# Patient Record
Sex: MALE | Race: OTHER | NOT HISPANIC OR LATINO
[De-identification: names, ages, dates, MRNs, and addresses within clinical notes are randomized per-mention and may not be internally consistent; named-entity substitution may affect disease eponyms.]

---

## 2021-03-24 ENCOUNTER — TRANSCRIPTION ENCOUNTER (OUTPATIENT)
Age: 61
End: 2021-03-24

## 2021-03-25 ENCOUNTER — INPATIENT (INPATIENT)
Facility: HOSPITAL | Age: 61
LOS: 8 days | Discharge: HOME | End: 2021-04-03
Attending: HOSPITALIST | Admitting: HOSPITALIST
Payer: MEDICAID

## 2021-03-25 VITALS
OXYGEN SATURATION: 100 % | DIASTOLIC BLOOD PRESSURE: 87 MMHG | HEART RATE: 73 BPM | TEMPERATURE: 99 F | SYSTOLIC BLOOD PRESSURE: 137 MMHG | RESPIRATION RATE: 17 BRPM

## 2021-03-25 DIAGNOSIS — R56.9 UNSPECIFIED CONVULSIONS: ICD-10-CM

## 2021-03-25 LAB
ALBUMIN SERPL ELPH-MCNC: 3.8 G/DL — SIGNIFICANT CHANGE UP (ref 3.3–5)
ALP SERPL-CCNC: 95 U/L — SIGNIFICANT CHANGE UP (ref 40–120)
ALT FLD-CCNC: 54 U/L — HIGH (ref 4–41)
ANION GAP SERPL CALC-SCNC: 12 MMOL/L — SIGNIFICANT CHANGE UP (ref 7–14)
APTT BLD: 39.9 SEC — HIGH (ref 27–36.3)
AST SERPL-CCNC: 43 U/L — HIGH (ref 4–40)
BASE EXCESS BLDV CALC-SCNC: -4.8 MMOL/L — LOW (ref -3–2)
BASOPHILS # BLD AUTO: 0 K/UL — SIGNIFICANT CHANGE UP (ref 0–0.2)
BASOPHILS NFR BLD AUTO: 0 % — SIGNIFICANT CHANGE UP (ref 0–2)
BILIRUB SERPL-MCNC: 0.6 MG/DL — SIGNIFICANT CHANGE UP (ref 0.2–1.2)
BLD GP AB SCN SERPL QL: NEGATIVE — SIGNIFICANT CHANGE UP
BLOOD GAS VENOUS - CREATININE: 3 MG/DL — HIGH (ref 0.5–1.3)
BLOOD GAS VENOUS COMPREHENSIVE RESULT: SIGNIFICANT CHANGE UP
BUN SERPL-MCNC: 50 MG/DL — HIGH (ref 7–23)
CALCIUM SERPL-MCNC: 8.4 MG/DL — SIGNIFICANT CHANGE UP (ref 8.4–10.5)
CHLORIDE BLDV-SCNC: 115 MMOL/L — HIGH (ref 96–108)
CHLORIDE SERPL-SCNC: 107 MMOL/L — SIGNIFICANT CHANGE UP (ref 98–107)
CO2 SERPL-SCNC: 19 MMOL/L — LOW (ref 22–31)
CREAT SERPL-MCNC: 2.92 MG/DL — HIGH (ref 0.5–1.3)
EOSINOPHIL # BLD AUTO: 0 K/UL — SIGNIFICANT CHANGE UP (ref 0–0.5)
EOSINOPHIL NFR BLD AUTO: 0 % — SIGNIFICANT CHANGE UP (ref 0–6)
GAS PNL BLDV: 136 MMOL/L — SIGNIFICANT CHANGE UP (ref 136–146)
GIANT PLATELETS BLD QL SMEAR: PRESENT — SIGNIFICANT CHANGE UP
GLUCOSE BLDV-MCNC: 160 MG/DL — HIGH (ref 70–99)
GLUCOSE SERPL-MCNC: 167 MG/DL — HIGH (ref 70–99)
HCO3 BLDV-SCNC: 20 MMOL/L — SIGNIFICANT CHANGE UP (ref 20–27)
HCT VFR BLD CALC: 31.3 % — LOW (ref 39–50)
HCT VFR BLD CALC: 31.8 % — LOW (ref 39–50)
HCT VFR BLDA CALC: 31.2 % — LOW (ref 39–51)
HGB BLD CALC-MCNC: 10.1 G/DL — LOW (ref 13–17)
HGB BLD-MCNC: 10 G/DL — LOW (ref 13–17)
HGB BLD-MCNC: 9.8 G/DL — LOW (ref 13–17)
IANC: 8.26 K/UL — SIGNIFICANT CHANGE UP (ref 1.5–8.5)
INR BLD: 1.01 RATIO — SIGNIFICANT CHANGE UP (ref 0.88–1.16)
LACTATE BLDV-MCNC: 2 MMOL/L — SIGNIFICANT CHANGE UP (ref 0.5–2)
LYMPHOCYTES # BLD AUTO: 0.4 K/UL — LOW (ref 1–3.3)
LYMPHOCYTES # BLD AUTO: 4.4 % — LOW (ref 13–44)
MACROCYTES BLD QL: SIGNIFICANT CHANGE UP
MANUAL SMEAR VERIFICATION: SIGNIFICANT CHANGE UP
MCHC RBC-ENTMCNC: 30 PG — SIGNIFICANT CHANGE UP (ref 27–34)
MCHC RBC-ENTMCNC: 30.2 PG — SIGNIFICANT CHANGE UP (ref 27–34)
MCHC RBC-ENTMCNC: 31.3 GM/DL — LOW (ref 32–36)
MCHC RBC-ENTMCNC: 31.4 GM/DL — LOW (ref 32–36)
MCV RBC AUTO: 95.7 FL — SIGNIFICANT CHANGE UP (ref 80–100)
MCV RBC AUTO: 96.1 FL — SIGNIFICANT CHANGE UP (ref 80–100)
MICROCYTES BLD QL: SLIGHT — SIGNIFICANT CHANGE UP
MONOCYTES # BLD AUTO: 0.56 K/UL — SIGNIFICANT CHANGE UP (ref 0–0.9)
MONOCYTES NFR BLD AUTO: 6.2 % — SIGNIFICANT CHANGE UP (ref 2–14)
NEUTROPHILS # BLD AUTO: 7.63 K/UL — HIGH (ref 1.8–7.4)
NEUTROPHILS NFR BLD AUTO: 84.1 % — HIGH (ref 43–77)
NRBC # BLD: 0 /100 WBCS — SIGNIFICANT CHANGE UP
NRBC # FLD: 0 K/UL — SIGNIFICANT CHANGE UP
OVALOCYTES BLD QL SMEAR: SLIGHT — SIGNIFICANT CHANGE UP
PCO2 BLDV: 41 MMHG — LOW (ref 42–55)
PH BLDV: 7.31 — LOW (ref 7.32–7.43)
PLAT MORPH BLD: NORMAL — SIGNIFICANT CHANGE UP
PLATELET # BLD AUTO: 88 K/UL — LOW (ref 150–400)
PLATELET # BLD AUTO: 97 K/UL — LOW (ref 150–400)
PLATELET COUNT - ESTIMATE: ABNORMAL
PO2 BLDV: 54 MMHG — HIGH (ref 35–40)
POTASSIUM BLDV-SCNC: 5.2 MMOL/L — HIGH (ref 3.4–4.5)
POTASSIUM SERPL-MCNC: 5.5 MMOL/L — HIGH (ref 3.5–5.3)
POTASSIUM SERPL-SCNC: 5.5 MMOL/L — HIGH (ref 3.5–5.3)
PROT SERPL-MCNC: 6.1 G/DL — SIGNIFICANT CHANGE UP (ref 6–8.3)
PROTHROM AB SERPL-ACNC: 11.6 SEC — SIGNIFICANT CHANGE UP (ref 10.6–13.6)
RBC # BLD: 3.27 M/UL — LOW (ref 4.2–5.8)
RBC # BLD: 3.31 M/UL — LOW (ref 4.2–5.8)
RBC # FLD: 17.6 % — HIGH (ref 10.3–14.5)
RBC # FLD: 17.7 % — HIGH (ref 10.3–14.5)
RBC BLD AUTO: NORMAL — SIGNIFICANT CHANGE UP
RH IG SCN BLD-IMP: POSITIVE — SIGNIFICANT CHANGE UP
SAO2 % BLDV: 84.2 % — SIGNIFICANT CHANGE UP (ref 60–85)
SARS-COV-2 RNA SPEC QL NAA+PROBE: DETECTED
SODIUM SERPL-SCNC: 138 MMOL/L — SIGNIFICANT CHANGE UP (ref 135–145)
VARIANT LYMPHS # BLD: 5.3 % — SIGNIFICANT CHANGE UP (ref 0–6)
WBC # BLD: 10.88 K/UL — HIGH (ref 3.8–10.5)
WBC # BLD: 9.07 K/UL — SIGNIFICANT CHANGE UP (ref 3.8–10.5)
WBC # FLD AUTO: 10.88 K/UL — HIGH (ref 3.8–10.5)
WBC # FLD AUTO: 9.07 K/UL — SIGNIFICANT CHANGE UP (ref 3.8–10.5)

## 2021-03-25 PROCEDURE — 99285 EMERGENCY DEPT VISIT HI MDM: CPT | Mod: 25

## 2021-03-25 PROCEDURE — 41252 REPAIR TONGUE LACERATION: CPT

## 2021-03-25 PROCEDURE — 70450 CT HEAD/BRAIN W/O DYE: CPT | Mod: 26

## 2021-03-25 RX ORDER — MORPHINE SULFATE 50 MG/1
4 CAPSULE, EXTENDED RELEASE ORAL ONCE
Refills: 0 | Status: DISCONTINUED | OUTPATIENT
Start: 2021-03-25 | End: 2021-03-25

## 2021-03-25 RX ORDER — ACETAMINOPHEN 500 MG
650 TABLET ORAL ONCE
Refills: 0 | Status: DISCONTINUED | OUTPATIENT
Start: 2021-03-25 | End: 2021-03-25

## 2021-03-25 RX ORDER — CHLORHEXIDINE GLUCONATE 213 G/1000ML
15 SOLUTION TOPICAL EVERY 12 HOURS
Refills: 0 | Status: DISCONTINUED | OUTPATIENT
Start: 2021-03-25 | End: 2021-03-30

## 2021-03-25 RX ORDER — PROPOFOL 10 MG/ML
50 INJECTION, EMULSION INTRAVENOUS
Qty: 1000 | Refills: 0 | Status: DISCONTINUED | OUTPATIENT
Start: 2021-03-25 | End: 2021-03-26

## 2021-03-25 RX ORDER — LEVETIRACETAM 250 MG/1
1000 TABLET, FILM COATED ORAL
Refills: 0 | Status: DISCONTINUED | OUTPATIENT
Start: 2021-03-25 | End: 2021-03-25

## 2021-03-25 RX ORDER — TRANEXAMIC ACID 100 MG/ML
5 INJECTION, SOLUTION INTRAVENOUS ONCE
Refills: 0 | Status: COMPLETED | OUTPATIENT
Start: 2021-03-25 | End: 2021-03-25

## 2021-03-25 RX ORDER — SODIUM CHLORIDE 9 MG/ML
1000 INJECTION INTRAMUSCULAR; INTRAVENOUS; SUBCUTANEOUS ONCE
Refills: 0 | Status: COMPLETED | OUTPATIENT
Start: 2021-03-25 | End: 2021-03-25

## 2021-03-25 RX ORDER — ACETAMINOPHEN 500 MG
1000 TABLET ORAL ONCE
Refills: 0 | Status: COMPLETED | OUTPATIENT
Start: 2021-03-25 | End: 2021-03-25

## 2021-03-25 RX ORDER — HEPARIN SODIUM 5000 [USP'U]/ML
5000 INJECTION INTRAVENOUS; SUBCUTANEOUS EVERY 8 HOURS
Refills: 0 | Status: DISCONTINUED | OUTPATIENT
Start: 2021-03-26 | End: 2021-04-02

## 2021-03-25 RX ORDER — SODIUM CHLORIDE 9 MG/ML
1000 INJECTION, SOLUTION INTRAVENOUS
Refills: 0 | Status: DISCONTINUED | OUTPATIENT
Start: 2021-03-25 | End: 2021-03-28

## 2021-03-25 RX ORDER — LEVETIRACETAM 250 MG/1
1000 TABLET, FILM COATED ORAL ONCE
Refills: 0 | Status: COMPLETED | OUTPATIENT
Start: 2021-03-25 | End: 2021-03-25

## 2021-03-25 RX ORDER — CHLORHEXIDINE GLUCONATE 213 G/1000ML
1 SOLUTION TOPICAL
Refills: 0 | Status: DISCONTINUED | OUTPATIENT
Start: 2021-03-25 | End: 2021-04-03

## 2021-03-25 RX ORDER — ENOXAPARIN SODIUM 100 MG/ML
40 INJECTION SUBCUTANEOUS EVERY 24 HOURS
Refills: 0 | Status: DISCONTINUED | OUTPATIENT
Start: 2021-03-25 | End: 2021-03-25

## 2021-03-25 RX ORDER — DIPHENHYDRAMINE HCL 50 MG
50 CAPSULE ORAL ONCE
Refills: 0 | Status: COMPLETED | OUTPATIENT
Start: 2021-03-25 | End: 2021-03-25

## 2021-03-25 RX ORDER — DEXAMETHASONE 0.5 MG/5ML
10 ELIXIR ORAL ONCE
Refills: 0 | Status: COMPLETED | OUTPATIENT
Start: 2021-03-25 | End: 2021-03-25

## 2021-03-25 RX ORDER — TETANUS TOXOID, REDUCED DIPHTHERIA TOXOID AND ACELLULAR PERTUSSIS VACCINE, ADSORBED 5; 2.5; 8; 8; 2.5 [IU]/.5ML; [IU]/.5ML; UG/.5ML; UG/.5ML; UG/.5ML
0.5 SUSPENSION INTRAMUSCULAR ONCE
Refills: 0 | Status: COMPLETED | OUTPATIENT
Start: 2021-03-25 | End: 2021-03-25

## 2021-03-25 RX ADMIN — Medication 1000 MILLIGRAM(S): at 10:33

## 2021-03-25 RX ADMIN — SODIUM CHLORIDE 1000 MILLILITER(S): 9 INJECTION INTRAMUSCULAR; INTRAVENOUS; SUBCUTANEOUS at 07:40

## 2021-03-25 RX ADMIN — TETANUS TOXOID, REDUCED DIPHTHERIA TOXOID AND ACELLULAR PERTUSSIS VACCINE, ADSORBED 0.5 MILLILITER(S): 5; 2.5; 8; 8; 2.5 SUSPENSION INTRAMUSCULAR at 08:24

## 2021-03-25 RX ADMIN — SODIUM CHLORIDE 100 MILLILITER(S): 9 INJECTION, SOLUTION INTRAVENOUS at 23:24

## 2021-03-25 RX ADMIN — PROPOFOL 27 MICROGRAM(S)/KG/MIN: 10 INJECTION, EMULSION INTRAVENOUS at 23:24

## 2021-03-25 RX ADMIN — MORPHINE SULFATE 4 MILLIGRAM(S): 50 CAPSULE, EXTENDED RELEASE ORAL at 12:40

## 2021-03-25 RX ADMIN — MORPHINE SULFATE 4 MILLIGRAM(S): 50 CAPSULE, EXTENDED RELEASE ORAL at 12:45

## 2021-03-25 RX ADMIN — Medication 102 MILLIGRAM(S): at 12:40

## 2021-03-25 RX ADMIN — TRANEXAMIC ACID 5 MILLILITER(S): 100 INJECTION, SOLUTION INTRAVENOUS at 10:13

## 2021-03-25 RX ADMIN — LEVETIRACETAM 400 MILLIGRAM(S): 250 TABLET, FILM COATED ORAL at 11:09

## 2021-03-25 RX ADMIN — Medication 400 MILLIGRAM(S): at 10:03

## 2021-03-25 RX ADMIN — Medication 50 MILLIGRAM(S): at 12:40

## 2021-03-25 NOTE — ED ADULT TRIAGE NOTE - CHIEF COMPLAINT QUOTE
alert oriented  s/p witnessed seizure while in bed was post ictal on EMS arrival no hx seizures  bit his tongue no other injury  hx anemia  takes lovenox and eliquis (stopped taking it 4 days ago) due for bone marrow test   poor historian   on scene

## 2021-03-25 NOTE — ED ADULT NURSE NOTE - CAS TRG GEN SKIN COLOR
52 yo M no PMHx p/w R knee pain/swelling. Pt initially had a bursitis drained from the right knee in early December. It became infected and he was started on Keflex. The knee continued to be red and swollen and on 3/12 it was drained again and grew MRSA. He was started on Bactrim last Tuesday and today followed up with Dr. Gaytan (orthopedics) and was told to go to the ER. He has been ambulating but not been bearing much weight on the right leg. He has had some chills yesterday, no fevers, N/V, abd pain. He had an MRI on 2/6. Normal for race

## 2021-03-25 NOTE — CONSULT NOTE ADULT - ATTENDING COMMENTS
Patient with seizure disorder and PE on lovenox s/p emergent trach after tongue laceration.  N mentating, pain controlled  resp trach in place, on minimal vent settings plan for trach collar  cv hemodynamically stable  gi npo consider ngt and feeding  gu/renal monitor uop  heme holding therapeutic ac in setting of tongue laceration, will discuss with ent  id no changes, discuss with id regarding covid status given history of vaccination  endo no changes    The patient is a critical care patient with life threatening hemodynamic and metabolic instability in SICU.  I have personally interviewed when possible and examined the patient, reviewed data and laboratory tests/x-rays and all pertinent electronic images.  I was physically present for the key portions of the evaluation and management (E/M) service provided.   The SICU team has a constant risk benefit analyzes discussion with the primary team, all consultants, House Staff and PA's on all decisions.  These diagnoses are unrelated to the surgical procedure noted above.  I meet with family if needed to get further history, discuss the case and make care decisions for this patient who might not be able to participate.  Time involved in performance of separately billable procedures was not counted toward my critical care time. There is no overlap.  I spent 55-75 minutes ( 0800Hrs-0915Hrs in AM/ 1600Hrs-1715Hrs in PM, or other time indicated) of critical care time for the diagnoses, assessment, plan and interventions.  This time excludes time spent on separate procedures and teaching.
Patient seen and examined with neurology team and above note reviewed and I agree with assessment and plan as outlined. Patient hx as noted and found to be COVID positive along with recent PE and now with 2 generalized tonic clonic seizures.   He is awake and alert and follows commands and no focal motor weakness   He has a tongue laceration.  No abnormal reflexes and no sensory loss.     Awaiting EEG and MRI brain     Assessment and plan: Patient with COVID and recent PE here with 2 Generalized seizures and now started on keppra     Would increase the keppra to 750 mg BID and continue seizure precautions     Obtain 24 hour EEG and MRI brain with epilepsy protocol     Continue PACU mgt and supportive care     All questions answered and patient understood plan of care.

## 2021-03-25 NOTE — ED PROVIDER NOTE - PHYSICAL EXAMINATION
2.5cm laceration to anterior tongue, deep about 1cm. 1.5cm lac to base of R tongue at gingival border.

## 2021-03-25 NOTE — H&P ADULT - HISTORY OF PRESENT ILLNESS
60 yo M s/p seizure this morning sustaining tongue lac and uncontrolled heme presents to [facility] ED. Pt denies hx of seizures. Pt admits to laceration to his tongue. Denies chest pain, sob, abd pain, n/v/d, numbness, tingling, weakness, dizziness, fever or chills. Pt states that he had Covid x1-2 months ago, and received his 2nd dose of the vaccine yesterday. Pt also states that he was recently dx w/ anemia and is currently being worked up for cancer but has no dx.

## 2021-03-25 NOTE — CONSULT NOTE ADULT - SUBJECTIVE AND OBJECTIVE BOX
CC: tongue laceration    HPI: 60 y/o male with seizure disorder that presented to [facility] ED via EMS actively having seizures. Patient bit his tongue causing several lacerations. ED sutured one lac. other still needs closure. Patient c/o tongue swelling and severe pain. called to evaluate airway. patient denies any sob, dyspnea or other complaints.      PAST MEDICAL & SURGICAL HISTORY:  HTN (hypertension)      Allergies    No Known Allergies    Intolerances      MEDICATIONS  (STANDING):    MEDICATIONS  (PRN):      ROS:   ENT: all negative except as noted in HPI   CV: denies palpitations  Pulm: denies SOB, cough, hemoptysis  GI: denies change in apetite, indigestion, n/v  : denies pertinent urinary symptoms, urgency  Neuro: denies numbness/tingling, loss of sensation  Psych: denies anxiety  MS: denies muscle weakness, instability  Heme: denies easy bruising or bleeding  Endo: denies heat/cold intolerance, excessive sweating  Vascular: denies LE edema    Vital Signs Last 24 Hrs  T(C): 37.1 (25 Mar 2021 06:38), Max: 37.1 (25 Mar 2021 06:38)  T(F): 98.7 (25 Mar 2021 06:38), Max: 98.7 (25 Mar 2021 06:38)  HR: 71 (25 Mar 2021 07:41) (71 - 73)  BP: 160/92 (25 Mar 2021 07:41) (137/87 - 160/92)  BP(mean): --  RR: 18 (25 Mar 2021 07:41) (17 - 18)  SpO2: 100% (25 Mar 2021 07:41) (100% - 100%)                          9.8    9.07  )-----------( 97       ( 25 Mar 2021 07:41 )             31.3    03-25    138  |  107  |  50<H>  ----------------------------<  167<H>  5.5<H>   |  19<L>  |  2.92<H>    Ca    8.4      25 Mar 2021 07:41    TPro  6.1  /  Alb  3.8  /  TBili  0.6  /  DBili  x   /  AST  43<H>  /  ALT  54<H>  /  AlkPhos  95  03-25   PT/INR - ( 25 Mar 2021 07:41 )   PT: 11.6 sec;   INR: 1.01 ratio         PTT - ( 25 Mar 2021 07:41 )  PTT:39.9 sec    PHYSICAL EXAM:  Gen: NAD  Skin: No rashes, bruises, or lesions  Head: Normocephalic, Atraumatic  Face: no edema, erythema, or fluctuance. Parotid glands soft without mass  Eyes: no scleral injection  Ears: Right - ear canal clear, TM intact without effusion or erythema. No evidence of any fluid drainage. No mastoid tenderness, erythema, or ear bulging            Left - ear canal clear, TM intact without effusion or erythema. No evidence of any fluid drainage. No mastoid tenderness, erythema, or ear bulging  Nose: Nares bilaterally patent, no discharge  Mouth: No Stridor / Drooling / Trismus.  tongue hematoma, unable to visualize posterior pharynx.   Neck: Flat, supple, no lymphadenopathy, trachea midline, no masses  Lymphatic: No lymphadenopathy  Resp: breathing easily, no stridor  CV: no peripheral edema/cyanosis  GI: nondistended   Peripheral vascular: no JVD or edema  Neuro: facial nerve intact, no facial droop      Diagnostic Nasal Endoscopy: no nasal polyps    Fiberoptic Indirect laryngoscopy: + blood in posterior pharynx, no active bleeding identified. epiglottis sharp, VC mobile/patent. slight fullness to arytenoids.      IMAGING/ADDITIONAL STUDIES:

## 2021-03-25 NOTE — BRIEF OPERATIVE NOTE - OPERATION/FINDINGS
Awake urgent tracheostomy followed by exploration and evacuation of tongue hematoma with repair of tongue lacerations.

## 2021-03-25 NOTE — H&P ADULT - NSHPLABSRESULTS_GEN_ALL_CORE
CBC Full  -  ( 25 Mar 2021 15:21 )  WBC Count : 10.88 K/uL  RBC Count : 3.31 M/uL  Hemoglobin : 10.0 g/dL  Hematocrit : 31.8 %  Platelet Count - Automated : 88 K/uL  Mean Cell Volume : 96.1 fL  Mean Cell Hemoglobin : 30.2 pg  Mean Cell Hemoglobin Concentration : 31.4 gm/dL  Auto Neutrophil # : x  Auto Lymphocyte # : x  Auto Monocyte # : x  Auto Eosinophil # : x  Auto Basophil # : x  Auto Neutrophil % : x  Auto Lymphocyte % : x  Auto Monocyte % : x  Auto Eosinophil % : x  Auto Basophil % : x

## 2021-03-25 NOTE — ED PROVIDER NOTE - OBJECTIVE STATEMENT
60 y/o male pmh htn, PE on lovenox c/o seizure x today. Pt states that he went to bed last night and felt fine. Pt woke up this AM on the ground surrounded by EMS. Pt states that his wife said he was shaking prompting her to thea EMS. Pt denies hx of seizures. Pt admits to laceration to his tongue. Denies chest pain, sob, abd pain, n/v/d, numbness, tingling, weakness, dizziness, fever or chills. Of note, pt states that he had Covid x1-2 months ago, and received his 2nd dose of the vaccine yesterday. Pt also staets that he was recently dx w/ anemia and is currently being worked up for cancer but has no diagnosis as of yet.

## 2021-03-25 NOTE — PROGRESS NOTE ADULT - SUBJECTIVE AND OBJECTIVE BOX
Patient is a 61y old  Male who presents with a chief complaint of seizure    HPI: Pt experience seizure episode on 3/24/2021.      PAST MEDICAL & SURGICAL HISTORY:  HTN (hypertension)    MEDICATIONS  (STANDING):  levETIRAcetam  IVPB 1000 milliGRAM(s) IV Intermittent once    MEDICATIONS  (PRN):      Allergies    No Known Allergies    Intolerances    Vital Signs Last 24 Hrs  T(C): 37.1 (25 Mar 2021 06:38), Max: 37.1 (25 Mar 2021 06:38)  T(F): 98.7 (25 Mar 2021 06:38), Max: 98.7 (25 Mar 2021 06:38)  HR: 71 (25 Mar 2021 07:41) (71 - 73)  BP: 160/92 (25 Mar 2021 07:41) (137/87 - 160/92)  BP(mean): --  RR: 18 (25 Mar 2021 07:41) (17 - 18)  SpO2: 100% (25 Mar 2021 07:41) (100% - 100%)    EOE:    Facial bones and MOM grossly intact             ( - ) swelling             ( - ) asymmetry             ( - ) palpation             ( - ) SOB             ( +) dysphagia             ( - ) LOC    IOE:   permanent dentition: grossly intact           tongue/FOM laceration sutured on dorsal tongue along midline; laceration also noted on right posterior lateral and ventral border of tongue           labial/buccal mucosa WNL    Radiographs: None    RADIOLOGY & ADDITIONAL STUDIES: N/A    ASSESSMENT: Limited bedside exam completed. Pt's tongue appeared swollen and obstructed visibility of full oral cavity. Difficult to visualize extent of laceration on right posterior lateral and ventral surface of tongue due to swollen tongue and extensive bleeding. Laceration on dorsal tongue along midline already had suture in place. No critical damage to dentition noted on either upper or lower arches. Reviewed clinical findings with oral surgery chief, Dr. Wilner Lou, and recommended firm gauze pressure to achieve hemostasis.    PROCEDURE:  Verbal consent given. Gauze pressure applied to right tongue laceration site.    RECOMMENDATIONS:   1) Apply firm gauze pressure with TXA, if needed, to achieve hemostasis; limit use of suction as it may dislodge blood clot and interfere with wound healing  2) If bleeding persists, page oral and maxillofacial surgery  3) Dental F/U with outpatient dentist for comprehensive dental care.   4) If any difficulty swallowing/breathing, fever occur, page dental.     Viral ADELAIDE Tabares pager #[phone]  Dre Holt, pager #[phone]

## 2021-03-25 NOTE — ED PROVIDER NOTE - CARE PLAN
Principal Discharge DX:	Seizure   Principal Discharge DX:	Seizure  Secondary Diagnosis:	Tongue laceration, initial encounter

## 2021-03-25 NOTE — ED PROVIDER NOTE - CLINICAL SUMMARY MEDICAL DECISION MAKING FREE TEXT BOX
radha: pt presents with first time sz.  pt is very poor historian.  speech is garbled, laceration to tongue.  pt is alert.  moves all extremities.  pt was on eliquis but was stopped to get a test- ?bone marrow.  pt follows at [facility].  needs urgent ct and more collateral information.  afebrile here radha: pt presents with first time sz.  pt is very poor historian.  speech is garbled, pt with accent as well,  laceration to tongue.  pt is alert.  airway is patent moves all extremities.  pt was on eliquis but was stopped to get a test- ?bone marrow.  pt follows at [facility].  needs urgent ct and more collateral information.  afebrile here signed out 8am to Dr. Phan

## 2021-03-25 NOTE — CONSULT NOTE ADULT - SUBJECTIVE AND OBJECTIVE BOX
Incomplete    Neurology  Consult Note  03-25-21    Name:  ANNEMARIE KELLEY; 61y (1960)    Reason for Admission: Convulsions        Chief Complaint:  HPI:  62yo man with a PMHx of PE on lovenox, HTN, currently being worked up for 'blood cancer' per report, presents with complaints of first time seizure event. Patient's speech currently limited by significant lateral R. tongue laceration w/o hemostasis. Patient reports that he went to bed normal last night and woke with his wife and EMS around him, lethargic and mildly confused for <10mins. Wife states that the patient experienced     Review of Systems:  As states in HPI.        PMHx:   HTN (hypertension)      PFHx:     PSuHx:     PSoHx:     Marital Status:  (   )    (   ) Single    (   )    (  )   Occupation:   Lives with: (  ) alone  (  ) children   (  ) spouse   (  ) parents  (  ) other  Recent Travel:     Substance Use (street drugs): (  ) never used  (  ) other:  Tobacco Usage:  (   ) never smoked   (   ) former smoker   (   ) current smoker  (     ) pack year  Alcohol Usage:  Sexual History:         Medications:  MEDICATIONS  (STANDING):    MEDICATIONS  (PRN):    Vitals:  T(C): 37.1 (03-25-21 @ 06:38), Max: 37.1 (03-25-21 @ 06:38)  HR: 71 (03-25-21 @ 07:41) (71 - 73)  BP: 160/92 (03-25-21 @ 07:41) (137/87 - 160/92)  RR: 18 (03-25-21 @ 07:41) (17 - 18)  SpO2: 100% (03-25-21 @ 07:41) (100% - 100%)    Labs:                        9.8    9.07  )-----------( 97       ( 25 Mar 2021 07:41 )             31.3     03-25    138  |  107  |  50<H>  ----------------------------<  167<H>  5.5<H>   |  19<L>  |  2.92<H>    Ca    8.4      25 Mar 2021 07:41    TPro  6.1  /  Alb  3.8  /  TBili  0.6  /  DBili  x   /  AST  43<H>  /  ALT  54<H>  /  AlkPhos  95  03-25    CAPILLARY BLOOD GLUCOSE        LIVER FUNCTIONS - ( 25 Mar 2021 07:41 )  Alb: 3.8 g/dL / Pro: 6.1 g/dL / ALK PHOS: 95 U/L / ALT: 54 U/L / AST: 43 U/L / GGT: x             PT/INR - ( 25 Mar 2021 07:41 )   PT: 11.6 sec;   INR: 1.01 ratio         PTT - ( 25 Mar 2021 07:41 )  PTT:39.9 sec  CSF:                      PHYSICAL EXAMINATION:  General: Well-developed, well nourished, in no acute distress.  Eyes: Conjunctiva and sclera clear.  Neurologic:  - Mental Status:  Alert, awake, oriented to person, place, and time; Speech is fluent with intact naming, repetition, and comprehension; Good overall fund of knowledge; Immediate recall is 3/3 words and delayed recall is 3/3 words at 5 minutes; Able to spell WORLD backwards and perform serial 7 subtraction; Able to read and write a sentence.  - Cranial Nerves II-XII:    II:  Visual fields are full to confrontation; Pupils are equal, round, and reactive to light; Visual acuity is 20/20 bilaterally; Fundoscopic exam is normal with sharp discs.  III, IV, VI:  Extraocular movements are intact without nystagmus.  V:  Facial sensation is intact in the V1-V3 distribution bilaterally.  VII:  Face is symmetric with normal eye closure and smile  VIII:  Hearing is intact to finger rub.  IX, X:  Uvula is midline and soft palate rises symmetrically  XI:  Head turning and shoulder shrug are intact.  XII:  Tongue protudes in the midline.  - Motor:  Strength is 5/5 throughout.  There is no pronator drift.  Normal muscle bulk and tone throughout.  - Reflexes:  2+ and symmetric at the biceps, triceps, brachioradialis, knees, and ankles.  Plantar responses flexor.  - Sensory:  Intact throughout to vibration, and joint-position, light touch, pin prick.  - Coordination:  Finger-nose-finger and heel-knee-shin intact without dysmetria.  Rapid alternating hand movements intact.  - Gait:   Normal steps, base, arm swing, and turning.  Heel and toe walking are normal.  Tandem gait is normal.  Romberg testing is negative.    Radiology:  CT Head No Cont:  (25 Mar 2021 07:44)      Impression:      Plan:    Legend:  [] Uncomplete task.  [P] Ordered and pending task.  [R] Imaging done, pending read.    [x] Completed task. Neurology  Consult Note  03-25-21    Name:  ANNEMARIE KELLEY; 61y (1960)    Reason for Admission: Convulsions    Chief Complaint:  Seizure    HPI:  62yo man with a PMHx of PE 1/2021 on lovenox, HTN, currently being worked up for 'blood cancer' per report, presents with complaints of first time seizure event. Patient's speech currently limited by significant lateral R. tongue laceration w/o hemostasis, being evaluated by ED/Dental. Patient reports that he went to bed normal last night and woke with his wife and EMS around him, lethargic and mildly confused for <10mins. Wife states that the patient experienced B/L lower extremity shaking quickly followed by b/l UE and whole body shaking associated w/ R. tongue laceration. Event lasted approx 5mins, resolved by the time EMS arrived, at baseline. Pt brought to ED.  In the ED, patient in moderate distress regarding tongue lac, but without other complaints. Patient hypertensive, mildly hyperkalemic, leukopenic. Reports no Hx or FHx of seizure activity. Denies illicit drug use, fevers, chills, ns, cp, sob, abd pain, n/v/d/c, weakness or changes to weight or senses.     Review of Systems:  As states in HPI.    PMHx:   HTN (hypertension)    Medications:  MEDICATIONS  (STANDING):    MEDICATIONS  (PRN):    Vitals:  T(C): 37.1 (03-25-21 @ 06:38), Max: 37.1 (03-25-21 @ 06:38)  HR: 71 (03-25-21 @ 07:41) (71 - 73)  BP: 160/92 (03-25-21 @ 07:41) (137/87 - 160/92)  RR: 18 (03-25-21 @ 07:41) (17 - 18)  SpO2: 100% (03-25-21 @ 07:41) (100% - 100%)    Labs:                        9.8    9.07  )-----------( 97       ( 25 Mar 2021 07:41 )             31.3     03-25    138  |  107  |  50<H>  ----------------------------<  167<H>  5.5<H>   |  19<L>  |  2.92<H>    Ca    8.4      25 Mar 2021 07:41    TPro  6.1  /  Alb  3.8  /  TBili  0.6  /  DBili  x   /  AST  43<H>  /  ALT  54<H>  /  AlkPhos  95  03-25    CAPILLARY BLOOD GLUCOSE        LIVER FUNCTIONS - ( 25 Mar 2021 07:41 )  Alb: 3.8 g/dL / Pro: 6.1 g/dL / ALK PHOS: 95 U/L / ALT: 54 U/L / AST: 43 U/L / GGT: x             PT/INR - ( 25 Mar 2021 07:41 )   PT: 11.6 sec;   INR: 1.01 ratio         PTT - ( 25 Mar 2021 07:41 )  PTT:39.9 sec    PHYSICAL EXAMINATION:  General: Well-developed, well nourished, in moderate acute distress.  Eyes: Conjunctiva and sclera clear.  Neurologic:  - Mental Status:  Alert, awake, oriented to person, place, and time; Speech is mechanically slurred, fluent with intact naming, repetition, and comprehension;   - Cranial Nerves II-XII:    II:  Visual fields are full to confrontation; Pupils are equal, round, and reactive to light;   III, IV, VI:  Extraocular movements are intact without nystagmus.  V:  Facial sensation is intact in the V1-V3 distribution bilaterally.  VII:  Face is symmetric with normal eye closure and smile  XI:  Head turning and shoulder shrug are intact.  - Motor:  Strength is 5/5 throughout.  There is no pronator drift.  Normal muscle bulk and tone throughout.  - Sensory:  Intact throughout to vibration, and joint-position, light touch, pin prick.  - Coordination:  Finger-nose-finger and heel-knee-shin intact without dysmetria.  Rapid alternating hand movements intact.    Radiology:  < from: CT Head No Cont (03.25.21 @ 07:44) >  IMPRESSION: No evidence of acute hemorrhage, mass or mass effect  ROBERTO PARKER M.D., ATTENDING RADIOLOGIST  This document has been electronically signed. Mar 25 2021  7:58AM  < end of copied text >

## 2021-03-25 NOTE — ED ADULT NURSE NOTE - OBJECTIVE STATEMENT
pt received to room 29, s/p witnessed seizure at home no hx seizures. pt appears to have urinated on self. pt bit tongue 3cm laceration to right side of tongue, mild bleeding noted. slurred speech noted. pt not sure of events. PMH HTN, "kidneys" not on dialysis. pt on Xarelto and Lovenox stopped taking them 4 days ago for unknown reasons.  pt has bruises in various stages of healing on abdomen from injections. MD at bedside for evaluation.

## 2021-03-25 NOTE — ED ADULT TRIAGE NOTE - CCCP TRG CHIEF CMPLNT
Spoke with NP seizures

## 2021-03-25 NOTE — CONSULT NOTE ADULT - TIME-BASED BILLING (NON-CRITICAL CARE)
Lumbosacral spine

 

HISTORY: Low back pain

 

5 views of lumbosacral spine

 

There is a levoscoliosis centered at L2. Bone mineralization is reduced which could limit sensitivity
. No evident spondylolysis. There is multilevel spondylosis. Minimal anterolisthesis grade 1 L3-4, L4
-5. Loss of disc height at the intervertebral levels shows associated vacuum phenomenon at L2-3, poss
ibly additional levels. Sclerosis present in the posterior elements of the lower lumbar spine compati
ble with facet arthropathy.

 

IMPRESSION: Degenerative disc disease, scoliosis, facet arthropathy and osteopenia. No acute fracture
 or subluxation.
Time-based billing (NON-critical care)

## 2021-03-25 NOTE — ED PROVIDER NOTE - ATTENDING CONTRIBUTION TO CARE
radha: pt presents with first time sz.  pt is very poor historian.  speech is garbled, laceration to tongue.  pt is alert.  moves all extremities.  pt was on eliquis but was stopped to get a test- ?bone marrow.  pt follows at [facility].  needs urgent ct and more collateral information.  afebrile here    I performed a history and physical exam of the patient and discussed their management with the resident and /or advanced care provider. I reviewed the resident and /or ACP's note and agree with the documented findings and plan of care. My medical decison making and observations are found above.

## 2021-03-25 NOTE — ED PROVIDER NOTE - PROGRESS NOTE DETAILS
MONICA Aguirre- Bleeding persistent from oral cavity, ENT/OMFS consulted. Gauze with TXA applied to area, did not help control bleeding OMFS concerned for worsening swelling to tongue, likely expanding hematoma from injury. Pt will be taken emergently to the OR for definitive management.

## 2021-03-25 NOTE — BRIEF OPERATIVE NOTE - NSICDXBRIEFPROCEDURE_GEN_ALL_CORE_FT
PROCEDURES:  Incision and drainage, lingual hematoma, tongue, intraoral approach 25-Mar-2021 17:47:42  Wilner Lou  Emergency transtracheal tracheostomy 25-Mar-2021 17:47:17  Wilner Lou

## 2021-03-25 NOTE — H&P ADULT - ASSESSMENT
60 yo M s/p seizure this morning sustaining tongue lac and uncontrolled heme now complicated by airway obstruction    -to OR for emergency Trach

## 2021-03-25 NOTE — ED ADULT NURSE REASSESSMENT NOTE - NS ED NURSE REASSESS COMMENT FT1
Pt awake and alert, breathing even and unlabored, vs as noted. Seizure precautions in placed. Will monitor

## 2021-03-25 NOTE — H&P ADULT - NSHPPHYSICALEXAM_GEN_ALL_CORE
· Physical Examination: 2.5cm laceration to anterior tongue repaired by ANNE MARIE ANDERSON, deep about 1cm. 1.5cm lac to base of R tongue at gingival border.  · CONSTITUTIONAL: Well appearing, awake, alert, oriented to person, place, time/situation and in acute distress.  · ENMT: Airway patent as of now, developing airway obstruction due to tongue hematoma, no oropharyngeal exudates.  · RESPIRATORY: Labored

## 2021-03-25 NOTE — ED ADULT NURSE REASSESSMENT NOTE - NS ED NURSE REASSESS COMMENT FT1
Pt's clothing put in a labeled patient belonging bag and placed inside the closet outside room 21 in the ED.

## 2021-03-25 NOTE — ED PROVIDER NOTE - SHIFT CHANGE DETAILS
pt in early stages of eval.  pt awake, alert and can communicate, has tongue laceration that will require closure,  was recently on eliquis, had 1st time seizure, concern for head bleed.

## 2021-03-25 NOTE — ED ADULT NURSE NOTE - BEFAST SPEECH SLURRED
Patient notified via Mychart.follow up as scheduled. Thanks, Julian Raza, DO
Patient notified via [employer]. Please repeat following labs in 6 months (FLP, CMP), follow up as scheduled. Thanks, Julian Raza, DO    If no word from patient, please touch base with her in 1 week  - I would like to increase Lipitor from 20 mg to 40 mg to drive LDL under 100.  If she agrees then we will renew lipid panel in 8 weeks with AST and ALT
No

## 2021-03-25 NOTE — ED ADULT TRIAGE NOTE - NS ED NURSE BANDS TYPE
"Telephone Encounter by Dalia Lai RN at 03/02/18 04:24 PM     Author:  Dalia Lai RN Service:  (none) Author Type:  Registered Nurse     Filed:  03/02/18 04:31 PM Encounter Date:  3/2/2018 Status:  Signed     :  Dalia Lai RN (Registered Nurse)            Telephone call from[DP1.1T] Rosann Collet, states that he has had some dementia but not like this. They went down to [facility] this weekend & on the way back he lost control of his bladder & bowel. He didn't know he even did this. States that he does wear a diaper but he will tell her he's dry & he's soaking wet. He has gotten angry at her & this is not him. He can't remember that he just spoke with someone 2 min earlier. She thinks he might have had a stroke while in [facility]. "" I'm worried & his family is worried. \""   Advised that he needs to go to ER now.   She will get help from his brother to convince him to go to ER.[DP1.1M]        Revision History        User Key Date/Time User Provider Type Action    > DP1.1 03/02/18 04:31 PM Dalia Lai RN Registered Nurse Sign    M - Manual, T - Template            " Name band;

## 2021-03-25 NOTE — CONSULT NOTE ADULT - SUBJECTIVE AND OBJECTIVE BOX
SICU Consultation Note  =====================================================  HPI: 60 y/o male PMHx HTN, PE on lovenox presented to [facility] ED c/o seizure today. Pt states that he went to bed last night and felt fine. Pt woke up this AM on the ground surrounded by EMS. Pt states that his wife said he was shaking prompting her to [facility]. Pt denies hx of seizures. Pt admits to laceration to his tongue. Denies chest pain, sob, abd pain, n/v/d, numbness, tingling, weakness, dizziness, fever or chills. Of note, pt states that he had Covid x1-2 months ago, and received his 2nd dose of the vaccine yesterday.   Patient was taken for awake urgent tracheostomy followed by exploration and evacuation of tongue hematoma with repair of tongue lacerations.     EBL: 40ml    Current medications:    Hematologic/Oncologic Medications  enoxaparin Injectable 40 milliGRAM(s) SubCutaneous every 24 hours    Topical/Other Medications  chlorhexidine 0.12% Liquid 15 milliLiter(s) Oral Mucosa every 12 hours  chlorhexidine 4% Liquid 1 Application(s) Topical <User Schedule>    --------------------------------------------------------------------------------------    ICU Vital Signs Last 24 Hrs  T(C): 36.6 (25 Mar 2021 18:15), Max: 37.1 (25 Mar 2021 06:38)  T(F): 97.8 (25 Mar 2021 18:15), Max: 98.7 (25 Mar 2021 06:38)  HR: 62 (25 Mar 2021 19:00) (62 - 73)  BP: 126/74 (25 Mar 2021 19:00) (122/74 - 162/91)  BP(mean): 90 (25 Mar 2021 18:30) (90 - 118)  RR: 22 (25 Mar 2021 19:00) (17 - 22)  SpO2: 100% (25 Mar 2021 19:00) (100% - 100%)    --------------------------------------------------------------------------------------      --------------------------------------------------------------------------------------    EXAM  NEUROLOGY  Exam: intubated, sedated    HEENT  Exam: Normocephalic, atraumatic.  Large lingual hematoma, bite block in place.   Trach sutured in place.    RESPIRATORY  Exam: intubated on mechanical ventilation     CARDIOVASCULAR  Exam: S1, S2.  Regular rate and rhythm.  Peripheral edema  ***    GI/NUTRITION  Exam: Abdomen soft, Non-tender, Non-distended.       HEMATOLOGIC  [x] DVT Prophylaxis: enoxaparin Injectable 40 milliGRAM(s) SubCutaneous every 24 hours    Transfusions:	[] PRBC	[x] Platelets		[] FFP	[] Cryoprecipitate    --------------------------------------------------------------------------------------    LABS:  cret                        10.0   10.88 )-----------( 88       ( 25 Mar 2021 15:21 )             31.8     03-25    138  |  107  |  50<H>  ----------------------------<  167<H>  5.5<H>   |  19<L>  |  2.92<H>    Ca    8.4      25 Mar 2021 07:41    TPro  6.1  /  Alb  3.8  /  TBili  0.6  /  DBili  x   /  AST  43<H>  /  ALT  54<H>  /  AlkPhos  95  03-25    PT/INR - ( 25 Mar 2021 07:41 )   PT: 11.6 sec;   INR: 1.01 ratio         PTT - ( 25 Mar 2021 07:41 )  PTT:39.9 sec  --------------------------------------------------------------------------------------

## 2021-03-26 LAB
ANION GAP SERPL CALC-SCNC: 13 MMOL/L — SIGNIFICANT CHANGE UP (ref 7–14)
BUN SERPL-MCNC: 57 MG/DL — HIGH (ref 7–23)
CALCIUM SERPL-MCNC: 8.2 MG/DL — LOW (ref 8.4–10.5)
CHLORIDE SERPL-SCNC: 108 MMOL/L — HIGH (ref 98–107)
CK SERPL-CCNC: 93 U/L — SIGNIFICANT CHANGE UP (ref 30–200)
CO2 SERPL-SCNC: 18 MMOL/L — LOW (ref 22–31)
COVID-19 SPIKE DOMAIN AB INTERP: POSITIVE
COVID-19 SPIKE DOMAIN ANTIBODY RESULT: >250 U/ML — HIGH
CREAT SERPL-MCNC: 3 MG/DL — HIGH (ref 0.5–1.3)
GLUCOSE BLDC GLUCOMTR-MCNC: 119 MG/DL — HIGH (ref 70–99)
GLUCOSE BLDC GLUCOMTR-MCNC: 156 MG/DL — HIGH (ref 70–99)
GLUCOSE SERPL-MCNC: 151 MG/DL — HIGH (ref 70–99)
HCT VFR BLD CALC: 25.4 % — LOW (ref 39–50)
HCV AB S/CO SERPL IA: 0.06 S/CO — SIGNIFICANT CHANGE UP (ref 0–0.99)
HCV AB SERPL-IMP: SIGNIFICANT CHANGE UP
HGB BLD-MCNC: 7.9 G/DL — LOW (ref 13–17)
MAGNESIUM SERPL-MCNC: 2.1 MG/DL — SIGNIFICANT CHANGE UP (ref 1.6–2.6)
MCHC RBC-ENTMCNC: 29.6 PG — SIGNIFICANT CHANGE UP (ref 27–34)
MCHC RBC-ENTMCNC: 31.1 GM/DL — LOW (ref 32–36)
MCV RBC AUTO: 95.1 FL — SIGNIFICANT CHANGE UP (ref 80–100)
NRBC # BLD: 0 /100 WBCS — SIGNIFICANT CHANGE UP
NRBC # FLD: 0 K/UL — SIGNIFICANT CHANGE UP
PHOSPHATE SERPL-MCNC: 5.5 MG/DL — HIGH (ref 2.5–4.5)
PLATELET # BLD AUTO: 122 K/UL — LOW (ref 150–400)
POTASSIUM SERPL-MCNC: 5.4 MMOL/L — HIGH (ref 3.5–5.3)
POTASSIUM SERPL-SCNC: 5.4 MMOL/L — HIGH (ref 3.5–5.3)
RBC # BLD: 2.67 M/UL — LOW (ref 4.2–5.8)
RBC # FLD: 17.9 % — HIGH (ref 10.3–14.5)
SARS-COV-2 IGG+IGM SERPL QL IA: >250 U/ML — HIGH
SARS-COV-2 IGG+IGM SERPL QL IA: POSITIVE
SARS-COV-2 RNA SPEC QL NAA+PROBE: SIGNIFICANT CHANGE UP
SARS-COV-2 RNA SPEC QL NAA+PROBE: SIGNIFICANT CHANGE UP
SODIUM SERPL-SCNC: 139 MMOL/L — SIGNIFICANT CHANGE UP (ref 135–145)
WBC # BLD: 7.89 K/UL — SIGNIFICANT CHANGE UP (ref 3.8–10.5)
WBC # FLD AUTO: 7.89 K/UL — SIGNIFICANT CHANGE UP (ref 3.8–10.5)

## 2021-03-26 PROCEDURE — 99223 1ST HOSP IP/OBS HIGH 75: CPT | Mod: GC

## 2021-03-26 PROCEDURE — 71045 X-RAY EXAM CHEST 1 VIEW: CPT | Mod: 26

## 2021-03-26 PROCEDURE — 99291 CRITICAL CARE FIRST HOUR: CPT | Mod: 24

## 2021-03-26 RX ORDER — DEXAMETHASONE 0.5 MG/5ML
8 ELIXIR ORAL EVERY 8 HOURS
Refills: 0 | Status: DISCONTINUED | OUTPATIENT
Start: 2021-03-26 | End: 2021-03-26

## 2021-03-26 RX ORDER — DEXAMETHASONE 0.5 MG/5ML
1 ELIXIR ORAL EVERY 8 HOURS
Refills: 0 | Status: DISCONTINUED | OUTPATIENT
Start: 2021-03-26 | End: 2021-03-26

## 2021-03-26 RX ORDER — OXYCODONE HYDROCHLORIDE 5 MG/1
5 TABLET ORAL EVERY 4 HOURS
Refills: 0 | Status: DISCONTINUED | OUTPATIENT
Start: 2021-03-26 | End: 2021-03-28

## 2021-03-26 RX ORDER — LEVETIRACETAM 250 MG/1
500 TABLET, FILM COATED ORAL EVERY 12 HOURS
Refills: 0 | Status: DISCONTINUED | OUTPATIENT
Start: 2021-03-26 | End: 2021-03-26

## 2021-03-26 RX ORDER — LEVETIRACETAM 250 MG/1
750 TABLET, FILM COATED ORAL EVERY 12 HOURS
Refills: 0 | Status: DISCONTINUED | OUTPATIENT
Start: 2021-03-26 | End: 2021-03-28

## 2021-03-26 RX ORDER — DEXAMETHASONE 0.5 MG/5ML
1 ELIXIR ORAL EVERY 6 HOURS
Refills: 0 | Status: DISCONTINUED | OUTPATIENT
Start: 2021-03-26 | End: 2021-03-26

## 2021-03-26 RX ORDER — ACETAMINOPHEN 500 MG
650 TABLET ORAL EVERY 6 HOURS
Refills: 0 | Status: DISCONTINUED | OUTPATIENT
Start: 2021-03-26 | End: 2021-03-28

## 2021-03-26 RX ORDER — DEXAMETHASONE 0.5 MG/5ML
8 ELIXIR ORAL EVERY 8 HOURS
Refills: 0 | Status: COMPLETED | OUTPATIENT
Start: 2021-03-26 | End: 2021-03-27

## 2021-03-26 RX ORDER — HYDROMORPHONE HYDROCHLORIDE 2 MG/ML
0.5 INJECTION INTRAMUSCULAR; INTRAVENOUS; SUBCUTANEOUS EVERY 4 HOURS
Refills: 0 | Status: DISCONTINUED | OUTPATIENT
Start: 2021-03-26 | End: 2021-03-26

## 2021-03-26 RX ADMIN — HYDROMORPHONE HYDROCHLORIDE 0.5 MILLIGRAM(S): 2 INJECTION INTRAMUSCULAR; INTRAVENOUS; SUBCUTANEOUS at 12:20

## 2021-03-26 RX ADMIN — CHLORHEXIDINE GLUCONATE 1 APPLICATION(S): 213 SOLUTION TOPICAL at 06:09

## 2021-03-26 RX ADMIN — Medication 650 MILLIGRAM(S): at 17:36

## 2021-03-26 RX ADMIN — OXYCODONE HYDROCHLORIDE 5 MILLIGRAM(S): 5 TABLET ORAL at 14:50

## 2021-03-26 RX ADMIN — OXYCODONE HYDROCHLORIDE 5 MILLIGRAM(S): 5 TABLET ORAL at 21:37

## 2021-03-26 RX ADMIN — PROPOFOL 27 MICROGRAM(S)/KG/MIN: 10 INJECTION, EMULSION INTRAVENOUS at 06:10

## 2021-03-26 RX ADMIN — CHLORHEXIDINE GLUCONATE 15 MILLILITER(S): 213 SOLUTION TOPICAL at 06:09

## 2021-03-26 RX ADMIN — HEPARIN SODIUM 5000 UNIT(S): 5000 INJECTION INTRAVENOUS; SUBCUTANEOUS at 14:00

## 2021-03-26 RX ADMIN — Medication 101.6 MILLIGRAM(S): at 14:00

## 2021-03-26 RX ADMIN — CHLORHEXIDINE GLUCONATE 15 MILLILITER(S): 213 SOLUTION TOPICAL at 17:35

## 2021-03-26 RX ADMIN — LEVETIRACETAM 400 MILLIGRAM(S): 250 TABLET, FILM COATED ORAL at 17:36

## 2021-03-26 RX ADMIN — Medication 650 MILLIGRAM(S): at 14:49

## 2021-03-26 RX ADMIN — OXYCODONE HYDROCHLORIDE 5 MILLIGRAM(S): 5 TABLET ORAL at 23:20

## 2021-03-26 RX ADMIN — LEVETIRACETAM 400 MILLIGRAM(S): 250 TABLET, FILM COATED ORAL at 21:38

## 2021-03-26 RX ADMIN — OXYCODONE HYDROCHLORIDE 5 MILLIGRAM(S): 5 TABLET ORAL at 15:30

## 2021-03-26 RX ADMIN — HEPARIN SODIUM 5000 UNIT(S): 5000 INJECTION INTRAVENOUS; SUBCUTANEOUS at 06:09

## 2021-03-26 RX ADMIN — HYDROMORPHONE HYDROCHLORIDE 0.5 MILLIGRAM(S): 2 INJECTION INTRAMUSCULAR; INTRAVENOUS; SUBCUTANEOUS at 13:00

## 2021-03-26 RX ADMIN — HEPARIN SODIUM 5000 UNIT(S): 5000 INJECTION INTRAVENOUS; SUBCUTANEOUS at 21:37

## 2021-03-26 RX ADMIN — Medication 101.6 MILLIGRAM(S): at 21:37

## 2021-03-26 NOTE — PROGRESS NOTE ADULT - SUBJECTIVE AND OBJECTIVE BOX
ORAL AND MAXILLOFACIAL SURGERY PROGRESS NOTE    SUBJECTIVE / 24H EVENTS:  Patient seen and examined on morning rounds. No acute events overnight.   Pt under sedation w/ trach in place.    OBJECTIVE:  VITAL SIGNS:  ICU Vital Signs Last 24 Hrs  T(C): 37.2 (26 Mar 2021 08:00), Max: 37.2 (26 Mar 2021 08:00)  T(F): 98.9 (26 Mar 2021 08:00), Max: 98.9 (26 Mar 2021 08:00)  HR: 64 (26 Mar 2021 10:00) (55 - 71)  BP: 150/90 (26 Mar 2021 09:00) (111/73 - 162/91)  BP(mean): 110 (26 Mar 2021 09:00) (90 - 118)  ABP: --  ABP(mean): --  RR: 22 (26 Mar 2021 09:00) (17 - 22)  SpO2: 100% (26 Mar 2021 09:00) (98% - 100%)    PHYSICAL EXAM:  Gen: Under sedation   Resp: Trach in place secured by suture  HEENT: b/l facial edema  Oral: lingual hematoma, hemostatic, sutures c/d/i.       LAB VALUES:  03-26    139  |  108<H>  |  57<H>  ----------------------------<  151<H>  5.4<H>   |  18<L>  |  3.00<H>    Ca    8.2<L>      26 Mar 2021 05:44  Phos  5.5     03-26  Mg     2.1     03-26    TPro  6.1  /  Alb  3.8  /  TBili  0.6  /  DBili  x   /  AST  43<H>  /  ALT  54<H>  /  AlkPhos  95  03-25                          7.9    7.89  )-----------( 122      ( 26 Mar 2021 05:44 )             25.4     PT/INR - ( 25 Mar 2021 07:41 )   PT: 11.6 sec;   INR: 1.01 ratio         PTT - ( 25 Mar 2021 07:41 )  PTT:39.9 sec    MEDICATIONS  (STANDING):  chlorhexidine 0.12% Liquid 15 milliLiter(s) Oral Mucosa every 12 hours  chlorhexidine 4% Liquid 1 Application(s) Topical <User Schedule>  dexAMETHasone  Injectable 1 milliGRAM(s) IV Push every 8 hours  heparin   Injectable 5000 Unit(s) SubCutaneous every 8 hours  lactated ringers. 1000 milliLiter(s) (100 mL/Hr) IV Continuous <Continuous>  propofol Infusion 50 MICROgram(s)/kG/Min (27 mL/Hr) IV Continuous <Continuous>    MEDICATIONS  (PRN):  LORazepam   Injectable 2 milliGRAM(s) IV Push every 4 hours PRN Agitation

## 2021-03-26 NOTE — PROGRESS NOTE ADULT - ASSESSMENT
61M with PMHx HTN, PE on Lovenox, presented to [facility] s/p seizure with lingual hematoma, POD #0 from awake tracheostomy followed by exploration and evacuation of tongue hematoma with repair of tongue lacerations. SICU consulted for new tracheostomy.       PLAN:      Neurologic:   - propofol for sedation  - pain control PRN    Respiratory:   - mechanical ventilation  - monitor SP02    Cardiovascular:   - monitor vital signs q1h  - hold home antihypertensives for now     Gastrointestinal/Nutrition:   - NPO    Renal/Genitourinary:   - LR @ 100cc/hr  - monitor urine output  - monitor electrolytes, replete PRN     Hematologic:   - monitor H&H  - Lovenox for DVT ppx    Infectious Disease:   - no active issues     Endocrine:   - monitor glucose on BMP  - no active issues     Disposition: under SICU care in [facility]

## 2021-03-26 NOTE — PROGRESS NOTE ADULT - ASSESSMENT
A/P: 62yo M s/p emergency tracheotomy for airway concern from lingual hematoma    - Rec bite block in place   - Appreciate SICU care   - Trach care by OMFS  - OMFS to follow    [id number] OMFS

## 2021-03-26 NOTE — PROGRESS NOTE ADULT - SUBJECTIVE AND OBJECTIVE BOX
SICU Daily Progress Note  =====================================================  HPI:   60 y/o male PMHx HTN, PE on lovenox presented to [facility] ED c/o seizure today. Pt states that he went to bed last night and felt fine. Pt woke up this AM on the ground surrounded by EMS. Pt states that his wife said he was shaking prompting her to [facility]. Pt denies hx of seizures. Pt admits to laceration to his tongue. Denies chest pain, sob, abd pain, n/v/d, numbness, tingling, weakness, dizziness, fever or chills. Of note, pt states that he had Covid x1-2 months ago, and received his 2nd dose of the vaccine yesterday.   Patient was taken for awake urgent tracheostomy followed by exploration and evacuation of tongue hematoma with repair of tongue lacerations.      MEDICATIONS:   --------------------------------------------------------------------------------------  Neurologic Medications  propofol Infusion 50 MICROgram(s)/kG/Min IV Continuous <Continuous>    Respiratory Medications    Cardiovascular Medications    Gastrointestinal Medications  lactated ringers. 1000 milliLiter(s) IV Continuous <Continuous>    Genitourinary Medications    Hematologic/Oncologic Medications  heparin   Injectable 5000 Unit(s) SubCutaneous every 8 hours    Antimicrobial/Immunologic Medications    Endocrine/Metabolic Medications    Topical/Other Medications  chlorhexidine 0.12% Liquid 15 milliLiter(s) Oral Mucosa every 12 hours  chlorhexidine 4% Liquid 1 Application(s) Topical <User Schedule>    --------------------------------------------------------------------------------------    VITAL SIGNS, INS/OUTS (last 24 hours):  --------------------------------------------------------------------------------------    03-25-21 @ 07:01  -  03-26-21 @ 01:13  --------------------------------------------------------  IN: 200 mL / OUT: 300 mL / NET: -100 mL      --------------------------------------------------------------------------------------    EXAM  NEUROLOGY  Exam: Sedated, comfortable    HEENT  Exam: Impressive tongue swelling, no signs of active bleeding intraorally, trach in place w/o bleeding     RESPIRATORY  Exam: Lungs clear to auscultation, Normal expansion/effort.   Mechanical Ventilation: Mode: AC/ CMV (Assist Control/ Continuous Mandatory Ventilation), RR (machine): 22, TV (machine): 400, FiO2: 100, PEEP: 5, ITime: 0.75, MAP: 9, PIP: 22    CARDIOVASCULAR  Exam: S1, S2.  Regular rate and rhythm.       GI/NUTRITION  Exam: Abdomen soft, Non-tender, Non-distended.      VASCULAR  Exam: Extremities warm, pink, well-perfused.    MUSCULOSKELETAL  Exam: All extremities moving spontaneously without limitations.    SKIN  Exam: Good skin turgor, no skin breakdown.     METABOLIC/FLUIDS/ELECTROLYTES  lactated ringers. 1000 milliLiter(s) IV Continuous <Continuous>      HEMATOLOGIC  [x] VTE Prophylaxis: heparin   Injectable 5000 Unit(s) SubCutaneous every 8 hours        LABS  --------------------------------------------------------------------------------------                              10.0   10.88 )-----------( 88       ( 25 Mar 2021 15:21 )             31.8     03-25    138  |  107  |  50<H>  ----------------------------<  167<H>  5.5<H>   |  19<L>  |  2.92<H>    Ca    8.4      25 Mar 2021 07:41    TPro  6.1  /  Alb  3.8  /  TBili  0.6  /  DBili  x   /  AST  43<H>  /  ALT  54<H>  /  AlkPhos  95  03-25    PT/INR - ( 25 Mar 2021 07:41 )   PT: 11.6 sec;   INR: 1.01 ratio         PTT - ( 25 Mar 2021 07:41 )  PTT:39.9 sec          --------------------------------------------------------------------------------------

## 2021-03-27 LAB
A1C WITH ESTIMATED AVERAGE GLUCOSE RESULT: 5.8 % — HIGH (ref 4–5.6)
ANION GAP SERPL CALC-SCNC: 13 MMOL/L — SIGNIFICANT CHANGE UP (ref 7–14)
BUN SERPL-MCNC: 66 MG/DL — HIGH (ref 7–23)
CALCIUM SERPL-MCNC: 8.7 MG/DL — SIGNIFICANT CHANGE UP (ref 8.4–10.5)
CHLORIDE SERPL-SCNC: 109 MMOL/L — HIGH (ref 98–107)
CK MB BLD-MCNC: 1.3 % — SIGNIFICANT CHANGE UP (ref 0–2.5)
CK MB CFR SERPL CALC: 2.6 NG/ML — SIGNIFICANT CHANGE UP
CK SERPL-CCNC: 201 U/L — HIGH (ref 30–200)
CO2 SERPL-SCNC: 18 MMOL/L — LOW (ref 22–31)
COVID-19 NUCLEOCAPSID GAM AB INTERP: POSITIVE
COVID-19 NUCLEOCAPSID TOTAL GAM ANTIBODY RESULT: 5.39 INDEX — HIGH
CREAT SERPL-MCNC: 2.99 MG/DL — HIGH (ref 0.5–1.3)
ESTIMATED AVERAGE GLUCOSE: 120 MG/DL — HIGH (ref 68–114)
GLUCOSE BLDC GLUCOMTR-MCNC: 175 MG/DL — HIGH (ref 70–99)
GLUCOSE SERPL-MCNC: 164 MG/DL — HIGH (ref 70–99)
HCT VFR BLD CALC: 25.1 % — LOW (ref 39–50)
HGB BLD-MCNC: 7.9 G/DL — LOW (ref 13–17)
MAGNESIUM SERPL-MCNC: 2.2 MG/DL — SIGNIFICANT CHANGE UP (ref 1.6–2.6)
MCHC RBC-ENTMCNC: 30.5 PG — SIGNIFICANT CHANGE UP (ref 27–34)
MCHC RBC-ENTMCNC: 31.5 GM/DL — LOW (ref 32–36)
MCV RBC AUTO: 96.9 FL — SIGNIFICANT CHANGE UP (ref 80–100)
NRBC # BLD: 0 /100 WBCS — SIGNIFICANT CHANGE UP
NRBC # FLD: 0 K/UL — SIGNIFICANT CHANGE UP
PHOSPHATE SERPL-MCNC: 5.4 MG/DL — HIGH (ref 2.5–4.5)
PLATELET # BLD AUTO: 100 K/UL — LOW (ref 150–400)
POTASSIUM SERPL-MCNC: 5.2 MMOL/L — SIGNIFICANT CHANGE UP (ref 3.5–5.3)
POTASSIUM SERPL-SCNC: 5.2 MMOL/L — SIGNIFICANT CHANGE UP (ref 3.5–5.3)
RBC # BLD: 2.59 M/UL — LOW (ref 4.2–5.8)
RBC # FLD: 17.9 % — HIGH (ref 10.3–14.5)
SARS-COV-2 IGG+IGM SERPL QL IA: 5.39 INDEX — HIGH
SARS-COV-2 IGG+IGM SERPL QL IA: POSITIVE
SODIUM SERPL-SCNC: 140 MMOL/L — SIGNIFICANT CHANGE UP (ref 135–145)
TROPONIN T, HIGH SENSITIVITY RESULT: 28 NG/L — SIGNIFICANT CHANGE UP
WBC # BLD: 7.94 K/UL — SIGNIFICANT CHANGE UP (ref 3.8–10.5)
WBC # FLD AUTO: 7.94 K/UL — SIGNIFICANT CHANGE UP (ref 3.8–10.5)

## 2021-03-27 RX ORDER — DEXAMETHASONE 0.5 MG/5ML
8 ELIXIR ORAL EVERY 8 HOURS
Refills: 0 | Status: COMPLETED | OUTPATIENT
Start: 2021-03-27 | End: 2021-03-28

## 2021-03-27 RX ADMIN — OXYCODONE HYDROCHLORIDE 5 MILLIGRAM(S): 5 TABLET ORAL at 13:00

## 2021-03-27 RX ADMIN — CHLORHEXIDINE GLUCONATE 15 MILLILITER(S): 213 SOLUTION TOPICAL at 17:38

## 2021-03-27 RX ADMIN — LEVETIRACETAM 400 MILLIGRAM(S): 250 TABLET, FILM COATED ORAL at 17:55

## 2021-03-27 RX ADMIN — LEVETIRACETAM 400 MILLIGRAM(S): 250 TABLET, FILM COATED ORAL at 06:37

## 2021-03-27 RX ADMIN — Medication 101.6 MILLIGRAM(S): at 19:27

## 2021-03-27 RX ADMIN — Medication 650 MILLIGRAM(S): at 12:30

## 2021-03-27 RX ADMIN — Medication 101.6 MILLIGRAM(S): at 05:23

## 2021-03-27 RX ADMIN — HEPARIN SODIUM 5000 UNIT(S): 5000 INJECTION INTRAVENOUS; SUBCUTANEOUS at 21:24

## 2021-03-27 RX ADMIN — Medication 650 MILLIGRAM(S): at 19:28

## 2021-03-27 RX ADMIN — CHLORHEXIDINE GLUCONATE 1 APPLICATION(S): 213 SOLUTION TOPICAL at 06:18

## 2021-03-27 RX ADMIN — Medication 650 MILLIGRAM(S): at 21:15

## 2021-03-27 RX ADMIN — HEPARIN SODIUM 5000 UNIT(S): 5000 INJECTION INTRAVENOUS; SUBCUTANEOUS at 05:23

## 2021-03-27 RX ADMIN — OXYCODONE HYDROCHLORIDE 5 MILLIGRAM(S): 5 TABLET ORAL at 19:28

## 2021-03-27 RX ADMIN — Medication 101.6 MILLIGRAM(S): at 14:25

## 2021-03-27 RX ADMIN — OXYCODONE HYDROCHLORIDE 5 MILLIGRAM(S): 5 TABLET ORAL at 21:15

## 2021-03-27 RX ADMIN — HEPARIN SODIUM 5000 UNIT(S): 5000 INJECTION INTRAVENOUS; SUBCUTANEOUS at 14:26

## 2021-03-27 RX ADMIN — OXYCODONE HYDROCHLORIDE 5 MILLIGRAM(S): 5 TABLET ORAL at 13:30

## 2021-03-27 RX ADMIN — CHLORHEXIDINE GLUCONATE 15 MILLILITER(S): 213 SOLUTION TOPICAL at 05:23

## 2021-03-27 RX ADMIN — OXYCODONE HYDROCHLORIDE 5 MILLIGRAM(S): 5 TABLET ORAL at 23:40

## 2021-03-27 NOTE — PROGRESS NOTE ADULT - SUBJECTIVE AND OBJECTIVE BOX
SICU Daily Progress Note  =====================================================  24 Hour Events:  -Propofol discontinued. Tylenol / Oxy PRN via NGT for pain.   -KO NGT placed and tube feeds started   -Higginbotham discontinued   -Neuro recs ordered   -repeat COVID negative, Ab+ - 3rd COVID PCR negative as well          HPI:   60 y/o male PMHx HTN, PE on lovenox presented to [facility] ED c/o seizure today. Pt states that he went to bed last night and felt fine. Pt woke up this AM on the ground surrounded by EMS. Pt states that his wife said he was shaking prompting her to [facility]. Pt denies hx of seizures. Pt admits to laceration to his tongue. Denies chest pain, sob, abd pain, n/v/d, numbness, tingling, weakness, dizziness, fever or chills. Of note, pt states that he had Covid x1-2 months ago, and received his 2nd dose of the vaccine yesterday.   Patient was taken for awake urgent tracheostomy followed by exploration and evacuation of tongue hematoma with repair of tongue lacerations.      MEDICATIONS:   --------------------------------------------------------------------------------------  Neurologic Medications  propofol Infusion 50 MICROgram(s)/kG/Min IV Continuous <Continuous>    Respiratory Medications    Cardiovascular Medications    Gastrointestinal Medications  lactated ringers. 1000 milliLiter(s) IV Continuous <Continuous>    Genitourinary Medications    Hematologic/Oncologic Medications  heparin   Injectable 5000 Unit(s) SubCutaneous every 8 hours    Antimicrobial/Immunologic Medications    Endocrine/Metabolic Medications    Topical/Other Medications  chlorhexidine 0.12% Liquid 15 milliLiter(s) Oral Mucosa every 12 hours  chlorhexidine 4% Liquid 1 Application(s) Topical <User Schedule>    --------------------------------------------------------------------------------------  Vital Signs  T(C): 36.7 (03-26-21 @ 20:00), Max: 37.2 (03-26-21 @ 08:00)  T(F): 98.1 (03-26-21 @ 20:00), Max: 99 (03-26-21 @ 14:00)  HR: 64 (03-26-21 @ 23:00) (55 - 74)  BP: 133/79 (03-26-21 @ 23:00) (111/73 - 150/90)  RR: 21 (03-26-21 @ 23:00) (13 - 22)  SpO2: 100% (03-26-21 @ 23:00) (98% - 100%)      25 Mar 2021 07:01  -  26 Mar 2021 07:00  --------------------------------------------------------  IN:    Lactated Ringers: 1000 mL    Propofol: 320 mL  Total IN: 1320 mL    OUT:    Voided (mL): 1100 mL  Total OUT: 1100 mL    Total NET: 220 mL      26 Mar 2021 07:01  -  27 Mar 2021 00:47  --------------------------------------------------------  IN:    Lactated Ringers: 900 mL    Propofol: 54 mL  Total IN: 954 mL    OUT:    Indwelling Catheter - Urethral (mL): 300 mL  Total OUT: 300 mL    Total NET: 654 mL          --------------------------------------------------------------------------------------    EXAM  NEUROLOGY  Exam: Sedated, comfortable    HEENT  Exam: Impressive tongue swelling, no signs of active bleeding intraorally, trach in place w/o bleeding     RESPIRATORY  Exam: Lungs clear to auscultation, Normal expansion/effort.   Mechanical Ventilation: Mode: AC/ CMV (Assist Control/ Continuous Mandatory Ventilation), RR (machine): 22, TV (machine): 400, FiO2: 100, PEEP: 5, ITime: 0.75, MAP: 9, PIP: 22    CARDIOVASCULAR  Exam: S1, S2.  Regular rate and rhythm.       GI/NUTRITION  Exam: Abdomen soft, Non-tender, Non-distended.      VASCULAR  Exam: Extremities warm, pink, well-perfused.    MUSCULOSKELETAL  Exam: All extremities moving spontaneously without limitations.    SKIN  Exam: Good skin turgor, no skin breakdown.     METABOLIC/FLUIDS/ELECTROLYTES  lactated ringers. 1000 milliLiter(s) IV Continuous <Continuous>      HEMATOLOGIC  [x] VTE Prophylaxis: heparin   Injectable 5000 Unit(s) SubCutaneous every 8 hours        LABS  --------------------------------------------------------------------------------------    LABS:  03-26 @ 05:44 Creatine 93 U/L [30 - 200]                          7.9    7.89  )-----------( 122      ( 26 Mar 2021 05:44 )             25.4     03-26    139  |  108<H>  |  57<H>  ----------------------------<  151<H>  5.4<H>   |  18<L>  |  3.00<H>    Ca    8.2<L>      26 Mar 2021 05:44  Phos  5.5     03-26  Mg     2.1     03-26    TPro  6.1  /  Alb  3.8  /  TBili  0.6  /  DBili  x   /  AST  43<H>  /  ALT  54<H>  /  AlkPhos  95  03-25    PT/INR - ( 25 Mar 2021 07:41 )   PT: 11.6 sec;   INR: 1.01 ratio         PTT - ( 25 Mar 2021 07:41 )  PTT:39.9 sec        --------------------------------------------------------------------------------------

## 2021-03-27 NOTE — PROGRESS NOTE ADULT - ASSESSMENT
A/P: 62yo M s/p emergency tracheotomy for airway concern from lingual hematoma    - Appreciate SICU care   - Trach care by OMFS  - OMFS to follow    [id number] OMFS

## 2021-03-27 NOTE — PROGRESS NOTE ADULT - SUBJECTIVE AND OBJECTIVE BOX
ORAL AND MAXILLOFACIAL SURGERY PROGRESS NOTE    SUBJECTIVE / 24H EVENTS:  Patient seen and examined on morning rounds. No acute events overnight.   Pt off sedation w/ trach in place. NPO w/ TF    OBJECTIVE:  VITAL SIGNS:  ICU Vital Signs Last 24 Hrs  T(C): 36.7 (27 Mar 2021 07:00), Max: 37.2 (26 Mar 2021 14:00)  T(F): 98 (27 Mar 2021 07:00), Max: 99 (26 Mar 2021 14:00)  HR: 55 (27 Mar 2021 08:00) (55 - 74)  BP: 165/87 (27 Mar 2021 07:00) (117/74 - 165/87)  BP(mean): 110 (27 Mar 2021 07:00) (70 - 113)  ABP: --  ABP(mean): --  RR: 10 (27 Mar 2021 07:00) (10 - 22)  SpO2: 99% (27 Mar 2021 08:00) (99% - 100%)    PHYSICAL EXAM:  Gen: AAOx3  Resp: Trach in place secured by suture  HEENT: b/l facial edema  Oral: lingual hematoma has been improved, hemostatic, sutures c/d/i.       LAB VALUES:  03-27    140  |  109<H>  |  66<H>  ----------------------------<  164<H>  5.2   |  18<L>  |  2.99<H>    Ca    8.7      27 Mar 2021 05:31  Phos  5.4     03-27  Mg     2.2     03-27                        7.9    7.94  )-----------( 100      ( 27 Mar 2021 05:31 )             25.1     MEDICATIONS  (STANDING):  chlorhexidine 0.12% Liquid 15 milliLiter(s) Oral Mucosa every 12 hours  chlorhexidine 4% Liquid 1 Application(s) Topical <User Schedule>  dexAMETHasone  IVPB 8 milliGRAM(s) IV Intermittent every 8 hours  dexAMETHasone  IVPB 8 milliGRAM(s) IV Intermittent every 8 hours  heparin   Injectable 5000 Unit(s) SubCutaneous every 8 hours  lactated ringers. 1000 milliLiter(s) (100 mL/Hr) IV Continuous <Continuous>  levETIRAcetam  IVPB 750 milliGRAM(s) IV Intermittent every 12 hours    MEDICATIONS  (PRN):  acetaminophen   Tablet .. 650 milliGRAM(s) Oral every 6 hours PRN Mild Pain (1 - 3)  LORazepam   Injectable 2 milliGRAM(s) IV Push every 4 hours PRN Agitation  oxyCODONE    IR 5 milliGRAM(s) Oral every 4 hours PRN Moderate Pain (4 - 6)

## 2021-03-27 NOTE — PROGRESS NOTE ADULT - ASSESSMENT
61M with PMHx HTN, PE on Lovenox, presented to [facility] s/p seizure with lingual hematoma, POD #0 from awake tracheostomy followed by exploration and evacuation of tongue hematoma with repair of tongue lacerations. SICU consulted for new tracheostomy.       PLAN:    Neurologic:   - Tylenol / Oxycodone - pain control PRN    Respiratory:   - wean vent to trach collar   - monitor SP02    Cardiovascular:   - monitor vital signs q1h  - hold home antihypertensives for now     Gastrointestinal/Nutrition:   - TF - Jevity @ 250cc bolus q6     Renal/Genitourinary:   - LR @ 100cc/hr  - monitor urine output  - monitor electrolytes, replete PRN     Hematologic:   - monitor H&H  - Heparing subQ for ppx     Infectious Disease:   - ? COVID - Ab+, COVID PCR pos > neg > f/u 3rd     Endocrine:   - monitor glucose on BMP  - no active issues     Disposition: under SICU care in [facility]

## 2021-03-28 LAB
ANION GAP SERPL CALC-SCNC: 11 MMOL/L — SIGNIFICANT CHANGE UP (ref 7–14)
ANION GAP SERPL CALC-SCNC: 12 MMOL/L — SIGNIFICANT CHANGE UP (ref 7–14)
ANION GAP SERPL CALC-SCNC: 13 MMOL/L — SIGNIFICANT CHANGE UP (ref 7–14)
BUN SERPL-MCNC: 71 MG/DL — HIGH (ref 7–23)
BUN SERPL-MCNC: 72 MG/DL — HIGH (ref 7–23)
BUN SERPL-MCNC: 73 MG/DL — HIGH (ref 7–23)
CALCIUM SERPL-MCNC: 8.4 MG/DL — SIGNIFICANT CHANGE UP (ref 8.4–10.5)
CALCIUM SERPL-MCNC: 9 MG/DL — SIGNIFICANT CHANGE UP (ref 8.4–10.5)
CALCIUM SERPL-MCNC: 9.3 MG/DL — SIGNIFICANT CHANGE UP (ref 8.4–10.5)
CHLORIDE SERPL-SCNC: 107 MMOL/L — SIGNIFICANT CHANGE UP (ref 98–107)
CHLORIDE SERPL-SCNC: 109 MMOL/L — HIGH (ref 98–107)
CHLORIDE SERPL-SCNC: 110 MMOL/L — HIGH (ref 98–107)
CO2 SERPL-SCNC: 20 MMOL/L — LOW (ref 22–31)
CO2 SERPL-SCNC: 20 MMOL/L — LOW (ref 22–31)
CO2 SERPL-SCNC: 22 MMOL/L — SIGNIFICANT CHANGE UP (ref 22–31)
CREAT SERPL-MCNC: 2.72 MG/DL — HIGH (ref 0.5–1.3)
CREAT SERPL-MCNC: 2.8 MG/DL — HIGH (ref 0.5–1.3)
CREAT SERPL-MCNC: 2.85 MG/DL — HIGH (ref 0.5–1.3)
GLUCOSE BLDC GLUCOMTR-MCNC: 131 MG/DL — HIGH (ref 70–99)
GLUCOSE BLDC GLUCOMTR-MCNC: 172 MG/DL — HIGH (ref 70–99)
GLUCOSE BLDC GLUCOMTR-MCNC: 217 MG/DL — HIGH (ref 70–99)
GLUCOSE SERPL-MCNC: 107 MG/DL — HIGH (ref 70–99)
GLUCOSE SERPL-MCNC: 126 MG/DL — HIGH (ref 70–99)
GLUCOSE SERPL-MCNC: 186 MG/DL — HIGH (ref 70–99)
HCT VFR BLD CALC: 26.8 % — LOW (ref 39–50)
HGB BLD-MCNC: 8.3 G/DL — LOW (ref 13–17)
MAGNESIUM SERPL-MCNC: 2.5 MG/DL — SIGNIFICANT CHANGE UP (ref 1.6–2.6)
MCHC RBC-ENTMCNC: 30.5 PG — SIGNIFICANT CHANGE UP (ref 27–34)
MCHC RBC-ENTMCNC: 31 GM/DL — LOW (ref 32–36)
MCV RBC AUTO: 98.5 FL — SIGNIFICANT CHANGE UP (ref 80–100)
NRBC # BLD: 0 /100 WBCS — SIGNIFICANT CHANGE UP
NRBC # FLD: 0 K/UL — SIGNIFICANT CHANGE UP
PHOSPHATE SERPL-MCNC: 6.4 MG/DL — HIGH (ref 2.5–4.5)
PLATELET # BLD AUTO: 99 K/UL — LOW (ref 150–400)
POTASSIUM SERPL-MCNC: 5.8 MMOL/L — HIGH (ref 3.5–5.3)
POTASSIUM SERPL-MCNC: 5.9 MMOL/L — HIGH (ref 3.5–5.3)
POTASSIUM SERPL-MCNC: 6.5 MMOL/L — CRITICAL HIGH (ref 3.5–5.3)
POTASSIUM SERPL-SCNC: 5.8 MMOL/L — HIGH (ref 3.5–5.3)
POTASSIUM SERPL-SCNC: 5.9 MMOL/L — HIGH (ref 3.5–5.3)
POTASSIUM SERPL-SCNC: 6.5 MMOL/L — CRITICAL HIGH (ref 3.5–5.3)
RBC # BLD: 2.72 M/UL — LOW (ref 4.2–5.8)
RBC # FLD: 17.2 % — HIGH (ref 10.3–14.5)
SODIUM SERPL-SCNC: 140 MMOL/L — SIGNIFICANT CHANGE UP (ref 135–145)
SODIUM SERPL-SCNC: 142 MMOL/L — SIGNIFICANT CHANGE UP (ref 135–145)
SODIUM SERPL-SCNC: 142 MMOL/L — SIGNIFICANT CHANGE UP (ref 135–145)
WBC # BLD: 9.82 K/UL — SIGNIFICANT CHANGE UP (ref 3.8–10.5)
WBC # FLD AUTO: 9.82 K/UL — SIGNIFICANT CHANGE UP (ref 3.8–10.5)

## 2021-03-28 RX ORDER — CALCIUM GLUCONATE 100 MG/ML
2 VIAL (ML) INTRAVENOUS ONCE
Refills: 0 | Status: COMPLETED | OUTPATIENT
Start: 2021-03-28 | End: 2021-03-28

## 2021-03-28 RX ORDER — SENNA PLUS 8.6 MG/1
10 TABLET ORAL AT BEDTIME
Refills: 0 | Status: DISCONTINUED | OUTPATIENT
Start: 2021-03-28 | End: 2021-03-30

## 2021-03-28 RX ORDER — LEVETIRACETAM 250 MG/1
750 TABLET, FILM COATED ORAL
Refills: 0 | Status: DISCONTINUED | OUTPATIENT
Start: 2021-03-28 | End: 2021-04-03

## 2021-03-28 RX ORDER — DEXTROSE 50 % IN WATER 50 %
50 SYRINGE (ML) INTRAVENOUS ONCE
Refills: 0 | Status: COMPLETED | OUTPATIENT
Start: 2021-03-28 | End: 2021-03-28

## 2021-03-28 RX ORDER — SENNA PLUS 8.6 MG/1
1 TABLET ORAL DAILY
Refills: 0 | Status: DISCONTINUED | OUTPATIENT
Start: 2021-03-28 | End: 2021-03-28

## 2021-03-28 RX ORDER — ACETAMINOPHEN 500 MG
975 TABLET ORAL EVERY 6 HOURS
Refills: 0 | Status: DISCONTINUED | OUTPATIENT
Start: 2021-03-28 | End: 2021-03-31

## 2021-03-28 RX ORDER — INSULIN HUMAN 100 [IU]/ML
10 INJECTION, SOLUTION SUBCUTANEOUS ONCE
Refills: 0 | Status: COMPLETED | OUTPATIENT
Start: 2021-03-28 | End: 2021-03-28

## 2021-03-28 RX ORDER — OXYCODONE HYDROCHLORIDE 5 MG/1
5 TABLET ORAL EVERY 4 HOURS
Refills: 0 | Status: DISCONTINUED | OUTPATIENT
Start: 2021-03-28 | End: 2021-03-29

## 2021-03-28 RX ORDER — POLYETHYLENE GLYCOL 3350 17 G/17G
17 POWDER, FOR SOLUTION ORAL DAILY
Refills: 0 | Status: DISCONTINUED | OUTPATIENT
Start: 2021-03-28 | End: 2021-04-03

## 2021-03-28 RX ORDER — DEXAMETHASONE 0.5 MG/5ML
8 ELIXIR ORAL DAILY
Refills: 0 | Status: COMPLETED | OUTPATIENT
Start: 2021-03-30 | End: 2021-03-30

## 2021-03-28 RX ORDER — SODIUM ZIRCONIUM CYCLOSILICATE 10 G/10G
10 POWDER, FOR SUSPENSION ORAL ONCE
Refills: 0 | Status: DISCONTINUED | OUTPATIENT
Start: 2021-03-28 | End: 2021-03-30

## 2021-03-28 RX ORDER — DEXAMETHASONE 0.5 MG/5ML
8 ELIXIR ORAL EVERY 12 HOURS
Refills: 0 | Status: COMPLETED | OUTPATIENT
Start: 2021-03-29 | End: 2021-03-29

## 2021-03-28 RX ADMIN — OXYCODONE HYDROCHLORIDE 5 MILLIGRAM(S): 5 TABLET ORAL at 15:13

## 2021-03-28 RX ADMIN — Medication 101.6 MILLIGRAM(S): at 03:46

## 2021-03-28 RX ADMIN — CHLORHEXIDINE GLUCONATE 1 APPLICATION(S): 213 SOLUTION TOPICAL at 05:39

## 2021-03-28 RX ADMIN — Medication 650 MILLIGRAM(S): at 04:27

## 2021-03-28 RX ADMIN — Medication 650 MILLIGRAM(S): at 12:08

## 2021-03-28 RX ADMIN — HEPARIN SODIUM 5000 UNIT(S): 5000 INJECTION INTRAVENOUS; SUBCUTANEOUS at 13:54

## 2021-03-28 RX ADMIN — Medication 50 MILLILITER(S): at 10:52

## 2021-03-28 RX ADMIN — OXYCODONE HYDROCHLORIDE 5 MILLIGRAM(S): 5 TABLET ORAL at 01:00

## 2021-03-28 RX ADMIN — POLYETHYLENE GLYCOL 3350 17 GRAM(S): 17 POWDER, FOR SOLUTION ORAL at 11:59

## 2021-03-28 RX ADMIN — CHLORHEXIDINE GLUCONATE 15 MILLILITER(S): 213 SOLUTION TOPICAL at 17:54

## 2021-03-28 RX ADMIN — Medication 101.6 MILLIGRAM(S): at 22:29

## 2021-03-28 RX ADMIN — LEVETIRACETAM 750 MILLIGRAM(S): 250 TABLET, FILM COATED ORAL at 22:30

## 2021-03-28 RX ADMIN — HEPARIN SODIUM 5000 UNIT(S): 5000 INJECTION INTRAVENOUS; SUBCUTANEOUS at 22:32

## 2021-03-28 RX ADMIN — Medication 975 MILLIGRAM(S): at 22:30

## 2021-03-28 RX ADMIN — Medication 101.6 MILLIGRAM(S): at 13:54

## 2021-03-28 RX ADMIN — OXYCODONE HYDROCHLORIDE 5 MILLIGRAM(S): 5 TABLET ORAL at 13:15

## 2021-03-28 RX ADMIN — SENNA PLUS 10 MILLILITER(S): 8.6 TABLET ORAL at 22:31

## 2021-03-28 RX ADMIN — INSULIN HUMAN 10 UNIT(S): 100 INJECTION, SOLUTION SUBCUTANEOUS at 10:53

## 2021-03-28 RX ADMIN — Medication 2 MILLIGRAM(S): at 14:50

## 2021-03-28 RX ADMIN — HEPARIN SODIUM 5000 UNIT(S): 5000 INJECTION INTRAVENOUS; SUBCUTANEOUS at 05:39

## 2021-03-28 RX ADMIN — Medication 200 GRAM(S): at 10:52

## 2021-03-28 RX ADMIN — OXYCODONE HYDROCHLORIDE 5 MILLIGRAM(S): 5 TABLET ORAL at 22:32

## 2021-03-28 RX ADMIN — OXYCODONE HYDROCHLORIDE 5 MILLIGRAM(S): 5 TABLET ORAL at 17:55

## 2021-03-28 RX ADMIN — CHLORHEXIDINE GLUCONATE 15 MILLILITER(S): 213 SOLUTION TOPICAL at 05:39

## 2021-03-28 RX ADMIN — OXYCODONE HYDROCHLORIDE 5 MILLIGRAM(S): 5 TABLET ORAL at 18:58

## 2021-03-28 RX ADMIN — LEVETIRACETAM 400 MILLIGRAM(S): 250 TABLET, FILM COATED ORAL at 06:08

## 2021-03-28 RX ADMIN — SENNA PLUS 1 TABLET(S): 8.6 TABLET ORAL at 11:30

## 2021-03-28 RX ADMIN — OXYCODONE HYDROCHLORIDE 5 MILLIGRAM(S): 5 TABLET ORAL at 03:46

## 2021-03-28 RX ADMIN — OXYCODONE HYDROCHLORIDE 5 MILLIGRAM(S): 5 TABLET ORAL at 08:44

## 2021-03-28 RX ADMIN — Medication 650 MILLIGRAM(S): at 03:49

## 2021-03-28 RX ADMIN — Medication 650 MILLIGRAM(S): at 11:59

## 2021-03-28 RX ADMIN — OXYCODONE HYDROCHLORIDE 5 MILLIGRAM(S): 5 TABLET ORAL at 04:28

## 2021-03-28 RX ADMIN — OXYCODONE HYDROCHLORIDE 5 MILLIGRAM(S): 5 TABLET ORAL at 09:30

## 2021-03-28 NOTE — PROGRESS NOTE ADULT - ASSESSMENT
A/P: 60yo M s/p emergency tracheotomy for airway concern from lingual hematoma    - Rec taper down steroid  - Rec bowl regimen  - Appreciate SICU care   - Trach care by OMFS  - OMFS to follow    [id number] OMFS

## 2021-03-28 NOTE — PROGRESS NOTE ADULT - SUBJECTIVE AND OBJECTIVE BOX
ORAL AND MAXILLOFACIAL SURGERY PROGRESS NOTE    SUBJECTIVE / 24H EVENTS:  Patient seen and examined on morning rounds. No acute events overnight.   Pt off sedation w/ trach in place. NPO w/ TF    OBJECTIVE:  VITAL SIGNS:  ICU Vital Signs Last 24 Hrs  T(C): 36.5 (28 Mar 2021 04:00), Max: 36.7 (27 Mar 2021 12:00)  T(F): 97.7 (28 Mar 2021 04:00), Max: 98 (27 Mar 2021 12:00)  HR: 54 (28 Mar 2021 10:00) (54 - 73)  BP: 147/85 (28 Mar 2021 10:00) (126/89 - 155/84)  BP(mean): 97 (28 Mar 2021 08:00) (84 - 103)  ABP: --  ABP(mean): --  RR: 12 (28 Mar 2021 10:00) (11 - 28)  SpO2: 100% (28 Mar 2021 10:00) (99% - 100%)    PHYSICAL EXAM:  Gen: AAOx3  Resp: Trach collar stand by, non labored   HEENT: Trach in place secured by suture w/ trach collar stand by  Oral: lingual hematoma has been improved, hemostatic, sutures c/d/i.       LAB VALUES:  03-28    142  |  110<H>  |  73<H>  ----------------------------<  186<H>  5.8<H>   |  20<L>  |  2.85<H>    Ca    8.4      28 Mar 2021 07:14  Phos  6.4     03-28  Mg     2.5     03-28                                         8.3    9.82  )-----------( 99       ( 28 Mar 2021 07:14 )             26.8       MEDICATIONS  (STANDING):  calcium gluconate IVPB 2 Gram(s) IV Intermittent once  chlorhexidine 0.12% Liquid 15 milliLiter(s) Oral Mucosa every 12 hours  chlorhexidine 4% Liquid 1 Application(s) Topical <User Schedule>  dexAMETHasone  IVPB 8 milliGRAM(s) IV Intermittent every 8 hours  dextrose 50% Injectable 50 milliLiter(s) IV Push once  heparin   Injectable 5000 Unit(s) SubCutaneous every 8 hours  insulin regular  human recombinant 10 Unit(s) IV Push once  lactated ringers. 1000 milliLiter(s) (100 mL/Hr) IV Continuous <Continuous>  levETIRAcetam  IVPB 750 milliGRAM(s) IV Intermittent every 12 hours  polyethylene glycol 3350 17 Gram(s) Oral daily    MEDICATIONS  (PRN):  acetaminophen   Tablet .. 650 milliGRAM(s) Oral every 6 hours PRN Mild Pain (1 - 3)  LORazepam   Injectable 2 milliGRAM(s) IV Push every 4 hours PRN Agitation  oxyCODONE    IR 5 milliGRAM(s) Oral every 4 hours PRN Moderate Pain (4 - 6)  senna 1 Tablet(s) Oral daily PRN Constipation

## 2021-03-28 NOTE — PROGRESS NOTE ADULT - ASSESSMENT
61M with PMHx HTN, PE on Lovenox, presented to [facility] s/p seizure with lingual hematoma, POD #0 from awake tracheostomy followed by exploration and evacuation of tongue hematoma with repair of tongue lacerations. SICU consulted for new tracheostomy.     PLAN:    Neurologic:   - Tylenol / Oxycodone - pain control PRN    Respiratory:   - tolerating trach collar  - monitor SP02    Cardiovascular:   - monitor vital signs q1h  - hold home antihypertensives for now     Gastrointestinal/Nutrition:   - TF - Jevity @ 250cc bolus q6     Renal/Genitourinary:   - LR @ 100cc/hr  - monitor urine output  - monitor electrolytes, replete PRN     Hematologic:   - monitor H&H  - Heparing subQ for ppx     Infectious Disease:   - ? COVID - Ab+, COVID PCR pos > neg > f/u 3rd     Endocrine:   - monitor glucose on BMP  - no active issues     Disposition: under SICU care in [facility] 61M with PMHx HTN, PE on Lovenox, presented to [facility] s/p seizure with lingual hematoma, POD #0 from awake tracheostomy followed by exploration and evacuation of tongue hematoma with repair of tongue lacerations. SICU consulted for new tracheostomy.     PLAN:    Neurologic:   - Tylenol / Oxycodone - pain control PRN    Respiratory:   - tolerating trach collar  - monitor SP02    Cardiovascular:   - monitor vital signs q1h  - hold home antihypertensives for now     Gastrointestinal/Nutrition:   - TF - Jevity @ 250cc bolus q6   - Bowel regimen    Renal/Genitourinary:   - LR @ 100cc/hr  - monitor urine output  - monitor electrolytes, replete PRN     Hematologic:   - monitor H&H  - Heparing subQ for ppx     Infectious Disease:   - COVID - Ab+  - COVID PCR positive, then negative x2    Endocrine:   - Taper dexamethasone  - monitor glucose on BMP  - no active issues     Disposition:   - SICU care in [facility]  - Pending transfer to the floors

## 2021-03-28 NOTE — PROGRESS NOTE ADULT - SUBJECTIVE AND OBJECTIVE BOX
SICU Daily Progress Note  =====================================================  Interval/Overnight Events:     - trach collar  - COVID ab positive, follow up infection control.    HPI:  60 yo M s/p seizure this morning sustaining tongue lac and uncontrolled heme presents to [facility] ED. Pt denies hx of seizures. Pt admits to laceration to his tongue. Denies chest pain, sob, abd pain, n/v/d, numbness, tingling, weakness, dizziness, fever or chills. Pt states that he had Covid x1-2 months ago, and received his 2nd dose of the vaccine yesterday. Pt also states that he was recently dx w/ anemia and is currently being worked up for cancer but has no dx.  (25 Mar 2021 22:18)      PAST MEDICAL & SURGICAL HISTORY:  HTN (hypertension)    ALLERGIES:  No Known Allergies    --------------------------------------------------------------------------------------    MEDICATIONS:    Neurologic Medications  acetaminophen   Tablet .. 650 milliGRAM(s) Oral every 6 hours PRN Mild Pain (1 - 3)  levETIRAcetam  IVPB 750 milliGRAM(s) IV Intermittent every 12 hours  LORazepam   Injectable 2 milliGRAM(s) IV Push every 4 hours PRN Agitation  oxyCODONE    IR 5 milliGRAM(s) Oral every 4 hours PRN Moderate Pain (4 - 6)    Gastrointestinal Medications  lactated ringers. 1000 milliLiter(s) IV Continuous <Continuous>    Hematologic/Oncologic Medications  heparin   Injectable 5000 Unit(s) SubCutaneous every 8 hours    Endocrine/Metabolic Medications  dexAMETHasone  IVPB 8 milliGRAM(s) IV Intermittent every 8 hours    Topical/Other Medications  chlorhexidine 0.12% Liquid 15 milliLiter(s) Oral Mucosa every 12 hours  chlorhexidine 4% Liquid 1 Application(s) Topical <User Schedule>    --------------------------------------------------------------------------------------    VITAL SIGNS:  Vital Signs Last 24 Hrs  T(C): 36.5 (27 Mar 2021 19:00), Max: 36.7 (27 Mar 2021 07:00)  T(F): 97.7 (27 Mar 2021 19:00), Max: 98 (27 Mar 2021 07:00)  HR: 58 (27 Mar 2021 23:00) (53 - 63)  BP: 154/85 (27 Mar 2021 23:00) (126/89 - 165/87)  BP(mean): 100 (27 Mar 2021 23:00) (84 - 113)  RR: 14 (27 Mar 2021 23:00) (10 - 28)  SpO2: 100% (27 Mar 2021 23:00) (99% - 100%)    --------------------------------------------------------------------------------------    INS AND OUTS:  I&O's Detail    26 Mar 2021 07:01  -  27 Mar 2021 07:00  --------------------------------------------------------  IN:    IV PiggyBack: 150 mL    Lactated Ringers: 2100 mL    Propofol: 54 mL  Total IN: 2304 mL    OUT:    Indwelling Catheter - Urethral (mL): 300 mL    Voided (mL): 1000 mL  Total OUT: 1300 mL    Total NET: 1004 mL      27 Mar 2021 07:01  -  28 Mar 2021 00:06  --------------------------------------------------------  IN:    Jevity 1.2: 140 mL    Lactated Ringers: 600 mL  Total IN: 740 mL    OUT:    Indwelling Catheter - Urethral (mL): 2825 mL  Total OUT: 2825 mL    Total NET: -2085 mL        --------------------------------------------------------------------------------------    EXAM    NEURO: NAD, resting comfortably.  HEENT: NC, s/p trach, +tongue swelling  RESPIRATORY: nonlabored respirations, normal CW expansion  CARDIO: RRR, S1S2  ABDOMEN: soft, nontender, nondistended.  EXTREMITIES: normal strength, no deformities.    --------------------------------------------------------------------------------------    LABS    I&O's Detail    26 Mar 2021 07:01  -  27 Mar 2021 07:00  --------------------------------------------------------  IN:    IV PiggyBack: 150 mL    Lactated Ringers: 2100 mL    Propofol: 54 mL  Total IN: 2304 mL    OUT:    Indwelling Catheter - Urethral (mL): 300 mL    Voided (mL): 1000 mL  Total OUT: 1300 mL    Total NET: 1004 mL      27 Mar 2021 07:01  -  28 Mar 2021 00:07  --------------------------------------------------------  IN:    Jevity 1.2: 140 mL    Lactated Ringers: 600 mL  Total IN: 740 mL    OUT:    Indwelling Catheter - Urethral (mL): 2825 mL  Total OUT: 2825 mL    Total NET: -2085 mL  -------------------------------------------------------------------------------------- SICU Daily Progress Note  =====================================================  Interval/Overnight Events:     - trach collar  - COVID ab positive, follow up infection control.  - Listed for the floors  - Bowel regimen  - Taper steroids      HPI:  62 yo M s/p seizure this morning sustaining tongue lac and uncontrolled heme presents to [facility] ED. Pt denies hx of seizures. Pt admits to laceration to his tongue. Denies chest pain, sob, abd pain, n/v/d, numbness, tingling, weakness, dizziness, fever or chills. Pt states that he had Covid x1-2 months ago, and received his 2nd dose of the vaccine yesterday. Pt also states that he was recently dx w/ anemia and is currently being worked up for cancer but has no dx.  (25 Mar 2021 22:18)      PAST MEDICAL & SURGICAL HISTORY:  HTN (hypertension)    ALLERGIES:  No Known Allergies    --------------------------------------------------------------------------------------    MEDICATIONS:    Neurologic Medications  acetaminophen   Tablet .. 650 milliGRAM(s) Oral every 6 hours PRN Mild Pain (1 - 3)  levETIRAcetam  IVPB 750 milliGRAM(s) IV Intermittent every 12 hours  LORazepam   Injectable 2 milliGRAM(s) IV Push every 4 hours PRN Agitation  oxyCODONE    IR 5 milliGRAM(s) Oral every 4 hours PRN Moderate Pain (4 - 6)    Gastrointestinal Medications  lactated ringers. 1000 milliLiter(s) IV Continuous <Continuous>    Hematologic/Oncologic Medications  heparin   Injectable 5000 Unit(s) SubCutaneous every 8 hours    Endocrine/Metabolic Medications  dexAMETHasone  IVPB 8 milliGRAM(s) IV Intermittent every 8 hours    Topical/Other Medications  chlorhexidine 0.12% Liquid 15 milliLiter(s) Oral Mucosa every 12 hours  chlorhexidine 4% Liquid 1 Application(s) Topical <User Schedule>    --------------------------------------------------------------------------------------    VITAL SIGNS:  Vital Signs Last 24 Hrs  T(C): 36.5 (27 Mar 2021 19:00), Max: 36.7 (27 Mar 2021 07:00)  T(F): 97.7 (27 Mar 2021 19:00), Max: 98 (27 Mar 2021 07:00)  HR: 58 (27 Mar 2021 23:00) (53 - 63)  BP: 154/85 (27 Mar 2021 23:00) (126/89 - 165/87)  BP(mean): 100 (27 Mar 2021 23:00) (84 - 113)  RR: 14 (27 Mar 2021 23:00) (10 - 28)  SpO2: 100% (27 Mar 2021 23:00) (99% - 100%)    --------------------------------------------------------------------------------------    INS AND OUTS:  I&O's Detail    26 Mar 2021 07:01  -  27 Mar 2021 07:00  --------------------------------------------------------  IN:    IV PiggyBack: 150 mL    Lactated Ringers: 2100 mL    Propofol: 54 mL  Total IN: 2304 mL    OUT:    Indwelling Catheter - Urethral (mL): 300 mL    Voided (mL): 1000 mL  Total OUT: 1300 mL    Total NET: 1004 mL      27 Mar 2021 07:01  -  28 Mar 2021 00:06  --------------------------------------------------------  IN:    Jevity 1.2: 140 mL    Lactated Ringers: 600 mL  Total IN: 740 mL    OUT:    Indwelling Catheter - Urethral (mL): 2825 mL  Total OUT: 2825 mL    Total NET: -2085 mL        --------------------------------------------------------------------------------------    EXAM    NEURO: NAD, resting comfortably.  HEENT: NC, s/p trach, +tongue swelling  RESPIRATORY: nonlabored respirations, normal CW expansion  CARDIO: RRR, S1S2  ABDOMEN: soft, nontender, nondistended.  EXTREMITIES: normal strength, no deformities.    --------------------------------------------------------------------------------------    LABS    I&O's Detail    26 Mar 2021 07:01  -  27 Mar 2021 07:00  --------------------------------------------------------  IN:    IV PiggyBack: 150 mL    Lactated Ringers: 2100 mL    Propofol: 54 mL  Total IN: 2304 mL    OUT:    Indwelling Catheter - Urethral (mL): 300 mL    Voided (mL): 1000 mL  Total OUT: 1300 mL    Total NET: 1004 mL      27 Mar 2021 07:01  -  28 Mar 2021 00:07  --------------------------------------------------------  IN:    Jevity 1.2: 140 mL    Lactated Ringers: 600 mL  Total IN: 740 mL    OUT:    Indwelling Catheter - Urethral (mL): 2825 mL  Total OUT: 2825 mL    Total NET: -2085 mL  --------------------------------------------------------------------------------------

## 2021-03-29 LAB
ANION GAP SERPL CALC-SCNC: 12 MMOL/L — SIGNIFICANT CHANGE UP (ref 7–14)
BUN SERPL-MCNC: 68 MG/DL — HIGH (ref 7–23)
CALCIUM SERPL-MCNC: 8.8 MG/DL — SIGNIFICANT CHANGE UP (ref 8.4–10.5)
CHLORIDE SERPL-SCNC: 109 MMOL/L — HIGH (ref 98–107)
CO2 SERPL-SCNC: 22 MMOL/L — SIGNIFICANT CHANGE UP (ref 22–31)
CREAT SERPL-MCNC: 2.5 MG/DL — HIGH (ref 0.5–1.3)
GLUCOSE SERPL-MCNC: 150 MG/DL — HIGH (ref 70–99)
HCT VFR BLD CALC: 28.2 % — LOW (ref 39–50)
HGB BLD-MCNC: 8.5 G/DL — LOW (ref 13–17)
MAGNESIUM SERPL-MCNC: 2.4 MG/DL — SIGNIFICANT CHANGE UP (ref 1.6–2.6)
MCHC RBC-ENTMCNC: 29.6 PG — SIGNIFICANT CHANGE UP (ref 27–34)
MCHC RBC-ENTMCNC: 30.1 GM/DL — LOW (ref 32–36)
MCV RBC AUTO: 98.3 FL — SIGNIFICANT CHANGE UP (ref 80–100)
NRBC # BLD: 0 /100 WBCS — SIGNIFICANT CHANGE UP
NRBC # FLD: 0 K/UL — SIGNIFICANT CHANGE UP
PHOSPHATE SERPL-MCNC: 5.1 MG/DL — HIGH (ref 2.5–4.5)
PLATELET # BLD AUTO: 116 K/UL — LOW (ref 150–400)
POTASSIUM SERPL-MCNC: 5.3 MMOL/L — SIGNIFICANT CHANGE UP (ref 3.5–5.3)
POTASSIUM SERPL-SCNC: 5.3 MMOL/L — SIGNIFICANT CHANGE UP (ref 3.5–5.3)
RBC # BLD: 2.87 M/UL — LOW (ref 4.2–5.8)
RBC # FLD: 16.8 % — HIGH (ref 10.3–14.5)
SODIUM SERPL-SCNC: 143 MMOL/L — SIGNIFICANT CHANGE UP (ref 135–145)
WBC # BLD: 9.98 K/UL — SIGNIFICANT CHANGE UP (ref 3.8–10.5)
WBC # FLD AUTO: 9.98 K/UL — SIGNIFICANT CHANGE UP (ref 3.8–10.5)

## 2021-03-29 PROCEDURE — 99232 SBSQ HOSP IP/OBS MODERATE 35: CPT

## 2021-03-29 RX ORDER — OXYCODONE HYDROCHLORIDE 5 MG/1
5 TABLET ORAL EVERY 4 HOURS
Refills: 0 | Status: DISCONTINUED | OUTPATIENT
Start: 2021-03-29 | End: 2021-04-03

## 2021-03-29 RX ORDER — OXYCODONE HYDROCHLORIDE 5 MG/1
10 TABLET ORAL EVERY 4 HOURS
Refills: 0 | Status: DISCONTINUED | OUTPATIENT
Start: 2021-03-29 | End: 2021-04-03

## 2021-03-29 RX ORDER — AMLODIPINE BESYLATE 2.5 MG/1
10 TABLET ORAL DAILY
Refills: 0 | Status: DISCONTINUED | OUTPATIENT
Start: 2021-03-29 | End: 2021-04-03

## 2021-03-29 RX ADMIN — Medication 975 MILLIGRAM(S): at 06:27

## 2021-03-29 RX ADMIN — CHLORHEXIDINE GLUCONATE 1 APPLICATION(S): 213 SOLUTION TOPICAL at 06:28

## 2021-03-29 RX ADMIN — Medication 101.6 MILLIGRAM(S): at 06:28

## 2021-03-29 RX ADMIN — OXYCODONE HYDROCHLORIDE 5 MILLIGRAM(S): 5 TABLET ORAL at 15:00

## 2021-03-29 RX ADMIN — HEPARIN SODIUM 5000 UNIT(S): 5000 INJECTION INTRAVENOUS; SUBCUTANEOUS at 06:28

## 2021-03-29 RX ADMIN — OXYCODONE HYDROCHLORIDE 10 MILLIGRAM(S): 5 TABLET ORAL at 21:58

## 2021-03-29 RX ADMIN — OXYCODONE HYDROCHLORIDE 5 MILLIGRAM(S): 5 TABLET ORAL at 06:29

## 2021-03-29 RX ADMIN — Medication 975 MILLIGRAM(S): at 01:34

## 2021-03-29 RX ADMIN — Medication 975 MILLIGRAM(S): at 06:29

## 2021-03-29 RX ADMIN — OXYCODONE HYDROCHLORIDE 5 MILLIGRAM(S): 5 TABLET ORAL at 02:10

## 2021-03-29 RX ADMIN — OXYCODONE HYDROCHLORIDE 10 MILLIGRAM(S): 5 TABLET ORAL at 16:23

## 2021-03-29 RX ADMIN — POLYETHYLENE GLYCOL 3350 17 GRAM(S): 17 POWDER, FOR SOLUTION ORAL at 11:38

## 2021-03-29 RX ADMIN — Medication 975 MILLIGRAM(S): at 18:52

## 2021-03-29 RX ADMIN — OXYCODONE HYDROCHLORIDE 10 MILLIGRAM(S): 5 TABLET ORAL at 10:10

## 2021-03-29 RX ADMIN — CHLORHEXIDINE GLUCONATE 15 MILLILITER(S): 213 SOLUTION TOPICAL at 06:27

## 2021-03-29 RX ADMIN — OXYCODONE HYDROCHLORIDE 5 MILLIGRAM(S): 5 TABLET ORAL at 01:34

## 2021-03-29 RX ADMIN — Medication 975 MILLIGRAM(S): at 12:30

## 2021-03-29 RX ADMIN — Medication 10 MILLIGRAM(S): at 14:09

## 2021-03-29 RX ADMIN — AMLODIPINE BESYLATE 10 MILLIGRAM(S): 2.5 TABLET ORAL at 06:27

## 2021-03-29 RX ADMIN — CHLORHEXIDINE GLUCONATE 15 MILLILITER(S): 213 SOLUTION TOPICAL at 18:17

## 2021-03-29 RX ADMIN — Medication 975 MILLIGRAM(S): at 11:39

## 2021-03-29 RX ADMIN — Medication 101.6 MILLIGRAM(S): at 18:16

## 2021-03-29 RX ADMIN — HEPARIN SODIUM 5000 UNIT(S): 5000 INJECTION INTRAVENOUS; SUBCUTANEOUS at 21:57

## 2021-03-29 RX ADMIN — OXYCODONE HYDROCHLORIDE 10 MILLIGRAM(S): 5 TABLET ORAL at 17:07

## 2021-03-29 RX ADMIN — OXYCODONE HYDROCHLORIDE 10 MILLIGRAM(S): 5 TABLET ORAL at 23:00

## 2021-03-29 RX ADMIN — LEVETIRACETAM 750 MILLIGRAM(S): 250 TABLET, FILM COATED ORAL at 06:28

## 2021-03-29 RX ADMIN — HEPARIN SODIUM 5000 UNIT(S): 5000 INJECTION INTRAVENOUS; SUBCUTANEOUS at 14:08

## 2021-03-29 RX ADMIN — Medication 975 MILLIGRAM(S): at 18:22

## 2021-03-29 RX ADMIN — OXYCODONE HYDROCHLORIDE 10 MILLIGRAM(S): 5 TABLET ORAL at 10:40

## 2021-03-29 RX ADMIN — OXYCODONE HYDROCHLORIDE 5 MILLIGRAM(S): 5 TABLET ORAL at 14:12

## 2021-03-29 RX ADMIN — LEVETIRACETAM 750 MILLIGRAM(S): 250 TABLET, FILM COATED ORAL at 18:17

## 2021-03-29 NOTE — PROGRESS NOTE ADULT - ASSESSMENT
61M with PMHx HTN, PE on Lovenox, presented to [facility] s/p seizure with lingual hematoma, POD #0 from awake tracheostomy followed by exploration and evacuation of tongue hematoma with repair of tongue lacerations. SICU consulted for new tracheostomy.     PLAN:    Neurologic:   - Tylenol / Oxycodone - pain control PRN  - Keppra 750mg BID   - f/u MRI   - f/u EEG   - f/u Neurology recs     Respiratory:   - tolerating trach collar  - monitor SP02    Cardiovascular:   - monitor vital signs q1h  - hold home antihypertensives for now     Gastrointestinal/Nutrition:   - TF - Nepro continuous @ 25cc/hour via NGT   - Miralax / senna for bowel regimen     Renal/Genitourinary:   - monitor urine output  - monitor electrolytes, replete PRN   - Hyperkalemia - Jevity changed to nepro in setting of elevated Cr 2.7-3.0 (unsure baseline Cr)   - lokelma added     Hematologic:   - monitor H&H  - Heparing subQ for ppx     Infectious Disease:   - COVID - Ab+  - COVID PCR positive, then negative x2   - follow-up infection control     Endocrine:   - Taper dexamethasone  - monitor glucose on BMP  - no active issues     Disposition:   - SICU care in [facility]  - Pending transfer to the floors

## 2021-03-29 NOTE — PROGRESS NOTE ADULT - SUBJECTIVE AND OBJECTIVE BOX
SICU Daily Progress Note  =====================================================  HPI: 62 yo M s/p seizure this morning sustaining tongue lac and uncontrolled heme presents to [facility] ED. Pt denies hx of seizures. Pt admits to laceration to his tongue. Denies chest pain, sob, abd pain, n/v/d, numbness, tingling, weakness, dizziness, fever or chills. Pt states that he had Covid x1-2 months ago, and received his 2nd dose of the vaccine yesterday. Pt also states that he was recently dx w/ anemia and is currently being worked up for cancer but has no dx.  (25 Mar 2021 22:18)    24 HR Events:    - tolerating trach collar   - hyperkalemic 5.9, s/p shifted 3/28   - jevity changed to nepro in setting of elevated Cr 2.7-3 (unsure baseline)  - lokelma added  - COVID ab positive, PCR negative x2  - Listed for the floors  - Taper steroids    MEDICATIONS:   --------------------------------------------------------------------------------------  Neurologic Medications  acetaminophen    Suspension .. 975 milliGRAM(s) Oral every 6 hours  levETIRAcetam  Solution 750 milliGRAM(s) Oral two times a day  oxyCODONE    Solution 5 milliGRAM(s) Oral every 4 hours PRN Moderate and Severe Pain    Respiratory Medications    Cardiovascular Medications    Gastrointestinal Medications  polyethylene glycol 3350 17 Gram(s) Oral daily  senna Syrup 10 milliLiter(s) Oral at bedtime    Genitourinary Medications    Hematologic/Oncologic Medications  heparin   Injectable 5000 Unit(s) SubCutaneous every 8 hours    Antimicrobial/Immunologic Medications    Endocrine/Metabolic Medications  dexAMETHasone  IVPB 8 milliGRAM(s) IV Intermittent every 12 hours    Topical/Other Medications  chlorhexidine 0.12% Liquid 15 milliLiter(s) Oral Mucosa every 12 hours  chlorhexidine 4% Liquid 1 Application(s) Topical <User Schedule>  sodium zirconium cyclosilicate 10 Gram(s) Oral once    --------------------------------------------------------------------------------------    VITAL SIGNS, INS/OUTS (last 24 hours):  --------------------------------------------------------------------------------------    03-27-21 @ 07:01 - 03-28-21 @ 07:00  --------------------------------------------------------  IN: 1880 mL / OUT: 3450 mL / NET: -1570 mL    03-28-21 @ 07:01  -  03-29-21 @ 01:36  --------------------------------------------------------  IN: 2145 mL / OUT: 2405 mL / NET: -260 mL      --------------------------------------------------------------------------------------    EXAM  NEURO: NAD, resting comfortably.  HEENT: s/p trach, +tongue swelling improving, no bleeding or erythema around trach    RESPIRATORY: lungs clear b/l, breathing unlabored   CARDIO: RRR, S1S2  ABDOMEN: soft, nontender, nondistended.  EXTREMITIES: normal strength, no deformities.    HEMATOLOGIC  [x] VTE Prophylaxis: heparin   Injectable 5000 Unit(s) SubCutaneous every 8 hours        LABS  --------------------------------------------------------------------------------------    T(C): 36.4 (03-29-21 @ 00:00), Max: 37 (03-28-21 @ 20:00)  HR: 65 (03-29-21 @ 00:00) (54 - 79)  BP: 145/102 (03-29-21 @ 00:00) (134/77 - 171/99)  RR: 12 (03-29-21 @ 00:00) (10 - 20)  SpO2: 100% (03-29-21 @ 00:00) (98% - 100%)    --------------------------------------------------------------------------------------

## 2021-03-29 NOTE — PROGRESS NOTE ADULT - ASSESSMENT
A/P: 62yo M s/p emergency tracheotomy for airway concern from lingual hematoma    - Pt must be downgraded and to be transferred to floor. 2 doses of COVID vaccine given previously. Must be out of COVID unit.     - Rec taper down steroid  - Rec bowl regimen  - Appreciate SICU care   - Trach care by OMFS  - OMFS to follow    [id number] FS

## 2021-03-29 NOTE — DIETITIAN INITIAL EVALUATION ADULT. - OTHER INFO
Per chart review patient with medical history of HTN, PE on Lovenox, presented to [facility] s/p seizure with lingual hematoma, s/p awake tracheostomy followed by exploration and evacuation of tongue hematoma with repair of tongue lacerations. Patient with NGT for feeds. Initially started on feeds of Jevity 1.2. Receiving bolus feeds previously but noted with intolerance. Transitioned to Nepro 2/2 hyperkalemia. Nepro running at 25mL/hr during time of visit.     No vomiting or nausea reported. Patient able to shake head "yes or no" to questions and reports feeling bloated. Noted constipation, no BM x4 days in-house. Ordered for Senna and Miralax. Patient denies abdominal pain. Glucose WDL, HbA1c 5.8%. Patient denies weight loss.  pounds. Admit weight 175.9 pounds (79.8kg). Patient reports Height is 63inches.

## 2021-03-29 NOTE — DIETITIAN INITIAL EVALUATION ADULT. - ENTERAL
1. Recommend Nepro @ 25mL/hr and advance 5mL q4hrs to goal rate of 37mL/hr x24 hours + No Carb Prosource (1pkg = 15 gm protein) 2x daily [provides 888mL total volume, 1598Kcal, 102g protein and 646mL free water daily] (20Kcal/kg ABW and 1.8g protein/kg IBW).

## 2021-03-29 NOTE — DIETITIAN INITIAL EVALUATION ADULT. - RD TO REMAIN AVAILABLE
2. Additional free water per MD discretion. 3. Bowel regimen as clinically appropriate. 4. Consider multivitamin daily for micronutrient coverage.

## 2021-03-29 NOTE — DIETITIAN INITIAL EVALUATION ADULT. - PERTINENT LABORATORY DATA
03-29 Na 143 mmol/L Glu 150 mg/dL<H> K+ 5.3 mmol/L Cr 2.50 mg/dL<H> BUN 68 mg/dL<H> Phos 5.1 mg/dL<H> Hgb 8.5 g/dL<L> Hct 28.2 %<L> Glucose, Serum: 150 mg/dL <H>  Glucose, Serum: 126 mg/dL <H>  24Hr FS:131 mg/dL

## 2021-03-29 NOTE — DIETITIAN INITIAL EVALUATION ADULT. - PERTINENT MEDS FT
acetaminophen    Suspension ..  amLODIPine   Tablet  bisacodyl Suppository PRN  dexAMETHasone  IVPB  heparin   Injectable  levETIRAcetam  Solution  oxyCODONE    IR PRN  polyethylene glycol 3350  senna Syrup  sodium zirconium cyclosilicate

## 2021-03-29 NOTE — PROGRESS NOTE ADULT - SUBJECTIVE AND OBJECTIVE BOX
ORAL AND MAXILLOFACIAL SURGERY PROGRESS NOTE    SUBJECTIVE / 24H EVENTS:  Patient seen and examined on morning rounds. No acute events overnight.   Pt is w/ trach in place. NPO w/ TF    OBJECTIVE:  VITAL SIGNS:  ICU Vital Signs Last 24 Hrs  T(C): 36.4 (29 Mar 2021 00:00), Max: 37 (28 Mar 2021 20:00)  T(F): 97.5 (29 Mar 2021 00:00), Max: 98.6 (28 Mar 2021 20:00)  HR: 71 (29 Mar 2021 07:20) (54 - 89)  BP: 158/99 (29 Mar 2021 06:00) (140/106 - 171/99)  BP(mean): 122 (29 Mar 2021 06:00) (97 - 141)  ABP: --  ABP(mean): --  RR: 10 (29 Mar 2021 07:20) (10 - 20)  SpO2: 100% (29 Mar 2021 07:20) (98% - 100%)    MEDICATIONS  (STANDING):  acetaminophen    Suspension .. 975 milliGRAM(s) Oral every 6 hours  amLODIPine   Tablet 10 milliGRAM(s) Oral daily  chlorhexidine 0.12% Liquid 15 milliLiter(s) Oral Mucosa every 12 hours  chlorhexidine 4% Liquid 1 Application(s) Topical <User Schedule>  dexAMETHasone  IVPB 8 milliGRAM(s) IV Intermittent every 12 hours  heparin   Injectable 5000 Unit(s) SubCutaneous every 8 hours  levETIRAcetam  Solution 750 milliGRAM(s) Oral two times a day  polyethylene glycol 3350 17 Gram(s) Oral daily  senna Syrup 10 milliLiter(s) Oral at bedtime  sodium zirconium cyclosilicate 10 Gram(s) Oral once    MEDICATIONS  (PRN):  oxyCODONE    Solution 5 milliGRAM(s) Oral every 4 hours PRN Moderate and Severe Pain  PHYSICAL EXAM:  Gen: AAOx3  Resp: Trach collar stand by, non labored   HEENT: Trach in place secured by suture w/ trach collar stand by  Oral: lingual hematoma has been improved, hemostatic, sutures c/d/i.       LAB VALUES:    03-29    143  |  109<H>  |  68<H>  ----------------------------<  150<H>  5.3   |  22  |  2.50<H>    Ca    8.8      29 Mar 2021 04:04  Phos  5.1     03-29  Mg     2.4     03-29                          8.5    9.98  )-----------( 116      ( 29 Mar 2021 04:04 )             28.2        Statement Selected

## 2021-03-29 NOTE — DIETITIAN INITIAL EVALUATION ADULT. - ORAL INTAKE PTA/DIET HISTORY
Unable to obtain subjective information regarding PO intake/diet prior to admission given patient unable to speak s/p trach.

## 2021-03-30 LAB
ANION GAP SERPL CALC-SCNC: 11 MMOL/L — SIGNIFICANT CHANGE UP (ref 7–14)
ANION GAP SERPL CALC-SCNC: 9 MMOL/L — SIGNIFICANT CHANGE UP (ref 7–14)
BUN SERPL-MCNC: 70 MG/DL — HIGH (ref 7–23)
BUN SERPL-MCNC: 73 MG/DL — HIGH (ref 7–23)
CALCIUM SERPL-MCNC: 10.5 MG/DL — SIGNIFICANT CHANGE UP (ref 8.4–10.5)
CALCIUM SERPL-MCNC: 9.1 MG/DL — SIGNIFICANT CHANGE UP (ref 8.4–10.5)
CHLORIDE SERPL-SCNC: 109 MMOL/L — HIGH (ref 98–107)
CHLORIDE SERPL-SCNC: 110 MMOL/L — HIGH (ref 98–107)
CO2 SERPL-SCNC: 24 MMOL/L — SIGNIFICANT CHANGE UP (ref 22–31)
CO2 SERPL-SCNC: 24 MMOL/L — SIGNIFICANT CHANGE UP (ref 22–31)
CREAT SERPL-MCNC: 2.72 MG/DL — HIGH (ref 0.5–1.3)
CREAT SERPL-MCNC: 2.8 MG/DL — HIGH (ref 0.5–1.3)
GLUCOSE BLDC GLUCOMTR-MCNC: 154 MG/DL — HIGH (ref 70–99)
GLUCOSE BLDC GLUCOMTR-MCNC: 156 MG/DL — HIGH (ref 70–99)
GLUCOSE BLDC GLUCOMTR-MCNC: 162 MG/DL — HIGH (ref 70–99)
GLUCOSE BLDC GLUCOMTR-MCNC: 195 MG/DL — HIGH (ref 70–99)
GLUCOSE SERPL-MCNC: 141 MG/DL — HIGH (ref 70–99)
GLUCOSE SERPL-MCNC: 172 MG/DL — HIGH (ref 70–99)
HCT VFR BLD CALC: 30.2 % — LOW (ref 39–50)
HGB BLD-MCNC: 8.9 G/DL — LOW (ref 13–17)
MAGNESIUM SERPL-MCNC: 2.6 MG/DL — SIGNIFICANT CHANGE UP (ref 1.6–2.6)
MCHC RBC-ENTMCNC: 29.2 PG — SIGNIFICANT CHANGE UP (ref 27–34)
MCHC RBC-ENTMCNC: 29.5 GM/DL — LOW (ref 32–36)
MCV RBC AUTO: 99 FL — SIGNIFICANT CHANGE UP (ref 80–100)
NRBC # BLD: 0 /100 WBCS — SIGNIFICANT CHANGE UP
NRBC # FLD: 0 K/UL — SIGNIFICANT CHANGE UP
PHOSPHATE SERPL-MCNC: 5.6 MG/DL — HIGH (ref 2.5–4.5)
PLATELET # BLD AUTO: 108 K/UL — LOW (ref 150–400)
POTASSIUM SERPL-MCNC: 5.7 MMOL/L — HIGH (ref 3.5–5.3)
POTASSIUM SERPL-MCNC: 6.7 MMOL/L — CRITICAL HIGH (ref 3.5–5.3)
POTASSIUM SERPL-SCNC: 5.7 MMOL/L — HIGH (ref 3.5–5.3)
POTASSIUM SERPL-SCNC: 6.7 MMOL/L — CRITICAL HIGH (ref 3.5–5.3)
RBC # BLD: 3.05 M/UL — LOW (ref 4.2–5.8)
RBC # FLD: 16.4 % — HIGH (ref 10.3–14.5)
SODIUM SERPL-SCNC: 142 MMOL/L — SIGNIFICANT CHANGE UP (ref 135–145)
SODIUM SERPL-SCNC: 145 MMOL/L — SIGNIFICANT CHANGE UP (ref 135–145)
WBC # BLD: 7.22 K/UL — SIGNIFICANT CHANGE UP (ref 3.8–10.5)
WBC # FLD AUTO: 7.22 K/UL — SIGNIFICANT CHANGE UP (ref 3.8–10.5)

## 2021-03-30 PROCEDURE — 99291 CRITICAL CARE FIRST HOUR: CPT

## 2021-03-30 RX ORDER — ACETAMINOPHEN 500 MG
975 TABLET ORAL ONCE
Refills: 0 | Status: DISCONTINUED | OUTPATIENT
Start: 2021-03-30 | End: 2021-03-30

## 2021-03-30 RX ORDER — CALCIUM GLUCONATE 100 MG/ML
2 VIAL (ML) INTRAVENOUS ONCE
Refills: 0 | Status: COMPLETED | OUTPATIENT
Start: 2021-03-30 | End: 2021-03-30

## 2021-03-30 RX ORDER — INSULIN HUMAN 100 [IU]/ML
10 INJECTION, SOLUTION SUBCUTANEOUS ONCE
Refills: 0 | Status: COMPLETED | OUTPATIENT
Start: 2021-03-30 | End: 2021-03-30

## 2021-03-30 RX ORDER — SODIUM ZIRCONIUM CYCLOSILICATE 10 G/10G
10 POWDER, FOR SUSPENSION ORAL EVERY 8 HOURS
Refills: 0 | Status: DISCONTINUED | OUTPATIENT
Start: 2021-03-30 | End: 2021-04-03

## 2021-03-30 RX ORDER — ACETAMINOPHEN 500 MG
975 TABLET ORAL EVERY 6 HOURS
Refills: 0 | Status: DISCONTINUED | OUTPATIENT
Start: 2021-03-30 | End: 2021-03-30

## 2021-03-30 RX ORDER — DEXTROSE 50 % IN WATER 50 %
50 SYRINGE (ML) INTRAVENOUS ONCE
Refills: 0 | Status: COMPLETED | OUTPATIENT
Start: 2021-03-30 | End: 2021-03-30

## 2021-03-30 RX ORDER — SENNA PLUS 8.6 MG/1
2 TABLET ORAL AT BEDTIME
Refills: 0 | Status: DISCONTINUED | OUTPATIENT
Start: 2021-03-30 | End: 2021-04-03

## 2021-03-30 RX ADMIN — AMLODIPINE BESYLATE 10 MILLIGRAM(S): 2.5 TABLET ORAL at 05:43

## 2021-03-30 RX ADMIN — Medication 975 MILLIGRAM(S): at 12:00

## 2021-03-30 RX ADMIN — Medication 975 MILLIGRAM(S): at 18:52

## 2021-03-30 RX ADMIN — Medication 975 MILLIGRAM(S): at 23:04

## 2021-03-30 RX ADMIN — OXYCODONE HYDROCHLORIDE 5 MILLIGRAM(S): 5 TABLET ORAL at 15:15

## 2021-03-30 RX ADMIN — OXYCODONE HYDROCHLORIDE 5 MILLIGRAM(S): 5 TABLET ORAL at 14:55

## 2021-03-30 RX ADMIN — Medication 50 MILLILITER(S): at 07:53

## 2021-03-30 RX ADMIN — HEPARIN SODIUM 5000 UNIT(S): 5000 INJECTION INTRAVENOUS; SUBCUTANEOUS at 22:54

## 2021-03-30 RX ADMIN — POLYETHYLENE GLYCOL 3350 17 GRAM(S): 17 POWDER, FOR SOLUTION ORAL at 12:23

## 2021-03-30 RX ADMIN — HEPARIN SODIUM 5000 UNIT(S): 5000 INJECTION INTRAVENOUS; SUBCUTANEOUS at 05:43

## 2021-03-30 RX ADMIN — LEVETIRACETAM 750 MILLIGRAM(S): 250 TABLET, FILM COATED ORAL at 18:53

## 2021-03-30 RX ADMIN — SODIUM ZIRCONIUM CYCLOSILICATE 10 GRAM(S): 10 POWDER, FOR SUSPENSION ORAL at 22:54

## 2021-03-30 RX ADMIN — Medication 101.6 MILLIGRAM(S): at 06:58

## 2021-03-30 RX ADMIN — CHLORHEXIDINE GLUCONATE 15 MILLILITER(S): 213 SOLUTION TOPICAL at 18:53

## 2021-03-30 RX ADMIN — CHLORHEXIDINE GLUCONATE 1 APPLICATION(S): 213 SOLUTION TOPICAL at 05:45

## 2021-03-30 RX ADMIN — Medication 975 MILLIGRAM(S): at 09:14

## 2021-03-30 RX ADMIN — SODIUM ZIRCONIUM CYCLOSILICATE 10 GRAM(S): 10 POWDER, FOR SUSPENSION ORAL at 10:32

## 2021-03-30 RX ADMIN — INSULIN HUMAN 10 UNIT(S): 100 INJECTION, SOLUTION SUBCUTANEOUS at 08:11

## 2021-03-30 RX ADMIN — LEVETIRACETAM 750 MILLIGRAM(S): 250 TABLET, FILM COATED ORAL at 06:55

## 2021-03-30 RX ADMIN — CHLORHEXIDINE GLUCONATE 15 MILLILITER(S): 213 SOLUTION TOPICAL at 05:44

## 2021-03-30 RX ADMIN — Medication 200 GRAM(S): at 07:53

## 2021-03-30 RX ADMIN — HEPARIN SODIUM 5000 UNIT(S): 5000 INJECTION INTRAVENOUS; SUBCUTANEOUS at 14:15

## 2021-03-30 RX ADMIN — Medication 975 MILLIGRAM(S): at 06:54

## 2021-03-30 RX ADMIN — Medication 975 MILLIGRAM(S): at 16:57

## 2021-03-30 NOTE — CHART NOTE - NSCHARTNOTEFT_GEN_A_CORE
PRELIMINARY EEG REVIEW    EEG reviewed to 15:36  Date: 03-30-21    - No epileptiform pattern or seizure seen.    This Preliminary report is based on fellow review. Final report pending Completion of study tomorrow morning and following attending review.    Reading Room: [phone]  On Call Service After Hours: [phone]    Danilo Abdul MD PGY-5  Epilepsy Fellow

## 2021-03-30 NOTE — PROGRESS NOTE ADULT - ASSESSMENT
A/P: 60yo M s/p emergency tracheotomy for airway concern from lingual hematoma    - Pt must be downgraded and to be transferred to floor. 2 doses of COVID vaccine given previously. Must be out of COVID unit.     - Rec bowl regimen  - Appreciate SICU care   - Trach care by OMFS  - OMFS to follow    [id number] OMFS

## 2021-03-30 NOTE — PROGRESS NOTE ADULT - SUBJECTIVE AND OBJECTIVE BOX
ORAL AND MAXILLOFACIAL SURGERY PROGRESS NOTE    SUBJECTIVE / 24H EVENTS:  Patient seen and examined on morning rounds. No acute events overnight.   Pt is w/ trach in place. NPO w/ TF    OBJECTIVE:  VITAL SIGNS:  ICU Vital Signs Last 24 Hrs  T(C): 36.6 (30 Mar 2021 07:00), Max: 37.1 (29 Mar 2021 16:00)  T(F): 97.9 (30 Mar 2021 07:00), Max: 98.8 (29 Mar 2021 16:00)  HR: 68 (30 Mar 2021 07:00) (68 - 82)  BP: 180/91 (30 Mar 2021 07:00) (119/95 - 180/91)  BP(mean): 114 (30 Mar 2021 07:00) (107 - 114)  ABP: --  ABP(mean): --  RR: 12 (30 Mar 2021 07:00) (8 - 17)  SpO2: 100% (30 Mar 2021 07:30) (99% - 100%)    MEDICATIONS  (STANDING):  acetaminophen    Suspension .. 975 milliGRAM(s) Oral every 6 hours  acetaminophen    Suspension .. 975 milliGRAM(s) Enteral Tube once  amLODIPine   Tablet 10 milliGRAM(s) Oral daily  chlorhexidine 0.12% Liquid 15 milliLiter(s) Oral Mucosa every 12 hours  chlorhexidine 4% Liquid 1 Application(s) Topical <User Schedule>  heparin   Injectable 5000 Unit(s) SubCutaneous every 8 hours  insulin regular  human recombinant 10 Unit(s) IV Push once  levETIRAcetam  Solution 750 milliGRAM(s) Oral two times a day  polyethylene glycol 3350 17 Gram(s) Oral daily  senna Syrup 10 milliLiter(s) Oral at bedtime  sodium zirconium cyclosilicate 10 Gram(s) Oral every 8 hours    MEDICATIONS  (PRN):  bisacodyl Suppository 10 milliGRAM(s) Rectal daily PRN Constipation  oxyCODONE    IR 5 milliGRAM(s) Oral every 4 hours PRN Moderate Pain (4 - 6)  oxyCODONE    IR 10 milliGRAM(s) Oral every 4 hours PRN Severe Pain (7 - 10)       LAB VALUES:    03-30    142  |  109<H>  |  73<H>  ----------------------------<  172<H>  6.7<HH>   |  24  |  2.80<H>    Ca    9.1      30 Mar 2021 04:50  Phos  5.6     03-30  Mg     2.6     03-30                          8.9    7.22  )-----------( 108      ( 30 Mar 2021 04:50 )             30.2

## 2021-03-30 NOTE — PROGRESS NOTE ADULT - ASSESSMENT
61M with PMHx HTN, PE on Lovenox, presented to [facility] s/p seizure with lingual hematoma, POD #0 from awake tracheostomy followed by exploration and evacuation of tongue hematoma with repair of tongue lacerations. SICU consulted for new tracheostomy.     PLAN:    Neurologic:   - Tylenol / Oxycodone - pain control PRN  - Keppra 750mg BID   - f/u MRI   - f/u EEG   - f/u Neurology recs     Respiratory:   - tolerating trach collar  - monitor SP02    Cardiovascular:   - monitor vital signs q1h  - hold home antihypertensives for now     Gastrointestinal/Nutrition:   - TF - Nepro continuous @ 25cc/hour via NGT   - Miralax / senna for bowel regimen     Renal/Genitourinary:   - monitor urine output  - monitor electrolytes, replete PRN   - Hyperkalemia, resolved after Jevity changed to nepro, lokelma added. 5.9 > 5.3    Hematologic:   - monitor H&H  - Heparing subQ for ppx     Infectious Disease:   - COVID - Ab+  - COVID PCR positive, then negative x2   - will require droplet precautions after leaving covid unit     Endocrine:   - Taper dexamethasone  - monitor glucose on BMP  - no active issues     Disposition:   - SICU care in [facility]  - Pending transfer to the floors

## 2021-03-30 NOTE — PROGRESS NOTE ADULT - SUBJECTIVE AND OBJECTIVE BOX
SICU Daily Progress Note  =====================================================  HPI: 62 yo M s/p seizure this morning sustaining tongue lac and uncontrolled heme presents to [facility] ED. Pt denies hx of seizures. Pt admits to laceration to his tongue. Denies chest pain, sob, abd pain, n/v/d, numbness, tingling, weakness, dizziness, fever or chills. Pt states that he had Covid x1-2 months ago, and received his 2nd dose of the vaccine yesterday. Pt also states that he was recently dx w/ anemia and is currently being worked up for cancer but has no dx.  (25 Mar 2021 22:18)    24 HR Events:  -tolerating trach collar   -listed for floors     MEDICATIONS:   --------------------------------------------------------------------------------------  Neurologic Medications  acetaminophen    Suspension .. 975 milliGRAM(s) Oral every 6 hours  levETIRAcetam  Solution 750 milliGRAM(s) Oral two times a day  oxyCODONE    IR 5 milliGRAM(s) Oral every 4 hours PRN Moderate Pain (4 - 6)  oxyCODONE    IR 10 milliGRAM(s) Oral every 4 hours PRN Severe Pain (7 - 10)    Respiratory Medications    Cardiovascular Medications  amLODIPine   Tablet 10 milliGRAM(s) Oral daily    Gastrointestinal Medications  bisacodyl Suppository 10 milliGRAM(s) Rectal daily PRN Constipation  polyethylene glycol 3350 17 Gram(s) Oral daily  senna Syrup 10 milliLiter(s) Oral at bedtime    Genitourinary Medications    Hematologic/Oncologic Medications  heparin   Injectable 5000 Unit(s) SubCutaneous every 8 hours    Antimicrobial/Immunologic Medications    Endocrine/Metabolic Medications    Topical/Other Medications  chlorhexidine 0.12% Liquid 15 milliLiter(s) Oral Mucosa every 12 hours  chlorhexidine 4% Liquid 1 Application(s) Topical <User Schedule>  sodium zirconium cyclosilicate 10 Gram(s) Oral once    --------------------------------------------------------------------------------------    VITAL SIGNS, INS/OUTS (last 24 hours):  --------------------------------------------------------------------------------------    03-28-21 @ 07:01 - 03-29-21 @ 07:00  --------------------------------------------------------  IN: 2320 mL / OUT: 3380 mL / NET: -1060 mL    03-29-21 @ 07:01  -  03-30-21 @ 00:00  --------------------------------------------------------  IN: 430 mL / OUT: 1385 mL / NET: -955 mL      --------------------------------------------------------------------------------------    EXAM  NEURO: NAD, resting comfortably.  HEENT: s/p trach, +tongue swelling improving, no bleeding or erythema around trach    RESPIRATORY: lungs clear b/l, breathing unlabored   CARDIO: RRR, S1S2  ABDOMEN: soft, nontender, nondistended.  EXTREMITIES: normal strength, no deformities    HEMATOLOGIC  [x] VTE Prophylaxis: heparin   Injectable 5000 Unit(s) SubCutaneous every 8 hours        LABS  --------------------------------------------------------------------------------------    T(C): 37 (03-29-21 @ 20:00), Max: 37.1 (03-29-21 @ 16:00)  HR: 82 (03-29-21 @ 20:00) (63 - 89)  BP: 119/95 (03-29-21 @ 20:00) (119/95 - 168/108)  RR: 17 (03-29-21 @ 20:00) (8 - 17)  SpO2: 100% (03-29-21 @ 20:00) (100% - 100%)    --------------------------------------------------------------------------------------

## 2021-03-31 LAB
ANION GAP SERPL CALC-SCNC: 11 MMOL/L — SIGNIFICANT CHANGE UP (ref 7–14)
ANION GAP SERPL CALC-SCNC: 8 MMOL/L — SIGNIFICANT CHANGE UP (ref 7–14)
BUN SERPL-MCNC: 68 MG/DL — HIGH (ref 7–23)
BUN SERPL-MCNC: 70 MG/DL — HIGH (ref 7–23)
CALCIUM SERPL-MCNC: 9.5 MG/DL — SIGNIFICANT CHANGE UP (ref 8.4–10.5)
CALCIUM SERPL-MCNC: 9.6 MG/DL — SIGNIFICANT CHANGE UP (ref 8.4–10.5)
CHLORIDE SERPL-SCNC: 104 MMOL/L — SIGNIFICANT CHANGE UP (ref 98–107)
CHLORIDE SERPL-SCNC: 107 MMOL/L — SIGNIFICANT CHANGE UP (ref 98–107)
CO2 SERPL-SCNC: 26 MMOL/L — SIGNIFICANT CHANGE UP (ref 22–31)
CO2 SERPL-SCNC: 26 MMOL/L — SIGNIFICANT CHANGE UP (ref 22–31)
CREAT SERPL-MCNC: 2.69 MG/DL — HIGH (ref 0.5–1.3)
CREAT SERPL-MCNC: 2.77 MG/DL — HIGH (ref 0.5–1.3)
GLUCOSE SERPL-MCNC: 116 MG/DL — HIGH (ref 70–99)
GLUCOSE SERPL-MCNC: 160 MG/DL — HIGH (ref 70–99)
HCT VFR BLD CALC: 32.1 % — LOW (ref 39–50)
HGB BLD-MCNC: 9.9 G/DL — LOW (ref 13–17)
MAGNESIUM SERPL-MCNC: 2.4 MG/DL — SIGNIFICANT CHANGE UP (ref 1.6–2.6)
MAGNESIUM SERPL-MCNC: 2.7 MG/DL — HIGH (ref 1.6–2.6)
MCHC RBC-ENTMCNC: 30 PG — SIGNIFICANT CHANGE UP (ref 27–34)
MCHC RBC-ENTMCNC: 30.8 GM/DL — LOW (ref 32–36)
MCV RBC AUTO: 97.3 FL — SIGNIFICANT CHANGE UP (ref 80–100)
NRBC # BLD: 0 /100 WBCS — SIGNIFICANT CHANGE UP
NRBC # FLD: 0 K/UL — SIGNIFICANT CHANGE UP
PHOSPHATE SERPL-MCNC: 4.6 MG/DL — HIGH (ref 2.5–4.5)
PHOSPHATE SERPL-MCNC: 4.8 MG/DL — HIGH (ref 2.5–4.5)
PLATELET # BLD AUTO: 120 K/UL — LOW (ref 150–400)
POTASSIUM SERPL-MCNC: 5 MMOL/L — SIGNIFICANT CHANGE UP (ref 3.5–5.3)
POTASSIUM SERPL-MCNC: 5.8 MMOL/L — HIGH (ref 3.5–5.3)
POTASSIUM SERPL-SCNC: 5 MMOL/L — SIGNIFICANT CHANGE UP (ref 3.5–5.3)
POTASSIUM SERPL-SCNC: 5.8 MMOL/L — HIGH (ref 3.5–5.3)
RBC # BLD: 3.3 M/UL — LOW (ref 4.2–5.8)
RBC # FLD: 16 % — HIGH (ref 10.3–14.5)
SODIUM SERPL-SCNC: 141 MMOL/L — SIGNIFICANT CHANGE UP (ref 135–145)
SODIUM SERPL-SCNC: 141 MMOL/L — SIGNIFICANT CHANGE UP (ref 135–145)
WBC # BLD: 7.67 K/UL — SIGNIFICANT CHANGE UP (ref 3.8–10.5)
WBC # FLD AUTO: 7.67 K/UL — SIGNIFICANT CHANGE UP (ref 3.8–10.5)

## 2021-03-31 PROCEDURE — 95720 EEG PHY/QHP EA INCR W/VEEG: CPT

## 2021-03-31 PROCEDURE — 93010 ELECTROCARDIOGRAM REPORT: CPT

## 2021-03-31 PROCEDURE — 99232 SBSQ HOSP IP/OBS MODERATE 35: CPT

## 2021-03-31 RX ORDER — ACETAMINOPHEN 500 MG
975 TABLET ORAL EVERY 6 HOURS
Refills: 0 | Status: DISCONTINUED | OUTPATIENT
Start: 2021-03-31 | End: 2021-04-03

## 2021-03-31 RX ORDER — INSULIN HUMAN 100 [IU]/ML
10 INJECTION, SOLUTION SUBCUTANEOUS ONCE
Refills: 0 | Status: COMPLETED | OUTPATIENT
Start: 2021-03-31 | End: 2021-03-31

## 2021-03-31 RX ORDER — HYDRALAZINE HCL 50 MG
25 TABLET ORAL THREE TIMES A DAY
Refills: 0 | Status: DISCONTINUED | OUTPATIENT
Start: 2021-03-31 | End: 2021-04-01

## 2021-03-31 RX ORDER — CALCIUM GLUCONATE 100 MG/ML
2 VIAL (ML) INTRAVENOUS ONCE
Refills: 0 | Status: COMPLETED | OUTPATIENT
Start: 2021-03-31 | End: 2021-03-31

## 2021-03-31 RX ORDER — DEXTROSE 50 % IN WATER 50 %
50 SYRINGE (ML) INTRAVENOUS ONCE
Refills: 0 | Status: COMPLETED | OUTPATIENT
Start: 2021-03-31 | End: 2021-03-31

## 2021-03-31 RX ADMIN — CHLORHEXIDINE GLUCONATE 1 APPLICATION(S): 213 SOLUTION TOPICAL at 06:06

## 2021-03-31 RX ADMIN — HEPARIN SODIUM 5000 UNIT(S): 5000 INJECTION INTRAVENOUS; SUBCUTANEOUS at 06:24

## 2021-03-31 RX ADMIN — Medication 25 MILLIGRAM(S): at 12:09

## 2021-03-31 RX ADMIN — Medication 975 MILLIGRAM(S): at 06:47

## 2021-03-31 RX ADMIN — OXYCODONE HYDROCHLORIDE 5 MILLIGRAM(S): 5 TABLET ORAL at 09:13

## 2021-03-31 RX ADMIN — OXYCODONE HYDROCHLORIDE 10 MILLIGRAM(S): 5 TABLET ORAL at 21:19

## 2021-03-31 RX ADMIN — Medication 975 MILLIGRAM(S): at 06:48

## 2021-03-31 RX ADMIN — LEVETIRACETAM 750 MILLIGRAM(S): 250 TABLET, FILM COATED ORAL at 06:26

## 2021-03-31 RX ADMIN — Medication 25 MILLIGRAM(S): at 21:19

## 2021-03-31 RX ADMIN — OXYCODONE HYDROCHLORIDE 10 MILLIGRAM(S): 5 TABLET ORAL at 21:45

## 2021-03-31 RX ADMIN — OXYCODONE HYDROCHLORIDE 10 MILLIGRAM(S): 5 TABLET ORAL at 17:01

## 2021-03-31 RX ADMIN — HEPARIN SODIUM 5000 UNIT(S): 5000 INJECTION INTRAVENOUS; SUBCUTANEOUS at 21:19

## 2021-03-31 RX ADMIN — OXYCODONE HYDROCHLORIDE 5 MILLIGRAM(S): 5 TABLET ORAL at 09:42

## 2021-03-31 RX ADMIN — Medication 975 MILLIGRAM(S): at 06:23

## 2021-03-31 RX ADMIN — SODIUM ZIRCONIUM CYCLOSILICATE 10 GRAM(S): 10 POWDER, FOR SUSPENSION ORAL at 21:58

## 2021-03-31 RX ADMIN — OXYCODONE HYDROCHLORIDE 10 MILLIGRAM(S): 5 TABLET ORAL at 16:46

## 2021-03-31 RX ADMIN — SENNA PLUS 2 TABLET(S): 8.6 TABLET ORAL at 21:19

## 2021-03-31 RX ADMIN — SODIUM ZIRCONIUM CYCLOSILICATE 10 GRAM(S): 10 POWDER, FOR SUSPENSION ORAL at 06:07

## 2021-03-31 RX ADMIN — HEPARIN SODIUM 5000 UNIT(S): 5000 INJECTION INTRAVENOUS; SUBCUTANEOUS at 13:55

## 2021-03-31 RX ADMIN — Medication 50 MILLILITER(S): at 06:22

## 2021-03-31 RX ADMIN — SODIUM ZIRCONIUM CYCLOSILICATE 10 GRAM(S): 10 POWDER, FOR SUSPENSION ORAL at 13:55

## 2021-03-31 RX ADMIN — INSULIN HUMAN 10 UNIT(S): 100 INJECTION, SOLUTION SUBCUTANEOUS at 06:22

## 2021-03-31 RX ADMIN — AMLODIPINE BESYLATE 10 MILLIGRAM(S): 2.5 TABLET ORAL at 06:23

## 2021-03-31 RX ADMIN — LEVETIRACETAM 750 MILLIGRAM(S): 250 TABLET, FILM COATED ORAL at 17:01

## 2021-03-31 RX ADMIN — Medication 200 GRAM(S): at 06:23

## 2021-03-31 NOTE — PROGRESS NOTE ADULT - ASSESSMENT
A/P: 60yo M s/p emergency tracheotomy for airway concern from lingual hematoma    - Trach capped o/n, sat 100%  - Trach to be decannulated today    - Appreciate SICU care   - Trach care by OMFS  - OMFS to follow    [id number] OMFS

## 2021-03-31 NOTE — SWALLOW BEDSIDE ASSESSMENT ADULT - SWALLOW EVAL: DIAGNOSIS
Patient consumed trials of puree, solids, nectar thick liquids and thin liquids presenting with mild oropharyngeal dysphagia. Oral phase characterized by adequate acceptance, adequate anterior bolus containment, slow bolus manipulation, anterior to posterior transport and adequate oral cavity clearance. Pharyngeal phase characterized by suspect delayed initiation of the pharyngeal swallow and hyolaryngeal elevation and excursion noted upon lateral neck palpation.  Wet vocal quality and inconsistent throat clear with thin liquids indicative of laryngeal penetration/aspiration.  No clinically overt signs or symptoms of aspiration across PO trials of puree, solids and nectar thick liquids.

## 2021-03-31 NOTE — PROGRESS NOTE ADULT - ASSESSMENT
61M with PMHx HTN, PE on Lovenox, presented to [facility] s/p seizure with lingual hematoma, POD #0 from awake tracheostomy followed by exploration and evacuation of tongue hematoma with repair of tongue lacerations. SICU consulted for new tracheostomy.     PLAN:    Neurologic:   - Tylenol / Oxycodone - pain control PRN  - Keppra 750mg BID   - f/u MRI   - EEG w/ no epileptiform pattern or seizure seen  - f/u Neurology recs     Respiratory:   - tolerating trach collar  - monitor SP02    Cardiovascular:   - monitor vital signs q1h  - Unsure home HTN meds - f/u med rec   - amlodipine 10mg     Gastrointestinal/Nutrition:   - Passed bedside S/S  - Started on dyphagia 2 diet  - Miralax / senna for bowel regimen     Renal/Genitourinary:   - Hyperkalemia to 6.7; improved to 5.7 s/p shift  - monitor urine output  - monitor electrolytes, replete PRN     Hematologic:   - monitor H&H  - Heparing subQ for ppx     Infectious Disease:   - COVID - Ab+  - COVID PCR positive, then negative x2   - follow-up infection control     Endocrine:   - Taper dexamethasone  - monitor glucose on BMP  - no active issues     Disposition:   - SICU care   - Pending transfer to the floor   61M with PMHx HTN, PE on Lovenox, presented to [facility] s/p seizure with lingual hematoma, POD #0 from awake tracheostomy followed by exploration and evacuation of tongue hematoma with repair of tongue lacerations. SICU consulted for new tracheostomy.       PLAN:    Neurologic:   - Tylenol / Oxycodone - pain control PRN  - Keppra 750mg BID   - EEG w/ no epileptiform pattern or seizure seen  - f/u MRI   - f/u Neurology recs     Respiratory:   - tolerating trach collar  - monitor SP02    Cardiovascular:   - monitor vital signs q4h  - Unsure home HTN meds - f/u med rec   - c/w amlodipine 10mg and start hydralazine 50mg TID    Gastrointestinal/Nutrition:   - S/S rec Dsyphagia 3 diet w/ nectar thick liquids  - Miralax / senna for bowel regimen     Renal/Genitourinary:   - Hyperkalemia to 5.8; improved to 5.0 s/p shift  - monitor urine output  - monitor electrolytes, replete PRN     Hematologic:   - monitor H&H  - Heparing subQ for ppx     Infectious Disease:   - Afebrile w/ normal WBC  - COVID - Ab+  - COVID PCR positive, then negative x2     Endocrine:   - monitor glucose on BMP  - no active issues   - s/p Dexamethasone taper    Disposition:   - SICU care   - Pending transfer to the floor

## 2021-03-31 NOTE — PROGRESS NOTE ADULT - SUBJECTIVE AND OBJECTIVE BOX
ORAL AND MAXILLOFACIAL SURGERY PROGRESS NOTE    SUBJECTIVE / 24H EVENTS:  Patient seen and examined on morning rounds. No acute events overnight.   Pt is w/ trach in place and downsized to 4 and capped yesterday. NGT removed and on soft-nectar liquid diet.     OBJECTIVE:  VITAL SIGNS:  ICU Vital Signs Last 24 Hrs  T(C): 36.5 (31 Mar 2021 12:00), Max: 37.1 (31 Mar 2021 00:00)  T(F): 97.7 (31 Mar 2021 12:00), Max: 98.8 (31 Mar 2021 00:00)  HR: 85 (31 Mar 2021 12:00) (63 - 87)  BP: 158/93 (31 Mar 2021 12:00) (120/82 - 175/98)  BP(mean): 106 (31 Mar 2021 12:00) (91 - 118)  ABP: --  ABP(mean): --  RR: 16 (31 Mar 2021 12:00) (8 - 18)  SpO2: 99% (31 Mar 2021 12:00) (96% - 100%)    MEDICATIONS  (STANDING):  amLODIPine   Tablet 10 milliGRAM(s) Oral daily  chlorhexidine 4% Liquid 1 Application(s) Topical <User Schedule>  heparin   Injectable 5000 Unit(s) SubCutaneous every 8 hours  hydrALAZINE 25 milliGRAM(s) Oral three times a day  levETIRAcetam  Solution 750 milliGRAM(s) Oral two times a day  polyethylene glycol 3350 17 Gram(s) Oral daily  senna 2 Tablet(s) Oral at bedtime  sodium zirconium cyclosilicate 10 Gram(s) Oral every 8 hours    MEDICATIONS  (PRN):  acetaminophen   Tablet .. 975 milliGRAM(s) Oral every 6 hours PRN Temp greater or equal to 38C (100.4F), Mild Pain (1 - 3)  bisacodyl Suppository 10 milliGRAM(s) Rectal daily PRN Constipation  oxyCODONE    IR 5 milliGRAM(s) Oral every 4 hours PRN Moderate Pain (4 - 6)  oxyCODONE    IR 10 milliGRAM(s) Oral every 4 hours PRN Severe Pain (7 - 10)    LAB VALUES:    03-31    141  |  104  |  68<H>  ----------------------------<  160<H>  5.0   |  26  |  2.69<H>    Ca    9.5      31 Mar 2021 11:12  Phos  4.8     03-31  Mg     2.4     03-31                          9.9    7.67  )-----------( 120      ( 31 Mar 2021 03:50 )             32.1

## 2021-03-31 NOTE — SWALLOW BEDSIDE ASSESSMENT ADULT - SWALLOW EVAL: RECOMMENDED DIET
1. Soft solids (dysphagia 3) and Nectar Thick liquids 2. Consider reconsulting thin service when patient is medically optimized for possible diet advancement, this service will attempt to follow up as schedule permits

## 2021-03-31 NOTE — EEG REPORT - NS EEG TEXT BOX
ANNEMARIE KELLEY [id number] 61y (1960)M  Admitting MD: Dr. Jh Sparks    Study Date: 03-30 14:13-08:00 3-31-21  x14:48hrs interrupted  --------------------------------------------------------------------------------------------------  History:  CC/ HPI Patient is a 61y old  Male who presents with a chief complaint of Tongue laceration, uncontrolled heme, hematoma, airway obstruction (31 Mar 2021 00:24)    amLODIPine   Tablet 10 milliGRAM(s) Oral daily  chlorhexidine 4% Liquid 1 Application(s) Topical <User Schedule>  heparin   Injectable 5000 Unit(s) SubCutaneous every 8 hours  levETIRAcetam  Solution 750 milliGRAM(s) Oral two times a day  polyethylene glycol 3350 17 Gram(s) Oral daily  senna 2 Tablet(s) Oral at bedtime  sodium zirconium cyclosilicate 10 Gram(s) Oral every 8 hours    --------------------------------------------------------------------------------------------------  Study Interpretation:    [[[Abbreviation Key:  PDR=alpha rhythm/posterior dominant rhythm. A-P=anterior posterior gradient.  Amplitude: ‘very low’:<20; ‘low’:20-50; ‘medium’:; ‘high’:>200uV.  Persistence for periodic/rhythmic patterns (% of epoch) ‘rare’:<1%; ‘occasional’:1-10%; ‘frequent’:10-50%; ‘abundant’:50-90%; ‘continuous’:>90%.  Persistence for sporadic discharges: ‘rare’:<1/hr; ‘occasional’:1/min-1/hr; ‘frequent’:>1/min; ‘abundant’:>1/10 sec.  GRDA=generalized rhythmic delta activity, LRDA=lateralized rhythmic delta activity, TIRDA=temporal intermittent rhythmic delta activity, FIRDA=frontal intermittent rhythmic activity. LPD=PLED=lateralized periodic discharges, GPD=generalized periodic discharges, BiPDs=BiPLEDs=bilateral independent periodic epileptiform discharges, SIRPID=stimulus induced rhythmic, periodic, or ictal appearing discharges.  Modifiers: +F=with fast component, +S=with spike component, +R=with rhythmic component.  S-B=burst suppression pattern.  Max=maximal. N1-drowsy, N2-stage II sleep, N3-slow wave sleep.  HV=hyperventilation, PS=photic stimulation]]]    FINDINGS:  The background was continuous, spontaneously variable and reactive.  During wakefulness, the posteriorly dominant rhythm consisted of symmetric, well modulated 8 Hz activity, with an amplitude to 40 uV, that attenuated to eye opening.  Low amplitude central beta was noted in wakefulness.    Background Slowing:  Generalized slowing: mild increase in diffuse irregular delta was present intermittently.  Focal slowing: subtle left frontocentral increase in polymorphic delta/theta was present.    Sleep Background:  Drowsiness was characterized by fragmentation, attenuation, and slowing of the background activity.    Sleep was characterized by the presence of vertex waves, symmetric spindles, and K-complexes.    Epileptiform Activity:   No epileptiform discharges were present.    Events:  No clinical events were recorded.  No seizures were recorded.    Activation Procedures:   -Hyperventilation was not performed.    -Photic stimulation was not performed.    Artifacts:  Intermittent myogenic and external motion artifacts were noted.    ECG:  The heart rate on single channel ECG at baseline was predominantly near BPM = 66-80  -----------------------------------------------------------------------------------------------------    EEG Classification / Summary:  Abnormal EEG study, awake / drowsy / asleep    Generalized slowing: mild increase in diffuse irregular delta was present intermittently.  Focal slowing: subtle left frontocentral increase in polymorphic delta/theta was present.    -----------------------------------------------------------------------------------------------------    Clinical Impression:  Mild diffuse cerebral dysfunction, slightly more pronounced left frontocentrally.  Correlate clinically/radiographically.  There were no epileptiform abnormalities recorded.      -------------------------------------------------------------------------------------------------------  [facility] EEG Reading Room Ph#: [phone]  Epilepsy Answering Service after 5PM and before 8:30AM: Ph#: [phone]    Serjio Pavon M.D.   of Neurology, [facility]

## 2021-03-31 NOTE — SWALLOW BEDSIDE ASSESSMENT ADULT - COMMENTS
As per SICU Note: 61M with PMHx HTN, PE on Lovenox, presented to [facility] s/p seizure with lingual hematoma, POD #0 from awake tracheostomy followed by exploration and evacuation of tongue hematoma with repair of tongue lacerations. SICU consulted for new tracheostomy.     As per OMFS Note: A/P: 62yo M s/p emergency tracheotomy for airway concern from lingual hematoma    HCT 3/25/21: No evidence of acute hemorrhage, mass or mass effect    CXR 3/26/21: NG tube into the stomach. Heart is enlarged. Question left basilar subsegmental atelectasis.. The apices and hemidiaphragms are unremarkable. Visualized osseous structures are within normal limits.    SLP spoke with RN prior to evaluation, who reported respiratory status has been stable and patient has not had difficulty with current diet consistency.     Patient evaluated in [facility]. Patient sitting upright in bed, lunch tray in front of patient.  Patient awake, alert, able to follow directions, answer questions and engage in conversation. Patient [place] #4 non-disposable inner cannula cuffless tracheostomy with  in place.   Baseline SPO2 is 98% which did not change throughout evaluation.

## 2021-03-31 NOTE — SWALLOW BEDSIDE ASSESSMENT ADULT - PHARYNGEAL PHASE
no clinically overt signs or symptoms of aspiration Delayed pharyngeal swallow/Wet vocal quality post oral intake/Throat clear post oral intake

## 2021-03-31 NOTE — PROGRESS NOTE ADULT - SUBJECTIVE AND OBJECTIVE BOX
SICU Daily Progress Note  =====================================================  HPI: 62 yo M s/p seizure this morning sustaining tongue lac and uncontrolled heme presents to [facility] ED. Pt denies hx of seizures. Pt admits to laceration to his tongue. Denies chest pain, sob, abd pain, n/v/d, numbness, tingling, weakness, dizziness, fever or chills. Pt states that he had Covid x1-2 months ago, and received his 2nd dose of the vaccine yesterday. Pt also states that he was recently dx w/ anemia and is currently being worked up for cancer but has no dx.  (25 Mar 2021 22:18)    24 HR Events:  - pt moved to SICU, remains listed for floor bed   - Hyperkalemia to 6.7, s/p 10U Insulin w/ D50 and Calcium > improved to 5.7   - Passed bedside S/S   - trach changed by ENT to uncuffed, +capped    MEDICATIONS:   --------------------------------------------------------------------------------------  Neurologic Medications  acetaminophen    Suspension .. 975 milliGRAM(s) Oral every 6 hours  levETIRAcetam  Solution 750 milliGRAM(s) Oral two times a day  oxyCODONE    IR 10 milliGRAM(s) Oral every 4 hours PRN Severe Pain (7 - 10)  oxyCODONE    IR 5 milliGRAM(s) Oral every 4 hours PRN Moderate Pain (4 - 6)    Respiratory Medications    Cardiovascular Medications  amLODIPine   Tablet 10 milliGRAM(s) Oral daily    Gastrointestinal Medications  bisacodyl Suppository 10 milliGRAM(s) Rectal daily PRN Constipation  polyethylene glycol 3350 17 Gram(s) Oral daily  senna 2 Tablet(s) Oral at bedtime    Genitourinary Medications    Hematologic/Oncologic Medications  heparin   Injectable 5000 Unit(s) SubCutaneous every 8 hours    Antimicrobial/Immunologic Medications    Endocrine/Metabolic Medications    Topical/Other Medications  chlorhexidine 4% Liquid 1 Application(s) Topical <User Schedule>  sodium zirconium cyclosilicate 10 Gram(s) Oral every 8 hours    --------------------------------------------------------------------------------------    VITAL SIGNS, INS/OUTS (last 24 hours):  --------------------------------------------------------------------------------------    03-29-21 @ 07:01 - 03-30-21 @ 07:00  --------------------------------------------------------  IN: 950 mL / OUT: 2425 mL / NET: -1475 mL    03-30-21 @ 07:01  -  03-31-21 @ 00:24  --------------------------------------------------------  IN: 340 mL / OUT: 930 mL / NET: -590 mL      --------------------------------------------------------------------------------------    EXAM  NEUROLOGY  Exam: Normal, NAD, alert, oriented x3, no focal deficits.    HEENT  Exam: Normocephalic, atraumatic, EOMI. +trach in place, capped.     RESPIRATORY  Exam: Lungs clear to auscultation, Normal expansion/effort.     CARDIOVASCULAR  Exam: S1, S2.  Regular rate and rhythm.       GI/NUTRITION  Exam: Abdomen soft, Non-tender, Non-distended.      VASCULAR  Exam: Extremities warm, pink, well-perfused.    MUSCULOSKELETAL  Exam: All extremities moving spontaneously without limitations.    SKIN  Exam: Good skin turgor, no skin breakdown.     METABOLIC/FLUIDS/ELECTROLYTES      HEMATOLOGIC  [x] VTE Prophylaxis: heparin   Injectable 5000 Unit(s) SubCutaneous every 8 hours        LABS  --------------------------------------------------------------------------------------    T(C): 36.8 (03-30-21 @ 20:00), Max: 37 (03-30-21 @ 17:42)  HR: 66 (03-30-21 @ 23:00) (66 - 79)  BP: 159/87 (03-30-21 @ 23:00) (137/92 - 180/91)  RR: 11 (03-30-21 @ 23:00) (8 - 19)  SpO2: 98% (03-30-21 @ 23:00) (96% - 100%)    --------------------------------------------------------------------------------------   SICU Daily Progress Note  =====================================================  HPI: 62 yo M s/p seizure this morning sustaining tongue lac and uncontrolled heme presents to [facility] ED. Pt denies hx of seizures. Pt admits to laceration to his tongue. Denies chest pain, sob, abd pain, n/v/d, numbness, tingling, weakness, dizziness, fever or chills. Pt states that he had Covid x1-2 months ago, and received his 2nd dose of the vaccine yesterday. Pt also states that he was recently dx w/ anemia and is currently being worked up for cancer but has no dx.  (25 Mar 2021 22:18)    24 HR Events:  Interval/Overnight Events:     - Hyperkalemia to 5.8, s/p 10U Insulin w/ D50 and Calcium > improved to 5.0   - trach changed by ENT to uncuffed, +capped  - start Hydralazine 50mg TID  - d/c [place], f/u TOV      MEDICATIONS:   --------------------------------------------------------------------------------------  Neurologic Medications  acetaminophen    Suspension .. 975 milliGRAM(s) Oral every 6 hours  levETIRAcetam  Solution 750 milliGRAM(s) Oral two times a day  oxyCODONE    IR 10 milliGRAM(s) Oral every 4 hours PRN Severe Pain (7 - 10)  oxyCODONE    IR 5 milliGRAM(s) Oral every 4 hours PRN Moderate Pain (4 - 6)    Respiratory Medications    Cardiovascular Medications  amLODIPine   Tablet 10 milliGRAM(s) Oral daily    Gastrointestinal Medications  bisacodyl Suppository 10 milliGRAM(s) Rectal daily PRN Constipation  polyethylene glycol 3350 17 Gram(s) Oral daily  senna 2 Tablet(s) Oral at bedtime    Genitourinary Medications    Hematologic/Oncologic Medications  heparin   Injectable 5000 Unit(s) SubCutaneous every 8 hours    Antimicrobial/Immunologic Medications    Endocrine/Metabolic Medications    Topical/Other Medications  chlorhexidine 4% Liquid 1 Application(s) Topical <User Schedule>  sodium zirconium cyclosilicate 10 Gram(s) Oral every 8 hours    --------------------------------------------------------------------------------------    VITAL SIGNS, INS/OUTS (last 24 hours):  --------------------------------------------------------------------------------------    03-29-21 @ 07:01 - 03-30-21 @ 07:00  --------------------------------------------------------  IN: 950 mL / OUT: 2425 mL / NET: -1475 mL    03-30-21 @ 07:01  -  03-31-21 @ 00:24  --------------------------------------------------------  IN: 340 mL / OUT: 930 mL / NET: -590 mL      --------------------------------------------------------------------------------------    EXAM  NEUROLOGY  Exam: Normal, NAD, alert, oriented x3, no focal deficits.    HEENT  Exam: Normocephalic, atraumatic, EOMI. +trach in place, capped.     RESPIRATORY  Exam: Lungs clear to auscultation, Normal expansion/effort.     CARDIOVASCULAR  Exam: S1, S2.  Regular rate and rhythm.       GI/NUTRITION  Exam: Abdomen soft, Non-tender, Non-distended.      VASCULAR  Exam: Extremities warm, pink, well-perfused.    MUSCULOSKELETAL  Exam: All extremities moving spontaneously without limitations.    SKIN  Exam: Good skin turgor, no skin breakdown.     METABOLIC/FLUIDS/ELECTROLYTES      HEMATOLOGIC  [x] VTE Prophylaxis: heparin   Injectable 5000 Unit(s) SubCutaneous every 8 hours        LABS  --------------------------------------------------------------------------------------    T(C): 36.8 (03-30-21 @ 20:00), Max: 37 (03-30-21 @ 17:42)  HR: 66 (03-30-21 @ 23:00) (66 - 79)  BP: 159/87 (03-30-21 @ 23:00) (137/92 - 180/91)  RR: 11 (03-30-21 @ 23:00) (8 - 19)  SpO2: 98% (03-30-21 @ 23:00) (96% - 100%)    --------------------------------------------------------------------------------------

## 2021-04-01 LAB
ANION GAP SERPL CALC-SCNC: 11 MMOL/L — SIGNIFICANT CHANGE UP (ref 7–14)
BUN SERPL-MCNC: 68 MG/DL — HIGH (ref 7–23)
CALCIUM SERPL-MCNC: 8.8 MG/DL — SIGNIFICANT CHANGE UP (ref 8.4–10.5)
CHLORIDE SERPL-SCNC: 100 MMOL/L — SIGNIFICANT CHANGE UP (ref 98–107)
CO2 SERPL-SCNC: 24 MMOL/L — SIGNIFICANT CHANGE UP (ref 22–31)
CREAT SERPL-MCNC: 3.02 MG/DL — HIGH (ref 0.5–1.3)
GLUCOSE SERPL-MCNC: 107 MG/DL — HIGH (ref 70–99)
HCT VFR BLD CALC: 30.9 % — LOW (ref 39–50)
HGB BLD-MCNC: 9.7 G/DL — LOW (ref 13–17)
MAGNESIUM SERPL-MCNC: 2.3 MG/DL — SIGNIFICANT CHANGE UP (ref 1.6–2.6)
MCHC RBC-ENTMCNC: 30.2 PG — SIGNIFICANT CHANGE UP (ref 27–34)
MCHC RBC-ENTMCNC: 31.4 GM/DL — LOW (ref 32–36)
MCV RBC AUTO: 96.3 FL — SIGNIFICANT CHANGE UP (ref 80–100)
NRBC # BLD: 0 /100 WBCS — SIGNIFICANT CHANGE UP
NRBC # FLD: 0 K/UL — SIGNIFICANT CHANGE UP
PHOSPHATE SERPL-MCNC: 3.7 MG/DL — SIGNIFICANT CHANGE UP (ref 2.5–4.5)
PLATELET # BLD AUTO: 152 K/UL — SIGNIFICANT CHANGE UP (ref 150–400)
POTASSIUM SERPL-MCNC: 5 MMOL/L — SIGNIFICANT CHANGE UP (ref 3.5–5.3)
POTASSIUM SERPL-SCNC: 5 MMOL/L — SIGNIFICANT CHANGE UP (ref 3.5–5.3)
RBC # BLD: 3.21 M/UL — LOW (ref 4.2–5.8)
RBC # FLD: 15.7 % — HIGH (ref 10.3–14.5)
SODIUM SERPL-SCNC: 135 MMOL/L — SIGNIFICANT CHANGE UP (ref 135–145)
WBC # BLD: 6.45 K/UL — SIGNIFICANT CHANGE UP (ref 3.8–10.5)
WBC # FLD AUTO: 6.45 K/UL — SIGNIFICANT CHANGE UP (ref 3.8–10.5)

## 2021-04-01 PROCEDURE — 95720 EEG PHY/QHP EA INCR W/VEEG: CPT

## 2021-04-01 PROCEDURE — 99232 SBSQ HOSP IP/OBS MODERATE 35: CPT

## 2021-04-01 RX ORDER — SODIUM CHLORIDE 9 MG/ML
1000 INJECTION INTRAMUSCULAR; INTRAVENOUS; SUBCUTANEOUS
Refills: 0 | Status: DISCONTINUED | OUTPATIENT
Start: 2021-04-01 | End: 2021-04-02

## 2021-04-01 RX ORDER — LABETALOL HCL 100 MG
75 TABLET ORAL EVERY 8 HOURS
Refills: 0 | Status: DISCONTINUED | OUTPATIENT
Start: 2021-04-01 | End: 2021-04-03

## 2021-04-01 RX ADMIN — HEPARIN SODIUM 5000 UNIT(S): 5000 INJECTION INTRAVENOUS; SUBCUTANEOUS at 16:25

## 2021-04-01 RX ADMIN — SENNA PLUS 2 TABLET(S): 8.6 TABLET ORAL at 21:27

## 2021-04-01 RX ADMIN — HEPARIN SODIUM 5000 UNIT(S): 5000 INJECTION INTRAVENOUS; SUBCUTANEOUS at 06:36

## 2021-04-01 RX ADMIN — AMLODIPINE BESYLATE 10 MILLIGRAM(S): 2.5 TABLET ORAL at 06:35

## 2021-04-01 RX ADMIN — SODIUM ZIRCONIUM CYCLOSILICATE 10 GRAM(S): 10 POWDER, FOR SUSPENSION ORAL at 21:27

## 2021-04-01 RX ADMIN — SODIUM ZIRCONIUM CYCLOSILICATE 10 GRAM(S): 10 POWDER, FOR SUSPENSION ORAL at 14:43

## 2021-04-01 RX ADMIN — Medication 25 MILLIGRAM(S): at 06:35

## 2021-04-01 RX ADMIN — POLYETHYLENE GLYCOL 3350 17 GRAM(S): 17 POWDER, FOR SOLUTION ORAL at 14:43

## 2021-04-01 RX ADMIN — SODIUM ZIRCONIUM CYCLOSILICATE 10 GRAM(S): 10 POWDER, FOR SUSPENSION ORAL at 06:37

## 2021-04-01 RX ADMIN — LEVETIRACETAM 750 MILLIGRAM(S): 250 TABLET, FILM COATED ORAL at 17:49

## 2021-04-01 RX ADMIN — LEVETIRACETAM 750 MILLIGRAM(S): 250 TABLET, FILM COATED ORAL at 06:36

## 2021-04-01 RX ADMIN — Medication 25 MILLIGRAM(S): at 14:43

## 2021-04-01 RX ADMIN — CHLORHEXIDINE GLUCONATE 1 APPLICATION(S): 213 SOLUTION TOPICAL at 05:57

## 2021-04-01 RX ADMIN — HEPARIN SODIUM 5000 UNIT(S): 5000 INJECTION INTRAVENOUS; SUBCUTANEOUS at 21:28

## 2021-04-01 RX ADMIN — Medication 75 MILLIGRAM(S): at 22:58

## 2021-04-01 RX ADMIN — SODIUM CHLORIDE 50 MILLILITER(S): 9 INJECTION INTRAMUSCULAR; INTRAVENOUS; SUBCUTANEOUS at 10:49

## 2021-04-01 NOTE — PROGRESS NOTE ADULT - SUBJECTIVE AND OBJECTIVE BOX
ORAL AND MAXILLOFACIAL SURGERY PROGRESS NOTE    SUBJECTIVE / 24H EVENTS:  Patient seen and examined on morning rounds. No acute events overnight.   Trach decannulated yesterday. Pt on soft-nectar liquid diet.     OBJECTIVE:  VITAL SIGNS:  ICU Vital Signs Last 24 Hrs  T(C): 37.2 (01 Apr 2021 04:00), Max: 37.2 (31 Mar 2021 20:00)  T(F): 98.9 (01 Apr 2021 04:00), Max: 99 (31 Mar 2021 20:00)  HR: 95 (01 Apr 2021 08:00) (78 - 100)  BP: 143/74 (01 Apr 2021 08:00) (132/78 - 162/94)  BP(mean): 89 (01 Apr 2021 08:00) (89 - 115)  ABP: --  ABP(mean): --  RR: 17 (01 Apr 2021 08:00) (13 - 20)  SpO2: 93% (01 Apr 2021 08:00) (93% - 99%)    Gen: AAOx3  Resp: non-labored  HEENT:  trach decannulated, 4x4 atop    Oral: no edema, hemostatic, sutures c/d/i.     MEDICATIONS  (STANDING):  amLODIPine   Tablet 10 milliGRAM(s) Oral daily  chlorhexidine 4% Liquid 1 Application(s) Topical <User Schedule>  heparin   Injectable 5000 Unit(s) SubCutaneous every 8 hours  hydrALAZINE 25 milliGRAM(s) Oral three times a day  levETIRAcetam  Solution 750 milliGRAM(s) Oral two times a day  polyethylene glycol 3350 17 Gram(s) Oral daily  senna 2 Tablet(s) Oral at bedtime  sodium zirconium cyclosilicate 10 Gram(s) Oral every 8 hours    MEDICATIONS  (PRN):  acetaminophen   Tablet .. 975 milliGRAM(s) Oral every 6 hours PRN Temp greater or equal to 38C (100.4F), Mild Pain (1 - 3)  bisacodyl Suppository 10 milliGRAM(s) Rectal daily PRN Constipation  oxyCODONE    IR 5 milliGRAM(s) Oral every 4 hours PRN Moderate Pain (4 - 6)  oxyCODONE    IR 10 milliGRAM(s) Oral every 4 hours PRN Severe Pain (7 - 10)    LAB VALUES:    04-01    135  |  100  |  68<H>  ----------------------------<  107<H>  5.0   |  24  |  3.02<H>    Ca    8.8      01 Apr 2021 03:16  Phos  3.7     04-01  Mg     2.3     04-01                        9.7    6.45  )-----------( 152      ( 01 Apr 2021 03:16 )             30.9

## 2021-04-01 NOTE — PROGRESS NOTE ADULT - SUBJECTIVE AND OBJECTIVE BOX
SICU Daily Progress Note  =====================================================  HPI: 62 yo M s/p seizure this morning sustaining tongue lac and uncontrolled heme presents to [facility] ED. Pt denies hx of seizures. Pt admits to laceration to his tongue. Denies chest pain, sob, abd pain, n/v/d, numbness, tingling, weakness, dizziness, fever or chills. Pt states that he had Covid x1-2 months ago, and received his 2nd dose of the vaccine yesterday. Pt also states that he was recently dx w/ anemia and is currently being worked up for cancer but has no dx.  (25 Mar 2021 22:18)    24 HR Events:  - K normalized s/p shift x1 during day shift 5.0   - Decannulated by surgical team   - tolerating diet   - listed for floor, needs tele bed for hyperkalemia       MEDICATIONS:   --------------------------------------------------------------------------------------  Neurologic Medications  acetaminophen   Tablet .. 975 milliGRAM(s) Oral every 6 hours PRN Temp greater or equal to 38C (100.4F), Mild Pain (1 - 3)  levETIRAcetam  Solution 750 milliGRAM(s) Oral two times a day  oxyCODONE    IR 5 milliGRAM(s) Oral every 4 hours PRN Moderate Pain (4 - 6)  oxyCODONE    IR 10 milliGRAM(s) Oral every 4 hours PRN Severe Pain (7 - 10)    Respiratory Medications    Cardiovascular Medications  amLODIPine   Tablet 10 milliGRAM(s) Oral daily  hydrALAZINE 25 milliGRAM(s) Oral three times a day    Gastrointestinal Medications  bisacodyl Suppository 10 milliGRAM(s) Rectal daily PRN Constipation  polyethylene glycol 3350 17 Gram(s) Oral daily  senna 2 Tablet(s) Oral at bedtime    Genitourinary Medications    Hematologic/Oncologic Medications  heparin   Injectable 5000 Unit(s) SubCutaneous every 8 hours    Antimicrobial/Immunologic Medications    Endocrine/Metabolic Medications    Topical/Other Medications  chlorhexidine 4% Liquid 1 Application(s) Topical <User Schedule>  sodium zirconium cyclosilicate 10 Gram(s) Oral every 8 hours    --------------------------------------------------------------------------------------    VITAL SIGNS, INS/OUTS (last 24 hours):  --------------------------------------------------------------------------------------    03-30-21 @ 07:01 - 03-31-21 @ 07:00  --------------------------------------------------------  IN: 340 mL / OUT: 1830 mL / NET: -1490 mL    03-31-21 @ 07:01  -  04-01-21 @ 00:56  --------------------------------------------------------  IN: 0 mL / OUT: 1440 mL / NET: -1440 mL      --------------------------------------------------------------------------------------    EXAM  NEUROLOGY  Exam: Normal, NAD, alert, oriented x3, no focal deficits.    HEENT  Exam: Normocephalic, atraumatic, EOMI. +decannulated with dressing c/d/i    RESPIRATORY  Exam: Lungs clear to auscultation, Normal expansion/effort.   Mechanical Ventilation:     CARDIOVASCULAR  Exam: S1, S2.  Regular rate and rhythm.       GI/NUTRITION  Exam: Abdomen soft, Non-tender, Non-distended.      VASCULAR  Exam: Extremities warm, pink, well-perfused.    MUSCULOSKELETAL  Exam: All extremities moving spontaneously without limitations.    SKIN  Exam: Good skin turgor, no skin breakdown.     METABOLIC/FLUIDS/ELECTROLYTES      HEMATOLOGIC  [x] VTE Prophylaxis: heparin   Injectable 5000 Unit(s) SubCutaneous every 8 hours        LABS  --------------------------------------------------------------------------------------    T(C): 37.2 (03-31-21 @ 20:00), Max: 37.2 (03-31-21 @ 20:00)  HR: 100 (03-31-21 @ 23:00) (63 - 100)  BP: 154/88 (03-31-21 @ 23:00) (120/82 - 175/98)  RR: 18 (03-31-21 @ 23:00) (10 - 20)  SpO2: 95% (03-31-21 @ 23:00) (95% - 100%)    --------------------------------------------------------------------------------------   SICU Daily Progress Note  =====================================================  HPI: 60 yo M s/p seizure this morning sustaining tongue lac and uncontrolled heme presents to [facility] ED. Pt denies hx of seizures. Pt admits to laceration to his tongue. Denies chest pain, sob, abd pain, n/v/d, numbness, tingling, weakness, dizziness, fever or chills. Pt states that he had Covid x1-2 months ago, and received his 2nd dose of the vaccine yesterday. Pt also states that he was recently dx w/ anemia and is currently being worked up for cancer but has no dx.  (25 Mar 2021 22:18)    24 HR Events:  - Decannulated by surgical team   - tolerating diet   - start IVF  - d/c EEG per Neuro  - Listed for regular bed      MEDICATIONS:   --------------------------------------------------------------------------------------  Neurologic Medications  acetaminophen   Tablet .. 975 milliGRAM(s) Oral every 6 hours PRN Temp greater or equal to 38C (100.4F), Mild Pain (1 - 3)  levETIRAcetam  Solution 750 milliGRAM(s) Oral two times a day  oxyCODONE    IR 5 milliGRAM(s) Oral every 4 hours PRN Moderate Pain (4 - 6)  oxyCODONE    IR 10 milliGRAM(s) Oral every 4 hours PRN Severe Pain (7 - 10)    Respiratory Medications    Cardiovascular Medications  amLODIPine   Tablet 10 milliGRAM(s) Oral daily  hydrALAZINE 25 milliGRAM(s) Oral three times a day    Gastrointestinal Medications  bisacodyl Suppository 10 milliGRAM(s) Rectal daily PRN Constipation  polyethylene glycol 3350 17 Gram(s) Oral daily  senna 2 Tablet(s) Oral at bedtime    Genitourinary Medications    Hematologic/Oncologic Medications  heparin   Injectable 5000 Unit(s) SubCutaneous every 8 hours    Antimicrobial/Immunologic Medications    Endocrine/Metabolic Medications    Topical/Other Medications  chlorhexidine 4% Liquid 1 Application(s) Topical <User Schedule>  sodium zirconium cyclosilicate 10 Gram(s) Oral every 8 hours    --------------------------------------------------------------------------------------    VITAL SIGNS, INS/OUTS (last 24 hours):  --------------------------------------------------------------------------------------    03-30-21 @ 07:01 - 03-31-21 @ 07:00  --------------------------------------------------------  IN: 340 mL / OUT: 1830 mL / NET: -1490 mL    03-31-21 @ 07:01  -  04-01-21 @ 00:56  --------------------------------------------------------  IN: 0 mL / OUT: 1440 mL / NET: -1440 mL      --------------------------------------------------------------------------------------    EXAM  NEUROLOGY  Exam: Normal, NAD, alert, oriented x3, no focal deficits.    HEENT  Exam: Normocephalic, atraumatic, EOMI. +decannulated with dressing c/d/i    RESPIRATORY  Exam: Lungs clear to auscultation, Normal expansion/effort.   Mechanical Ventilation:     CARDIOVASCULAR  Exam: S1, S2.  Regular rate and rhythm.       GI/NUTRITION  Exam: Abdomen soft, Non-tender, Non-distended.      VASCULAR  Exam: Extremities warm, pink, well-perfused.    MUSCULOSKELETAL  Exam: All extremities moving spontaneously without limitations.    SKIN  Exam: Good skin turgor, no skin breakdown.     METABOLIC/FLUIDS/ELECTROLYTES      HEMATOLOGIC  [x] VTE Prophylaxis: heparin   Injectable 5000 Unit(s) SubCutaneous every 8 hours        LABS  --------------------------------------------------------------------------------------    T(C): 37.2 (03-31-21 @ 20:00), Max: 37.2 (03-31-21 @ 20:00)  HR: 100 (03-31-21 @ 23:00) (63 - 100)  BP: 154/88 (03-31-21 @ 23:00) (120/82 - 175/98)  RR: 18 (03-31-21 @ 23:00) (10 - 20)  SpO2: 95% (03-31-21 @ 23:00) (95% - 100%)    --------------------------------------------------------------------------------------

## 2021-04-01 NOTE — EEG REPORT - NS EEG TEXT BOX
ANNEMARIE KELLEY [id number] 61y (1960)M  Admitting MD: Dr. Jh Sparks    Study Date: 03-31 08:00-08:00 4-1-21  x24hrs  --------------------------------------------------------------------------------------------------  History:  CC/ HPI Patient is a 61y old  Male who presents with a chief complaint of Tongue laceration, uncontrolled heme, hematoma, airway obstruction (31 Mar 2021 00:24)    amLODIPine   Tablet 10 milliGRAM(s) Oral daily  chlorhexidine 4% Liquid 1 Application(s) Topical <User Schedule>  heparin   Injectable 5000 Unit(s) SubCutaneous every 8 hours  levETIRAcetam  Solution 750 milliGRAM(s) Oral two times a day  polyethylene glycol 3350 17 Gram(s) Oral daily  senna 2 Tablet(s) Oral at bedtime  sodium zirconium cyclosilicate 10 Gram(s) Oral every 8 hours    --------------------------------------------------------------------------------------------------  Study Interpretation:    [[[Abbreviation Key:  PDR=alpha rhythm/posterior dominant rhythm. A-P=anterior posterior gradient.  Amplitude: ‘very low’:<20; ‘low’:20-50; ‘medium’:; ‘high’:>200uV.  Persistence for periodic/rhythmic patterns (% of epoch) ‘rare’:<1%; ‘occasional’:1-10%; ‘frequent’:10-50%; ‘abundant’:50-90%; ‘continuous’:>90%.  Persistence for sporadic discharges: ‘rare’:<1/hr; ‘occasional’:1/min-1/hr; ‘frequent’:>1/min; ‘abundant’:>1/10 sec.  GRDA=generalized rhythmic delta activity, LRDA=lateralized rhythmic delta activity, TIRDA=temporal intermittent rhythmic delta activity, FIRDA=frontal intermittent rhythmic activity. LPD=PLED=lateralized periodic discharges, GPD=generalized periodic discharges, BiPDs=BiPLEDs=bilateral independent periodic epileptiform discharges, SIRPID=stimulus induced rhythmic, periodic, or ictal appearing discharges.  Modifiers: +F=with fast component, +S=with spike component, +R=with rhythmic component.  S-B=burst suppression pattern.  Max=maximal. N1-drowsy, N2-stage II sleep, N3-slow wave sleep.  HV=hyperventilation, PS=photic stimulation]]]    FINDINGS:  The background was continuous, spontaneously variable and reactive.  During wakefulness, the posteriorly dominant rhythm consisted of symmetric, well modulated 8 Hz activity, with an amplitude to 40 uV, that attenuated to eye opening.  Low amplitude central beta was noted in wakefulness.    Background Slowing:  Generalized slowing: mild increase in diffuse irregular delta was present intermittently.  Focal slowing: subtle left frontocentral increase in polymorphic delta/theta was present.    Sleep Background:  Drowsiness was characterized by fragmentation, attenuation, and slowing of the background activity.    Sleep was characterized by the presence of vertex waves, symmetric spindles, and K-complexes.    Epileptiform Activity:   No epileptiform discharges were present.    Events:  No clinical events were recorded.  No seizures were recorded.    Activation Procedures:   -Hyperventilation was not performed.    -Photic stimulation was not performed.    Artifacts:  Intermittent myogenic and external motion artifacts were noted.    ECG:  The heart rate on single channel ECG at baseline was predominantly near BPM = 66-80  -----------------------------------------------------------------------------------------------------    EEG Classification / Summary:  Abnormal EEG study, awake / drowsy / asleep    Generalized slowing: mild increase in diffuse irregular delta was present intermittently.  Focal slowing: subtle left frontocentral increase in polymorphic delta/theta was present.    -----------------------------------------------------------------------------------------------------    Clinical Impression:  Mild diffuse cerebral dysfunction, slightly more pronounced left frontocentrally.  Correlate clinically/radiographically.  There were no epileptiform abnormalities recorded.      -------------------------------------------------------------------------------------------------------  [facility] EEG Reading Room Ph#: [phone]  Epilepsy Answering Service after 5PM and before 8:30AM: Ph#: [phone]    Serjio Pavon M.D.   of Neurology, [facility] [place]	   ANNEMARIE KELLEY [id number] 61y (1960)M  Admitting MD: Dr. Jh Sparks    Study Date: 03-31 08:00-11:38 4-1-21  x27hrs 38 mins   --------------------------------------------------------------------------------------------------  History:  CC/ HPI Patient is a 61y old  Male who presents with a chief complaint of Tongue laceration, uncontrolled heme, hematoma, airway obstruction (31 Mar 2021 00:24)    amLODIPine   Tablet 10 milliGRAM(s) Oral daily  chlorhexidine 4% Liquid 1 Application(s) Topical <User Schedule>  heparin   Injectable 5000 Unit(s) SubCutaneous every 8 hours  levETIRAcetam  Solution 750 milliGRAM(s) Oral two times a day  polyethylene glycol 3350 17 Gram(s) Oral daily  senna 2 Tablet(s) Oral at bedtime  sodium zirconium cyclosilicate 10 Gram(s) Oral every 8 hours    --------------------------------------------------------------------------------------------------  Study Interpretation:    [[[Abbreviation Key:  PDR=alpha rhythm/posterior dominant rhythm. A-P=anterior posterior gradient.  Amplitude: ‘very low’:<20; ‘low’:20-50; ‘medium’:; ‘high’:>200uV.  Persistence for periodic/rhythmic patterns (% of epoch) ‘rare’:<1%; ‘occasional’:1-10%; ‘frequent’:10-50%; ‘abundant’:50-90%; ‘continuous’:>90%.  Persistence for sporadic discharges: ‘rare’:<1/hr; ‘occasional’:1/min-1/hr; ‘frequent’:>1/min; ‘abundant’:>1/10 sec.  GRDA=generalized rhythmic delta activity, LRDA=lateralized rhythmic delta activity, TIRDA=temporal intermittent rhythmic delta activity, FIRDA=frontal intermittent rhythmic activity. LPD=PLED=lateralized periodic discharges, GPD=generalized periodic discharges, BiPDs=BiPLEDs=bilateral independent periodic epileptiform discharges, SIRPID=stimulus induced rhythmic, periodic, or ictal appearing discharges.  Modifiers: +F=with fast component, +S=with spike component, +R=with rhythmic component.  S-B=burst suppression pattern.  Max=maximal. N1-drowsy, N2-stage II sleep, N3-slow wave sleep.  HV=hyperventilation, PS=photic stimulation]]]    FINDINGS:  The background was continuous, spontaneously variable and reactive.  During wakefulness, the posteriorly dominant rhythm consisted of symmetric, well modulated 8 Hz activity, with an amplitude to 40 uV, that attenuated to eye opening.  Low amplitude central beta was noted in wakefulness.    Background Slowing:  Generalized slowing: mild increase in diffuse irregular delta was present intermittently.  Focal slowing: subtle left frontocentral increase in polymorphic delta/theta was present.    Sleep Background:  Drowsiness was characterized by fragmentation, attenuation, and slowing of the background activity.    Sleep was characterized by the presence of vertex waves, symmetric spindles, and K-complexes.    Epileptiform Activity:   No epileptiform discharges were present.    Events:  No clinical events were recorded.  No seizures were recorded.    Activation Procedures:   -Hyperventilation was not performed.    -Photic stimulation was not performed.    Artifacts:  Intermittent myogenic and external motion artifacts were noted.    ECG:  The heart rate on single channel ECG at baseline was predominantly near BPM = 66-80  -----------------------------------------------------------------------------------------------------    EEG Classification / Summary:  Abnormal EEG study, awake / drowsy / asleep    Generalized slowing: mild increase in diffuse irregular delta was present intermittently.  Focal slowing: subtle left frontocentral increase in polymorphic delta/theta was present.    -----------------------------------------------------------------------------------------------------    Clinical Impression:  Mild diffuse cerebral dysfunction, slightly more pronounced left frontocentrally.  Correlate clinically/radiographically.  There were no epileptiform abnormalities recorded.      -------------------------------------------------------------------------------------------------------  [facility] EEG Reading Room Ph#: [phone]  Epilepsy Answering Service after 5PM and before 8:30AM: Ph#: [phone]    Serjio Pavon M.D.   of Neurology, [facility]

## 2021-04-01 NOTE — PROGRESS NOTE ADULT - ATTENDING COMMENTS
I agree with the above history, physical, and plan, which I have reviewed and edited where appropriate.  I agree with notes/assessment and detailed interval history of the health care providers on my service (PAs, Residents and House Staff).  The patient is in SICU with diagnosis mentioned in the note.    The SICU team has a constant risk benefit analyzes discussion and coordinating care with the primary team, all consultants, House Staff and PA's.  I was physically present for the key portions of the evaluation and management (E/M) service provided.  61M with PMHx HTN, PE on Lovenox, presented to [facility] s/p seizure with lingual hematoma, sp tracheostomy and evacuation of tongue hematoma with repair of tongue lacerations. sp tracheostomy in SICU.   I have personally examined the patient, reviewed data and laboratory tests/x-rays and all pertinent electronic images.    NEURO: NAD, resting comfortably.  HEENT: s/p trach, +tongue swelling improving  RESPIRATORY: lungs clear   CARDIO: RRR, S1S2  ABDOMEN: soft, nontender, nondistended.  The plan is specified below:  Neurologic:   - Tylenol / Oxycodone   - Keppra 750mg BID   Respiratory:   - trach collar  Cardiovascular:   - hold home antihypertensives for now   Gastrointestinal/Nutrition:   - TF - Nepro   Renal/Genitourinary:   -nepro in setting of elevated Cr 2.7-3.0 (unsure baseline Cr)   Hematologic:   - Heparing subQ for ppx   Infectious Disease:   - follow-up infection control   Endocrine:   - Taper dexamethasone    Disposition:   - SICU care in [facility]  - Pending transfer to the floors
I agree with the history, physical, and plan, which I have reviewed and edited where appropriate.  I agree with notes/assessment and detailed interval history of the health care providers on my service (PAs, Residents and House Staff).  The patient is in SICU with diagnosis mentioned in the note.    The patient is a critical care patient with life threatening hemodynamic and metabolic instability in SICU.  The SICU team has a constant risk benefit analyzes discussion and coordinating care with the primary team, all consultants, House Staff and PA's..  I was physically present for the key portions of the evaluation and management (E/M) service provided.    The documentation of the total time spent 62 minutes  61M with PMHx HTN, PE on Lovenox, presented to [facility] s/p seizure with lingual hematoma, with tongue hematoma with repair of tongue lacerations. s/p new tracheostomy.   I have personally examined the patient, reviewed data and laboratory tests/x-rays and all pertinent electronic images.  NEURO: NAD,   HEENT: s/p trach, +tongue swelling improving, no bleeding or erythema around trach    RESPIRATORY: lungs clear   CARDIO: RRR,   ABDOMEN: soft, nontender, nondistended.  EXTREMITIES: normal strength  The plan is specified below:  Neurologic:   - Tylenol / Oxycodone   - Keppra 750mg BID   Respiratory:   - tolerating trach collar  Cardiovascular:   - monitor vital signs q1h  Gastrointestinal/Nutrition:   - TF - Nepro  Renal/Genitourinary:   - Hyperkalemia,  - nepro, lokelma added. 5.9 > 5.3  Hematologic:   - Heparing subQ for ppx   Infectious Disease:   - will require droplet precautions after leaving covid unit   Endocrine:   - Taper dexamethasone    Disposition:   - SICU care in [facility]  - Pending transfer to the floors
I have personally interviewed and examined this patient, reviewed pertinent labs and imaging, and discussed the case with colleagues, residents, physician assistants, and nurses on SICU rounds.    45   minutes in total were spent in providing direct critical care for the diagnoses, assessment and plan outlined below.  These diagnoses are unrelated to the surgical procedure.  Additionally, time spent in the performance of separately billable procedures was not counted toward the critical care time.  There is no overlap.    The active critical care issues are:  1. critical airway 2/2 contused tongue now s/p trach  2. acute resp insufficiency due to surgery  3. h/o PE on t-lovenox  4. seizure  5. CKD  6. h/o COVID  7. at risk for malnutrition    Patient is awake, calm, cooperative.  D/c propofol and wean to trach collar.  Insert enteral tube for meds/tube feeds nutrition support.  Continue keppra and neuro w/u.  p-VTE until cleared by OMFS for t-lovenox.  Gentle hydration and avoid hypotension for CKD.
I agree with the above history, physical, and plan, which I have reviewed and edited where appropriate.  I agree with notes/assessment and detailed interval history of the health care providers on my service (PAs, Residents and House Staff).  The patient is in SICU with diagnosis mentioned in the note.    The SICU team has a constant risk benefit analyzes discussion and coordinating care with the primary team, all consultants, House Staff and PA's.  I was physically present for the key portions of the evaluation and management (E/M) service provided.  61M with PMHx HTN, PE on Lovenox, s/p seizure with lingual hematoma, sp tracheostomy in SICU  I have personally examined the patient, reviewed data and laboratory tests/x-rays and all pertinent electronic images.    NEUROLOGY  Exam: Normal, NAD, alert, oriented x3, no focal deficits.  HEENT  Exam: +trach in place, capped.   RESPIRATORY  Exam: Lungs clear   CARDIOVASCULAR  Exam: S1, S2.  RR  GI/NUTRITION  Exam: Abdomen soft, Non-tender, Non-distended.    VASCULAR  Exam: Extremities warm  The plan is specified below:  Neurologic:   - Tylenol / Oxycodone -  - Keppra 750mg BID   -Respiratory:   - tolerating trach collar  -Cardiovascular:   - amlodipine 10mg   Gastrointestinal/Nutrition:   -dyphagia 2 diet  Renal/Genitourinary:   - Hyperkalemia to 6.7; improved to 5.7 s/p shift  -- monitor electrolytes,   Hematologic:   - Heparing subQ for ppx   Infectious Disease:   - follow-up infection control   Endocrine:   - Taper dexamethasone  Disposition:   - SICU care   - Pending transfer to the floor
I agree with the above history, physical, and plan, which I have reviewed and edited where appropriate.  I agree with notes/assessment and detailed interval history of the health care providers on my service (PAs, Residents and House Staff).  The patient is in SICU with diagnosis mentioned in the note.    The SICU team has a constant risk benefit analyzes discussion and coordinating care with the primary team, all consultants, House Staff and PA's.  I was physically present for the key portions of the evaluation and management (E/M) service provided.  61M with PMHx HTN, PE on Lovenox, s/p seizure with lingual hematoma, sp awake tracheostomy and evacuation of tongue hematoma with repair of tongue lacerations in SICU   I have personally examined the patient, reviewed data and laboratory tests/x-rays and all pertinent electronic images.    EXAM  NEUROLOGY  Exam: Normal, NAD, alert,   HEENT  Exam: Normocephalic, atraumatic, EOMI.   RESPIRATORY  Exam: Lungs clear   CARDIOVASCULAR  Exam: S1, S2.  RR  GI/NUTRITION  Exam: Abdomen soft, Non-tender, Non-distended.    VASCULAR  Exam: Extremities warm    The plan is specified below:  Neurologic:   - Tylenol / Oxycodone   - Keppra 750mg BID   Respiratory:   - RA   Cardiovascular:   -amlodipine 1  Gastrointestinal/Nutrition:   - w/ nectar thick liquids  Renal/Genitourinary:   - NS @ 50 cc/hr  Hematologic:   - Heparing subQ for ppx   Infectious Disease:   - Afebrile w/ normal WBC  Endocrine:   - s/p Dexamethasone taper    Disposition:   - SICU care   - Pending transfer to the floor

## 2021-04-01 NOTE — PROGRESS NOTE ADULT - ASSESSMENT
61M with PMHx HTN, PE on Lovenox, presented to [facility] s/p seizure with lingual hematoma, POD #0 from awake tracheostomy followed by exploration and evacuation of tongue hematoma with repair of tongue lacerations. SICU consulted for new tracheostomy.     24 HR Events:  - K normalized s/p shift x1 during day shift 5.0   - Decannulated by surgical team   - tolerating diet   - listed for floor, needs tele bed for hyperkalemia       PLAN:    Neurologic:   - Tylenol / Oxycodone - pain control PRN  - Keppra 750mg BID   - EEG w/ no epileptiform pattern or seizure seen  - f/u MRI   - f/u Neurology recs     Respiratory:   - tolerating trach collar  - monitor SP02    Cardiovascular:   - monitor vital signs q4h  - Unsure home HTN meds - f/u med rec   - c/w amlodipine 10mg and start hydralazine 50mg TID    Gastrointestinal/Nutrition:   - S/S rec Dsyphagia 3 diet w/ nectar thick liquids  - Miralax / senna for bowel regimen     Renal/Genitourinary:   - Hyperkalemia to 5.8; improved to 5.0 s/p shift  - monitor urine output  - monitor electrolytes, replete PRN     Hematologic:   - monitor H&H  - Heparing subQ for ppx     Infectious Disease:   - Afebrile w/ normal WBC  - COVID - Ab+  - COVID PCR positive, then negative x2     Endocrine:   - monitor glucose on BMP  - no active issues   - s/p Dexamethasone taper    Disposition:   - SICU care   - Pending transfer to the floor   61M with PMHx HTN, PE on Lovenox, presented to [facility] s/p seizure with lingual hematoma, POD #0 from awake tracheostomy followed by exploration and evacuation of tongue hematoma with repair of tongue lacerations. SICU consulted for new tracheostomy.     24 HR Events:  - K normalized s/p shift x1 during day shift 5.0   - Decannulated by surgical team   - tolerating diet   - listed for floor, needs tele bed for hyperkalemia       PLAN:    Neurologic:   - Tylenol / Oxycodone - pain control PRN  - Keppra 750mg BID   - EEG w/ no epileptiform pattern or seizure seen  - f/u MRI   - f/u Neurology recs     Respiratory:   - trach decannulated 3/31 - satting well on RA   - monitor SP02    Cardiovascular:   - monitor vital signs q4h  - Unsure home HTN meds - f/u med rec   - c/w amlodipine 10mg and start hydralazine 50mg TID    Gastrointestinal/Nutrition:   - S/S rec Dsyphagia 3 diet w/ nectar thick liquids  - Miralax / senna for bowel regimen     Renal/Genitourinary:   - Hyperkalemia to 5.8; improved to 5.0 s/p shift  - monitor urine output  - monitor electrolytes, replete PRN     Hematologic:   - monitor H&H  - Heparing subQ for ppx     Infectious Disease:   - Afebrile w/ normal WBC  - COVID - Ab+  - COVID PCR positive, then negative x2     Endocrine:   - monitor glucose on BMP  - no active issues   - s/p Dexamethasone taper    Disposition:   - SICU care   - Pending transfer to the floor   61M with PMHx HTN, PE on Lovenox, presented to [facility] s/p seizure with lingual hematoma, POD #0 from awake tracheostomy followed by exploration and evacuation of tongue hematoma with repair of tongue lacerations. SICU consulted for new tracheostomy.     24 HR Events:  - Decannulated by surgical team   - tolerating diet   - start IVF  - d/c EEG per Neuro      PLAN:    Neurologic:   - Tylenol / Oxycodone - pain control PRN  - Keppra 750mg BID   - EEG w/ no epileptiform pattern or seizure seen  - f/u MRI   - f/u Neurology recs     Respiratory:   - decannulated - satting well on RA   - monitor SP02    Cardiovascular:   - monitor vital signs q4h  - Unsure home HTN meds - f/u med rec   - c/w amlodipine 10mg and start hydralazine 50mg TID    Gastrointestinal/Nutrition:   - S/S rec Dsyphagia 3 diet w/ nectar thick liquids  - Miralax / senna for bowel regimen     Renal/Genitourinary:   - Potassium normalized to 5.0  - monitor urine output  - monitor electrolytes, replete PRN   - NS @ 50 cc/hr    Hematologic:   - monitor H&H  - Heparing subQ for ppx     Infectious Disease:   - Afebrile w/ normal WBC  - COVID - Ab+  - COVID PCR positive, then negative x2     Endocrine:   - monitor glucose on BMP  - no active issues   - s/p Dexamethasone taper    Disposition:   - SICU care   - Pending transfer to the floor

## 2021-04-01 NOTE — PROGRESS NOTE ADULT - SUBJECTIVE AND OBJECTIVE BOX
ORAL AND MAXILLOFACIAL SURGERY PROGRESS NOTE    SUBJECTIVE / 24H EVENTS:  Patient seen and examined on morning rounds. No acute events overnight.   Trach removed and dgauze dressing placed on 03/31/2021. NGT removed and on soft-nectar liquid diet.     OBJECTIVE:  Vital Signs Last 24 Hrs  T(C): 37.2 (01 Apr 2021 04:00), Max: 37.2 (31 Mar 2021 20:00)  T(F): 98.9 (01 Apr 2021 04:00), Max: 99 (31 Mar 2021 20:00)  HR: 95 (01 Apr 2021 08:00) (78 - 100)  BP: 143/74 (01 Apr 2021 08:00) (132/78 - 162/94)  BP(mean): 89 (01 Apr 2021 08:00) (89 - 115)  RR: 17 (01 Apr 2021 08:00) (13 - 20)  SpO2: 93% (01 Apr 2021 08:00) (93% - 99%)    MEDICATIONS  (STANDING):  amLODIPine   Tablet 10 milliGRAM(s) Oral daily  chlorhexidine 4% Liquid 1 Application(s) Topical <User Schedule>  heparin   Injectable 5000 Unit(s) SubCutaneous every 8 hours  hydrALAZINE 25 milliGRAM(s) Oral three times a day  levETIRAcetam  Solution 750 milliGRAM(s) Oral two times a day  polyethylene glycol 3350 17 Gram(s) Oral daily  senna 2 Tablet(s) Oral at bedtime  sodium zirconium cyclosilicate 10 Gram(s) Oral every 8 hours    MEDICATIONS  (PRN):  acetaminophen   Tablet .. 975 milliGRAM(s) Oral every 6 hours PRN Temp greater or equal to 38C (100.4F), Mild Pain (1 - 3)  bisacodyl Suppository 10 milliGRAM(s) Rectal daily PRN Constipation  oxyCODONE    IR 5 milliGRAM(s) Oral every 4 hours PRN Moderate Pain (4 - 6)  oxyCODONE    IR 10 milliGRAM(s) Oral every 4 hours PRN Severe Pain (7 - 10)      LABS:              9.7    6.45  )-----------( 152      ( 01 Apr 2021 03:16 )             30.9     04-01    135  |  100  |  68<H>  ----------------------------<  107<H>  5.0   |  24  |  3.02<H>    Ca    8.8      01 Apr 2021 03:16  Phos  3.7     04-01  Mg     2.3     04-01          31 Mar 2021 07:01  -  01 Apr 2021 07:00  --------------------------------------------------------  IN:    Oral Fluid: 120 mL  Total IN: 120 mL  OUT:    Indwelling Catheter - Urethral (mL): 790 mL    Voided (mL): 950 mL  Total OUT: 1740 mL  Total NET: -1620 mL    I&O's Summary    31 Mar 2021 07:01  -  01 Apr 2021 07:00  --------------------------------------------------------  IN: 120 mL / OUT: 1740 mL / NET: -1620 mL

## 2021-04-01 NOTE — PROGRESS NOTE ADULT - ASSESSMENT
A/P: 62yo M s/p emergency tracheotomy for airway concern from lingual hematoma    - Trach decannulated, sat appropriately    - Trach to be decannulated today    - Appreciate SICU care   - OMFS to follow    [id number] OMFS

## 2021-04-01 NOTE — PROGRESS NOTE ADULT - ASSESSMENT
A/P: 60yo M s/p emergency tracheotomy for airway concern from lingual hematoma    - Trach decannulated 03/31/2021,, sat 100%  - Appreciate SICU care   - Trach care by OMFS  - OMFS to follow  - plan for discharge, will discuss with attending

## 2021-04-02 ENCOUNTER — TRANSCRIPTION ENCOUNTER (OUTPATIENT)
Age: 61
End: 2021-04-02

## 2021-04-02 DIAGNOSIS — S01.512A LACERATION WITHOUT FOREIGN BODY OF ORAL CAVITY, INITIAL ENCOUNTER: ICD-10-CM

## 2021-04-02 DIAGNOSIS — D64.9 ANEMIA, UNSPECIFIED: ICD-10-CM

## 2021-04-02 DIAGNOSIS — I26.99 OTHER PULMONARY EMBOLISM WITHOUT ACUTE COR PULMONALE: ICD-10-CM

## 2021-04-02 DIAGNOSIS — N18.9 CHRONIC KIDNEY DISEASE, UNSPECIFIED: ICD-10-CM

## 2021-04-02 DIAGNOSIS — Z93.0 TRACHEOSTOMY STATUS: ICD-10-CM

## 2021-04-02 DIAGNOSIS — I10 ESSENTIAL (PRIMARY) HYPERTENSION: ICD-10-CM

## 2021-04-02 LAB
ANION GAP SERPL CALC-SCNC: 10 MMOL/L — SIGNIFICANT CHANGE UP (ref 7–14)
BUN SERPL-MCNC: 64 MG/DL — HIGH (ref 7–23)
CALCIUM SERPL-MCNC: 8.2 MG/DL — LOW (ref 8.4–10.5)
CHLORIDE SERPL-SCNC: 98 MMOL/L — SIGNIFICANT CHANGE UP (ref 98–107)
CO2 SERPL-SCNC: 26 MMOL/L — SIGNIFICANT CHANGE UP (ref 22–31)
CREAT SERPL-MCNC: 3.21 MG/DL — HIGH (ref 0.5–1.3)
GLUCOSE SERPL-MCNC: 223 MG/DL — HIGH (ref 70–99)
HCT VFR BLD CALC: 31.2 % — LOW (ref 39–50)
HGB BLD-MCNC: 9.8 G/DL — LOW (ref 13–17)
MAGNESIUM SERPL-MCNC: 2.2 MG/DL — SIGNIFICANT CHANGE UP (ref 1.6–2.6)
MCHC RBC-ENTMCNC: 30.1 PG — SIGNIFICANT CHANGE UP (ref 27–34)
MCHC RBC-ENTMCNC: 31.4 GM/DL — LOW (ref 32–36)
MCV RBC AUTO: 95.7 FL — SIGNIFICANT CHANGE UP (ref 80–100)
NRBC # BLD: 0 /100 WBCS — SIGNIFICANT CHANGE UP
NRBC # FLD: 0 K/UL — SIGNIFICANT CHANGE UP
PHOSPHATE SERPL-MCNC: 4.6 MG/DL — HIGH (ref 2.5–4.5)
PLATELET # BLD AUTO: 134 K/UL — LOW (ref 150–400)
POTASSIUM SERPL-MCNC: 3.9 MMOL/L — SIGNIFICANT CHANGE UP (ref 3.5–5.3)
POTASSIUM SERPL-SCNC: 3.9 MMOL/L — SIGNIFICANT CHANGE UP (ref 3.5–5.3)
RBC # BLD: 3.26 M/UL — LOW (ref 4.2–5.8)
RBC # FLD: 15.1 % — HIGH (ref 10.3–14.5)
SODIUM SERPL-SCNC: 134 MMOL/L — LOW (ref 135–145)
WBC # BLD: 5.9 K/UL — SIGNIFICANT CHANGE UP (ref 3.8–10.5)
WBC # FLD AUTO: 5.9 K/UL — SIGNIFICANT CHANGE UP (ref 3.8–10.5)

## 2021-04-02 PROCEDURE — 99233 SBSQ HOSP IP/OBS HIGH 50: CPT

## 2021-04-02 RX ORDER — CHLORHEXIDINE GLUCONATE 213 G/1000ML
15 SOLUTION TOPICAL
Qty: 210 | Refills: 0
Start: 2021-04-02 | End: 2021-04-08

## 2021-04-02 RX ORDER — ENOXAPARIN SODIUM 100 MG/ML
80 INJECTION SUBCUTANEOUS DAILY
Refills: 0 | Status: DISCONTINUED | OUTPATIENT
Start: 2021-04-02 | End: 2021-04-03

## 2021-04-02 RX ORDER — ENOXAPARIN SODIUM 100 MG/ML
80 INJECTION SUBCUTANEOUS
Qty: 0 | Refills: 0 | DISCHARGE

## 2021-04-02 RX ORDER — LEVETIRACETAM 250 MG/1
7.5 TABLET, FILM COATED ORAL
Qty: 105 | Refills: 0
Start: 2021-04-02 | End: 2021-04-08

## 2021-04-02 RX ORDER — ENOXAPARIN SODIUM 100 MG/ML
80 INJECTION SUBCUTANEOUS
Qty: 2400 | Refills: 0
Start: 2021-04-02 | End: 2021-05-01

## 2021-04-02 RX ORDER — ACETAMINOPHEN 500 MG
2 TABLET ORAL
Qty: 56 | Refills: 0
Start: 2021-04-02 | End: 2021-04-08

## 2021-04-02 RX ADMIN — Medication 975 MILLIGRAM(S): at 11:14

## 2021-04-02 RX ADMIN — SODIUM ZIRCONIUM CYCLOSILICATE 10 GRAM(S): 10 POWDER, FOR SUSPENSION ORAL at 06:04

## 2021-04-02 RX ADMIN — HEPARIN SODIUM 5000 UNIT(S): 5000 INJECTION INTRAVENOUS; SUBCUTANEOUS at 15:47

## 2021-04-02 RX ADMIN — Medication 975 MILLIGRAM(S): at 02:53

## 2021-04-02 RX ADMIN — HEPARIN SODIUM 5000 UNIT(S): 5000 INJECTION INTRAVENOUS; SUBCUTANEOUS at 06:03

## 2021-04-02 RX ADMIN — Medication 975 MILLIGRAM(S): at 23:57

## 2021-04-02 RX ADMIN — LEVETIRACETAM 750 MILLIGRAM(S): 250 TABLET, FILM COATED ORAL at 19:52

## 2021-04-02 RX ADMIN — SODIUM ZIRCONIUM CYCLOSILICATE 10 GRAM(S): 10 POWDER, FOR SUSPENSION ORAL at 15:47

## 2021-04-02 RX ADMIN — AMLODIPINE BESYLATE 10 MILLIGRAM(S): 2.5 TABLET ORAL at 06:04

## 2021-04-02 RX ADMIN — Medication 75 MILLIGRAM(S): at 23:56

## 2021-04-02 RX ADMIN — Medication 75 MILLIGRAM(S): at 06:04

## 2021-04-02 RX ADMIN — LEVETIRACETAM 750 MILLIGRAM(S): 250 TABLET, FILM COATED ORAL at 06:03

## 2021-04-02 RX ADMIN — Medication 975 MILLIGRAM(S): at 11:44

## 2021-04-02 NOTE — PROGRESS NOTE ADULT - PROBLEM SELECTOR PLAN 7
pt was on lovenox as outpt, discussed with OMFS Dr. garcia for restarting lovenox, will restart lovenox today, will monitor with AC today and will d/c tomorrow if stable pt was on lovenox as outpt, discussed with OMFS Dr. garcia for restarting lovenox, will restart lovenox today, will monitor for any bleeding with AC today and will d/c tomorrow if stable.  Plan discussed with ACP

## 2021-04-02 NOTE — DISCHARGE NOTE PROVIDER - PROVIDER TOKENS
PROVIDER:[TOKEN:[[id number]]] PROVIDER:[TOKEN:[[id number]]],FREE:[LAST:[[facility] Neurology],PHONE:[phone]],FAX:[(   [phone]    -],ADDRESS:[[address] [place] [address], [address]]]

## 2021-04-02 NOTE — DISCHARGE NOTE PROVIDER - NSDCCPTREATMENT_GEN_ALL_CORE_FT
PRINCIPAL PROCEDURE  Procedure: Open tracheostomy  Findings and Treatment:        PRINCIPAL PROCEDURE  Procedure: Open tracheostomy  Findings and Treatment: continue local wound care.       PRINCIPAL PROCEDURE  Procedure: Open tracheostomy  Findings and Treatment: continue local wound care, cleanse site with normal, apply dry guaze dressing, secure with tape.

## 2021-04-02 NOTE — PROVIDER CONTACT NOTE (OTHER) - SITUATION
pt states he was prescribed 4 doses of Lovenox before his bone marrow draw. refusing evening Lovenox at the moment

## 2021-04-02 NOTE — PROVIDER CONTACT NOTE (OTHER) - BACKGROUND
62yo man with a PMHx of PE 1/2021 on Lovenox, HTN, currently being worked up for 'blood cancer' per report, presents with complaints of first time seizure event.

## 2021-04-02 NOTE — PROVIDER CONTACT NOTE (OTHER) - NAME OF MD/NP/PA/DO NOTIFIED:
Caller would like to discuss about her lab results.     Patient Name: Angelica Orellana  Caller Name: Angelica  Callback Number: [phone]  Best Availability: any   Did you confirm the message with the caller?: yes    Thank you,  Torres Pineda     Donna Anthony  [phone]

## 2021-04-02 NOTE — PROGRESS NOTE ADULT - ASSESSMENT
61 M with HTN, PE on Lovenox admitted to [facility] 3/25 s/p s/p seizure c/b lingual hematoma c/b airway obstruction   s/p emergency tracheostomy followed by exploration and evacuation of tongue hematoma with repair of tongue lacerations now transferred to medicine for further management.

## 2021-04-02 NOTE — PROGRESS NOTE ADULT - PROBLEM SELECTOR PLAN 5
Pt has a h/o ckd with baseline creatinine in the 3s, pt is being wkup at [facility], waiting for renal bx Obtained collateral information from Dr. Randal Cordero [facility]. Pt has a h/o ckd with baseline creatinine in the 3s, pt is being wkup at [facility], waiting for renal bx

## 2021-04-02 NOTE — DISCHARGE NOTE PROVIDER - CARE PROVIDER_API CALL
Fidel Rock (DDS; MD)  OralMaxillofacial Surgery  [phone] [place] [address]  Phone: [phone]  Fax: [phone]  Follow Up Time:    Fidel Rock (DDS; MD)  OralMaxillofacial Surgery  [address] [place] [address]  Phone: [phone]  Fax: [phone]  Follow Up Time:     [facility],   [address] [place] [address], [address]  Phone: [phone]  Fax: (   )    -  Follow Up Time:

## 2021-04-02 NOTE — DISCHARGE NOTE PROVIDER - HOSPITAL COURSE
03/25/2021 - · HPI Objective Statement: 60 y/o male pmh htn, PE on lovenox c/o seizure x today. Pt states that he went to bed last night and felt fine. Pt woke up this AM on the ground surrounded by EMS. Pt states that his wife said he was shaking prompting her to [facility]. Pt denies hx of seizures. Pt admits to laceration to his tongue. Denies chest pain, sob, abd pain, n/v/d, numbness, tingling, weakness, dizziness, fever or chills. Of note, pt states that he had Covid x1-2 months ago, and received his 2nd dose of the vaccine yesterday. Pt also staets that he was recently dx w/ anemia and is currently being worked up for cancer but has no diagnosis as of yet.           tongue/FOM laceration sutured on dorsal tongue along midline; laceration also noted on right posterior lateral and ventral border of tongue           labial/buccal mucosa WNL. firm gauze pressure and TXA applied with no resolution of bleeding.  Awake urgent tracheostomy followed by exploration and evacuation of tongue hematoma with repair of tongue lacerations.  03/26/2021- Patient seen and examined on morning rounds. No acute events overnight.   Pt under sedation w/ trach in place. Care as per SICU.  03/27-28/2021- Patient seen and examined on morning rounds. No acute events overnight.   Pt  w/ trach in place. Care as per SICU.  03/29/2021-Patient seen and examined on morning rounds. No acute events overnight.   Pt is w/ trach in place. NPO w/ TF. Care as per SICU.  03/30/2021-Patient seen and examined on morning rounds. No acute events overnight.   Pt is w/ trach in place. NPO w/ TF. transfer to floor.  03/31/2021 - Patient seen and examined on morning rounds. No acute events overnight. Pt is w/ trach in place and downsized to 4 and capped yesterday. NGT removed and on soft-nectar liquid diet.   04/01/2021 - Patient seen and examined on morning rounds. No acute events overnight. Trach decannulated yesterday. Pt on soft-nectar liquid diet. 03/25/2021 - · HPI Objective Statement: 60 y/o male pmh htn, PE on lovenox c/o seizure x today. Pt states that he went to bed last night and felt fine. Pt woke up this AM on the ground surrounded by EMS. Pt states that his wife said he was shaking prompting her to [facility]. Pt denies hx of seizures. Pt admits to laceration to his tongue. Denies chest pain, sob, abd pain, n/v/d, numbness, tingling, weakness, dizziness, fever or chills. Of note, pt states that he had Covid x1-2 months ago, and received his 2nd dose of the vaccine yesterday. Pt also staets that he was recently dx w/ anemia and is currently being worked up for cancer but has no diagnosis as of yet.           tongue/FOM laceration sutured on dorsal tongue along midline; laceration also noted on right posterior lateral and ventral border of tongue           labial/buccal mucosa WNL. firm gauze pressure and TXA applied with no resolution of bleeding.  Awake urgent tracheostomy followed by exploration and evacuation of tongue hematoma with repair of tongue lacerations.  03/26/2021- Patient seen and examined on morning rounds. No acute events overnight.   Pt under sedation w/ trach in place. Care as per SICU.  03/27-28/2021- Patient seen and examined on morning rounds. No acute events overnight.   Pt  w/ trach in place. Care as per SICU.  03/29/2021-Patient seen and examined on morning rounds. No acute events overnight.   Pt is w/ trach in place. NPO w/ TF. Care as per SICU.  03/30/2021-Patient seen and examined on morning rounds. No acute events overnight.   Pt is w/ trach in place. NPO w/ TF. transfer to floor.  03/31/2021 - Patient seen and examined on morning rounds. No acute events overnight. Pt is w/ trach in place and downsized to 4 and capped yesterday. NGT removed and on soft-nectar liquid diet.   04/01/2021 - Patient seen and examined on morning rounds. No acute events overnight. Trach decannulated yesterday. Pt on soft-nectar liquid diet.     4/2/2021--Neurology cleared pt for discharge, MRI can be done as outpatient. Pt to f/u outpatient for titration of Keppra. Pt cleared by OMFS for discharge. Pt with hx CKD w/ baseline cr 3s, also pancytopenia, as per discussion within his outpatient hematologist, Dr. Randal Cordero @ [facility], pt is pending bone marrow bx for w/u for anti-phospholipid and lupus anticoagulant. Pt also pending kidney bx for possible hydralazine induced lupus, was prescribed prednisone however pt denies taking it. No evidence of AI here. Pt is medically stable, plans to resume Lovenox 4/2, however pt refusing Lovenox, states he was on Eliquis and was temporary placed on Lovenox for his BM bx, and also does not want to be on Eliquis either. 03/25/2021 - · HPI Objective Statement: 60 y/o male pmh htn, PE on lovenox c/o seizure x today. Pt states that he went to bed last night and felt fine. Pt woke up this AM on the ground surrounded by EMS. Pt states that his wife said he was shaking prompting her to [facility]. Pt denies hx of seizures. Pt admits to laceration to his tongue. Denies chest pain, sob, abd pain, n/v/d, numbness, tingling, weakness, dizziness, fever or chills. Of note, pt states that he had Covid x1-2 months ago, and received his 2nd dose of the vaccine yesterday. Pt also staets that he was recently dx w/ anemia and is currently being worked up for cancer but has no diagnosis as of yet.           tongue/FOM laceration sutured on dorsal tongue along midline; laceration also noted on right posterior lateral and ventral border of tongue           labial/buccal mucosa WNL. firm gauze pressure and TXA applied with no resolution of bleeding.  Awake urgent tracheostomy followed by exploration and evacuation of tongue hematoma with repair of tongue lacerations.  03/26/2021- Patient seen and examined on morning rounds. No acute events overnight.   Pt under sedation w/ trach in place. Care as per SICU.  03/27-28/2021- Patient seen and examined on morning rounds. No acute events overnight.   Pt  w/ trach in place. Care as per SICU.  03/29/2021-Patient seen and examined on morning rounds. No acute events overnight.   Pt is w/ trach in place. NPO w/ TF. Care as per SICU.  03/30/2021-Patient seen and examined on morning rounds. No acute events overnight.   Pt is w/ trach in place. NPO w/ TF. transfer to floor.  03/31/2021 - Patient seen and examined on morning rounds. No acute events overnight. Pt is w/ trach in place and downsized to 4 and capped yesterday. NGT removed and on soft-nectar liquid diet.   04/01/2021 - Patient seen and examined on morning rounds. No acute events overnight. Trach decannulated yesterday. Pt on soft-nectar liquid diet.     4/2/2021--Pt was transferred to medicine service, Neurology cleared pt for discharge, MRI can be done as outpatient. Pt to f/u outpatient for titration of Keppra. Pt cleared by OMFS for discharge. Pt with hx CKD w/ baseline cr 3s, also pancytopenia, as per discussion within his outpatient hematologist, Dr. Randal Cordero @ [facility], pt is pending bone marrow bx for w/u for anti-phospholipid and lupus anticoagulant. Pt also pending kidney bx for possible hydralazine induced lupus, was prescribed prednisone however pt denies taking it. No evidence of AI here. Pt is medically stable, plans to resume Lovenox 4/2, however pt refusing Lovenox, states he was on Eliquis and was temporary placed on Lovenox for his BM bx, and also does not want to be on Eliquis either. After further discussion with Pt agreeable to continuing Lovenox, dose given 4/3. As per OMFS, pt to be on Augmentin 875mg Po BIDx 7days. Pt with persistent hyperkalemia in the setting of CKD, he was started on Lokelma 10g TIS, Renal was consulted for further recommendation, recommended Pt be d/c on Lokelma 10g daily, low potassium diet. Pt to f/u outpatient with Nephrology for further monitoring.     Pt is clinically and medically stable for discharge. 03/25/2021 - · HPI Objective Statement: 62 y/o male pmh htn, PE on lovenox c/o seizure x today. Pt states that he went to bed last night and felt fine. Pt woke up this AM on the ground surrounded by EMS. Pt states that his wife said he was shaking prompting her to [facility]. Pt denies hx of seizures. Pt admits to laceration to his tongue. Denies chest pain, sob, abd pain, n/v/d, numbness, tingling, weakness, dizziness, fever or chills. Of note, pt states that he had Covid x1-2 months ago, and received his 2nd dose of the vaccine yesterday. Pt also staets that he was recently dx w/ anemia and is currently being worked up for cancer but has no diagnosis as of yet.           tongue/FOM laceration sutured on dorsal tongue along midline; laceration also noted on right posterior lateral and ventral border of tongue           labial/buccal mucosa WNL. firm gauze pressure and TXA applied with no resolution of bleeding.  Awake urgent tracheostomy followed by exploration and evacuation of tongue hematoma with repair of tongue lacerations.  03/26/2021- Patient seen and examined on morning rounds. No acute events overnight.   Pt under sedation w/ trach in place. Care as per SICU.  03/27-28/2021- Patient seen and examined on morning rounds. No acute events overnight.   Pt  w/ trach in place. Care as per SICU.  03/29/2021-Patient seen and examined on morning rounds. No acute events overnight.   Pt is w/ trach in place. NPO w/ TF. Care as per SICU.  03/30/2021-Patient seen and examined on morning rounds. No acute events overnight.   Pt is w/ trach in place. NPO w/ TF. transfer to floor.  03/31/2021 - Patient seen and examined on morning rounds. No acute events overnight. Pt is w/ trach in place and downsized to 4 and capped yesterday. NGT removed and on soft-nectar liquid diet.   04/01/2021 - Patient seen and examined on morning rounds. No acute events overnight. Trach decannulated yesterday. Pt on soft-nectar liquid diet.     4/2/2021--Pt was transferred to medicine service, Neurology cleared pt for discharge, MRI can be done as outpatient. Pt to f/u outpatient for titration of Keppra. Pt cleared by OMFS for discharge. Pt with hx CKD w/ baseline cr 3s, also pancytopenia, as per discussion within his outpatient hematologist, Dr. Randal Cordero @ [facility], pt is pending bone marrow bx for w/u for anti-phospholipid and lupus anticoagulant. Pt also pending kidney bx for possible hydralazine induced lupus, was prescribed prednisone however pt denies taking it. No evidence of AI here. Pt is medically stable, plans to resume Lovenox 4/2, however pt refusing Lovenox, states he was on Eliquis and was temporary placed on Lovenox for his BM bx, and also does not want to be on Eliquis either. After further discussion with Pt agreeable to continuing Lovenox, dose given 4/3. As per OMFS, pt to be on Augmentin 875mg Po BIDx 7days. Pt with persistent hyperkalemia in the setting of CKD, he was started on Lokelma 10g TID, Renal was consulted for further recommendation, recommended Pt be d/c on Lokelma 10g daily, low potassium diet. Pt to f/u outpatient with Nephrology for further monitoring. Lokelma is NOT covered by Pt's insurance, Nephrology recommend Kayexalate 30g 3x weekly (M/W/F).    Pt is clinically and medically stable for discharge. 03/25/2021 - · HPI Objective Statement: 60 y/o male pmh htn, PE on lovenox c/o seizure x today. Pt states that he went to bed last night and felt fine. Pt woke up this AM on the ground surrounded by EMS. Pt states that his wife said he was shaking prompting her to [facility]. Pt denies hx of seizures. Pt admits to laceration to his tongue. Denies chest pain, sob, abd pain, n/v/d, numbness, tingling, weakness, dizziness, fever or chills. Of note, pt states that he had Covid x1-2 months ago, and received his 2nd dose of the vaccine yesterday. Pt also staets that he was recently dx w/ anemia and is currently being worked up for cancer but has no diagnosis as of yet.           tongue/FOM laceration sutured on dorsal tongue along midline; laceration also noted on right posterior lateral and ventral border of tongue           labial/buccal mucosa WNL. firm gauze pressure and TXA applied with no resolution of bleeding.  Awake urgent tracheostomy followed by exploration and evacuation of tongue hematoma with repair of tongue lacerations.  03/26/2021- Patient seen and examined on morning rounds. No acute events overnight.   Pt under sedation w/ trach in place. Care as per SICU.  03/27-28/2021- Patient seen and examined on morning rounds. No acute events overnight.   Pt  w/ trach in place. Care as per SICU.  03/29/2021-Patient seen and examined on morning rounds. No acute events overnight.   Pt is w/ trach in place. NPO w/ TF. Care as per SICU.  03/30/2021-Patient seen and examined on morning rounds. No acute events overnight.   Pt is w/ trach in place. NPO w/ TF. transfer to floor.  03/31/2021 - Patient seen and examined on morning rounds. No acute events overnight. Pt is w/ trach in place and downsized to 4 and capped yesterday. NGT removed and on soft-nectar liquid diet.   04/01/2021 - Patient seen and examined on morning rounds. No acute events overnight. Trach decannulated yesterday. Pt on soft-nectar liquid diet.     4/2/2021--Pt was transferred to medicine service, Neurology cleared pt for discharge, MRI can be done as outpatient. Pt to f/u outpatient for titration of Keppra. Pt cleared by OMFS for discharge. Pt with hx CKD w/ baseline cr 3s, also pancytopenia, as per discussion within his outpatient hematologist, Dr. Randal Cordero @ [facility], pt is pending bone marrow bx for w/u for anti-phospholipid and lupus anticoagulant. Pt also pending kidney bx for possible hydralazine induced lupus, was prescribed prednisone however pt denies taking it. No evidence of AI here. Pt is medically stable, plans to resume Lovenox (renal dosing) 4/2, however pt refusing Lovenox, states he was on Eliquis and was temporary placed on Lovenox for his BM bx, and also does not want to be on Eliquis either. After further discussion with Pt agreeable to continuing Lovenox, dose given 4/3. As per OMFS, pt to be on Augmentin 875mg Po BIDx 7days. Pt with persistent hyperkalemia in the setting of CKD, he was started on Lokelma 10g TID, Renal was consulted for further recommendation, recommended Pt be d/c on Lokelma 10g daily, low potassium diet. Pt to f/u outpatient with Nephrology for further monitoring. Lokelma is NOT covered by Pt's insurance, Nephrology recommend Kayexalate 30g 3x weekly (M/W/F).    Pt is clinically and medically stable for discharge. 03/25/2021 - · HPI Objective Statement: 60 y/o male pmh htn, PE on lovenox c/o seizure x today. Pt states that he went to bed last night and felt fine. Pt woke up this AM on the ground surrounded by EMS. Pt states that his wife said he was shaking prompting her to [facility]. Pt denies hx of seizures. Pt admits to laceration to his tongue. Denies chest pain, sob, abd pain, n/v/d, numbness, tingling, weakness, dizziness, fever or chills. Of note, pt states that he had Covid x1-2 months ago, and received his 2nd dose of the vaccine yesterday. Pt also staets that he was recently dx w/ anemia and is currently being worked up for cancer but has no diagnosis as of yet.           tongue/FOM laceration sutured on dorsal tongue along midline; laceration also noted on right posterior lateral and ventral border of tongue           labial/buccal mucosa WNL. firm gauze pressure and TXA applied with no resolution of bleeding.  Awake urgent tracheostomy followed by exploration and evacuation of tongue hematoma with repair of tongue lacerations.  03/26/2021- Patient seen and examined on morning rounds. No acute events overnight.   Pt under sedation w/ trach in place. Care as per SICU.  03/27-28/2021- Patient seen and examined on morning rounds. No acute events overnight.   Pt  w/ trach in place. Care as per SICU.  03/29/2021-Patient seen and examined on morning rounds. No acute events overnight.   Pt is w/ trach in place. NPO w/ TF. Care as per SICU.  03/30/2021-Patient seen and examined on morning rounds. No acute events overnight.   Pt is w/ trach in place. NPO w/ TF. transfer to floor.  03/31/2021 - Patient seen and examined on morning rounds. No acute events overnight. Pt is w/ trach in place and downsized to 4 and capped yesterday. NGT removed and on soft-nectar liquid diet.   04/01/2021 - Patient seen and examined on morning rounds. No acute events overnight. Trach decannulated yesterday. Pt on soft-nectar liquid diet.     4/2/2021--Pt was transferred to medicine service, Neurology cleared pt for discharge, MRI can be done as outpatient. Pt to f/u outpatient for titration of Keppra. Pt cleared by OMFS for discharge. Pt with hx CKD w/ baseline cr 3s, also pancytopenia, as per discussion within his outpatient hematologist, Dr. Randal Cordero @ [facility], pt is pending bone marrow bx for w/u for anti-phospholipid and lupus anticoagulant. Pt also pending kidney bx for possible hydralazine induced lupus, was prescribed prednisone however pt denies taking it. No evidence of AI here. Pt is medically stable, plans to resume Lovenox (renal dosing) 4/2, however pt refusing Lovenox, states he was on Eliquis and was temporary placed on Lovenox for his BM bx, and also does not want to be on Eliquis either. After further discussion with Pt agreeable to continuing Lovenox, dose given 4/3. As per OMFS, pt to be on Augmentin 875mg Po BIDx 7days. Pt with persistent hyperkalemia in the setting of CKD, he was started on Lokelma 10g TID, Renal was consulted for further recommendation, recommended Pt be d/c on Lokelma 10g daily, low potassium diet. Pt to f/u outpatient with Nephrology for further monitoring. Lokelma is NOT covered by Pt's insurance, Nephrology recommend Kayexalate 30g 3x weekly (M/W/F). Pt's hematologist office will contact pt for sooner outpt appt.    Pt is clinically and medically stable for discharge.

## 2021-04-02 NOTE — CHART NOTE - NSCHARTNOTEFT_GEN_A_CORE
MAR Accept Note  Transfer to:  864A    Patient seen and examined.   Labs and data reviewed.   No findings precluding transfer of service.       HPI/MICU COURSE:   Please refer to MICU transfer note for full details. Briefly, this is a 61 M with HTN, PE on Lovenox  Admitted to [facility] 3/25 s/p s/p seizure c/b lingual hematoma c/b airway obstruction   s/p emergency tracheostomy followed by exploration and evacuation of tongue hematoma with repair of tongue lacerations    FOR FOLLOW-UP:  [ ] c/w Keppra 750mg BID   [ ] f/u MRI   [ ] f/u Neurology recs   [ ] unsure home HTN meds - f/u med rec       Mark Hellerman PGY3  MAR [id number]

## 2021-04-02 NOTE — PROGRESS NOTE ADULT - PROBLEM SELECTOR PLAN 2
Trach has been decannulated, dressing in place, cont trach care. Trach has been decannulated, dressing in place, cont wound care. Per OMFS no further intervention, ok to d/c from their perspective

## 2021-04-02 NOTE — PROGRESS NOTE ADULT - ASSESSMENT
A/P: 60yo M s/p emergency tracheotomy for airway concern from lingual hematoma    - Trach decannulated and covered 4x4 atop  - Appreciate Neuro rec  - CVT ppx Heparin drip        [id number] OMFS   A/P: 62yo M s/p emergency tracheotomy for airway concern from lingual hematoma    - Trach decannulated and covered 4x4 atop  - Appreciate Neuro rec  - CVT ppx Heparin drip  - patient clear for discharge from OMFS standpoint  - social work f/u to make arrangements for discharge

## 2021-04-02 NOTE — DISCHARGE NOTE PROVIDER - NSDCCPCAREPLAN_GEN_ALL_CORE_FT
PRINCIPAL DISCHARGE DIAGNOSIS  Diagnosis: Seizure  Assessment and Plan of Treatment:       SECONDARY DISCHARGE DIAGNOSES  Diagnosis: Tongue laceration, initial encounter  Assessment and Plan of Treatment:      PRINCIPAL DISCHARGE DIAGNOSIS  Diagnosis: Seizure  Assessment and Plan of Treatment: continue Seizure medication as prescribed  Follow up with Neurology for further medication management.      SECONDARY DISCHARGE DIAGNOSES  Diagnosis: Pulmonary embolism  Assessment and Plan of Treatment: continue Lovenox as prescribed    Diagnosis: CKD (chronic kidney disease)  Assessment and Plan of Treatment: Follow up with your Primary Care Doctor within 1 week of discharge. Call to schedule an appointment      Diagnosis: HTN (hypertension)  Assessment and Plan of Treatment: Continue blood pressure medication regimen as directed. Monitor for any visual changes, headaches or dizziness.  Monitor blood pressure regularly.  Follow up with your primary care provider for further management for high blood pressure.      Diagnosis: Tongue laceration, initial encounter  Assessment and Plan of Treatment:      PRINCIPAL DISCHARGE DIAGNOSIS  Diagnosis: Seizure  Assessment and Plan of Treatment: continue Seizure medication as prescribed  Follow up with Neurology within 1 months of discharge, for further medication management, call to schedule an appointment. Plans for MRI head as outpatient.      SECONDARY DISCHARGE DIAGNOSES  Diagnosis: Tongue laceration, initial encounter  Assessment and Plan of Treatment: Follow up with your Primary Care Doctor within 1 week of discharge. Call to schedule an appointment      Diagnosis: HTN (hypertension)  Assessment and Plan of Treatment: Continue blood pressure medication regimen as directed. Monitor for any visual changes, headaches or dizziness.  Monitor blood pressure regularly.  Follow up with your primary care provider for further management for high blood pressure.      Diagnosis: Pulmonary embolism  Assessment and Plan of Treatment: continue Lovenox as prescribed    Diagnosis: CKD (chronic kidney disease)  Assessment and Plan of Treatment: Follow up with your Primary Care Doctor within 1 week of discharge. Call to schedule an appointment  Follow up for Renal biopsy at [facility]       Diagnosis: Anemia  Assessment and Plan of Treatment: follow up with your hematologist Dr. Randal Cordero, for Bone marrow biopsy as planned.     PRINCIPAL DISCHARGE DIAGNOSIS  Diagnosis: Seizure  Assessment and Plan of Treatment: continue Seizure medication as prescribed  Follow up with Neurology within 1 months of discharge, for further medication management, call to schedule an appointment. Plans for MRI head as outpatient.      SECONDARY DISCHARGE DIAGNOSES  Diagnosis: Tongue laceration, initial encounter  Assessment and Plan of Treatment: Follow up with your Primary Care Doctor within 1 week of discharge. Call to schedule an appointment      Diagnosis: HTN (hypertension)  Assessment and Plan of Treatment: Continue blood pressure medication regimen as directed. Monitor for any visual changes, headaches or dizziness.  Monitor blood pressure regularly.  Follow up with your primary care provider for further management for high blood pressure.      Diagnosis: Pulmonary embolism  Assessment and Plan of Treatment: continue Lovenox as prescribed    Diagnosis: CKD (chronic kidney disease)  Assessment and Plan of Treatment: Follow up with your Primary Care Doctor within 1 week of discharge. Call to schedule an appointment  Follow up for Renal biopsy at [facility]       Diagnosis: Anemia  Assessment and Plan of Treatment: follow up with your hematologist Dr. Randal Cordero, for Bone marrow biopsy as planned.    Diagnosis: Hyperkalemia  Assessment and Plan of Treatment: continue Lokelma as ordered  continue Low potassium diet.  Follow up with Nephrology (kidney) doctor at [facility] next week for further monitoring and labs. Call to schedule an appointment

## 2021-04-02 NOTE — DISCHARGE NOTE PROVIDER - NSDCFUADDINST_GEN_ALL_CORE_FT
diet: full liquid  pain: OTC tylenol/ibuprofen  Augmentin 875mg-125 BID x7 days  please thea and confirm 1 week f/u with OMFS at [phone]

## 2021-04-02 NOTE — PROGRESS NOTE ADULT - SUBJECTIVE AND OBJECTIVE BOX
Patient is a 61y old  Male who presents with a chief complaint of Tongue laceration, uncontrolled heme, hematoma, airway obstruction (02 Apr 2021 08:25)      SUBJECTIVE / OVERNIGHT EVENTS: Pt seen and examined at 11:05am, no overnight events, pt denies any sob, speaking in full sentences, no bleeding reported. No other complaints.    MEDICATIONS  (STANDING):  amLODIPine   Tablet 10 milliGRAM(s) Oral daily  chlorhexidine 4% Liquid 1 Application(s) Topical <User Schedule>  heparin   Injectable 5000 Unit(s) SubCutaneous every 8 hours  labetalol 75 milliGRAM(s) Oral every 8 hours  levETIRAcetam  Solution 750 milliGRAM(s) Oral two times a day  polyethylene glycol 3350 17 Gram(s) Oral daily  senna 2 Tablet(s) Oral at bedtime  sodium zirconium cyclosilicate 10 Gram(s) Oral every 8 hours    MEDICATIONS  (PRN):  acetaminophen   Tablet .. 975 milliGRAM(s) Oral every 6 hours PRN Temp greater or equal to 38C (100.4F), Mild Pain (1 - 3)  bisacodyl Suppository 10 milliGRAM(s) Rectal daily PRN Constipation  oxyCODONE    IR 5 milliGRAM(s) Oral every 4 hours PRN Moderate Pain (4 - 6)  oxyCODONE    IR 10 milliGRAM(s) Oral every 4 hours PRN Severe Pain (7 - 10)      Vital Signs Last 24 Hrs  T(C): 37 (02 Apr 2021 14:48), Max: 37.7 (02 Apr 2021 09:40)  T(F): 98.6 (02 Apr 2021 14:48), Max: 99.8 (02 Apr 2021 09:40)  HR: 82 (02 Apr 2021 14:48) (81 - 107)  BP: 109/72 (02 Apr 2021 14:48) (104/72 - 154/96)  BP(mean): 99 (01 Apr 2021 18:58) (89 - 99)  RR: 18 (02 Apr 2021 14:48) (14 - 18)  SpO2: 98% (02 Apr 2021 14:48) (96% - 99%)  CAPILLARY BLOOD GLUCOSE        I&O's Summary    01 Apr 2021 07:01  -  02 Apr 2021 07:00  --------------------------------------------------------  IN: 1690 mL / OUT: 1600 mL / NET: 90 mL        PHYSICAL EXAM:  GENERAL: NAD, well-developed  HEENT: + neck dressing no bleeding noted  CHEST/LUNG: Clear to auscultation bilaterally; No wheeze  HEART: Regular rate and rhythm; No murmurs  ABDOMEN: Soft, Nontender, Nondistended  EXTREMITIES: no LE edema  PSYCH: Calm  NEUROLOGY: AAOx3  SKIN: No rashes or lesions    LABS:                        9.8    5.90  )-----------( 134      ( 02 Apr 2021 07:51 )             31.2     04-02    134<L>  |  98  |  64<H>  ----------------------------<  223<H>  3.9   |  26  |  3.21<H>    Ca    8.2<L>      02 Apr 2021 07:51  Phos  4.6     04-02  Mg     2.2     04-02                RADIOLOGY & ADDITIONAL TESTS:    Imaging Personally Reviewed:    Consultant(s) Notes Reviewed:      Care Discussed with Consultants/Other Providers:

## 2021-04-02 NOTE — PROVIDER CONTACT NOTE (OTHER) - ASSESSMENT
-- Message is from the [facility]--    Patient is requesting a medication refill - medication is on active medication list    Patient is currently OUT of the requested medication.    Was Medication Pended?  Yes.    Rx Name and Dose:  fluoxetine (PROZAC) 40 MG capsule    Duration: 30 days    Pharmacy  [facility] [id number] [id number]     NAD at this time

## 2021-04-02 NOTE — DISCHARGE NOTE PROVIDER - NSDCMRMEDTOKEN_GEN_ALL_CORE_FT
acetaminophen 325 mg oral tablet: 2 tab(s) orally every 6 hours, As Needed -Temp greater or equal to 38C (100.4F), Mild Pain (1 - 3) MDD:max 8 tabs daily  Augmentin 875 mg-125 mg oral tablet: 1 milligram(s) orally 2 times a day MDD:max 2 tabs a day  levETIRAcetam 100 mg/mL oral solution: 7.5 milliliter(s) orally 2 times a day MDD:max 1500mg a day  Peridex 0.12% mucous membrane liquid: 15 milliliter(s) orally 2 times a day MDD:max 30mL a day   acetaminophen 325 mg oral tablet: 2 tab(s) orally every 6 hours, As Needed -Temp greater or equal to 38C (100.4F), Mild Pain (1 - 3) MDD:max 8 tabs daily  Augmentin 875 mg-125 mg oral tablet: 1 milligram(s) orally 2 times a day MDD:max 2 tabs a day  levETIRAcetam 100 mg/mL oral solution: 7.5 milliliter(s) orally 2 times a day MDD:max 1500mg a day  Lovenox 80 mg/0.8 mL injectable solution: 80 milligram(s) subcutaneously once a day   Peridex 0.12% mucous membrane liquid: 15 milliliter(s) orally 2 times a day MDD:max 30mL a day   acetaminophen 325 mg oral tablet: 2 tab(s) orally every 6 hours, As Needed -Temp greater or equal to 38C (100.4F), Mild Pain (1 - 3) MDD:max 8 tabs daily  levETIRAcetam 100 mg/mL oral solution: 7.5 milliliter(s) orally 2 times a day MDD:max 1500mg a day  Lovenox 80 mg/0.8 mL injectable solution: 80 milligram(s) subcutaneously once a day   Peridex 0.12% mucous membrane liquid: 15 milliliter(s) orally 2 times a day MDD:max 30mL a day   acetaminophen 325 mg oral tablet: 2 tab(s) orally every 6 hours, As Needed -Temp greater or equal to 38C (100.4F), Mild Pain (1 - 3) MDD:max 8 tabs daily  amoxicillin-clavulanate 875 mg-125 mg oral tablet: 1 tab(s) orally 2 times a day  levETIRAcetam 100 mg/mL oral solution: 7.5 milliliter(s) orally 2 times a day MDD:max 1500mg a day  Lokelma 5 g oral powder for reconstitution: 10 gram(s) orally once a day   Lovenox 80 mg/0.8 mL injectable solution: 80 milligram(s) subcutaneously once a day   Peridex 0.12% mucous membrane liquid: 15 milliliter(s) orally 2 times a day MDD:max 30mL a day   acetaminophen 325 mg oral tablet: 2 tab(s) orally every 6 hours, As Needed -Temp greater or equal to 38C (100.4F), Mild Pain (1 - 3) MDD:max 8 tabs daily  amoxicillin-clavulanate 875 mg-125 mg oral tablet: 1 tab(s) orally 2 times a day  levETIRAcetam 100 mg/mL oral solution: 7.5 milliliter(s) orally 2 times a day MDD:max 1500mg a day  Lovenox 80 mg/0.8 mL injectable solution: 80 milligram(s) subcutaneously once a day   Peridex 0.12% mucous membrane liquid: 15 milliliter(s) orally 2 times a day MDD:max 30mL a day  sodium polystyrene sulfonate 15 g/60 mL oral and rectal suspension: 120 milliliter(s) orally 3 times a week Mondays, Wednesdays, and Fridays

## 2021-04-02 NOTE — PROGRESS NOTE ADULT - PROBLEM SELECTOR PLAN 6
Pt with h/o pancytopenia, and being worked up for antiphospholipid syndrome/lupus anticoagulant, waiting for bone marow bx per Dr. Randal Cordero ( hematologist at [facility])  Pt was prescribed prednisone 30mg daily, but pt reports that he is only taking labetalol and amlodipine, not taking prednisone Pt with h/o pancytopenia, and being worked up for antiphospholipid syndrome/lupus anticoagulant, waiting for bone marow bx per Dr. Randal Cordero ( hematologist at [facility]) pt is on Lovenox for h/o PE  Pt was prescribed prednisone 30mg daily, but pt reports that he is only taking labetalol and amlodipine, not taking prednisone Pt with h/o pancytopenia, and being worked up for antiphospholipid syndrome/lupus anticoagulant, waiting for bone marow bx per Dr. Randal Cordero ( hematologist at [facility]) pt is on Lovenox for h/o PE.  Pt is being worked up for hydralazine induced lupus, was prescribed prednisone 30mg daily, but pt reports that he is only taking labetalol and amlodipine, not taking prednisone  pt needs to be f/u at [facility] to address all his co morbid conditions

## 2021-04-02 NOTE — CHART NOTE - NSCHARTNOTEFT_GEN_A_CORE
PT was being followed by neurology for first time seizures.  Likely provoked in setting of PE and COVID.  PT at higher risk for future seizures temporarily.  Normally, would not continue seizure prophylaxis indefinitely for first time provoked seizures.  Given higher risk, will continue Keppra for now but will have PT f/u with neurology at [address] to titrate PT off in 3-6 months.  PT currently back to baseline.  EEG inpatient with no clear epileptiform discharges.  MRI brain can be done as outpatient, doesn't need to be done inpatient.  No further neurologic work up inpatient.

## 2021-04-02 NOTE — PROGRESS NOTE ADULT - PROBLEM SELECTOR PLAN 4
Pt only takes labetalol and amlodipine Pt only takes labetalol and amlodipine   will monitor and adjust meds prn

## 2021-04-02 NOTE — PROGRESS NOTE ADULT - PROBLEM SELECTOR PLAN 1
s/p repair of tongue laceration, discussed with OMFS, no further plan for any intervention, ok for d/c from their perspective, Discussed with Dr. garcia to restart lovenox s/p repair of tongue laceration, discussed with OMFS, no further plan for any intervention, ok for d/c from their perspective, Discussed with Dr. Guzman, ok to restart lovenox

## 2021-04-03 ENCOUNTER — TRANSCRIPTION ENCOUNTER (OUTPATIENT)
Age: 61
End: 2021-04-03

## 2021-04-03 VITALS
SYSTOLIC BLOOD PRESSURE: 120 MMHG | OXYGEN SATURATION: 100 % | DIASTOLIC BLOOD PRESSURE: 83 MMHG | RESPIRATION RATE: 18 BRPM | HEART RATE: 84 BPM | TEMPERATURE: 99 F

## 2021-04-03 DIAGNOSIS — N18.4 CHRONIC KIDNEY DISEASE, STAGE 4 (SEVERE): ICD-10-CM

## 2021-04-03 DIAGNOSIS — E87.5 HYPERKALEMIA: ICD-10-CM

## 2021-04-03 PROCEDURE — 99239 HOSP IP/OBS DSCHRG MGMT >30: CPT

## 2021-04-03 RX ORDER — SODIUM POLYSTYRENE SULFONATE 4.1 MEQ/G
120 POWDER, FOR SUSPENSION ORAL
Qty: 1543 | Refills: 0
Start: 2021-04-03 | End: 2021-05-02

## 2021-04-03 RX ORDER — SODIUM ZIRCONIUM CYCLOSILICATE 10 G/10G
10 POWDER, FOR SUSPENSION ORAL
Qty: 300 | Refills: 0
Start: 2021-04-03 | End: 2021-05-02

## 2021-04-03 RX ADMIN — OXYCODONE HYDROCHLORIDE 10 MILLIGRAM(S): 5 TABLET ORAL at 12:59

## 2021-04-03 RX ADMIN — LEVETIRACETAM 750 MILLIGRAM(S): 250 TABLET, FILM COATED ORAL at 05:50

## 2021-04-03 RX ADMIN — SODIUM ZIRCONIUM CYCLOSILICATE 10 GRAM(S): 10 POWDER, FOR SUSPENSION ORAL at 14:14

## 2021-04-03 RX ADMIN — LEVETIRACETAM 750 MILLIGRAM(S): 250 TABLET, FILM COATED ORAL at 18:10

## 2021-04-03 RX ADMIN — ENOXAPARIN SODIUM 80 MILLIGRAM(S): 100 INJECTION SUBCUTANEOUS at 12:58

## 2021-04-03 RX ADMIN — Medication 75 MILLIGRAM(S): at 14:14

## 2021-04-03 RX ADMIN — Medication 975 MILLIGRAM(S): at 00:27

## 2021-04-03 RX ADMIN — OXYCODONE HYDROCHLORIDE 10 MILLIGRAM(S): 5 TABLET ORAL at 12:26

## 2021-04-03 RX ADMIN — POLYETHYLENE GLYCOL 3350 17 GRAM(S): 17 POWDER, FOR SOLUTION ORAL at 12:59

## 2021-04-03 NOTE — DISCHARGE NOTE NURSING/CASE MANAGEMENT/SOCIAL WORK - NSDCPNINST_GEN_ALL_CORE
Please NOTIFY MD for any of the following s/s: S/S infection (Fever >100.4, chills, uncontrolled pain not relieved by pain medications, persistent nausea/vomiting or inability to tolerate diet. No heavy lifting; No driving while taking narcotic pain medications.

## 2021-04-03 NOTE — CONSULT NOTE ADULT - ASSESSMENT
61 M with HTN, PE on Lovenox admitted to [facility] 3/25 s/p s/p seizure c/b lingual hematoma c/b airway obstruction   s/p emergency tracheostomy followed by exploration and evacuation of tongue hematoma with repair of tongue laceration with de cannulation  Now Renal consult for CKD stage 4 and Hyperkalemia
61 year old male with tongue lac/hematoma after seizure
ASSESSMENT:  61M with PMHx HTN, PE on Lovenox, presented to [facility] s/p seizure with lingual hematoma, POD #0 from awake tracheostomy followed by exploration and evacuation of tongue hematoma with repair of tongue lacerations. SICU consulted for new tracheostomy.       PLAN:      Neurologic:   - propofol for sedation  - pain control PRN    Respiratory:   - mechanical ventilation  - monitor SP02    Cardiovascular:   - monitor vital signs q1h  - hold home antihypertensives for now     Gastrointestinal/Nutrition:   - NPO    Renal/Genitourinary:   - LR @ 100cc/hr  - monitor urine output  - monitor electrolytes, replete PRN     Hematologic:   - monitor H&H  - Lovenox for DVT ppx    Infectious Disease:   - no active issues     Endocrine:   - monitor glucose on BMP  - no active issues     Disposition: under SICU care in [place]     --------------------------------------------------------------------------------------
Impression:    First time, reportedly unprovoked GTC event i/s/o recent PE, onc w/u   Unknown etiology. 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     Plan:  - Load LEV 1g   - Start /500 IV/PO  - vEEG  - MRI Brain w/wo (Seizure/Epilepsy protocol)   - TTE w/ bubble   - A1c, LDL, Heavy metal screen, CK, Prolactin  - Toxic, Metabolic, Infectious screen including CXR, KUB, UA, RVP w/ COVID PCR

## 2021-04-03 NOTE — PROGRESS NOTE ADULT - REASON FOR ADMISSION
Tongue laceration, uncontrolled heme, hematoma, airway obstruction
seizure
Tongue laceration, uncontrolled heme, hematoma, airway obstruction

## 2021-04-03 NOTE — PROGRESS NOTE ADULT - PROBLEM SELECTOR PLAN 5
Obtained collateral information from Dr. Randal Cordero [facility]. Pt has a h/o ckd with baseline creatinine in the 3s, pt is being wkup at [facility], waiting for renal bx

## 2021-04-03 NOTE — PROGRESS NOTE ADULT - PROBLEM SELECTOR PLAN 3
s/p eeg with no epileptiform activity, discussed with neuro no need for inpt MRI, to cont keppra for now and f/u with neuro as outpt
s/p eeg with no epileptiform activity, discussed with neuro no need for inpt MRI, to cont keppra for now and f/u with neuro as outpt

## 2021-04-03 NOTE — PROGRESS NOTE ADULT - PROBLEM SELECTOR PLAN 1
s/p repair of tongue laceration, discussed with OMFS, no further plan for any intervention, ok for d/c from their perspective, Discussed with Dr. Guzman on 4/2, ok to restart lovenox, restarted on 4/2 but pt did not take it and now is agreeable to take it  - augmentin for 7 days per OMFS as outpt

## 2021-04-03 NOTE — CONSULT NOTE ADULT - PROBLEM SELECTOR RECOMMENDATION 2
improved  pt eating potatoes--> would place on Low k diet  dec lokelma to 10mg po qd  outpt k check next week  okay for discharge

## 2021-04-03 NOTE — PROGRESS NOTE ADULT - PROBLEM SELECTOR PLAN 2
Trach has been decannulated, dressing in place, cont wound care. Per OMFS no further intervention, ok to d/c from their perspective

## 2021-04-03 NOTE — PROGRESS NOTE ADULT - NSICDXPILOT_GEN_ALL_CORE
[place]

## 2021-04-03 NOTE — PROGRESS NOTE ADULT - PROBLEM SELECTOR PLAN 8
Pt has been on lokelma, K normal as of 4/2 labs, no labs done today  renal consult appreciated, per renal will give lokelma qd and f/u with renal as outpt  d/c today, d/c planning time spent in coordination 45 mts ( preparing d/c summary and plan)

## 2021-04-03 NOTE — PROGRESS NOTE ADULT - PROBLEM SELECTOR PLAN 6
Pt with h/o pancytopenia, and being worked up for antiphospholipid syndrome/lupus anticoagulant, waiting for bone marow bx per Dr. Randal Cordero ( hematologist at [facility]) pt is on Lovenox for h/o PE.  Pt is being worked up for hydralazine induced lupus, was prescribed prednisone 30mg daily, but pt reports that he is only taking labetalol and amlodipine, not taking prednisone  pt needs to be f/u at [facility] to address all his co morbid conditions

## 2021-04-03 NOTE — CONSULT NOTE ADULT - SUBJECTIVE AND OBJECTIVE BOX
[facility] Consult Note Nephrology - CONSULTATION NOTE     60 y/o male pmh htn, PE on lovenox c/o seizure x today. Pt states that he went to bed last night and felt fine. Pt woke up this AM on the ground surrounded by EMS. Pt states that his wife said he was shaking prompting her to thea EMS. Pt denies hx of seizures. Pt admits to laceration to his tongue. Denies chest pain, sob, abd pain, n/v/d, numbness, tingling, weakness, dizziness, fever or chills. Of note, pt states that he had Covid x1-2 months ago, and received his 2nd dose of the vaccine yesterday. Pt also staets that he was recently dx w/ anemia and is currently being worked up for cancer but has no diagnosis as of yet.           tongue/FOM laceration sutured on dorsal tongue along midline; laceration also noted on right posterior lateral and ventral border of tongue           labial/buccal mucosa WNL. firm gauze pressure and TXA applied with no resolution of bleeding.  Awake urgent tracheostomy followed by exploration and evacuation of tongue hematoma with repair of tongue lacerations.  03/26/2021- Patient seen and examined on morning rounds. No acute events overnight.   Pt under sedation w/ trach in place. Care as per SICU.  03/27-28/2021- Patient seen and examined on morning rounds. No acute events overnight.   Pt  w/ trach in place. Care as per SICU.  03/29/2021-Patient seen and examined on morning rounds. No acute events overnight.   Pt is w/ trach in place. NPO w/ TF. Care as per SICU.  03/30/2021-Patient seen and examined on morning rounds. No acute events overnight.   Pt is w/ trach in place. NPO w/ TF. transfer to floor.  03/31/2021 - Patient seen and examined on morning rounds. No acute events overnight. Pt is w/ trach in place and downsized to 4 and capped yesterday. NGT removed and on soft-nectar liquid diet.   04/01/2021 - Patient seen and examined on morning rounds. No acute events overnight. Trach decannulated yesterday. Pt on soft-nectar liquid diet.     4/2/2021--Neurology cleared pt for discharge, MRI can be done as outpatient. Pt to f/u outpatient for titration of Keppra. Pt cleared by OMFS for discharge. Pt with hx CKD w/ baseline cr 3s, also pancytopenia, as per discussion within his outpatient hematologist, Dr. Randal Cordero @ [facility], pt is pending bone marrow bx for w/u for anti-phospholipid and lupus anticoagulant. Pt also pending kidney bx for possible hydralazine induced lupus, was prescribed prednisone however pt denies taking it. No evidence of AI here. Pt is medically stable, plans to resume Lovenox 4/2, however pt refusing Lovenox, states he was on Eliquis and was temporary placed on Lovenox for his BM bx, and also does not want to be on Eliquis either.       Renal consult for CKD stage 4 and Hyperkalemia  currently on lokelma 10grams po q8h  per chart pts cr around 3mg/dl  ?hx of hydralazine induced aye  pt sees Nephro at [facility]  Currently feels well  Eating potatoes   denies any nausea or vomiting    PAST MEDICAL & SURGICAL HISTORY:  HTN (hypertension)      No Known Allergies    Home Medications Reviewed  Hospital Medications:   MEDICATIONS  (STANDING):  amLODIPine   Tablet 10 milliGRAM(s) Oral daily  amoxicillin  875 milliGRAM(s)/clavulanate 1 Tablet(s) Oral two times a day  chlorhexidine 4% Liquid 1 Application(s) Topical <User Schedule>  enoxaparin Injectable 80 milliGRAM(s) SubCutaneous daily  labetalol 75 milliGRAM(s) Oral every 8 hours  levETIRAcetam  Solution 750 milliGRAM(s) Oral two times a day  polyethylene glycol 3350 17 Gram(s) Oral daily  senna 2 Tablet(s) Oral at bedtime  sodium zirconium cyclosilicate 10 Gram(s) Oral every 8 hours    SOCIAL HISTORY:  Denies ETOh,Smoking,   FAMILY HISTORY:    REVIEW OF SYSTEMS:  CONSTITUTIONAL: No weakness, fevers or chills  EYES/ENT: No visual changes;  No vertigo or throat pain   NECK: No pain or stiffness  RESPIRATORY: No cough, wheezing, hemoptysis; No shortness of breath  CARDIOVASCULAR: No chest pain or palpitations.  GASTROINTESTINAL: No abdominal or epigastric pain. No nausea, vomiting, or hematemesis; No diarrhea or constipation. No melena or hematochezia.  GENITOURINARY: No dysuria, frequency, foamy urine, urinary urgency, incontinence or hematuria  NEUROLOGICAL: No numbness or weakness  SKIN: No itching, burning, rashes, or lesions   VASCULAR: No bilateral lower extremity edema.   All other review of systems is negative unless indicated above.    VITALS:  T(F): 97.6 (04-03-21 @ 09:53), Max: 98.6 (04-02-21 @ 14:48)  HR: 78 (04-03-21 @ 09:53)  BP: 137/88 (04-03-21 @ 09:53)  RR: 18 (04-03-21 @ 09:53)  SpO2: 100% (04-03-21 @ 09:53)  Wt(kg): --    04-02 @ 07:01  -  04-03 @ 07:00  --------------------------------------------------------  IN: 720 mL / OUT: 1700 mL / NET: -980 mL    04-03 @ 07:01  -  04-03 @ 13:36  --------------------------------------------------------  IN: 0 mL / OUT: 200 mL / NET: -200 mL        PHYSICAL EXAM:  Constitutional: NAD  HEENT: + gauze around neck  Neck: No JVD  Respiratory: CTAB, no wheezes, rales or rhonchi  Cardiovascular: S1, S2, RRR  Gastrointestinal: BS+, soft, NT/ND  Extremities: + peripheral edema  Neurological: A/O x 3, no focal deficits  Psychiatric: Normal mood, normal affect  : No CVA tenderness. No lester.   Skin: No rashes    LABS:  04-02    134<L>  |  98  |  64<H>  ----------------------------<  223<H>  3.9   |  26  |  3.21<H>    Ca    8.2<L>      02 Apr 2021 07:51  Phos  4.6     04-02  Mg     2.2     04-02      Creatinine Trend: 3.21 <--, 3.02 <--, 2.69 <--, 2.77 <--, 2.72 <--, 2.80 <--, 2.50 <--, 2.80 <--, 2.72 <--, 2.85 <--                        9.8    5.90  )-----------( 134      ( 02 Apr 2021 07:51 )             31.2     Urine Studies:      RADIOLOGY & ADDITIONAL STUDIES:

## 2021-04-03 NOTE — CONSULT NOTE ADULT - PROBLEM SELECTOR RECOMMENDATION 9
Per Chart hx of CKD stage 4--> b/l cr around 3mg/dl;  Cr currently is 3.2mg/dl ( around baseline --> fluctuations expected)  Pt to see outpt nephro next week  No need for further inpt work up as of now  outpt follow up next week
1. Discussed findings with Resident Gautam and asked if she would need to rescope patient. Plan was then decided that patient would be evaluated by OMFS and if he worsened and was in respiratory distress that the ED should intubate the patient using a nasal fiber optic scope and there would be no further ENT intervention at this time.   will discuss case and plan with Dr. Cabrera ENT attending on call.

## 2021-04-03 NOTE — PROGRESS NOTE ADULT - ASSESSMENT
61 M with HTN, PE on Lovenox admitted to [facility] 3/25 s/p s/p seizure c/b lingual hematoma c/b airway obstruction   s/p emergency tracheostomy followed by exploration and evacuation of tongue hematoma with repair of tongue lacerations now transferred to medicine for further management.

## 2021-04-03 NOTE — DISCHARGE NOTE NURSING/CASE MANAGEMENT/SOCIAL WORK - PATIENT PORTAL LINK FT
You can access the FollowMyHealth Patient Portal offered by [facility] by registering at the following website: [email or web address]. By joining [email or web address]’s FollowMyHealth portal, you will also be able to view your health information using other applications (apps) compatible with our system.

## 2021-04-03 NOTE — PROGRESS NOTE ADULT - PROVIDER SPECIALTY LIST ADULT
OMFS
Dental
OMFS
SICU
OMFS
OMFS
SICU
SICU
Hospitalist
Hospitalist

## 2021-04-03 NOTE — PROGRESS NOTE ADULT - PROBLEM SELECTOR PLAN 7
pt was on lovenox as outpt, discussed with OMFS  on 4/2. ok for restarting lovenox, d/c on lovenox renal dose  Plan discussed with ACP

## 2021-04-03 NOTE — PROGRESS NOTE ADULT - SUBJECTIVE AND OBJECTIVE BOX
Patient is a 61y old  Male who presents with a chief complaint of Tongue laceration, uncontrolled heme, hematoma, airway obstruction (03 Apr 2021 13:35)      SUBJECTIVE / OVERNIGHT EVENTS: Pt seen and examined at 12N, no overnight events, pt denies any sob, speaking in full sentences, no bleeding reported. Pt had refused to take lovenox yesterday, spoke with him and now is agreeable, and is willing to f/u with his Hemonc and nephrology as outpt. No other complaints.        MEDICATIONS  (STANDING):  amLODIPine   Tablet 10 milliGRAM(s) Oral daily  amoxicillin  875 milliGRAM(s)/clavulanate 1 Tablet(s) Oral two times a day  chlorhexidine 4% Liquid 1 Application(s) Topical <User Schedule>  enoxaparin Injectable 80 milliGRAM(s) SubCutaneous daily  labetalol 75 milliGRAM(s) Oral every 8 hours  levETIRAcetam  Solution 750 milliGRAM(s) Oral two times a day  polyethylene glycol 3350 17 Gram(s) Oral daily  senna 2 Tablet(s) Oral at bedtime  sodium zirconium cyclosilicate 10 Gram(s) Oral every 8 hours    MEDICATIONS  (PRN):  acetaminophen   Tablet .. 975 milliGRAM(s) Oral every 6 hours PRN Temp greater or equal to 38C (100.4F), Mild Pain (1 - 3)  bisacodyl Suppository 10 milliGRAM(s) Rectal daily PRN Constipation  oxyCODONE    IR 5 milliGRAM(s) Oral every 4 hours PRN Moderate Pain (4 - 6)  oxyCODONE    IR 10 milliGRAM(s) Oral every 4 hours PRN Severe Pain (7 - 10)      Vital Signs Last 24 Hrs  T(C): 36.4 (03 Apr 2021 09:53), Max: 37 (02 Apr 2021 14:48)  T(F): 97.6 (03 Apr 2021 09:53), Max: 98.6 (02 Apr 2021 14:48)  HR: 78 (03 Apr 2021 09:53) (78 - 85)  BP: 137/88 (03 Apr 2021 09:53) (103/65 - 137/88)  BP(mean): --  RR: 18 (03 Apr 2021 09:53) (18 - 18)  SpO2: 100% (03 Apr 2021 09:53) (97% - 100%)  CAPILLARY BLOOD GLUCOSE        I&O's Summary    02 Apr 2021 07:01  -  03 Apr 2021 07:00  --------------------------------------------------------  IN: 720 mL / OUT: 1700 mL / NET: -980 mL    03 Apr 2021 07:01  -  03 Apr 2021 13:49  --------------------------------------------------------  IN: 0 mL / OUT: 200 mL / NET: -200 mL        PHYSICAL EXAM:  GENERAL: NAD, well-developed  HEENT: + neck dressing no bleeding noted  CHEST/LUNG: Clear to auscultation bilaterally; No wheeze  HEART: Regular rate and rhythm; No murmurs  ABDOMEN: Soft, Nontender, Nondistended  EXTREMITIES: no LE edema  PSYCH: Calm  NEUROLOGY: AAOx3  SKIN: No rashes or lesions    LABS:                        9.8    5.90  )-----------( 134      ( 02 Apr 2021 07:51 )             31.2     04-02    134<L>  |  98  |  64<H>  ----------------------------<  223<H>  3.9   |  26  |  3.21<H>    Ca    8.2<L>      02 Apr 2021 07:51  Phos  4.6     04-02  Mg     2.2     04-02                RADIOLOGY & ADDITIONAL TESTS:    Imaging Personally Reviewed:    Consultant(s) Notes Reviewed:      Care Discussed with Consultants/Other Providers:

## 2021-04-16 ENCOUNTER — INPATIENT (INPATIENT)
Facility: HOSPITAL | Age: 61
LOS: 12 days | Discharge: HOME | End: 2021-04-29
Attending: THORACIC SURGERY (CARDIOTHORACIC VASCULAR SURGERY) | Admitting: THORACIC SURGERY (CARDIOTHORACIC VASCULAR SURGERY)
Payer: MEDICAID

## 2021-04-16 VITALS
RESPIRATION RATE: 18 BRPM | TEMPERATURE: 98 F | DIASTOLIC BLOOD PRESSURE: 80 MMHG | SYSTOLIC BLOOD PRESSURE: 122 MMHG | HEART RATE: 75 BPM | OXYGEN SATURATION: 100 % | HEIGHT: 63 IN

## 2021-04-16 DIAGNOSIS — J95.09 OTHER TRACHEOSTOMY COMPLICATION: ICD-10-CM

## 2021-04-16 DIAGNOSIS — I26.99 OTHER PULMONARY EMBOLISM WITHOUT ACUTE COR PULMONALE: ICD-10-CM

## 2021-04-16 DIAGNOSIS — Z96.651 PRESENCE OF RIGHT ARTIFICIAL KNEE JOINT: Chronic | ICD-10-CM

## 2021-04-16 DIAGNOSIS — J95.03 MALFUNCTION OF TRACHEOSTOMY STOMA: ICD-10-CM

## 2021-04-16 PROBLEM — I10 ESSENTIAL (PRIMARY) HYPERTENSION: Chronic | Status: ACTIVE | Noted: 2021-03-25

## 2021-04-16 LAB
ALBUMIN SERPL ELPH-MCNC: 3.7 G/DL — SIGNIFICANT CHANGE UP (ref 3.3–5)
ALP SERPL-CCNC: 139 U/L — HIGH (ref 40–120)
ALT FLD-CCNC: 17 U/L — SIGNIFICANT CHANGE UP (ref 4–41)
ANION GAP SERPL CALC-SCNC: 9 MMOL/L — SIGNIFICANT CHANGE UP (ref 7–14)
APTT BLD: 45.3 SEC — HIGH (ref 27–36.3)
APTT BLD: 89.9 SEC — HIGH (ref 27–36.3)
AST SERPL-CCNC: 22 U/L — SIGNIFICANT CHANGE UP (ref 4–40)
BASOPHILS # BLD AUTO: 0.03 K/UL — SIGNIFICANT CHANGE UP (ref 0–0.2)
BASOPHILS NFR BLD AUTO: 0.5 % — SIGNIFICANT CHANGE UP (ref 0–2)
BILIRUB SERPL-MCNC: 0.2 MG/DL — SIGNIFICANT CHANGE UP (ref 0.2–1.2)
BLD GP AB SCN SERPL QL: NEGATIVE — SIGNIFICANT CHANGE UP
BLOOD GAS ARTERIAL - LYTES,HGB,ICA,LACT RESULT: SIGNIFICANT CHANGE UP
BLOOD GAS VENOUS COMPREHENSIVE RESULT: SIGNIFICANT CHANGE UP
BUN SERPL-MCNC: 22 MG/DL — SIGNIFICANT CHANGE UP (ref 7–23)
CALCIUM SERPL-MCNC: 9.2 MG/DL — SIGNIFICANT CHANGE UP (ref 8.4–10.5)
CHLORIDE SERPL-SCNC: 108 MMOL/L — HIGH (ref 98–107)
CO2 SERPL-SCNC: 20 MMOL/L — LOW (ref 22–31)
CREAT SERPL-MCNC: 2.94 MG/DL — HIGH (ref 0.5–1.3)
EOSINOPHIL # BLD AUTO: 0.15 K/UL — SIGNIFICANT CHANGE UP (ref 0–0.5)
EOSINOPHIL NFR BLD AUTO: 2.6 % — SIGNIFICANT CHANGE UP (ref 0–6)
GLUCOSE SERPL-MCNC: 94 MG/DL — SIGNIFICANT CHANGE UP (ref 70–99)
HCT VFR BLD CALC: 28.4 % — LOW (ref 39–50)
HGB BLD-MCNC: 9 G/DL — LOW (ref 13–17)
IANC: 3.35 K/UL — SIGNIFICANT CHANGE UP (ref 1.5–8.5)
IMM GRANULOCYTES NFR BLD AUTO: 0.9 % — SIGNIFICANT CHANGE UP (ref 0–1.5)
INR BLD: 1.09 RATIO — SIGNIFICANT CHANGE UP (ref 0.88–1.16)
LYMPHOCYTES # BLD AUTO: 1.65 K/UL — SIGNIFICANT CHANGE UP (ref 1–3.3)
LYMPHOCYTES # BLD AUTO: 28.3 % — SIGNIFICANT CHANGE UP (ref 13–44)
MCHC RBC-ENTMCNC: 29.9 PG — SIGNIFICANT CHANGE UP (ref 27–34)
MCHC RBC-ENTMCNC: 31.7 GM/DL — LOW (ref 32–36)
MCV RBC AUTO: 94.4 FL — SIGNIFICANT CHANGE UP (ref 80–100)
MONOCYTES # BLD AUTO: 0.61 K/UL — SIGNIFICANT CHANGE UP (ref 0–0.9)
MONOCYTES NFR BLD AUTO: 10.4 % — SIGNIFICANT CHANGE UP (ref 2–14)
NEUTROPHILS # BLD AUTO: 3.35 K/UL — SIGNIFICANT CHANGE UP (ref 1.8–7.4)
NEUTROPHILS NFR BLD AUTO: 57.3 % — SIGNIFICANT CHANGE UP (ref 43–77)
NRBC # BLD: 0 /100 WBCS — SIGNIFICANT CHANGE UP
NRBC # FLD: 0 K/UL — SIGNIFICANT CHANGE UP
PLATELET # BLD AUTO: 175 K/UL — SIGNIFICANT CHANGE UP (ref 150–400)
POTASSIUM SERPL-MCNC: 4.7 MMOL/L — SIGNIFICANT CHANGE UP (ref 3.5–5.3)
POTASSIUM SERPL-SCNC: 4.7 MMOL/L — SIGNIFICANT CHANGE UP (ref 3.5–5.3)
PROT SERPL-MCNC: 6.9 G/DL — SIGNIFICANT CHANGE UP (ref 6–8.3)
PROTHROM AB SERPL-ACNC: 12.5 SEC — SIGNIFICANT CHANGE UP (ref 10.6–13.6)
RBC # BLD: 3.01 M/UL — LOW (ref 4.2–5.8)
RBC # FLD: 13.8 % — SIGNIFICANT CHANGE UP (ref 10.3–14.5)
RH IG SCN BLD-IMP: POSITIVE — SIGNIFICANT CHANGE UP
SARS-COV-2 RNA SPEC QL NAA+PROBE: SIGNIFICANT CHANGE UP
SODIUM SERPL-SCNC: 137 MMOL/L — SIGNIFICANT CHANGE UP (ref 135–145)
WBC # BLD: 5.84 K/UL — SIGNIFICANT CHANGE UP (ref 3.8–10.5)
WBC # FLD AUTO: 5.84 K/UL — SIGNIFICANT CHANGE UP (ref 3.8–10.5)

## 2021-04-16 PROCEDURE — 99285 EMERGENCY DEPT VISIT HI MDM: CPT

## 2021-04-16 PROCEDURE — 93306 TTE W/DOPPLER COMPLETE: CPT | Mod: 26

## 2021-04-16 PROCEDURE — 70491 CT SOFT TISSUE NECK W/DYE: CPT | Mod: 26

## 2021-04-16 PROCEDURE — 99291 CRITICAL CARE FIRST HOUR: CPT

## 2021-04-16 RX ORDER — SODIUM CHLORIDE 9 MG/ML
1000 INJECTION, SOLUTION INTRAVENOUS
Refills: 0 | Status: DISCONTINUED | OUTPATIENT
Start: 2021-04-16 | End: 2021-04-17

## 2021-04-16 RX ORDER — ACETAMINOPHEN 500 MG
1000 TABLET ORAL ONCE
Refills: 0 | Status: DISCONTINUED | OUTPATIENT
Start: 2021-04-16 | End: 2021-04-20

## 2021-04-16 RX ORDER — LABETALOL HCL 100 MG
100 TABLET ORAL EVERY 8 HOURS
Refills: 0 | Status: DISCONTINUED | OUTPATIENT
Start: 2021-04-16 | End: 2021-04-16

## 2021-04-16 RX ORDER — EPINEPHRINE 11.25MG/ML
5 SOLUTION, NON-ORAL INHALATION ONCE
Refills: 0 | Status: COMPLETED | OUTPATIENT
Start: 2021-04-16 | End: 2021-04-16

## 2021-04-16 RX ORDER — HYDRALAZINE HCL 50 MG
10 TABLET ORAL EVERY 4 HOURS
Refills: 0 | Status: DISCONTINUED | OUTPATIENT
Start: 2021-04-16 | End: 2021-04-29

## 2021-04-16 RX ORDER — LEVETIRACETAM 250 MG/1
750 TABLET, FILM COATED ORAL
Refills: 0 | Status: DISCONTINUED | OUTPATIENT
Start: 2021-04-16 | End: 2021-04-16

## 2021-04-16 RX ORDER — LEVETIRACETAM 250 MG/1
750 TABLET, FILM COATED ORAL EVERY 12 HOURS
Refills: 0 | Status: DISCONTINUED | OUTPATIENT
Start: 2021-04-16 | End: 2021-04-20

## 2021-04-16 RX ORDER — DEXAMETHASONE 0.5 MG/5ML
10 ELIXIR ORAL EVERY 8 HOURS
Refills: 0 | Status: DISCONTINUED | OUTPATIENT
Start: 2021-04-16 | End: 2021-04-16

## 2021-04-16 RX ORDER — ACETAMINOPHEN 500 MG
1000 TABLET ORAL ONCE
Refills: 0 | Status: COMPLETED | OUTPATIENT
Start: 2021-04-16 | End: 2021-04-17

## 2021-04-16 RX ORDER — HEPARIN SODIUM 5000 [USP'U]/ML
10 INJECTION INTRAVENOUS; SUBCUTANEOUS
Qty: 25000 | Refills: 0 | Status: DISCONTINUED | OUTPATIENT
Start: 2021-04-16 | End: 2021-04-19

## 2021-04-16 RX ORDER — SODIUM CHLORIDE 9 MG/ML
1000 INJECTION, SOLUTION INTRAVENOUS ONCE
Refills: 0 | Status: COMPLETED | OUTPATIENT
Start: 2021-04-16 | End: 2021-04-16

## 2021-04-16 RX ORDER — PANTOPRAZOLE SODIUM 20 MG/1
40 TABLET, DELAYED RELEASE ORAL
Refills: 0 | Status: DISCONTINUED | OUTPATIENT
Start: 2021-04-16 | End: 2021-04-16

## 2021-04-16 RX ORDER — DEXAMETHASONE 0.5 MG/5ML
10 ELIXIR ORAL EVERY 8 HOURS
Refills: 0 | Status: COMPLETED | OUTPATIENT
Start: 2021-04-16 | End: 2021-04-16

## 2021-04-16 RX ORDER — EPINEPHRINE 11.25MG/ML
0.5 SOLUTION, NON-ORAL INHALATION EVERY 6 HOURS
Refills: 0 | Status: DISCONTINUED | OUTPATIENT
Start: 2021-04-16 | End: 2021-04-19

## 2021-04-16 RX ORDER — PANTOPRAZOLE SODIUM 20 MG/1
40 TABLET, DELAYED RELEASE ORAL DAILY
Refills: 0 | Status: DISCONTINUED | OUTPATIENT
Start: 2021-04-16 | End: 2021-04-20

## 2021-04-16 RX ORDER — AMLODIPINE BESYLATE 2.5 MG/1
10 TABLET ORAL DAILY
Refills: 0 | Status: DISCONTINUED | OUTPATIENT
Start: 2021-04-16 | End: 2021-04-16

## 2021-04-16 RX ADMIN — Medication 0.5 MILLILITER(S): at 16:33

## 2021-04-16 RX ADMIN — HEPARIN SODIUM 10 UNIT(S)/HR: 5000 INJECTION INTRAVENOUS; SUBCUTANEOUS at 13:41

## 2021-04-16 RX ADMIN — Medication 102 MILLIGRAM(S): at 13:50

## 2021-04-16 RX ADMIN — LEVETIRACETAM 400 MILLIGRAM(S): 250 TABLET, FILM COATED ORAL at 14:21

## 2021-04-16 RX ADMIN — PANTOPRAZOLE SODIUM 40 MILLIGRAM(S): 20 TABLET, DELAYED RELEASE ORAL at 13:52

## 2021-04-16 RX ADMIN — Medication 10 MILLIGRAM(S): at 09:45

## 2021-04-16 RX ADMIN — Medication 5 MILLILITER(S): at 11:14

## 2021-04-16 RX ADMIN — Medication 102 MILLIGRAM(S): at 22:14

## 2021-04-16 RX ADMIN — Medication 102 MILLIGRAM(S): at 08:00

## 2021-04-16 RX ADMIN — SODIUM CHLORIDE 30 MILLILITER(S): 9 INJECTION, SOLUTION INTRAVENOUS at 13:41

## 2021-04-16 RX ADMIN — Medication 0.5 MILLILITER(S): at 22:40

## 2021-04-16 RX ADMIN — Medication 400 MILLIGRAM(S): at 23:00

## 2021-04-16 RX ADMIN — SODIUM CHLORIDE 1000 MILLILITER(S): 9 INJECTION, SOLUTION INTRAVENOUS at 09:45

## 2021-04-16 RX ADMIN — SODIUM CHLORIDE 1000 MILLILITER(S): 9 INJECTION, SOLUTION INTRAVENOUS at 07:58

## 2021-04-16 NOTE — CONSULT NOTE ADULT - ASSESSMENT
Tracheal stenosis  planned for surgery   Oxygent   fu with CTS    HTN  cont current meds for now   will amend meds as needed    PE  on a/c   consider lower ext venous doppler to assess any residual clot if a/c need to be interrupted   obtain echo     CKD  monitor lytes , crt     Pre-Operative Cardiac Risk Stratification and Optimization   Based on current ACC/AHA guidelines, patient history and physical exam, the patient is considered to have elevated risk for this time sensitive urgent surgery  obtain echo to evaluate LV / RV function

## 2021-04-16 NOTE — CONSULT NOTE ADULT - SUBJECTIVE AND OBJECTIVE BOX
HPI:  Patient is a 61M PMhx of HTN, PE on lovenox, and recent seizure s/p emergency cricothyrotomy on 03/25/2021 2/2 lingual hematoma and inability to protect airway. Discharged on 4/3/21, decannulated. Today patient p/w difficulty breathing, SOB for the past "couple weeks" per patient with gradual worsening. On room air in ER. Placed on BiPAP to decrease work of breathing.      Physical Exam  T(C): 36.6 (04-16-21 @ 03:10), Max: 36.6 (04-16-21 @ 03:10)  HR: 78 (04-16-21 @ 06:49) (59 - 78)  BP: 159/89 (04-16-21 @ 05:44) (122/80 - 159/89)  RR: 16 (04-16-21 @ 05:44) (16 - 18)  SpO2: 100% (04-16-21 @ 06:49) (100% - 100%)  General: NAD, A+Ox3  Inspiratory stridor +   Face:  Symmetric without masses or lesions  OU: EOMI  Nose: nasal cavity clear bilaterally, inferior turbinates normal, mucosa normal without crusting or bleeding  OC/OP: tongue normal, floor of mouth wnl, no masses or lesions, OP clear  Neck: soft/flat, well healed scar with some granulation tissue from previous cricothyrotomy.   CNII-XII intact    Procedure: Flexible laryngoscopy    Pre-procedure diagnosis/Indication for procedure: To evaluate larynx    Anesthesia: None    Description:    A flexible endoscope was used to examine the left and right nasal cavities, posterior oropharynx, hypopharynx, and larynx. The nasal valve areas were examined for abnormalities or collapse. The inferior and middle turbinates were evaluated. The middle and superior meatuses, the sphenoethmoid recesses, and the nasopharynx were examined and inspected for mucopurulence, polyps, and nasal masses. The oropharynx, tongue base/vallecula, epiglottis, aryepiglottic folds, arytenoids, vocal cords, hypopharynx were also inspected for swelling, inflammation, or dysfunction. The patient tolerated the procedure without complications.    Findings:    NP wnl  BOT/vallecula normal  Epiglottis sharp  AE folds nonedematous  Arytenoids mobile  Vocal folds mobile bilaterally  No masses or lesions visualized in post cricoid space or pyriform sinuses bilaterally    Subglottis with narrowing     --------------------------------------------------------------      A/P:  61M multiple medical comorbidities s/p cricothyrotomy 3/25, decannulated. Now p/w shortness of breath x a few weeks. Scope exam performed by attending at bedside, showed possible subglottic narrowing.     - please keep NPO  - decadron q8 x 3   - follow up CT neck  - COVID negative  - continuous pulse oximetry  - please call/page ENT if exam changes.     ---------------------------------------------------------------

## 2021-04-16 NOTE — ED PROVIDER NOTE - CLINICAL SUMMARY MEDICAL DECISION MAKING FREE TEXT BOX
LWBS PGY3/MD Adair. 60 yo M, with recent seizure, p/w difficulty breathing, likely from upper airway stenosis above vocal cords given pt is making full sentences. needs airway eval, ENT consult and will speak with OMF, for possible surgical management. Pt is placed on BiPAP for upper airway obstruction, to help work of breathing. PGY3/MD Adair. 62 yo M, with recent seizure, p/w difficulty breathing, likely from upper airway stenosis below vocal cords given pt is making full sentences, and no drooling or muffle voice. needs airway eval, ENT consult and will speak with OMF, for possible surgical management to post-tracheostomy complication. Pt is placed on BiPAP for upper airway obstruction, to help the work of breathing.

## 2021-04-16 NOTE — CONSULT NOTE ADULT - ASSESSMENT
Patient is a 62 yo M, with PMH of HTN, b/l TKR, and COVID 2-3 months ago, not hospitalized  CKD, s/p  PE on lovenox, and recent seizure( unknown etiology) s/p emergency cric on 03/25/2021 from tongue injury lingual hematoma 2/2 to seizure blocking airway (discharged on 4/3/2021), now p/w difficulty breathing, had N/V x1 yesterday and abd pain-now resolved.  Nocturnal, worsening, since discharge. Pt feels he cannot breath especially lying down, denies fever, cough, or hematemesis. No more seizure at home, denies use of EtOH. Pt is placed on BiPAP for upper airway obstruction in ED, to help work of breathing. admitted to CTI for close monitoring

## 2021-04-16 NOTE — ED ADULT TRIAGE NOTE - CHIEF COMPLAINT QUOTE
SOB worse with exertion & lying flat x a few weeks, pt was emergency cric a few weeks ago 2/2 oral swelling from biting his tongue 2/2 seizure - pt moderately edematous, small bandages on cric insertion - site looks normal, EMS reports pitchy stridor from the neck when pt exerting himself

## 2021-04-16 NOTE — ED PROVIDER NOTE - PHYSICAL EXAMINATION
PGY3/MD Adair.   VITALS: reviewed  GEN: No apparent distress, A & O x 4  HEAD/EYES: NC/AT, anicteric sclerae, no conjunctival pallor  ENT: mucus membranes moist, oropharynx WNL, + surgical wound lower trachea, above clavicle, with well healing no blood, no JVD, neck is supple  RESP: lungs CTA with equal breath sounds bilaterally, chest wall nontender and atraumatic  CV: heart with reg rhythm S1, S2, no murmur; distal pulses intact and symmetric bilaterally  ABDOMEN: normoactive bowel sounds, soft, nondistended, nontender  MSK: extremities atraumatic and nontender, no edema, no asymmetry.   SKIN: warm, dry, no rash, no bruising, no cyanosis. color appropriate for ethnicity  NEURO: alert, mentating appropriately, no facial asymmetry.   PSYCH: Affect appropriate

## 2021-04-16 NOTE — H&P ADULT - NSHPREVIEWOFSYSTEMS_GEN_ALL_CORE
REVIEW OF SYSTEMS  General:No Weight change/ Fatigue/ HA/Dizzy	  Skin/Breast: No Rashes/ Lesions/ Masses	  Ophthalmologic: No Blurry vision/ Glaucoma/ Blindness	  ENMT: + inspiratory stridor	  Respiratory and Thorax: SOB, worsening last 2 weeks, especially at night when recliningh	  Cardiovascular: No Chest pain/ Palpitations/ Diaphoresis	  Gastrointestinal: No Nausea/ Vomiting/ Constipation/ Appetite Change	  Genitourinary: No Heamturia/ Dysuria/ Frequency change/ Impotence, ckd	  Musculoskeletal: No Pain/ Weakness/ Claudication   - B/L le EDEMA, LEFT> RIGHT	  Neurological:  SEIZURE 3/25 C/B TONGUE HEMATOMA AND CRICOTHYROIDOTOMY, D/C 4/3/21, NO TIA/CVA/ Parastesias  Psychiatric: No Dementia/ Depression/ SI/HI	  Hematology/Lymphatics: No hx of bleeding/B/L le EDEMA	  Endocrine:	No Hyperglycemia/ Hypoglycemia  Allergic/Immunologic:	 No Anaphylaxis/ Intolerance/ Recent illnesses REVIEW OF SYSTEMS  General:No Weight change/ Fatigue/ HA/Dizzy	  Skin/Breast: No Rashes/ Lesions/ Masses	  Ophthalmologic: No Blurry vision/ Glaucoma/ Blindness	  ENMT: + inspiratory stridor	  Respiratory and Thorax: SOB, worsening last 2 weeks, especially at night when recliningh	  Cardiovascular: No Chest pain/ Palpitations/ Diaphoresis	  Gastrointestinal: n/v x1 yesterday, abd pain yesterday -resolved, Constipation/ Appetite Change	  Genitourinary: No Heamturia/ Dysuria/ Frequency change/ Impotence, ckd	  Musculoskeletal: No Pain/ Weakness/ Claudication   - B/L le EDEMA, LEFT> RIGHT	  Neurological:  SEIZURE 3/25 C/B TONGUE HEMATOMA AND CRICOTHYROIDOTOMY, D/C 4/3/21, NO TIA/CVA/ Parastesias  Psychiatric: No Dementia/ Depression/ SI/HI	  Hematology/Lymphatics: No hx of bleeding/B/L le EDEMA	  Endocrine:	No Hyperglycemia/ Hypoglycemia  Allergic/Immunologic:	 No Anaphylaxis/ Intolerance/ Recent illnesses
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
## 2021-04-16 NOTE — H&P ADULT - ASSESSMENT
62 yo M, with PMH of HTN, b/l TKR, and COVID 2-3 months ago, not hospitilized,  CKD, s/p  PE on lovenox, and recent seizure( unknown etiology) s/p emergency cric on 03/25/2021 from tongue injury lingual hematoma 2/2 to seizure blocking airway (discharged on 4/3/2021), now p/w difficulty breathing,had N/V x1 yesterday and abd pain-now resolved.  Nocturnal, worsening, since discharge. Pt feels he cannot breath especially lying down, denies fever, cough, or hematemesis. No more seizure at home, denies use of EtOH. Pt is placed on BiPAP for upper airway obstruction in ED, to help work of breathing.    Films reviewed and case d/w Dr Allen. Pt with subglottic tracheal stenosis measure 4.5x7mm. Admit to CTI for observation, racemic epi, total 3 doses decadron, heliox. Cont Labetalol, Norvasc and Keprra. Called Dr Lewis for medical consult and Dr Mayen for cardiology clearance. Scheduled for Monday for FB, possible laser, cryo, dilation.

## 2021-04-16 NOTE — H&P ADULT - NSHPSOCIALHISTORY_GEN_ALL_CORE
(+) etOH once weekly,  tobacco-quit 6 years ago, smoked socially for 10 years, (-) recreational drug  retired, lives with wife, children are in [facility]

## 2021-04-16 NOTE — ED PROVIDER NOTE - OBJECTIVE STATEMENT
PGY3/MD Adair. 60 y/o male pmh htn, PE on lovenox recent admission for tongue injury, laceration repair with tracheostomy (by Dr. Jh Sparks, [facility]), 2/2 seizure, discharged on 4/3, p/w sob. Nocturnal, worsening, since discharge. Pt feels he cannot breath especially lying down, denies fever, cough, or hematemesis. No more seizure at home, denies use of EtOH.

## 2021-04-16 NOTE — ED PROVIDER NOTE - PROGRESS NOTE DETAILS
PGY3/MD Adair. F Dr. Terrell, called, communicated. PGY3/MD Adair. upper airway, airway emergency, needs eval by ENT scope, spoke with Thalia Jha, consultation has been placed. Bobbi, PGY-2: ENT evaluated patient, requesting CT with fine cuts through larynx/trachea (discussed this with rads tech). Decadron q10 ordered, GFR <30, but ENT requesting contrast for possible procedure vs stent/surgery. Will order fluids as well. Bobbi, PGY-2: CTS eval patient, send to CTICU

## 2021-04-16 NOTE — ED PROVIDER NOTE - ATTENDING CONTRIBUTION TO CARE
MD Calderon:  I performed a face to face bedside interview with patient regarding history of present illness, review of symptoms and past medical history. I completed an independent physical exam(documented below).  I have discussed patient's plan of care with resident.   I agree with note as stated above, having amended the EMR as needed to reflect my findings. I have discussed the assessment and plan of care.  This includes during the time I functioned as the attending physician for this patient.  PE:  Gen: Alert, in NAD  Head: NC, AT,  EOMI, normal lids/conjunctiva  ENT:  normal hearing, patent oropharynx without erythema/exudate, tolerating secretions  Neck: +supple, no tenderness/meningismus/JVD, cric lower anterior neck is c/d/i w/ 2 steri strips on each pole of incision, nttp  Chest: no chest wall tenderness, equal chest rise  Pulm: no muffled voice but + high pitch stridor. tachypneic, no wheeze/retractions  CV: RRR, no M/R/G, +dist pulses  Abd: +BS, soft, NT/ND  Rectal: deferred  Mskel: no edema/erythema/cyanosis  Skin: no rash  Neuro: AAOx3  MDM:   bipap, labs, ENT and OMFS consult for upper airway eval.

## 2021-04-16 NOTE — PROGRESS NOTE ADULT - SUBJECTIVE AND OBJECTIVE BOX
PROCEDURE:       ISSUES:       INTERVAL EVENTS:          HISTORY:   Patient reports dyspnea and stridor. Denies chest pain, cough, pleuritic pain, fever. Denies abdominal pain, nausea, or vomiting.    PHYSICAL EXAM:   Gen: Comfortable, No acute distress  Eyes: Sclera white, Conjunctiva normal, Eyelids normal, Pupils symmetrical   ENT: Mucous membranes moist,  ,  ,    Neck: Trachea midline,  ,  ,  ,  ,    CV: Rate regular, Rhythm regular,  ,  ,    Resp: Breath sounds clear, No accessory muscles use,  ,    Abd: Soft, Non-distended, Non-tender, Bowel sounds normal,  ,  ,    Skin: Warm, No peripheral edema of lower extremities,  ,    : No lester  Neuro: Moving all 4 extremities,    Psych: A&Ox3      ASSESSMENT AND PLAN:     NEURO:  No pain issues.     Seizure disorder - stable. continue antiepileptics                     RESPIRATORY:  Hypoxia - Wean nasal cannula for goal O2sat above 92. Obtain CXR. Incentive spirometry. Chest PT and frequent suctioning. Continue bronchodilators. OOB to chair & ambulate w/ assistance. Continuous pulse oximetry for support & to prevent decompensation.                              Pulmonary Embolism - stable. heparin gtt         CARDIOVASCULAR:  Hemodynamically stable - Not on pressors. Continue hemodynamic monitoring.  HTN - stable. Hold home antihypertensives for now.                                   RENAL:  CKD – Stable. Renally dose medications. Monitor for hyperkalemia and uremia. Avoid nephrotoxic medications. Monitor IOs and electrolytes.         GASTROINTESTINAL:  GI prophylaxis not indicated  Zofran and Reglan IV PRN for nausea  Regular consistency diet            HEMATOLOGIC:  No signs of active bleeding. Monitor Hgb in CBC in AM  On therapeutic anticoagulation.         INFECTIOUS DISEASE:  No signs of active infection. Will monitor for fever and leukocytosis.        ENDOCRINE:  Stable – Monitor glucose fingersticks for goal 120-180.            Pertinent clinical, laboratory, radiographic, hemodynamic, echocardiographic, respiratory data, microbiologic data and chart were reviewed by myself and analyzed frequently throughout the course of the day and night by myself.    Plan discussed at length with the CTICU staff and Attending CT Surgeon.     Patient's status was discussed with patient at bedside.           ________________________________________________

## 2021-04-16 NOTE — ED ADULT NURSE NOTE - OBJECTIVE STATEMENT
Pt presents to  6 AO4 ambulatory w steady gait complaining of diff breathing. Only reported PMH HTN. On 3/25 pt had seizure, bit tongue requiring stitches,  which then became swollen, requiring emergency crich. Pt then discharged. Reporting since DC has been experiencing worsening sob and diff breathing, specifically while lying down. Now reports inability to sleep d/t breathing. Presents with audible stridor respirations. MD Borrero at bedside evaluating. 18g IV placed to L AC, labs drawn and sent. Placed on cont pulse Ox satting 100%. Resp are even but slightly labored.

## 2021-04-16 NOTE — CONSULT NOTE ADULT - PROBLEM SELECTOR RECOMMENDATION 9
CTS eval appreciated  monitor closelyO2 supplement   aspiration precautions  Echo noted  patient is medically optimized for OR on monday

## 2021-04-16 NOTE — PROGRESS NOTE ADULT - SUBJECTIVE AND OBJECTIVE BOX
ISSUES:   Severe tracheal stenosis (C7-T1 narrowing to 0.5cm x 1.1cm)  Acute hypoxic respiratory failure requiring Heliox  Critical Airway  Recent emergency cricothyrotomy due airway obstruction from expanding tongue hematoma s/p decannulation (4/2021)  Acute PE (dx 10/2020) on lovenox  Seizure disorder  CKD  Hx of COVID (2/2021)      INTERVAL EVENTS:   Patient arrived in ED dyspneic with stridor.  Started on noninvasive mechanical ventilation.  Transitioned to Heliox. Heliox was titrated based on blood gas.  Heparin started for recent acute PE      HISTORY:   Patient reports dyspnea and stridor. Denies chest pain, cough, pleuritic pain, fever. Denies abdominal pain, nausea, or vomiting.    PHYSICAL EXAM:   Gen: Comfortable, No acute distress  Eyes: Sclera white, Conjunctiva normal, Eyelids normal, Pupils symmetrical   ENT: Mucous membranes moist,  ,  ,    Neck: Trachea midline,  ,  ,  ,  , stridor  CV: Rate regular, Rhythm regular,  ,  ,    Resp: Breath sounds clear, No accessory muscles use,  ,    Abd: Soft, Non-distended, Non-tender, Bowel sounds normal,  ,  ,    Skin: Warm, No peripheral edema of lower extremities,  ,    : No lester  Neuro: Moving all 4 extremities,    Psych: A&Ox3      ASSESSMENT AND PLAN:     NEURO:    Seizure disorder - stable. continue antiepileptics        RESPIRATORY:  Impending respiratory failure - requires ICU monitoring of airway. low threshold to intubate.    Severe tracheal stenosis - Stable, but tenuous. Continue Heliox. Bipap PRN. High risk airway. Airway monitoring in ICU. I titrated Heliox based on blood gas and symptoms.  Awaiting OR for intervention.    Acute hypoxic respiratory failure - Continue Heliox. Incentive spirometry. Chest PT and frequent suctioning. Continue bronchodilators. OOB to chair & ambulate w/ assistance. Continuous pulse oximetry for support & to prevent decompensation.     Pulmonary Embolism - stable. heparin gtt      CARDIOVASCULAR:  Hemodynamically stable - Not on pressors. Continue hemodynamic monitoring.  HTN - stable. Hold home antihypertensives for now.               RENAL:  CKD – Stable. Renally dose medications. Monitor for hyperkalemia and uremia. Avoid nephrotoxic medications. Monitor IOs and electrolytes.         GASTROINTESTINAL:  GI prophylaxis not indicated  Zofran and Reglan IV PRN for nausea  NPO for now            HEMATOLOGIC:  No signs of active bleeding. Monitor Hgb in CBC in AM  On therapeutic anticoagulation.         INFECTIOUS DISEASE:  All surgical sites appear clean. No signs of active infection. Will monitor for fever and leukocytosis.          ENDOCRINE:  Stable – Monitor glucose fingersticks for goal 120-180.            Pertinent clinical, laboratory, radiographic, hemodynamic, echocardiographic, respiratory data, microbiologic data and chart were reviewed by myself and analyzed frequently throughout the course of the day and night by myself.    Plan discussed at length with the CTICU staff and Attending CT Surgeon.     Patient's status was discussed with patient at bedside.         Patient required critical care management.  45 minutes were spent evaluating, managing, providing, coordinating, and documenting care for this patient, exclusive of procedures from  1230PM to  1159PM.    ________________________________________________        _________________________  VITAL SIGNS:  Vital Signs Last 24 Hrs  T(C): 36.8 (16 Apr 2021 20:00), Max: 37.2 (16 Apr 2021 08:49)  T(F): 98.2 (16 Apr 2021 20:00), Max: 99 (16 Apr 2021 08:49)  HR: 77 (16 Apr 2021 19:00) (59 - 89)  BP: 128/76 (16 Apr 2021 19:00) (122/80 - 162/91)  BP(mean): 87 (16 Apr 2021 19:00) (87 - 104)  RR: 14 (16 Apr 2021 19:00) (11 - 22)  SpO2: 99% (16 Apr 2021 19:00) (99% - 100%)  I/Os:   I&O's Detail    16 Apr 2021 07:01  -  16 Apr 2021 19:41  --------------------------------------------------------  IN:    dextrose 5% + sodium chloride 0.45%: 180 mL    Heparin: 58.5 mL    IV PiggyBack: 150 mL  Total IN: 388.5 mL    OUT:    Voided (mL): 700 mL  Total OUT: 700 mL    Total NET: -311.5 mL              MEDICATIONS:  MEDICATIONS  (STANDING):  dexAMETHasone  IVPB 10 milliGRAM(s) IV Intermittent every 8 hours  dextrose 5% + sodium chloride 0.45%. 1000 milliLiter(s) (30 mL/Hr) IV Continuous <Continuous>  heparin  Infusion 10 Unit(s)/Hr (8.5 mL/Hr) IV Continuous <Continuous>  levETIRAcetam  IVPB 750 milliGRAM(s) IV Intermittent every 12 hours  pantoprazole  Injectable 40 milliGRAM(s) IV Push daily  racepinephrine  2.25% Inhalation 0.5 milliLiter(s) Inhalation every 6 hours    MEDICATIONS  (PRN):  hydrALAZINE Injectable 10 milliGRAM(s) IV Push every 4 hours PRN systolic blood pressure greater than 160      LABS:                        9.0    5.84  )-----------( 175      ( 16 Apr 2021 04:37 )             28.4     04-16    137  |  108<H>  |  22  ----------------------------<  94  4.7   |  20<L>  |  2.94<H>    Ca    9.2      16 Apr 2021 04:37    TPro  6.9  /  Alb  3.7  /  TBili  0.2  /  DBili  x   /  AST  22  /  ALT  17  /  AlkPhos  139<H>  04-16    LIVER FUNCTIONS - ( 16 Apr 2021 04:37 )  Alb: 3.7 g/dL / Pro: 6.9 g/dL / ALK PHOS: 139 U/L / ALT: 17 U/L / AST: 22 U/L / GGT: x           PT/INR - ( 16 Apr 2021 04:37 )   PT: 12.5 sec;   INR: 1.09 ratio         PTT - ( 16 Apr 2021 18:20 )  PTT:89.9 sec  ABG - ( 16 Apr 2021 13:09 )  pH, Arterial: 7.35  pH, Blood: x     /  pCO2: 37    /  pO2: 123   / HCO3: 20    / Base Excess: -4.9  /  SaO2: 98.9                _________________________

## 2021-04-16 NOTE — CONSULT NOTE ADULT - SUBJECTIVE AND OBJECTIVE BOX
Patient is a 62 yo M, with PMH of HTN, b/l TKR, and COVID 2-3 months ago, not hospitalized  CKD, s/p  PE on lovenox, and recent seizure( unknown etiology) s/p emergency cric on 03/25/2021 from tongue injury lingual hematoma 2/2 to seizure blocking airway (discharged on 4/3/2021), now p/w difficulty breathing, had N/V x1 yesterday and abd pain-now resolved.  Nocturnal, worsening, since discharge. Pt feels he cannot breath especially lying down, denies fever, cough, or hematemesis. No more seizure at home, denies use of EtOH. Pt is placed on BiPAP for upper airway obstruction in ED, to help work of breathing.      REVIEW OF SYSTEMS:    CONSTITUTIONAL: No weakness, fevers or chills  EYES/ENT: No visual changes;  No vertigo or throat pain   NECK: No pain or stiffness  RESPIRATORY: No cough, wheezing, hemoptysis; No shortness of breath  CARDIOVASCULAR: No chest pain or palpitations  GASTROINTESTINAL: No abdominal or epigastric pain  GENITOURINARY: No dysuria, frequency or hematuria  NEUROLOGICAL: No numbness or weakness  SKIN: No itching, burning, rashes, or lesions   All other review of systems is negative unless indicated above.    PAST MEDICAL & SURGICAL HISTORY:  HTN (hypertension)  Chronic kidney disease, unspecified CKD stage  Pulmonary embolus  diagnosed about 6 months ago incidentally found on imaging related to falling off a bike and chest pain  2019 novel coronavirus disease (COVID-19)  never hospitilized  Arthritis  History of total right knee replacement (TKR)  bilateral     Patient is a 60 yo M, with PMH of HTN, b/l TKR, and COVID 2-3 months ago, not hospitalized  CKD, s/p  PE on lovenox, and recent seizure( unknown etiology) s/p emergency cric on 03/25/2021 from tongue injury lingual hematoma 2/2 to seizure blocking airway (discharged on 4/3/2021), now p/w difficulty breathing, had N/V x1 yesterday and abd pain-now resolved.  Nocturnal, worsening, since discharge. Pt feels he cannot breath especially lying down, denies fever, cough, or hematemesis. No more seizure at home, denies use of EtOH. Pt is placed on BiPAP for upper airway obstruction in ED, to help work of breathing. admitted to CTI for close monitoring       REVIEW OF SYSTEMS:    CONSTITUTIONAL: No weakness, fevers or chills  EYES/ENT: No visual changes;  No vertigo or throat pain   NECK: No pain or stiffness  RESPIRATORY: No cough, wheezing, hemoptysis; No shortness of breath  CARDIOVASCULAR: No chest pain or palpitations  GASTROINTESTINAL: No abdominal or epigastric pain  GENITOURINARY: No dysuria, frequency or hematuria  NEUROLOGICAL: No numbness or weakness  SKIN: No itching, burning, rashes, or lesions   All other review of systems is negative unless indicated above.    PAST MEDICAL & SURGICAL HISTORY:  HTN (hypertension)  Chronic kidney disease, unspecified CKD stage  Pulmonary embolus  diagnosed about 6 months ago incidentally found on imaging related to falling off a bike and chest pain  2019 novel coronavirus disease (COVID-19)  never hospitilized  Arthritis  History of total right knee replacement (TKR)  bilateral    MEDICATIONS  (STANDING):  acetaminophen  IVPB .. 1000 milliGRAM(s) IV Intermittent once  acetaminophen  IVPB .. 1000 milliGRAM(s) IV Intermittent once  acetaminophen  IVPB .. 1000 milliGRAM(s) IV Intermittent once  acetaminophen  IVPB .. 1000 milliGRAM(s) IV Intermittent once  acetaminophen  IVPB .. 1000 milliGRAM(s) IV Intermittent once  dextrose 5% + sodium chloride 0.45%. 1000 milliLiter(s) (30 mL/Hr) IV Continuous <Continuous>  heparin  Infusion 10 Unit(s)/Hr (8.5 mL/Hr) IV Continuous <Continuous>  levETIRAcetam  IVPB 750 milliGRAM(s) IV Intermittent every 12 hours  pantoprazole  Injectable 40 milliGRAM(s) IV Push daily  racepinephrine  2.25% Inhalation 0.5 milliLiter(s) Inhalation every 6 hours    MEDICATIONS  (PRN):  hydrALAZINE Injectable 10 milliGRAM(s) IV Push every 4 hours PRN systolic blood pressure greater than 160    Home Medications:  labetalol 100 mg oral tablet: 1 tab(s) orally 3 times a day (16 Apr 2021 11:48)  Norvasc 10 mg oral tablet: 1 tab(s) orally once a day (16 Apr 2021 11:48)  Lovenox     Allergies    No Known Allergies    Intolerances      Vital Signs Last 24 Hrs  T(C): 36.8 (16 Apr 2021 20:00), Max: 37.2 (16 Apr 2021 08:49)  T(F): 98.2 (16 Apr 2021 20:00), Max: 99 (16 Apr 2021 08:49)  HR: 78 (16 Apr 2021 23:00) (59 - 89)  BP: 121/80 (16 Apr 2021 23:00) (114/80 - 162/91)  BP(mean): 89 (16 Apr 2021 23:00) (85 - 104)  RR: 15 (16 Apr 2021 23:00) (11 - 22)  SpO2: 98% (16 Apr 2021 23:00) (98% - 100%)    Appearance: Normal	  HEENT:   Normal oral mucosa, PERRL, EOMI	  Lymphatic: No lymphadenopathy , no edema  Cardiovascular: Normal S1 S2, No JVD,  Respiratory: Lungs clear to auscultation, normal effort 	  Gastrointestinal:  Soft, Non-tender, + BS	  Skin: No rashes, No ecchymoses, No cyanosis, warm to touch  Musculoskeletal: Normal range of motion, normal strength  Psychiatry:  Mood & affect appropriate  Ext: No edema                          9.0    5.84  )-----------( 175      ( 16 Apr 2021 04:37 )             28.4               04-16    137  |  108<H>  |  22  ----------------------------<  94  4.7   |  20<L>  |  2.94<H>    Ca    9.2      16 Apr 2021 04:37    TPro  6.9  /  Alb  3.7  /  TBili  0.2  /  DBili  x   /  AST  22  /  ALT  17  /  AlkPhos  139<H>  04-16    PT/INR - ( 16 Apr 2021 04:37 )   PT: 12.5 sec;   INR: 1.09 ratio         PTT - ( 16 Apr 2021 18:20 )  PTT:89.9 sec                 ABG - ( 16 Apr 2021 13:09 )  pH, Arterial: 7.35  pH, Blood: x     /  pCO2: 37    /  pO2: 123   / HCO3: 20    / Base Excess: -4.9  /  SaO2: 98.9          < from: CT Neck Soft Tissue w/ IV Cont (04.16.21 @ 09:05) >    IMPRESSION: Tracheal stenosis. Deformity of the tracheal cartilage at the C7-T1 level. Abnormal soft tissue in the anterior tracheal lumen at this level measuring approximately 1 cm. Narrowing of the tracheal lumen to 1.1 x 0.5 cm (AP x TV).      < from: Transthoracic Echocardiogram (04.16.21 @ 16:56) >  CONCLUSIONS:  1. Mitral annular calcification, otherwise normal mitral  valve. Minimal mitral regurgitation.  2. Calcified trileaflet aortic valve with normal opening.  Mild aortic regurgitation.  3. Mildly dilated left atrium.  LA volume index = 38 cc/m2.  4. Normal left ventricular internal dimensions and wall  thicknesses.  5. Normal left ventricular systolic function. No segmental  wall motion abnormalities.  6. Mild diastolic dysfunction (Stage I).  7. Normal right ventricular size and function.

## 2021-04-16 NOTE — ED ADULT NURSE REASSESSMENT NOTE - NS ED NURSE REASSESS COMMENT FT1
CT completed.  CM placed.  HOB elevated.  BIPAP at bedside but not in use.  O2 sat %.  Stridor noted and MD aware.

## 2021-04-16 NOTE — CONSULT NOTE ADULT - SUBJECTIVE AND OBJECTIVE BOX
60 yo M, with PMH of HTN, PE on lovenox, and recent seizure s/p emergency cric on 03/25/2021 from tongue injury lingual hematoma 2/2 to seizure blocking airway (discharged on 4/3/2021), now p/w difficulty breathing, SOB, likely from upper airway stenosis above vocal cords given pt is making full sentences. Nocturnal, worsening, since discharge. Pt feels he cannot breath especially lying down, denies fever, cough, or hematemesis. No more seizure at home, denies use of EtOH. Pt is placed on BiPAP for upper airway obstruction in ED, to help work of breathing.    Vital Signs Last 24 Hrs  T(C): 36.6 (16 Apr 2021 03:10), Max: 36.6 (16 Apr 2021 03:10)  T(F): 97.8 (16 Apr 2021 03:10), Max: 97.8 (16 Apr 2021 03:10)  HR: 77 (16 Apr 2021 04:54) (75 - 77)  BP: 122/80 (16 Apr 2021 03:10) (122/80 - 122/80)  BP(mean): --  RR: 18 (16 Apr 2021 03:10) (18 - 18)  SpO2: 100% (16 Apr 2021 04:54) (100% - 100%)    PAST MEDICAL & SURGICAL HISTORY:  HTN (hypertension)  Home Medications: leveticetam, amlodipine  Allergies: NKDA    LABS:              9.0    5.84  )-----------( 175      ( 16 Apr 2021 04:37 )             28.4     04-16    137  |  108<H>  |  22  ----------------------------<  94  4.7   |  20<L>  |  2.94<H>    Ca    9.2      16 Apr 2021 04:37    TPro  6.9  /  Alb  3.7  /  TBili  0.2  /  DBili  x   /  AST  22  /  ALT  17  /  AlkPhos  139<H>  04-16  PT/INR - ( 16 Apr 2021 04:37 )   PT: 12.5 sec;   INR: 1.09 ratio    PTT - ( 16 Apr 2021 04:37 )  PTT:45.3 sec  LIVER FUNCTIONS - ( 16 Apr 2021 04:37 )  Alb: 3.7 g/dL / Pro: 6.9 g/dL / ALK PHOS: 139 U/L / ALT: 17 U/L / AST: 22 U/L / GGT: x           PE  Gen: pt is NAD, AAOx3, well-nourished and cooperative  H: no skull depressions. No hematomas, no ecchymosis, no lacerations, no LAD, no steps or crepitus on palpation.  E: PERRL, EOMI. No periorbital ecchymosis, no lacerations, no hematomas. No subconjunctival heme or chemosis. No crepitus or steps on palpation of orbital rims.  E: No bland sign, hearing intact bilaterally, no lacerations, no ecchymosis, no hematomas, no otorrhea.   N: nasal dorsum at midline. Nares patent. No epistaxis or rhinorrhea. No crepitus or steps on palpation of nasal dorsum. No lacerations, no hematomas, no ecchymosis.   T: trachea at midline, no LAD, no hematomas, no lacerations, no ecchymosis. Trach site with steri-strips with incision site fully healed.    Resp: right lung clear, left lung slight expiratory wheeze  EOE: No swelling, facial asymmetry, or LAD. Mandibular inferior border palpable b/l. ALEK>35mm. TMJ exam unremarkable, no deviation on opening. FROM. BiCPAP in place.  IOE: Uvula at midline. FOM soft/NT/non-elevated. Dentition intact with good oral hygiene. Occlusion stable/reproducible. No swellings, fistulas, or purulent drainage. No gingival tears or occlusal steps.

## 2021-04-16 NOTE — CONSULT NOTE ADULT - ASSESSMENT
61 year old male with SOB, expiratory stridor s/p emergency cric on 3/25/2021 due to tongue lac and lingual hematoma blocking airway 2/2 seizure - clinically stable and satting well on BiPAP in ED.    -maintain patient on BiPAP  -appreciate ENT eval of airway  -suspicious for upper airway stenosis above vocal cords due to cric granulation tissue  - pain meds as per ED  -discussed case with chief on call and attending on call, awaiting final plan

## 2021-04-16 NOTE — CONSULT NOTE ADULT - PROBLEM SELECTOR RECOMMENDATION 6
DVT and GI PPX     Differential diagnosis and plan of care discussed with patient after the evaluation.   Advanced care planning/advanced directives discussed with patient/family. DNR status including forceful chest compressions to attempt to restart the heart, ventilator support/artificial breathing, electric shock, artificial nutrition, health care proxy, Molst form all discussed with pt. Pt wishes to consider.   OMT on six regions for acute somatic dysfunctions done at the bedside   Importance of Fall prevention discussed.

## 2021-04-16 NOTE — H&P ADULT - NSHPLABSRESULTS_GEN_ALL_CORE
< from: CT Neck Soft Tissue w/ IV Cont (04.16.21 @ 09:05) >      IMPRESSION: Tracheal stenosis. Deformity of the tracheal cartilage at the C7-T1 level. Abnormal soft tissue in the anterior tracheal lumen at this level measuring approximately 1 cm. Narrowing of the tracheal lumen to 1.1 x 0.5 cm (AP x TV).    < end of copied text >    ENT scoped pt, exam WNL above chords                          9.0    5.84  )-----------( 175      ( 16 Apr 2021 04:37 )             28.4     PT/INR - ( 16 Apr 2021 04:37 )   PT: 12.5 sec;   INR: 1.09 ratio         PTT - ( 16 Apr 2021 04:37 )  PTT:45.3 sec    04-16    137  |  108<H>  |  22  ----------------------------<  94  4.7   |  20<L>  |  2.94<H>    Ca    9.2      16 Apr 2021 04:37    TPro  6.9  /  Alb  3.7  /  TBili  0.2  /  DBili  x   /  AST  22  /  ALT  17  /  AlkPhos  139<H>  04-16

## 2021-04-16 NOTE — H&P ADULT - HISTORY OF PRESENT ILLNESS
60 yo M, with PMH of HTN, b/l TKR, and COVID 2-3 months ago, not hospitilized,  CKD, s/p  PE on lovenox, and recent seizure( unknown etiology) s/p emergency cric on 03/25/2021 from tongue injury lingual hematoma 2/2 to seizure blocking airway (discharged on 4/3/2021), now p/w difficulty breathing, Nocturnal, worsening, since discharge. Pt feels he cannot breath especially lying down, denies fever, cough, or hematemesis. No more seizure at home, denies use of EtOH. Pt is placed on BiPAP for upper airway obstruction in ED, to help work of breathing.    62 yo M, with PMH of HTN, b/l TKR, and COVID 2-3 months ago, not hospitilized,  CKD, s/p  PE on lovenox, and recent seizure( unknown etiology) s/p emergency cric on 03/25/2021 from tongue injury lingual hematoma 2/2 to seizure blocking airway (discharged on 4/3/2021), now p/w difficulty breathing,had N/V x1 yesterday and abd pain-now resolved.  Nocturnal, worsening, since discharge. Pt feels he cannot breath especially lying down, denies fever, cough, or hematemesis. No more seizure at home, denies use of EtOH. Pt is placed on BiPAP for upper airway obstruction in ED, to help work of breathing.

## 2021-04-16 NOTE — CONSULT NOTE ADULT - PROBLEM SELECTOR RECOMMENDATION 2
on lovenox at home  may resumefor now, hold priro to OR   check LE DVT study  Check echo , noted above   Pt with h/o pancytopenia, and being worked up for antiphospholipid syndrome/lupus anticoagulant,    Hart marrow biopsy W/U at [facility]

## 2021-04-17 DIAGNOSIS — I10 ESSENTIAL (PRIMARY) HYPERTENSION: ICD-10-CM

## 2021-04-17 DIAGNOSIS — U07.1 COVID-19: ICD-10-CM

## 2021-04-17 DIAGNOSIS — Z29.9 ENCOUNTER FOR PROPHYLACTIC MEASURES, UNSPECIFIED: ICD-10-CM

## 2021-04-17 DIAGNOSIS — N18.9 CHRONIC KIDNEY DISEASE, UNSPECIFIED: ICD-10-CM

## 2021-04-17 DIAGNOSIS — I26.99 OTHER PULMONARY EMBOLISM WITHOUT ACUTE COR PULMONALE: ICD-10-CM

## 2021-04-17 PROBLEM — M19.90 UNSPECIFIED OSTEOARTHRITIS, UNSPECIFIED SITE: Chronic | Status: ACTIVE | Noted: 2021-04-16

## 2021-04-17 LAB
ANION GAP SERPL CALC-SCNC: 9 MMOL/L — SIGNIFICANT CHANGE UP (ref 7–14)
APTT BLD: 60.7 SEC — HIGH (ref 27–36.3)
APTT BLD: 70.3 SEC — HIGH (ref 27–36.3)
APTT BLD: 84.2 SEC — HIGH (ref 27–36.3)
BUN SERPL-MCNC: 23 MG/DL — SIGNIFICANT CHANGE UP (ref 7–23)
CALCIUM SERPL-MCNC: 8.9 MG/DL — SIGNIFICANT CHANGE UP (ref 8.4–10.5)
CHLORIDE SERPL-SCNC: 106 MMOL/L — SIGNIFICANT CHANGE UP (ref 98–107)
CO2 SERPL-SCNC: 19 MMOL/L — LOW (ref 22–31)
CREAT SERPL-MCNC: 2.74 MG/DL — HIGH (ref 0.5–1.3)
GLUCOSE SERPL-MCNC: 230 MG/DL — HIGH (ref 70–99)
HCT VFR BLD CALC: 24.5 % — LOW (ref 39–50)
HCT VFR BLD CALC: 25.5 % — LOW (ref 39–50)
HGB BLD-MCNC: 8.1 G/DL — LOW (ref 13–17)
HGB BLD-MCNC: 8.5 G/DL — LOW (ref 13–17)
INR BLD: 1.17 RATIO — HIGH (ref 0.88–1.16)
MAGNESIUM SERPL-MCNC: 1.7 MG/DL — SIGNIFICANT CHANGE UP (ref 1.6–2.6)
MCHC RBC-ENTMCNC: 29.9 PG — SIGNIFICANT CHANGE UP (ref 27–34)
MCHC RBC-ENTMCNC: 30.1 PG — SIGNIFICANT CHANGE UP (ref 27–34)
MCHC RBC-ENTMCNC: 33.1 GM/DL — SIGNIFICANT CHANGE UP (ref 32–36)
MCHC RBC-ENTMCNC: 33.3 GM/DL — SIGNIFICANT CHANGE UP (ref 32–36)
MCV RBC AUTO: 90.4 FL — SIGNIFICANT CHANGE UP (ref 80–100)
MCV RBC AUTO: 90.4 FL — SIGNIFICANT CHANGE UP (ref 80–100)
NRBC # BLD: 0 /100 WBCS — SIGNIFICANT CHANGE UP
NRBC # BLD: 0 /100 WBCS — SIGNIFICANT CHANGE UP
NRBC # FLD: 0 K/UL — SIGNIFICANT CHANGE UP
NRBC # FLD: 0 K/UL — SIGNIFICANT CHANGE UP
PHOSPHATE SERPL-MCNC: 3.8 MG/DL — SIGNIFICANT CHANGE UP (ref 2.5–4.5)
PLATELET # BLD AUTO: 167 K/UL — SIGNIFICANT CHANGE UP (ref 150–400)
PLATELET # BLD AUTO: 173 K/UL — SIGNIFICANT CHANGE UP (ref 150–400)
POTASSIUM SERPL-MCNC: 4.9 MMOL/L — SIGNIFICANT CHANGE UP (ref 3.5–5.3)
POTASSIUM SERPL-SCNC: 4.9 MMOL/L — SIGNIFICANT CHANGE UP (ref 3.5–5.3)
PROTHROM AB SERPL-ACNC: 13.2 SEC — SIGNIFICANT CHANGE UP (ref 10.6–13.6)
RBC # BLD: 2.71 M/UL — LOW (ref 4.2–5.8)
RBC # BLD: 2.82 M/UL — LOW (ref 4.2–5.8)
RBC # FLD: 13.5 % — SIGNIFICANT CHANGE UP (ref 10.3–14.5)
RBC # FLD: 13.7 % — SIGNIFICANT CHANGE UP (ref 10.3–14.5)
SODIUM SERPL-SCNC: 134 MMOL/L — LOW (ref 135–145)
WBC # BLD: 5.27 K/UL — SIGNIFICANT CHANGE UP (ref 3.8–10.5)
WBC # BLD: 8.83 K/UL — SIGNIFICANT CHANGE UP (ref 3.8–10.5)
WBC # FLD AUTO: 5.27 K/UL — SIGNIFICANT CHANGE UP (ref 3.8–10.5)
WBC # FLD AUTO: 8.83 K/UL — SIGNIFICANT CHANGE UP (ref 3.8–10.5)

## 2021-04-17 PROCEDURE — 99233 SBSQ HOSP IP/OBS HIGH 50: CPT

## 2021-04-17 PROCEDURE — 71045 X-RAY EXAM CHEST 1 VIEW: CPT | Mod: 26

## 2021-04-17 RX ORDER — SODIUM CHLORIDE 9 MG/ML
1000 INJECTION, SOLUTION INTRAVENOUS
Refills: 0 | Status: DISCONTINUED | OUTPATIENT
Start: 2021-04-17 | End: 2021-04-17

## 2021-04-17 RX ORDER — MAGNESIUM SULFATE 500 MG/ML
2 VIAL (ML) INJECTION ONCE
Refills: 0 | Status: COMPLETED | OUTPATIENT
Start: 2021-04-17 | End: 2021-04-17

## 2021-04-17 RX ADMIN — Medication 0.5 MILLILITER(S): at 15:57

## 2021-04-17 RX ADMIN — SODIUM CHLORIDE 50 MILLILITER(S): 9 INJECTION, SOLUTION INTRAVENOUS at 06:24

## 2021-04-17 RX ADMIN — Medication 0.5 MILLILITER(S): at 04:26

## 2021-04-17 RX ADMIN — Medication 1000 MILLIGRAM(S): at 04:44

## 2021-04-17 RX ADMIN — LEVETIRACETAM 400 MILLIGRAM(S): 250 TABLET, FILM COATED ORAL at 17:46

## 2021-04-17 RX ADMIN — LEVETIRACETAM 400 MILLIGRAM(S): 250 TABLET, FILM COATED ORAL at 06:17

## 2021-04-17 RX ADMIN — PANTOPRAZOLE SODIUM 40 MILLIGRAM(S): 20 TABLET, DELAYED RELEASE ORAL at 13:36

## 2021-04-17 RX ADMIN — Medication 0.5 MILLILITER(S): at 11:24

## 2021-04-17 RX ADMIN — Medication 0.5 MILLILITER(S): at 22:26

## 2021-04-17 RX ADMIN — Medication 50 GRAM(S): at 06:57

## 2021-04-17 NOTE — PROGRESS NOTE ADULT - ASSESSMENT
Tracheal stenosis  planned for surgery   Oxygent   fu with CTS    HTN  stble     PE  on a/c   consider lower ext venous doppler to assess any residual clot if a/c need to be interrupted   unremarkable echo     CKD  monitor lytes , crt     Pre-Operative Cardiac Risk Stratification and Optimization   Based on current ACC/AHA guidelines, patient history and physical exam, the patient is considered to have elevated risk for this time sensitive urgent surgery  echo shows normal LV / RV function  no absolute CV objection to proceed to OR

## 2021-04-17 NOTE — PROGRESS NOTE ADULT - SUBJECTIVE AND OBJECTIVE BOX
DATE OF SERVICE: 04-17-21 @ 13:15    Subjective: Patient seen and examined. No new events except as noted.     SUBJECTIVE/ROS:    Pt seen and examined early this am     MEDICATIONS:  MEDICATIONS  (STANDING):  acetaminophen  IVPB .. 1000 milliGRAM(s) IV Intermittent once  acetaminophen  IVPB .. 1000 milliGRAM(s) IV Intermittent once  acetaminophen  IVPB .. 1000 milliGRAM(s) IV Intermittent once  acetaminophen  IVPB .. 1000 milliGRAM(s) IV Intermittent once  dextrose 5% + sodium chloride 0.9%. 1000 milliLiter(s) (50 mL/Hr) IV Continuous <Continuous>  heparin  Infusion 10 Unit(s)/Hr (8.5 mL/Hr) IV Continuous <Continuous>  levETIRAcetam  IVPB 750 milliGRAM(s) IV Intermittent every 12 hours  pantoprazole  Injectable 40 milliGRAM(s) IV Push daily  racepinephrine  2.25% Inhalation 0.5 milliLiter(s) Inhalation every 6 hours      PHYSICAL EXAM:  T(C): 36.8 (04-17-21 @ 08:00), Max: 37.1 (04-16-21 @ 16:00)  HR: 85 (04-17-21 @ 12:00) (65 - 87)  BP: 136/76 (04-17-21 @ 12:00) (112/74 - 149/92)  RR: 18 (04-17-21 @ 12:00) (11 - 19)  SpO2: 100% (04-17-21 @ 12:00) (98% - 100%)  Wt(kg): --  I&O's Summary    16 Apr 2021 07:01  -  17 Apr 2021 07:00  --------------------------------------------------------  IN: 1020.5 mL / OUT: 900 mL / NET: 120.5 mL    17 Apr 2021 07:01  -  17 Apr 2021 13:15  --------------------------------------------------------  IN: 234 mL / OUT: 550 mL / NET: -316 mL            JVP: Normal  Neck: supple  Lung: clear   CV: S1 S2 , Murmur:  Abd: soft  Ext: No edema  neuro: Awake / alert  Psych: flat affect  Skin: normal``    LABS/DATA:    CARDIAC MARKERS:        < from: Transthoracic Echocardiogram (04.16.21 @ 16:56) >  CONCLUSIONS:  1. Mitral annular calcification, otherwise normal mitral  valve. Minimal mitral regurgitation.  2. Calcified trileaflet aortic valve with normal opening.  Mild aortic regurgitation.  3. Mildly dilated left atrium.  LA volume index = 38 cc/m2.  4. Normal left ventricular internal dimensions and wall  thicknesses.  5. Normal left ventricular systolic function. No segmental  wall motion abnormalities.  6. Mild diastolic dysfunction (Stage I).  7. Normal right ventricular size and function.  ------------------------------------------------------------------------  Confirmed on  4/16/2021 - 18:04:33 by Jh Betancourt M.D.,  [facility], [place]  ------------------------------------------------------------------------    < end of copied text >                          8.1    5.27  )-----------( 167      ( 17 Apr 2021 01:26 )             24.5     04-17    134<L>  |  106  |  23  ----------------------------<  230<H>  4.9   |  19<L>  |  2.74<H>    Ca    8.9      17 Apr 2021 01:26  Phos  3.8     04-17  Mg     1.7     04-17    TPro  6.9  /  Alb  3.7  /  TBili  0.2  /  DBili  x   /  AST  22  /  ALT  17  /  AlkPhos  139<H>  04-16    proBNP:   Lipid Profile:   HgA1c:   TSH:     TELE:  EKG:

## 2021-04-17 NOTE — PROGRESS NOTE ADULT - SUBJECTIVE AND OBJECTIVE BOX
S:  Comfortable, on heliox.    O:    General:  AOx3, appears comfortable in no distress.  Neuro: no gross neurologic or CN deficits  Pulm: CTABL. +stridor.  CV: s1s2 RRR no m/r/g  Abdominal: NT/ND, TTP  Extremities: no cyanosis, clubbing or edema  Derm: no significant skin rash noted  _________________________  VITAL SIGNS:  Vital Signs Last 24 Hrs  T(C): 36.8 (17 Apr 2021 08:00), Max: 37.2 (16 Apr 2021 12:00)  T(F): 98.3 (17 Apr 2021 08:00), Max: 98.9 (16 Apr 2021 12:00)  HR: 70 (17 Apr 2021 09:00) (65 - 88)  BP: 129/76 (17 Apr 2021 09:00) (112/74 - 162/91)  BP(mean): 89 (17 Apr 2021 09:00) (79 - 104)  RR: 13 (17 Apr 2021 09:00) (11 - 22)  SpO2: 99% (17 Apr 2021 09:00) (98% - 100%)  I/Os:   I&O's Detail    16 Apr 2021 07:01  -  17 Apr 2021 07:00  --------------------------------------------------------  IN:    dextrose 5% + sodium chloride 0.45%: 510 mL    dextrose 5% + sodium chloride 0.9%: 50 mL    Heparin: 160.5 mL    IV PiggyBack: 300 mL  Total IN: 1020.5 mL    OUT:    Voided (mL): 900 mL  Total OUT: 900 mL    Total NET: 120.5 mL      17 Apr 2021 07:01  -  17 Apr 2021 10:19  --------------------------------------------------------  IN:    dextrose 5% + sodium chloride 0.9%: 100 mL    Heparin: 17 mL  Total IN: 117 mL    OUT:    Voided (mL): 250 mL  Total OUT: 250 mL    Total NET: -133 mL    MEDICATIONS:  MEDICATIONS  (STANDING):  acetaminophen  IVPB .. 1000 milliGRAM(s) IV Intermittent once  acetaminophen  IVPB .. 1000 milliGRAM(s) IV Intermittent once  acetaminophen  IVPB .. 1000 milliGRAM(s) IV Intermittent once  acetaminophen  IVPB .. 1000 milliGRAM(s) IV Intermittent once  dextrose 5% + sodium chloride 0.9%. 1000 milliLiter(s) (50 mL/Hr) IV Continuous <Continuous>  heparin  Infusion 10 Unit(s)/Hr (8.5 mL/Hr) IV Continuous <Continuous>  levETIRAcetam  IVPB 750 milliGRAM(s) IV Intermittent every 12 hours  pantoprazole  Injectable 40 milliGRAM(s) IV Push daily  racepinephrine  2.25% Inhalation 0.5 milliLiter(s) Inhalation every 6 hours    MEDICATIONS  (PRN):  hydrALAZINE Injectable 10 milliGRAM(s) IV Push every 4 hours PRN systolic blood pressure greater than 160      LABS:                        8.1    5.27  )-----------( 167      ( 17 Apr 2021 01:26 )             24.5     04-17    134<L>  |  106  |  23  ----------------------------<  230<H>  4.9   |  19<L>  |  2.74<H>    Ca    8.9      17 Apr 2021 01:26  Phos  3.8     04-17  Mg     1.7     04-17    TPro  6.9  /  Alb  3.7  /  TBili  0.2  /  DBili  x   /  AST  22  /  ALT  17  /  AlkPhos  139<H>  04-16    LIVER FUNCTIONS - ( 16 Apr 2021 04:37 )  Alb: 3.7 g/dL / Pro: 6.9 g/dL / ALK PHOS: 139 U/L / ALT: 17 U/L / AST: 22 U/L / GGT: x           PT/INR - ( 17 Apr 2021 01:26 )   PT: 13.2 sec;   INR: 1.17 ratio         PTT - ( 17 Apr 2021 07:06 )  PTT:60.7 sec  ABG - ( 16 Apr 2021 13:09 )  pH, Arterial: 7.35  pH, Blood: x     /  pCO2: 37    /  pO2: 123   / HCO3: 20    / Base Excess: -4.9  /  SaO2: 98.9      _________________________    A/P:    Tracheal stenosis  PE on AC  Seizure  CKD    Neuro- No issues  Pulmonary- on heliox. OR monday.  CV- HD stable, euvolemic. Cont AC.  Abdomen- no issues.  Renal- stable Cr. Trend.  Endocrine- no active issues, trend FSG.    DVT ppx- on AC    Pertinent clinical, laboratory, radiographic, hemodynamic, echocardiographic, respiratory data, microbiologic data and chart were reviewed and analyzed frequently throughout the course of the day and night  Patient seen, examined and plan discussed with CT Surgeon / CTICU team during rounds.    Status discussed with patient /  updated plan of care

## 2021-04-17 NOTE — PROGRESS NOTE ADULT - ASSESSMENT
Patient is a 62 yo M, with PMH of HTN, b/l TKR, and COVID 2-3 months ago, not hospitalized  CKD, s/p  PE on lovenox, and recent seizure( unknown etiology) s/p emergency cric on 03/25/2021 from tongue injury lingual hematoma 2/2 to seizure blocking airway (discharged on 4/3/2021), now p/w difficulty breathing, had N/V x1 yesterday and abd pain-now resolved.  Nocturnal, worsening, since discharge. Pt feels he cannot breath especially lying down, denies fever, cough, or hematemesis. No more seizure at home, denies use of EtOH. Pt is placed on BiPAP for upper airway obstruction in ED, to help work of breathing. admitted to CTI for close monitoring       Problem/Recommendation - 1:  Problem: Tracheal stenosis following tracheostomy. Recommendation: CTS eval appreciated  monitor closelyO2 supplement   aspiration precautions  Echo noted  patient is medically optimized for OR on monday.    Problem/Recommendation - 2:  ·  Problem: Pulmonary embolus.  Recommendation: on lovenox at home  may resumefor now, hold priro to OR   check LE DVT study  Echo , normal   Pt with h/o pancytopenia, and being worked up for antiphospholipid syndrome/lupus anticoagulant,    Hart marrow biopsy W/U at [facility].     Problem/Recommendation - 3:  ·  Problem: CKD (chronic kidney disease).  Recommendation: monitor BUN/Cr  at his baseline  IV Hydration PRN  monitor urine output.     Problem/Recommendation - 4:  ·  Problem: HTN (hypertension).  Recommendation: BP control per ICU team  resume oral meds as tolerated.     Problem/Recommendation - 5:  ·  Problem: 2019 novel coronavirus disease (COVID-19).  Recommendation: stable O2 sat.     Problem/Recommendation - 6:  Problem: Prophylactic measure. Recommendation: DVT and GI PPX

## 2021-04-18 ENCOUNTER — TRANSCRIPTION ENCOUNTER (OUTPATIENT)
Age: 61
End: 2021-04-18

## 2021-04-18 PROBLEM — Z00.00 ENCOUNTER FOR PREVENTIVE HEALTH EXAMINATION: Status: ACTIVE | Noted: 2021-04-18

## 2021-04-18 LAB
ANION GAP SERPL CALC-SCNC: 9 MMOL/L — SIGNIFICANT CHANGE UP (ref 7–14)
APTT BLD: 69.7 SEC — HIGH (ref 27–36.3)
APTT BLD: 75.5 SEC — HIGH (ref 27–36.3)
APTT BLD: >200 SEC — CRITICAL HIGH (ref 27–36.3)
BLD GP AB SCN SERPL QL: NEGATIVE — SIGNIFICANT CHANGE UP
BUN SERPL-MCNC: 37 MG/DL — HIGH (ref 7–23)
CALCIUM SERPL-MCNC: 8.6 MG/DL — SIGNIFICANT CHANGE UP (ref 8.4–10.5)
CHLORIDE SERPL-SCNC: 106 MMOL/L — SIGNIFICANT CHANGE UP (ref 98–107)
CO2 SERPL-SCNC: 21 MMOL/L — LOW (ref 22–31)
CREAT SERPL-MCNC: 3.14 MG/DL — HIGH (ref 0.5–1.3)
GLUCOSE SERPL-MCNC: 158 MG/DL — HIGH (ref 70–99)
HCT VFR BLD CALC: 23.7 % — LOW (ref 39–50)
HCT VFR BLD CALC: 27.1 % — LOW (ref 39–50)
HGB BLD-MCNC: 7.7 G/DL — LOW (ref 13–17)
HGB BLD-MCNC: 8.8 G/DL — LOW (ref 13–17)
INR BLD: 0.98 RATIO — SIGNIFICANT CHANGE UP (ref 0.88–1.16)
INR BLD: 1.02 RATIO — SIGNIFICANT CHANGE UP (ref 0.88–1.16)
INR BLD: 1.03 RATIO — SIGNIFICANT CHANGE UP (ref 0.88–1.16)
MAGNESIUM SERPL-MCNC: 2.5 MG/DL — SIGNIFICANT CHANGE UP (ref 1.6–2.6)
MCHC RBC-ENTMCNC: 30.1 PG — SIGNIFICANT CHANGE UP (ref 27–34)
MCHC RBC-ENTMCNC: 30.3 PG — SIGNIFICANT CHANGE UP (ref 27–34)
MCHC RBC-ENTMCNC: 32.5 GM/DL — SIGNIFICANT CHANGE UP (ref 32–36)
MCHC RBC-ENTMCNC: 32.5 GM/DL — SIGNIFICANT CHANGE UP (ref 32–36)
MCV RBC AUTO: 92.8 FL — SIGNIFICANT CHANGE UP (ref 80–100)
MCV RBC AUTO: 93.3 FL — SIGNIFICANT CHANGE UP (ref 80–100)
NRBC # BLD: 0 /100 WBCS — SIGNIFICANT CHANGE UP
NRBC # BLD: 0 /100 WBCS — SIGNIFICANT CHANGE UP
NRBC # FLD: 0 K/UL — SIGNIFICANT CHANGE UP
NRBC # FLD: 0.02 K/UL — HIGH
PHOSPHATE SERPL-MCNC: 3.5 MG/DL — SIGNIFICANT CHANGE UP (ref 2.5–4.5)
PLATELET # BLD AUTO: 149 K/UL — LOW (ref 150–400)
PLATELET # BLD AUTO: 175 K/UL — SIGNIFICANT CHANGE UP (ref 150–400)
POTASSIUM SERPL-MCNC: 4.8 MMOL/L — SIGNIFICANT CHANGE UP (ref 3.5–5.3)
POTASSIUM SERPL-SCNC: 4.8 MMOL/L — SIGNIFICANT CHANGE UP (ref 3.5–5.3)
PROTHROM AB SERPL-ACNC: 11.2 SEC — SIGNIFICANT CHANGE UP (ref 10.6–13.6)
PROTHROM AB SERPL-ACNC: 11.7 SEC — SIGNIFICANT CHANGE UP (ref 10.6–13.6)
PROTHROM AB SERPL-ACNC: 11.7 SEC — SIGNIFICANT CHANGE UP (ref 10.6–13.6)
RBC # BLD: 2.54 M/UL — LOW (ref 4.2–5.8)
RBC # BLD: 2.92 M/UL — LOW (ref 4.2–5.8)
RBC # FLD: 14.1 % — SIGNIFICANT CHANGE UP (ref 10.3–14.5)
RBC # FLD: 14.2 % — SIGNIFICANT CHANGE UP (ref 10.3–14.5)
RH IG SCN BLD-IMP: POSITIVE — SIGNIFICANT CHANGE UP
SARS-COV-2 RNA SPEC QL NAA+PROBE: SIGNIFICANT CHANGE UP
SODIUM SERPL-SCNC: 136 MMOL/L — SIGNIFICANT CHANGE UP (ref 135–145)
WBC # BLD: 9.7 K/UL — SIGNIFICANT CHANGE UP (ref 3.8–10.5)
WBC # BLD: 9.79 K/UL — SIGNIFICANT CHANGE UP (ref 3.8–10.5)
WBC # FLD AUTO: 9.7 K/UL — SIGNIFICANT CHANGE UP (ref 3.8–10.5)
WBC # FLD AUTO: 9.79 K/UL — SIGNIFICANT CHANGE UP (ref 3.8–10.5)

## 2021-04-18 PROCEDURE — 99233 SBSQ HOSP IP/OBS HIGH 50: CPT

## 2021-04-18 RX ORDER — SODIUM CHLORIDE 9 MG/ML
1000 INJECTION, SOLUTION INTRAVENOUS
Refills: 0 | Status: DISCONTINUED | OUTPATIENT
Start: 2021-04-18 | End: 2021-04-20

## 2021-04-18 RX ADMIN — Medication 0.5 MILLILITER(S): at 04:34

## 2021-04-18 RX ADMIN — Medication 0.5 MILLILITER(S): at 16:02

## 2021-04-18 RX ADMIN — LEVETIRACETAM 400 MILLIGRAM(S): 250 TABLET, FILM COATED ORAL at 05:21

## 2021-04-18 RX ADMIN — PANTOPRAZOLE SODIUM 40 MILLIGRAM(S): 20 TABLET, DELAYED RELEASE ORAL at 11:23

## 2021-04-18 RX ADMIN — Medication 0.5 MILLILITER(S): at 22:30

## 2021-04-18 RX ADMIN — LEVETIRACETAM 400 MILLIGRAM(S): 250 TABLET, FILM COATED ORAL at 17:33

## 2021-04-18 RX ADMIN — Medication 0.5 MILLILITER(S): at 10:50

## 2021-04-18 NOTE — PROGRESS NOTE ADULT - SUBJECTIVE AND OBJECTIVE BOX
DATE OF SERVICE: 04-18-21 @ 06:32    Subjective: Patient seen and examined. No new events except as noted.     SUBJECTIVE/ROS:        MEDICATIONS:  MEDICATIONS  (STANDING):  acetaminophen  IVPB .. 1000 milliGRAM(s) IV Intermittent once  acetaminophen  IVPB .. 1000 milliGRAM(s) IV Intermittent once  acetaminophen  IVPB .. 1000 milliGRAM(s) IV Intermittent once  acetaminophen  IVPB .. 1000 milliGRAM(s) IV Intermittent once  heparin  Infusion 10 Unit(s)/Hr (8.5 mL/Hr) IV Continuous <Continuous>  levETIRAcetam  IVPB 750 milliGRAM(s) IV Intermittent every 12 hours  pantoprazole  Injectable 40 milliGRAM(s) IV Push daily  racepinephrine  2.25% Inhalation 0.5 milliLiter(s) Inhalation every 6 hours      PHYSICAL EXAM:  T(C): 36.6 (04-18-21 @ 00:00), Max: 36.9 (04-17-21 @ 16:00)  HR: 74 (04-18-21 @ 05:00) (63 - 86)  BP: 131/83 (04-18-21 @ 05:00) (115/69 - 140/89)  RR: 11 (04-18-21 @ 05:00) (11 - 21)  SpO2: 98% (04-18-21 @ 05:00) (94% - 100%)  Wt(kg): --  I&O's Summary    16 Apr 2021 07:01  -  17 Apr 2021 07:00  --------------------------------------------------------  IN: 1020.5 mL / OUT: 900 mL / NET: 120.5 mL    17 Apr 2021 07:01  -  18 Apr 2021 06:32  --------------------------------------------------------  IN: 953 mL / OUT: 2150 mL / NET: -1197 mL            JVP: Normal  Neck: supple  Lung: clear   CV: S1 S2 , Murmur:  Abd: soft  Ext: No edema  neuro: Awake / alert  Psych: flat affect  Skin: normal``    LABS/DATA:    CARDIAC MARKERS:                                7.7    9.79  )-----------( 149      ( 18 Apr 2021 03:22 )             23.7     04-18    136  |  106  |  37<H>  ----------------------------<  158<H>  4.8   |  21<L>  |  3.14<H>    Ca    8.6      18 Apr 2021 03:22  Phos  3.5     04-18  Mg     2.5     04-18      proBNP:   Lipid Profile:   HgA1c:   TSH:     TELE:  EKG:

## 2021-04-18 NOTE — PROGRESS NOTE ADULT - ASSESSMENT
Patient is a 60 yo M, with PMH of HTN, b/l TKR, and COVID 2-3 months ago, not hospitalized  CKD, s/p  PE on lovenox, and recent seizure( unknown etiology) s/p emergency cric on 03/25/2021 from tongue injury lingual hematoma 2/2 to seizure blocking airway (discharged on 4/3/2021), now p/w difficulty breathing, had N/V x1 yesterday and abd pain-now resolved.  Nocturnal, worsening, since discharge. Pt feels he cannot breath especially lying down, denies fever, cough, or hematemesis. No more seizure at home, denies use of EtOH. Pt is placed on BiPAP for upper airway obstruction in ED, to help work of breathing. admitted to CTI for close monitoring       Problem/Recommendation - 1:  Problem: Tracheal stenosis following tracheostomy. Recommendation: CTS eval appreciated  monitor closelyO2 supplement   aspiration precautions  Echo noted  patient is medically optimized for OR on monday.    Problem/Recommendation - 2:  ·  Problem: Pulmonary embolus.  Recommendation: on lovenox at home  may resumefor now, hold priro to OR   check LE DVT study  Echo , normal   Pt with h/o pancytopenia, and being worked up for antiphospholipid syndrome/lupus anticoagulant,    Hart marrow biopsy W/U at [facility].     Problem/Recommendation - 3:  ·  Problem: CKD (chronic kidney disease).  Recommendation: monitor BUN/Cr  at his baseline  IV Hydration PRN  monitor urine output.     Problem/Recommendation - 4:  ·  Problem: HTN (hypertension).  Recommendation: BP control per ICU team  resume oral meds as tolerated.     Problem/Recommendation - 5:  ·  Problem: 2019 novel coronavirus disease (COVID-19).  Recommendation: stable O2 sat.     Problem/Recommendation - 6:  Problem: Prophylactic measure. Recommendation: DVT and GI PPX

## 2021-04-18 NOTE — PROGRESS NOTE ADULT - SUBJECTIVE AND OBJECTIVE BOX
Name of Patient : ANNEMARIE KELLEY  MRN: [id number]  DATE OF SERVICE: 04-18-21     Subjective: Patient seen and examined. No new events except as noted.   stable     REVIEW OF SYSTEMS:    CONSTITUTIONAL: No weakness, fevers or chills  EYES/ENT: No visual changes;  No vertigo or throat pain   NECK: No pain or stiffness  RESPIRATORY: No cough, wheezing, hemoptysis; No shortness of breath  CARDIOVASCULAR: No chest pain or palpitations  GASTROINTESTINAL: No abdominal or epigastric pain. No nausea, vomiting, or hematemesis; No diarrhea or constipation. No melena or hematochezia.  GENITOURINARY: No dysuria, frequency or hematuria  NEUROLOGICAL: No numbness or weakness  SKIN: No itching, burning, rashes, or lesions   All other review of systems is negative unless indicated above.    MEDICATIONS:  MEDICATIONS  (STANDING):  acetaminophen  IVPB .. 1000 milliGRAM(s) IV Intermittent once  acetaminophen  IVPB .. 1000 milliGRAM(s) IV Intermittent once  acetaminophen  IVPB .. 1000 milliGRAM(s) IV Intermittent once  acetaminophen  IVPB .. 1000 milliGRAM(s) IV Intermittent once  dextrose 5% + sodium chloride 0.45%. 1000 milliLiter(s) (70 mL/Hr) IV Continuous <Continuous>  heparin  Infusion 10 Unit(s)/Hr (8.5 mL/Hr) IV Continuous <Continuous>  levETIRAcetam  IVPB 750 milliGRAM(s) IV Intermittent every 12 hours  pantoprazole  Injectable 40 milliGRAM(s) IV Push daily  racepinephrine  2.25% Inhalation 0.5 milliLiter(s) Inhalation every 6 hours      PHYSICAL EXAM:  T(C): 36.7 (04-18-21 @ 20:00), Max: 37 (04-18-21 @ 08:00)  HR: 74 (04-18-21 @ 22:31) (67 - 89)  BP: 135/82 (04-18-21 @ 22:00) (122/74 - 150/94)  RR: 13 (04-18-21 @ 22:31) (10 - 23)  SpO2: 100% (04-18-21 @ 22:31) (94% - 100%)  Wt(kg): --  I&O's Summary    17 Apr 2021 07:01  -  18 Apr 2021 07:00  --------------------------------------------------------  IN: 953 mL / OUT: 2150 mL / NET: -1197 mL    18 Apr 2021 07:01  -  18 Apr 2021 22:57  --------------------------------------------------------  IN: 256 mL / OUT: 2050 mL / NET: -1794 mL          Appearance: Normal	  HEENT:  PERRLA   Lymphatic: No lymphadenopathy   Cardiovascular: Normal S1 S2, no JVD  Respiratory: normal effort , clear  Gastrointestinal:  Soft, Non-tender  Skin: No rashes,  warm to touch  Psychiatry:  Mood & affect appropriate  Musculuskeletal: No edema      All labs, Imaging and EKGs personally reviewed       04-17-21 @ 07:01  -  04-18-21 @ 07:00  --------------------------------------------------------  IN: 953 mL / OUT: 2150 mL / NET: -1197 mL    04-18-21 @ 07:01  -  04-18-21 @ 22:57  --------------------------------------------------------  IN: 256 mL / OUT: 2050 mL / NET: -1794 mL                            8.8    9.70  )-----------( 175      ( 18 Apr 2021 08:50 )             27.1               04-18    136  |  106  |  37<H>  ----------------------------<  158<H>  4.8   |  21<L>  |  3.14<H>    Ca    8.6      18 Apr 2021 03:22  Phos  3.5     04-18  Mg     2.5     04-18      PT/INR - ( 18 Apr 2021 10:26 )   PT: 11.7 sec;   INR: 1.02 ratio         PTT - ( 18 Apr 2021 10:26 )  PTT:75.5 sec

## 2021-04-18 NOTE — PROGRESS NOTE ADULT - SUBJECTIVE AND OBJECTIVE BOX
ISSUES:   Severe tracheal stenosis (C7-T1 narrowing to 0.5cm x 1.1cm)  Acute hypoxic respiratory failure requiring Heliox  Critical Airway  Recent emergency cricothyrotomy due airway obstruction from expanding tongue hematoma s/p decannulation (4/2021)  Acute PE (dx 10/2020) on lovenox  Seizure disorder  CKD  Hx of COVID (2/2021)      INTERVAL EVENTS:   Remains Heliox.   Remains on Heparin gtt for recent acute PE      HISTORY:   Patient reports dyspnea and stridor. Denies chest pain, cough, pleuritic pain, fever. Denies abdominal pain, nausea, or vomiting.    PHYSICAL EXAM:   Gen: Comfortable, No acute distress  Eyes: Sclera white, Conjunctiva normal, Eyelids normal, Pupils symmetrical   ENT: Mucous membranes moist,  ,  ,    Neck: Trachea midline,  ,  ,  ,  , stridor  CV: Rate regular, Rhythm regular,  ,  ,    Resp: Breath sounds clear, No accessory muscles use,  ,    Abd: Soft, Non-distended, Non-tender, Bowel sounds normal,  ,  ,    Skin: Warm, No peripheral edema of lower extremities,  ,    : No lester  Neuro: Moving all 4 extremities,    Psych: A&Ox3      ASSESSMENT AND PLAN:     NEURO:    Seizure disorder - stable. continue antiepileptics        RESPIRATORY:  Impending respiratory failure - requires ICU monitoring of airway. low threshold to intubate.    Severe tracheal stenosis - Stable, but tenuous. Continue Heliox. Bipap PRN. High risk airway. Airway monitoring in ICU. I titrated Heliox based on blood gas and symptoms.  Awaiting OR for intervention.    Acute hypoxic respiratory failure - Continue Heliox. Incentive spirometry. Chest PT and frequent suctioning. Continue bronchodilators. OOB to chair & ambulate w/ assistance. Continuous pulse oximetry for support & to prevent decompensation.     Pulmonary Embolism - stable. heparin gtt      CARDIOVASCULAR:  Hemodynamically stable - Not on pressors. Continue hemodynamic monitoring.  HTN - stable. Hold home antihypertensives for now.               RENAL:  CKD – Stable. Renally dose medications. Monitor for hyperkalemia and uremia. Avoid nephrotoxic medications. Monitor IOs and electrolytes.         GASTROINTESTINAL:  GI prophylaxis not indicated  Zofran and Reglan IV PRN for nausea  NPO for now            HEMATOLOGIC:  No signs of active bleeding. Monitor Hgb in CBC in AM  On therapeutic anticoagulation.         INFECTIOUS DISEASE:  All surgical sites appear clean. No signs of active infection. Will monitor for fever and leukocytosis.          ENDOCRINE:  Stable – Monitor glucose fingersticks for goal 120-180.            Pertinent clinical, laboratory, radiographic, hemodynamic, echocardiographic, respiratory data, microbiologic data and chart were reviewed by myself and analyzed frequently throughout the course of the day and night by myself.    Plan discussed at length with the CTICU staff and Attending CT Surgeon.     Patient's status was discussed with patient at bedside.  _________________________  VITAL SIGNS:  Vital Signs Last 24 Hrs  T(C): 37 (18 Apr 2021 08:00), Max: 37 (18 Apr 2021 08:00)  T(F): 98.6 (18 Apr 2021 08:00), Max: 98.6 (18 Apr 2021 08:00)  HR: 68 (18 Apr 2021 09:00) (63 - 86)  BP: 140/81 (18 Apr 2021 09:00) (120/66 - 147/85)  BP(mean): 95 (18 Apr 2021 09:00) (75 - 101)  RR: 11 (18 Apr 2021 09:00) (11 - 21)  SpO2: 100% (18 Apr 2021 09:00) (94% - 100%)  I/Os:   I&O's Detail    17 Apr 2021 07:01  -  18 Apr 2021 07:00  --------------------------------------------------------  IN:    dextrose 5% + sodium chloride 0.9%: 350 mL    Heparin: 153 mL    IV PiggyBack: 150 mL    Oral Fluid: 300 mL  Total IN: 953 mL    OUT:    Voided (mL): 2150 mL  Total OUT: 2150 mL    Total NET: -1197 mL      18 Apr 2021 07:01  -  18 Apr 2021 10:02  --------------------------------------------------------  IN:    Heparin: 17 mL  Total IN: 17 mL    OUT:    Voided (mL): 200 mL  Total OUT: 200 mL    Total NET: -183 mL              MEDICATIONS:  MEDICATIONS  (STANDING):  acetaminophen  IVPB .. 1000 milliGRAM(s) IV Intermittent once  acetaminophen  IVPB .. 1000 milliGRAM(s) IV Intermittent once  acetaminophen  IVPB .. 1000 milliGRAM(s) IV Intermittent once  acetaminophen  IVPB .. 1000 milliGRAM(s) IV Intermittent once  dextrose 5% + sodium chloride 0.45%. 1000 milliLiter(s) (70 mL/Hr) IV Continuous <Continuous>  heparin  Infusion 10 Unit(s)/Hr (8.5 mL/Hr) IV Continuous <Continuous>  levETIRAcetam  IVPB 750 milliGRAM(s) IV Intermittent every 12 hours  pantoprazole  Injectable 40 milliGRAM(s) IV Push daily  racepinephrine  2.25% Inhalation 0.5 milliLiter(s) Inhalation every 6 hours    MEDICATIONS  (PRN):  hydrALAZINE Injectable 10 milliGRAM(s) IV Push every 4 hours PRN systolic blood pressure greater than 160      LABS:                        8.8    9.70  )-----------( 175      ( 18 Apr 2021 08:50 )             27.1     04-18    136  |  106  |  37<H>  ----------------------------<  158<H>  4.8   |  21<L>  |  3.14<H>    Ca    8.6      18 Apr 2021 03:22  Phos  3.5     04-18  Mg     2.5     04-18        PT/INR - ( 18 Apr 2021 08:50 )   PT: 11.2 sec;   INR: 0.98 ratio         PTT - ( 18 Apr 2021 08:50 )  PTT:>200.0 sec  ABG - ( 16 Apr 2021 13:09 )  pH, Arterial: 7.35  pH, Blood: x     /  pCO2: 37    /  pO2: 123   / HCO3: 20    / Base Excess: -4.9  /  SaO2: 98.9                _________________________

## 2021-04-19 ENCOUNTER — TRANSCRIPTION ENCOUNTER (OUTPATIENT)
Age: 61
End: 2021-04-19

## 2021-04-19 ENCOUNTER — APPOINTMENT (OUTPATIENT)
Dept: THORACIC SURGERY | Facility: HOSPITAL | Age: 61
End: 2021-04-19

## 2021-04-19 LAB
ANION GAP SERPL CALC-SCNC: 11 MMOL/L — SIGNIFICANT CHANGE UP (ref 7–14)
APTT BLD: 75.2 SEC — HIGH (ref 27–36.3)
BUN SERPL-MCNC: 36 MG/DL — HIGH (ref 7–23)
CALCIUM SERPL-MCNC: 8.2 MG/DL — LOW (ref 8.4–10.5)
CHLORIDE SERPL-SCNC: 106 MMOL/L — SIGNIFICANT CHANGE UP (ref 98–107)
CO2 SERPL-SCNC: 20 MMOL/L — LOW (ref 22–31)
CREAT SERPL-MCNC: 3.07 MG/DL — HIGH (ref 0.5–1.3)
GLUCOSE SERPL-MCNC: 230 MG/DL — HIGH (ref 70–99)
HCT VFR BLD CALC: 22.7 % — LOW (ref 39–50)
HGB BLD-MCNC: 7.4 G/DL — LOW (ref 13–17)
INR BLD: 1.12 RATIO — SIGNIFICANT CHANGE UP (ref 0.88–1.16)
MAGNESIUM SERPL-MCNC: 1.9 MG/DL — SIGNIFICANT CHANGE UP (ref 1.6–2.6)
MCHC RBC-ENTMCNC: 30.8 PG — SIGNIFICANT CHANGE UP (ref 27–34)
MCHC RBC-ENTMCNC: 32.6 GM/DL — SIGNIFICANT CHANGE UP (ref 32–36)
MCV RBC AUTO: 94.6 FL — SIGNIFICANT CHANGE UP (ref 80–100)
NRBC # BLD: 0 /100 WBCS — SIGNIFICANT CHANGE UP
NRBC # FLD: 0 K/UL — SIGNIFICANT CHANGE UP
PHOSPHATE SERPL-MCNC: 3.1 MG/DL — SIGNIFICANT CHANGE UP (ref 2.5–4.5)
PLATELET # BLD AUTO: 149 K/UL — LOW (ref 150–400)
POTASSIUM SERPL-MCNC: 4.1 MMOL/L — SIGNIFICANT CHANGE UP (ref 3.5–5.3)
POTASSIUM SERPL-SCNC: 4.1 MMOL/L — SIGNIFICANT CHANGE UP (ref 3.5–5.3)
PROTHROM AB SERPL-ACNC: 12.8 SEC — SIGNIFICANT CHANGE UP (ref 10.6–13.6)
RBC # BLD: 2.4 M/UL — LOW (ref 4.2–5.8)
RBC # FLD: 14 % — SIGNIFICANT CHANGE UP (ref 10.3–14.5)
SODIUM SERPL-SCNC: 137 MMOL/L — SIGNIFICANT CHANGE UP (ref 135–145)
WBC # BLD: 6.83 K/UL — SIGNIFICANT CHANGE UP (ref 3.8–10.5)
WBC # FLD AUTO: 6.83 K/UL — SIGNIFICANT CHANGE UP (ref 3.8–10.5)

## 2021-04-19 PROCEDURE — 99233 SBSQ HOSP IP/OBS HIGH 50: CPT

## 2021-04-19 PROCEDURE — 71045 X-RAY EXAM CHEST 1 VIEW: CPT | Mod: 26

## 2021-04-19 RX ORDER — ENOXAPARIN SODIUM 100 MG/ML
40 INJECTION SUBCUTANEOUS EVERY 12 HOURS
Refills: 0 | Status: DISCONTINUED | OUTPATIENT
Start: 2021-04-19 | End: 2021-04-20

## 2021-04-19 RX ORDER — EPINEPHRINE 11.25MG/ML
0.5 SOLUTION, NON-ORAL INHALATION EVERY 6 HOURS
Refills: 0 | Status: DISCONTINUED | OUTPATIENT
Start: 2021-04-19 | End: 2021-04-29

## 2021-04-19 RX ADMIN — LEVETIRACETAM 400 MILLIGRAM(S): 250 TABLET, FILM COATED ORAL at 17:42

## 2021-04-19 RX ADMIN — PANTOPRAZOLE SODIUM 40 MILLIGRAM(S): 20 TABLET, DELAYED RELEASE ORAL at 11:47

## 2021-04-19 RX ADMIN — SODIUM CHLORIDE 70 MILLILITER(S): 9 INJECTION, SOLUTION INTRAVENOUS at 00:00

## 2021-04-19 RX ADMIN — SODIUM CHLORIDE 70 MILLILITER(S): 9 INJECTION, SOLUTION INTRAVENOUS at 08:56

## 2021-04-19 RX ADMIN — Medication 0.5 MILLILITER(S): at 04:36

## 2021-04-19 RX ADMIN — LEVETIRACETAM 400 MILLIGRAM(S): 250 TABLET, FILM COATED ORAL at 05:35

## 2021-04-19 RX ADMIN — ENOXAPARIN SODIUM 40 MILLIGRAM(S): 100 INJECTION SUBCUTANEOUS at 17:42

## 2021-04-19 NOTE — PROGRESS NOTE ADULT - SUBJECTIVE AND OBJECTIVE BOX
DATE OF SERVICE: 04-19-21 @ 07:12    Subjective: Patient seen and examined. No new events except as noted.     SUBJECTIVE/ROS:        MEDICATIONS:  MEDICATIONS  (STANDING):  acetaminophen  IVPB .. 1000 milliGRAM(s) IV Intermittent once  acetaminophen  IVPB .. 1000 milliGRAM(s) IV Intermittent once  acetaminophen  IVPB .. 1000 milliGRAM(s) IV Intermittent once  acetaminophen  IVPB .. 1000 milliGRAM(s) IV Intermittent once  dextrose 5% + sodium chloride 0.45%. 1000 milliLiter(s) (70 mL/Hr) IV Continuous <Continuous>  heparin  Infusion 10 Unit(s)/Hr (8.5 mL/Hr) IV Continuous <Continuous>  levETIRAcetam  IVPB 750 milliGRAM(s) IV Intermittent every 12 hours  pantoprazole  Injectable 40 milliGRAM(s) IV Push daily  racepinephrine  2.25% Inhalation 0.5 milliLiter(s) Inhalation every 6 hours      PHYSICAL EXAM:  T(C): 36.7 (04-19-21 @ 04:00), Max: 37 (04-18-21 @ 08:00)  HR: 89 (04-19-21 @ 07:00) (65 - 97)  BP: 141/78 (04-19-21 @ 07:00) (128/76 - 150/94)  RR: 18 (04-19-21 @ 07:00) (10 - 23)  SpO2: 100% (04-19-21 @ 07:00) (96% - 100%)  Wt(kg): --  I&O's Summary    18 Apr 2021 07:01  -  19 Apr 2021 07:00  --------------------------------------------------------  IN: 1356.5 mL / OUT: 2900 mL / NET: -1543.5 mL            JVP: Normal  Neck: supple  Lung: clear   CV: S1 S2 , Murmur:  Abd: soft  Ext: No edema  neuro: Awake / alert  Psych: flat affect  Skin: normal``    LABS/DATA:    CARDIAC MARKERS:                                7.4    6.83  )-----------( 149      ( 19 Apr 2021 03:47 )             22.7     04-19    137  |  106  |  36<H>  ----------------------------<  230<H>  4.1   |  20<L>  |  3.07<H>    Ca    8.2<L>      19 Apr 2021 03:47  Phos  3.1     04-19  Mg     1.9     04-19      proBNP:   Lipid Profile:   HgA1c:   TSH:     TELE:  EKG:

## 2021-04-19 NOTE — PROGRESS NOTE ADULT - SUBJECTIVE AND OBJECTIVE BOX
Name of Patient : ANNEMARIE KELLEY  MRN: [id number]  DATE OF SERVICE: 04-19-21    Subjective: Patient seen and examined. No new events except as noted.   doing okay   OR today     REVIEW OF SYSTEMS:    CONSTITUTIONAL: No weakness, fevers or chills  EYES/ENT: No visual changes;  No vertigo or throat pain   NECK: No pain or stiffness  RESPIRATORY: No cough, wheezing, hemoptysis; No shortness of breath  CARDIOVASCULAR: No chest pain or palpitations  GASTROINTESTINAL: No abdominal or epigastric pain. No nausea, vomiting, or hematemesis; No diarrhea or constipation. No melena or hematochezia.  GENITOURINARY: No dysuria, frequency or hematuria  NEUROLOGICAL: No numbness or weakness  SKIN: No itching, burning, rashes, or lesions   All other review of systems is negative unless indicated above.    MEDICATIONS:  MEDICATIONS  (STANDING):  acetaminophen  IVPB .. 1000 milliGRAM(s) IV Intermittent once  acetaminophen  IVPB .. 1000 milliGRAM(s) IV Intermittent once  acetaminophen  IVPB .. 1000 milliGRAM(s) IV Intermittent once  acetaminophen  IVPB .. 1000 milliGRAM(s) IV Intermittent once  dextrose 5% + sodium chloride 0.45%. 1000 milliLiter(s) (70 mL/Hr) IV Continuous <Continuous>  enoxaparin Injectable 40 milliGRAM(s) SubCutaneous every 12 hours  levETIRAcetam  IVPB 750 milliGRAM(s) IV Intermittent every 12 hours  pantoprazole  Injectable 40 milliGRAM(s) IV Push daily      PHYSICAL EXAM:  T(C): 36.6 (04-19-21 @ 20:00), Max: 36.7 (04-19-21 @ 00:00)  HR: 79 (04-19-21 @ 21:00) (65 - 97)  BP: 136/86 (04-19-21 @ 21:00) (125/95 - 150/87)  RR: 16 (04-19-21 @ 21:00) (11 - 20)  SpO2: 98% (04-19-21 @ 21:00) (96% - 100%)  Wt(kg): --  I&O's Summary    18 Apr 2021 07:01  -  19 Apr 2021 07:00  --------------------------------------------------------  IN: 1356.5 mL / OUT: 2900 mL / NET: -1543.5 mL    19 Apr 2021 07:01  -  19 Apr 2021 23:04  --------------------------------------------------------  IN: 1390 mL / OUT: 2450 mL / NET: -1060 mL          Appearance: Normal	  HEENT:  PERRLA   Lymphatic: No lymphadenopathy   Cardiovascular: Normal S1 S2, no JVD  Respiratory: normal effort , clear  Gastrointestinal:  Soft, Non-tender  Skin: No rashes,  warm to touch  Psychiatry:  Mood & affect appropriate  Musculuskeletal: No edema      All labs, Imaging and EKGs personally reviewed       04-18-21 @ 07:01  -  04-19-21 @ 07:00  --------------------------------------------------------  IN: 1356.5 mL / OUT: 2900 mL / NET: -1543.5 mL    04-19-21 @ 07:01  -  04-19-21 @ 23:04  --------------------------------------------------------  IN: 1390 mL / OUT: 2450 mL / NET: -1060 mL                            7.4    6.83  )-----------( 149      ( 19 Apr 2021 03:47 )             22.7               04-19    137  |  106  |  36<H>  ----------------------------<  230<H>  4.1   |  20<L>  |  3.07<H>    Ca    8.2<L>      19 Apr 2021 03:47  Phos  3.1     04-19  Mg     1.9     04-19      PT/INR - ( 19 Apr 2021 03:47 )   PT: 12.8 sec;   INR: 1.12 ratio         PTT - ( 19 Apr 2021 03:47 )  PTT:75.2 sec
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
## 2021-04-19 NOTE — DISCHARGE NOTE PROVIDER - CARE PROVIDER_API CALL
Jh Allen)  Surgery; Thoracic Surgery  [phone] [place] [address], [place] [address]  Phone: [phone]  Fax: [phone]  Follow Up Time:

## 2021-04-19 NOTE — DISCHARGE NOTE PROVIDER - NSDCACTIVITY_GEN_ALL_CORE
No restrictions/Do not drive or operate machinery/Showering allowed/Do not make important decisions/Stairs allowed/Walking - Indoors allowed/Walking - Outdoors allowed

## 2021-04-19 NOTE — DISCHARGE NOTE PROVIDER - NSDCMRMEDTOKEN_GEN_ALL_CORE_FT
acetaminophen 325 mg oral tablet: 2 tab(s) orally every 6 hours, As Needed -Temp greater or equal to 38C (100.4F), Mild Pain (1 - 3) MDD:max 8 tabs daily  amoxicillin-clavulanate 875 mg-125 mg oral tablet: 1 tab(s) orally 2 times a day  labetalol 100 mg oral tablet: 1 tab(s) orally 3 times a day  levETIRAcetam 100 mg/mL oral solution: 7.5 milliliter(s) orally 2 times a day MDD:max 1500mg a day  Lovenox 80 mg/0.8 mL injectable solution: 80 milligram(s) subcutaneously once a day   Norvasc 10 mg oral tablet: 1 tab(s) orally once a day  Peridex 0.12% mucous membrane liquid: 15 milliliter(s) orally 2 times a day MDD:max 30mL a day  sodium polystyrene sulfonate 15 g/60 mL oral and rectal suspension: 120 milliliter(s) orally 3 times a week Mondays, Wednesdays, and Fridays    acetaminophen 325 mg oral tablet: 2 tab(s) orally every 6 hours, As Needed -Temp greater or equal to 38C (100.4F), Mild Pain (1 - 3) MDD:max 8 tabs daily  labetalol 100 mg oral tablet: 1 tab(s) orally 3 times a day  levETIRAcetam 100 mg/mL oral solution: 7.5 milliliter(s) orally 2 times a day MDD:max 1500mg a day  Norvasc 10 mg oral tablet: 1 tab(s) orally once a day  Peridex 0.12% mucous membrane liquid: 15 milliliter(s) orally 2 times a day MDD:max 30mL a day  sodium polystyrene sulfonate 15 g/60 mL oral and rectal suspension: 120 milliliter(s) orally 3 times a week Mondays, Wednesdays, and Fridays    labetalol 100 mg oral tablet: 1 tab(s) orally 3 times a day  levETIRAcetam 100 mg/mL oral solution: 7.5 milliliter(s) orally 2 times a day MDD:max 1500mg a day  Norvasc 10 mg oral tablet: 1 tab(s) orally once a day  oxyCODONE 5 mg oral tablet: 1 tab(s) orally every 4 hours, As Needed for moderate pain MDD:6   Peridex 0.12% mucous membrane liquid: 15 milliliter(s) orally 2 times a day MDD:max 30mL a day  sodium polystyrene sulfonate 15 g/60 mL oral and rectal suspension: 120 milliliter(s) orally 3 times a week Mondays, Wednesdays, and Fridays   Tylenol 325 mg oral tablet: 2 tab(s) orally every 4 hours, As Needed for mild pain   aluminum hydroxide-magnesium hydroxide 200 mg-200 mg/5 mL oral suspension: 30 milliliter(s) orally every 6 hours, As needed, Dyspepsia  apixaban 5 mg oral tablet: 1 tab(s) orally 2 times a day  cyclobenzaprine 5 mg oral tablet: 1 tab(s) orally 3 times a day, As needed, Muscle Spasm MDD:3  labetalol 100 mg oral tablet: 1 tab(s) orally 3 times a day  levETIRAcetam 750 mg oral tablet: 1 tab(s) orally every 12 hours  Norvasc 10 mg oral tablet: 1 tab(s) orally once a day  oxyCODONE 5 mg oral tablet: 1 tab(s) orally every 4 hours, As Needed for moderate pain MDD:6   Peridex 0.12% mucous membrane liquid: 15 milliliter(s) orally 2 times a day MDD:max 30mL a day  polyethylene glycol 3350 oral powder for reconstitution: 17 gram(s) orally once a day, As Needed  sodium polystyrene sulfonate 15 g/60 mL oral and rectal suspension: 120 milliliter(s) orally 3 times a week Mondays, Wednesdays, and Fridays   Tylenol 325 mg oral tablet: 2 tab(s) orally every 4 hours, As Needed for mild pain

## 2021-04-19 NOTE — DISCHARGE NOTE PROVIDER - NSDCFUADDINST_GEN_ALL_CORE_FT
Please return to the hospital on 4/23/21 for tracheal resection. Please do not eat anything after midnight on 4/22/21. Please only take the morning dose of Lovenox on Thursday, and do not take any blood thinners on Friday morning.  The office will call you with more information on when to come to the hospital for your surgery.     If you have difficulty breathing, please go to the ED. Walk frequently. You may climb stairs. Continue to use incentive spirometer.   If you have difficulty breathing, please go to the ED.  You must wear neck collar at all times. Only remove to shower. Velcro on the collar should always be in the FRONT.  You can never hyperextend your neck and look up. You must keep head in a neutral to down position at all times.   Keep small guaze over incision between incision and collar to protect and absorb any moisture. Keep wound very dry.

## 2021-04-19 NOTE — DISCHARGE NOTE PROVIDER - NSDCFUSCHEDAPPT_GEN_ALL_CORE_FT
ANNEMARIE KELLEY ; 04/23/2021 ; [facility] Pre[facility]mits ANNEMARIE KELLEY ; 04/29/2021 ; [facility] [address]

## 2021-04-19 NOTE — DISCHARGE NOTE PROVIDER - NSDCCPCAREPLAN_GEN_ALL_CORE_FT
PRINCIPAL DISCHARGE DIAGNOSIS  Diagnosis: Tracheal stenosis following tracheostomy  Assessment and Plan of Treatment: Tracheal stenosis following tracheostomy

## 2021-04-19 NOTE — DISCHARGE NOTE PROVIDER - HOSPITAL COURSE
60 yo M, with PMH of HTN, b/l TKR, and COVID 2-3 months ago, not hospitilized,  CKD, s/p  PE on lovenox, and recent seizure( unknown etiology) s/p emergency cric on 03/25/2021 from tongue injury lingual hematoma 2/2 to seizure blocking airway (discharged on 4/3/2021), now p/w tracheal stenosis. Pt started on heliox therapy. Pt underwent a FB, Balloon dilatation of trachea on 4/19/21. Pt was monitored overnight, and was cleared for DC on 4/20/21 with return to hospital on 4/23/21 for tracheal resection   62 y/o male, with PMHx of HTN, B/L TKR, and COVID 2-3 months ago (not hospitalized),  CKD, PE (8/2020 - On lovenox or eliquis at home at different times; Hematologist Dr. Gil Tee), and recent seizure requiring emergency cric/tracheostomy on 03/25/2021 at [facility] due to tongue injury and lingual hematoma (discharged on 4/3/2021).  Readmitted on 4/16/21 w/ SOB, and underwent Flexible Bronchoscopy, Balloon dilatation of trachea on 4/19/21.  Patient denies any chest pains or SOB at this time, and is hemodynamically stable with O2 sat 99% on room-air.  He was seen by Dr. Allen at the bedside this morning, and is being discharged home today, w/ return to hospital on 4/23/21 for tracheal resection.  Patient was recently being evaluated for hypercoagulable, antiphospholipid, lupus anticoagulant, and was scheduled to have a bone marrow biopsy last Friday, 4/16/21, to which he missed due to his admission here.  Dr. Raj Karimi and I spoke w/ patients hematologist, Dr. Gil Tee, who recommended lovenox 70mg sq QD.  This was discussed w/ Dr. Allen, and the patient is to take Lovenox at home tomorrow morning, as well as on the following morning, Thursday 4/22/21, which will be his final dose prior to surgery.  He received Lovenox here as an inpatient already this morning.   60 y/o male, with PMHx of HTN, B/L TKR, and COVID 2-3 months ago (not hospitalized),  CKD, PE (8/2020 - On lovenox or eliquis at home at different times; Hematologist Dr. Gil Tee), and recent seizure requiring emergency cric/tracheostomy on 03/25/2021 at [facility] due to tongue injury and lingual hematoma (discharged on 4/3/2021).  Readmitted on 4/16/21 w/ SOB, and underwent Flexible Bronchoscopy, Balloon dilatation of trachea on 4/19/21. Pt remained inhouse on Heparin gtt awaiting Tracheal resection. Seen by Nephrology for CKD. On 4/23/21 Pt had a Tracheal resection and reconstruction. Post op w Guardian stitch  in place. Resumed on Hep gtt. Today pt brought back to OR for surveillence Bronch, removal of guardian stitch. Anastomosis healing well. Soft cervical collar in place.  Wound c/d/i. Pt given extensive teaching abt keeping collar in place, no hyperextension of the neck. Pt w understanding. To resume Eliquis tonight. No CP/SOB no stridor. Pt voiding  AMb ad portillo. Cleared for d/c to home by Dr. Allen.   Vital Signs Last 24 Hrs  T(C): 36.7 (29 Apr 2021 11:26), Max: 37.4 (28 Apr 2021 16:32)  T(F): 98 (29 Apr 2021 11:26), Max: 99.4 (28 Apr 2021 16:32)  HR: 74 (29 Apr 2021 11:26) (71 - 92)  BP: 150/78 (29 Apr 2021 11:26) (110/69 - 158/84)  BP(mean): 89 (29 Apr 2021 10:30) (78 - 92)  RR: 16 (29 Apr 2021 11:26) (11 - 23)  SpO2: 100% (29 Apr 2021 11:26) (96% - 100%)

## 2021-04-19 NOTE — PROGRESS NOTE ADULT - ASSESSMENT
Patient is a 60 yo M, with PMH of HTN, b/l TKR, and COVID 2-3 months ago, not hospitalized  CKD, s/p  PE on lovenox, and recent seizure( unknown etiology) s/p emergency cric on 03/25/2021 from tongue injury lingual hematoma 2/2 to seizure blocking airway (discharged on 4/3/2021), now p/w difficulty breathing, had N/V x1 yesterday and abd pain-now resolved.  Nocturnal, worsening, since discharge. Pt feels he cannot breath especially lying down, denies fever, cough, or hematemesis. No more seizure at home, denies use of EtOH. Pt is placed on BiPAP for upper airway obstruction in ED, to help work of breathing. admitted to CTI for close monitoring       Problem/Recommendation - 1:  Problem: Tracheal stenosis following tracheostomy. Recommendation: CTS eval appreciated  monitor closelyO2 supplement   aspiration precautions  Echo noted  patient is medically optimized for OR    Problem/Recommendation - 2:  ·  Problem: Pulmonary embolus.  Recommendation: on lovenox at home  may resumefor now, hold priro to OR   check LE DVT study  Echo , normal   Pt with h/o pancytopenia, and being worked up for antiphospholipid syndrome/lupus anticoagulant,    Hart marrow biopsy W/U at [facility].     Problem/Recommendation - 3:  ·  Problem: CKD (chronic kidney disease).  Recommendation: monitor BUN/Cr  at his baseline  IV Hydration PRN  monitor urine output.     Problem/Recommendation - 4:  ·  Problem: HTN (hypertension).  Recommendation: BP control per ICU team  resume oral meds as tolerated.     Problem/Recommendation - 5:  ·  Problem: 2019 novel coronavirus disease (COVID-19).  Recommendation: stable O2 sat.     Problem/Recommendation - 6:  Problem: Prophylactic measure. Recommendation: DVT and GI PPX

## 2021-04-19 NOTE — DISCHARGE NOTE PROVIDER - NSDCFUADDAPPT_GEN_ALL_CORE_FT
See Dr. Allen in 2 weeks in office. Call for an apt. [phone]  You will need a repeat Bronchoscopy in 4 weeks.   Continue your blood thinner for now, but it will be have to be held prior to the Bronchoscopy  See you Hematologist Dr. Dr. Gil Tee in 1-2 weeks. Follow up any results  See your Kidney doctor in next few weeks as well.

## 2021-04-19 NOTE — DIETITIAN INITIAL EVALUATION ADULT. - OTHER INFO
62 yo M, with PMH of HTN, b/l TKR, and COVID 2-3 months ago, not hospitalized,  CKD, s/p  PE on lovenox, and recent seizure( unknown etiology) s/p emergency cric on 03/25/2021 from tongue injury lingual hematoma 2/2 to seizure blocking airway (discharged on 4/3/2021), now p/w difficulty breathing ,had N/V x1 yesterday and abd pain-now resolved.  Nocturnal, worsening, since discharge. Pt feels he cannot breath especially lying down, denies fever, cough, or hematemesis. No more seizure at home, denies use of EtOH. Pt is placed on BiPAP for upper airway obstruction in ED, to help work of breathing.    S/P Dysphagia consult on 3/29-recommended dysphagia 3 diet with nectar thickened fluids    OR today (4/19/21)  for tracheal stenosis-bronchoscopy with tracheal dilation.

## 2021-04-19 NOTE — DISCHARGE NOTE PROVIDER - NSDCCPTREATMENT_GEN_ALL_CORE_FT
PRINCIPAL PROCEDURE  Procedure: Bronchoscopy, with tracheal dilation  Findings and Treatment:        PRINCIPAL PROCEDURE  Procedure: Bronchoscopy, with tracheal dilation  Findings and Treatment:       SECONDARY PROCEDURE  Procedure: Resection of trachea, open approach  Findings and Treatment:

## 2021-04-19 NOTE — DIETITIAN INITIAL EVALUATION ADULT. - ORAL INTAKE PTA/DIET HISTORY
Attempted to see pt for nutrition evaluation, but pt off unit in surgery.  Extensive chart review conducted to obtain nutrition related information.  Pt w/no known allergies.  Pt admitted with N/V x 1 and abdominal pain.  Pt w/recent [facility] admission-RDN note reviewed (3/29/21)-Pt on enteral nutrition at that time.  No wt change was noted at that time.  UBW reported as 175lbs (79.5kg).  Wt on 3/28=79.8kg.  Wt on this admission 10kg less, but wt 4/18 up 4.7kg. Request recheck of wt status.

## 2021-04-20 LAB
ANION GAP SERPL CALC-SCNC: 9 MMOL/L — SIGNIFICANT CHANGE UP (ref 7–14)
BUN SERPL-MCNC: 32 MG/DL — HIGH (ref 7–23)
CALCIUM SERPL-MCNC: 9.2 MG/DL — SIGNIFICANT CHANGE UP (ref 8.4–10.5)
CHLORIDE SERPL-SCNC: 102 MMOL/L — SIGNIFICANT CHANGE UP (ref 98–107)
CO2 SERPL-SCNC: 21 MMOL/L — LOW (ref 22–31)
CREAT SERPL-MCNC: 2.75 MG/DL — HIGH (ref 0.5–1.3)
GLUCOSE SERPL-MCNC: 158 MG/DL — HIGH (ref 70–99)
HCT VFR BLD CALC: 31 % — LOW (ref 39–50)
HGB BLD-MCNC: 9.9 G/DL — LOW (ref 13–17)
MCHC RBC-ENTMCNC: 29.4 PG — SIGNIFICANT CHANGE UP (ref 27–34)
MCHC RBC-ENTMCNC: 31.9 GM/DL — LOW (ref 32–36)
MCV RBC AUTO: 92 FL — SIGNIFICANT CHANGE UP (ref 80–100)
NRBC # BLD: 0 /100 WBCS — SIGNIFICANT CHANGE UP
NRBC # FLD: 0 K/UL — SIGNIFICANT CHANGE UP
PLATELET # BLD AUTO: 211 K/UL — SIGNIFICANT CHANGE UP (ref 150–400)
POTASSIUM SERPL-MCNC: 5.1 MMOL/L — SIGNIFICANT CHANGE UP (ref 3.5–5.3)
POTASSIUM SERPL-SCNC: 5.1 MMOL/L — SIGNIFICANT CHANGE UP (ref 3.5–5.3)
RBC # BLD: 3.37 M/UL — LOW (ref 4.2–5.8)
RBC # FLD: 13.6 % — SIGNIFICANT CHANGE UP (ref 10.3–14.5)
SARS-COV-2 RNA SPEC QL NAA+PROBE: SIGNIFICANT CHANGE UP
SODIUM SERPL-SCNC: 132 MMOL/L — LOW (ref 135–145)
WBC # BLD: 8.03 K/UL — SIGNIFICANT CHANGE UP (ref 3.8–10.5)
WBC # FLD AUTO: 8.03 K/UL — SIGNIFICANT CHANGE UP (ref 3.8–10.5)

## 2021-04-20 PROCEDURE — 99223 1ST HOSP IP/OBS HIGH 75: CPT | Mod: GC

## 2021-04-20 PROCEDURE — 71045 X-RAY EXAM CHEST 1 VIEW: CPT | Mod: 26

## 2021-04-20 PROCEDURE — 99233 SBSQ HOSP IP/OBS HIGH 50: CPT

## 2021-04-20 RX ORDER — ACETAMINOPHEN 500 MG
650 TABLET ORAL EVERY 6 HOURS
Refills: 0 | Status: DISCONTINUED | OUTPATIENT
Start: 2021-04-20 | End: 2021-04-23

## 2021-04-20 RX ORDER — PANTOPRAZOLE SODIUM 20 MG/1
40 TABLET, DELAYED RELEASE ORAL
Refills: 0 | Status: DISCONTINUED | OUTPATIENT
Start: 2021-04-20 | End: 2021-04-29

## 2021-04-20 RX ORDER — ENOXAPARIN SODIUM 100 MG/ML
70 INJECTION SUBCUTANEOUS DAILY
Refills: 0 | Status: DISCONTINUED | OUTPATIENT
Start: 2021-04-21 | End: 2021-04-21

## 2021-04-20 RX ORDER — AMLODIPINE BESYLATE 2.5 MG/1
5 TABLET ORAL DAILY
Refills: 0 | Status: DISCONTINUED | OUTPATIENT
Start: 2021-04-20 | End: 2021-04-23

## 2021-04-20 RX ORDER — ENOXAPARIN SODIUM 100 MG/ML
60 INJECTION SUBCUTANEOUS
Qty: 120 | Refills: 0
Start: 2021-04-20 | End: 2021-04-21

## 2021-04-20 RX ORDER — LEVETIRACETAM 250 MG/1
750 TABLET, FILM COATED ORAL
Refills: 0 | Status: DISCONTINUED | OUTPATIENT
Start: 2021-04-20 | End: 2021-04-23

## 2021-04-20 RX ADMIN — LEVETIRACETAM 400 MILLIGRAM(S): 250 TABLET, FILM COATED ORAL at 06:00

## 2021-04-20 RX ADMIN — ENOXAPARIN SODIUM 40 MILLIGRAM(S): 100 INJECTION SUBCUTANEOUS at 05:59

## 2021-04-20 RX ADMIN — AMLODIPINE BESYLATE 5 MILLIGRAM(S): 2.5 TABLET ORAL at 16:27

## 2021-04-20 RX ADMIN — Medication 650 MILLIGRAM(S): at 23:32

## 2021-04-20 NOTE — PROGRESS NOTE ADULT - SUBJECTIVE AND OBJECTIVE BOX
DATE OF SERVICE: 04-20-21 @ 06:53    Subjective: Patient seen and examined. No new events except as noted.     SUBJECTIVE/ROS:  s/p tracheal dilation   pt feels better  no sob       MEDICATIONS:  MEDICATIONS  (STANDING):  acetaminophen  IVPB .. 1000 milliGRAM(s) IV Intermittent once  acetaminophen  IVPB .. 1000 milliGRAM(s) IV Intermittent once  acetaminophen  IVPB .. 1000 milliGRAM(s) IV Intermittent once  acetaminophen  IVPB .. 1000 milliGRAM(s) IV Intermittent once  dextrose 5% + sodium chloride 0.45%. 1000 milliLiter(s) (70 mL/Hr) IV Continuous <Continuous>  enoxaparin Injectable 40 milliGRAM(s) SubCutaneous every 12 hours  levETIRAcetam  IVPB 750 milliGRAM(s) IV Intermittent every 12 hours  pantoprazole  Injectable 40 milliGRAM(s) IV Push daily      PHYSICAL EXAM:  T(C): 36.6 (04-20-21 @ 04:00), Max: 36.7 (04-19-21 @ 16:00)  HR: 95 (04-20-21 @ 06:00) (65 - 95)  BP: 159/89 (04-20-21 @ 06:00) (125/95 - 159/89)  RR: 22 (04-20-21 @ 06:00) (11 - 22)  SpO2: 98% (04-20-21 @ 06:00) (96% - 100%)  Wt(kg): --  I&O's Summary    18 Apr 2021 07:01  -  19 Apr 2021 07:00  --------------------------------------------------------  IN: 1356.5 mL / OUT: 2900 mL / NET: -1543.5 mL    19 Apr 2021 07:01  -  20 Apr 2021 06:53  --------------------------------------------------------  IN: 1490 mL / OUT: 3250 mL / NET: -1760 mL            JVP: Normal  Neck: supple  Lung: clear   CV: S1 S2 , Murmur:  Abd: soft  Ext: No edema  neuro: Awake / alert  Psych: flat affect  Skin: normal``    LABS/DATA:    CARDIAC MARKERS:                                9.9    8.03  )-----------( 211      ( 20 Apr 2021 05:32 )             31.0     04-20    132<L>  |  102  |  32<H>  ----------------------------<  158<H>  5.1   |  21<L>  |  2.75<H>    Ca    9.2      20 Apr 2021 05:32  Phos  3.1     04-19  Mg     1.9     04-19      proBNP:   Lipid Profile:   HgA1c:   TSH:     TELE:  EKG:

## 2021-04-20 NOTE — PROGRESS NOTE ADULT - SUBJECTIVE AND OBJECTIVE BOX
POST ANESTHESIA EVALUATION    61y Male POSTOP DAY 1 S/P     MENTAL STATUS: Patient participation [x  ] Awake     [  ] Arousable     [  ] Sedated    AIRWAY PATENCY: [ x ] Satisfactory  [  ] Other:     Vital Signs Last 24 Hrs  T(C): 36.7 (20 Apr 2021 08:00), Max: 36.7 (19 Apr 2021 16:00)  T(F): 98.1 (20 Apr 2021 08:00), Max: 98.1 (20 Apr 2021 08:00)  HR: 100 (20 Apr 2021 09:00) (65 - 100)  BP: 143/85 (20 Apr 2021 08:00) (125/95 - 159/89)  BP(mean): 97 (20 Apr 2021 08:00) (90 - 114)  RR: 17 (20 Apr 2021 09:00) (11 - 22)  SpO2: 99% (20 Apr 2021 09:00) (96% - 100%)  I&O's Summary    19 Apr 2021 07:01  -  20 Apr 2021 07:00  --------------------------------------------------------  IN: 1490 mL / OUT: 3250 mL / NET: -1760 mL    20 Apr 2021 07:01  -  20 Apr 2021 09:33  --------------------------------------------------------  IN: 0 mL / OUT: 200 mL / NET: -200 mL          NAUSEA/ VOMITTING:  [  x] NONE  [  ] CONTROLLED [  ] OTHER     PAIN: [x  ] CONTROLLED WITH CURRENT REGIMEN  [  ] OTHER    [ x ] NO APPARENT ANESTHESIA COMPLICATIONS      Comments:
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
## 2021-04-20 NOTE — PROGRESS NOTE ADULT - ASSESSMENT
Tracheal stenosis  s/p tracheal dilation   fu with CTS    HTN  stble     CKD  monitor lytes , crt

## 2021-04-20 NOTE — CONSULT NOTE ADULT - ATTENDING COMMENTS
Patient consulted for perioperative AC management in the setting of unfounded h/o APLS. Agree with the plan suggested in fellow's documentation. Patient to follow up with primary oncologist after discharge.
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
## 2021-04-20 NOTE — PROGRESS NOTE ADULT - SUBJECTIVE AND OBJECTIVE BOX
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 Previously on apixaban. Now on lovenox in anticipation for biopsies and surgery.  Seizure disorder  CKD  Hx of COVID (2/2021)      INTERVAL EVENTS:   OR yesterday. transferred to CTICU.  On RA  Will need tracheal resection for definitive treatment of tracheal stenosis      HISTORY:   Patient denies dyspnea and denies stridor. Denies chest pain, cough, pleuritic pain, fever. Denies abdominal pain, nausea, or vomiting.    PHYSICAL EXAM:   Gen: Comfortable, No acute distress  Eyes: Sclera white, Conjunctiva normal, Eyelids normal, Pupils symmetrical   ENT: Mucous membranes moist,  ,  ,    Neck: Trachea midline,  ,  ,  ,  ,   CV: Rate regular, Rhythm regular,  ,  ,    Resp: Breath sounds clear, No accessory muscles use,  ,    Abd: Soft, Non-distended, Non-tender, Bowel sounds normal,  ,  ,    Skin: Warm, No peripheral edema of lower extremities,  ,    : No lester  Neuro: Moving all 4 extremities,    Psych: A&Ox3      ASSESSMENT AND PLAN:     NEURO:    Seizure disorder - stable. continue antiepileptics. Neuro consult.        RESPIRATORY:  Severe tracheal stenosis s/p dilation (4/19) - Stable, but will need tracheal resection for definitive treatment.     Pulmonary Embolism - stable. lovenox daily      CARDIOVASCULAR:  Hemodynamically stable - Not on pressors. Continue hemodynamic monitoring.  HTN - stable. Continue home antihypertensives               RENAL:  CKD – Stable. Renally dose medications. Monitor for hyperkalemia and uremia. Avoid nephrotoxic medications. Monitor IOs and electrolytes.         GASTROINTESTINAL:  GI prophylaxis not indicated  Zofran and Reglan IV PRN for nausea  Regular diet            HEMATOLOGIC:  No signs of active bleeding. Monitor Hgb in CBC in AM  On therapeutic anticoagulation.    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 Previously on apixaban. Now on lovenox in anticipation for biopsies and surgery.        INFECTIOUS DISEASE:  All surgical sites appear clean. No signs of active infection. Will monitor for fever and leukocytosis.          ENDOCRINE:  Stable – Monitor glucose fingersticks for goal 120-180.            Pertinent clinical, laboratory, radiographic, hemodynamic, echocardiographic, respiratory data, microbiologic data and chart were reviewed by myself and analyzed frequently throughout the course of the day and night by myself.    Plan discussed at length with the CTICU staff and Attending CT Surgeon.     Patient's status was discussed with patient at bedside.    _________________________  VITAL SIGNS:  Vital Signs Last 24 Hrs  T(C): 36.8 (20 Apr 2021 12:00), Max: 36.8 (20 Apr 2021 12:00)  T(F): 98.2 (20 Apr 2021 12:00), Max: 98.2 (20 Apr 2021 12:00)  HR: 81 (20 Apr 2021 14:00) (65 - 107)  BP: 143/82 (20 Apr 2021 14:00) (112/82 - 159/89)  BP(mean): 97 (20 Apr 2021 14:00) (89 - 114)  RR: 14 (20 Apr 2021 14:00) (11 - 24)  SpO2: 99% (20 Apr 2021 14:00) (96% - 100%)  I/Os:   I&O's Detail    19 Apr 2021 07:01  -  20 Apr 2021 07:00  --------------------------------------------------------  IN:    dextrose 5% + sodium chloride 0.45%: 910 mL    IV PiggyBack: 100 mL    Oral Fluid: 480 mL  Total IN: 1490 mL    OUT:    Voided (mL): 3250 mL  Total OUT: 3250 mL    Total NET: -1760 mL      20 Apr 2021 07:01  -  20 Apr 2021 14:42  --------------------------------------------------------  IN:    Oral Fluid: 240 mL  Total IN: 240 mL    OUT:    Voided (mL): 475 mL  Total OUT: 475 mL    Total NET: -235 mL              MEDICATIONS:  MEDICATIONS  (STANDING):  amLODIPine   Tablet 5 milliGRAM(s) Oral daily  levETIRAcetam 750 milliGRAM(s) Oral two times a day  pantoprazole    Tablet 40 milliGRAM(s) Oral before breakfast    MEDICATIONS  (PRN):  acetaminophen   Tablet .. 650 milliGRAM(s) Oral every 6 hours PRN Mild Pain (1 - 3)  hydrALAZINE Injectable 10 milliGRAM(s) IV Push every 4 hours PRN systolic blood pressure greater than 160  racepinephrine  2.25% Inhalation 0.5 milliLiter(s) Inhalation every 6 hours PRN Stridor      LABS:                        9.9    8.03  )-----------( 211      ( 20 Apr 2021 05:32 )             31.0     04-20    132<L>  |  102  |  32<H>  ----------------------------<  158<H>  5.1   |  21<L>  |  2.75<H>    Ca    9.2      20 Apr 2021 05:32  Phos  3.1     04-19  Mg     1.9     04-19        PT/INR - ( 19 Apr 2021 03:47 )   PT: 12.8 sec;   INR: 1.12 ratio         PTT - ( 19 Apr 2021 03:47 )  PTT:75.2 sec      _________________________

## 2021-04-20 NOTE — PROGRESS NOTE ADULT - SUBJECTIVE AND OBJECTIVE BOX
Name of Patient : ANNEMARIE KELLEY  MRN: [id number]  DATE OF SERVICE: 04-20-21     Subjective: Patient seen and examined. No new events except as noted.   S/P tracheal dilatation  doing okay     REVIEW OF SYSTEMS:    CONSTITUTIONAL: No weakness, fevers or chills  EYES/ENT: No visual changes;  No vertigo or throat pain   NECK: No pain or stiffness  RESPIRATORY: No cough, wheezing, hemoptysis; No shortness of breath  CARDIOVASCULAR: No chest pain or palpitations  GASTROINTESTINAL: No abdominal or epigastric pain. No nausea, vomiting, or hematemesis; No diarrhea or constipation. No melena or hematochezia.  GENITOURINARY: No dysuria, frequency or hematuria  NEUROLOGICAL: No numbness or weakness  SKIN: No itching, burning, rashes, or lesions   All other review of systems is negative unless indicated above.    MEDICATIONS:  MEDICATIONS  (STANDING):  amLODIPine   Tablet 5 milliGRAM(s) Oral daily  levETIRAcetam 750 milliGRAM(s) Oral two times a day  pantoprazole    Tablet 40 milliGRAM(s) Oral before breakfast      PHYSICAL EXAM:  T(C): 36.7 (04-20-21 @ 16:00), Max: 36.8 (04-20-21 @ 12:00)  HR: 95 (04-20-21 @ 17:00) (66 - 107)  BP: 140/84 (04-20-21 @ 17:00) (112/82 - 159/89)  RR: 17 (04-20-21 @ 17:00) (11 - 24)  SpO2: 98% (04-20-21 @ 17:00) (96% - 100%)  Wt(kg): --  I&O's Summary    19 Apr 2021 07:01  -  20 Apr 2021 07:00  --------------------------------------------------------  IN: 1490 mL / OUT: 3250 mL / NET: -1760 mL    20 Apr 2021 07:01  -  20 Apr 2021 21:29  --------------------------------------------------------  IN: 720 mL / OUT: 1025 mL / NET: -305 mL          Appearance: Normal	  HEENT:  PERRLA   Lymphatic: No lymphadenopathy   Cardiovascular: Normal S1 S2, no JVD  Respiratory: normal effort , clear  Gastrointestinal:  Soft, Non-tender  Skin: No rashes,  warm to touch  Psychiatry:  Mood & affect appropriate  Musculuskeletal: No edema      All labs, Imaging and EKGs personally reviewed         04-19-21 @ 07:01  -  04-20-21 @ 07:00  --------------------------------------------------------  IN: 1490 mL / OUT: 3250 mL / NET: -1760 mL    04-20-21 @ 07:01  -  04-20-21 @ 21:29  --------------------------------------------------------  IN: 720 mL / OUT: 1025 mL / NET: -305 mL                            9.9    8.03  )-----------( 211      ( 20 Apr 2021 05:32 )             31.0               04-20    132<L>  |  102  |  32<H>  ----------------------------<  158<H>  5.1   |  21<L>  |  2.75<H>    Ca    9.2      20 Apr 2021 05:32  Phos  3.1     04-19  Mg     1.9     04-19      PT/INR - ( 19 Apr 2021 03:47 )   PT: 12.8 sec;   INR: 1.12 ratio         PTT - ( 19 Apr 2021 03:47 )  PTT:75.2 sec

## 2021-04-20 NOTE — PROGRESS NOTE ADULT - ASSESSMENT
Patient is a 62 yo M, with PMH of HTN, b/l TKR, and COVID 2-3 months ago, not hospitalized  CKD, s/p  PE on lovenox, and recent seizure( unknown etiology) s/p emergency cric on 03/25/2021 from tongue injury lingual hematoma 2/2 to seizure blocking airway (discharged on 4/3/2021), now p/w difficulty breathing, had N/V x1 yesterday and abd pain-now resolved.  Nocturnal, worsening, since discharge. Pt feels he cannot breath especially lying down, denies fever, cough, or hematemesis. No more seizure at home, denies use of EtOH. Pt is placed on BiPAP for upper airway obstruction in ED, to help work of breathing. admitted to CTI for close monitoring       Problem/Recommendation - 1:  Problem: Tracheal stenosis following tracheostomy. Recommendation: CTS eval appreciated  monitor closelyO2 supplement   aspiration precautions  Echo noted  S/P tracheal dilatation   CTS care     Problem/Recommendation - 2:  ·  Problem: Pulmonary embolus.  Recommendation: on lovenox at home  hematlogy eval called by team   check LE DVT study  Echo , normal   Pt with h/o pancytopenia, and being worked up for antiphospholipid syndrome/lupus anticoagulant,    Hart marrow biopsy W/U at [facility]. follow up with hematology     Problem/Recommendation - 3:  ·  Problem: CKD (chronic kidney disease).  Recommendation: monitor BUN/Cr  at his baseline  IV Hydration PRN  monitor urine output.     Problem/Recommendation - 4:  ·  Problem: HTN (hypertension).  Recommendation: BP control per ICU team  resume oral meds as tolerated.     Problem/Recommendation - 5:  ·  Problem: 2019 novel coronavirus disease (COVID-19).  Recommendation: stable O2 sat.     Problem/Recommendation - 6:  Problem: Prophylactic measure. Recommendation: DVT and GI PPX

## 2021-04-20 NOTE — CONSULT NOTE ADULT - SUBJECTIVE AND OBJECTIVE BOX
ANNEMARIE KELLEY  Male  MRN-[id number]    HPI: 60 yo man with a PMHx of PE 1/2021 (on Lovenox), HTN, CKD, prior COVID infection, and recent first-time unprovoked seizure in 03/2021 c/b tongue injury with lingual hematoma causing blockage of airway (s/p emergency cric on 03/25/2021) now presenting with difficulty breathing since discharge on 04/03. Aforementioned seizure was likely provoked in setting of PE/COVID infection/reduced hypoxia. EEG inpatient during last admission with no clear epileptiform discharges. Patient found to have severe tracheal stenosis. Will be undergoing tracheal resection under general anesthesia during this admission. Patient complains of difficulty breathing but has no other complaints. No further seizures. States he is compliant with Keppra. Neuro exam nonfocal. Denies HA, dizziness, blurry/double vision, numbness/tingling, weakness.    ROS: All negative except as mentioned in HPI.    PAST MEDICAL & SURGICAL HISTORY:  HTN (hypertension)    Chronic kidney disease, unspecified CKD stage    Pulmonary embolus  diagnosed about 6 months ago incidentally found on imaging related to falling off a bike and chest pain    2019 novel coronavirus disease (COVID-19)  never hospitalized    Arthritis    History of total right knee replacement (TKR)  bilateral    MEDICATIONS  (STANDING):  amLODIPine   Tablet 5 milliGRAM(s) Oral daily  levETIRAcetam 750 milliGRAM(s) Oral two times a day  pantoprazole    Tablet 40 milliGRAM(s) Oral before breakfast    MEDICATIONS  (PRN):  acetaminophen   Tablet .. 650 milliGRAM(s) Oral every 6 hours PRN Mild Pain (1 - 3)  hydrALAZINE Injectable 10 milliGRAM(s) IV Push every 4 hours PRN systolic blood pressure greater than 160  racepinephrine  2.25% Inhalation 0.5 milliLiter(s) Inhalation every 6 hours PRN Stridor    Allergies    No Known Allergies    ICU Vital Signs Last 24 Hrs  T(C): 36.8 (20 Apr 2021 12:00), Max: 36.8 (20 Apr 2021 12:00)  T(F): 98.2 (20 Apr 2021 12:00), Max: 98.2 (20 Apr 2021 12:00)  HR: 80 (20 Apr 2021 15:00) (66 - 107)  BP: 143/80 (20 Apr 2021 15:00) (112/82 - 159/89)  BP(mean): 97 (20 Apr 2021 15:00) (89 - 114)  RR: 12 (20 Apr 2021 15:00) (11 - 24)  SpO2: 98% (20 Apr 2021 15:00) (96% - 100%)    Neuro Exam:   MS: Alert, eyes open spontaneously. Oriented to self, age, location, month, year. Speech is clear, fluent. Follows commands.  CN: PERRL. VFF. EOMI. V1-V3 intact. Face symmetric. Tongue midline.   Motor: All extremities antigravity without drift.  Sensory: Intact to LT throughout.  Coordination: No dysmetria on FNF or on HTS bilaterally.  Gait: Deferred.    LABS:               9.9    8.03  )-----------( 211      ( 20 Apr 2021 05:32 )             31.0     04-20    132<L>  |  102  |  32<H>  ----------------------------<  158<H>  5.1   |  21<L>  |  2.75<H>    Ca    9.2      20 Apr 2021 05:32  Phos  3.1     04-19  Mg     1.9     04-19    PT/INR - ( 19 Apr 2021 03:47 )   PT: 12.8 sec;   INR: 1.12 ratio      PTT - ( 19 Apr 2021 03:47 )  PTT:75.2 sec

## 2021-04-20 NOTE — CONSULT NOTE ADULT - ASSESSMENT
Assessment: 60 yo man with a PMHx of PE 1/2021 (on Lovenox), HTN, CKD, prior COVID infection, and recent first-time provoked seizure in 03/2021 c/b tongue injury with lingual hematoma causing blockage of airway (s/p emergency cric on 03/25/2021) now presenting with difficulty breathing since discharge on 04/03. Aforementioned seizure was likely provoked in setting of PE/COVID infection/reduced hypoxia. EEG inpatient during last admission with no clear epileptiform discharges. Patient found to have severe tracheal stenosis. Will be undergoing tracheal resection under general anesthesia during this admission.    Recommendations:  [] Can c/w home dose Keppra for now. To be titrated off by neurology as outpatient.  [] Patient neurologically cleared for general anesthesia.  [] Patient should follow up with epilepsy at [address] [place] [address] after discharge. Please instruct the patient to call [phone] to schedule this appointment.     Case to be discussed with neurology attending Dr. Linn.

## 2021-04-20 NOTE — CONSULT NOTE ADULT - ATTENDING COMMENTS
60 yo with hx of first time provoked seizure in the setting of hypoxia / COVID infection 3/2021 complicated by tongue injury and lingual hematoma causing blockage of airway (s/p emergent crico on 3/25/99834) now presents with difficulty breathing and found to have severe tracheal stenosis. Patient is scheduled to undergo tracheal resection under general anaesthesia and neurology consulted for surgical clearance in the light of hx of seizure and patient being on keppra.     Of note, EEG during last admission did not show any seizure activity.     Patient awake and oriented. No acute distress. He denies any seizures since discharge and is compliant on keppra. No focal neurological deficits on exam.     No contraindications with proceeding with surgery from neurological stand point. Continue Keppra at home dose for now. Patient can follow up with neurology as outpatient to consider tapering off AED, given first time provoked seizures with normal EEG.    neurology will sign off

## 2021-04-21 LAB
ANION GAP SERPL CALC-SCNC: 9 MMOL/L — SIGNIFICANT CHANGE UP (ref 7–14)
BUN SERPL-MCNC: 49 MG/DL — HIGH (ref 7–23)
CALCIUM SERPL-MCNC: 9.3 MG/DL — SIGNIFICANT CHANGE UP (ref 8.4–10.5)
CHLORIDE SERPL-SCNC: 105 MMOL/L — SIGNIFICANT CHANGE UP (ref 98–107)
CO2 SERPL-SCNC: 21 MMOL/L — LOW (ref 22–31)
CREAT SERPL-MCNC: 3.42 MG/DL — HIGH (ref 0.5–1.3)
GLUCOSE SERPL-MCNC: 102 MG/DL — HIGH (ref 70–99)
HCT VFR BLD CALC: 30.3 % — LOW (ref 39–50)
HGB BLD-MCNC: 10.1 G/DL — LOW (ref 13–17)
MAGNESIUM SERPL-MCNC: 1.9 MG/DL — SIGNIFICANT CHANGE UP (ref 1.6–2.6)
MCHC RBC-ENTMCNC: 30.4 PG — SIGNIFICANT CHANGE UP (ref 27–34)
MCHC RBC-ENTMCNC: 33.3 GM/DL — SIGNIFICANT CHANGE UP (ref 32–36)
MCV RBC AUTO: 91.3 FL — SIGNIFICANT CHANGE UP (ref 80–100)
NRBC # BLD: 0 /100 WBCS — SIGNIFICANT CHANGE UP
NRBC # FLD: 0 K/UL — SIGNIFICANT CHANGE UP
PHOSPHATE SERPL-MCNC: 4.4 MG/DL — SIGNIFICANT CHANGE UP (ref 2.5–4.5)
PLATELET # BLD AUTO: 200 K/UL — SIGNIFICANT CHANGE UP (ref 150–400)
POTASSIUM SERPL-MCNC: 4.6 MMOL/L — SIGNIFICANT CHANGE UP (ref 3.5–5.3)
POTASSIUM SERPL-SCNC: 4.6 MMOL/L — SIGNIFICANT CHANGE UP (ref 3.5–5.3)
RBC # BLD: 3.32 M/UL — LOW (ref 4.2–5.8)
RBC # FLD: 13.9 % — SIGNIFICANT CHANGE UP (ref 10.3–14.5)
SODIUM SERPL-SCNC: 135 MMOL/L — SIGNIFICANT CHANGE UP (ref 135–145)
WBC # BLD: 8.83 K/UL — SIGNIFICANT CHANGE UP (ref 3.8–10.5)
WBC # FLD AUTO: 8.83 K/UL — SIGNIFICANT CHANGE UP (ref 3.8–10.5)

## 2021-04-21 PROCEDURE — 99233 SBSQ HOSP IP/OBS HIGH 50: CPT

## 2021-04-21 PROCEDURE — 99223 1ST HOSP IP/OBS HIGH 75: CPT

## 2021-04-21 PROCEDURE — 71045 X-RAY EXAM CHEST 1 VIEW: CPT | Mod: 26

## 2021-04-21 RX ADMIN — AMLODIPINE BESYLATE 5 MILLIGRAM(S): 2.5 TABLET ORAL at 06:25

## 2021-04-21 RX ADMIN — Medication 650 MILLIGRAM(S): at 02:17

## 2021-04-21 RX ADMIN — Medication 650 MILLIGRAM(S): at 12:45

## 2021-04-21 RX ADMIN — PANTOPRAZOLE SODIUM 40 MILLIGRAM(S): 20 TABLET, DELAYED RELEASE ORAL at 06:24

## 2021-04-21 RX ADMIN — Medication 650 MILLIGRAM(S): at 13:00

## 2021-04-21 RX ADMIN — ENOXAPARIN SODIUM 70 MILLIGRAM(S): 100 INJECTION SUBCUTANEOUS at 12:30

## 2021-04-21 RX ADMIN — Medication 650 MILLIGRAM(S): at 06:54

## 2021-04-21 RX ADMIN — Medication 650 MILLIGRAM(S): at 06:24

## 2021-04-21 NOTE — PROGRESS NOTE ADULT - SUBJECTIVE AND OBJECTIVE BOX
Name of Patient : ANNEMARIE KELLEY  MRN: [id number]  DATE OF SERVICE: 04-21-21     Subjective: Patient seen and examined. No new events except as noted.   doing okay     REVIEW OF SYSTEMS:    CONSTITUTIONAL: No weakness, fevers or chills  EYES/ENT: No visual changes;  No vertigo or throat pain   NECK: No pain or stiffness  RESPIRATORY: No cough, wheezing, hemoptysis; No shortness of breath  CARDIOVASCULAR: No chest pain or palpitations  GASTROINTESTINAL: No abdominal or epigastric pain. No nausea, vomiting, or hematemesis; No diarrhea or constipation. No melena or hematochezia.  GENITOURINARY: No dysuria, frequency or hematuria  NEUROLOGICAL: No numbness or weakness  SKIN: No itching, burning, rashes, or lesions   All other review of systems is negative unless indicated above.    MEDICATIONS:  MEDICATIONS  (STANDING):  amLODIPine   Tablet 5 milliGRAM(s) Oral daily  levETIRAcetam 750 milliGRAM(s) Oral two times a day  pantoprazole    Tablet 40 milliGRAM(s) Oral before breakfast      PHYSICAL EXAM:  T(C): 36.6 (04-21-21 @ 21:00), Max: 36.7 (04-21-21 @ 04:00)  HR: 78 (04-21-21 @ 21:00) (65 - 100)  BP: 111/70 (04-21-21 @ 21:00) (104/69 - 153/97)  RR: 18 (04-21-21 @ 21:00) (9 - 18)  SpO2: 98% (04-21-21 @ 21:00) (95% - 100%)  Wt(kg): --  I&O's Summary    20 Apr 2021 07:01  -  21 Apr 2021 07:00  --------------------------------------------------------  IN: 720 mL / OUT: 2225 mL / NET: -1505 mL    21 Apr 2021 07:01  -  21 Apr 2021 22:57  --------------------------------------------------------  IN: 0 mL / OUT: 300 mL / NET: -300 mL          Appearance: Normal	  HEENT:  PERRLA   Lymphatic: No lymphadenopathy   Cardiovascular: Normal S1 S2, no JVD  Respiratory: normal effort , clear  Gastrointestinal:  Soft, Non-tender  Skin: No rashes,  warm to touch  Psychiatry:  Mood & affect appropriate  Musculuskeletal: No edema      All labs, Imaging and EKGs personally reviewed     04-20-21 @ 07:01  -  04-21-21 @ 07:00  --------------------------------------------------------  IN: 720 mL / OUT: 2225 mL / NET: -1505 mL    04-21-21 @ 07:01  -  04-21-21 @ 22:57  --------------------------------------------------------  IN: 0 mL / OUT: 300 mL / NET: -300 mL                              10.1   8.83  )-----------( 200      ( 21 Apr 2021 06:23 )             30.3               04-21    135  |  105  |  49<H>  ----------------------------<  102<H>  4.6   |  21<L>  |  3.42<H>    Ca    9.3      21 Apr 2021 06:23  Phos  4.4     04-21  Mg     1.9     04-21

## 2021-04-21 NOTE — PROGRESS NOTE ADULT - SUBJECTIVE AND OBJECTIVE BOX
DATE OF SERVICE: 04-21-21 @ 11:09    Subjective: Patient seen and examined. No new events except as noted.     SUBJECTIVE/ROS:        MEDICATIONS:  MEDICATIONS  (STANDING):  amLODIPine   Tablet 5 milliGRAM(s) Oral daily  enoxaparin Injectable 70 milliGRAM(s) SubCutaneous daily  levETIRAcetam 750 milliGRAM(s) Oral two times a day  pantoprazole    Tablet 40 milliGRAM(s) Oral before breakfast      PHYSICAL EXAM:  T(C): 36.6 (04-21-21 @ 08:00), Max: 36.8 (04-20-21 @ 12:00)  HR: 73 (04-21-21 @ 10:00) (65 - 107)  BP: 148/89 (04-21-21 @ 09:00) (104/69 - 153/112)  RR: 14 (04-21-21 @ 10:00) (9 - 24)  SpO2: 97% (04-21-21 @ 10:00) (95% - 100%)  Wt(kg): --  I&O's Summary    20 Apr 2021 07:01  -  21 Apr 2021 07:00  --------------------------------------------------------  IN: 720 mL / OUT: 2225 mL / NET: -1505 mL    21 Apr 2021 07:01  -  21 Apr 2021 11:09  --------------------------------------------------------  IN: 0 mL / OUT: 300 mL / NET: -300 mL            JVP: Normal  Neck: supple  Lung: clear   CV: S1 S2 , Murmur:  Abd: soft  Ext: No edema  neuro: Awake / alert  Psych: flat affect  Skin: normal``    LABS/DATA:    CARDIAC MARKERS:                                10.1   8.83  )-----------( 200      ( 21 Apr 2021 06:23 )             30.3     04-21    135  |  105  |  49<H>  ----------------------------<  102<H>  4.6   |  21<L>  |  3.42<H>    Ca    9.3      21 Apr 2021 06:23  Phos  4.4     04-21  Mg     1.9     04-21      proBNP:   Lipid Profile:   HgA1c:   TSH:     TELE:  EKG:

## 2021-04-21 NOTE — PROGRESS NOTE ADULT - SUBJECTIVE AND OBJECTIVE BOX
ANNEMARIE KELLEY            [id number]         No Known Allergies                              62 yo M, with PMH of HTN, b/l TKR, and COVID 2-3 months ago, not hospitilized,  CKD, s/p  PE on lovenox, and recent seizure( unknown etiology) s/p emergency cric on 03/25/2021 from tongue injury lingual hematoma 2/2 to seizure blocking airway (discharged on 4/3/2021), now p/w difficulty breathing,had N/V x1 yesterday and abd pain-now resolved.  Nocturnal, worsening, since discharge. Pt feels he cannot breath especially lying down, denies fever, cough, or hematemesis. No more seizure at home, denies use of EtOH. Pt is placed on BiPAP for upper airway obstruction in ED, to help work of breathing.   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)  Acute hypoxic respiratory failure requiring Heliox  Critical Airway  Recent emergency cricothyrotomy due airway obstruction from expanding tongue hematoma s/p decannulation (4/2021)  Acute PE (dx 10/2020) on lovenox  Seizure disorder  HTN  CKD              Hx of COVID (2/2021)               Home Medications:  labetalol 100 mg oral tablet: 1 tab(s) orally 3 times a day (16 Apr 2021 11:48)  Norvasc 10 mg oral tablet: 1 tab(s) orally once a day (16 Apr 2021 11:48)      PAST MEDICAL & SURGICAL HISTORY:  HTN (hypertension)    Chronic kidney disease, unspecified CKD stage    Pulmonary embolus  diagnosed about 6 months ago incidentally found on imaging related to falling off a bike and chest pain    2019 novel coronavirus disease (COVID-19)  never hospitilized    Arthritis    History of total right knee replacement (TKR)  bilateral          ICU Vital Signs Last 24 Hrs  T(C): 36.7 (21 Apr 2021 04:00), Max: 36.8 (20 Apr 2021 12:00)  T(F): 98.1 (21 Apr 2021 04:00), Max: 98.2 (20 Apr 2021 12:00)  HR: 79 (21 Apr 2021 09:00) (65 - 107)  BP: 148/89 (21 Apr 2021 09:00) (104/69 - 153/112)  BP(mean): 104 (21 Apr 2021 09:00) (77 - 120)  ABP: --  ABP(mean): --  RR: 14 (21 Apr 2021 09:00) (9 - 24)  SpO2: 99% (21 Apr 2021 09:00) (95% - 100%)    I&O's Detail    20 Apr 2021 07:01  -  21 Apr 2021 07:00  --------------------------------------------------------  IN:    Oral Fluid: 720 mL  Total IN: 720 mL    OUT:    Voided (mL): 2225 mL  Total OUT: 2225 mL    Total NET: -1505 mL        CAPILLARY BLOOD GLUCOSE          Home Medications:  labetalol 100 mg oral tablet: 1 tab(s) orally 3 times a day (16 Apr 2021 11:48)  Norvasc 10 mg oral tablet: 1 tab(s) orally once a day (16 Apr 2021 11:48)      MEDICATIONS  (STANDING):  amLODIPine   Tablet 5 milliGRAM(s) Oral daily  enoxaparin Injectable 70 milliGRAM(s) SubCutaneous daily  levETIRAcetam 750 milliGRAM(s) Oral two times a day  pantoprazole    Tablet 40 milliGRAM(s) Oral before breakfast    MEDICATIONS  (PRN):  acetaminophen   Tablet .. 650 milliGRAM(s) Oral every 6 hours PRN Mild Pain (1 - 3)  hydrALAZINE Injectable 10 milliGRAM(s) IV Push every 4 hours PRN systolic blood pressure greater than 160  racepinephrine  2.25% Inhalation 0.5 milliLiter(s) Inhalation every 6 hours PRN Stridor          Physical exam:     General:               Pt is awake, alert,  appears short of breath but not in distress                                                  Neuro:                  Nonfocal                             Cardiovascular:   S1 & S2, regular                           Respiratory:         Air entry is fair and equal on both sides, has bilateral rhonchi                           GI:                          Soft, nondistended and nontender, Bowel sounds active                            Ext:                        No cyanosis or edema                            Labs:                                                                           10.1   8.83  )-----------( 200      ( 21 Apr 2021 06:23 )             30.3             04-21    135  |  105  |  49<H>  ----------------------------<  102<H>  4.6   |  21<L>  |  3.42<H>    Ca    9.3      21 Apr 2021 06:23  Phos  4.4     04-21  Mg     1.9     04-21           CXR:    < from: Xray Chest 1 View- PORTABLE-Routine (Xray Chest 1 View- PORTABLE-Routine in AM.) (04.20.21 @ 06:10) >  INTERPRETATION:    No pneumomediastinum or pneumothorax post tracheal dilation.  The focal area of narrowing of the trachea at the level of the thoracic inlet is again seen appears the same.  The lungs are clear, the heart is not enlarged and there are no effusions.  COMPARISON:  April 19 at 4:23 AM    IMPRESSION:  Follow-up post tracheal dilation without complications.        Plan:   62 yo M, with PMH of HTN, b/l TKR, and COVID 2-3 months ago, not hospitilized,  CKD, s/p  PE on lovenox, and recent seizure( unknown etiology) s/p emergency cric on 03/25/2021 from tongue injury lingual hematoma 2/2 to seizure blocking airway (discharged on 4/3/2021), now p/w difficulty breathing,had N/V x1 yesterday and abd pain-now resolved.  Nocturnal, worsening, since discharge. Pt feels he cannot breath especially lying down, denies fever, cough, or hematemesis. No more seizure at home, denies use of EtOH. Pt is placed on BiPAP for upper airway obstruction in ED, to help work of breathing.   (16 Apr 2021 10:55)                              Neuro:                                         Pain control with  Tylenol IV PRN    Seizure disorder - currently on Keppra 750 mg BID.   Continue current dose and follow with Neurology after discharge                            Cardiovascular:                                          HTN: Continue hemodynamic monitoring. On Norvasc. Restart Labetalol if BP allows                            Respiratory:                                         On RA and comfortable     Racemic epinephrine PRN                                         Continue bronchodilators, pulmonary toilet as needed     Tracheal stenosis: For tracheal resection on Friday 4/23     Hx of P.E: Currently on therapeutic Lovenox which will be held 24hrs before SURGERY.                              GI                                         Tolerating PO                                         Continue GI prophylaxis with Protonix                                         Continue Zofran / Reglan for nausea - PRN	                                                                 Renal:                                         Monitor I/Os and electrolytes                                                 Hem/ Onc:                                         Chronic Anemia: Monitor H/H     Follow CBC in AM                           Infectious disease:                                            Monitor for fever / leukocytosis.                            Endocrine                                             Continue Accu-Checks with coverage    Pt is on SQ Heparin and Venodyne boots for DVT prophylaxis.     Pertinent clinical, laboratory, radiographic, hemodynamic, echocardiographic, respiratory data, microbiologic data and chart were reviewed and analyzed frequently throughout the course of the day and night  Patient seen, examined and plan discussed with CT Surgeon Dr. Allen  / CTICU team during rounds.    Pt is updated of the status and plan. Aware of surgical plan          Yefri Kidd MD

## 2021-04-21 NOTE — PROGRESS NOTE ADULT - ASSESSMENT
Patient is a 62 yo M, with PMH of HTN, b/l TKR, and COVID 2-3 months ago, not hospitalized  CKD, s/p  PE on lovenox, and recent seizure( unknown etiology) s/p emergency cric on 03/25/2021 from tongue injury lingual hematoma 2/2 to seizure blocking airway (discharged on 4/3/2021), now p/w difficulty breathing, had N/V x1 yesterday and abd pain-now resolved.  Nocturnal, worsening, since discharge. Pt feels he cannot breath especially lying down, denies fever, cough, or hematemesis. No more seizure at home, denies use of EtOH. Pt is placed on BiPAP for upper airway obstruction in ED, to help work of breathing. admitted to CTI for close monitoring       Problem/Recommendation - 1:  Problem: Tracheal stenosis following tracheostomy. Recommendation: CTS eval appreciated  monitor closelyO2 supplement   aspiration precautions  Echo noted  S/P tracheal dilatation   CTS care     Problem/Recommendation - 2:  ·  Problem: Pulmonary embolus.  Recommendation: on lovenox at home  hematlogy eval called by team   check LE DVT study  Echo , normal   Pt with h/o pancytopenia, and being worked up for antiphospholipid syndrome/lupus anticoagulant,    Hart marrow biopsy W/U at [facility]. follow up with hematology     Problem/Recommendation - 3:  ·  Problem: CKD (chronic kidney disease).  Recommendation: monitor BUN/Cr  at his baseline  IV Hydration PRN  monitor urine output.     Problem/Recommendation - 4:  ·  Problem: HTN (hypertension).  Recommendation: BP control per ICU team  resume oral meds as tolerated.     Problem/Recommendation - 5:  ·  Problem: 2019 novel coronavirus disease (COVID-19).  Recommendation: stable O2 sat.     Problem/Recommendation - 6:  Problem: Prophylactic measure. Recommendation: DVT and GI PPX

## 2021-04-21 NOTE — PROGRESS NOTE ADULT - ASSESSMENT
Tracheal stenosis  planned for resection   Oxygen  fu with CTS    HTN  stble     PE  on a/c   consider lower ext venous doppler to assess any residual clot if a/c need to be interrupted   unremarkable echo     CKD  monitor lytes , crt     Pre-Operative Cardiac Risk Stratification and Optimization   Based on current ACC/AHA guidelines, patient history and physical exam, the patient is considered to have elevated risk for this time sensitive urgent surgery  echo shows normal LV / RV function  no absolute CV objection to proceed to OR

## 2021-04-22 LAB
ANION GAP SERPL CALC-SCNC: 13 MMOL/L — SIGNIFICANT CHANGE UP (ref 7–14)
APTT BLD: 36.7 SEC — HIGH (ref 27–36.3)
APTT BLD: 61.7 SEC — HIGH (ref 27–36.3)
BLD GP AB SCN SERPL QL: NEGATIVE — SIGNIFICANT CHANGE UP
BUN SERPL-MCNC: 62 MG/DL — HIGH (ref 7–23)
CALCIUM SERPL-MCNC: 9.4 MG/DL — SIGNIFICANT CHANGE UP (ref 8.4–10.5)
CHLORIDE SERPL-SCNC: 101 MMOL/L — SIGNIFICANT CHANGE UP (ref 98–107)
CO2 SERPL-SCNC: 21 MMOL/L — LOW (ref 22–31)
CREAT SERPL-MCNC: 3.79 MG/DL — HIGH (ref 0.5–1.3)
GLUCOSE SERPL-MCNC: 97 MG/DL — SIGNIFICANT CHANGE UP (ref 70–99)
HCT VFR BLD CALC: 31.4 % — LOW (ref 39–50)
HCT VFR BLD CALC: 32.8 % — LOW (ref 39–50)
HGB BLD-MCNC: 10.4 G/DL — LOW (ref 13–17)
HGB BLD-MCNC: 10.9 G/DL — LOW (ref 13–17)
INR BLD: 1 RATIO — SIGNIFICANT CHANGE UP (ref 0.88–1.16)
MAGNESIUM SERPL-MCNC: 1.9 MG/DL — SIGNIFICANT CHANGE UP (ref 1.6–2.6)
MCHC RBC-ENTMCNC: 30.1 PG — SIGNIFICANT CHANGE UP (ref 27–34)
MCHC RBC-ENTMCNC: 30.6 PG — SIGNIFICANT CHANGE UP (ref 27–34)
MCHC RBC-ENTMCNC: 33.1 GM/DL — SIGNIFICANT CHANGE UP (ref 32–36)
MCHC RBC-ENTMCNC: 33.2 GM/DL — SIGNIFICANT CHANGE UP (ref 32–36)
MCV RBC AUTO: 90.6 FL — SIGNIFICANT CHANGE UP (ref 80–100)
MCV RBC AUTO: 92.4 FL — SIGNIFICANT CHANGE UP (ref 80–100)
NRBC # BLD: 0 /100 WBCS — SIGNIFICANT CHANGE UP
NRBC # BLD: 0 /100 WBCS — SIGNIFICANT CHANGE UP
NRBC # FLD: 0 K/UL — SIGNIFICANT CHANGE UP
NRBC # FLD: 0 K/UL — SIGNIFICANT CHANGE UP
PHOSPHATE SERPL-MCNC: 4.2 MG/DL — SIGNIFICANT CHANGE UP (ref 2.5–4.5)
PLATELET # BLD AUTO: 205 K/UL — SIGNIFICANT CHANGE UP (ref 150–400)
PLATELET # BLD AUTO: 223 K/UL — SIGNIFICANT CHANGE UP (ref 150–400)
POTASSIUM SERPL-MCNC: 4.2 MMOL/L — SIGNIFICANT CHANGE UP (ref 3.5–5.3)
POTASSIUM SERPL-SCNC: 4.2 MMOL/L — SIGNIFICANT CHANGE UP (ref 3.5–5.3)
PROTHROM AB SERPL-ACNC: 11.4 SEC — SIGNIFICANT CHANGE UP (ref 10.6–13.6)
RBC # BLD: 3.4 M/UL — LOW (ref 4.2–5.8)
RBC # BLD: 3.62 M/UL — LOW (ref 4.2–5.8)
RBC # FLD: 13.7 % — SIGNIFICANT CHANGE UP (ref 10.3–14.5)
RBC # FLD: 13.9 % — SIGNIFICANT CHANGE UP (ref 10.3–14.5)
RH IG SCN BLD-IMP: POSITIVE — SIGNIFICANT CHANGE UP
SARS-COV-2 RNA SPEC QL NAA+PROBE: SIGNIFICANT CHANGE UP
SODIUM SERPL-SCNC: 135 MMOL/L — SIGNIFICANT CHANGE UP (ref 135–145)
WBC # BLD: 11.14 K/UL — HIGH (ref 3.8–10.5)
WBC # BLD: 12.48 K/UL — HIGH (ref 3.8–10.5)
WBC # FLD AUTO: 11.14 K/UL — HIGH (ref 3.8–10.5)
WBC # FLD AUTO: 12.48 K/UL — HIGH (ref 3.8–10.5)

## 2021-04-22 PROCEDURE — 71045 X-RAY EXAM CHEST 1 VIEW: CPT | Mod: 26

## 2021-04-22 PROCEDURE — 99233 SBSQ HOSP IP/OBS HIGH 50: CPT

## 2021-04-22 RX ORDER — LABETALOL HCL 100 MG
100 TABLET ORAL EVERY 12 HOURS
Refills: 0 | Status: DISCONTINUED | OUTPATIENT
Start: 2021-04-22 | End: 2021-04-23

## 2021-04-22 RX ORDER — HEPARIN SODIUM 5000 [USP'U]/ML
1000 INJECTION INTRAVENOUS; SUBCUTANEOUS
Qty: 25000 | Refills: 0 | Status: DISCONTINUED | OUTPATIENT
Start: 2021-04-22 | End: 2021-04-22

## 2021-04-22 RX ORDER — HEPARIN SODIUM 5000 [USP'U]/ML
850 INJECTION INTRAVENOUS; SUBCUTANEOUS
Qty: 25000 | Refills: 0 | Status: DISCONTINUED | OUTPATIENT
Start: 2021-04-22 | End: 2021-04-23

## 2021-04-22 RX ORDER — SODIUM CHLORIDE 9 MG/ML
1000 INJECTION INTRAMUSCULAR; INTRAVENOUS; SUBCUTANEOUS
Refills: 0 | Status: DISCONTINUED | OUTPATIENT
Start: 2021-04-23 | End: 2021-04-23

## 2021-04-22 RX ADMIN — Medication 100 MILLIGRAM(S): at 02:57

## 2021-04-22 RX ADMIN — AMLODIPINE BESYLATE 5 MILLIGRAM(S): 2.5 TABLET ORAL at 05:31

## 2021-04-22 RX ADMIN — LEVETIRACETAM 750 MILLIGRAM(S): 250 TABLET, FILM COATED ORAL at 17:34

## 2021-04-22 RX ADMIN — Medication 100 MILLIGRAM(S): at 17:34

## 2021-04-22 RX ADMIN — Medication 650 MILLIGRAM(S): at 03:39

## 2021-04-22 RX ADMIN — Medication 650 MILLIGRAM(S): at 04:09

## 2021-04-22 RX ADMIN — Medication 650 MILLIGRAM(S): at 23:16

## 2021-04-22 RX ADMIN — HEPARIN SODIUM 8.5 UNIT(S)/HR: 5000 INJECTION INTRAVENOUS; SUBCUTANEOUS at 08:36

## 2021-04-22 RX ADMIN — PANTOPRAZOLE SODIUM 40 MILLIGRAM(S): 20 TABLET, DELAYED RELEASE ORAL at 05:31

## 2021-04-22 RX ADMIN — LEVETIRACETAM 750 MILLIGRAM(S): 250 TABLET, FILM COATED ORAL at 05:31

## 2021-04-22 NOTE — CONSULT NOTE ADULT - SUBJECTIVE AND OBJECTIVE BOX
NEPHROLOGY - NSN    Patient seen and examined.    HPI:   62 yo M, with PMH of HTN, b/l TKR, and COVID 2-3 months ago, not hospitilized,  CKD, s/p  PE on lovenox, and recent seizure( unknown etiology) s/p emergency cric on 03/25/2021 from tongue injury lingual hematoma 2/2 to seizure blocking airway (discharged on 4/3/2021), now p/w difficulty breathing,had N/V x1 yesterday and abd pain-now resolved.  Nocturnal, worsening, since discharge. Pt feels he cannot breath especially lying down, denies fever, cough, or hematemesis. No more seizure at home, denies use of EtOH. Pt is placed on BiPAP for upper airway obstruction in ED, to help work of breathing.   (16 Apr 2021 10:55)      PAST MEDICAL & SURGICAL HISTORY:  HTN (hypertension)    Chronic kidney disease, unspecified CKD stage    Pulmonary embolus  diagnosed about 6 months ago incidentally found on imaging related to falling off a bike and chest pain    2019 novel coronavirus disease (COVID-19)  never hospitilized    Arthritis    History of total right knee replacement (TKR)  bilateral        MEDICATIONS  (STANDING):  amLODIPine   Tablet 5 milliGRAM(s) Oral daily  heparin  Infusion 850 Unit(s)/Hr (8.5 mL/Hr) IV Continuous <Continuous>  labetalol 100 milliGRAM(s) Oral every 12 hours  levETIRAcetam 750 milliGRAM(s) Oral two times a day  pantoprazole    Tablet 40 milliGRAM(s) Oral before breakfast      Allergies    No Known Allergies    Intolerances        SOCIAL HISTORY:  Denies alcohol abuse, drug abuse or tobacco usage.     FAMILY HISTORY:      VITALS:  T(C): 36.7 (04-22-21 @ 08:45), Max: 36.9 (04-22-21 @ 05:00)  HR: 81 (04-22-21 @ 08:45) (73 - 109)  BP: 117/76 (04-22-21 @ 08:45) (111/70 - 154/104)  RR: 14 (04-22-21 @ 08:45) (14 - 18)  SpO2: 100% (04-22-21 @ 08:45) (97% - 100%)    REVIEW OF SYSTEMS:  Denies any nausea, vomiting, diarrhea, fever or chills. Denies chest pain, SOB, focal weakness, hematuria or dysuria. Good oral intake and denies fatigue or weakness. All other pertinent systems are reviewed and are negative.    PHYSICAL EXAM:  Constitutional: NAD  HEENT: EOMI  Neck:  No JVD, supple   Respiratory: CTA B/L  Cardiovascular: S1 and S2, RRR  Gastrointestinal: + BS, soft, NT, ND  Extremities: No peripheral edema, + peripheral pulses  Neurological: A/O x 3, CN2-12 intact  Psychiatric: Normal mood, normal affect  : No Higginbotham  Skin: No rashes, C/D/I  Access: Not applicable    I and O's:    04-20 @ 07:01  -  04-21 @ 07:00  --------------------------------------------------------  IN: 720 mL / OUT: 2225 mL / NET: -1505 mL    04-21 @ 07:01  -  04-22 @ 07:00  --------------------------------------------------------  IN: 0 mL / OUT: 550 mL / NET: -550 mL          LABS:                        10.9   11.14 )-----------( 223      ( 22 Apr 2021 07:02 )             32.8     04-22    135  |  101  |  62<H>  ----------------------------<  97  4.2   |  21<L>  |  3.79<H>    Ca    9.4      22 Apr 2021 07:02  Phos  4.2     04-22  Mg     1.9     04-22        URINE:      RADIOLOGY & ADDITIONAL STUDIES:     NEPHROLOGY - NSN    Patient seen and examined.    HPI:   60 yo M, with PMH of HTN, b/l TKR, and COVID 2-3 months ago, not hospitilized,  CKD, s/p  PE on lovenox, and recent seizure( unknown etiology) s/p emergency cric on 03/25/2021 from tongue injury lingual hematoma 2/2 to seizure blocking airway (discharged on 4/3/2021), now p/w difficulty breathing,had N/V x1 yesterday and abd pain-now resolved.  Nocturnal, worsening, since discharge. Pt feels he cannot breath especially lying down, denies fever, cough, or hematemesis. No more seizure at home, denies use of EtOH. Pt is placed on BiPAP for upper airway obstruction in ED, to help work of breathing.  Pt has anemia and was sched for bone marrow as outpt;  He has CKD stage 4 and has outpt nephrologist in [facility] as well.  He states that he has taken a combination of pain meds and that might be the reason for renal insufficiency   He is not diabetic at present   There is no hematuria or bubbles in the urine.  No history of NSAIDS or nephrolithisis.  The patient urinates once or twice in the night and there is no incontinence.  No family hx or renal disease or back pain.    No recent abx use.  No alleviating or aggravating factors with respect to the kidneys.       PAST MEDICAL & SURGICAL HISTORY:  HTN (hypertension)    Chronic kidney disease, unspecified CKD stage    Pulmonary embolus  diagnosed about 6 months ago incidentally found on imaging related to falling off a bike and chest pain    2019 novel coronavirus disease (COVID-19)  never hospitilized    Arthritis    History of total right knee replacement (TKR)  bilateral        MEDICATIONS  (STANDING):  amLODIPine   Tablet 5 milliGRAM(s) Oral daily  heparin  Infusion 850 Unit(s)/Hr (8.5 mL/Hr) IV Continuous <Continuous>  labetalol 100 milliGRAM(s) Oral every 12 hours  levETIRAcetam 750 milliGRAM(s) Oral two times a day  pantoprazole    Tablet 40 milliGRAM(s) Oral before breakfast      Allergies    No Known Allergies    Intolerances        SOCIAL HISTORY:  Denies alcohol abuse, drug abuse or tobacco usage.     FAMILY HISTORY:      VITALS:  T(C): 36.7 (04-22-21 @ 08:45), Max: 36.9 (04-22-21 @ 05:00)  HR: 81 (04-22-21 @ 08:45) (73 - 109)  BP: 117/76 (04-22-21 @ 08:45) (111/70 - 154/104)  RR: 14 (04-22-21 @ 08:45) (14 - 18)  SpO2: 100% (04-22-21 @ 08:45) (97% - 100%)    REVIEW OF SYSTEMS:  Denies any nausea, vomiting, diarrhea, fever or chills. Denies chest pain, SOB, focal weakness, hematuria or dysuria. Good oral intake and denies fatigue or weakness. All other pertinent systems are reviewed and are negative.    PHYSICAL EXAM:  Constitutional: NAD  HEENT: EOMI  Neck:  No JVD, supple   Respiratory: CTA B/L  Cardiovascular: S1 and S2, RRR  Gastrointestinal: + BS, soft, NT, ND  Extremities: No peripheral edema, + peripheral pulses  Neurological: A/O x 3, CN2-12 intact  Psychiatric: Normal mood, normal affect  : No Higginbotham  Skin: No rashes, C/D/I  Access: Not applicable    I and O's:    04-20 @ 07:01  -  04-21 @ 07:00  --------------------------------------------------------  IN: 720 mL / OUT: 2225 mL / NET: -1505 mL    04-21 @ 07:01  -  04-22 @ 07:00  --------------------------------------------------------  IN: 0 mL / OUT: 550 mL / NET: -550 mL          LABS:                        10.9   11.14 )-----------( 223      ( 22 Apr 2021 07:02 )             32.8     04-22    135  |  101  |  62<H>  ----------------------------<  97  4.2   |  21<L>  |  3.79<H>    Ca    9.4      22 Apr 2021 07:02  Phos  4.2     04-22  Mg     1.9     04-22        URINE:      RADIOLOGY & ADDITIONAL STUDIES:    < from: Xray Chest 1 View- PORTABLE-Routine (04.21.21 @ 05:28) >    EXAM:  XR CHEST PORTABLE ROUTINE 1V        PROCEDURE DATE:  Apr 21 2021         INTERPRETATION:  TIME OF EXAM: April 21, 2021 at 5:28 AM    CLINICAL INFORMATION: Post tracheal dilation.    TECHNIQUE:   Portable chest    INTERPRETATION:    Lungs remain clear. The heart is not enlarged and there is no effusion or pneumothorax. Minimal tracheal narrowing at the level of the thoracic inlet.      COMPARISON:  April 20      IMPRESSION:  Status post tracheal dilation without complications.              LUCIA COOMBS MD; Attending Radiologist  This document has been electronically signed. Apr 21 2021 12:04PM    < end of copied text >

## 2021-04-22 NOTE — PROGRESS NOTE ADULT - ASSESSMENT
Patient is a 60 yo M, with PMH of HTN, b/l TKR, and COVID 2-3 months ago, not hospitalized  CKD, s/p  PE on lovenox, and recent seizure( unknown etiology) s/p emergency cric on 03/25/2021 from tongue injury lingual hematoma 2/2 to seizure blocking airway (discharged on 4/3/2021), now p/w difficulty breathing, had N/V x1 yesterday and abd pain-now resolved.  Nocturnal, worsening, since discharge. Pt feels he cannot breath especially lying down, denies fever, cough, or hematemesis. No more seizure at home, denies use of EtOH. Pt is placed on BiPAP for upper airway obstruction in ED, to help work of breathing. admitted to CTI for close monitoring       Problem/Recommendation - 1:  Problem: Tracheal stenosis following tracheostomy. Recommendation: CTS eval appreciated  monitor closelyO2 supplement   aspiration precautions  Echo noted  S/P tracheal dilatation   CTS care     Problem/Recommendation - 2:  ·  Problem: Pulmonary embolus.  Recommendation: on lovenox at home  hematlogy eval called by team   check LE DVT study  Echo , normal   Pt with h/o pancytopenia, and being worked up for antiphospholipid syndrome/lupus anticoagulant,    Hart marrow biopsy W/U at [facility]. follow up with hematology     Problem/Recommendation - 3:  ·  Problem: CKD (chronic kidney disease).  Recommendation: monitor BUN/Cr  at his baseline  IV Hydration PRN  monitor urine output.   renal follow up appreciated     Problem/Recommendation - 4:  ·  Problem: HTN (hypertension).  Recommendation: BP control per ICU team  resume oral meds as tolerated.     Problem/Recommendation - 5:  ·  Problem: 2019 novel coronavirus disease (COVID-19).  Recommendation: stable O2 sat.     Problem/Recommendation - 6:  Problem: Prophylactic measure. Recommendation: DVT and GI PPX

## 2021-04-22 NOTE — PROGRESS NOTE ADULT - SUBJECTIVE AND OBJECTIVE BOX
Subjective: "I feel ok. Just nervous about tomorrow. Hope everything goes ok" Pt's questions answered in regards to procedure. Pt denies CP or SOb. Amb ad portillo in room. On Heparin gtt.     Vital Signs:  Vital Signs Last 24 Hrs  T(C): 36.8 (04-22-21 @ 13:15), Max: 36.9 (04-22-21 @ 05:00)  T(F): 98.3 (04-22-21 @ 13:15), Max: 98.5 (04-22-21 @ 05:00)  HR: 92 (04-22-21 @ 13:15) (77 - 109)  BP: 132/77 (04-22-21 @ 13:15) (111/70 - 154/104)  RR: 18 (04-22-21 @ 13:15) (14 - 18)  SpO2: 99% (04-22-21 @ 13:15) (97% - 100%) on (O2)    Telemetry/Alarms:  General: WN/WD NAD  Neurology: Awake, nonfocal, SINGH x 4  Eyes: Scleras clear, PERRLA/ EOMI, Gross vision intact  ENT:Gross hearing intact, grossly patent pharynx, no stridor  Neck: Neck supple, trachea midline, No JVD,  slight coarse airway sounds. No stridor  Respiratory: CTA B/L, No wheezing, rales, rhonchi  CV: RRR, S1S2, no murmurs, rubs or gallops  Abdominal: Soft, NT, ND +BS,   Extremities: No edema, + peripheral pulses  Skin: No Rashes, Hematoma, Ecchymosis  Lymphatic: No Neck, axilla, groin LAD  Psych: Oriented x 3, normal affect    Relevant labs, radiology and Medications reviewed                        10.9   11.14 )-----------( 223      ( 22 Apr 2021 07:02 )             32.8     04-22    135  |  101  |  62<H>  ----------------------------<  97  4.2   |  21<L>  |  3.79<H>    Ca    9.4      22 Apr 2021 07:02  Phos  4.2     04-22  Mg     1.9     04-22      PT/INR - ( 22 Apr 2021 07:02 )   PT: 11.4 sec;   INR: 1.00 ratio         PTT - ( 22 Apr 2021 07:02 )  PTT:36.7 sec  MEDICATIONS  (STANDING):  amLODIPine   Tablet 5 milliGRAM(s) Oral daily  heparin  Infusion 850 Unit(s)/Hr (8.5 mL/Hr) IV Continuous <Continuous>  labetalol 100 milliGRAM(s) Oral every 12 hours  levETIRAcetam 750 milliGRAM(s) Oral two times a day  pantoprazole    Tablet 40 milliGRAM(s) Oral before breakfast    MEDICATIONS  (PRN):  acetaminophen   Tablet .. 650 milliGRAM(s) Oral every 6 hours PRN Mild Pain (1 - 3)  hydrALAZINE Injectable 10 milliGRAM(s) IV Push every 4 hours PRN systolic blood pressure greater than 160  racepinephrine  2.25% Inhalation 0.5 milliLiter(s) Inhalation every 6 hours PRN Stridor    Pertinent Physical Exam  I&O's Summary    21 Apr 2021 07:01  -  22 Apr 2021 07:00  --------------------------------------------------------  IN: 0 mL / OUT: 550 mL / NET: -550 mL    Social History:  Social History (marital status, living situation, occupation, tobacco use, alcohol and drug use, and sexual history): (+) etOH once weekly,  tobacco-quit 6 years ago, smoked socially for 10 years, (-) recreational drug  retired, lives with wife, children are in [facility]      Assessment  61y Male  w/ PAST MEDICAL & SURGICAL HISTORY:  HTN (hypertension)    Chronic kidney disease, unspecified CKD stage    Pulmonary embolus  diagnosed about 6 months ago incidentally found on imaging related to falling off a bike and chest pain    2019 novel coronavirus disease (COVID-19)  never hospitilized    Arthritis    History of total right knee replacement (TKR)  bilateral    admitted with complaints of Patient is a 61y old  Male who presents with a chief complaint of SOB (22 Apr 2021 09:23)   60 yo M, with PMH of HTN, b/l TKR, and COVID 2-3 months ago, not hospitilized,  CKD, s/p  PE on lovenox, and recent seizure( unknown etiology) s/p emergency cric on 03/25/2021 from tongue injury lingual hematoma 2/2 to seizure blocking airway (discharged on 4/3/2021), now p/w difficulty breathing,had N/V x1 yesterday and abd pain-now resolved.  Nocturnal, worsening, since discharge. Pt feels he cannot breath especially lying down, denies fever, cough, or hematemesis. No more seizure at home, denies use of EtOH. Pt is placed on BiPAP for upper airway obstruction in ED, to help work of breathing. Admitted to CTICU for airway monitoring. On 4/19 had a bronch, tracheal dilation. Improved clinically. transferred to T. Transitioned to Hep gtt this am for planned Tracheal resection tomorrow. Pt worsening CRI/CKD    PLAN  Neuro: Pain management  Pulm: Encourage coughing, deep breathing and use of incentive spirometry.    Cardio: Monitor telemetry/alarms  GI: Tolerating diet. Continue stool softeners.  Renal: monitor urine output, supplement electrolytes as needed. Worsening creatinine, will consult Nephro for optimization prior to OR  Vasc: Heparin SC/SCDs for DVT prophylaxis On Heparin gtt for PE  Heme: Stable H/H. .   ID: Off antibiotics. Stable.  Therapy: OOB/ambulate  Disposition: Tracheal resection tomorrow with DR. Allen.   Discussed with Cardiothoracic Team at AM rounds.

## 2021-04-22 NOTE — PROGRESS NOTE ADULT - SUBJECTIVE AND OBJECTIVE BOX
Name of Patient : ANNEMARIE KELLEY  MRN: [id number]  DATE OF SERVICE: 04-22-21 @ 16:59    Subjective: Patient seen and examined. No new events except as noted.   doing okay  plan for OR     REVIEW OF SYSTEMS:    CONSTITUTIONAL: No weakness, fevers or chills  EYES/ENT: No visual changes;  No vertigo or throat pain   NECK: No pain or stiffness  RESPIRATORY: No cough, wheezing, hemoptysis; No shortness of breath  CARDIOVASCULAR: No chest pain or palpitations  GASTROINTESTINAL: No abdominal or epigastric pain. No nausea, vomiting, or hematemesis; No diarrhea or constipation. No melena or hematochezia.  GENITOURINARY: No dysuria, frequency or hematuria  NEUROLOGICAL: No numbness or weakness  SKIN: No itching, burning, rashes, or lesions   All other review of systems is negative unless indicated above.    MEDICATIONS:  MEDICATIONS  (STANDING):  amLODIPine   Tablet 5 milliGRAM(s) Oral daily  heparin  Infusion 850 Unit(s)/Hr (8.5 mL/Hr) IV Continuous <Continuous>  labetalol 100 milliGRAM(s) Oral every 12 hours  levETIRAcetam 750 milliGRAM(s) Oral two times a day  pantoprazole    Tablet 40 milliGRAM(s) Oral before breakfast      PHYSICAL EXAM:  T(C): 36.8 (04-22-21 @ 16:20), Max: 36.9 (04-22-21 @ 05:00)  HR: 90 (04-22-21 @ 16:20) (77 - 109)  BP: 130/78 (04-22-21 @ 16:20) (111/70 - 154/104)  RR: 18 (04-22-21 @ 16:20) (14 - 18)  SpO2: 100% (04-22-21 @ 16:20) (97% - 100%)  Wt(kg): --  I&O's Summary    21 Apr 2021 07:01  -  22 Apr 2021 07:00  --------------------------------------------------------  IN: 0 mL / OUT: 550 mL / NET: -550 mL          Appearance: Normal	  HEENT:  PERRLA   Lymphatic: No lymphadenopathy   Cardiovascular: Normal S1 S2, no JVD  Respiratory: normal effort , clear  Gastrointestinal:  Soft, Non-tender  Skin: No rashes,  warm to touch  Psychiatry:  Mood & affect appropriate  Musculuskeletal: No edema      All labs, Imaging and EKGs personally reviewed       04-21-21 @ 07:01  -  04-22-21 @ 07:00  --------------------------------------------------------  IN: 0 mL / OUT: 550 mL / NET: -550 mL

## 2021-04-22 NOTE — CONSULT NOTE ADULT - ASSESSMENT
60 yo M, with PMH of HTN, b/l TKR, and COVID 2-3 months ago, not hospitilized,  CKD, s/p  PE on lovenox, and recent seizure awaiting tracheal stenosis in am  CKD stage 4 at baseline     1 Renal-IVF to be started tonight;  Check UA   Check PTH to assess secondary hyperparathyroidism;  No need for renal sono   2 CVS-Not in heart failure   3CTS-OR in am     Pt is aware of possible worsening creatinine post op    Sayed [facility]   [phone]

## 2021-04-22 NOTE — CONSULT NOTE ADULT - CONSULT REQUESTED DATE/TIME
16-Apr-2021 16:35
16-Apr-2021 05:00
20-Apr-2021 13:00
22-Apr-2021
16-Apr-2021 08:43
16-Apr-2021 12:04
20-Apr-2021 15:58

## 2021-04-22 NOTE — PROGRESS NOTE ADULT - SUBJECTIVE AND OBJECTIVE BOX
DATE OF SERVICE: 04-22-21 @ 06:29    Subjective: Patient seen and examined. No new events except as noted.     SUBJECTIVE/ROS:  No chest pain, dyspnea, palpitation, or dizziness.       MEDICATIONS:  MEDICATIONS  (STANDING):  amLODIPine   Tablet 5 milliGRAM(s) Oral daily  heparin  Infusion 850 Unit(s)/Hr (8.5 mL/Hr) IV Continuous <Continuous>  labetalol 100 milliGRAM(s) Oral every 12 hours  levETIRAcetam 750 milliGRAM(s) Oral two times a day  pantoprazole    Tablet 40 milliGRAM(s) Oral before breakfast      PHYSICAL EXAM:  T(C): 36.9 (04-22-21 @ 05:00), Max: 36.9 (04-22-21 @ 05:00)  HR: 82 (04-22-21 @ 05:00) (73 - 109)  BP: 120/82 (04-22-21 @ 05:00) (111/70 - 154/104)  RR: 16 (04-22-21 @ 05:00) (12 - 18)  SpO2: 97% (04-22-21 @ 05:00) (97% - 100%)  Wt(kg): --  I&O's Summary    20 Apr 2021 07:01  -  21 Apr 2021 07:00  --------------------------------------------------------  IN: 720 mL / OUT: 2225 mL / NET: -1505 mL    21 Apr 2021 07:01  -  22 Apr 2021 06:29  --------------------------------------------------------  IN: 0 mL / OUT: 550 mL / NET: -550 mL            JVP: Normal  Neck: supple  Lung: clear   CV: S1 S2 , Murmur:  Abd: soft  Ext: No edema  neuro: Awake / alert  Psych: flat affect  Skin: normal``    LABS/DATA:    CARDIAC MARKERS:                                10.1   8.83  )-----------( 200      ( 21 Apr 2021 06:23 )             30.3     04-21    135  |  105  |  49<H>  ----------------------------<  102<H>  4.6   |  21<L>  |  3.42<H>    Ca    9.3      21 Apr 2021 06:23  Phos  4.4     04-21  Mg     1.9     04-21      proBNP:   Lipid Profile:   HgA1c:   TSH:     TELE:  EKG:

## 2021-04-22 NOTE — CONSULT NOTE ADULT - CONSULT REASON
[place]
shortness of breath
Pre-Operative Cardiac Risk Stratification and Optimization
AC recommendations prior to OR
medical eulogio ment
s/p emergency cric, now SOB
Clearance for surgery

## 2021-04-23 ENCOUNTER — APPOINTMENT (OUTPATIENT)
Dept: THORACIC SURGERY | Facility: HOSPITAL | Age: 61
End: 2021-04-23

## 2021-04-23 ENCOUNTER — RESULT REVIEW (OUTPATIENT)
Age: 61
End: 2021-04-23

## 2021-04-23 PROCEDURE — 31781 RECONSTRUCT WINDPIPE: CPT

## 2021-04-23 PROCEDURE — 99233 SBSQ HOSP IP/OBS HIGH 50: CPT

## 2021-04-23 PROCEDURE — 88305 TISSUE EXAM BY PATHOLOGIST: CPT | Mod: 26

## 2021-04-23 PROCEDURE — 71045 X-RAY EXAM CHEST 1 VIEW: CPT | Mod: 26

## 2021-04-23 RX ORDER — ACETAMINOPHEN 500 MG
1000 TABLET ORAL ONCE
Refills: 0 | Status: COMPLETED | OUTPATIENT
Start: 2021-04-24 | End: 2021-04-24

## 2021-04-23 RX ORDER — LEVETIRACETAM 250 MG/1
750 TABLET, FILM COATED ORAL EVERY 12 HOURS
Refills: 0 | Status: COMPLETED | OUTPATIENT
Start: 2021-04-23 | End: 2021-04-24

## 2021-04-23 RX ORDER — ACETAMINOPHEN 500 MG
1000 TABLET ORAL ONCE
Refills: 0 | Status: COMPLETED | OUTPATIENT
Start: 2021-04-23 | End: 2021-04-23

## 2021-04-23 RX ORDER — HYDROMORPHONE HYDROCHLORIDE 2 MG/ML
0.5 INJECTION INTRAMUSCULAR; INTRAVENOUS; SUBCUTANEOUS
Refills: 0 | Status: DISCONTINUED | OUTPATIENT
Start: 2021-04-23 | End: 2021-04-23

## 2021-04-23 RX ORDER — HYDROMORPHONE HYDROCHLORIDE 2 MG/ML
0.25 INJECTION INTRAMUSCULAR; INTRAVENOUS; SUBCUTANEOUS
Refills: 0 | Status: DISCONTINUED | OUTPATIENT
Start: 2021-04-23 | End: 2021-04-25

## 2021-04-23 RX ORDER — SODIUM CHLORIDE 9 MG/ML
1000 INJECTION INTRAMUSCULAR; INTRAVENOUS; SUBCUTANEOUS
Refills: 0 | Status: DISCONTINUED | OUTPATIENT
Start: 2021-04-23 | End: 2021-04-24

## 2021-04-23 RX ORDER — OXYCODONE AND ACETAMINOPHEN 5; 325 MG/1; MG/1
1 TABLET ORAL EVERY 4 HOURS
Refills: 0 | Status: DISCONTINUED | OUTPATIENT
Start: 2021-04-23 | End: 2021-04-23

## 2021-04-23 RX ORDER — LEVETIRACETAM 250 MG/1
750 TABLET, FILM COATED ORAL EVERY 12 HOURS
Refills: 0 | Status: DISCONTINUED | OUTPATIENT
Start: 2021-04-24 | End: 2021-04-29

## 2021-04-23 RX ADMIN — AMLODIPINE BESYLATE 5 MILLIGRAM(S): 2.5 TABLET ORAL at 06:05

## 2021-04-23 RX ADMIN — Medication 1000 MILLIGRAM(S): at 19:18

## 2021-04-23 RX ADMIN — SODIUM CHLORIDE 30 MILLILITER(S): 9 INJECTION INTRAMUSCULAR; INTRAVENOUS; SUBCUTANEOUS at 19:40

## 2021-04-23 RX ADMIN — HYDROMORPHONE HYDROCHLORIDE 0.25 MILLIGRAM(S): 2 INJECTION INTRAMUSCULAR; INTRAVENOUS; SUBCUTANEOUS at 21:34

## 2021-04-23 RX ADMIN — HYDROMORPHONE HYDROCHLORIDE 0.25 MILLIGRAM(S): 2 INJECTION INTRAMUSCULAR; INTRAVENOUS; SUBCUTANEOUS at 21:50

## 2021-04-23 RX ADMIN — LEVETIRACETAM 750 MILLIGRAM(S): 250 TABLET, FILM COATED ORAL at 06:05

## 2021-04-23 RX ADMIN — Medication 650 MILLIGRAM(S): at 00:15

## 2021-04-23 RX ADMIN — SODIUM CHLORIDE 60 MILLILITER(S): 9 INJECTION INTRAMUSCULAR; INTRAVENOUS; SUBCUTANEOUS at 00:01

## 2021-04-23 RX ADMIN — SODIUM CHLORIDE 30 MILLILITER(S): 9 INJECTION INTRAMUSCULAR; INTRAVENOUS; SUBCUTANEOUS at 14:33

## 2021-04-23 RX ADMIN — HYDROMORPHONE HYDROCHLORIDE 0.5 MILLIGRAM(S): 2 INJECTION INTRAMUSCULAR; INTRAVENOUS; SUBCUTANEOUS at 11:56

## 2021-04-23 RX ADMIN — HYDROMORPHONE HYDROCHLORIDE 0.5 MILLIGRAM(S): 2 INJECTION INTRAMUSCULAR; INTRAVENOUS; SUBCUTANEOUS at 14:45

## 2021-04-23 RX ADMIN — Medication 400 MILLIGRAM(S): at 18:48

## 2021-04-23 RX ADMIN — PANTOPRAZOLE SODIUM 40 MILLIGRAM(S): 20 TABLET, DELAYED RELEASE ORAL at 06:05

## 2021-04-23 RX ADMIN — HYDROMORPHONE HYDROCHLORIDE 0.5 MILLIGRAM(S): 2 INJECTION INTRAMUSCULAR; INTRAVENOUS; SUBCUTANEOUS at 12:10

## 2021-04-23 RX ADMIN — LEVETIRACETAM 400 MILLIGRAM(S): 250 TABLET, FILM COATED ORAL at 17:29

## 2021-04-23 RX ADMIN — HYDROMORPHONE HYDROCHLORIDE 0.5 MILLIGRAM(S): 2 INJECTION INTRAMUSCULAR; INTRAVENOUS; SUBCUTANEOUS at 14:33

## 2021-04-23 RX ADMIN — Medication 100 MILLIGRAM(S): at 06:05

## 2021-04-23 NOTE — PROGRESS NOTE ADULT - SUBJECTIVE AND OBJECTIVE BOX
DATE OF SERVICE: 04-23-21 @ 06:57    Subjective: Patient seen and examined. No new events except as noted.     SUBJECTIVE/ROS:  No chest pain, palpitation, or dizziness.       MEDICATIONS:  MEDICATIONS  (STANDING):  amLODIPine   Tablet 5 milliGRAM(s) Oral daily  heparin  Infusion 850 Unit(s)/Hr (8.5 mL/Hr) IV Continuous <Continuous>  labetalol 100 milliGRAM(s) Oral every 12 hours  levETIRAcetam 750 milliGRAM(s) Oral two times a day  pantoprazole    Tablet 40 milliGRAM(s) Oral before breakfast  sodium chloride 0.9%. 1000 milliLiter(s) (60 mL/Hr) IV Continuous <Continuous>      PHYSICAL EXAM:  T(C): 36.9 (04-23-21 @ 06:40), Max: 36.9 (04-23-21 @ 06:40)  HR: 73 (04-23-21 @ 06:40) (73 - 92)  BP: 137/85 (04-23-21 @ 06:40) (117/76 - 137/85)  RR: 16 (04-23-21 @ 06:40) (14 - 18)  SpO2: 100% (04-23-21 @ 06:00) (99% - 100%)  Wt(kg): --  I&O's Summary    21 Apr 2021 07:01  -  22 Apr 2021 07:00  --------------------------------------------------------  IN: 0 mL / OUT: 550 mL / NET: -550 mL    22 Apr 2021 07:01  -  23 Apr 2021 06:57  --------------------------------------------------------  IN: 402.5 mL / OUT: 550 mL / NET: -147.5 mL      Height (cm): 160 (04-23 @ 06:00)  Weight (kg): 69.3 (04-23 @ 06:00)  BMI (kg/m2): 27.1 (04-23 @ 06:00)  BSA (m2): 1.72 (04-23 @ 06:00)      JVP: Normal  Neck: supple  Lung: clear   CV: S1 S2 , Murmur:  Abd: soft  Ext: No edema  neuro: Awake / alert  Psych: flat affect  Skin: normal``    LABS/DATA:    CARDIAC MARKERS:                                10.4   12.48 )-----------( 205      ( 22 Apr 2021 14:50 )             31.4     04-22    135  |  101  |  62<H>  ----------------------------<  97  4.2   |  21<L>  |  3.79<H>    Ca    9.4      22 Apr 2021 07:02  Phos  4.2     04-22  Mg     1.9     04-22      proBNP:   Lipid Profile:   HgA1c:   TSH:     TELE:  EKG:
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
## 2021-04-23 NOTE — PROGRESS NOTE ADULT - SUBJECTIVE AND OBJECTIVE BOX
NEPHROLOGY-[facility] [phone]        Patient seen and examined in bed.  He was not on pressors         MEDICATIONS  (STANDING):  amLODIPine   Tablet 5 milliGRAM(s) Oral daily  labetalol 100 milliGRAM(s) Oral every 12 hours  levETIRAcetam 750 milliGRAM(s) Oral two times a day  pantoprazole    Tablet 40 milliGRAM(s) Oral before breakfast  sodium chloride 0.9%. 1000 milliLiter(s) (30 mL/Hr) IV Continuous <Continuous>      VITAL:  T(C): , Max: 36.9 (04-23-21 @ 06:40)  T(F): , Max: 98.4 (04-23-21 @ 06:40)  HR: 74 (04-23-21 @ 15:00)  BP: 119/64 (04-23-21 @ 15:00)  BP(mean): 78 (04-23-21 @ 15:00)  RR: 12 (04-23-21 @ 15:00)  SpO2: 97% (04-23-21 @ 15:00)  Wt(kg): --    I and O's:    04-22 @ 07:01  -  04-23 @ 07:00  --------------------------------------------------------  IN: 402.5 mL / OUT: 550 mL / NET: -147.5 mL    04-23 @ 07:01  -  04-23 @ 15:56  --------------------------------------------------------  IN: 150 mL / OUT: 300 mL / NET: -150 mL      Height (cm): 160 (04-23 @ 06:00)  Weight (kg): 69.3 (04-23 @ 06:00)  BMI (kg/m2): 27.1 (04-23 @ 06:00)  BSA (m2): 1.72 (04-23 @ 06:00)    PHYSICAL EXAM:    Constitutional: NAD  Neck:  No JVD  Respiratory: CTAB/L  Cardiovascular: S1 and S2  Gastrointestinal: BS+, soft, NT/ND  Extremities: No peripheral edema  Neurological: A/O x 3, no focal deficits  Psychiatric: Normal mood, normal affect  : No Higginbotham  Skin: No rashes  Access: Not applicable    LABS:                        10.4   12.48 )-----------( 205      ( 22 Apr 2021 14:50 )             31.4     04-22    135  |  101  |  62<H>  ----------------------------<  97  4.2   |  21<L>  |  3.79<H>    Ca    9.4      22 Apr 2021 07:02  Phos  4.2     04-22  Mg     1.9     04-22            Urine Studies:          RADIOLOGY & ADDITIONAL STUDIES:

## 2021-04-23 NOTE — PROGRESS NOTE ADULT - ASSESSMENT
62 yo M, with PMH of HTN, b/l TKR, and COVID 2-3 months ago, not hospitilized,  CKD, s/p  PE on lovenox, and recent seizure awaiting tracheal stenosis in am  CKD stage 4 at baseline   SP POD #0  for tracheal resection     1 Renal- KVO fluids at present and trend renal profile   Check PTH to assess secondary hyperparathyroidism;  No need for renal sono   2 CVS-Not in heart failure   3CTS-Took ~3cm of affected area of the proximal trachea, Guardian stitch placed 4/23/2021         Sayed [facility]   [phone]

## 2021-04-24 LAB
ALBUMIN SERPL ELPH-MCNC: 3.4 G/DL — SIGNIFICANT CHANGE UP (ref 3.3–5)
ALP SERPL-CCNC: 103 U/L — SIGNIFICANT CHANGE UP (ref 40–120)
ALT FLD-CCNC: 9 U/L — SIGNIFICANT CHANGE UP (ref 4–41)
ANION GAP SERPL CALC-SCNC: 13 MMOL/L — SIGNIFICANT CHANGE UP (ref 7–14)
ANION GAP SERPL CALC-SCNC: 14 MMOL/L — SIGNIFICANT CHANGE UP (ref 7–14)
APTT BLD: 28.8 SEC — SIGNIFICANT CHANGE UP (ref 27–36.3)
APTT BLD: 41 SEC — HIGH (ref 27–36.3)
AST SERPL-CCNC: 12 U/L — SIGNIFICANT CHANGE UP (ref 4–40)
BASE EXCESS BLDV CALC-SCNC: -5.2 MMOL/L — LOW (ref -3–2)
BILIRUB SERPL-MCNC: 0.3 MG/DL — SIGNIFICANT CHANGE UP (ref 0.2–1.2)
BLOOD GAS VENOUS - CREATININE: 3.7 MG/DL — HIGH (ref 0.5–1.3)
BLOOD GAS VENOUS COMPREHENSIVE RESULT: SIGNIFICANT CHANGE UP
BUN SERPL-MCNC: 58 MG/DL — HIGH (ref 7–23)
BUN SERPL-MCNC: 58 MG/DL — HIGH (ref 7–23)
CA-I BLD-SCNC: 0.72 MMOL/L — LOW (ref 1.15–1.29)
CALCIUM SERPL-MCNC: 9.2 MG/DL — SIGNIFICANT CHANGE UP (ref 8.4–10.5)
CALCIUM SERPL-MCNC: 9.4 MG/DL — SIGNIFICANT CHANGE UP (ref 8.4–10.5)
CHLORIDE BLDV-SCNC: 107 MMOL/L — SIGNIFICANT CHANGE UP (ref 96–108)
CHLORIDE SERPL-SCNC: 103 MMOL/L — SIGNIFICANT CHANGE UP (ref 98–107)
CHLORIDE SERPL-SCNC: 105 MMOL/L — SIGNIFICANT CHANGE UP (ref 98–107)
CO2 SERPL-SCNC: 12 MMOL/L — LOW (ref 22–31)
CO2 SERPL-SCNC: 17 MMOL/L — LOW (ref 22–31)
CREAT SERPL-MCNC: 3.41 MG/DL — HIGH (ref 0.5–1.3)
CREAT SERPL-MCNC: 3.43 MG/DL — HIGH (ref 0.5–1.3)
GAS PNL BLDV: 135 MMOL/L — LOW (ref 136–146)
GLUCOSE BLDV-MCNC: 123 MG/DL — HIGH (ref 70–99)
GLUCOSE SERPL-MCNC: 121 MG/DL — HIGH (ref 70–99)
GLUCOSE SERPL-MCNC: 135 MG/DL — HIGH (ref 70–99)
HCO3 BLDV-SCNC: 20 MMOL/L — SIGNIFICANT CHANGE UP (ref 20–27)
HCT VFR BLD CALC: 33.8 % — LOW (ref 39–50)
HCT VFR BLDA CALC: 28.7 % — LOW (ref 39–51)
HGB BLD CALC-MCNC: 9.3 G/DL — LOW (ref 13–17)
HGB BLD-MCNC: 10.3 G/DL — LOW (ref 13–17)
LACTATE BLDV-MCNC: 1.2 MMOL/L — SIGNIFICANT CHANGE UP (ref 0.5–2)
MAGNESIUM SERPL-MCNC: 2 MG/DL — SIGNIFICANT CHANGE UP (ref 1.6–2.6)
MCHC RBC-ENTMCNC: 30.5 GM/DL — LOW (ref 32–36)
MCHC RBC-ENTMCNC: 30.7 PG — SIGNIFICANT CHANGE UP (ref 27–34)
MCV RBC AUTO: 100.9 FL — HIGH (ref 80–100)
NRBC # BLD: 0 /100 WBCS — SIGNIFICANT CHANGE UP
NRBC # FLD: 0 K/UL — SIGNIFICANT CHANGE UP
PCO2 BLDV: 42 MMHG — SIGNIFICANT CHANGE UP (ref 42–55)
PH BLDV: 7.3 — LOW (ref 7.32–7.43)
PHOSPHATE SERPL-MCNC: 5 MG/DL — HIGH (ref 2.5–4.5)
PLATELET # BLD AUTO: 183 K/UL — SIGNIFICANT CHANGE UP (ref 150–400)
PO2 BLDV: 53 MMHG — HIGH (ref 35–40)
POTASSIUM BLDV-SCNC: 4.7 MMOL/L — HIGH (ref 3.4–4.5)
POTASSIUM SERPL-MCNC: 4.9 MMOL/L — SIGNIFICANT CHANGE UP (ref 3.5–5.3)
POTASSIUM SERPL-MCNC: 5.2 MMOL/L — SIGNIFICANT CHANGE UP (ref 3.5–5.3)
POTASSIUM SERPL-MCNC: 6.2 MMOL/L — CRITICAL HIGH (ref 3.5–5.3)
POTASSIUM SERPL-SCNC: 4.9 MMOL/L — SIGNIFICANT CHANGE UP (ref 3.5–5.3)
POTASSIUM SERPL-SCNC: 5.2 MMOL/L — SIGNIFICANT CHANGE UP (ref 3.5–5.3)
POTASSIUM SERPL-SCNC: 6.2 MMOL/L — CRITICAL HIGH (ref 3.5–5.3)
PROT SERPL-MCNC: 6.4 G/DL — SIGNIFICANT CHANGE UP (ref 6–8.3)
RBC # BLD: 3.35 M/UL — LOW (ref 4.2–5.8)
RBC # FLD: 14 % — SIGNIFICANT CHANGE UP (ref 10.3–14.5)
SAO2 % BLDV: 83.5 % — SIGNIFICANT CHANGE UP (ref 60–85)
SODIUM SERPL-SCNC: 130 MMOL/L — LOW (ref 135–145)
SODIUM SERPL-SCNC: 134 MMOL/L — LOW (ref 135–145)
WBC # BLD: 13.31 K/UL — HIGH (ref 3.8–10.5)
WBC # FLD AUTO: 13.31 K/UL — HIGH (ref 3.8–10.5)

## 2021-04-24 PROCEDURE — 99233 SBSQ HOSP IP/OBS HIGH 50: CPT

## 2021-04-24 PROCEDURE — 71045 X-RAY EXAM CHEST 1 VIEW: CPT | Mod: 26

## 2021-04-24 RX ORDER — ACETYLCYSTEINE 200 MG/ML
3 VIAL (ML) MISCELLANEOUS THREE TIMES A DAY
Refills: 0 | Status: COMPLETED | OUTPATIENT
Start: 2021-04-24 | End: 2021-04-28

## 2021-04-24 RX ORDER — CALCIUM GLUCONATE 100 MG/ML
1 VIAL (ML) INTRAVENOUS ONCE
Refills: 0 | Status: COMPLETED | OUTPATIENT
Start: 2021-04-24 | End: 2021-04-24

## 2021-04-24 RX ORDER — POLYETHYLENE GLYCOL 3350 17 G/17G
17 POWDER, FOR SOLUTION ORAL DAILY
Refills: 0 | Status: DISCONTINUED | OUTPATIENT
Start: 2021-04-24 | End: 2021-04-29

## 2021-04-24 RX ORDER — HEPARIN SODIUM 5000 [USP'U]/ML
700 INJECTION INTRAVENOUS; SUBCUTANEOUS
Qty: 25000 | Refills: 0 | Status: DISCONTINUED | OUTPATIENT
Start: 2021-04-24 | End: 2021-04-29

## 2021-04-24 RX ORDER — HEPARIN SODIUM 5000 [USP'U]/ML
5000 INJECTION INTRAVENOUS; SUBCUTANEOUS EVERY 8 HOURS
Refills: 0 | Status: DISCONTINUED | OUTPATIENT
Start: 2021-04-24 | End: 2021-04-24

## 2021-04-24 RX ORDER — ALBUTEROL 90 UG/1
2.5 AEROSOL, METERED ORAL
Refills: 0 | Status: DISCONTINUED | OUTPATIENT
Start: 2021-04-24 | End: 2021-04-29

## 2021-04-24 RX ADMIN — HYDROMORPHONE HYDROCHLORIDE 0.25 MILLIGRAM(S): 2 INJECTION INTRAMUSCULAR; INTRAVENOUS; SUBCUTANEOUS at 02:16

## 2021-04-24 RX ADMIN — HYDROMORPHONE HYDROCHLORIDE 0.25 MILLIGRAM(S): 2 INJECTION INTRAMUSCULAR; INTRAVENOUS; SUBCUTANEOUS at 02:30

## 2021-04-24 RX ADMIN — LEVETIRACETAM 400 MILLIGRAM(S): 250 TABLET, FILM COATED ORAL at 05:59

## 2021-04-24 RX ADMIN — Medication 1000 MILLIGRAM(S): at 18:54

## 2021-04-24 RX ADMIN — Medication 100 GRAM(S): at 07:48

## 2021-04-24 RX ADMIN — HYDROMORPHONE HYDROCHLORIDE 0.25 MILLIGRAM(S): 2 INJECTION INTRAMUSCULAR; INTRAVENOUS; SUBCUTANEOUS at 10:33

## 2021-04-24 RX ADMIN — HYDROMORPHONE HYDROCHLORIDE 0.25 MILLIGRAM(S): 2 INJECTION INTRAMUSCULAR; INTRAVENOUS; SUBCUTANEOUS at 18:30

## 2021-04-24 RX ADMIN — HYDROMORPHONE HYDROCHLORIDE 0.25 MILLIGRAM(S): 2 INJECTION INTRAMUSCULAR; INTRAVENOUS; SUBCUTANEOUS at 12:35

## 2021-04-24 RX ADMIN — HYDROMORPHONE HYDROCHLORIDE 0.25 MILLIGRAM(S): 2 INJECTION INTRAMUSCULAR; INTRAVENOUS; SUBCUTANEOUS at 18:00

## 2021-04-24 RX ADMIN — Medication 1000 MILLIGRAM(S): at 09:08

## 2021-04-24 RX ADMIN — HYDROMORPHONE HYDROCHLORIDE 0.25 MILLIGRAM(S): 2 INJECTION INTRAMUSCULAR; INTRAVENOUS; SUBCUTANEOUS at 18:23

## 2021-04-24 RX ADMIN — ALBUTEROL 2.5 MILLIGRAM(S): 90 AEROSOL, METERED ORAL at 21:31

## 2021-04-24 RX ADMIN — LEVETIRACETAM 750 MILLIGRAM(S): 250 TABLET, FILM COATED ORAL at 18:02

## 2021-04-24 RX ADMIN — Medication 400 MILLIGRAM(S): at 18:08

## 2021-04-24 RX ADMIN — HYDROMORPHONE HYDROCHLORIDE 0.25 MILLIGRAM(S): 2 INJECTION INTRAMUSCULAR; INTRAVENOUS; SUBCUTANEOUS at 12:01

## 2021-04-24 RX ADMIN — PANTOPRAZOLE SODIUM 40 MILLIGRAM(S): 20 TABLET, DELAYED RELEASE ORAL at 08:50

## 2021-04-24 RX ADMIN — HYDROMORPHONE HYDROCHLORIDE 0.25 MILLIGRAM(S): 2 INJECTION INTRAMUSCULAR; INTRAVENOUS; SUBCUTANEOUS at 09:00

## 2021-04-24 RX ADMIN — POLYETHYLENE GLYCOL 3350 17 GRAM(S): 17 POWDER, FOR SOLUTION ORAL at 14:55

## 2021-04-24 RX ADMIN — Medication 400 MILLIGRAM(S): at 08:30

## 2021-04-24 NOTE — PROGRESS NOTE ADULT - ASSESSMENT
62 yo M, with PMH of HTN, b/l TKR, and COVID 2-3 months ago, not hospitilized,  CKD, s/p  PE on lovenox, and recent seizure awaiting tracheal stenosis   SP POD #0  for tracheal resection   CKD stage 4 at baseline       1 Renal- KVO fluids at present, Creatinine once improved from 3.79        : Check PTH to assess secondary hyperparathyroidism;         : No need for renal sono   2 CVS-Not in heart failure   3 CTS-Took ~3cm of affected area of the proximal trachea, Guardian stitch placed 4/23/2021    60 yo M, with PMH of HTN, b/l TKR, and COVID 2-3 months ago, not hospitilized,  CKD, s/p  PE on lovenox, and recent seizure awaiting tracheal stenosis   SP POD #0  for tracheal resection   CKD stage 4 at baseline       1 Renal- KVO fluids at present, Creatinine once improved from 3.79        : No need for renal sono        : Check PTH to assess secondary hyperparathyroidism;         : check phosphorus and BMP daily       : Low Potassium and low phosphorus diet  2 CVS-Not in heart failure   3 CTS-Took ~3cm of affected area of the proximal trachea, Guardian stitch placed 4/23/2021

## 2021-04-24 NOTE — PROGRESS NOTE ADULT - SUBJECTIVE AND OBJECTIVE BOX
patient is doing well, tolerating liquids, OOB to chair. no residual anesthetic issues or complications noted.

## 2021-04-24 NOTE — PROGRESS NOTE ADULT - ASSESSMENT
Patient is a 60 yo M, with PMH of HTN, b/l TKR, and COVID 2-3 months ago, not hospitalized  CKD, s/p  PE on lovenox, and recent seizure( unknown etiology) s/p emergency cric on 03/25/2021 from tongue injury lingual hematoma 2/2 to seizure blocking airway (discharged on 4/3/2021), now p/w difficulty breathing, had N/V x1 yesterday and abd pain-now resolved.  Nocturnal, worsening, since discharge. Pt feels he cannot breath especially lying down, denies fever, cough, or hematemesis. No more seizure at home, denies use of EtOH. Pt is placed on BiPAP for upper airway obstruction in ED, to help work of breathing. admitted to CTI for close monitoring       Problem/Recommendation - 1:  Problem: Tracheal stenosis following tracheostomy. Recommendation: CTS eval appreciated  monitor closelyO2 supplement   aspiration precautions  Echo noted  S/P tracheal dilatation   CTS care     Problem/Recommendation - 2:  ·  Problem: Pulmonary embolus.  Recommendation: on lovenox at home  hematlogy eval called by team   check LE DVT study  Echo , normal   Pt with h/o pancytopenia, and being worked up for antiphospholipid syndrome/lupus anticoagulant,    Hart marrow biopsy W/U at [facility]. follow up with hematology     Problem/Recommendation - 3:  ·  Problem: CKD (chronic kidney disease).  Recommendation: monitor BUN/Cr  at his baseline  IV Hydration PRN  monitor urine output.   renal follow up appreciated     Problem/Recommendation - 4:  ·  Problem: HTN (hypertension).  Recommendation: BP control per ICU team  resume oral meds as tolerated.     Problem/Recommendation - 5:  ·  Problem: 2019 novel coronavirus disease (COVID-19).  Recommendation: stable O2 sat.     Problem/Recommendation - 6:  Problem: Prophylactic measure. Recommendation: DVT and GI PPX

## 2021-04-24 NOTE — PROGRESS NOTE ADULT - SUBJECTIVE AND OBJECTIVE BOX
Name of Patient : ANNEMARIE KELLEY  MRN: [id number]  DATE OF SERVICE: 04-24-21     Subjective: Patient seen and examined. No new events except as noted.     REVIEW OF SYSTEMS:    CONSTITUTIONAL: No weakness, fevers or chills  EYES/ENT: No visual changes;  No vertigo or throat pain   NECK: No pain or stiffness  RESPIRATORY: No cough, wheezing, hemoptysis; No shortness of breath  CARDIOVASCULAR: No chest pain or palpitations  GASTROINTESTINAL: No abdominal or epigastric pain.  GENITOURINARY: No dysuria, frequency or hematuria  NEUROLOGICAL: No numbness or weakness  SKIN: No itching, burning, rashes, or lesions   All other review of systems is negative unless indicated above.    MEDICATIONS:  MEDICATIONS  (STANDING):  acetylcysteine 10%  Inhalation 3 milliLiter(s) Inhalation three times a day  ALBUTerol   0.5% 2.5 milliGRAM(s) Nebulizer four times a day  heparin  Infusion 700 Unit(s)/Hr (7 mL/Hr) IV Continuous <Continuous>  levETIRAcetam 750 milliGRAM(s) Oral every 12 hours  pantoprazole    Tablet 40 milliGRAM(s) Oral before breakfast  polyethylene glycol 3350 17 Gram(s) Oral daily      PHYSICAL EXAM:  T(C): 36.6 (04-24-21 @ 20:00), Max: 36.9 (04-24-21 @ 08:00)  HR: 75 (04-24-21 @ 23:00) (67 - 95)  BP: 116/86 (04-24-21 @ 23:00) (116/86 - 151/86)  RR: 11 (04-24-21 @ 23:00) (10 - 19)  SpO2: 99% (04-24-21 @ 23:00) (95% - 100%)  Wt(kg): --  I&O's Summary    23 Apr 2021 07:01  -  24 Apr 2021 07:00  --------------------------------------------------------  IN: 610 mL / OUT: 1500 mL / NET: -890 mL    24 Apr 2021 07:01  -  25 Apr 2021 00:02  --------------------------------------------------------  IN: 396 mL / OUT: 1200 mL / NET: -804 mL          Appearance: Normal	  HEENT:  PERRLA   Lymphatic: No lymphadenopathy   Cardiovascular: Normal S1 S2, no JVD  Respiratory: normal effort , clear  Gastrointestinal:  Soft, Non-tender  Skin: No rashes,  warm to touch  Psychiatry:  Mood & affect appropriate  Musculuskeletal: No edema      All labs, Imaging and EKGs personally reviewed           04-23-21 @ 07:01  -  04-24-21 @ 07:00  --------------------------------------------------------  IN: 610 mL / OUT: 1500 mL / NET: -890 mL    04-24-21 @ 07:01  -  04-25-21 @ 00:02  --------------------------------------------------------  IN: 396 mL / OUT: 1200 mL / NET: -804 mL                            10.3   13.31 )-----------( 183      ( 24 Apr 2021 04:54 )             33.8               04-24    134<L>  |  103  |  58<H>  ----------------------------<  121<H>  4.9   |  17<L>  |  3.43<H>    Ca    9.4      24 Apr 2021 09:32  Phos  5.0     04-24  Mg     2.0     04-24    TPro  6.4  /  Alb  3.4  /  TBili  0.3  /  DBili  x   /  AST  12  /  ALT  9   /  AlkPhos  103  04-24    PTT - ( 24 Apr 2021 21:00 )  PTT:41.0 sec

## 2021-04-24 NOTE — PROGRESS NOTE ADULT - SUBJECTIVE AND OBJECTIVE BOX
Patient seen and examined    REVIEW OF SYSTEMS:  As per HPI, otherwise 8 full 10 ROS were unremarkable    MEDICATIONS  (STANDING):  acetylcysteine 10%  Inhalation 3 milliLiter(s) Inhalation three times a day  ALBUTerol   0.5% 2.5 milliGRAM(s) Nebulizer four times a day  heparin  Infusion 700 Unit(s)/Hr (7 mL/Hr) IV Continuous <Continuous>  levETIRAcetam 750 milliGRAM(s) Oral every 12 hours  pantoprazole    Tablet 40 milliGRAM(s) Oral before breakfast  polyethylene glycol 3350 17 Gram(s) Oral daily      VITAL:  T(C): , Max: 36.9 (04-24-21 @ 08:00)  T(F): , Max: 98.5 (04-24-21 @ 16:00)  HR: 73 (04-24-21 @ 20:00)  BP: 137/86 (04-24-21 @ 20:00)  BP(mean): 98 (04-24-21 @ 20:00)  RR: 10 (04-24-21 @ 20:00)  SpO2: 96% (04-24-21 @ 20:00)  Wt(kg): --    I and O's:    04-23 @ 07:01  -  04-24 @ 07:00  --------------------------------------------------------  IN: 610 mL / OUT: 1500 mL / NET: -890 mL    04-24 @ 07:01  -  04-24 @ 20:28  --------------------------------------------------------  IN: 382 mL / OUT: 1000 mL / NET: -618 mL          PHYSICAL EXAM:    Constitutional: NAD  Neck:  No JVD  Respiratory: CTAB  Cardiovascular: S1 and S2  Gastrointestinal: BS+, soft, NT/ND  Extremities: No peripheral edema  Neurological:  Awake / alert  : No Higginbotham      LABS:                        10.3   13.31 )-----------( 183      ( 24 Apr 2021 04:54 )             33.8     04-24    134<L>  |  103  |  58<H>  ----------------------------<  121<H>  4.9   |  17<L>  |  3.43<H>    Ca    9.4      24 Apr 2021 09:32  Phos  5.0     04-24  Mg     2.0     04-24    TPro  6.4  /  Alb  3.4  /  TBili  0.3  /  DBili  x   /  AST  12  /  ALT  9   /  AlkPhos  103  04-24           Patient seen and examined.  OOB to chair.  offers no complaints.  NAD noted    REVIEW OF SYSTEMS:  As per HPI, otherwise 8 full 10 ROS were unremarkable    MEDICATIONS  (STANDING):  acetylcysteine 10%  Inhalation 3 milliLiter(s) Inhalation three times a day  ALBUTerol   0.5% 2.5 milliGRAM(s) Nebulizer four times a day  heparin  Infusion 700 Unit(s)/Hr (7 mL/Hr) IV Continuous <Continuous>  levETIRAcetam 750 milliGRAM(s) Oral every 12 hours  pantoprazole    Tablet 40 milliGRAM(s) Oral before breakfast  polyethylene glycol 3350 17 Gram(s) Oral daily      VITAL:  T(C): , Max: 36.9 (04-24-21 @ 08:00)  T(F): , Max: 98.5 (04-24-21 @ 16:00)  HR: 73 (04-24-21 @ 20:00)  BP: 137/86 (04-24-21 @ 20:00)  BP(mean): 98 (04-24-21 @ 20:00)  RR: 10 (04-24-21 @ 20:00)  SpO2: 96% (04-24-21 @ 20:00)  Wt(kg): --    I and O's:    04-23 @ 07:01  -  04-24 @ 07:00  --------------------------------------------------------  IN: 610 mL / OUT: 1500 mL / NET: -890 mL    04-24 @ 07:01  -  04-24 @ 20:28  --------------------------------------------------------  IN: 382 mL / OUT: 1000 mL / NET: -618 mL          PHYSICAL EXAM:    Constitutional: NAD  Neck:  No JVD  Respiratory: CTAB  Cardiovascular: S1 and S2  Gastrointestinal: BS+, soft, NT/ND  Extremities: No peripheral edema  Neurological:  Awake / alert  : No Higginbotham      LABS:                        10.3   13.31 )-----------( 183      ( 24 Apr 2021 04:54 )             33.8     04-24    134<L>  |  103  |  58<H>  ----------------------------<  121<H>  4.9   |  17<L>  |  3.43<H>    Ca    9.4      24 Apr 2021 09:32  Phos  5.0     04-24  Mg     2.0     04-24    TPro  6.4  /  Alb  3.4  /  TBili  0.3  /  DBili  x   /  AST  12  /  ALT  9   /  AlkPhos  103  04-24
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
## 2021-04-24 NOTE — PROGRESS NOTE ADULT - SUBJECTIVE AND OBJECTIVE BOX
DATE OF SERVICE: 04-24-21 @ 14:00    Subjective: Patient seen and examined. No new events except as noted.     SUBJECTIVE/ROS:  sp surgery   NAD      MEDICATIONS:  MEDICATIONS  (STANDING):  acetylcysteine 10%  Inhalation 3 milliLiter(s) Inhalation three times a day  ALBUTerol   0.5% 2.5 milliGRAM(s) Nebulizer four times a day  heparin  Infusion 700 Unit(s)/Hr (7 mL/Hr) IV Continuous <Continuous>  levETIRAcetam 750 milliGRAM(s) Oral every 12 hours  pantoprazole    Tablet 40 milliGRAM(s) Oral before breakfast  polyethylene glycol 3350 17 Gram(s) Oral daily      PHYSICAL EXAM:  T(C): 36.9 (04-24-21 @ 08:00), Max: 36.9 (04-24-21 @ 08:00)  HR: 68 (04-24-21 @ 11:00) (67 - 95)  BP: 127/75 (04-24-21 @ 11:00) (103/72 - 146/90)  RR: 12 (04-24-21 @ 11:00) (10 - 23)  SpO2: 98% (04-24-21 @ 11:00) (92% - 99%)  Wt(kg): --  I&O's Summary    23 Apr 2021 07:01  -  24 Apr 2021 07:00  --------------------------------------------------------  IN: 610 mL / OUT: 1500 mL / NET: -890 mL    24 Apr 2021 07:01  -  24 Apr 2021 14:00  --------------------------------------------------------  IN: 240 mL / OUT: 250 mL / NET: -10 mL            JVP: Normal  Neck: supple  Lung: clear   CV: S1 S2 , Murmur:  Abd: soft  Ext: No edema  neuro: Awake / alert  Psych: flat affect  Skin: normal``    LABS/DATA:    CARDIAC MARKERS:                                10.3   13.31 )-----------( 183      ( 24 Apr 2021 04:54 )             33.8     04-24    134<L>  |  103  |  58<H>  ----------------------------<  121<H>  4.9   |  17<L>  |  3.43<H>    Ca    9.4      24 Apr 2021 09:32  Phos  5.0     04-24  Mg     2.0     04-24    TPro  6.4  /  Alb  3.4  /  TBili  0.3  /  DBili  x   /  AST  12  /  ALT  9   /  AlkPhos  103  04-24    proBNP:   Lipid Profile:   HgA1c:   TSH:     TELE:  EKG:

## 2021-04-25 LAB
ANION GAP SERPL CALC-SCNC: 10 MMOL/L — SIGNIFICANT CHANGE UP (ref 7–14)
ANION GAP SERPL CALC-SCNC: 9 MMOL/L — SIGNIFICANT CHANGE UP (ref 7–14)
APTT BLD: 43.6 SEC — HIGH (ref 27–36.3)
APTT BLD: 48.4 SEC — HIGH (ref 27–36.3)
APTT BLD: 49.3 SEC — HIGH (ref 27–36.3)
BUN SERPL-MCNC: 44 MG/DL — HIGH (ref 7–23)
BUN SERPL-MCNC: 50 MG/DL — HIGH (ref 7–23)
CA-I BLD-SCNC: 1.14 MMOL/L — LOW (ref 1.15–1.29)
CALCIUM SERPL-MCNC: 9 MG/DL — SIGNIFICANT CHANGE UP (ref 8.4–10.5)
CALCIUM SERPL-MCNC: 9.1 MG/DL — SIGNIFICANT CHANGE UP (ref 8.4–10.5)
CHLORIDE SERPL-SCNC: 102 MMOL/L — SIGNIFICANT CHANGE UP (ref 98–107)
CHLORIDE SERPL-SCNC: 102 MMOL/L — SIGNIFICANT CHANGE UP (ref 98–107)
CO2 SERPL-SCNC: 19 MMOL/L — LOW (ref 22–31)
CO2 SERPL-SCNC: 23 MMOL/L — SIGNIFICANT CHANGE UP (ref 22–31)
CREAT SERPL-MCNC: 2.84 MG/DL — HIGH (ref 0.5–1.3)
CREAT SERPL-MCNC: 3.03 MG/DL — HIGH (ref 0.5–1.3)
GLUCOSE SERPL-MCNC: 110 MG/DL — HIGH (ref 70–99)
GLUCOSE SERPL-MCNC: 130 MG/DL — HIGH (ref 70–99)
HCT VFR BLD CALC: 27.2 % — LOW (ref 39–50)
HCT VFR BLD CALC: 29.2 % — LOW (ref 39–50)
HGB BLD-MCNC: 8.6 G/DL — LOW (ref 13–17)
HGB BLD-MCNC: 9.3 G/DL — LOW (ref 13–17)
INR BLD: 1.04 RATIO — SIGNIFICANT CHANGE UP (ref 0.88–1.16)
INR BLD: 1.05 RATIO — SIGNIFICANT CHANGE UP (ref 0.88–1.16)
MAGNESIUM SERPL-MCNC: 2 MG/DL — SIGNIFICANT CHANGE UP (ref 1.6–2.6)
MCHC RBC-ENTMCNC: 30.1 PG — SIGNIFICANT CHANGE UP (ref 27–34)
MCHC RBC-ENTMCNC: 30.3 PG — SIGNIFICANT CHANGE UP (ref 27–34)
MCHC RBC-ENTMCNC: 31.6 GM/DL — LOW (ref 32–36)
MCHC RBC-ENTMCNC: 31.8 GM/DL — LOW (ref 32–36)
MCV RBC AUTO: 94.5 FL — SIGNIFICANT CHANGE UP (ref 80–100)
MCV RBC AUTO: 95.8 FL — SIGNIFICANT CHANGE UP (ref 80–100)
NRBC # BLD: 0 /100 WBCS — SIGNIFICANT CHANGE UP
NRBC # BLD: 0 /100 WBCS — SIGNIFICANT CHANGE UP
NRBC # FLD: 0 K/UL — SIGNIFICANT CHANGE UP
NRBC # FLD: 0 K/UL — SIGNIFICANT CHANGE UP
PHOSPHATE SERPL-MCNC: 3.9 MG/DL — SIGNIFICANT CHANGE UP (ref 2.5–4.5)
PLATELET # BLD AUTO: 167 K/UL — SIGNIFICANT CHANGE UP (ref 150–400)
PLATELET # BLD AUTO: 178 K/UL — SIGNIFICANT CHANGE UP (ref 150–400)
POTASSIUM SERPL-MCNC: 4.2 MMOL/L — SIGNIFICANT CHANGE UP (ref 3.5–5.3)
POTASSIUM SERPL-MCNC: 4.6 MMOL/L — SIGNIFICANT CHANGE UP (ref 3.5–5.3)
POTASSIUM SERPL-SCNC: 4.2 MMOL/L — SIGNIFICANT CHANGE UP (ref 3.5–5.3)
POTASSIUM SERPL-SCNC: 4.6 MMOL/L — SIGNIFICANT CHANGE UP (ref 3.5–5.3)
PROTHROM AB SERPL-ACNC: 11.9 SEC — SIGNIFICANT CHANGE UP (ref 10.6–13.6)
PROTHROM AB SERPL-ACNC: 11.9 SEC — SIGNIFICANT CHANGE UP (ref 10.6–13.6)
PTH-INTACT FLD-MCNC: 122 PG/ML — HIGH (ref 15–65)
RBC # BLD: 2.84 M/UL — LOW (ref 4.2–5.8)
RBC # BLD: 3.09 M/UL — LOW (ref 4.2–5.8)
RBC # FLD: 14 % — SIGNIFICANT CHANGE UP (ref 10.3–14.5)
RBC # FLD: 14 % — SIGNIFICANT CHANGE UP (ref 10.3–14.5)
SODIUM SERPL-SCNC: 131 MMOL/L — LOW (ref 135–145)
SODIUM SERPL-SCNC: 134 MMOL/L — LOW (ref 135–145)
WBC # BLD: 10.47 K/UL — SIGNIFICANT CHANGE UP (ref 3.8–10.5)
WBC # BLD: 10.87 K/UL — HIGH (ref 3.8–10.5)
WBC # FLD AUTO: 10.47 K/UL — SIGNIFICANT CHANGE UP (ref 3.8–10.5)
WBC # FLD AUTO: 10.87 K/UL — HIGH (ref 3.8–10.5)

## 2021-04-25 PROCEDURE — 71045 X-RAY EXAM CHEST 1 VIEW: CPT | Mod: 26

## 2021-04-25 PROCEDURE — 99233 SBSQ HOSP IP/OBS HIGH 50: CPT

## 2021-04-25 RX ORDER — OXYCODONE AND ACETAMINOPHEN 5; 325 MG/1; MG/1
1 TABLET ORAL EVERY 4 HOURS
Refills: 0 | Status: DISCONTINUED | OUTPATIENT
Start: 2021-04-25 | End: 2021-04-27

## 2021-04-25 RX ORDER — ACETAMINOPHEN 500 MG
1000 TABLET ORAL ONCE
Refills: 0 | Status: COMPLETED | OUTPATIENT
Start: 2021-04-25 | End: 2021-04-25

## 2021-04-25 RX ORDER — SODIUM CHLORIDE 9 MG/ML
250 INJECTION, SOLUTION INTRAVENOUS ONCE
Refills: 0 | Status: COMPLETED | OUTPATIENT
Start: 2021-04-25 | End: 2021-04-25

## 2021-04-25 RX ORDER — LABETALOL HCL 100 MG
100 TABLET ORAL EVERY 8 HOURS
Refills: 0 | Status: DISCONTINUED | OUTPATIENT
Start: 2021-04-25 | End: 2021-04-29

## 2021-04-25 RX ORDER — POTASSIUM CHLORIDE 20 MEQ
10 PACKET (EA) ORAL ONCE
Refills: 0 | Status: DISCONTINUED | OUTPATIENT
Start: 2021-04-25 | End: 2021-04-25

## 2021-04-25 RX ORDER — SODIUM CHLORIDE 9 MG/ML
1000 INJECTION, SOLUTION INTRAVENOUS
Refills: 0 | Status: DISCONTINUED | OUTPATIENT
Start: 2021-04-25 | End: 2021-04-27

## 2021-04-25 RX ADMIN — SODIUM CHLORIDE 1000 MILLILITER(S): 9 INJECTION, SOLUTION INTRAVENOUS at 06:45

## 2021-04-25 RX ADMIN — HYDROMORPHONE HYDROCHLORIDE 0.25 MILLIGRAM(S): 2 INJECTION INTRAMUSCULAR; INTRAVENOUS; SUBCUTANEOUS at 07:45

## 2021-04-25 RX ADMIN — Medication 3 MILLILITER(S): at 21:41

## 2021-04-25 RX ADMIN — OXYCODONE AND ACETAMINOPHEN 1 TABLET(S): 5; 325 TABLET ORAL at 11:00

## 2021-04-25 RX ADMIN — HEPARIN SODIUM 7.5 UNIT(S)/HR: 5000 INJECTION INTRAVENOUS; SUBCUTANEOUS at 06:14

## 2021-04-25 RX ADMIN — OXYCODONE AND ACETAMINOPHEN 1 TABLET(S): 5; 325 TABLET ORAL at 21:09

## 2021-04-25 RX ADMIN — HYDROMORPHONE HYDROCHLORIDE 0.25 MILLIGRAM(S): 2 INJECTION INTRAMUSCULAR; INTRAVENOUS; SUBCUTANEOUS at 00:38

## 2021-04-25 RX ADMIN — HYDROMORPHONE HYDROCHLORIDE 0.25 MILLIGRAM(S): 2 INJECTION INTRAMUSCULAR; INTRAVENOUS; SUBCUTANEOUS at 08:00

## 2021-04-25 RX ADMIN — ALBUTEROL 2.5 MILLIGRAM(S): 90 AEROSOL, METERED ORAL at 21:41

## 2021-04-25 RX ADMIN — OXYCODONE AND ACETAMINOPHEN 1 TABLET(S): 5; 325 TABLET ORAL at 16:15

## 2021-04-25 RX ADMIN — HEPARIN SODIUM 9 UNIT(S)/HR: 5000 INJECTION INTRAVENOUS; SUBCUTANEOUS at 13:20

## 2021-04-25 RX ADMIN — Medication 100 MILLIGRAM(S): at 14:13

## 2021-04-25 RX ADMIN — Medication 3 MILLILITER(S): at 07:28

## 2021-04-25 RX ADMIN — SODIUM CHLORIDE 30 MILLILITER(S): 9 INJECTION, SOLUTION INTRAVENOUS at 08:00

## 2021-04-25 RX ADMIN — ALBUTEROL 2.5 MILLIGRAM(S): 90 AEROSOL, METERED ORAL at 15:40

## 2021-04-25 RX ADMIN — PANTOPRAZOLE SODIUM 40 MILLIGRAM(S): 20 TABLET, DELAYED RELEASE ORAL at 06:14

## 2021-04-25 RX ADMIN — ALBUTEROL 2.5 MILLIGRAM(S): 90 AEROSOL, METERED ORAL at 03:11

## 2021-04-25 RX ADMIN — Medication 1000 MILLIGRAM(S): at 02:15

## 2021-04-25 RX ADMIN — LEVETIRACETAM 750 MILLIGRAM(S): 250 TABLET, FILM COATED ORAL at 06:14

## 2021-04-25 RX ADMIN — OXYCODONE AND ACETAMINOPHEN 1 TABLET(S): 5; 325 TABLET ORAL at 21:39

## 2021-04-25 RX ADMIN — OXYCODONE AND ACETAMINOPHEN 1 TABLET(S): 5; 325 TABLET ORAL at 11:30

## 2021-04-25 RX ADMIN — HYDROMORPHONE HYDROCHLORIDE 0.25 MILLIGRAM(S): 2 INJECTION INTRAMUSCULAR; INTRAVENOUS; SUBCUTANEOUS at 00:23

## 2021-04-25 RX ADMIN — Medication 100 MILLIGRAM(S): at 21:09

## 2021-04-25 RX ADMIN — LEVETIRACETAM 750 MILLIGRAM(S): 250 TABLET, FILM COATED ORAL at 18:42

## 2021-04-25 RX ADMIN — OXYCODONE AND ACETAMINOPHEN 1 TABLET(S): 5; 325 TABLET ORAL at 16:45

## 2021-04-25 RX ADMIN — ALBUTEROL 2.5 MILLIGRAM(S): 90 AEROSOL, METERED ORAL at 07:26

## 2021-04-25 RX ADMIN — Medication 400 MILLIGRAM(S): at 02:00

## 2021-04-25 NOTE — PROGRESS NOTE ADULT - SUBJECTIVE AND OBJECTIVE BOX
Patient seen and examined    REVIEW OF SYSTEMS:  As per HPI, otherwise 8 full 10 ROS were unremarkable    MEDICATIONS  (STANDING):  acetylcysteine 10%  Inhalation 3 milliLiter(s) Inhalation three times a day  ALBUTerol   0.5% 2.5 milliGRAM(s) Nebulizer four times a day  heparin  Infusion 700 Unit(s)/Hr (9 mL/Hr) IV Continuous <Continuous>  labetalol 100 milliGRAM(s) Oral every 8 hours  lactated ringers. 1000 milliLiter(s) (30 mL/Hr) IV Continuous <Continuous>  levETIRAcetam 750 milliGRAM(s) Oral every 12 hours  pantoprazole    Tablet 40 milliGRAM(s) Oral before breakfast  polyethylene glycol 3350 17 Gram(s) Oral daily      VITAL:  T(C): , Max: 36.9 (04-25-21 @ 12:00)  T(F): , Max: 98.4 (04-25-21 @ 12:00)  HR: 79 (04-25-21 @ 16:00)  BP: 146/76 (04-25-21 @ 16:00)  BP(mean): 91 (04-25-21 @ 16:00)  RR: 12 (04-25-21 @ 16:00)  SpO2: 96% (04-25-21 @ 16:00)  Wt(kg): --    I and O's:    04-24 @ 07:01  -  04-25 @ 07:00  --------------------------------------------------------  IN: 717 mL / OUT: 2100 mL / NET: -1383 mL    04-25 @ 07:01  -  04-25 @ 17:57  --------------------------------------------------------  IN: 1144.5 mL / OUT: 1600 mL / NET: -455.5 mL          PHYSICAL EXAM:    Constitutional: NAD  Neck:  No JVD  Respiratory: CTAB  Cardiovascular: S1 and S2  Gastrointestinal: BS+, soft, NT/ND  Extremities: No peripheral edema  Neurological:  Awake / alert  : No Higginbotham    LABS:                        9.3    10.47 )-----------( 178      ( 25 Apr 2021 12:21 )             29.2     04-25    134<L>  |  102  |  44<H>  ----------------------------<  130<H>  4.2   |  23  |  2.84<H>    Ca    9.1      25 Apr 2021 12:21  Phos  3.9     04-25  Mg     2.0     04-25    TPro  6.4  /  Alb  3.4  /  TBili  0.3  /  DBili  x   /  AST  12  /  ALT  9   /  AlkPhos  103  04-24       Patient seen and examined.  OOB to chair.  Denies complaints, or SOB.  NAD noted  REVIEW OF SYSTEMS:  As per HPI, otherwise 8 full 10 ROS were unremarkable    MEDICATIONS  (STANDING):  acetylcysteine 10%  Inhalation 3 milliLiter(s) Inhalation three times a day  ALBUTerol   0.5% 2.5 milliGRAM(s) Nebulizer four times a day  heparin  Infusion 700 Unit(s)/Hr (9 mL/Hr) IV Continuous <Continuous>  labetalol 100 milliGRAM(s) Oral every 8 hours  lactated ringers. 1000 milliLiter(s) (30 mL/Hr) IV Continuous <Continuous>  levETIRAcetam 750 milliGRAM(s) Oral every 12 hours  pantoprazole    Tablet 40 milliGRAM(s) Oral before breakfast  polyethylene glycol 3350 17 Gram(s) Oral daily      VITAL:  T(C): , Max: 36.9 (04-25-21 @ 12:00)  T(F): , Max: 98.4 (04-25-21 @ 12:00)  HR: 79 (04-25-21 @ 16:00)  BP: 146/76 (04-25-21 @ 16:00)  BP(mean): 91 (04-25-21 @ 16:00)  RR: 12 (04-25-21 @ 16:00)  SpO2: 96% (04-25-21 @ 16:00)  Wt(kg): --    I and O's:    04-24 @ 07:01  -  04-25 @ 07:00  --------------------------------------------------------  IN: 717 mL / OUT: 2100 mL / NET: -1383 mL    04-25 @ 07:01  -  04-25 @ 17:57  --------------------------------------------------------  IN: 1144.5 mL / OUT: 1600 mL / NET: -455.5 mL          PHYSICAL EXAM:    Constitutional: NAD  Neck:  No JVD  Respiratory: CTAB  Cardiovascular: S1 and S2  Gastrointestinal: BS+, soft, NT/ND  Extremities: No peripheral edema  Neurological:  Awake / alert  : No Higginbotham    LABS:                        9.3    10.47 )-----------( 178      ( 25 Apr 2021 12:21 )             29.2     04-25    134<L>  |  102  |  44<H>  ----------------------------<  130<H>  4.2   |  23  |  2.84<H>    Ca    9.1      25 Apr 2021 12:21  Phos  3.9     04-25  Mg     2.0     04-25    TPro  6.4  /  Alb  3.4  /  TBili  0.3  /  DBili  x   /  AST  12  /  ALT  9   /  AlkPhos  103  04-24

## 2021-04-25 NOTE — PROGRESS NOTE ADULT - ASSESSMENT
60 yo M, with PMH of HTN, b/l TKR, and COVID 2-3 months ago, not hospitilized,  CKD, s/p  PE on lovenox, and recent seizure awaiting tracheal stenosis   SP POD #0  for tracheal resection   CKD stage 4 at baseline       1 Renal- KVO fluids at present, renal funx improving       : No need for renal sono        : secondary hyperparathyroidism        :  BMP daily       : Low Potassium and low phosphorus diet  2 CVS-Not in heart failure   3 CTS-Took ~3cm of affected area of the proximal trachea, Guardian stitch placed 4/23/2021

## 2021-04-25 NOTE — PROGRESS NOTE ADULT - SUBJECTIVE AND OBJECTIVE BOX
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 Previously on apixaban. Now on lovenox in anticipation for biopsies and surgery.  Seizure disorder  CKD  Hx of COVID (2/2021)      INTERVAL EVENTS:   Remains on RA  Mild SubQ Emphysema stable on L neck    HISTORY:   Patient denies dyspnea and denies stridor. Denies chest pain, cough, pleuritic pain, fever. Denies abdominal pain, nausea, or vomiting.    PHYSICAL EXAM:   Gen: Comfortable, No acute distress  Eyes: Sclera white, Conjunctiva normal, Eyelids normal, Pupils symmetrical   ENT: Mucous membranes moist,  ,  ,    Neck: Trachea midline,  ,  ,  ,  ,   CV: Rate regular, Rhythm regular,  ,  ,    Resp: Breath sounds clear, No accessory muscles use,  ,    Abd: Soft, Non-distended, Non-tender, Bowel sounds normal,  ,  ,    Skin: Warm, No peripheral edema of lower extremities,  ,    : No lester  Neuro: Moving all 4 extremities,    Psych: A&Ox3      ASSESSMENT AND PLAN:     NEURO:    Seizure disorder - stable. continue antiepileptics. Neuro consult.        RESPIRATORY:  Severe tracheal stenosis s/p dilation (4/19) - Stable, but will need tracheal resection for definitive treatment.     Pulmonary Embolism - stable. heparin gtt      CARDIOVASCULAR:  Hemodynamically stable - Not on pressors. Continue hemodynamic monitoring.  HTN - stable. Continue home antihypertensives               RENAL:  CKD – Stable. Renally dose medications. Monitor for hyperkalemia and uremia. Avoid nephrotoxic medications. Monitor IOs and electrolytes.         GASTROINTESTINAL:  GI prophylaxis not indicated  Zofran and Reglan IV PRN for nausea  Regular diet            HEMATOLOGIC:  No signs of active bleeding. Monitor Hgb in CBC in AM  On therapeutic anticoagulation.    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 Previously on apixaban. Now on lovenox in anticipation for biopsies and surgery as outpatient. Continue heparin gtt.        INFECTIOUS DISEASE:  All surgical sites appear clean. No signs of active infection. Will monitor for fever and leukocytosis.          ENDOCRINE:  Stable – Monitor glucose fingersticks for goal 120-180.            Pertinent clinical, laboratory, radiographic, hemodynamic, echocardiographic, respiratory data, microbiologic data and chart were reviewed by myself and analyzed frequently throughout the course of the day and night by myself.    Plan discussed at length with the CTICU staff and Attending CT Surgeon.     Patient's status was discussed with patient at bedside.      _________________________  VITAL SIGNS:  Vital Signs Last 24 Hrs  T(C): 36.9 (25 Apr 2021 12:00), Max: 36.9 (24 Apr 2021 16:00)  T(F): 98.4 (25 Apr 2021 12:00), Max: 98.5 (24 Apr 2021 16:00)  HR: 95 (25 Apr 2021 14:00) (66 - 97)  BP: 133/81 (25 Apr 2021 14:00) (116/86 - 154/90)  BP(mean): 94 (25 Apr 2021 14:00) (87 - 111)  RR: 13 (25 Apr 2021 14:00) (10 - 18)  SpO2: 97% (25 Apr 2021 14:00) (95% - 100%)  I/Os:   I&O's Detail    24 Apr 2021 07:01  -  25 Apr 2021 07:00  --------------------------------------------------------  IN:    Heparin: 127 mL    IV PiggyBack: 250 mL    Lactated Ringers Bolus: 250 mL    sodium chloride 0.9%: 90 mL  Total IN: 717 mL    OUT:    Voided (mL): 2100 mL  Total OUT: 2100 mL    Total NET: -1383 mL      25 Apr 2021 07:01  -  25 Apr 2021 14:09  --------------------------------------------------------  IN:    Heparin: 46.5 mL    Lactated Ringers: 180 mL  Total IN: 226.5 mL    OUT:    Voided (mL): 1200 mL  Total OUT: 1200 mL    Total NET: -973.5 mL              MEDICATIONS:  MEDICATIONS  (STANDING):  acetylcysteine 10%  Inhalation 3 milliLiter(s) Inhalation three times a day  ALBUTerol   0.5% 2.5 milliGRAM(s) Nebulizer four times a day  heparin  Infusion 700 Unit(s)/Hr (9 mL/Hr) IV Continuous <Continuous>  labetalol 100 milliGRAM(s) Oral every 8 hours  lactated ringers. 1000 milliLiter(s) (30 mL/Hr) IV Continuous <Continuous>  levETIRAcetam 750 milliGRAM(s) Oral every 12 hours  pantoprazole    Tablet 40 milliGRAM(s) Oral before breakfast  polyethylene glycol 3350 17 Gram(s) Oral daily    MEDICATIONS  (PRN):  hydrALAZINE Injectable 10 milliGRAM(s) IV Push every 4 hours PRN systolic blood pressure greater than 160  oxycodone    5 mG/acetaminophen 325 mG 1 Tablet(s) Oral every 4 hours PRN Mild Pain (1 - 3)  racepinephrine  2.25% Inhalation 0.5 milliLiter(s) Inhalation every 6 hours PRN Stridor      LABS:                        9.3    10.47 )-----------( 178      ( 25 Apr 2021 12:21 )             29.2     04-25    134<L>  |  102  |  44<H>  ----------------------------<  130<H>  4.2   |  23  |  2.84<H>    Ca    9.1      25 Apr 2021 12:21  Phos  3.9     04-25  Mg     2.0     04-25    TPro  6.4  /  Alb  3.4  /  TBili  0.3  /  DBili  x   /  AST  12  /  ALT  9   /  AlkPhos  103  04-24    LIVER FUNCTIONS - ( 24 Apr 2021 09:32 )  Alb: 3.4 g/dL / Pro: 6.4 g/dL / ALK PHOS: 103 U/L / ALT: 9 U/L / AST: 12 U/L / GGT: x           PT/INR - ( 25 Apr 2021 12:21 )   PT: 11.9 sec;   INR: 1.05 ratio         PTT - ( 25 Apr 2021 12:21 )  PTT:43.6 sec      _________________________

## 2021-04-26 LAB
ANION GAP SERPL CALC-SCNC: 11 MMOL/L — SIGNIFICANT CHANGE UP (ref 7–14)
APTT BLD: 52.1 SEC — HIGH (ref 27–36.3)
APTT BLD: 55.1 SEC — HIGH (ref 27–36.3)
APTT BLD: 59.4 SEC — HIGH (ref 27–36.3)
APTT BLD: 92.2 SEC — HIGH (ref 27–36.3)
BLD GP AB SCN SERPL QL: NEGATIVE — SIGNIFICANT CHANGE UP
BUN SERPL-MCNC: 38 MG/DL — HIGH (ref 7–23)
CA-I BLD-SCNC: 1.1 MMOL/L — LOW (ref 1.15–1.29)
CALCIUM SERPL-MCNC: 8.9 MG/DL — SIGNIFICANT CHANGE UP (ref 8.4–10.5)
CHLORIDE SERPL-SCNC: 103 MMOL/L — SIGNIFICANT CHANGE UP (ref 98–107)
CO2 SERPL-SCNC: 20 MMOL/L — LOW (ref 22–31)
CREAT SERPL-MCNC: 2.95 MG/DL — HIGH (ref 0.5–1.3)
GLUCOSE SERPL-MCNC: 110 MG/DL — HIGH (ref 70–99)
HCT VFR BLD CALC: 27 % — LOW (ref 39–50)
HGB BLD-MCNC: 8.4 G/DL — LOW (ref 13–17)
INR BLD: 1.09 RATIO — SIGNIFICANT CHANGE UP (ref 0.88–1.16)
MAGNESIUM SERPL-MCNC: 1.9 MG/DL — SIGNIFICANT CHANGE UP (ref 1.6–2.6)
MCHC RBC-ENTMCNC: 29.6 PG — SIGNIFICANT CHANGE UP (ref 27–34)
MCHC RBC-ENTMCNC: 31.1 GM/DL — LOW (ref 32–36)
MCV RBC AUTO: 95.1 FL — SIGNIFICANT CHANGE UP (ref 80–100)
NRBC # BLD: 0 /100 WBCS — SIGNIFICANT CHANGE UP
NRBC # FLD: 0 K/UL — SIGNIFICANT CHANGE UP
PHOSPHATE SERPL-MCNC: 4.3 MG/DL — SIGNIFICANT CHANGE UP (ref 2.5–4.5)
PLATELET # BLD AUTO: 163 K/UL — SIGNIFICANT CHANGE UP (ref 150–400)
POTASSIUM SERPL-MCNC: 4.4 MMOL/L — SIGNIFICANT CHANGE UP (ref 3.5–5.3)
POTASSIUM SERPL-SCNC: 4.4 MMOL/L — SIGNIFICANT CHANGE UP (ref 3.5–5.3)
PROTHROM AB SERPL-ACNC: 12.4 SEC — SIGNIFICANT CHANGE UP (ref 10.6–13.6)
RBC # BLD: 2.84 M/UL — LOW (ref 4.2–5.8)
RBC # FLD: 13.8 % — SIGNIFICANT CHANGE UP (ref 10.3–14.5)
RH IG SCN BLD-IMP: POSITIVE — SIGNIFICANT CHANGE UP
SODIUM SERPL-SCNC: 134 MMOL/L — LOW (ref 135–145)
WBC # BLD: 9.82 K/UL — SIGNIFICANT CHANGE UP (ref 3.8–10.5)
WBC # FLD AUTO: 9.82 K/UL — SIGNIFICANT CHANGE UP (ref 3.8–10.5)

## 2021-04-26 PROCEDURE — 99233 SBSQ HOSP IP/OBS HIGH 50: CPT

## 2021-04-26 PROCEDURE — 71045 X-RAY EXAM CHEST 1 VIEW: CPT | Mod: 26

## 2021-04-26 RX ORDER — BENZOCAINE AND MENTHOL 5; 1 G/100ML; G/100ML
1 LIQUID ORAL EVERY 4 HOURS
Refills: 0 | Status: DISCONTINUED | OUTPATIENT
Start: 2021-04-26 | End: 2021-04-29

## 2021-04-26 RX ADMIN — OXYCODONE AND ACETAMINOPHEN 1 TABLET(S): 5; 325 TABLET ORAL at 02:30

## 2021-04-26 RX ADMIN — OXYCODONE AND ACETAMINOPHEN 1 TABLET(S): 5; 325 TABLET ORAL at 17:00

## 2021-04-26 RX ADMIN — HEPARIN SODIUM 10 UNIT(S)/HR: 5000 INJECTION INTRAVENOUS; SUBCUTANEOUS at 09:14

## 2021-04-26 RX ADMIN — Medication 100 MILLIGRAM(S): at 05:13

## 2021-04-26 RX ADMIN — OXYCODONE AND ACETAMINOPHEN 1 TABLET(S): 5; 325 TABLET ORAL at 09:15

## 2021-04-26 RX ADMIN — Medication 3 MILLILITER(S): at 16:04

## 2021-04-26 RX ADMIN — OXYCODONE AND ACETAMINOPHEN 1 TABLET(S): 5; 325 TABLET ORAL at 21:45

## 2021-04-26 RX ADMIN — Medication 100 MILLIGRAM(S): at 13:35

## 2021-04-26 RX ADMIN — OXYCODONE AND ACETAMINOPHEN 1 TABLET(S): 5; 325 TABLET ORAL at 02:15

## 2021-04-26 RX ADMIN — POLYETHYLENE GLYCOL 3350 17 GRAM(S): 17 POWDER, FOR SOLUTION ORAL at 12:08

## 2021-04-26 RX ADMIN — OXYCODONE AND ACETAMINOPHEN 1 TABLET(S): 5; 325 TABLET ORAL at 15:52

## 2021-04-26 RX ADMIN — ALBUTEROL 2.5 MILLIGRAM(S): 90 AEROSOL, METERED ORAL at 16:04

## 2021-04-26 RX ADMIN — LEVETIRACETAM 750 MILLIGRAM(S): 250 TABLET, FILM COATED ORAL at 17:22

## 2021-04-26 RX ADMIN — ALBUTEROL 2.5 MILLIGRAM(S): 90 AEROSOL, METERED ORAL at 03:05

## 2021-04-26 RX ADMIN — Medication 3 MILLILITER(S): at 07:52

## 2021-04-26 RX ADMIN — LEVETIRACETAM 750 MILLIGRAM(S): 250 TABLET, FILM COATED ORAL at 05:12

## 2021-04-26 RX ADMIN — OXYCODONE AND ACETAMINOPHEN 1 TABLET(S): 5; 325 TABLET ORAL at 09:45

## 2021-04-26 RX ADMIN — HEPARIN SODIUM 8 UNIT(S)/HR: 5000 INJECTION INTRAVENOUS; SUBCUTANEOUS at 10:58

## 2021-04-26 RX ADMIN — SODIUM CHLORIDE 30 MILLILITER(S): 9 INJECTION, SOLUTION INTRAVENOUS at 09:14

## 2021-04-26 RX ADMIN — PANTOPRAZOLE SODIUM 40 MILLIGRAM(S): 20 TABLET, DELAYED RELEASE ORAL at 05:12

## 2021-04-26 RX ADMIN — HEPARIN SODIUM 8 UNIT(S)/HR: 5000 INJECTION INTRAVENOUS; SUBCUTANEOUS at 21:03

## 2021-04-26 RX ADMIN — ALBUTEROL 2.5 MILLIGRAM(S): 90 AEROSOL, METERED ORAL at 07:53

## 2021-04-26 RX ADMIN — BENZOCAINE AND MENTHOL 1 LOZENGE: 5; 1 LIQUID ORAL at 21:03

## 2021-04-26 RX ADMIN — Medication 100 MILLIGRAM(S): at 21:03

## 2021-04-26 RX ADMIN — OXYCODONE AND ACETAMINOPHEN 1 TABLET(S): 5; 325 TABLET ORAL at 21:03

## 2021-04-26 NOTE — PROGRESS NOTE ADULT - SUBJECTIVE AND OBJECTIVE BOX
NEPHROLOGY-[facility] [phone]        Patient seen and examined in the chair.  He was in good spirits         MEDICATIONS  (STANDING):  acetylcysteine 10%  Inhalation 3 milliLiter(s) Inhalation three times a day  ALBUTerol   0.5% 2.5 milliGRAM(s) Nebulizer four times a day  heparin  Infusion 700 Unit(s)/Hr (10 mL/Hr) IV Continuous <Continuous>  labetalol 100 milliGRAM(s) Oral every 8 hours  lactated ringers. 1000 milliLiter(s) (30 mL/Hr) IV Continuous <Continuous>  levETIRAcetam 750 milliGRAM(s) Oral every 12 hours  pantoprazole    Tablet 40 milliGRAM(s) Oral before breakfast  polyethylene glycol 3350 17 Gram(s) Oral daily      VITAL:  T(C): , Max: 36.9 (04-25-21 @ 12:00)  T(F): , Max: 98.4 (04-25-21 @ 12:00)  HR: 76 (04-26-21 @ 08:00)  BP: 113/73 (04-26-21 @ 08:00)  BP(mean): 83 (04-26-21 @ 08:00)  RR: 22 (04-26-21 @ 08:00)  SpO2: 100% (04-26-21 @ 08:00)  Wt(kg): --    I and O's:    04-25 @ 07:01  -  04-26 @ 07:00  --------------------------------------------------------  IN: 2259.5 mL / OUT: 2950 mL / NET: -690.5 mL    04-26 @ 07:01  -  04-26 @ 09:09  --------------------------------------------------------  IN: 440 mL / OUT: 200 mL / NET: 240 mL          PHYSICAL EXAM:    Constitutional: NAD  Neck:  No JVD  Respiratory: CTAB/L  Cardiovascular: S1 and S2  Gastrointestinal: BS+, soft, NT/ND  Extremities: No peripheral edema  Neurological: A/O x 3, no focal deficits  Psychiatric: Normal mood, normal affect  : No Higginbotham  Skin: No rashes  Access: Not applicable    LABS:                        8.4    9.82  )-----------( 163      ( 26 Apr 2021 03:36 )             27.0     04-26    134<L>  |  103  |  38<H>  ----------------------------<  110<H>  4.4   |  20<L>  |  2.95<H>    Ca    8.9      26 Apr 2021 03:36  Phos  4.3     04-26  Mg     1.9     04-26    TPro  6.4  /  Alb  3.4  /  TBili  0.3  /  DBili  x   /  AST  12  /  ALT  9   /  AlkPhos  103  04-24          Urine Studies:          RADIOLOGY & ADDITIONAL STUDIES:

## 2021-04-26 NOTE — PROGRESS NOTE ADULT - ASSESSMENT
60 yo M, with PMH of HTN, b/l TKR, and COVID 2-3 months ago, not hospitilized,  CKD, s/p  PE on lovenox, and recent seizure awaiting tracheal stenosis   SP POD #0  for tracheal resection   CKD stage 4 at baseline       1 Renal-Renal function at baseline        : secondary hyperparathyroidism        :  BMP daily       : Low Potassium and low phosphorus diet  2 CVS-Not in heart failure;  DC IVF   3 CTS-Took ~3cm of affected area of the proximal trachea, Guardian stitch placed.  Bronch on Thursday planned     DW CTS team     Sayed Rick   [facility]   [phone]

## 2021-04-26 NOTE — PROGRESS NOTE ADULT - SUBJECTIVE AND OBJECTIVE BOX
DATE OF SERVICE: 04-26-21 @ 06:52    Subjective: Patient seen and examined. No new events except as noted.     SUBJECTIVE/ROS:        MEDICATIONS:  MEDICATIONS  (STANDING):  acetylcysteine 10%  Inhalation 3 milliLiter(s) Inhalation three times a day  ALBUTerol   0.5% 2.5 milliGRAM(s) Nebulizer four times a day  heparin  Infusion 700 Unit(s)/Hr (10 mL/Hr) IV Continuous <Continuous>  labetalol 100 milliGRAM(s) Oral every 8 hours  lactated ringers. 1000 milliLiter(s) (30 mL/Hr) IV Continuous <Continuous>  levETIRAcetam 750 milliGRAM(s) Oral every 12 hours  pantoprazole    Tablet 40 milliGRAM(s) Oral before breakfast  polyethylene glycol 3350 17 Gram(s) Oral daily      PHYSICAL EXAM:  T(C): 36.4 (04-26-21 @ 04:00), Max: 36.9 (04-25-21 @ 12:00)  HR: 73 (04-26-21 @ 06:00) (66 - 97)  BP: 118/80 (04-26-21 @ 06:00) (113/68 - 154/90)  RR: 13 (04-26-21 @ 06:00) (12 - 18)  SpO2: 97% (04-26-21 @ 06:00) (94% - 100%)  Wt(kg): --  I&O's Summary    24 Apr 2021 07:01  -  25 Apr 2021 07:00  --------------------------------------------------------  IN: 717 mL / OUT: 2100 mL / NET: -1383 mL    25 Apr 2021 07:01  -  26 Apr 2021 06:52  --------------------------------------------------------  IN: 2259.5 mL / OUT: 2950 mL / NET: -690.5 mL            JVP: Normal  Neck: supple  Lung: clear   CV: S1 S2 , Murmur:  Abd: soft  Ext: No edema  neuro: Awake / alert  Psych: flat affect  Skin: normal``    LABS/DATA:    CARDIAC MARKERS:                                8.4    9.82  )-----------( 163      ( 26 Apr 2021 03:36 )             27.0     04-26    134<L>  |  103  |  38<H>  ----------------------------<  110<H>  4.4   |  20<L>  |  2.95<H>    Ca    8.9      26 Apr 2021 03:36  Phos  4.3     04-26  Mg     1.9     04-26    TPro  6.4  /  Alb  3.4  /  TBili  0.3  /  DBili  x   /  AST  12  /  ALT  9   /  AlkPhos  103  04-24    proBNP:   Lipid Profile:   HgA1c:   TSH:     TELE:  EKG:
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
## 2021-04-26 NOTE — PROGRESS NOTE ADULT - ASSESSMENT
Patient is a 60 yo M, with PMH of HTN, b/l TKR, and COVID 2-3 months ago, not hospitalized  CKD, s/p  PE on lovenox, and recent seizure( unknown etiology) s/p emergency cric on 03/25/2021 from tongue injury lingual hematoma 2/2 to seizure blocking airway (discharged on 4/3/2021), now p/w difficulty breathing, had N/V x1 yesterday and abd pain-now resolved.  Nocturnal, worsening, since discharge. Pt feels he cannot breath especially lying down, denies fever, cough, or hematemesis. No more seizure at home, denies use of EtOH. Pt is placed on BiPAP for upper airway obstruction in ED, to help work of breathing. admitted to CTI for close monitoring       Problem/Recommendation - 1:  Problem: Tracheal stenosis following tracheostomy. Recommendation: CTS eval appreciated  monitor closelyO2 supplement   aspiration precautions  Echo noted  S/P tracheal dilatation   CTS care     Problem/Recommendation - 2:  ·  Problem: Pulmonary embolus.  Recommendation: on lovenox at home  hematlogy eval called by team   check LE DVT study  Echo , normal   Pt with h/o pancytopenia, and being worked up for antiphospholipid syndrome/lupus anticoagulant,    Hart marrow biopsy W/U at [facility]. follow up with hematology     Problem/Recommendation - 3:  ·  Problem: CKD (chronic kidney disease).  Recommendation: monitor BUN/Cr  at his baseline  IV Hydration PRN  monitor urine output.   renal follow up appreciated     Problem/Recommendation - 4:  ·  Problem: HTN (hypertension).  Recommendation: BP control per ICU team  resume oral meds as tolerated.     Problem/Recommendation - 5:  ·  Problem: 2019 novel coronavirus disease (COVID-19).  Recommendation: stable O2 sat.     Problem/Recommendation - 6:  Problem: Prophylactic measure. Recommendation: DVT and GI PPX

## 2021-04-26 NOTE — CHART NOTE - NSCHARTNOTEFT_GEN_A_CORE
HEMATOLOGY FELLOW NOTE    Hematology initially asked to comment on perioperative anticoagulation. Patient is now post-op on a heparin gtt and stable. On discharge, we would recommend placing patient back on Eliquis. However, if there are any further questions regarding discharge AC, please contact patient's primary Hematologist, Dr. Gil Tee.     [facility] will sign off at this time.     Please page or call with questions.    Alisha Sultana MD  Hematology/Oncology Fellow, PGY-4  Pager: [phone]  After 5pm and on weekends please page on-call fellow

## 2021-04-26 NOTE — PROGRESS NOTE ADULT - SUBJECTIVE AND OBJECTIVE BOX
Name of Patient : ANNEMARIE KELLEY  MRN: [id number]  DATE OF SERVICE: 04-26-21 @ 18:15    Subjective: Patient seen and examined. No new events except as noted.     REVIEW OF SYSTEMS:    CONSTITUTIONAL: No weakness, fevers or chills  EYES/ENT: No visual changes;  No vertigo or throat pain   NECK: No pain or stiffness  RESPIRATORY: No cough, wheezing, hemoptysis; No shortness of breath  CARDIOVASCULAR: No chest pain or palpitations  GASTROINTESTINAL: No abdominal or epigastric pain. No nausea, vomiting, or hematemesis; No diarrhea or constipation. No melena or hematochezia.  GENITOURINARY: No dysuria, frequency or hematuria  NEUROLOGICAL: No numbness or weakness  SKIN: No itching, burning, rashes, or lesions   All other review of systems is negative unless indicated above.    MEDICATIONS:  MEDICATIONS  (STANDING):  acetylcysteine 10%  Inhalation 3 milliLiter(s) Inhalation three times a day  ALBUTerol   0.5% 2.5 milliGRAM(s) Nebulizer four times a day  heparin  Infusion 700 Unit(s)/Hr (8 mL/Hr) IV Continuous <Continuous>  labetalol 100 milliGRAM(s) Oral every 8 hours  lactated ringers. 1000 milliLiter(s) (30 mL/Hr) IV Continuous <Continuous>  levETIRAcetam 750 milliGRAM(s) Oral every 12 hours  pantoprazole    Tablet 40 milliGRAM(s) Oral before breakfast  polyethylene glycol 3350 17 Gram(s) Oral daily      PHYSICAL EXAM:  T(C): 37 (04-26-21 @ 16:00), Max: 37 (04-26-21 @ 12:00)  HR: 79 (04-26-21 @ 18:00) (70 - 89)  BP: 137/87 (04-26-21 @ 18:00) (113/68 - 148/81)  RR: 11 (04-26-21 @ 18:00) (11 - 22)  SpO2: 95% (04-26-21 @ 18:00) (94% - 100%)  Wt(kg): --  I&O's Summary    25 Apr 2021 07:01  -  26 Apr 2021 07:00  --------------------------------------------------------  IN: 2259.5 mL / OUT: 2950 mL / NET: -690.5 mL    26 Apr 2021 07:01  -  26 Apr 2021 18:15  --------------------------------------------------------  IN: 1422 mL / OUT: 1500 mL / NET: -78 mL          Appearance: Normal	  HEENT:  PERRLA   Lymphatic: No lymphadenopathy   Cardiovascular: Normal S1 S2, no JVD  Respiratory: normal effort , clear  Gastrointestinal:  Soft, Non-tender  Skin: No rashes,  warm to touch  Psychiatry:  Mood & affect appropriate  Musculuskeletal: No edema      All labs, Imaging and EKGs personally reviewed       04-25-21 @ 07:01  -  04-26-21 @ 07:00  --------------------------------------------------------  IN: 2259.5 mL / OUT: 2950 mL / NET: -690.5 mL    04-26-21 @ 07:01  -  04-26-21 @ 18:15  --------------------------------------------------------  IN: 1422 mL / OUT: 1500 mL / NET: -78 mL                          8.4    9.82  )-----------( 163      ( 26 Apr 2021 03:36 )             27.0               04-26    134<L>  |  103  |  38<H>  ----------------------------<  110<H>  4.4   |  20<L>  |  2.95<H>    Ca    8.9      26 Apr 2021 03:36  Phos  4.3     04-26  Mg     1.9     04-26      PT/INR - ( 26 Apr 2021 03:36 )   PT: 12.4 sec;   INR: 1.09 ratio         PTT - ( 26 Apr 2021 17:12 )  PTT:59.4 sec

## 2021-04-27 LAB — APTT BLD: 49.2 SEC — HIGH (ref 27–36.3)

## 2021-04-27 RX ORDER — CYCLOBENZAPRINE HYDROCHLORIDE 10 MG/1
5 TABLET, FILM COATED ORAL THREE TIMES A DAY
Refills: 0 | Status: DISCONTINUED | OUTPATIENT
Start: 2021-04-27 | End: 2021-04-29

## 2021-04-27 RX ORDER — ACETAMINOPHEN 500 MG
650 TABLET ORAL EVERY 6 HOURS
Refills: 0 | Status: DISCONTINUED | OUTPATIENT
Start: 2021-04-27 | End: 2021-04-29

## 2021-04-27 RX ORDER — OXYCODONE HYDROCHLORIDE 5 MG/1
10 TABLET ORAL EVERY 4 HOURS
Refills: 0 | Status: DISCONTINUED | OUTPATIENT
Start: 2021-04-27 | End: 2021-04-29

## 2021-04-27 RX ORDER — CYCLOBENZAPRINE HYDROCHLORIDE 10 MG/1
5 TABLET, FILM COATED ORAL ONCE
Refills: 0 | Status: COMPLETED | OUTPATIENT
Start: 2021-04-27 | End: 2021-04-27

## 2021-04-27 RX ORDER — OXYCODONE HYDROCHLORIDE 5 MG/1
5 TABLET ORAL EVERY 4 HOURS
Refills: 0 | Status: DISCONTINUED | OUTPATIENT
Start: 2021-04-27 | End: 2021-04-29

## 2021-04-27 RX ADMIN — Medication 100 MILLIGRAM(S): at 13:23

## 2021-04-27 RX ADMIN — ALBUTEROL 2.5 MILLIGRAM(S): 90 AEROSOL, METERED ORAL at 04:53

## 2021-04-27 RX ADMIN — Medication 100 MILLIGRAM(S): at 20:42

## 2021-04-27 RX ADMIN — OXYCODONE AND ACETAMINOPHEN 1 TABLET(S): 5; 325 TABLET ORAL at 03:50

## 2021-04-27 RX ADMIN — LEVETIRACETAM 750 MILLIGRAM(S): 250 TABLET, FILM COATED ORAL at 17:13

## 2021-04-27 RX ADMIN — OXYCODONE HYDROCHLORIDE 10 MILLIGRAM(S): 5 TABLET ORAL at 16:29

## 2021-04-27 RX ADMIN — OXYCODONE AND ACETAMINOPHEN 1 TABLET(S): 5; 325 TABLET ORAL at 03:21

## 2021-04-27 RX ADMIN — Medication 650 MILLIGRAM(S): at 14:00

## 2021-04-27 RX ADMIN — CYCLOBENZAPRINE HYDROCHLORIDE 5 MILLIGRAM(S): 10 TABLET, FILM COATED ORAL at 08:38

## 2021-04-27 RX ADMIN — Medication 650 MILLIGRAM(S): at 17:13

## 2021-04-27 RX ADMIN — LEVETIRACETAM 750 MILLIGRAM(S): 250 TABLET, FILM COATED ORAL at 05:16

## 2021-04-27 RX ADMIN — ALBUTEROL 2.5 MILLIGRAM(S): 90 AEROSOL, METERED ORAL at 21:57

## 2021-04-27 RX ADMIN — Medication 3 MILLILITER(S): at 15:47

## 2021-04-27 RX ADMIN — BENZOCAINE AND MENTHOL 1 LOZENGE: 5; 1 LIQUID ORAL at 20:46

## 2021-04-27 RX ADMIN — Medication 3 MILLILITER(S): at 08:58

## 2021-04-27 RX ADMIN — ALBUTEROL 2.5 MILLIGRAM(S): 90 AEROSOL, METERED ORAL at 08:58

## 2021-04-27 RX ADMIN — Medication 650 MILLIGRAM(S): at 08:38

## 2021-04-27 RX ADMIN — ALBUTEROL 2.5 MILLIGRAM(S): 90 AEROSOL, METERED ORAL at 15:46

## 2021-04-27 RX ADMIN — CYCLOBENZAPRINE HYDROCHLORIDE 5 MILLIGRAM(S): 10 TABLET, FILM COATED ORAL at 13:23

## 2021-04-27 RX ADMIN — Medication 650 MILLIGRAM(S): at 13:23

## 2021-04-27 RX ADMIN — BENZOCAINE AND MENTHOL 1 LOZENGE: 5; 1 LIQUID ORAL at 14:50

## 2021-04-27 RX ADMIN — OXYCODONE HYDROCHLORIDE 10 MILLIGRAM(S): 5 TABLET ORAL at 20:42

## 2021-04-27 RX ADMIN — PANTOPRAZOLE SODIUM 40 MILLIGRAM(S): 20 TABLET, DELAYED RELEASE ORAL at 05:16

## 2021-04-27 RX ADMIN — OXYCODONE HYDROCHLORIDE 10 MILLIGRAM(S): 5 TABLET ORAL at 17:13

## 2021-04-27 RX ADMIN — Medication 650 MILLIGRAM(S): at 09:40

## 2021-04-27 RX ADMIN — OXYCODONE HYDROCHLORIDE 10 MILLIGRAM(S): 5 TABLET ORAL at 21:13

## 2021-04-27 RX ADMIN — Medication 3 MILLILITER(S): at 21:57

## 2021-04-27 RX ADMIN — Medication 100 MILLIGRAM(S): at 05:16
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
## 2021-04-27 NOTE — PROGRESS NOTE ADULT - ASSESSMENT
Patient is a 60 yo M, with PMH of HTN, b/l TKR, and COVID 2-3 months ago, not hospitalized  CKD, s/p  PE on lovenox, and recent seizure( unknown etiology) s/p emergency cric on 03/25/2021 from tongue injury lingual hematoma 2/2 to seizure blocking airway (discharged on 4/3/2021), now p/w difficulty breathing, had N/V x1 yesterday and abd pain-now resolved.  Nocturnal, worsening, since discharge. Pt feels he cannot breath especially lying down, denies fever, cough, or hematemesis. No more seizure at home, denies use of EtOH. Pt is placed on BiPAP for upper airway obstruction in ED, to help work of breathing. admitted to CTI for close monitoring       Problem/Recommendation - 1:  Problem: Tracheal stenosis following tracheostomy. Recommendation: CTS eval appreciated  O2 supplement   aspiration precautions  Echo noted  S/P tracheal dilatation   CTS care appreciated   plan for bronch this week     Problem/Recommendation - 2:  ·  Problem: Pulmonary embolus.  Recommendation:  cont AC   hematlogy eval called by team   Echo , normal   Pt with h/o pancytopenia, and being worked up for antiphospholipid syndrome/lupus anticoagulant,    Hart marrow biopsy W/U at [facility]. follow up with hematology     Problem/Recommendation - 3:  ·  Problem: CKD (chronic kidney disease).  Recommendation: monitor BUN/Cr  at his baseline  IV Hydration PRN  monitor urine output.   renal follow up appreciated     Problem/Recommendation - 4:  ·  Problem: HTN (hypertension).  Recommendation: BP control per ICU team  resume oral meds as tolerated.     Problem/Recommendation - 5:  ·  Problem: 2019 novel coronavirus disease (COVID-19).  Recommendation: stable O2 sat.     Problem/Recommendation - 6:  Problem: Prophylactic measure. Recommendation: DVT and GI PPX

## 2021-04-27 NOTE — PROGRESS NOTE ADULT - SUBJECTIVE AND OBJECTIVE BOX
[facility] [phone]        Patient seen and examined in bed.  He was about the same         MEDICATIONS  (STANDING):  acetaminophen   Tablet .. 650 milliGRAM(s) Oral every 6 hours  acetylcysteine 10%  Inhalation 3 milliLiter(s) Inhalation three times a day  ALBUTerol   0.5% 2.5 milliGRAM(s) Nebulizer four times a day  heparin  Infusion 700 Unit(s)/Hr (8 mL/Hr) IV Continuous <Continuous>  labetalol 100 milliGRAM(s) Oral every 8 hours  lactated ringers. 1000 milliLiter(s) (30 mL/Hr) IV Continuous <Continuous>  levETIRAcetam 750 milliGRAM(s) Oral every 12 hours  pantoprazole    Tablet 40 milliGRAM(s) Oral before breakfast  polyethylene glycol 3350 17 Gram(s) Oral daily      VITAL:  T(C): , Max: 37.4 (04-27-21 @ 05:14)  T(F): , Max: 99.4 (04-27-21 @ 05:14)  HR: 78 (04-27-21 @ 08:58)  BP: 132/77 (04-27-21 @ 08:00)  BP(mean): 100 (04-26-21 @ 18:00)  RR: 16 (04-27-21 @ 08:00)  SpO2: 98% (04-27-21 @ 08:58)  Wt(kg): --    I and O's:    04-26 @ 07:01  -  04-27 @ 07:00  --------------------------------------------------------  IN: 1422 mL / OUT: 1500 mL / NET: -78 mL          PHYSICAL EXAM:    Constitutional: NAD  Neck:  No JVD  Respiratory: CTAB/L  Cardiovascular: S1 and S2  Gastrointestinal: BS+, soft, NT/ND  Extremities: No peripheral edema  Neurological: A/O x 3, no focal deficits  Psychiatric: Normal mood, normal affect  : No Higginbotham  Skin: No rashes  Access: Not applicable    LABS:                        8.4    9.82  )-----------( 163      ( 26 Apr 2021 03:36 )             27.0     04-26    134<L>  |  103  |  38<H>  ----------------------------<  110<H>  4.4   |  20<L>  |  2.95<H>    Ca    8.9      26 Apr 2021 03:36  Phos  4.3     04-26  Mg     1.9     04-26            Urine Studies:          RADIOLOGY & ADDITIONAL STUDIES:

## 2021-04-27 NOTE — PROGRESS NOTE ADULT - SUBJECTIVE AND OBJECTIVE BOX
DATE OF SERVICE: 04-27-21 @ 09:26    Subjective: Patient seen and examined. No new events except as noted.     SUBJECTIVE/ROS:    no new events     MEDICATIONS:  MEDICATIONS  (STANDING):  acetaminophen   Tablet .. 650 milliGRAM(s) Oral every 6 hours  acetylcysteine 10%  Inhalation 3 milliLiter(s) Inhalation three times a day  ALBUTerol   0.5% 2.5 milliGRAM(s) Nebulizer four times a day  heparin  Infusion 700 Unit(s)/Hr (8 mL/Hr) IV Continuous <Continuous>  labetalol 100 milliGRAM(s) Oral every 8 hours  lactated ringers. 1000 milliLiter(s) (30 mL/Hr) IV Continuous <Continuous>  levETIRAcetam 750 milliGRAM(s) Oral every 12 hours  pantoprazole    Tablet 40 milliGRAM(s) Oral before breakfast  polyethylene glycol 3350 17 Gram(s) Oral daily      PHYSICAL EXAM:  T(C): 37.1 (04-27-21 @ 08:00), Max: 37.4 (04-27-21 @ 05:14)  HR: 78 (04-27-21 @ 08:58) (76 - 89)  BP: 132/77 (04-27-21 @ 08:00) (127/81 - 148/81)  RR: 16 (04-27-21 @ 08:00) (11 - 18)  SpO2: 98% (04-27-21 @ 08:58) (95% - 100%)  Wt(kg): --  I&O's Summary    26 Apr 2021 07:01  -  27 Apr 2021 07:00  --------------------------------------------------------  IN: 1422 mL / OUT: 1500 mL / NET: -78 mL            JVP: Normal  Neck: supple  Lung: clear   CV: S1 S2 , Murmur:  Abd: soft  Ext: No edema  neuro: Awake / alert  Psych: flat affect  Skin: normal``    LABS/DATA:    CARDIAC MARKERS:                                8.4    9.82  )-----------( 163      ( 26 Apr 2021 03:36 )             27.0     04-26    134<L>  |  103  |  38<H>  ----------------------------<  110<H>  4.4   |  20<L>  |  2.95<H>    Ca    8.9      26 Apr 2021 03:36  Phos  4.3     04-26  Mg     1.9     04-26      proBNP:   Lipid Profile:   HgA1c:   TSH:     TELE:  EKG:

## 2021-04-27 NOTE — PROGRESS NOTE ADULT - SUBJECTIVE AND OBJECTIVE BOX
Name of Patient : ANNEMARIE KELLEY  MRN: [id number]  DATE OF SERVICE: 04-27-21    Subjective: Patient seen and examined. No new events except as noted.   doing okay    REVIEW OF SYSTEMS:    CONSTITUTIONAL: No weakness, fevers or chills  EYES/ENT: No visual changes;  No vertigo or throat pain   NECK: No pain or stiffness  RESPIRATORY: No cough, wheezing, hemoptysis; No shortness of breath  CARDIOVASCULAR: No chest pain or palpitations  GASTROINTESTINAL: No abdominal or epigastric pain. No nausea, vomiting, or hematemesis; No diarrhea or constipation. No melena or hematochezia.  GENITOURINARY: No dysuria, frequency or hematuria  NEUROLOGICAL: No numbness or weakness  SKIN: No itching, burning, rashes, or lesions   All other review of systems is negative unless indicated above.    MEDICATIONS:  MEDICATIONS  (STANDING):  acetaminophen   Tablet .. 650 milliGRAM(s) Oral every 6 hours  acetylcysteine 10%  Inhalation 3 milliLiter(s) Inhalation three times a day  ALBUTerol   0.5% 2.5 milliGRAM(s) Nebulizer four times a day  heparin  Infusion 700 Unit(s)/Hr (8 mL/Hr) IV Continuous <Continuous>  labetalol 100 milliGRAM(s) Oral every 8 hours  lactated ringers. 1000 milliLiter(s) (30 mL/Hr) IV Continuous <Continuous>  levETIRAcetam 750 milliGRAM(s) Oral every 12 hours  pantoprazole    Tablet 40 milliGRAM(s) Oral before breakfast  polyethylene glycol 3350 17 Gram(s) Oral daily      PHYSICAL EXAM:  T(C): 36.8 (04-27-21 @ 16:21), Max: 37.4 (04-27-21 @ 05:14)  HR: 78 (04-27-21 @ 16:21) (78 - 88)  BP: 147/78 (04-27-21 @ 16:21) (127/81 - 147/78)  RR: 18 (04-27-21 @ 16:21) (11 - 18)  SpO2: 100% (04-27-21 @ 16:21) (95% - 100%)  Wt(kg): --  I&O's Summary    26 Apr 2021 07:01  -  27 Apr 2021 07:00  --------------------------------------------------------  IN: 1422 mL / OUT: 1500 mL / NET: -78 mL          Appearance: Normal	  HEENT:  PERRLA   Lymphatic: No lymphadenopathy   Cardiovascular: Normal S1 S2, no JVD  Respiratory: normal effort , clear  Gastrointestinal:  Soft, Non-tender  Skin: No rashes,  warm to touch  Psychiatry:  Mood & affect appropriate  Musculuskeletal: No edema      All labs, Imaging and EKGs personally reviewed         04-26-21 @ 07:01  -  04-27-21 @ 07:00  --------------------------------------------------------  IN: 1422 mL / OUT: 1500 mL / NET: -78 mL                          8.4    9.82  )-----------( 163      ( 26 Apr 2021 03:36 )             27.0               04-26    134<L>  |  103  |  38<H>  ----------------------------<  110<H>  4.4   |  20<L>  |  2.95<H>    Ca    8.9      26 Apr 2021 03:36  Phos  4.3     04-26  Mg     1.9     04-26      PT/INR - ( 26 Apr 2021 03:36 )   PT: 12.4 sec;   INR: 1.09 ratio         PTT - ( 27 Apr 2021 07:22 )  PTT:49.2 sec

## 2021-04-27 NOTE — PROGRESS NOTE ADULT - ASSESSMENT
62 yo M, with PMH of HTN, b/l TKR, and COVID 2-3 months ago, not hospitilized,  CKD, s/p  PE on lovenox, and recent seizure awaiting tracheal stenosis   SP  tracheal resection   CKD stage 4 at baseline   Hyponatremia - new       1 Renal-Renal function at baseline and the serum sodium is not significant        : secondary hyperparathyroidism         : Low Potassium and low phosphorus diet  2 CVS-Not in heart failure;  DC IVF   3 CTS-Took ~3cm of affected area of the proximal trachea, Guardian stitch placed.  Bronch on Thursday planned          [employer]   [phone]

## 2021-04-28 ENCOUNTER — TRANSCRIPTION ENCOUNTER (OUTPATIENT)
Age: 61
End: 2021-04-28

## 2021-04-28 LAB
APTT BLD: 55.8 SEC — HIGH (ref 27–36.3)
SARS-COV-2 RNA SPEC QL NAA+PROBE: SIGNIFICANT CHANGE UP

## 2021-04-28 RX ORDER — SIMETHICONE 80 MG/1
80 TABLET, CHEWABLE ORAL ONCE
Refills: 0 | Status: COMPLETED | OUTPATIENT
Start: 2021-04-28 | End: 2021-04-28

## 2021-04-28 RX ADMIN — Medication 30 MILLILITER(S): at 23:38

## 2021-04-28 RX ADMIN — Medication 650 MILLIGRAM(S): at 17:58

## 2021-04-28 RX ADMIN — LEVETIRACETAM 750 MILLIGRAM(S): 250 TABLET, FILM COATED ORAL at 06:35

## 2021-04-28 RX ADMIN — POLYETHYLENE GLYCOL 3350 17 GRAM(S): 17 POWDER, FOR SOLUTION ORAL at 11:59

## 2021-04-28 RX ADMIN — BENZOCAINE AND MENTHOL 1 LOZENGE: 5; 1 LIQUID ORAL at 23:38

## 2021-04-28 RX ADMIN — OXYCODONE HYDROCHLORIDE 10 MILLIGRAM(S): 5 TABLET ORAL at 17:12

## 2021-04-28 RX ADMIN — Medication 30 MILLILITER(S): at 19:32

## 2021-04-28 RX ADMIN — BENZOCAINE AND MENTHOL 1 LOZENGE: 5; 1 LIQUID ORAL at 17:12

## 2021-04-28 RX ADMIN — OXYCODONE HYDROCHLORIDE 10 MILLIGRAM(S): 5 TABLET ORAL at 06:35

## 2021-04-28 RX ADMIN — Medication 100 MILLIGRAM(S): at 20:31

## 2021-04-28 RX ADMIN — Medication 650 MILLIGRAM(S): at 06:35

## 2021-04-28 RX ADMIN — Medication 650 MILLIGRAM(S): at 17:12

## 2021-04-28 RX ADMIN — BENZOCAINE AND MENTHOL 1 LOZENGE: 5; 1 LIQUID ORAL at 06:35

## 2021-04-28 RX ADMIN — Medication 100 MILLIGRAM(S): at 13:56

## 2021-04-28 RX ADMIN — SIMETHICONE 80 MILLIGRAM(S): 80 TABLET, CHEWABLE ORAL at 23:38

## 2021-04-28 RX ADMIN — Medication 650 MILLIGRAM(S): at 11:59

## 2021-04-28 RX ADMIN — Medication 650 MILLIGRAM(S): at 12:29

## 2021-04-28 RX ADMIN — OXYCODONE HYDROCHLORIDE 10 MILLIGRAM(S): 5 TABLET ORAL at 07:05

## 2021-04-28 RX ADMIN — LEVETIRACETAM 750 MILLIGRAM(S): 250 TABLET, FILM COATED ORAL at 17:12

## 2021-04-28 RX ADMIN — PANTOPRAZOLE SODIUM 40 MILLIGRAM(S): 20 TABLET, DELAYED RELEASE ORAL at 06:35

## 2021-04-28 RX ADMIN — Medication 3 MILLILITER(S): at 08:52

## 2021-04-28 RX ADMIN — ALBUTEROL 2.5 MILLIGRAM(S): 90 AEROSOL, METERED ORAL at 14:50

## 2021-04-28 RX ADMIN — ALBUTEROL 2.5 MILLIGRAM(S): 90 AEROSOL, METERED ORAL at 22:40

## 2021-04-28 RX ADMIN — OXYCODONE HYDROCHLORIDE 10 MILLIGRAM(S): 5 TABLET ORAL at 02:46

## 2021-04-28 RX ADMIN — Medication 100 MILLIGRAM(S): at 06:35

## 2021-04-28 RX ADMIN — SIMETHICONE 80 MILLIGRAM(S): 80 TABLET, CHEWABLE ORAL at 20:31

## 2021-04-28 RX ADMIN — HEPARIN SODIUM 8 UNIT(S)/HR: 5000 INJECTION INTRAVENOUS; SUBCUTANEOUS at 12:03

## 2021-04-28 RX ADMIN — Medication 650 MILLIGRAM(S): at 07:05

## 2021-04-28 RX ADMIN — ALBUTEROL 2.5 MILLIGRAM(S): 90 AEROSOL, METERED ORAL at 08:53

## 2021-04-28 RX ADMIN — OXYCODONE HYDROCHLORIDE 10 MILLIGRAM(S): 5 TABLET ORAL at 01:56

## 2021-04-28 RX ADMIN — ALBUTEROL 2.5 MILLIGRAM(S): 90 AEROSOL, METERED ORAL at 04:28

## 2021-04-28 RX ADMIN — BENZOCAINE AND MENTHOL 1 LOZENGE: 5; 1 LIQUID ORAL at 12:00

## 2021-04-28 RX ADMIN — OXYCODONE HYDROCHLORIDE 10 MILLIGRAM(S): 5 TABLET ORAL at 17:58

## 2021-04-28 NOTE — PROGRESS NOTE ADULT - SUBJECTIVE AND OBJECTIVE BOX
Subjective: no acute complaints  pt denies shortness of breath   pain control  Tolerating diet  ambulating on RA    Vital Signs:  Vital Signs Last 24 Hrs  T(C): 37.4 (04-28-21 @ 06:03), Max: 37.7 (04-28-21 @ 01:21)  T(F): 99.3 (04-28-21 @ 06:03), Max: 99.8 (04-28-21 @ 01:21)  HR: 97 (04-28-21 @ 06:03) (72 - 97)  BP: 141/77 (04-28-21 @ 06:03) (128/78 - 147/78)  RR: 18 (04-28-21 @ 06:03) (18 - 18)  SpO2: 94% (04-28-21 @ 06:03) (94% - 100%) on (O2)    Telemetry/Alarms: SR  General: WN/WD NAD  Neurology: Awake, nonfocal, SINGH x 4  Eyes: Scleras clear, PERRLA/ EOMI, Gross vision intact  ENT:Gross hearing intact, grossly patent pharynx, no stridor  Neck: Neck supple, trachea midline, No JVD,   Respiratory: CTA B/L, No wheezing, rales, rhonchi  CV: RRR, S1S2, no murmurs, rubs or gallops  Abdominal: Soft, NT, ND +BS,   Extremities: No edema, + peripheral pulses  Skin: No Rashes, Hematoma, Ecchymosis  Lymphatic: No Neck, axilla, groin LAD  Psych: Oriented x 3, normal affect  Incisions: guardian stitch intact    Relevant labs, radiology and Medications reviewed          PTT - ( 28 Apr 2021 07:14 )  PTT:55.8 sec  MEDICATIONS  (STANDING):  acetaminophen   Tablet .. 650 milliGRAM(s) Oral every 6 hours  acetylcysteine 10%  Inhalation 3 milliLiter(s) Inhalation three times a day  ALBUTerol   0.5% 2.5 milliGRAM(s) Nebulizer four times a day  heparin  Infusion 700 Unit(s)/Hr (8 mL/Hr) IV Continuous <Continuous>  labetalol 100 milliGRAM(s) Oral every 8 hours  levETIRAcetam 750 milliGRAM(s) Oral every 12 hours  pantoprazole    Tablet 40 milliGRAM(s) Oral before breakfast  polyethylene glycol 3350 17 Gram(s) Oral daily    MEDICATIONS  (PRN):  benzocaine 15 mG/menthol 3.6 mG (Sugar-Free) Lozenge 1 Lozenge Oral every 4 hours PRN Sore Throat  cyclobenzaprine 5 milliGRAM(s) Oral three times a day PRN Muscle Spasm  hydrALAZINE Injectable 10 milliGRAM(s) IV Push every 4 hours PRN systolic blood pressure greater than 160  oxyCODONE    IR 5 milliGRAM(s) Oral every 4 hours PRN Moderate Pain (4 - 6)  oxyCODONE    IR 10 milliGRAM(s) Oral every 4 hours PRN Severe Pain (7 - 10)  racepinephrine  2.25% Inhalation 0.5 milliLiter(s) Inhalation every 6 hours PRN Stridor    Pertinent Physical Exam  I&O's Summary    27 Apr 2021 07:01  -  28 Apr 2021 07:00  --------------------------------------------------------  IN: 386 mL / OUT: 1400 mL / NET: -1014 mL        Assessment  62 yo M, with PMH of HTN, b/l TKR, and COVID 2-3 months ago, not hospitilized,  CKD, s/p  PE on lovenox, and recent seizure( unknown etiology) s/p emergency cric on 03/25/2021 from tongue injury lingual hematoma 2/2 to seizure blocking airway (discharged on 4/3/2021), now p/w difficulty breathing,had N/V x1 yesterday and abd pain-now resolved.  Nocturnal, worsening, since discharge. Pt feels he cannot breath especially lying down, denies fever, cough, or hematemesis. No more seizure at home, denies use of EtOH. Pt is placed on BiPAP for upper airway obstruction in ED, to help work of breathing.   (16 Apr 2021 10:55)    Procedure:  Flexible bronchoscopy, Tracheal resection with reconstruction. Took ~3cm of affected area of the proximal trachea, Guardian stitch placed 4/23/2021      Issues:     Severe tracheal stenosis s/p Resection  Recent emergency cricothyrotomy due airway obstruction from expanding tongue hematoma s/p decannulation (4/2021)  Acute PE (dx 10/2020) on lovenox  Seizure disorder  HTN  CKD              Hx of COVID (2/2021)  Pt doing well post op. NOn complicated. Guardian stitch in place. On Heparin gtt for h/o PE      PLAN  Neuro: Pain management. Will inc Oxy and add flexeril.   Pulm: Encourage coughing, deep breathing and use of incentive spirometry.No need for CXR per Dr. Allen. Guardian stitch in place for next flexion  Cardio: Monitor telemetry/alarms  GI: Tolerating diet. Continue stool softeners.  Renal: monitor urine output, supplement electrolytes as needed. FU any renal reccs. Creatinine stable.   Vasc: Heparin SC/SCDs for DVT prophylaxis Pt on Heparin gtt, PTT therapeutic  Heme: Stable H/H. .   ID: Off antibiotics. Stable.  Therapy: OOB/ambulate  Disposition: Plan Bronch on Thursday  Discussed with Cardiothoracic Team at AM rounds.      Subjective: no acute complaints  pt denies shortness of breath   pain control  Tolerating diet  ambulating on RA    Vital Signs:  Vital Signs Last 24 Hrs  T(C): 37.4 (04-28-21 @ 06:03), Max: 37.7 (04-28-21 @ 01:21)  T(F): 99.3 (04-28-21 @ 06:03), Max: 99.8 (04-28-21 @ 01:21)  HR: 97 (04-28-21 @ 06:03) (72 - 97)  BP: 141/77 (04-28-21 @ 06:03) (128/78 - 147/78)  RR: 18 (04-28-21 @ 06:03) (18 - 18)  SpO2: 94% (04-28-21 @ 06:03) (94% - 100%) on (O2)    Telemetry/Alarms: SR  General: WN/WD NAD  Neurology: Awake, nonfocal, SINGH x 4  Eyes: Scleras clear, PERRLA/ EOMI, Gross vision intact  ENT:Gross hearing intact, grossly patent pharynx, no stridor  Neck: Neck supple, trachea midline, No JVD,   Respiratory: CTA B/L, No wheezing, rales, rhonchi  CV: RRR, S1S2, no murmurs, rubs or gallops  Abdominal: Soft, NT, ND +BS,   Extremities: No edema, + peripheral pulses  Skin: No Rashes, Hematoma, Ecchymosis  Lymphatic: No Neck, axilla, groin LAD  Psych: Oriented x 3, normal affect  Incisions: guardian stitch intact    Relevant labs, radiology and Medications reviewed          PTT - ( 28 Apr 2021 07:14 )  PTT:55.8 sec  MEDICATIONS  (STANDING):  acetaminophen   Tablet .. 650 milliGRAM(s) Oral every 6 hours  acetylcysteine 10%  Inhalation 3 milliLiter(s) Inhalation three times a day  ALBUTerol   0.5% 2.5 milliGRAM(s) Nebulizer four times a day  heparin  Infusion 700 Unit(s)/Hr (8 mL/Hr) IV Continuous <Continuous>  labetalol 100 milliGRAM(s) Oral every 8 hours  levETIRAcetam 750 milliGRAM(s) Oral every 12 hours  pantoprazole    Tablet 40 milliGRAM(s) Oral before breakfast  polyethylene glycol 3350 17 Gram(s) Oral daily    MEDICATIONS  (PRN):  benzocaine 15 mG/menthol 3.6 mG (Sugar-Free) Lozenge 1 Lozenge Oral every 4 hours PRN Sore Throat  cyclobenzaprine 5 milliGRAM(s) Oral three times a day PRN Muscle Spasm  hydrALAZINE Injectable 10 milliGRAM(s) IV Push every 4 hours PRN systolic blood pressure greater than 160  oxyCODONE    IR 5 milliGRAM(s) Oral every 4 hours PRN Moderate Pain (4 - 6)  oxyCODONE    IR 10 milliGRAM(s) Oral every 4 hours PRN Severe Pain (7 - 10)  racepinephrine  2.25% Inhalation 0.5 milliLiter(s) Inhalation every 6 hours PRN Stridor    Pertinent Physical Exam  I&O's Summary    27 Apr 2021 07:01  -  28 Apr 2021 07:00  --------------------------------------------------------  IN: 386 mL / OUT: 1400 mL / NET: -1014 mL        Assessment  62 yo M, with PMH of HTN, b/l TKR, and COVID 2-3 months ago, not hospitilized,  CKD, s/p  PE on lovenox, and recent seizure( unknown etiology) s/p emergency cric on 03/25/2021 from tongue injury lingual hematoma 2/2 to seizure blocking airway (discharged on 4/3/2021), now p/w difficulty breathing,had N/V x1 yesterday and abd pain-now resolved.  Nocturnal, worsening, since discharge. Pt feels he cannot breath especially lying down, denies fever, cough, or hematemesis. No more seizure at home, denies use of EtOH. Pt is placed on BiPAP for upper airway obstruction in ED, to help work of breathing.   (16 Apr 2021 10:55)    Procedure:  Flexible bronchoscopy, Tracheal resection with reconstruction. Took ~3cm of affected area of the proximal trachea, Guardian stitch placed 4/23/2021      Issues:     Severe tracheal stenosis s/p Resection  Recent emergency cricothyrotomy due airway obstruction from expanding tongue hematoma s/p decannulation (4/2021)  Acute PE (dx 10/2020) on lovenox  Seizure disorder  HTN  CKD  Hx of COVID (2/2021)  Pt doing well post op. NOn complicated. Guardian stitch in place. On Heparin gtt for h/o PE  Plan for FLEXIBLE BRONCH tomorrow       PLAN  Neuro: Pain management. Will inc Oxy and add flexeril.   Pulm: Encourage coughing, deep breathing and use of incentive spirometry.  No need for CXR per Dr. Allen. Guardian stitch in place for next flexion  Cardio: Monitor telemetry/alarms  GI: Tolerating diet. Continue stool softeners. NPO p MN  Renal: monitor urine output, supplement electrolytes as needed. FU any renal reccs. Creatinine stable.   Vasc: Heparin SC/SCDs for DVT prophylaxis Pt on Heparin gtt, PTT therapeutic - will hold heparin gtt at [facility] tonight   Heme: Stable H/H. .   ID: Off antibiotics. Stable. will send COVID swab  Therapy: OOB/ambulate  Disposition: Plan Bronch on Thursday  Discussed with Cardiothoracic Team at AM rounds.

## 2021-04-28 NOTE — PROGRESS NOTE ADULT - SUBJECTIVE AND OBJECTIVE BOX
[facility] [phone]        Patient seen and examined in the room walking around         MEDICATIONS  (STANDING):  acetaminophen   Tablet .. 650 milliGRAM(s) Oral every 6 hours  ALBUTerol   0.5% 2.5 milliGRAM(s) Nebulizer four times a day  heparin  Infusion 700 Unit(s)/Hr (8 mL/Hr) IV Continuous <Continuous>  labetalol 100 milliGRAM(s) Oral every 8 hours  levETIRAcetam 750 milliGRAM(s) Oral every 12 hours  pantoprazole    Tablet 40 milliGRAM(s) Oral before breakfast  polyethylene glycol 3350 17 Gram(s) Oral daily      VITAL:  T(C): , Max: 37.7 (04-28-21 @ 01:21)  T(F): , Max: 99.8 (04-28-21 @ 01:21)  HR: 77 (04-28-21 @ 08:53)  BP: 141/77 (04-28-21 @ 06:03)  BP(mean): --  RR: 18 (04-28-21 @ 06:03)  SpO2: 99% (04-28-21 @ 08:53)  Wt(kg): --    I and O's:    04-27 @ 07:01  -  04-28 @ 07:00  --------------------------------------------------------  IN: 386 mL / OUT: 1400 mL / NET: -1014 mL          PHYSICAL EXAM:    Constitutional: NAD  Neck:  No JVD  Respiratory: CTAB/L  Cardiovascular: S1 and S2  Gastrointestinal: BS+, soft, NT/ND  Extremities: No peripheral edema  Neurological: A/O x 3, no focal deficits  Psychiatric: Normal mood, normal affect  : No Higginbotham  Skin: No rashes  Access: Not applicable    LABS:                Urine Studies:          RADIOLOGY & ADDITIONAL STUDIES:

## 2021-04-28 NOTE — PROGRESS NOTE ADULT - ASSESSMENT
60 yo M, with PMH of HTN, b/l TKR, and COVID 2-3 months ago, not hospitilized,  CKD, s/p  PE on lovenox, and recent seizure awaiting tracheal stenosis   SP  tracheal resection   CKD stage 4 at baseline          1 Renal-Renal function at baseline and labs are pending        : secondary hyperparathyroidism         : Low Potassium and low phosphorus diet  2 CVS-Not in heart failure;    3 CTS-Took ~3cm of affected area of the proximal trachea, Guardian stitch placed.  Bronch on Thursday planned   4 Pulm-Heparin gtt         [facility]   [facility]   [phone]

## 2021-04-28 NOTE — PROGRESS NOTE ADULT - SUBJECTIVE AND OBJECTIVE BOX
DATE OF SERVICE: 04-28-21 @ 07:03    Subjective: Patient seen and examined. No new events except as noted.     SUBJECTIVE/ROS:  no cp   no sob       MEDICATIONS:  MEDICATIONS  (STANDING):  acetaminophen   Tablet .. 650 milliGRAM(s) Oral every 6 hours  acetylcysteine 10%  Inhalation 3 milliLiter(s) Inhalation three times a day  ALBUTerol   0.5% 2.5 milliGRAM(s) Nebulizer four times a day  heparin  Infusion 700 Unit(s)/Hr (8 mL/Hr) IV Continuous <Continuous>  labetalol 100 milliGRAM(s) Oral every 8 hours  levETIRAcetam 750 milliGRAM(s) Oral every 12 hours  pantoprazole    Tablet 40 milliGRAM(s) Oral before breakfast  polyethylene glycol 3350 17 Gram(s) Oral daily      PHYSICAL EXAM:  T(C): 37.7 (04-28-21 @ 01:21), Max: 37.7 (04-28-21 @ 01:21)  HR: 72 (04-28-21 @ 04:31) (72 - 94)  BP: 133/80 (04-28-21 @ 01:21) (128/78 - 147/78)  RR: 18 (04-28-21 @ 01:21) (16 - 18)  SpO2: 100% (04-28-21 @ 04:31) (96% - 100%)  Wt(kg): --  I&O's Summary    27 Apr 2021 07:01  -  28 Apr 2021 07:00  --------------------------------------------------------  IN: 122 mL / OUT: 1200 mL / NET: -1078 mL            JVP: Normal  Neck: supple  Lung: clear   CV: S1 S2 , Murmur:  Abd: soft  Ext: No edema  neuro: Awake / alert  Psych: flat affect  Skin: normal``    LABS/DATA:    CARDIAC MARKERS:                  proBNP:   Lipid Profile:   HgA1c:   TSH:     TELE:  EKG:

## 2021-04-28 NOTE — PROGRESS NOTE ADULT - ASSESSMENT
Patient is a 60 yo M, with PMH of HTN, b/l TKR, and COVID 2-3 months ago, not hospitalized  CKD, s/p  PE on lovenox, and recent seizure( unknown etiology) s/p emergency cric on 03/25/2021 from tongue injury lingual hematoma 2/2 to seizure blocking airway (discharged on 4/3/2021), now p/w difficulty breathing, had N/V x1 yesterday and abd pain-now resolved.  Nocturnal, worsening, since discharge. Pt feels he cannot breath especially lying down, denies fever, cough, or hematemesis. No more seizure at home, denies use of EtOH. Pt is placed on BiPAP for upper airway obstruction in ED, to help work of breathing. admitted to CTI for close monitoring       Problem/Recommendation - 1:  Problem: Tracheal stenosis following tracheostomy. Recommendation: CTS eval appreciated  O2 supplement   aspiration precautions  Echo noted  S/P tracheal dilatation   CTS care appreciated   plan for bronch this week by CTS    Problem/Recommendation - 2:  ·  Problem: Pulmonary embolus.  Recommendation:  cont AC   hematlogy eval called by team   Echo , normal   Pt with h/o pancytopenia, and being worked up for antiphospholipid syndrome/lupus anticoagulant,    Bone marrow biopsy W/U at [facility]. follow up with hematology     Problem/Recommendation - 3:  ·  Problem: CKD (chronic kidney disease).  Recommendation: monitor BUN/Cr  at his baseline  IV Hydration PRN  monitor urine output.   renal follow up appreciated     Problem/Recommendation - 4:  ·  Problem: HTN (hypertension).  Recommendation: BP control per ICU team  resume oral meds as tolerated.     Problem/Recommendation - 5:  ·  Problem: 2019 novel coronavirus disease (COVID-19).  Recommendation: stable O2 sat.     Problem/Recommendation - 6:  Problem: Prophylactic measure. Recommendation: DVT and GI PPX

## 2021-04-28 NOTE — PROGRESS NOTE ADULT - SUBJECTIVE AND OBJECTIVE BOX
Name of Patient : ANNEMARIE KELLEY  MRN: [id number]  DATE OF SERVICE: 04-28-21     Subjective: Patient seen and examined. No new events except as noted.   plan for bronch     REVIEW OF SYSTEMS:    CONSTITUTIONAL: No weakness, fevers or chills  EYES/ENT: No visual changes;  No vertigo or throat pain   NECK: No pain or stiffness  RESPIRATORY: No cough, wheezing, hemoptysis; No shortness of breath  CARDIOVASCULAR: No chest pain or palpitations  GASTROINTESTINAL: No abdominal or epigastric pain. No nausea, vomiting, or hematemesis; No diarrhea or constipation. No melena or hematochezia.  GENITOURINARY: No dysuria, frequency or hematuria  NEUROLOGICAL: No numbness or weakness  SKIN: No itching, burning, rashes, or lesions   All other review of systems is negative unless indicated above.    MEDICATIONS:  MEDICATIONS  (STANDING):  acetaminophen   Tablet .. 650 milliGRAM(s) Oral every 6 hours  ALBUTerol   0.5% 2.5 milliGRAM(s) Nebulizer four times a day  heparin  Infusion 700 Unit(s)/Hr (8 mL/Hr) IV Continuous <Continuous>  labetalol 100 milliGRAM(s) Oral every 8 hours  levETIRAcetam 750 milliGRAM(s) Oral every 12 hours  pantoprazole    Tablet 40 milliGRAM(s) Oral before breakfast  polyethylene glycol 3350 17 Gram(s) Oral daily      PHYSICAL EXAM:  T(C): 37.2 (04-28-21 @ 12:03), Max: 37.7 (04-28-21 @ 01:21)  HR: 89 (04-28-21 @ 14:51) (72 - 97)  BP: 122/70 (04-28-21 @ 12:03) (100/57 - 147/78)  RR: 16 (04-28-21 @ 12:03) (16 - 18)  SpO2: 100% (04-28-21 @ 14:51) (94% - 100%)  Wt(kg): --  I&O's Summary    27 Apr 2021 07:01  -  28 Apr 2021 07:00  --------------------------------------------------------  IN: 386 mL / OUT: 1400 mL / NET: -1014 mL    28 Apr 2021 07:01  -  28 Apr 2021 15:21  --------------------------------------------------------  IN: 0 mL / OUT: 400 mL / NET: -400 mL          Appearance: Normal	  HEENT:  PERRLA   Lymphatic: No lymphadenopathy   Cardiovascular: Normal S1 S2, no JVD  Respiratory: normal effort , clear  Gastrointestinal:  Soft, Non-tender  Skin: No rashes,  warm to touch  Psychiatry:  Mood & affect appropriate  Musculuskeletal: No edema      All labs, Imaging and EKGs personally reviewed         04-27-21 @ 07:01  -  04-28-21 @ 07:00  --------------------------------------------------------  IN: 386 mL / OUT: 1400 mL / NET: -1014 mL    04-28-21 @ 07:01  -  04-28-21 @ 15:21  --------------------------------------------------------  IN: 0 mL / OUT: 400 mL / NET: -400 mL                      PTT - ( 28 Apr 2021 07:14 )  PTT:55.8 sec

## 2021-04-29 ENCOUNTER — APPOINTMENT (OUTPATIENT)
Dept: THORACIC SURGERY | Facility: HOSPITAL | Age: 61
End: 2021-04-29

## 2021-04-29 ENCOUNTER — TRANSCRIPTION ENCOUNTER (OUTPATIENT)
Age: 61
End: 2021-04-29

## 2021-04-29 VITALS
TEMPERATURE: 98 F | SYSTOLIC BLOOD PRESSURE: 150 MMHG | OXYGEN SATURATION: 100 % | RESPIRATION RATE: 16 BRPM | DIASTOLIC BLOOD PRESSURE: 78 MMHG | HEART RATE: 74 BPM

## 2021-04-29 LAB
ANION GAP SERPL CALC-SCNC: 12 MMOL/L — SIGNIFICANT CHANGE UP (ref 7–14)
APTT BLD: 33.9 SEC — SIGNIFICANT CHANGE UP (ref 27–36.3)
BLD GP AB SCN SERPL QL: NEGATIVE — SIGNIFICANT CHANGE UP
BUN SERPL-MCNC: 34 MG/DL — HIGH (ref 7–23)
CALCIUM SERPL-MCNC: 9.8 MG/DL — SIGNIFICANT CHANGE UP (ref 8.4–10.5)
CHLORIDE SERPL-SCNC: 100 MMOL/L — SIGNIFICANT CHANGE UP (ref 98–107)
CO2 SERPL-SCNC: 24 MMOL/L — SIGNIFICANT CHANGE UP (ref 22–31)
CREAT SERPL-MCNC: 3.55 MG/DL — HIGH (ref 0.5–1.3)
GLUCOSE SERPL-MCNC: 113 MG/DL — HIGH (ref 70–99)
HCT VFR BLD CALC: 26.7 % — LOW (ref 39–50)
HGB BLD-MCNC: 8.4 G/DL — LOW (ref 13–17)
INR BLD: 1.15 RATIO — SIGNIFICANT CHANGE UP (ref 0.88–1.16)
MAGNESIUM SERPL-MCNC: 1.9 MG/DL — SIGNIFICANT CHANGE UP (ref 1.6–2.6)
MCHC RBC-ENTMCNC: 29.9 PG — SIGNIFICANT CHANGE UP (ref 27–34)
MCHC RBC-ENTMCNC: 31.5 GM/DL — LOW (ref 32–36)
MCV RBC AUTO: 95 FL — SIGNIFICANT CHANGE UP (ref 80–100)
NRBC # BLD: 0 /100 WBCS — SIGNIFICANT CHANGE UP
NRBC # FLD: 0 K/UL — SIGNIFICANT CHANGE UP
PHOSPHATE SERPL-MCNC: 4.2 MG/DL — SIGNIFICANT CHANGE UP (ref 2.5–4.5)
PLATELET # BLD AUTO: 174 K/UL — SIGNIFICANT CHANGE UP (ref 150–400)
POTASSIUM SERPL-MCNC: 5.3 MMOL/L — SIGNIFICANT CHANGE UP (ref 3.5–5.3)
POTASSIUM SERPL-SCNC: 5.3 MMOL/L — SIGNIFICANT CHANGE UP (ref 3.5–5.3)
PROTHROM AB SERPL-ACNC: 13.1 SEC — SIGNIFICANT CHANGE UP (ref 10.6–13.6)
RBC # BLD: 2.81 M/UL — LOW (ref 4.2–5.8)
RBC # FLD: 13.2 % — SIGNIFICANT CHANGE UP (ref 10.3–14.5)
RH IG SCN BLD-IMP: POSITIVE — SIGNIFICANT CHANGE UP
SODIUM SERPL-SCNC: 136 MMOL/L — SIGNIFICANT CHANGE UP (ref 135–145)
WBC # BLD: 12.65 K/UL — HIGH (ref 3.8–10.5)
WBC # FLD AUTO: 12.65 K/UL — HIGH (ref 3.8–10.5)

## 2021-04-29 PROCEDURE — 31622 DX BRONCHOSCOPE/WASH: CPT | Mod: 78

## 2021-04-29 RX ORDER — APIXABAN 2.5 MG/1
1 TABLET, FILM COATED ORAL
Qty: 60 | Refills: 0
Start: 2021-04-29 | End: 2021-05-28

## 2021-04-29 RX ORDER — POLYETHYLENE GLYCOL 3350 17 G/17G
17 POWDER, FOR SOLUTION ORAL
Qty: 0 | Refills: 0 | DISCHARGE
Start: 2021-04-29

## 2021-04-29 RX ORDER — CYCLOBENZAPRINE HYDROCHLORIDE 10 MG/1
1 TABLET, FILM COATED ORAL
Qty: 15 | Refills: 0
Start: 2021-04-29 | End: 2021-05-03

## 2021-04-29 RX ORDER — LEVETIRACETAM 250 MG/1
1 TABLET, FILM COATED ORAL
Qty: 0 | Refills: 0 | DISCHARGE
Start: 2021-04-29

## 2021-04-29 RX ORDER — ERYTHROPOIETIN 10000 [IU]/ML
10000 INJECTION, SOLUTION INTRAVENOUS; SUBCUTANEOUS ONCE
Refills: 0 | Status: DISCONTINUED | OUTPATIENT
Start: 2021-04-29 | End: 2021-04-29

## 2021-04-29 RX ORDER — OXYCODONE HYDROCHLORIDE 5 MG/1
1 TABLET ORAL
Qty: 18 | Refills: 0
Start: 2021-04-29 | End: 2021-05-01

## 2021-04-29 RX ORDER — LEVETIRACETAM 250 MG/1
1 TABLET, FILM COATED ORAL
Qty: 60 | Refills: 0
Start: 2021-04-29 | End: 2021-05-28

## 2021-04-29 RX ORDER — APIXABAN 2.5 MG/1
5 TABLET, FILM COATED ORAL
Refills: 0 | Status: DISCONTINUED | OUTPATIENT
Start: 2021-04-29 | End: 2021-04-29

## 2021-04-29 RX ORDER — APIXABAN 2.5 MG/1
1 TABLET, FILM COATED ORAL
Qty: 0 | Refills: 0 | DISCHARGE
Start: 2021-04-29

## 2021-04-29 RX ADMIN — Medication 100 MILLIGRAM(S): at 13:31

## 2021-04-29 RX ADMIN — Medication 30 MILLILITER(S): at 13:32

## 2021-04-29 RX ADMIN — ALBUTEROL 2.5 MILLIGRAM(S): 90 AEROSOL, METERED ORAL at 03:45

## 2021-04-29 RX ADMIN — Medication 650 MILLIGRAM(S): at 14:01

## 2021-04-29 RX ADMIN — PANTOPRAZOLE SODIUM 40 MILLIGRAM(S): 20 TABLET, DELAYED RELEASE ORAL at 05:36

## 2021-04-29 RX ADMIN — Medication 100 MILLIGRAM(S): at 05:36

## 2021-04-29 RX ADMIN — Medication 650 MILLIGRAM(S): at 13:31

## 2021-04-29 NOTE — PROGRESS NOTE ADULT - ASSESSMENT
Patient is a 60 yo M, with PMH of HTN, b/l TKR, and COVID 2-3 months ago, not hospitalized  CKD, s/p  PE on lovenox, and recent seizure( unknown etiology) s/p emergency cric on 03/25/2021 from tongue injury lingual hematoma 2/2 to seizure blocking airway (discharged on 4/3/2021), now p/w difficulty breathing, had N/V x1 yesterday and abd pain-now resolved.  Nocturnal, worsening, since discharge. Pt feels he cannot breath especially lying down, denies fever, cough, or hematemesis. No more seizure at home, denies use of EtOH. Pt is placed on BiPAP for upper airway obstruction in ED, to help work of breathing. admitted to CTI for close monitoring       Problem/Recommendation - 1:  Problem: Tracheal stenosis following tracheostomy. Recommendation: CTS eval appreciated  O2 supplement   aspiration precautions  Echo noted  S/P tracheal dilatation   CTS care appreciated   plan for bronch this week by CTS    Problem/Recommendation - 2:  ·  Problem: Pulmonary embolus.  Recommendation:  cont AC   hematlogy eval called by team   Echo , normal   Pt with h/o pancytopenia, and being worked up for antiphospholipid syndrome/lupus anticoagulant,    Bone marrow biopsy W/U at [facility]. follow up with hematology     Problem/Recommendation - 3:  ·  Problem: CKD (chronic kidney disease).  Recommendation: monitor BUN/Cr  at his baseline  IV Hydration PRN  monitor urine output.   renal follow up appreciated     Problem/Recommendation - 4:  ·  Problem: HTN (hypertension).  Recommendation: BP control per ICU team  resume oral meds as tolerated.     Problem/Recommendation - 5:  ·  Problem: 2019 novel coronavirus disease (COVID-19).  Recommendation: stable O2 sat.     Problem/Recommendation - 6:  Problem: Prophylactic measure. Recommendation: DVT and GI PPX

## 2021-04-29 NOTE — BRIEF OPERATIVE NOTE - OPERATION/FINDINGS
flexible bronchoscopy, 3ml of 4% lido to vocal cords, tracheal stenosis at 2nd tracheal ring for ~3cm, soft granulation tissue easily dilated up to 20mm with balloon. tracheal malacia appearance
flexible bronchoscopy, patent well healing tracheal anastomosis with minor granulation tissue. guardian stitch removed.
Flexible bronchoscopy  Tracheal resection with reconstruction.  Took ~3cm of affected area of the proximal trachea.  Guardian stitch placed.

## 2021-04-29 NOTE — PROGRESS NOTE ADULT - ASSESSMENT
62 yo M, with PMH of HTN, b/l TKR, and COVID 2-3 months ago, not hospitilized,  CKD, s/p  PE on lovenox, and recent seizure awaiting tracheal stenosis   SP  tracheal resection   CKD stage 4 at baseline   secondary hyperparathyroidism         1 Renal-Renal function slightly elevated from baseline;  May give gentle IVF if the creatinine is up in am     2 CVS-Not in heart failure;    3 CTS-Took ~3cm of affected area of the proximal trachea;   Bronch today    4 Anemia- Retacrit 47912 unti x 1          [facility]   [phone]

## 2021-04-29 NOTE — PROGRESS NOTE ADULT - SUBJECTIVE AND OBJECTIVE BOX
Name of Patient : ANNEMARIE KELLEY  MRN: [id number]  DATE OF SERVICE: 04-29-21 @ 22:38    Subjective: Patient seen and examined. No new events except as noted.     REVIEW OF SYSTEMS:    CONSTITUTIONAL: No weakness, fevers or chills  EYES/ENT: No visual changes;  No vertigo or throat pain   NECK: No pain or stiffness  RESPIRATORY: No cough, wheezing, hemoptysis; No shortness of breath  CARDIOVASCULAR: No chest pain or palpitations  GASTROINTESTINAL: No abdominal or epigastric pain. No nausea, vomiting, or hematemesis; No diarrhea or constipation. No melena or hematochezia.  GENITOURINARY: No dysuria, frequency or hematuria  NEUROLOGICAL: No numbness or weakness  SKIN: No itching, burning, rashes, or lesions   All other review of systems is negative unless indicated above.    MEDICATIONS:  MEDICATIONS  (STANDING):  acetaminophen   Tablet .. 650 milliGRAM(s) Oral every 6 hours  ALBUTerol   0.5% 2.5 milliGRAM(s) Nebulizer four times a day  apixaban 5 milliGRAM(s) Oral two times a day  epoetin walt-epbx (RETACRIT) Injectable 32478 Unit(s) SubCutaneous once  labetalol 100 milliGRAM(s) Oral every 8 hours  levETIRAcetam 750 milliGRAM(s) Oral every 12 hours  pantoprazole    Tablet 40 milliGRAM(s) Oral before breakfast  polyethylene glycol 3350 17 Gram(s) Oral daily      PHYSICAL EXAM:  T(C): 36.7 (04-29-21 @ 11:26), Max: 37.2 (04-29-21 @ 05:38)  HR: 74 (04-29-21 @ 11:26) (71 - 92)  BP: 150/78 (04-29-21 @ 11:26) (110/69 - 158/84)  RR: 16 (04-29-21 @ 11:26) (11 - 23)  SpO2: 100% (04-29-21 @ 11:26) (96% - 100%)  Wt(kg): --  I&O's Summary    28 Apr 2021 07:01  -  29 Apr 2021 07:00  --------------------------------------------------------  IN: 32 mL / OUT: 1600 mL / NET: -1568 mL    29 Apr 2021 07:01 - 29 Apr 2021 22:38  --------------------------------------------------------  IN: 0 mL / OUT: 225 mL / NET: -225 mL      Height (cm): 160 (04-29 @ 07:08)  Weight (kg): 69.3 (04-29 @ 07:08)  BMI (kg/m2): 27.1 (04-29 @ 07:08)  BSA (m2): 1.72 (04-29 @ 07:08)    Appearance: Normal	  HEENT:  PERRLA   Lymphatic: No lymphadenopathy   Cardiovascular: Normal S1 S2, no JVD  Respiratory: normal effort , clear  Gastrointestinal:  Soft, Non-tender  Skin: No rashes,  warm to touch  Psychiatry:  Mood & affect appropriate  Musculuskeletal: No edema      All labs, Imaging and EKGs personally reviewed                 04-28-21 @ 07:01  -  04-29-21 @ 07:00  --------------------------------------------------------  IN: 32 mL / OUT: 1600 mL / NET: -1568 mL    04-29-21 @ 07:01  -  04-29-21 @ 22:38  --------------------------------------------------------  IN: 0 mL / OUT: 225 mL / NET: -225 mL

## 2021-04-29 NOTE — PROGRESS NOTE ADULT - REASON FOR ADMISSION
SOB

## 2021-04-29 NOTE — DISCHARGE NOTE NURSING/CASE MANAGEMENT/SOCIAL WORK - PATIENT PORTAL LINK FT
You can access the FollowMyHealth Patient Portal offered by [facility] by registering at the following website: [email or web address]. By joining [employer]’s FollowMyHealth portal, you will also be able to view your health information using other applications (apps) compatible with our system.

## 2021-04-29 NOTE — PROGRESS NOTE ADULT - SUBJECTIVE AND OBJECTIVE BOX
NEPHROLOGY-[facility] [phone]        Patient seen and examined in bed.  He was the same         MEDICATIONS  (STANDING):  acetaminophen   Tablet .. 650 milliGRAM(s) Oral every 6 hours  ALBUTerol   0.5% 2.5 milliGRAM(s) Nebulizer four times a day  labetalol 100 milliGRAM(s) Oral every 8 hours  levETIRAcetam 750 milliGRAM(s) Oral every 12 hours  pantoprazole    Tablet 40 milliGRAM(s) Oral before breakfast  polyethylene glycol 3350 17 Gram(s) Oral daily      VITAL:  T(C): , Max: 37.4 (04-28-21 @ 16:32)  T(F): , Max: 99.4 (04-28-21 @ 16:32)  HR: 73 (04-29-21 @ 09:00)  BP: 123/77 (04-29-21 @ 09:00)  BP(mean): 88 (04-29-21 @ 09:00)  RR: 13 (04-29-21 @ 09:00)  SpO2: 96% (04-29-21 @ 09:00)  Wt(kg): --    I and O's:    04-28 @ 07:01  -  04-29 @ 07:00  --------------------------------------------------------  IN: 32 mL / OUT: 1600 mL / NET: -1568 mL      Height (cm): 160 (04-29 @ 07:08)  Weight (kg): 69.3 (04-29 @ 07:08)  BMI (kg/m2): 27.1 (04-29 @ 07:08)  BSA (m2): 1.72 (04-29 @ 07:08)    PHYSICAL EXAM:    Constitutional: NAD  Neck:  No JVD  Respiratory: CTAB/L  Cardiovascular: S1 and S2  Gastrointestinal: BS+, soft, NT/ND  Extremities: No peripheral edema  Neurological: A/O x 3, no focal deficits  Psychiatric: Normal mood, normal affect  : No Higginbotham  Skin: No rashes  Access: Not applicable    LABS:                        8.4    12.65 )-----------( 174      ( 29 Apr 2021 06:15 )             26.7     04-29    136  |  100  |  34<H>  ----------------------------<  113<H>  5.3   |  24  |  3.55<H>    Ca    9.8      29 Apr 2021 06:15  Phos  4.2     04-29  Mg     1.9     04-29            Urine Studies:          RADIOLOGY & ADDITIONAL STUDIES:

## 2021-04-29 NOTE — BRIEF OPERATIVE NOTE - NSICDXBRIEFPOSTOP_GEN_ALL_CORE_FT
POST-OP DIAGNOSIS:  Tracheal stenosis 19-Apr-2021 13:06:01  Dallas Crooks  

## 2021-04-29 NOTE — BRIEF OPERATIVE NOTE - NSICDXBRIEFPREOP_GEN_ALL_CORE_FT
PRE-OP DIAGNOSIS:  Tracheal stenosis 19-Apr-2021 13:05:52  Dallas Crooks  

## 2021-04-29 NOTE — BRIEF OPERATIVE NOTE - NSICDXBRIEFPROCEDURE_GEN_ALL_CORE_FT
PROCEDURES:  Resection of trachea, open approach 23-Apr-2021 10:28:59  Winston Rosado  Flexible bronchoscopy 23-Apr-2021 10:29:10  Winston Rosado  
PROCEDURES:  Flexible bronchoscopy 23-Apr-2021 10:29:10  Winston Rosado  
PROCEDURES:  Bronchoscopy, with tracheal dilation 19-Apr-2021 13:05:44  Dallas Crooks

## 2021-04-29 NOTE — PROGRESS NOTE ADULT - NSICDXPILOT_GEN_ALL_CORE
[place]

## 2021-04-29 NOTE — DISCHARGE NOTE NURSING/CASE MANAGEMENT/SOCIAL WORK - NSDCFUADDAPPT_GEN_ALL_CORE_FT
See Dr. Allen in 2 weeks in office. Call for an apt. [phone]  You will need a repeat Bronchoscopy in 4 weeks.   Continue your blood thinner for now, but it will be have to be held prior to the Bronchoscopy  See you Hematologist Dr. Dr. Gil Tee in 1-2 weeks. Follow up any results  See your Kidney doctor in next few weeks as well.

## 2021-04-30 ENCOUNTER — INPATIENT (INPATIENT)
Facility: HOSPITAL | Age: 61
LOS: 2 days | Discharge: HOME | End: 2021-05-03
Attending: THORACIC SURGERY (CARDIOTHORACIC VASCULAR SURGERY) | Admitting: THORACIC SURGERY (CARDIOTHORACIC VASCULAR SURGERY)
Payer: MEDICAID

## 2021-04-30 VITALS
DIASTOLIC BLOOD PRESSURE: 73 MMHG | OXYGEN SATURATION: 100 % | HEIGHT: 63 IN | RESPIRATION RATE: 18 BRPM | TEMPERATURE: 100 F | HEART RATE: 83 BPM | SYSTOLIC BLOOD PRESSURE: 140 MMHG

## 2021-04-30 DIAGNOSIS — Z96.651 PRESENCE OF RIGHT ARTIFICIAL KNEE JOINT: Chronic | ICD-10-CM

## 2021-04-30 DIAGNOSIS — J95.03 MALFUNCTION OF TRACHEOSTOMY STOMA: ICD-10-CM

## 2021-04-30 DIAGNOSIS — Z87.09 PERSONAL HISTORY OF OTHER DISEASES OF THE RESPIRATORY SYSTEM: Chronic | ICD-10-CM

## 2021-04-30 LAB
ALBUMIN SERPL ELPH-MCNC: 3.5 G/DL — SIGNIFICANT CHANGE UP (ref 3.3–5)
ALP SERPL-CCNC: 96 U/L — SIGNIFICANT CHANGE UP (ref 40–120)
ALT FLD-CCNC: <5 U/L — SIGNIFICANT CHANGE UP (ref 4–41)
ANION GAP SERPL CALC-SCNC: 12 MMOL/L — SIGNIFICANT CHANGE UP (ref 7–14)
APTT BLD: 42.8 SEC — HIGH (ref 27–36.3)
AST SERPL-CCNC: 8 U/L — SIGNIFICANT CHANGE UP (ref 4–40)
BASOPHILS # BLD AUTO: 0.03 K/UL — SIGNIFICANT CHANGE UP (ref 0–0.2)
BASOPHILS NFR BLD AUTO: 0.2 % — SIGNIFICANT CHANGE UP (ref 0–2)
BILIRUB SERPL-MCNC: 0.3 MG/DL — SIGNIFICANT CHANGE UP (ref 0.2–1.2)
BLD GP AB SCN SERPL QL: NEGATIVE — SIGNIFICANT CHANGE UP
BLOOD GAS VENOUS COMPREHENSIVE RESULT: SIGNIFICANT CHANGE UP
BUN SERPL-MCNC: 34 MG/DL — HIGH (ref 7–23)
CALCIUM SERPL-MCNC: 9.4 MG/DL — SIGNIFICANT CHANGE UP (ref 8.4–10.5)
CHLORIDE SERPL-SCNC: 98 MMOL/L — SIGNIFICANT CHANGE UP (ref 98–107)
CO2 SERPL-SCNC: 24 MMOL/L — SIGNIFICANT CHANGE UP (ref 22–31)
CREAT SERPL-MCNC: 3.78 MG/DL — HIGH (ref 0.5–1.3)
EOSINOPHIL # BLD AUTO: 0.47 K/UL — SIGNIFICANT CHANGE UP (ref 0–0.5)
EOSINOPHIL NFR BLD AUTO: 3.7 % — SIGNIFICANT CHANGE UP (ref 0–6)
GLUCOSE SERPL-MCNC: 133 MG/DL — HIGH (ref 70–99)
HCT VFR BLD CALC: 25.6 % — LOW (ref 39–50)
HGB BLD-MCNC: 8.4 G/DL — LOW (ref 13–17)
IANC: 8.89 K/UL — HIGH (ref 1.5–8.5)
IMM GRANULOCYTES NFR BLD AUTO: 1.2 % — SIGNIFICANT CHANGE UP (ref 0–1.5)
INR BLD: 1.51 RATIO — HIGH (ref 0.88–1.16)
LYMPHOCYTES # BLD AUTO: 1.85 K/UL — SIGNIFICANT CHANGE UP (ref 1–3.3)
LYMPHOCYTES # BLD AUTO: 14.4 % — SIGNIFICANT CHANGE UP (ref 13–44)
MCHC RBC-ENTMCNC: 30.2 PG — SIGNIFICANT CHANGE UP (ref 27–34)
MCHC RBC-ENTMCNC: 32.8 GM/DL — SIGNIFICANT CHANGE UP (ref 32–36)
MCV RBC AUTO: 92.1 FL — SIGNIFICANT CHANGE UP (ref 80–100)
MONOCYTES # BLD AUTO: 1.48 K/UL — HIGH (ref 0–0.9)
MONOCYTES NFR BLD AUTO: 11.5 % — SIGNIFICANT CHANGE UP (ref 2–14)
NEUTROPHILS # BLD AUTO: 8.89 K/UL — HIGH (ref 1.8–7.4)
NEUTROPHILS NFR BLD AUTO: 69 % — SIGNIFICANT CHANGE UP (ref 43–77)
NRBC # BLD: 0 /100 WBCS — SIGNIFICANT CHANGE UP
NRBC # FLD: 0 K/UL — SIGNIFICANT CHANGE UP
PLATELET # BLD AUTO: 194 K/UL — SIGNIFICANT CHANGE UP (ref 150–400)
POTASSIUM SERPL-MCNC: 5 MMOL/L — SIGNIFICANT CHANGE UP (ref 3.5–5.3)
POTASSIUM SERPL-SCNC: 5 MMOL/L — SIGNIFICANT CHANGE UP (ref 3.5–5.3)
PROT SERPL-MCNC: 6.9 G/DL — SIGNIFICANT CHANGE UP (ref 6–8.3)
PROTHROM AB SERPL-ACNC: 16.9 SEC — HIGH (ref 10.6–13.6)
RBC # BLD: 2.78 M/UL — LOW (ref 4.2–5.8)
RBC # FLD: 12.8 % — SIGNIFICANT CHANGE UP (ref 10.3–14.5)
RH IG SCN BLD-IMP: POSITIVE — SIGNIFICANT CHANGE UP
SARS-COV-2 RNA SPEC QL NAA+PROBE: SIGNIFICANT CHANGE UP
SODIUM SERPL-SCNC: 134 MMOL/L — LOW (ref 135–145)
SURGICAL PATHOLOGY STUDY: SIGNIFICANT CHANGE UP
WBC # BLD: 12.87 K/UL — HIGH (ref 3.8–10.5)
WBC # FLD AUTO: 12.87 K/UL — HIGH (ref 3.8–10.5)

## 2021-04-30 PROCEDURE — 99291 CRITICAL CARE FIRST HOUR: CPT

## 2021-04-30 PROCEDURE — 99024 POSTOP FOLLOW-UP VISIT: CPT

## 2021-04-30 PROCEDURE — 99285 EMERGENCY DEPT VISIT HI MDM: CPT

## 2021-04-30 RX ORDER — PIPERACILLIN AND TAZOBACTAM 4; .5 G/20ML; G/20ML
3.38 INJECTION, POWDER, LYOPHILIZED, FOR SOLUTION INTRAVENOUS ONCE
Refills: 0 | Status: COMPLETED | OUTPATIENT
Start: 2021-04-30 | End: 2021-04-30

## 2021-04-30 RX ORDER — DEXAMETHASONE 0.5 MG/5ML
8 ELIXIR ORAL EVERY 8 HOURS
Refills: 0 | Status: DISCONTINUED | OUTPATIENT
Start: 2021-04-30 | End: 2021-05-02

## 2021-04-30 RX ORDER — LEVETIRACETAM 250 MG/1
750 TABLET, FILM COATED ORAL EVERY 12 HOURS
Refills: 0 | Status: DISCONTINUED | OUTPATIENT
Start: 2021-04-30 | End: 2021-05-03

## 2021-04-30 RX ORDER — VANCOMYCIN HCL 1 G
1000 VIAL (EA) INTRAVENOUS ONCE
Refills: 0 | Status: DISCONTINUED | OUTPATIENT
Start: 2021-04-30 | End: 2021-04-30

## 2021-04-30 RX ORDER — LEVETIRACETAM 250 MG/1
750 TABLET, FILM COATED ORAL
Refills: 0 | Status: DISCONTINUED | OUTPATIENT
Start: 2021-04-30 | End: 2021-04-30

## 2021-04-30 RX ORDER — CYCLOBENZAPRINE HYDROCHLORIDE 10 MG/1
5 TABLET, FILM COATED ORAL THREE TIMES A DAY
Refills: 0 | Status: DISCONTINUED | OUTPATIENT
Start: 2021-04-30 | End: 2021-05-03

## 2021-04-30 RX ORDER — SODIUM CHLORIDE 9 MG/ML
1000 INJECTION INTRAMUSCULAR; INTRAVENOUS; SUBCUTANEOUS ONCE
Refills: 0 | Status: COMPLETED | OUTPATIENT
Start: 2021-04-30 | End: 2021-04-30

## 2021-04-30 RX ORDER — SODIUM CHLORIDE 9 MG/ML
1000 INJECTION, SOLUTION INTRAVENOUS
Refills: 0 | Status: DISCONTINUED | OUTPATIENT
Start: 2021-04-30 | End: 2021-05-01

## 2021-04-30 RX ORDER — EPINEPHRINE 11.25MG/ML
0.5 SOLUTION, NON-ORAL INHALATION
Refills: 0 | Status: DISCONTINUED | OUTPATIENT
Start: 2021-04-30 | End: 2021-05-03

## 2021-04-30 RX ADMIN — PIPERACILLIN AND TAZOBACTAM 200 GRAM(S): 4; .5 INJECTION, POWDER, LYOPHILIZED, FOR SOLUTION INTRAVENOUS at 15:58

## 2021-04-30 RX ADMIN — LEVETIRACETAM 400 MILLIGRAM(S): 250 TABLET, FILM COATED ORAL at 19:34

## 2021-04-30 RX ADMIN — Medication 101.6 MILLIGRAM(S): at 23:53

## 2021-04-30 RX ADMIN — SODIUM CHLORIDE 1000 MILLILITER(S): 9 INJECTION INTRAMUSCULAR; INTRAVENOUS; SUBCUTANEOUS at 15:58

## 2021-04-30 NOTE — H&P ADULT - NSHPLABSRESULTS_GEN_ALL_CORE
04-29    136  |  100  |  34<H>  ----------------------------<  113<H>  5.3   |  24  |  3.55<H>    Ca    9.8      29 Apr 2021 06:15  Phos  4.2     04-29  Mg     1.9     04-29                          8.4    12.87 )-----------( 194      ( 30 Apr 2021 16:13 )             25.6

## 2021-04-30 NOTE — BRIEF OPERATIVE NOTE - OPERATION/FINDINGS
LMA intubation and flexible bronchoscopy revealed edematous vocal cords and fibrin deposit surrounding  tracheal anastomosis; Debris removed and remaining airways patent

## 2021-04-30 NOTE — ED ADULT NURSE NOTE - NSIMPLEMENTINTERV_GEN_ALL_ED
Implemented All Fall with Harm Risk Interventions:  [place] to call system. Call bell, personal items and telephone within reach. Instruct patient to call for assistance. Room bathroom lighting operational. Non-slip footwear when patient is off stretcher. Physically safe environment: no spills, clutter or unnecessary equipment. Stretcher in lowest position, wheels locked, appropriate side rails in place. Provide visual cue, wrist band, yellow gown, etc. Monitor gait and stability. Monitor for mental status changes and reorient to person, place, and time. Review medications for side effects contributing to fall risk. Reinforce activity limits and safety measures with patient and family. Provide visual clues: red socks.

## 2021-04-30 NOTE — ED PROVIDER NOTE - PROGRESS NOTE DETAILS
Randal ERICKSON - Spoke w/ CT surg PA who states he will be going to the OR now for a bronch and will go to CTICU after.

## 2021-04-30 NOTE — ED ADULT TRIAGE NOTE - CHIEF COMPLAINT QUOTE
Pt discharged from [facility] yesterday after having trach removed. Pt with excessive mucous ,cough and co sob. pt with low grade fever. Pt states had covied vaccine . Pt states had negetive covid test yesterday.

## 2021-04-30 NOTE — ED PROVIDER NOTE - ATTENDING CONTRIBUTION TO CARE
Tommy BENSON: I agree with the above provided history and exam and addend/modify it as follows.    61M w/ pmh HTN, b/l TKR, CKD, PE (lovenox), s/p emergency cric on 03/25/2021 from tongue injury lingual hematoma 2/2 to seizure blocking airway (discharged on 4/3/2021) -- p/w mucous, cough, sob, and fever since last night, after was discharged from hospital admission. Pt reports these symptoms are similar to when was previously admitted.     Was discharged yesterday from CT ICU after was admitted 4/16 for fever, n/v.     *GEN:   uncomfortable, AOx3  *EYES:   pupils equally round and reactive to light, extra-occular movements intact  *HEENT:   trach site c/d/i w/out drainage, erythema  *CV:   regular rate and rhythm  *RESP:   incr work of breathing  *ABD:   soft, non-tender  *:   no cva/flank tenderness  *EXTREM:   no MSK tenderness, full ROM throughout, no leg swelling  *SKIN:   dry, intact  *NEURO:   AOx3, no focal weakness or loss of sensation     61M p/w infectious like symptoms, concern for tracheal stenosis. Will consult CT surgery, check labs/cxr, consider advanced imaging after d/w CT surgery    I Etienne Sanders MD performed a history and physical exam of the patient and discussed their management with the resident and /or advanced care provider. I reviewed the resident and /or ACP's note and agree with the documented findings and plan of care. My medical decision making and observations are found above. Tommy BENSON: I agree with the above provided history and exam and addend/modify it as follows.    61M w/ pmh HTN, b/l TKR, CKD, PE (lovenox), s/p emergency cric on 03/25/2021 from tongue injury lingual hematoma 2/2 to seizure blocking airway (discharged on 4/3/2021) -- p/w mucous, cough, sob, and fever since last night, after was discharged from hospital admission. Pt reports these symptoms are similar to when was previously admitted for tracheal stenosis s/p tracheostomy.    Was discharged yesterday from CT ICU after was admitted 4/16 for fever, n/v.     *GEN:   uncomfortable, AOx3  *EYES:   pupils equally round and reactive to light, extra-occular movements intact  *HEENT:   trach site c/d/i w/out drainage, erythema  *CV:   regular rate and rhythm  *RESP:   incr work of breathing  *ABD:   soft, non-tender  *:   no cva/flank tenderness  *EXTREM:   no MSK tenderness, full ROM throughout, no leg swelling  *SKIN:   dry, intact  *NEURO:   AOx3, no focal weakness or loss of sensation     61M p/w infectious like symptoms, concern for tracheal stenosis. Will consult CT surgery, check labs/cxr, consider advanced imaging after d/w CT surgery    I Etienne Sanders MD performed a history and physical exam of the patient and discussed their management with the resident and /or advanced care provider. I reviewed the resident and /or ACP's note and agree with the documented findings and plan of care. My medical decision making and observations are found above.

## 2021-04-30 NOTE — H&P ADULT - ATTENDING COMMENTS
patient is s/p tracheal resection and reconstruction.  Presents with stridor.  Will proceed emergently to the OR.

## 2021-04-30 NOTE — H&P ADULT - ASSESSMENT
60 y/o male, with PMHx of HTN, B/L TKR, and COVID 2-3 months ago (not hospitalized),  CKD, PE (8/2020) now s/p tracheal resection on 4/23/2021 and surveillance bronchoscopy on 4/29 revealing patent airways and well healing anastomosis who now p/w stridor and increased mucous production    -Admit to Thoracic Surgery, Dr. Allen  -Emergent bronchoscopy in the OR  -CTICU post-op  -D/w Dr. Allen

## 2021-04-30 NOTE — ED ADULT NURSE NOTE - ED STAT RN HANDOFF DETAILS
report given to ALLAN Corral. Pt a&ox4 and ambulatory. pt noted to have stridor. Pt transported to or with pa, fellow and edt on zoll. VS as noted, call bell in reach.

## 2021-04-30 NOTE — ED PROVIDER NOTE - CLINICAL SUMMARY MEDICAL DECISION MAKING FREE TEXT BOX
61M p/w SOB when lying down flat since being discharged yesterday.  Audible stridor on exam, lungs wheezes w/ auscultation.  VSS.  Orally temp of 99.9.  Tracheal stenosis w/ an impacted mucus plug vs tracheitis vs PNA.  Will obtain sepsis labs, consult CT surg, obtain CXR, reassess, and likely admit.

## 2021-04-30 NOTE — ED ADULT NURSE NOTE - CHIEF COMPLAINT QUOTE
Pt discharged from [facility] yesterday after having trach removed. Pt with excessive mucous ,cough and co sob. pt with low grade fever. Pt states had covied vaccine . Pt states had negetive covid test yesterday.

## 2021-04-30 NOTE — ED ADULT NURSE NOTE - OBJECTIVE STATEMENT
60 y/o M received to room 10 c/o sob and fever. pt a&ox4 and ambulatory. pt states he recently had a tracheostomy removal s/p removal developed tracheal stenosis and had a bronchoscopy done yesterday. pt endorsing sob and the feeling like something is stuck in his throat. Pt noted to have stridor upon arrival. Pt arrives tachypneic sating 100% on 6L. Pt abdomen soft nontender nondistended. pt skin intact. pt denies ha, cp, palpations, n/v/d, VS as noted, call bell in reach, comfort measures provided 20G IV placed L forearm, labs drawn and sent, medicated as per md order will continue to monitor.

## 2021-04-30 NOTE — H&P ADULT - NSHPPHYSICALEXAM_GEN_ALL_CORE
General: NAD, Pleasant  Neurology: Patient is AA&Ox4, follows commands, and speech fluent  Neck: Neck supple, trachea midline, No JVD; incisional site c/d/i, (+) audible stridor  Respiratory: CTA B/L, (-)rales, rhonchi  CV: S1S2, r/r/r, (-)m/r/g  Abdomen: S/NT/ND, BSx4  Extremities: 2+ peripheral pulses bilat throughout; (-)edema appreciated  Skin: No Rashes, Hematoma, Ecchymosis

## 2021-04-30 NOTE — ED PROVIDER NOTE - NS ED ROS FT
General: +fever, chills; denies weight loss/weight gain.  HENT: denies nasal congestion, sore throat, rhinorrhea, ear pain.  Eyes: denies visual changes, blurred vision, eye discharge, eye redness.  Neck: denies neck pain, neck swelling.  CV: denies chest pain, palpitations.  Resp: +difficulty breathing; denies cough.  Abdominal: denies nausea, vomiting, diarrhea, abdominal pain, blood in stool, dark stool.  MSK: denies muscle aches, bony pain, leg pain, leg swelling.  Neuro: denies headaches, numbness, tingling, dizziness, lightheadedness.  Skin: denies rashes, cuts, bruises.  Hematologic: denies unexplained bruises.

## 2021-04-30 NOTE — H&P ADULT - HISTORY OF PRESENT ILLNESS
62 y/o male, with PMHx of HTN, B/L TKR, and COVID 2-3 months ago (not hospitalized),  CKD, PE (8/2020 - On lovenox or eliquis at home at different times; Hematologist Dr. Gil Tee), and recent seizure requiring emergency cric/tracheostomy on 03/25/2021 at [facility] due to tongue injury and lingual hematoma (discharged on 4/3/2021).  Readmitted on 4/16/21 w/ SOB, and underwent Flexible Bronchoscopy, Balloon dilatation of trachea on 4/19/21. Pt remained inhouse on Heparin gtt awaiting Tracheal resection. Seen by Nephrology for CKD. On 4/23/21 Pt had a Tracheal resection and reconstruction. Post op w Guardian stitch in place; Was brought back to the OR on 4/29 for surveillance bronchoscopy which revealed a patent airway and well healing anastomosis and therefore guardian suture removed and pt discharged home with soft collar in place; today pt presents to the ED with c/o increased stridor and increased mucous production with a cough .

## 2021-04-30 NOTE — H&P ADULT - NSICDXPASTSURGICALHX_GEN_ALL_CORE_FT
PAST SURGICAL HISTORY:  History of total right knee replacement (TKR) bilateral    History of tracheal stenosis

## 2021-04-30 NOTE — ED PROVIDER NOTE - PHYSICAL EXAMINATION
GENERAL: Patient awake alert, in mild distress.  HEENT: NC/AT, Moist mucous membranes, no secretions in the oropharynx.  LUNGS: Audible stridors, wheezing on b/l lung fields.  CARDIAC: RRR, no m/r/g.  ABDOMEN: Soft, NT, ND, No rebound, guarding.  EXT: No edema. No calf tenderness. CV 2+DP/PT bilaterally.   MSK: No pain with movement, no deformities.  NEURO: A&Ox3. Moving all extremities.  SKIN: Warm and dry. No rash.  PSYCH: Normal affect.
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
## 2021-04-30 NOTE — ED PROVIDER NOTE - OBJECTIVE STATEMENT
61M w/ PMHx of HTN, b/l TKR, and COVID 2-3 months ago, not hospitilized,  CKD, s/p  PE on lovenox, and recent seizure( unknown etiology) s/p emergency cric on 03/25/2021 from tongue injury lingual hematoma 2/2 to seizure blocking airway, discharged yesterday from CT surgery for tracheal stenosis following tracheostomy p/w SOB when lying down to sleep and waking up.  Feels like there is mucus.  Low grade fevers, denies any CP, abd pain, urinary sxs.  Tracheostomy site appears clean w/o any discharge.
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
## 2021-05-01 LAB
ANION GAP SERPL CALC-SCNC: 12 MMOL/L — SIGNIFICANT CHANGE UP (ref 7–14)
ANION GAP SERPL CALC-SCNC: 12 MMOL/L — SIGNIFICANT CHANGE UP (ref 7–14)
BASOPHILS # BLD AUTO: 0.02 K/UL — SIGNIFICANT CHANGE UP (ref 0–0.2)
BASOPHILS NFR BLD AUTO: 0.2 % — SIGNIFICANT CHANGE UP (ref 0–2)
BUN SERPL-MCNC: 34 MG/DL — HIGH (ref 7–23)
BUN SERPL-MCNC: 34 MG/DL — HIGH (ref 7–23)
CALCIUM SERPL-MCNC: 9.2 MG/DL — SIGNIFICANT CHANGE UP (ref 8.4–10.5)
CALCIUM SERPL-MCNC: 9.4 MG/DL — SIGNIFICANT CHANGE UP (ref 8.4–10.5)
CHLORIDE SERPL-SCNC: 101 MMOL/L — SIGNIFICANT CHANGE UP (ref 98–107)
CHLORIDE SERPL-SCNC: 101 MMOL/L — SIGNIFICANT CHANGE UP (ref 98–107)
CO2 SERPL-SCNC: 21 MMOL/L — LOW (ref 22–31)
CO2 SERPL-SCNC: 22 MMOL/L — SIGNIFICANT CHANGE UP (ref 22–31)
COVID-19 SPIKE DOMAIN AB INTERP: POSITIVE
COVID-19 SPIKE DOMAIN ANTIBODY RESULT: >250 U/ML — HIGH
CREAT SERPL-MCNC: 3.48 MG/DL — HIGH (ref 0.5–1.3)
CREAT SERPL-MCNC: 3.5 MG/DL — HIGH (ref 0.5–1.3)
EOSINOPHIL # BLD AUTO: 0.01 K/UL — SIGNIFICANT CHANGE UP (ref 0–0.5)
EOSINOPHIL NFR BLD AUTO: 0.1 % — SIGNIFICANT CHANGE UP (ref 0–6)
GLUCOSE BLDC GLUCOMTR-MCNC: 175 MG/DL — HIGH (ref 70–99)
GLUCOSE SERPL-MCNC: 146 MG/DL — HIGH (ref 70–99)
GLUCOSE SERPL-MCNC: 176 MG/DL — HIGH (ref 70–99)
HCT VFR BLD CALC: 25.3 % — LOW (ref 39–50)
HGB BLD-MCNC: 8.2 G/DL — LOW (ref 13–17)
IANC: 9.53 K/UL — HIGH (ref 1.5–8.5)
IMM GRANULOCYTES NFR BLD AUTO: 1.5 % — SIGNIFICANT CHANGE UP (ref 0–1.5)
LYMPHOCYTES # BLD AUTO: 0.96 K/UL — LOW (ref 1–3.3)
LYMPHOCYTES # BLD AUTO: 8.9 % — LOW (ref 13–44)
MAGNESIUM SERPL-MCNC: 2.3 MG/DL — SIGNIFICANT CHANGE UP (ref 1.6–2.6)
MCHC RBC-ENTMCNC: 30.3 PG — SIGNIFICANT CHANGE UP (ref 27–34)
MCHC RBC-ENTMCNC: 32.4 GM/DL — SIGNIFICANT CHANGE UP (ref 32–36)
MCV RBC AUTO: 93.4 FL — SIGNIFICANT CHANGE UP (ref 80–100)
MONOCYTES # BLD AUTO: 0.12 K/UL — SIGNIFICANT CHANGE UP (ref 0–0.9)
MONOCYTES NFR BLD AUTO: 1.1 % — LOW (ref 2–14)
NEUTROPHILS # BLD AUTO: 9.53 K/UL — HIGH (ref 1.8–7.4)
NEUTROPHILS NFR BLD AUTO: 88.2 % — HIGH (ref 43–77)
NRBC # BLD: 0 /100 WBCS — SIGNIFICANT CHANGE UP
NRBC # FLD: 0 K/UL — SIGNIFICANT CHANGE UP
PHOSPHATE SERPL-MCNC: 4.4 MG/DL — SIGNIFICANT CHANGE UP (ref 2.5–4.5)
PLATELET # BLD AUTO: 181 K/UL — SIGNIFICANT CHANGE UP (ref 150–400)
POTASSIUM SERPL-MCNC: 5.3 MMOL/L — SIGNIFICANT CHANGE UP (ref 3.5–5.3)
POTASSIUM SERPL-MCNC: 5.5 MMOL/L — HIGH (ref 3.5–5.3)
POTASSIUM SERPL-SCNC: 5.3 MMOL/L — SIGNIFICANT CHANGE UP (ref 3.5–5.3)
POTASSIUM SERPL-SCNC: 5.5 MMOL/L — HIGH (ref 3.5–5.3)
RBC # BLD: 2.71 M/UL — LOW (ref 4.2–5.8)
RBC # FLD: 12.6 % — SIGNIFICANT CHANGE UP (ref 10.3–14.5)
SARS-COV-2 IGG+IGM SERPL QL IA: >250 U/ML — HIGH
SARS-COV-2 IGG+IGM SERPL QL IA: POSITIVE
SODIUM SERPL-SCNC: 134 MMOL/L — LOW (ref 135–145)
SODIUM SERPL-SCNC: 135 MMOL/L — SIGNIFICANT CHANGE UP (ref 135–145)
WBC # BLD: 10.8 K/UL — HIGH (ref 3.8–10.5)
WBC # FLD AUTO: 10.8 K/UL — HIGH (ref 3.8–10.5)

## 2021-05-01 PROCEDURE — 99291 CRITICAL CARE FIRST HOUR: CPT

## 2021-05-01 PROCEDURE — 71045 X-RAY EXAM CHEST 1 VIEW: CPT | Mod: 26

## 2021-05-01 RX ORDER — IPRATROPIUM/ALBUTEROL SULFATE 18-103MCG
3 AEROSOL WITH ADAPTER (GRAM) INHALATION EVERY 6 HOURS
Refills: 0 | Status: DISCONTINUED | OUTPATIENT
Start: 2021-05-01 | End: 2021-05-03

## 2021-05-01 RX ORDER — SODIUM CHLORIDE 9 MG/ML
250 INJECTION INTRAMUSCULAR; INTRAVENOUS; SUBCUTANEOUS ONCE
Refills: 0 | Status: COMPLETED | OUTPATIENT
Start: 2021-05-01 | End: 2021-05-01

## 2021-05-01 RX ORDER — FUROSEMIDE 40 MG
20 TABLET ORAL ONCE
Refills: 0 | Status: COMPLETED | OUTPATIENT
Start: 2021-05-01 | End: 2021-05-01

## 2021-05-01 RX ORDER — SODIUM CHLORIDE 9 MG/ML
1000 INJECTION, SOLUTION INTRAVENOUS
Refills: 0 | Status: DISCONTINUED | OUTPATIENT
Start: 2021-05-01 | End: 2021-05-02

## 2021-05-01 RX ORDER — SODIUM ZIRCONIUM CYCLOSILICATE 10 G/10G
10 POWDER, FOR SUSPENSION ORAL ONCE
Refills: 0 | Status: COMPLETED | OUTPATIENT
Start: 2021-05-01 | End: 2021-05-01

## 2021-05-01 RX ORDER — SIMETHICONE 80 MG/1
80 TABLET, CHEWABLE ORAL DAILY
Refills: 0 | Status: DISCONTINUED | OUTPATIENT
Start: 2021-05-01 | End: 2021-05-03

## 2021-05-01 RX ORDER — SODIUM CHLORIDE 9 MG/ML
1000 INJECTION INTRAMUSCULAR; INTRAVENOUS; SUBCUTANEOUS
Refills: 0 | Status: DISCONTINUED | OUTPATIENT
Start: 2021-05-01 | End: 2021-05-01

## 2021-05-01 RX ORDER — PANTOPRAZOLE SODIUM 20 MG/1
40 TABLET, DELAYED RELEASE ORAL DAILY
Refills: 0 | Status: DISCONTINUED | OUTPATIENT
Start: 2021-05-01 | End: 2021-05-03

## 2021-05-01 RX ORDER — HEPARIN SODIUM 5000 [USP'U]/ML
5000 INJECTION INTRAVENOUS; SUBCUTANEOUS EVERY 8 HOURS
Refills: 0 | Status: DISCONTINUED | OUTPATIENT
Start: 2021-05-01 | End: 2021-05-03

## 2021-05-01 RX ADMIN — SODIUM CHLORIDE 75 MILLILITER(S): 9 INJECTION INTRAMUSCULAR; INTRAVENOUS; SUBCUTANEOUS at 07:37

## 2021-05-01 RX ADMIN — SODIUM ZIRCONIUM CYCLOSILICATE 10 GRAM(S): 10 POWDER, FOR SUSPENSION ORAL at 08:02

## 2021-05-01 RX ADMIN — SODIUM CHLORIDE 75 MILLILITER(S): 9 INJECTION, SOLUTION INTRAVENOUS at 14:08

## 2021-05-01 RX ADMIN — Medication 20 MILLIGRAM(S): at 06:23

## 2021-05-01 RX ADMIN — LEVETIRACETAM 400 MILLIGRAM(S): 250 TABLET, FILM COATED ORAL at 05:35

## 2021-05-01 RX ADMIN — HEPARIN SODIUM 5000 UNIT(S): 5000 INJECTION INTRAVENOUS; SUBCUTANEOUS at 22:41

## 2021-05-01 RX ADMIN — SIMETHICONE 80 MILLIGRAM(S): 80 TABLET, CHEWABLE ORAL at 08:51

## 2021-05-01 RX ADMIN — SODIUM CHLORIDE 75 MILLILITER(S): 9 INJECTION INTRAMUSCULAR; INTRAVENOUS; SUBCUTANEOUS at 07:38

## 2021-05-01 RX ADMIN — Medication 101.6 MILLIGRAM(S): at 06:22

## 2021-05-01 RX ADMIN — Medication 101.6 MILLIGRAM(S): at 14:08

## 2021-05-01 RX ADMIN — Medication 3 MILLILITER(S): at 22:23

## 2021-05-01 RX ADMIN — Medication 3 MILLILITER(S): at 16:39

## 2021-05-01 RX ADMIN — PANTOPRAZOLE SODIUM 40 MILLIGRAM(S): 20 TABLET, DELAYED RELEASE ORAL at 10:37

## 2021-05-01 RX ADMIN — SODIUM CHLORIDE 1000 MILLILITER(S): 9 INJECTION INTRAMUSCULAR; INTRAVENOUS; SUBCUTANEOUS at 07:38

## 2021-05-01 RX ADMIN — Medication 101.6 MILLIGRAM(S): at 22:41

## 2021-05-01 RX ADMIN — SODIUM CHLORIDE 75 MILLILITER(S): 9 INJECTION, SOLUTION INTRAVENOUS at 20:05

## 2021-05-01 RX ADMIN — LEVETIRACETAM 400 MILLIGRAM(S): 250 TABLET, FILM COATED ORAL at 17:21

## 2021-05-02 LAB
ANION GAP SERPL CALC-SCNC: 10 MMOL/L — SIGNIFICANT CHANGE UP (ref 7–14)
APTT BLD: 32.3 SEC — SIGNIFICANT CHANGE UP (ref 27–36.3)
BUN SERPL-MCNC: 37 MG/DL — HIGH (ref 7–23)
CALCIUM SERPL-MCNC: 9 MG/DL — SIGNIFICANT CHANGE UP (ref 8.4–10.5)
CHLORIDE SERPL-SCNC: 103 MMOL/L — SIGNIFICANT CHANGE UP (ref 98–107)
CO2 SERPL-SCNC: 23 MMOL/L — SIGNIFICANT CHANGE UP (ref 22–31)
CREAT SERPL-MCNC: 3.32 MG/DL — HIGH (ref 0.5–1.3)
GLUCOSE SERPL-MCNC: 193 MG/DL — HIGH (ref 70–99)
HCT VFR BLD CALC: 23.4 % — LOW (ref 39–50)
HGB BLD-MCNC: 7.7 G/DL — LOW (ref 13–17)
INR BLD: 1.25 RATIO — HIGH (ref 0.88–1.16)
MAGNESIUM SERPL-MCNC: 2.4 MG/DL — SIGNIFICANT CHANGE UP (ref 1.6–2.6)
MCHC RBC-ENTMCNC: 29.8 PG — SIGNIFICANT CHANGE UP (ref 27–34)
MCHC RBC-ENTMCNC: 32.9 GM/DL — SIGNIFICANT CHANGE UP (ref 32–36)
MCV RBC AUTO: 90.7 FL — SIGNIFICANT CHANGE UP (ref 80–100)
NRBC # BLD: 0 /100 WBCS — SIGNIFICANT CHANGE UP
NRBC # FLD: 0 K/UL — SIGNIFICANT CHANGE UP
PHOSPHATE SERPL-MCNC: 4.6 MG/DL — HIGH (ref 2.5–4.5)
PLATELET # BLD AUTO: 184 K/UL — SIGNIFICANT CHANGE UP (ref 150–400)
POTASSIUM SERPL-MCNC: 4.8 MMOL/L — SIGNIFICANT CHANGE UP (ref 3.5–5.3)
POTASSIUM SERPL-SCNC: 4.8 MMOL/L — SIGNIFICANT CHANGE UP (ref 3.5–5.3)
PROTHROM AB SERPL-ACNC: 14.1 SEC — HIGH (ref 10.6–13.6)
RBC # BLD: 2.58 M/UL — LOW (ref 4.2–5.8)
RBC # FLD: 12.4 % — SIGNIFICANT CHANGE UP (ref 10.3–14.5)
SARS-COV-2 RNA SPEC QL NAA+PROBE: SIGNIFICANT CHANGE UP
SODIUM SERPL-SCNC: 136 MMOL/L — SIGNIFICANT CHANGE UP (ref 135–145)
WBC # BLD: 13.31 K/UL — HIGH (ref 3.8–10.5)
WBC # FLD AUTO: 13.31 K/UL — HIGH (ref 3.8–10.5)

## 2021-05-02 PROCEDURE — 99233 SBSQ HOSP IP/OBS HIGH 50: CPT

## 2021-05-02 PROCEDURE — 71045 X-RAY EXAM CHEST 1 VIEW: CPT | Mod: 26

## 2021-05-02 RX ADMIN — HEPARIN SODIUM 5000 UNIT(S): 5000 INJECTION INTRAVENOUS; SUBCUTANEOUS at 21:09

## 2021-05-02 RX ADMIN — HEPARIN SODIUM 5000 UNIT(S): 5000 INJECTION INTRAVENOUS; SUBCUTANEOUS at 05:10

## 2021-05-02 RX ADMIN — SODIUM CHLORIDE 75 MILLILITER(S): 9 INJECTION, SOLUTION INTRAVENOUS at 11:43

## 2021-05-02 RX ADMIN — LEVETIRACETAM 400 MILLIGRAM(S): 250 TABLET, FILM COATED ORAL at 06:18

## 2021-05-02 RX ADMIN — PANTOPRAZOLE SODIUM 40 MILLIGRAM(S): 20 TABLET, DELAYED RELEASE ORAL at 11:41

## 2021-05-02 RX ADMIN — Medication 101.6 MILLIGRAM(S): at 06:18

## 2021-05-02 RX ADMIN — Medication 3 MILLILITER(S): at 11:44

## 2021-05-02 RX ADMIN — LEVETIRACETAM 400 MILLIGRAM(S): 250 TABLET, FILM COATED ORAL at 18:20

## 2021-05-02 RX ADMIN — SIMETHICONE 80 MILLIGRAM(S): 80 TABLET, CHEWABLE ORAL at 11:40

## 2021-05-02 RX ADMIN — Medication 3 MILLILITER(S): at 04:44

## 2021-05-02 RX ADMIN — Medication 3 MILLILITER(S): at 16:30

## 2021-05-02 RX ADMIN — Medication 101.6 MILLIGRAM(S): at 13:55

## 2021-05-02 RX ADMIN — HEPARIN SODIUM 5000 UNIT(S): 5000 INJECTION INTRAVENOUS; SUBCUTANEOUS at 13:55

## 2021-05-02 RX ADMIN — Medication 3 MILLILITER(S): at 22:23

## 2021-05-03 ENCOUNTER — TRANSCRIPTION ENCOUNTER (OUTPATIENT)
Age: 61
End: 2021-05-03

## 2021-05-03 VITALS
SYSTOLIC BLOOD PRESSURE: 140 MMHG | OXYGEN SATURATION: 99 % | RESPIRATION RATE: 15 BRPM | TEMPERATURE: 98 F | HEART RATE: 90 BPM | DIASTOLIC BLOOD PRESSURE: 78 MMHG

## 2021-05-03 LAB
ANION GAP SERPL CALC-SCNC: 12 MMOL/L — SIGNIFICANT CHANGE UP (ref 7–14)
APTT BLD: 31.4 SEC — SIGNIFICANT CHANGE UP (ref 27–36.3)
BLD GP AB SCN SERPL QL: NEGATIVE — SIGNIFICANT CHANGE UP
BUN SERPL-MCNC: 33 MG/DL — HIGH (ref 7–23)
CALCIUM SERPL-MCNC: 9.1 MG/DL — SIGNIFICANT CHANGE UP (ref 8.4–10.5)
CHLORIDE SERPL-SCNC: 102 MMOL/L — SIGNIFICANT CHANGE UP (ref 98–107)
CO2 SERPL-SCNC: 21 MMOL/L — LOW (ref 22–31)
CREAT SERPL-MCNC: 2.83 MG/DL — HIGH (ref 0.5–1.3)
GLUCOSE SERPL-MCNC: 163 MG/DL — HIGH (ref 70–99)
HCT VFR BLD CALC: 26 % — LOW (ref 39–50)
HGB BLD-MCNC: 8.4 G/DL — LOW (ref 13–17)
INR BLD: 1.19 RATIO — HIGH (ref 0.88–1.16)
MAGNESIUM SERPL-MCNC: 2.3 MG/DL — SIGNIFICANT CHANGE UP (ref 1.6–2.6)
MCHC RBC-ENTMCNC: 29.5 PG — SIGNIFICANT CHANGE UP (ref 27–34)
MCHC RBC-ENTMCNC: 32.3 GM/DL — SIGNIFICANT CHANGE UP (ref 32–36)
MCV RBC AUTO: 91.2 FL — SIGNIFICANT CHANGE UP (ref 80–100)
NRBC # BLD: 0 /100 WBCS — SIGNIFICANT CHANGE UP
NRBC # FLD: 0 K/UL — SIGNIFICANT CHANGE UP
PHOSPHATE SERPL-MCNC: 4.1 MG/DL — SIGNIFICANT CHANGE UP (ref 2.5–4.5)
PLATELET # BLD AUTO: 205 K/UL — SIGNIFICANT CHANGE UP (ref 150–400)
POTASSIUM SERPL-MCNC: 5 MMOL/L — SIGNIFICANT CHANGE UP (ref 3.5–5.3)
POTASSIUM SERPL-SCNC: 5 MMOL/L — SIGNIFICANT CHANGE UP (ref 3.5–5.3)
PROTHROM AB SERPL-ACNC: 13.6 SEC — SIGNIFICANT CHANGE UP (ref 10.6–13.6)
RBC # BLD: 2.85 M/UL — LOW (ref 4.2–5.8)
RBC # FLD: 12.3 % — SIGNIFICANT CHANGE UP (ref 10.3–14.5)
RH IG SCN BLD-IMP: POSITIVE — SIGNIFICANT CHANGE UP
SODIUM SERPL-SCNC: 135 MMOL/L — SIGNIFICANT CHANGE UP (ref 135–145)
WBC # BLD: 17.45 K/UL — HIGH (ref 3.8–10.5)
WBC # FLD AUTO: 17.45 K/UL — HIGH (ref 3.8–10.5)

## 2021-05-03 PROCEDURE — 99233 SBSQ HOSP IP/OBS HIGH 50: CPT

## 2021-05-03 PROCEDURE — 71045 X-RAY EXAM CHEST 1 VIEW: CPT | Mod: 26

## 2021-05-03 RX ORDER — SIMETHICONE 80 MG/1
80 TABLET, CHEWABLE ORAL ONCE
Refills: 0 | Status: COMPLETED | OUTPATIENT
Start: 2021-05-03 | End: 2021-05-03

## 2021-05-03 RX ADMIN — Medication 3 MILLILITER(S): at 10:18

## 2021-05-03 RX ADMIN — Medication 3 MILLILITER(S): at 04:09

## 2021-05-03 RX ADMIN — Medication 3 MILLILITER(S): at 15:15

## 2021-05-03 RX ADMIN — SIMETHICONE 80 MILLIGRAM(S): 80 TABLET, CHEWABLE ORAL at 12:23

## 2021-05-03 RX ADMIN — HEPARIN SODIUM 5000 UNIT(S): 5000 INJECTION INTRAVENOUS; SUBCUTANEOUS at 13:30

## 2021-05-03 RX ADMIN — SIMETHICONE 80 MILLIGRAM(S): 80 TABLET, CHEWABLE ORAL at 07:41

## 2021-05-03 RX ADMIN — PANTOPRAZOLE SODIUM 40 MILLIGRAM(S): 20 TABLET, DELAYED RELEASE ORAL at 12:23

## 2021-05-03 RX ADMIN — HEPARIN SODIUM 5000 UNIT(S): 5000 INJECTION INTRAVENOUS; SUBCUTANEOUS at 05:48

## 2021-05-03 RX ADMIN — LEVETIRACETAM 400 MILLIGRAM(S): 250 TABLET, FILM COATED ORAL at 05:48

## 2021-05-03 NOTE — DISCHARGE NOTE NURSING/CASE MANAGEMENT/SOCIAL WORK - PATIENT PORTAL LINK FT
You can access the FollowMyHealth Patient Portal offered by [facility] by registering at the following website: [email or web address]. By joining [employer]’s FollowMyHealth portal, you will also be able to view your health information using other applications (apps) compatible with our system.

## 2021-05-03 NOTE — DISCHARGE NOTE PROVIDER - CARE PROVIDER_API CALL
Jh Allen)  Surgery; Thoracic Surgery  [phone] [place] [address], [place] [address]  Phone: [phone]  Fax: [phone]  Follow Up Time:

## 2021-05-03 NOTE — DISCHARGE NOTE PROVIDER - NSDCCPTREATMENT_GEN_ALL_CORE_FT
PRINCIPAL PROCEDURE  Procedure: Bronchoscopy, flexible, by CT surgery  Findings and Treatment:

## 2021-05-03 NOTE — DIETITIAN INITIAL EVALUATION ADULT. - ORAL INTAKE PTA/DIET HISTORY
Met w/pt who provided nutrition hx.  Pt states he tolerated regular diet well today at breakfast meal.  He describes his appetite as being good.  NKA to food.  He denies difficulty chewing/swallowing, or modified diet PTA.  He endorses weight loss about 2 months ago of ~19lbs from 176 to 157lbs, d/t illness, decreased appetite and the desire to lose weight.  Appetite is now improved and wt increase of 5.5lbs noted.  Wt stable since April admission.  Pt states he will d/c'd today

## 2021-05-03 NOTE — DIETITIAN INITIAL EVALUATION ADULT. - OTHER INFO
62 y/o male, with PMHx of HTN, B/L TKR, and COVID 2-3 months ago (not hospitalized),  CKD, PE (8/2020 - On lovenox or eliquis at home at different times; Hematologist Dr. Gil Tee), and recent seizure requiring emergency cric/tracheostomy on 03/25/2021 at [facility] due to tongue injury and lingual hematoma (discharged on 4/3/2021).  Readmitted on 4/16/21 w/ SOB, and underwent Flexible Bronchoscopy, Balloon dilatation of trachea on 4/19/21. Pt remained inhouse on Heparin gtt awaiting Tracheal resection. Seen by Nephrology for CKD. On 4/23/21 Pt had a Tracheal resection and reconstruction. Post op w Guardian stitch in place; Was brought back to the OR on 4/29 for surveillance bronchoscopy which revealed a patent airway and well healing anastomosis and therefore guardian suture removed and pt discharged home with soft collar in place; today pt presents to the ED with c/o increased stridor and increased mucous production with a cough .    (30 Apr 2021 16:28)

## 2021-05-03 NOTE — PROGRESS NOTE ADULT - SUBJECTIVE AND OBJECTIVE BOX
ANNEMARIE KELLEY                     [id number]    HPI:  62 y/o male, with PMHx of HTN, B/L TKR, and COVID 2-3 months ago (not hospitalized),  CKD, PE (8/2020 - On lovenox or eliquis at home at different times; Hematologist Dr. Gil Tee), and recent seizure requiring emergency cric/tracheostomy on 03/25/2021 at [facility] due to tongue injury and lingual hematoma (discharged on 4/3/2021).  Readmitted on 4/16/21 w/ SOB, and underwent Flexible Bronchoscopy, Balloon dilatation of trachea on 4/19/21. Pt remained inhouse on Heparin gtt awaiting Tracheal resection. Seen by Nephrology for CKD. On 4/23/21 Pt had a Tracheal resection and reconstruction. Post op w Guardian stitch in place; Was brought back to the OR on 4/29 for surveillance bronchoscopy which revealed a patent airway and well healing anastomosis and therefore guardian suture removed and pt discharged home with soft collar in place; today pt presents to the ED with c/o increased stridor and increased mucous production with a cough .    (30 Apr 2021 16:28)    Procedure:  LMA intubation and flexible bronchoscopy revealed edematous vocal cords and fibrin deposit surrounding  tracheal anastomosis;   Flexible bronchoscopy, Tracheal resection with reconstruction. Took ~3cm of affected area of the proximal trachea, Guardian stitch placed 4/23/2021    Issues:  Critical airway, w airway obstruction   Tracheal stenosis  Stridor  CKD  Post op pain  PE  SZ      PAST MEDICAL & SURGICAL HISTORY:  HTN (hypertension)    Chronic kidney disease, unspecified CKD stage    Pulmonary embolus  diagnosed about 6 months ago incidentally found on imaging related to falling off a bike and chest pain    2019 novel coronavirus disease (COVID-19)  never hospitilized    Arthritis    Tracheal stenosis    History of total right knee replacement (TKR)  bilateral    History of tracheal stenosis              VITAL SIGNS:  Vital Signs Last 24 Hrs  T(C): 36.6 (02 May 2021 08:00), Max: 36.7 (01 May 2021 12:00)  T(F): 97.8 (02 May 2021 08:00), Max: 98.1 (01 May 2021 16:00)  HR: 70 (02 May 2021 08:00) (54 - 82)  BP: 121/67 (02 May 2021 08:00) (114/72 - 140/80)  BP(mean): 79 (02 May 2021 08:00) (79 - 96)  RR: 17 (02 May 2021 08:00) (10 - 18)  SpO2: 100% (02 May 2021 08:00) (96% - 100%)    I/Os:   I&O's Detail    01 May 2021 07:01  -  02 May 2021 07:00  --------------------------------------------------------  IN:    dextrose 5% + sodium chloride 0.9%: 1200 mL    IV PiggyBack: 250 mL    Oral Fluid: 90 mL    sodium chloride 0.9%: 600 mL    Sodium Chloride 0.9% Bolus: 250 mL  Total IN: 2390 mL    OUT:    Voided (mL): 2750 mL  Total OUT: 2750 mL    Total NET: -360 mL      02 May 2021 07:01  -  02 May 2021 09:00  --------------------------------------------------------  IN:    dextrose 5% + sodium chloride 0.9%: 75 mL  Total IN: 75 mL    OUT:  Total OUT: 0 mL    Total NET: 75 mL          CAPILLARY BLOOD GLUCOSE      POCT Blood Glucose.: 175 mg/dL (01 May 2021 22:46)      =======================MEDICATIONS===================  MEDICATIONS  (STANDING):  albuterol/ipratropium for Nebulization 3 milliLiter(s) Nebulizer every 6 hours  dexAMETHasone  IVPB 8 milliGRAM(s) IV Intermittent every 8 hours  dextrose 5% + sodium chloride 0.9%. 1000 milliLiter(s) (75 mL/Hr) IV Continuous <Continuous>  heparin   Injectable 5000 Unit(s) SubCutaneous every 8 hours  levETIRAcetam  IVPB 750 milliGRAM(s) IV Intermittent every 12 hours  pantoprazole  Injectable 40 milliGRAM(s) IV Push daily  simethicone 80 milliGRAM(s) Chew daily    MEDICATIONS  (PRN):  cyclobenzaprine 5 milliGRAM(s) Oral three times a day PRN Muscle Spasm  racepinephrine  2.25% Inhalation 0.5 milliLiter(s) Inhalation every 3 hours PRN stridor      PHYSICAL EXAM============================  General:                         Awake, alert, not in any distress  Neuro:                            Moving all extremities to commands.   Respiratory:	Air entry fair and  bilateral conducted sounds                                           Effort even and unlabored. no stridor   CV:		Regular rate and rhythm. Normal S1/S2                                          Distal pulses present.  Abdomen:	                     Soft, non-distended. Bowel sounds present   Skin:		No rash.  Extremities:	Warm, no cyanosis or edema.  Palpable pulses    ============================LABS=========================                        7.7    13.31 )-----------( 184      ( 02 May 2021 05:16 )             23.4     05-02    136  |  103  |  37<H>  ----------------------------<  193<H>  4.8   |  23  |  3.32<H>    Ca    9.0      02 May 2021 05:16  Phos  4.6     05-02  Mg     2.4     05-02    TPro  6.9  /  Alb  3.5  /  TBili  0.3  /  DBili  x   /  AST  8   /  ALT  <5  /  AlkPhos  96  04-30    LIVER FUNCTIONS - ( 30 Apr 2021 16:13 )  Alb: 3.5 g/dL / Pro: 6.9 g/dL / ALK PHOS: 96 U/L / ALT: <5 U/L / AST: 8 U/L / GGT: x           PT/INR - ( 02 May 2021 05:16 )   PT: 14.1 sec;   INR: 1.25 ratio         PTT - ( 02 May 2021 05:16 )  PTT:32.3 sec      A/P:  61yMale s/p  Flexible bronchoscopy, Tracheal resection with reconstruction. Admitted from ED with stridor, airway edema, to OR s/p FB. Transferred in CTU for airway monitoring an d management                             Neuro:                                         Pain control with Dilaudid / Tylenol IV PRN    Hx of seizure disorder: Continue Keppra                            Cardiovascular:  On tele                                        HTN: Continue hemodynamic monitoring and  Labetalol as tolerated                             Respiratory:                                        Acute airway edema, s/p FB, on Decadron / Racemic EPI/ Heliox                                        Closely monitoring for stridor and airway obstruction                                          Keep neck in flexed position with limited mobility, soft collar on                                                                                 Comfortable, not in any distress.                                         Encourage incentive spirometry - as tolerated                                          pulmonary toilet as tolerated                              GI                                        Clears, advance as indicated                                          Continue GI prophylaxis with  Protonix                                         Continue Zofran / Reglan for nausea - PRN	                                                                 Renal:                                         Acute on chronic kidney disease  Avoid hypotension / nephrotoxic agents                                         Monitor I/Os and electrolytes                                                    Hem/ Onc:                                         Hx of PE: Hold AC, d/w Dr. Carlos                                          Follow CBC in AM                           Infectious disease:                                            Monitor for fever / leukocytosis.                            Endocrine                                             Continue Accu-Checks with coverage    Pt is on SQ Heparin and Venodyne boots for DVT prophylaxis.     Pertinent clinical, laboratory, radiographic, hemodynamic, echocardiographic, respiratory data, microbiologic data and chart were reviewed and analyzed frequently throughout the course of the day and night  Patient seen, examined and plan discussed with CT Surgeon Dr. Allen  / CTICU team during rounds.  I spent 30 minutes at bedside providing Critical care serviies from post op to 11pm     Status discussed with patient at bedside, updated plan      Christoing Dixon LYNN FACEP    
ANNEMARIE KELLEY                     [id number]    HPI:  60 y/o male, with PMHx of HTN, B/L TKR, and COVID 2-3 months ago (not hospitalized),  CKD, PE (8/2020 - On lovenox or eliquis at home at different times; Hematologist Dr. Gil Tee), and recent seizure requiring emergency cric/tracheostomy on 03/25/2021 at [facility] due to tongue injury and lingual hematoma (discharged on 4/3/2021).  Readmitted on 4/16/21 w/ SOB, and underwent Flexible Bronchoscopy, Balloon dilatation of trachea on 4/19/21. Pt remained inhouse on Heparin gtt awaiting Tracheal resection. Seen by Nephrology for CKD. On 4/23/21 Pt had a Tracheal resection and reconstruction. Post op w Guardian stitch in place; Was brought back to the OR on 4/29 for surveillance bronchoscopy which revealed a patent airway and well healing anastomosis and therefore guardian suture removed and pt discharged home with soft collar in place; today pt presents to the ED with c/o increased stridor and increased mucous production with a cough .    (30 Apr 2021 16:28)      Procedure:  LMA intubation and flexible bronchoscopy revealed edematous vocal cords and fibrin deposit surrounding  tracheal anastomosis;   Flexible bronchoscopy, Tracheal resection with reconstruction. Took ~3cm of affected area of the proximal trachea, Guardian stitch placed 4/23/2021    Issues:  Critical airway, w airway obstruction   Tracheal stenosis  Stridor  CKD  Post op pain  PE  SZ      PAST MEDICAL & SURGICAL HISTORY:  HTN (hypertension)    Chronic kidney disease, unspecified CKD stage    Pulmonary embolus  diagnosed about 6 months ago incidentally found on imaging related to falling off a bike and chest pain    2019 novel coronavirus disease (COVID-19)  never hospitilized    Arthritis    Tracheal stenosis    History of total right knee replacement (TKR)  bilateral    History of tracheal stenosis              VITAL SIGNS:  Vital Signs Last 24 Hrs  T(C): 36.8 (30 Apr 2021 17:25), Max: 37.7 (30 Apr 2021 14:48)  T(F): 98.3 (30 Apr 2021 17:25), Max: 99.9 (30 Apr 2021 14:48)  HR: 77 (30 Apr 2021 17:55) (77 - 87)  BP: 129/78 (30 Apr 2021 17:55) (127/78 - 141/73)  BP(mean): 91 (30 Apr 2021 17:55) (85 - 91)  RR: 12 (30 Apr 2021 17:55) (10 - 18)  SpO2: 100% (30 Apr 2021 17:55) (100% - 100%)    I/Os:   I&O's Detail      CAPILLARY BLOOD GLUCOSE          =======================MEDICATIONS===================  MEDICATIONS  (STANDING):  dexAMETHasone  IVPB 8 milliGRAM(s) IV Intermittent every 8 hours  levETIRAcetam  IVPB 750 milliGRAM(s) IV Intermittent every 12 hours    MEDICATIONS  (PRN):  cyclobenzaprine 5 milliGRAM(s) Oral three times a day PRN Muscle Spasm  racepinephrine  2.25% Inhalation 0.5 milliLiter(s) Inhalation every 3 hours PRN stridor      PHYSICAL EXAM============================  General:                         Awake, alert, not in any distress  Neuro:                            Moving all extremities to commands.   Respiratory:	Air entry fair and  bilateral conducted sounds                                           Effort even and unlabored. No active stridor   CV:		Regular rate and rhythm. Normal S1/S2                                          Distal pulses present.  Abdomen:	                     Soft, non-distended. Bowel sounds present   Skin:		No rash.  Extremities:	Warm, no cyanosis or edema.  Palpable pulses    ============================LABS=========================                        8.4    12.87 )-----------( 194      ( 30 Apr 2021 16:13 )             25.6     04-30    134<L>  |  98  |  34<H>  ----------------------------<  133<H>  5.0   |  24  |  3.78<H>    Ca    9.4      30 Apr 2021 16:13  Phos  4.2     04-29  Mg     1.9     04-29    TPro  6.9  /  Alb  3.5  /  TBili  0.3  /  DBili  x   /  AST  8   /  ALT  <5  /  AlkPhos  96  04-30    LIVER FUNCTIONS - ( 30 Apr 2021 16:13 )  Alb: 3.5 g/dL / Pro: 6.9 g/dL / ALK PHOS: 96 U/L / ALT: <5 U/L / AST: 8 U/L / GGT: x           PT/INR - ( 30 Apr 2021 16:13 )   PT: 16.9 sec;   INR: 1.51 ratio         PTT - ( 30 Apr 2021 16:13 )  PTT:42.8 sec    A/P:  61yMale s/p  Flexible bronchoscopy, Tracheal resection with reconstruction. Admitted from ED with stridor, airway edema, to OR s/p FB. Transferred in CTU for airway monitoring an d management                             Neuro:                                         Pain control with Dilaudid / Tylenol IV PRN    Hx of seizure disorder: Continue Keppra                            Cardiovascular:  On tele                                        HTN: Continue hemodynamic monitoring and  Labetalol as tolerated                             Respiratory:                                        Acute airway edema, s/p FB, on Decadron / Racemic EPI/ Heliox                                        Closely monitoring for stridor and airway obstruction                                          Keep neck in flexed position with limited mobility, soft collar on                                                                                 Comfortable, not in any distress.                                         Encourage incentive spirometry - as tolerated                                          pulmonary toilet as tolerated                              GI                                         NPO                                         Continue GI prophylaxis with  Protonix                                         Continue Zofran / Reglan for nausea - PRN	                                                                 Renal:                                         Acute on chronic kidney disease  Avoid hypotension / nephrotoxic agents                                         Monitor I/Os and electrolytes                                                    Hem/ Onc:                                         Hx of PE: Hold AC, d/w Dr. Perdue                                          Follow CBC in AM                           Infectious disease:                                            Monitor for fever / leukocytosis.                            Endocrine                                             Continue Accu-Checks with coverage    Pt is on SQ Heparin and Venodyne boots for DVT prophylaxis.     Pertinent clinical, laboratory, radiographic, hemodynamic, echocardiographic, respiratory data, microbiologic data and chart were reviewed and analyzed frequently throughout the course of the day and night  Patient seen, examined and plan discussed with CT Surgeon Dr. Allen  / CTICU team during rounds.  I spent 30 minutes at bedside providing Critical care serviies from post op to 11pm     Status discussed with patient at bedside, updated plan        Renée Metcalf DO, FACEP    
POST ANESTHESIA EVALUATION    61y Male POSTOP DAY 1 S/P     MENTAL STATUS: Patient participation [ x ] Awake     [  ] Arousable     [  ] Sedated    AIRWAY PATENCY: [x  ] Satisfactory  [  ] Other:     Vital Signs Last 24 Hrs  T(C): 36.7 (01 May 2021 12:00), Max: 36.8 (30 Apr 2021 17:25)  T(F): 98 (01 May 2021 12:00), Max: 98.3 (30 Apr 2021 17:25)  HR: 77 (01 May 2021 14:00) (61 - 87)  BP: 130/84 (01 May 2021 14:00) (118/72 - 144/78)  BP(mean): 94 (01 May 2021 14:00) (83 - 117)  RR: 18 (01 May 2021 14:00) (10 - 25)  SpO2: 98% (01 May 2021 14:00) (97% - 100%)  I&O's Summary    30 Apr 2021 07:01  -  01 May 2021 07:00  --------------------------------------------------------  IN: 1150 mL / OUT: 1900 mL / NET: -750 mL    01 May 2021 07:01  -  01 May 2021 14:48  --------------------------------------------------------  IN: 990 mL / OUT: 1500 mL / NET: -510 mL          NAUSEA/ VOMITTING:  [ x ] NONE  [  ] CONTROLLED [  ] OTHER     PAIN: [x  ] CONTROLLED WITH CURRENT REGIMEN  [  ] OTHER    [ x ] NO APPARENT ANESTHESIA COMPLICATIONS      Comments: 
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 Previously on apixaban. Now on lovenox in anticipation for biopsies and surgery.  Seizure disorder  CKD  Hx of COVID (2/2021)      INTERVAL EVENTS:   Requiring humidified O2, Racemic epi and heliox  on dexamethasone for laryngeal edema  If respiratory failure, will need fiberoptic intubation.      HISTORY:   Patient reports dyspnea and stridor. Denies chest pain, cough, pleuritic pain, fever. Denies abdominal pain, nausea, or vomiting.    PHYSICAL EXAM:   Gen: Comfortable, No acute distress  Eyes: Sclera white, Conjunctiva normal, Eyelids normal, Pupils symmetrical   ENT: Mucous membranes moist,  ,  ,    Neck: Trachea midline,  ,  ,  ,  ,   CV: Rate regular, Rhythm regular,  ,  ,    Resp: Breath sounds clear, No accessory muscles use,  ,    Abd: Soft, Non-distended, Non-tender, Bowel sounds normal,  ,  ,    Skin: Warm, No peripheral edema of lower extremities,  ,    : No lester  Neuro: Moving all 4 extremities,    Psych: A&Ox3      ASSESSMENT AND PLAN:     NEURO:    Seizure disorder - stable. continue antiepileptics.         RESPIRATORY:    Edematous vocal cords and fibrin deposit surrounding  tracheal anastomosis -- racemic epi, heliox. If decompensates, will need fiberoptic intubation. Continuous pulse oximetry. Critical Airway monitoring.    Severe tracheal stenosis s/p dilation (4/19) s/p tracheal resection - awaiting repeat bronchoscopy     Hx of Pulmonary Embolism (10/2020) - stable. holding anticoagulation for now      CARDIOVASCULAR:  Hemodynamically stable - Not on pressors. Continue hemodynamic monitoring.  HTN - stable. Continue home antihypertensives               RENAL:  CKD – Stable. Renally dose medications. Monitor for hyperkalemia and uremia. Avoid nephrotoxic medications. Monitor IOs and electrolytes.         GASTROINTESTINAL:  GI prophylaxis not indicated  Zofran and Reglan IV PRN for nausea  NPO            HEMATOLOGIC:  No signs of active bleeding. Monitor Hgb in CBC in AM  On therapeutic anticoagulation.    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 Previously on apixaban and then on lovenox as outpatient in anticipation for biopsies and surgery as outpatient.         INFECTIOUS DISEASE:  All surgical sites appear clean. No signs of active infection. Will monitor for fever and leukocytosis.          ENDOCRINE:  Stable – Monitor glucose fingersticks for goal 120-180.            Pertinent clinical, laboratory, radiographic, hemodynamic, echocardiographic, respiratory data, microbiologic data and chart were reviewed by myself and analyzed frequently throughout the course of the day and night by myself.    Plan discussed at length with the CTICU staff and Attending CT Surgeon.     Patient's status was discussed with patient at bedside.    Patient required critical care management.  45 minutes were spent evaluating, managing, providing, coordinating, and documenting care for this patient, exclusive of procedures from  7 AM to 1159PM.      _________________________  VITAL SIGNS:  Vital Signs Last 24 Hrs  T(C): 36.6 (01 May 2021 07:00), Max: 37.7 (30 Apr 2021 14:48)  T(F): 97.8 (01 May 2021 07:00), Max: 99.9 (30 Apr 2021 14:48)  HR: 62 (01 May 2021 08:00) (62 - 87)  BP: 128/71 (01 May 2021 08:00) (118/72 - 144/78)  BP(mean): 84 (01 May 2021 08:00) (83 - 117)  RR: 13 (01 May 2021 08:00) (10 - 25)  SpO2: 99% (01 May 2021 08:00) (98% - 100%)  I/Os:   I&O's Detail    30 Apr 2021 07:01  -  01 May 2021 07:00  --------------------------------------------------------  IN:    IV PiggyBack: 250 mL    Lactated Ringers: 825 mL    sodium chloride 0.9%: 75 mL  Total IN: 1150 mL    OUT:    Intermittent Catheterization - Urethral (mL): 800 mL    Voided (mL): 1100 mL  Total OUT: 1900 mL    Total NET: -750 mL      01 May 2021 07:01  -  01 May 2021 09:33  --------------------------------------------------------  IN:    Oral Fluid: 90 mL    sodium chloride 0.9%: 75 mL    Sodium Chloride 0.9% Bolus: 250 mL  Total IN: 415 mL    OUT:    Voided (mL): 600 mL  Total OUT: 600 mL    Total NET: -185 mL              MEDICATIONS:  MEDICATIONS  (STANDING):  albuterol/ipratropium for Nebulization 3 milliLiter(s) Nebulizer every 6 hours  dexAMETHasone  IVPB 8 milliGRAM(s) IV Intermittent every 8 hours  levETIRAcetam  IVPB 750 milliGRAM(s) IV Intermittent every 12 hours  pantoprazole  Injectable 40 milliGRAM(s) IV Push daily  simethicone 80 milliGRAM(s) Chew daily  sodium chloride 0.9%. 1000 milliLiter(s) (75 mL/Hr) IV Continuous <Continuous>    MEDICATIONS  (PRN):  cyclobenzaprine 5 milliGRAM(s) Oral three times a day PRN Muscle Spasm  racepinephrine  2.25% Inhalation 0.5 milliLiter(s) Inhalation every 3 hours PRN stridor      LABS:                        8.2    10.80 )-----------( 181      ( 01 May 2021 04:29 )             25.3     05-01    134<L>  |  101  |  34<H>  ----------------------------<  176<H>  5.5<H>   |  21<L>  |  3.48<H>    Ca    9.2      01 May 2021 04:29    TPro  6.9  /  Alb  3.5  /  TBili  0.3  /  DBili  x   /  AST  8   /  ALT  <5  /  AlkPhos  96  04-30    LIVER FUNCTIONS - ( 30 Apr 2021 16:13 )  Alb: 3.5 g/dL / Pro: 6.9 g/dL / ALK PHOS: 96 U/L / ALT: <5 U/L / AST: 8 U/L / GGT: x           PT/INR - ( 30 Apr 2021 16:13 )   PT: 16.9 sec;   INR: 1.51 ratio         PTT - ( 30 Apr 2021 16:13 )  PTT:42.8 sec      _________________________        
ANNEMARIE KELLEY            MRN-[id number]         No Known Allergies                 HPI:  62 y/o male, with PMHx of HTN, B/L TKR, and COVID 2-3 months ago (not hospitalized),  CKD, PE (8/2020 - On lovenox or eliquis at home at different times; Hematologist Dr. Gil Tee), and recent seizure requiring emergency cric/tracheostomy on 03/25/2021 at [facility] due to tongue injury and lingual hematoma (discharged on 4/3/2021).  Readmitted on 4/16/21 w/ SOB, and underwent Flexible Bronchoscopy, Balloon dilatation of trachea on 4/19/21. Pt remained inhouse on Heparin gtt awaiting Tracheal resection. Seen by Nephrology for CKD. On 4/23/21 Pt had a Tracheal resection and reconstruction. Post op w Guardian stitch in place; Was brought back to the OR on 4/29 for surveillance bronchoscopy which revealed a patent airway and well healing anastomosis and therefore guardian suture removed and pt discharged home with soft collar in place; today pt presents to the ED with c/o increased stridor and increased mucous production with a cough .    (30 Apr 2021 16:28)      Procedure:  Flex bronch, tracheal resection      Issues:  Severe tracheal stenosis s/p Resection  Acute PE (dx 10/2020) on lovenox  Seizure disorder  HTN  CKD              Hx of COVID (2/2021)                   Home Medications:  labetalol 100 mg oral tablet: 1 tab(s) orally 3 times a day (16 Apr 2021 11:48)  Norvasc 10 mg oral tablet: 1 tab(s) orally once a day (16 Apr 2021 11:48)  polyethylene glycol 3350 oral powder for reconstitution: 17 gram(s) orally once a day, As Needed (29 Apr 2021 15:25)  Tylenol 325 mg oral tablet: 2 tab(s) orally every 4 hours, As Needed for mild pain (29 Apr 2021 08:31)      PAST MEDICAL & SURGICAL HISTORY:  HTN (hypertension)    Chronic kidney disease, unspecified CKD stage    Pulmonary embolus  diagnosed about 6 months ago incidentally found on imaging related to falling off a bike and chest pain    2019 novel coronavirus disease (COVID-19)  never hospitilized    Arthritis    Tracheal stenosis    History of total right knee replacement (TKR)  bilateral    History of tracheal stenosis          ICU Vital Signs Last 24 Hrs  T(C): 36.6 (03 May 2021 08:00), Max: 36.7 (02 May 2021 12:00)  T(F): 97.9 (03 May 2021 08:00), Max: 98.1 (02 May 2021 12:00)  HR: 58 (03 May 2021 08:00) (58 - 92)  BP: 160/83 (03 May 2021 08:00) (103/74 - 164/86)  BP(mean): 102 (03 May 2021 08:00) (77 - 110)  ABP: --  ABP(mean): --  RR: 13 (03 May 2021 08:00) (12 - 19)  SpO2: 97% (03 May 2021 08:00) (94% - 100%)    I&O's Detail    02 May 2021 07:01  -  03 May 2021 07:00  --------------------------------------------------------  IN:    dextrose 5% + sodium chloride 0.9%: 375 mL    IV PiggyBack: 150 mL    Oral Fluid: 840 mL  Total IN: 1365 mL    OUT:    Voided (mL): 1750 mL  Total OUT: 1750 mL    Total NET: -385 mL      03 May 2021 07:01  -  03 May 2021 08:41  --------------------------------------------------------  IN:  Total IN: 0 mL    OUT:    Voided (mL): 150 mL  Total OUT: 150 mL    Total NET: -150 mL        CAPILLARY BLOOD GLUCOSE      POCT Blood Glucose.: 175 mg/dL (01 May 2021 22:46)      Home Medications:  labetalol 100 mg oral tablet: 1 tab(s) orally 3 times a day (16 Apr 2021 11:48)  Norvasc 10 mg oral tablet: 1 tab(s) orally once a day (16 Apr 2021 11:48)  polyethylene glycol 3350 oral powder for reconstitution: 17 gram(s) orally once a day, As Needed (29 Apr 2021 15:25)  Tylenol 325 mg oral tablet: 2 tab(s) orally every 4 hours, As Needed for mild pain (29 Apr 2021 08:31)      MEDICATIONS  (STANDING):  albuterol/ipratropium for Nebulization 3 milliLiter(s) Nebulizer every 6 hours  heparin   Injectable 5000 Unit(s) SubCutaneous every 8 hours  levETIRAcetam  IVPB 750 milliGRAM(s) IV Intermittent every 12 hours  pantoprazole  Injectable 40 milliGRAM(s) IV Push daily  simethicone 80 milliGRAM(s) Chew daily    MEDICATIONS  (PRN):  cyclobenzaprine 5 milliGRAM(s) Oral three times a day PRN Muscle Spasm  racepinephrine  2.25% Inhalation 0.5 milliLiter(s) Inhalation every 3 hours PRN stridor          Physical exam:                             CNS:                     Nonfocal                             Cardiovascular:     S1 & S2, regular                           Respiratory:         Air entry is fair and equal on both sides, lungs are clear                          GI:                       Soft, nondistended and nontender, Bowel sounds active                            Ext:                       No cyanosis or edema    Labs:                                                                           8.4    17.45 )-----------( 205      ( 03 May 2021 02:44 )             26.0             05-03    135  |  102  |  33<H>  ----------------------------<  163<H>  5.0   |  21<L>  |  2.83<H>    Ca    9.1      03 May 2021 02:44  Phos  4.1     05-03  Mg     2.3     05-03                    PT/INR - ( 03 May 2021 04:28 )   PT: 13.6 sec;   INR: 1.19 ratio       PTT - ( 03 May 2021 04:28 )  PTT:31.4 sec          CXR:  < from: Xray Chest 1 View- PORTABLE-Routine (05.02.21 @ 04:37) >  INTERPRETATION:    Heart size and the mediastinum cannot be accurately evaluated on this projection.  The lungs are clear.  No pleural effusion or pneumothorax seen.  There is osteoarthritic degenerative change of the spine and shoulders.      IMPRESSION:  Clear lungs.      Plan:    General: 61yMale s/p  Flexible bronchoscopy, Tracheal resection with reconstruction. Took ~3cm of affected area of the proximal trachea, Guardian stitch placed 4/23/2021,  complaining of neck discomfort but able to breathe and work with I.S.                             Neuro:                                         Pain control with Oxy / Tylenol     Hx of seizure disorder: Continue Keppra                            Cardiovascular:                                          HTN: Continue hemodynamic monitoring and  Labetalol as tolerated                             Respiratory:                                         Keep neck in flexed position with limited mobility as allowed                                          Pt is on room air, saturating in high 90S                                         Received 48 hrs of IV Steroids. Comfortable, not in any distress.                                         Encourage incentive spirometry - as tolerated                                          Continue bronchodilators, pulmonary toilet as tolerated                               GI                                         Continue GI prophylaxis with  Protonix                                                                                                        Renal:                                         Acute on chronic kidney disease. Cr is stable                                         Monitor I/Os and electrolytes                              Hem/ Onc:                                         Hx of PE: Restart Eliquis                                         Follow CBC in AM                           Infectious disease:                                            Monitor for fever / leukocytosis.                            Endocrine                                             Continue Accu-Checks with coverage    Pt is on SQ Heparin and Venodyne boots for DVT prophylaxis.     Pertinent clinical, laboratory, radiographic, hemodynamic, echocardiographic, respiratory data, microbiologic data and chart were reviewed and analyzed frequently throughout the course of the day and night  Patient seen, examined and plan discussed with CT Surgeon Dr. Allen  / CTICU team during rounds.    Status discussed with patient at bedside, updated plan including discharge today          Yefri Kidd MD

## 2021-05-03 NOTE — DISCHARGE NOTE PROVIDER - NSDCFUADDINST_GEN_ALL_CORE_FT
DO NOT EXTEND YOUR NECK.   IF YOU HAVE DIFFICULTLY BREATHING PLEASE GO TO THE EMERGENCY ROOM.  Please walk 4-5 x per day, Increase as tolerated. You may climb stairs. Continue to use incentive spirometer.   See Dr. Allen's office in 2 weeks. Please call [phone] for an apt.  Take pain pills only as needed. Please take a laxative to help support your bowel movements while on pain medication. Laxatives can be available over the counter at your local pharmacy.  Please call the office at [phone] if you have fever's chills, worsening shortness of breath, chest pain, warmth, redness or purulent discharge from the insertion site.

## 2021-05-03 NOTE — DISCHARGE NOTE PROVIDER - NSDCMRMEDTOKEN_GEN_ALL_CORE_FT
aluminum hydroxide-magnesium hydroxide 200 mg-200 mg/5 mL oral suspension: 30 milliliter(s) orally every 6 hours, As needed, Dyspepsia MDD:120ml  apixaban 5 mg oral tablet: 1 tab(s) orally 2 times a day**apply free coupon if available**  cyclobenzaprine 5 mg oral tablet: 1 tab(s) orally 3 times a day, As needed, Muscle Spasm MDD:3  labetalol 100 mg oral tablet: 1 tab(s) orally 3 times a day  levETIRAcetam 750 mg oral tablet: 1 tab(s) orally every 12 hours  Norvasc 10 mg oral tablet: 1 tab(s) orally once a day  oxyCODONE 5 mg oral tablet: 1 tab(s) orally every 4 hours, As Needed for moderate pain MDD:6   Peridex 0.12% mucous membrane liquid: 15 milliliter(s) orally 2 times a day MDD:max 30mL a day  polyethylene glycol 3350 oral powder for reconstitution: 17 gram(s) orally once a day, As Needed  sodium polystyrene sulfonate 15 g/60 mL oral and rectal suspension: 120 milliliter(s) orally 3 times a week Mondays, Wednesdays, and Fridays   Tylenol 325 mg oral tablet: 2 tab(s) orally every 4 hours, As Needed for mild pain   aluminum hydroxide-magnesium hydroxide 200 mg-200 mg/5 mL oral suspension: 30 milliliter(s) orally every 6 hours, As needed, Dyspepsia MDD:120ml  apixaban 5 mg oral tablet: 1 tab(s) orally 2 times a day**apply free coupon if available**  cyclobenzaprine 5 mg oral tablet: 1 tab(s) orally 3 times a day, As needed, Muscle Spasm MDD:3  labetalol 100 mg oral tablet: 1 tab(s) orally 3 times a day  levETIRAcetam 750 mg oral tablet: 1 tab(s) orally every 12 hours  Norvasc 10 mg oral tablet: 1 tab(s) orally once a day  oxyCODONE 5 mg oral tablet: 1 tab(s) orally every 4 hours, As Needed for moderate pain MDD:6   Peridex 0.12% mucous membrane liquid: 15 milliliter(s) orally 2 times a day MDD:max 30mL a day  polyethylene glycol 3350 oral powder for reconstitution: 17 gram(s) orally once a day, As Needed  Tylenol 325 mg oral tablet: 2 tab(s) orally every 4 hours, As Needed for mild pain

## 2021-05-03 NOTE — DISCHARGE NOTE PROVIDER - HOSPITAL COURSE
60 y/o male, with PMHx of HTN, B/L TKR, and COVID 2-3 months ago (not hospitalized),  CKD, PE (8/2020 - On lovenox or eliquis at home at different times; Hematologist Dr. Gil Tee), and recent seizure requiring emergency cric/tracheostomy on 03/25/2021 at [facility] due to tongue injury and lingual hematoma (discharged on 4/3/2021).  Readmitted on 4/16/21 w/ SOB, and underwent Flexible Bronchoscopy, Balloon dilatation of trachea on 4/19/21. Pt remained inhouse on Heparin gtt awaiting Tracheal resection. Seen by Nephrology for CKD. On 4/23/21 Pt had a Tracheal resection and reconstruction. Post op w Guardian stitch in place; Was brought back to the OR on 4/29 for surveillance bronchoscopy which revealed a patent airway and well healing anastomosis and therefore guardian suture removed and pt discharged home with soft collar in place; pt presetned to the ED one day after d/c complaining for stridor. Pt went to OR the same day which revealed vocal cord swelling and some fibrin around the anastomosis which was removed. Pt was moved to the ICU for observation. He started on decadron for 48 hours. Over the 48 hours pt's voice has removed 62 y/o male, with PMHx of HTN, B/L TKR, and COVID 2-3 months ago (not hospitalized),  CKD, PE (8/2020 - On lovenox or eliquis at home at different times; Hematologist Dr. Gil Tee), and recent seizure requiring emergency cric/tracheostomy on 03/25/2021 at [facility] due to tongue injury and lingual hematoma (discharged on 4/3/2021).  Readmitted on 4/16/21 w/ SOB, and underwent Flexible Bronchoscopy, Balloon dilatation of trachea on 4/19/21. Pt remained inhouse on Heparin gtt awaiting Tracheal resection. Seen by Nephrology for CKD. On 4/23/21 Pt had a Tracheal resection and reconstruction. Post op w Guardian stitch in place; Was brought back to the OR on 4/29 for surveillance bronchoscopy which revealed a patent airway and well healing anastomosis and therefore guardian suture removed and pt discharged home with soft collar in place; pt presetned to the ED one day after d/c complaining for stridor. Pt went to OR the same day which revealed vocal cord swelling and some fibrin around the anastomosis which was removed. Pt was moved to the ICU for observation. He started on decadron for 48 hours. Over the 48 hours pt's voice has improved, pt required no heliox or racemic epinephrine. Pt is cleared for d/c on Monday 5/3.

## 2021-05-03 NOTE — DISCHARGE NOTE PROVIDER - NSDCCPCAREPLAN_GEN_ALL_CORE_FT
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

## 2021-05-04 ENCOUNTER — TRANSCRIPTION ENCOUNTER (OUTPATIENT)
Age: 61
End: 2021-05-04

## 2021-05-05 ENCOUNTER — INPATIENT (INPATIENT)
Facility: HOSPITAL | Age: 61
LOS: 5 days | Discharge: HOME | End: 2021-05-11
Attending: THORACIC SURGERY (CARDIOTHORACIC VASCULAR SURGERY) | Admitting: THORACIC SURGERY (CARDIOTHORACIC VASCULAR SURGERY)
Payer: MEDICAID

## 2021-05-05 ENCOUNTER — APPOINTMENT (OUTPATIENT)
Dept: THORACIC SURGERY | Facility: HOSPITAL | Age: 61
End: 2021-05-05

## 2021-05-05 VITALS
HEART RATE: 72 BPM | SYSTOLIC BLOOD PRESSURE: 129 MMHG | RESPIRATION RATE: 16 BRPM | DIASTOLIC BLOOD PRESSURE: 78 MMHG | TEMPERATURE: 98 F | OXYGEN SATURATION: 100 % | HEIGHT: 63 IN

## 2021-05-05 DIAGNOSIS — Z87.09 PERSONAL HISTORY OF OTHER DISEASES OF THE RESPIRATORY SYSTEM: Chronic | ICD-10-CM

## 2021-05-05 DIAGNOSIS — J39.8 OTHER SPECIFIED DISEASES OF UPPER RESPIRATORY TRACT: ICD-10-CM

## 2021-05-05 DIAGNOSIS — Z96.651 PRESENCE OF RIGHT ARTIFICIAL KNEE JOINT: Chronic | ICD-10-CM

## 2021-05-05 DIAGNOSIS — R06.1 STRIDOR: ICD-10-CM

## 2021-05-05 LAB
ANION GAP SERPL CALC-SCNC: 15 MMOL/L — HIGH (ref 7–14)
APTT BLD: 34.8 SEC — SIGNIFICANT CHANGE UP (ref 27–36.3)
BASOPHILS # BLD AUTO: 0.03 K/UL — SIGNIFICANT CHANGE UP (ref 0–0.2)
BASOPHILS NFR BLD AUTO: 0.3 % — SIGNIFICANT CHANGE UP (ref 0–2)
BLD GP AB SCN SERPL QL: NEGATIVE — SIGNIFICANT CHANGE UP
BUN SERPL-MCNC: 31 MG/DL — HIGH (ref 7–23)
CALCIUM SERPL-MCNC: 8.7 MG/DL — SIGNIFICANT CHANGE UP (ref 8.4–10.5)
CHLORIDE SERPL-SCNC: 101 MMOL/L — SIGNIFICANT CHANGE UP (ref 98–107)
CO2 SERPL-SCNC: 20 MMOL/L — LOW (ref 22–31)
CREAT SERPL-MCNC: 2.71 MG/DL — HIGH (ref 0.5–1.3)
CULTURE RESULTS: SIGNIFICANT CHANGE UP
CULTURE RESULTS: SIGNIFICANT CHANGE UP
EOSINOPHIL # BLD AUTO: 0.36 K/UL — SIGNIFICANT CHANGE UP (ref 0–0.5)
EOSINOPHIL NFR BLD AUTO: 3.1 % — SIGNIFICANT CHANGE UP (ref 0–6)
GAS PNL BLDA: SIGNIFICANT CHANGE UP
GLUCOSE BLDC GLUCOMTR-MCNC: 121 MG/DL — HIGH (ref 70–99)
GLUCOSE BLDC GLUCOMTR-MCNC: 195 MG/DL — HIGH (ref 70–99)
GLUCOSE SERPL-MCNC: 113 MG/DL — HIGH (ref 70–99)
HCT VFR BLD CALC: 27.2 % — LOW (ref 39–50)
HGB BLD-MCNC: 8.8 G/DL — LOW (ref 13–17)
IANC: 7.16 K/UL — SIGNIFICANT CHANGE UP (ref 1.5–8.5)
IMM GRANULOCYTES NFR BLD AUTO: 2.4 % — HIGH (ref 0–1.5)
INR BLD: 1.43 RATIO — HIGH (ref 0.88–1.16)
LYMPHOCYTES # BLD AUTO: 2.42 K/UL — SIGNIFICANT CHANGE UP (ref 1–3.3)
LYMPHOCYTES # BLD AUTO: 21.1 % — SIGNIFICANT CHANGE UP (ref 13–44)
MAGNESIUM SERPL-MCNC: 1.7 MG/DL — SIGNIFICANT CHANGE UP (ref 1.6–2.6)
MCHC RBC-ENTMCNC: 29.9 PG — SIGNIFICANT CHANGE UP (ref 27–34)
MCHC RBC-ENTMCNC: 32.4 GM/DL — SIGNIFICANT CHANGE UP (ref 32–36)
MCV RBC AUTO: 92.5 FL — SIGNIFICANT CHANGE UP (ref 80–100)
MONOCYTES # BLD AUTO: 1.2 K/UL — HIGH (ref 0–0.9)
MONOCYTES NFR BLD AUTO: 10.5 % — SIGNIFICANT CHANGE UP (ref 2–14)
NEUTROPHILS # BLD AUTO: 7.16 K/UL — SIGNIFICANT CHANGE UP (ref 1.8–7.4)
NEUTROPHILS NFR BLD AUTO: 62.6 % — SIGNIFICANT CHANGE UP (ref 43–77)
NRBC # BLD: 0 /100 WBCS — SIGNIFICANT CHANGE UP
NRBC # FLD: 0.02 K/UL — HIGH
PHOSPHATE SERPL-MCNC: 3.5 MG/DL — SIGNIFICANT CHANGE UP (ref 2.5–4.5)
PLATELET # BLD AUTO: 184 K/UL — SIGNIFICANT CHANGE UP (ref 150–400)
POTASSIUM SERPL-MCNC: 4.4 MMOL/L — SIGNIFICANT CHANGE UP (ref 3.5–5.3)
POTASSIUM SERPL-SCNC: 4.4 MMOL/L — SIGNIFICANT CHANGE UP (ref 3.5–5.3)
PROTHROM AB SERPL-ACNC: 16.2 SEC — HIGH (ref 10.6–13.6)
RBC # BLD: 2.94 M/UL — LOW (ref 4.2–5.8)
RBC # FLD: 12.6 % — SIGNIFICANT CHANGE UP (ref 10.3–14.5)
RH IG SCN BLD-IMP: POSITIVE — SIGNIFICANT CHANGE UP
SARS-COV-2 RNA SPEC QL NAA+PROBE: SIGNIFICANT CHANGE UP
SODIUM SERPL-SCNC: 136 MMOL/L — SIGNIFICANT CHANGE UP (ref 135–145)
SPECIMEN SOURCE: SIGNIFICANT CHANGE UP
SPECIMEN SOURCE: SIGNIFICANT CHANGE UP
WBC # BLD: 11.45 K/UL — HIGH (ref 3.8–10.5)
WBC # FLD AUTO: 11.45 K/UL — HIGH (ref 3.8–10.5)

## 2021-05-05 PROCEDURE — 99223 1ST HOSP IP/OBS HIGH 75: CPT

## 2021-05-05 PROCEDURE — 31624 DX BRONCHOSCOPE/LAVAGE: CPT | Mod: 78

## 2021-05-05 PROCEDURE — 99291 CRITICAL CARE FIRST HOUR: CPT

## 2021-05-05 PROCEDURE — 70360 X-RAY EXAM OF NECK: CPT | Mod: 26

## 2021-05-05 PROCEDURE — 99285 EMERGENCY DEPT VISIT HI MDM: CPT

## 2021-05-05 PROCEDURE — 71045 X-RAY EXAM CHEST 1 VIEW: CPT | Mod: 26

## 2021-05-05 RX ORDER — DEXTROSE 50 % IN WATER 50 %
25 SYRINGE (ML) INTRAVENOUS ONCE
Refills: 0 | Status: DISCONTINUED | OUTPATIENT
Start: 2021-05-05 | End: 2021-05-07

## 2021-05-05 RX ORDER — FENTANYL CITRATE 50 UG/ML
50 INJECTION INTRAVENOUS ONCE
Refills: 0 | Status: DISCONTINUED | OUTPATIENT
Start: 2021-05-05 | End: 2021-05-05

## 2021-05-05 RX ORDER — DEXAMETHASONE 0.5 MG/5ML
8 ELIXIR ORAL EVERY 4 HOURS
Refills: 0 | Status: DISCONTINUED | OUTPATIENT
Start: 2021-05-05 | End: 2021-05-05

## 2021-05-05 RX ORDER — POLYETHYLENE GLYCOL 3350 17 G/17G
17 POWDER, FOR SOLUTION ORAL DAILY
Refills: 0 | Status: DISCONTINUED | OUTPATIENT
Start: 2021-05-05 | End: 2021-05-11

## 2021-05-05 RX ORDER — PROPOFOL 10 MG/ML
30 INJECTION, EMULSION INTRAVENOUS
Qty: 1000 | Refills: 0 | Status: DISCONTINUED | OUTPATIENT
Start: 2021-05-05 | End: 2021-05-07

## 2021-05-05 RX ORDER — GLUCAGON INJECTION, SOLUTION 0.5 MG/.1ML
1 INJECTION, SOLUTION SUBCUTANEOUS ONCE
Refills: 0 | Status: DISCONTINUED | OUTPATIENT
Start: 2021-05-05 | End: 2021-05-07

## 2021-05-05 RX ORDER — SODIUM CHLORIDE 9 MG/ML
1000 INJECTION, SOLUTION INTRAVENOUS
Refills: 0 | Status: DISCONTINUED | OUTPATIENT
Start: 2021-05-05 | End: 2021-05-07

## 2021-05-05 RX ORDER — HYDRALAZINE HCL 50 MG
10 TABLET ORAL EVERY 6 HOURS
Refills: 0 | Status: DISCONTINUED | OUTPATIENT
Start: 2021-05-05 | End: 2021-05-11

## 2021-05-05 RX ORDER — DEXTROSE 50 % IN WATER 50 %
15 SYRINGE (ML) INTRAVENOUS ONCE
Refills: 0 | Status: DISCONTINUED | OUTPATIENT
Start: 2021-05-05 | End: 2021-05-07

## 2021-05-05 RX ORDER — HYDROMORPHONE HYDROCHLORIDE 2 MG/ML
1 INJECTION INTRAMUSCULAR; INTRAVENOUS; SUBCUTANEOUS
Refills: 0 | Status: DISCONTINUED | OUTPATIENT
Start: 2021-05-05 | End: 2021-05-10

## 2021-05-05 RX ORDER — DEXMEDETOMIDINE HYDROCHLORIDE IN 0.9% SODIUM CHLORIDE 4 UG/ML
0.5 INJECTION INTRAVENOUS
Qty: 400 | Refills: 0 | Status: DISCONTINUED | OUTPATIENT
Start: 2021-05-05 | End: 2021-05-07

## 2021-05-05 RX ORDER — LABETALOL HCL 100 MG
100 TABLET ORAL THREE TIMES A DAY
Refills: 0 | Status: DISCONTINUED | OUTPATIENT
Start: 2021-05-05 | End: 2021-05-11

## 2021-05-05 RX ORDER — CHLORHEXIDINE GLUCONATE 213 G/1000ML
15 SOLUTION TOPICAL EVERY 12 HOURS
Refills: 0 | Status: DISCONTINUED | OUTPATIENT
Start: 2021-05-05 | End: 2021-05-07

## 2021-05-05 RX ORDER — PANTOPRAZOLE SODIUM 20 MG/1
40 TABLET, DELAYED RELEASE ORAL DAILY
Refills: 0 | Status: DISCONTINUED | OUTPATIENT
Start: 2021-05-05 | End: 2021-05-10

## 2021-05-05 RX ORDER — LEVETIRACETAM 250 MG/1
750 TABLET, FILM COATED ORAL
Refills: 0 | Status: DISCONTINUED | OUTPATIENT
Start: 2021-05-05 | End: 2021-05-05

## 2021-05-05 RX ORDER — AMLODIPINE BESYLATE 2.5 MG/1
10 TABLET ORAL DAILY
Refills: 0 | Status: DISCONTINUED | OUTPATIENT
Start: 2021-05-05 | End: 2021-05-11

## 2021-05-05 RX ORDER — LEVETIRACETAM 250 MG/1
750 TABLET, FILM COATED ORAL EVERY 12 HOURS
Refills: 0 | Status: DISCONTINUED | OUTPATIENT
Start: 2021-05-05 | End: 2021-05-11

## 2021-05-05 RX ORDER — CYCLOBENZAPRINE HYDROCHLORIDE 10 MG/1
5 TABLET, FILM COATED ORAL THREE TIMES A DAY
Refills: 0 | Status: DISCONTINUED | OUTPATIENT
Start: 2021-05-05 | End: 2021-05-11

## 2021-05-05 RX ORDER — DEXAMETHASONE 0.5 MG/5ML
8 ELIXIR ORAL EVERY 8 HOURS
Refills: 0 | Status: DISCONTINUED | OUTPATIENT
Start: 2021-05-05 | End: 2021-05-07

## 2021-05-05 RX ORDER — ACETAMINOPHEN 500 MG
650 TABLET ORAL EVERY 6 HOURS
Refills: 0 | Status: DISCONTINUED | OUTPATIENT
Start: 2021-05-05 | End: 2021-05-11

## 2021-05-05 RX ORDER — INSULIN LISPRO 100/ML
VIAL (ML) SUBCUTANEOUS EVERY 6 HOURS
Refills: 0 | Status: DISCONTINUED | OUTPATIENT
Start: 2021-05-05 | End: 2021-05-07

## 2021-05-05 RX ORDER — DEXTROSE 50 % IN WATER 50 %
12.5 SYRINGE (ML) INTRAVENOUS ONCE
Refills: 0 | Status: DISCONTINUED | OUTPATIENT
Start: 2021-05-05 | End: 2021-05-07

## 2021-05-05 RX ORDER — OXYCODONE HYDROCHLORIDE 5 MG/1
5 TABLET ORAL EVERY 4 HOURS
Refills: 0 | Status: DISCONTINUED | OUTPATIENT
Start: 2021-05-05 | End: 2021-05-09

## 2021-05-05 RX ORDER — HEPARIN SODIUM 5000 [USP'U]/ML
5000 INJECTION INTRAVENOUS; SUBCUTANEOUS EVERY 8 HOURS
Refills: 0 | Status: DISCONTINUED | OUTPATIENT
Start: 2021-05-05 | End: 2021-05-11

## 2021-05-05 RX ADMIN — PANTOPRAZOLE SODIUM 40 MILLIGRAM(S): 20 TABLET, DELAYED RELEASE ORAL at 21:03

## 2021-05-05 RX ADMIN — OXYCODONE HYDROCHLORIDE 5 MILLIGRAM(S): 5 TABLET ORAL at 09:36

## 2021-05-05 RX ADMIN — HEPARIN SODIUM 5000 UNIT(S): 5000 INJECTION INTRAVENOUS; SUBCUTANEOUS at 21:03

## 2021-05-05 RX ADMIN — DEXMEDETOMIDINE HYDROCHLORIDE IN 0.9% SODIUM CHLORIDE 9.19 MICROGRAM(S)/KG/HR: 4 INJECTION INTRAVENOUS at 19:27

## 2021-05-05 RX ADMIN — Medication 10 MILLIGRAM(S): at 21:03

## 2021-05-05 RX ADMIN — FENTANYL CITRATE 50 MICROGRAM(S): 50 INJECTION INTRAVENOUS at 18:52

## 2021-05-05 RX ADMIN — Medication 101.6 MILLIGRAM(S): at 21:04

## 2021-05-05 RX ADMIN — DEXMEDETOMIDINE HYDROCHLORIDE IN 0.9% SODIUM CHLORIDE 9.19 MICROGRAM(S)/KG/HR: 4 INJECTION INTRAVENOUS at 18:51

## 2021-05-05 RX ADMIN — FENTANYL CITRATE 50 MICROGRAM(S): 50 INJECTION INTRAVENOUS at 18:51

## 2021-05-05 RX ADMIN — LEVETIRACETAM 400 MILLIGRAM(S): 250 TABLET, FILM COATED ORAL at 19:43

## 2021-05-05 RX ADMIN — Medication 1: at 23:11

## 2021-05-05 RX ADMIN — PROPOFOL 13.2 MICROGRAM(S)/KG/MIN: 10 INJECTION, EMULSION INTRAVENOUS at 19:37

## 2021-05-05 RX ADMIN — OXYCODONE HYDROCHLORIDE 5 MILLIGRAM(S): 5 TABLET ORAL at 10:33

## 2021-05-05 NOTE — ED PROVIDER NOTE - CLINICAL SUMMARY MEDICAL DECISION MAKING FREE TEXT BOX
60 y/o M w/ PMH of HTN, B/L TKR, and COVID 2-3 months ago (not hospitalized), CKD, PE (8/2020 - On lovenox or eliquis at home at different times; Hematologist Dr. Gil Tee), and recent seizure requiring emergency cric/tracheostomy on 03/25/2021 at [facility] due to tongue injury and lingual hematoma (discharged on 4/3/2021).  Readmitted on 4/16w/ SOB, and underwent Flexible Bronchoscopy, Balloon dilatation of trachea on 4/19, tracheal resection and reconstruction on 4/23 presenting today w/ fullness in throat. Pt overall well appearing, however audible stridor upon deep breath as well as auscultatable stridor in neck. Will speak w/ CT surg regarding plan. AP and lateral neck xray ordered. Will reassess the need for additional interventions as clinically warranted.

## 2021-05-05 NOTE — ED ADULT NURSE NOTE - OBJECTIVE STATEMENT
Patient arrives to the ED because since being discharged after tracheostomy placement he is having trouble eating and feels like his throat is closing up. Pulsox 100% on RA. No excessive drooling or difficulty swallowing own spit noted. Patient breathing even and nonlabored. Denies any headache, chest pain, dizziness, nausea, vomiting, or fevers. Patient reports he feels like he needs to spit something up but can't. Stridor audible when patient is trying to cough something up. Suction at bedside. Call bell provided. Safety maintained. Patient stable upon exiting the room. Awaiting MD orders. Patient arrives to the ED because since being discharged after tracheostomy placement he is having trouble eating and feels like his throat is closing up. Pulsox 100% on RA. No excessive drooling or difficulty swallowing own spit noted. Patient breathing even and nonlabored. Denies any headache, chest pain, dizziness, nausea, vomiting, or fevers. Patient reports he feels like he needs to spit something up but can't. Stridor audible when patient is trying to cough something up. Suction at bedside. Call bell provided. 20g Iv placed to left arm. Labs sent as ordered. COVID swab sent. Safety maintained. Patient stable upon exiting the room.  Safety maintained. Patient stable upon exiting the room.

## 2021-05-05 NOTE — ED PROVIDER NOTE - OBJECTIVE STATEMENT
60 y/o M w/ PMH of HTN, B/L TKR, and COVID 2-3 months ago (not hospitalized), CKD, PE (8/2020 - On lovenox or eliquis at home at different times; Hematologist Dr. Gil Tee), and recent seizure requiring emergency cric/tracheostomy on 03/25/2021 at [facility] due to tongue injury and lingual hematoma (discharged on 4/3/2021).  Readmitted on 4/16w/ SOB, and underwent Flexible Bronchoscopy, Balloon dilatation of trachea on 4/19, tracheal resection and reconstruction on 4/23 presenting today w/ fullness in throat. Green room 11. Pt reports for past 2 days has had increased fullness. Describes a "stifling" sensation in his throat. Has been able to tolerate eating. Came to ED due to concern for sensation he is feeling in his throat. Had similar presentation on his last admission at end of April. Denies fevers, chills, headache, dizziness, blurred vision, chest pain, cough, shortness of breath, abdominal pain, n/v/d/c, urinary symptoms, MSK pain, rash.
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
## 2021-05-05 NOTE — ED ADULT NURSE NOTE - NSIMPLEMENTINTERV_GEN_ALL_ED
Implemented All Fall with Harm Risk Interventions:  [place] to call system. Call bell, personal items and telephone within reach. Instruct patient to call for assistance. Room bathroom lighting operational. Non-slip footwear when patient is off stretcher. Physically safe environment: no spills, clutter or unnecessary equipment. Stretcher in lowest position, wheels locked, appropriate side rails in place. Provide visual cue, wrist band, yellow gown, etc. Monitor gait and stability. Monitor for mental status changes and reorient to person, place, and time. Review medications for side effects contributing to fall risk. Reinforce activity limits and safety measures with patient and family. Provide visual clues: red socks.

## 2021-05-05 NOTE — H&P ADULT - HISTORY OF PRESENT ILLNESS
62 y/o male returns to ER for 2nd time c/o stridor after discharge from tracheal resection 2 wks ago. He has PMHx of HTN, B/L TKR, and COVID 2-3 months ago (not hospitalized),  CKD, PE (8/2020 - On lovenox or eliquis at home at different times; Hematologist Dr. Gil Tee), and recent seizure requiring emergency cric/tracheostomy on 03/25/2021 at [facility] due to tongue injury and lingual hematoma (discharged on 4/3/2021).  Readmitted on 4/16/21 w/ SOB, and underwent Flexible Bronchoscopy, Balloon dilatation of trachea on 4/19/21. Pt remained inhouse on Heparin gtt awaiting Tracheal resection. Seen by Nephrology for CKD. On 4/23/21 Pt had a Tracheal resection and reconstruction 4/23/21. Post op w Guardian stitch in place; Was brought back to the OR on 4/29 for surveillance bronchoscopy which revealed a patent airway and well healing anastomosis and therefore guardian suture removed and pt discharged home with soft collar in place. Pt presented to ER 4/30 w c/o stridor and was immediately taken to OR for flexible bronchoscopy. Pt again presents today pt with c/o increased stridor and increased mucous production with a cough .

## 2021-05-05 NOTE — ED PROVIDER NOTE - PROGRESS NOTE DETAILS
Dr. Andre Rhodes, PGY-3: spoke w/ CT surg who will come eval pt Dr. Andre Rhodes, PGY-3: page out to CT surgery. Dr. Andre Rhodes, PGY-3: seen by CT surg PA. Likely will admit to CT Surg service for bronch. Final recs pending.

## 2021-05-05 NOTE — ED ADULT NURSE REASSESSMENT NOTE - NS ED NURSE REASSESS COMMENT FT1
pt remains A&Ox4, ambulatory. Pt breathing even and unlabored on room air, satting 100%. Pt endorses pain to his neck. No drooling or stridor noted. Incision to anterior neck from cricoidotomy intact, well approximated, no swelling, drainage or redness noted. Left 20G IV WNL. Pt TBA, awaiting surgery c/s.

## 2021-05-05 NOTE — ED ADULT TRIAGE NOTE - CHIEF COMPLAINT QUOTE
Pt. has a tracheostomy done on 5/3 after coming to ER for strider and diagnosed with tracheal stenosis. Pt. stated he feeling like his throat is closing up and unable to eat since surgery. no drooling or c/o difficulty breathing. noted with neck brace in place.

## 2021-05-05 NOTE — ED PROVIDER NOTE - ATTENDING CONTRIBUTION TO CARE
HPI: 62 y/o M w/ PMH of HTN, B/L TKR, and COVID 2-3 months ago (not hospitalized), CKD, PE (8/2020 - On lovenox or eliquis at home at different times; Hematologist Dr. Gil Tee), and recent seizure requiring emergency cric/tracheostomy on 03/25/2021 at [facility] due to tongue injury and lingual hematoma (discharged on 4/3/2021).  Readmitted on 4/16w/ SOB, and underwent Flexible Bronchoscopy, Balloon dilatation of trachea on 4/19, tracheal resection and reconstruction on 4/23 presenting today w/ fullness in throat. Green room 11. Pt reports for past 2 days has had increased fullness. Describes a "stifling" sensation in his throat. Has been able to tolerate eating. Came to ED due to concern for sensation he is feeling in his throat. Had similar presentation on his last admission at end of April. Denies fevers, chills, headache, dizziness, blurred vision, chest pain, cough, shortness of breath, abdominal pain, n/v/d/c, urinary symptoms, MSK pain, rash.  EXAM: Speaking full sentences, oropharynx clear with no tongue elevation, uvula is midline, no drooling no bleeding, no pus. Neck with normal midline thyroid with well healed horizontal scar, no pus/drainage/fluctuance/crepitus/erythema, nontender. Minimal stridor, no bruits.   MDM: pt with multiple medical problems that was here for cric/trach that has been reversed that presents with stridor and reports of throat discomfort. Speaking full sentences and not drooling, reports tolerating PO and secretions but feels abnormal. Will consult CT surgery for recs as pt has been here for similar issues in past s/p surgery.

## 2021-05-05 NOTE — H&P ADULT - ASSESSMENT
62 y/o male, with PMHx of HTN, B/L TKR, and COVID 2-3 months ago (not hospitalized),  CKD, PE (8/2020) now s/p tracheal resection on 4/23/2021 and surveillance bronchoscopy on 4/29 revealing patent airways and well healing anastomosis. Pt presented to ER w complaints of stridor and went for FB 4/30.  This is his second return to OR with complaints of stridor and increased mucous production    -Admit to CTICU Thoracic Surgery, Dr. Allen  - heliox, racemic epinephrine PRN  -pt added on for bronchoscopy in the OR today - keep NPO  -CTICU post-op  -D/w Dr. Allen

## 2021-05-05 NOTE — ED PROVIDER NOTE - PHYSICAL EXAMINATION
Gen: NAD, AOx3, able to make needs known, non-toxic  Head: NCAT  HEENT: EOMI, oral mucosa moist, normal conjunctiva  Lung: CTAB, no respiratory distress, no wheezes/rhonchi/rales B/L, speaking in full sentences. Stridor auscultated in neck  CV: RRR, no murmurs  Abd: soft, NTND, no guarding  MSK: no visible deformities  Neuro: Appears non focal  Skin: Warm, well perfused. Well healing incision on ant neck  Psych: normal affect

## 2021-05-06 LAB
ANION GAP SERPL CALC-SCNC: 11 MMOL/L — SIGNIFICANT CHANGE UP (ref 7–14)
BUN SERPL-MCNC: 34 MG/DL — HIGH (ref 7–23)
CALCIUM SERPL-MCNC: 9.5 MG/DL — SIGNIFICANT CHANGE UP (ref 8.4–10.5)
CHLORIDE SERPL-SCNC: 104 MMOL/L — SIGNIFICANT CHANGE UP (ref 98–107)
CO2 SERPL-SCNC: 22 MMOL/L — SIGNIFICANT CHANGE UP (ref 22–31)
COVID-19 SPIKE DOMAIN AB INTERP: POSITIVE
COVID-19 SPIKE DOMAIN ANTIBODY RESULT: >250 U/ML — HIGH
CREAT SERPL-MCNC: 2.54 MG/DL — HIGH (ref 0.5–1.3)
GLUCOSE BLDC GLUCOMTR-MCNC: 117 MG/DL — HIGH (ref 70–99)
GLUCOSE BLDC GLUCOMTR-MCNC: 159 MG/DL — HIGH (ref 70–99)
GLUCOSE BLDC GLUCOMTR-MCNC: 170 MG/DL — HIGH (ref 70–99)
GLUCOSE SERPL-MCNC: 174 MG/DL — HIGH (ref 70–99)
HCT VFR BLD CALC: 27.4 % — LOW (ref 39–50)
HGB BLD-MCNC: 9.1 G/DL — LOW (ref 13–17)
MAGNESIUM SERPL-MCNC: 2 MG/DL — SIGNIFICANT CHANGE UP (ref 1.6–2.6)
MCHC RBC-ENTMCNC: 29.7 PG — SIGNIFICANT CHANGE UP (ref 27–34)
MCHC RBC-ENTMCNC: 33.2 GM/DL — SIGNIFICANT CHANGE UP (ref 32–36)
MCV RBC AUTO: 89.5 FL — SIGNIFICANT CHANGE UP (ref 80–100)
NRBC # BLD: 0 /100 WBCS — SIGNIFICANT CHANGE UP
NRBC # FLD: 0 K/UL — SIGNIFICANT CHANGE UP
PHOSPHATE SERPL-MCNC: 4.2 MG/DL — SIGNIFICANT CHANGE UP (ref 2.5–4.5)
PLATELET # BLD AUTO: 219 K/UL — SIGNIFICANT CHANGE UP (ref 150–400)
POTASSIUM SERPL-MCNC: 5.2 MMOL/L — SIGNIFICANT CHANGE UP (ref 3.5–5.3)
POTASSIUM SERPL-SCNC: 5.2 MMOL/L — SIGNIFICANT CHANGE UP (ref 3.5–5.3)
RBC # BLD: 3.06 M/UL — LOW (ref 4.2–5.8)
RBC # FLD: 12.3 % — SIGNIFICANT CHANGE UP (ref 10.3–14.5)
SARS-COV-2 IGG+IGM SERPL QL IA: >250 U/ML — HIGH
SARS-COV-2 IGG+IGM SERPL QL IA: POSITIVE
SODIUM SERPL-SCNC: 137 MMOL/L — SIGNIFICANT CHANGE UP (ref 135–145)
WBC # BLD: 9.99 K/UL — SIGNIFICANT CHANGE UP (ref 3.8–10.5)
WBC # FLD AUTO: 9.99 K/UL — SIGNIFICANT CHANGE UP (ref 3.8–10.5)

## 2021-05-06 PROCEDURE — 31575 DIAGNOSTIC LARYNGOSCOPY: CPT

## 2021-05-06 PROCEDURE — 99291 CRITICAL CARE FIRST HOUR: CPT

## 2021-05-06 PROCEDURE — 71045 X-RAY EXAM CHEST 1 VIEW: CPT | Mod: 26

## 2021-05-06 PROCEDURE — 99222 1ST HOSP IP/OBS MODERATE 55: CPT | Mod: 25

## 2021-05-06 RX ORDER — EPINEPHRINE 11.25MG/ML
0.5 SOLUTION, NON-ORAL INHALATION ONCE
Refills: 0 | Status: COMPLETED | OUTPATIENT
Start: 2021-05-06 | End: 2021-05-06

## 2021-05-06 RX ORDER — LIDOCAINE HCL 20 MG/ML
4 VIAL (ML) INJECTION ONCE
Refills: 0 | Status: DISCONTINUED | OUTPATIENT
Start: 2021-05-06 | End: 2021-05-07

## 2021-05-06 RX ORDER — SODIUM CHLORIDE 9 MG/ML
1000 INJECTION INTRAMUSCULAR; INTRAVENOUS; SUBCUTANEOUS
Refills: 0 | Status: DISCONTINUED | OUTPATIENT
Start: 2021-05-06 | End: 2021-05-09

## 2021-05-06 RX ADMIN — LEVETIRACETAM 400 MILLIGRAM(S): 250 TABLET, FILM COATED ORAL at 06:23

## 2021-05-06 RX ADMIN — HEPARIN SODIUM 5000 UNIT(S): 5000 INJECTION INTRAVENOUS; SUBCUTANEOUS at 06:25

## 2021-05-06 RX ADMIN — HEPARIN SODIUM 5000 UNIT(S): 5000 INJECTION INTRAVENOUS; SUBCUTANEOUS at 22:09

## 2021-05-06 RX ADMIN — OXYCODONE HYDROCHLORIDE 5 MILLIGRAM(S): 5 TABLET ORAL at 20:30

## 2021-05-06 RX ADMIN — Medication 650 MILLIGRAM(S): at 21:39

## 2021-05-06 RX ADMIN — Medication 1: at 18:32

## 2021-05-06 RX ADMIN — Medication 101.6 MILLIGRAM(S): at 13:23

## 2021-05-06 RX ADMIN — Medication 101.6 MILLIGRAM(S): at 06:23

## 2021-05-06 RX ADMIN — PANTOPRAZOLE SODIUM 40 MILLIGRAM(S): 20 TABLET, DELAYED RELEASE ORAL at 13:22

## 2021-05-06 RX ADMIN — Medication 1: at 06:54

## 2021-05-06 RX ADMIN — LEVETIRACETAM 400 MILLIGRAM(S): 250 TABLET, FILM COATED ORAL at 18:32

## 2021-05-06 RX ADMIN — OXYCODONE HYDROCHLORIDE 5 MILLIGRAM(S): 5 TABLET ORAL at 20:39

## 2021-05-06 RX ADMIN — SODIUM CHLORIDE 75 MILLILITER(S): 9 INJECTION INTRAMUSCULAR; INTRAVENOUS; SUBCUTANEOUS at 14:49

## 2021-05-06 RX ADMIN — CHLORHEXIDINE GLUCONATE 15 MILLILITER(S): 213 SOLUTION TOPICAL at 18:31

## 2021-05-06 RX ADMIN — Medication 0.5 MILLILITER(S): at 13:11

## 2021-05-06 RX ADMIN — Medication 650 MILLIGRAM(S): at 20:39

## 2021-05-06 RX ADMIN — Medication 101.6 MILLIGRAM(S): at 22:09

## 2021-05-06 RX ADMIN — CHLORHEXIDINE GLUCONATE 15 MILLILITER(S): 213 SOLUTION TOPICAL at 06:25

## 2021-05-06 RX ADMIN — HEPARIN SODIUM 5000 UNIT(S): 5000 INJECTION INTRAVENOUS; SUBCUTANEOUS at 13:22

## 2021-05-06 NOTE — CONSULT NOTE ADULT - SUBJECTIVE AND OBJECTIVE BOX
62 y/o male returns to ER for 2nd time c/o stridor after discharge from tracheal resection 2 wks ago. He has PMHx of HTN, B/L TKR, and COVID 2-3 months ago (not hospitalized),  CKD, PE, and recent seizure requiring emergency cric/tracheostomy on 03/25/2021 at [facility] due to tongue injury and lingual hematoma (discharged on 4/3/2021).  Readmitted on 4/16/21 w/ SOB, and underwent Flexible Bronchoscopy, Balloon dilatation of trachea on 4/19/21. Pt remained inhouse on Heparin gtt awaiting Tracheal resection. On 4/23/21 Pt had a Tracheal resection and reconstruction 4/23/21. Post op w Guardian stitch in place; Was brought back to the OR on 4/29 for surveillance bronchoscopy which revealed a patent airway and well healing anastomosis and therefore guardian suture removed and pt discharged home with soft collar in place. Pt presented to ER 4/30 w c/o stridor and was immediately taken to OR for flexible bronchoscopy revealing edematous VCs and fibrin deposit surrounding tracheal anastomosis but patent airway. Pt again presented yesterday with c/o increased stridor and increased mucous production with a cough. Patient endorses dysphagia and dysphonia for past week.       Physical Exam  T(C): 36.7 (05-06-21 @ 16:00), Max: 36.8 (05-06-21 @ 12:00)  HR: 64 (05-06-21 @ 19:00) (38 - 90)  BP: 112/65 (05-06-21 @ 19:00) (85/55 - 184/93)  RR: 14 (05-06-21 @ 19:00) (12 - 23)  SpO2: 100% (05-06-21 @ 19:00) (97% - 100%)  General: NAD, A+Ox3  mild inspiratory stridor, on face tent with desaturations to high 80s with face tent removed  Voice quality: breathy  Neck: soft/flat    LARYNGOSCOPY EXAM:     Verbal consent was obtained from patient prior to procedure.    Indication: dysphonia, stridor    Flexible laryngoscopy was performed and revealed the following:    Nasopharynx had no mass or exudate.    Base of tongue was symmetric and not enlarged.    Vallecula was clear    Epiglottis, both aryepiglottic folds and both false vocal folds were normal    Arytenoids with mild edema, no erythema     R TVC fully mobile and without lesions, L TVC paresis noted    Post cricoid area was clear    mild Interarytenoid edema     The patient tolerated the procedure well.      A/P: 61y Male h/o emergency cric, tracheal resection, reconstruction, recent bronch showing VC edema p/w stridor and dysphonis. FOE shows w/ L TVC paresis.  - patient may benefit from VC injection if sx do not improve, will discuss with attending laryngologist Dr. Hunt regarding further management  - recommend SLP eval    --------------------------------------------------------------  Thank you for the consult,    Paulina Sullivan MD  Resident  Department of Otolaryngology - Head and Neck Surgery  Peds Page #[phone]  Adult Page #[phone]  ---------------------------------------------------------------

## 2021-05-07 LAB
ANION GAP SERPL CALC-SCNC: 12 MMOL/L — SIGNIFICANT CHANGE UP (ref 7–14)
BUN SERPL-MCNC: 45 MG/DL — HIGH (ref 7–23)
CALCIUM SERPL-MCNC: 8.7 MG/DL — SIGNIFICANT CHANGE UP (ref 8.4–10.5)
CHLORIDE SERPL-SCNC: 108 MMOL/L — HIGH (ref 98–107)
CO2 SERPL-SCNC: 18 MMOL/L — LOW (ref 22–31)
COVID-19 SPIKE DOMAIN AB INTERP: POSITIVE
COVID-19 SPIKE DOMAIN ANTIBODY RESULT: >250 U/ML — HIGH
CREAT SERPL-MCNC: 2.94 MG/DL — HIGH (ref 0.5–1.3)
GLUCOSE BLDC GLUCOMTR-MCNC: 146 MG/DL — HIGH (ref 70–99)
GLUCOSE BLDC GLUCOMTR-MCNC: 147 MG/DL — HIGH (ref 70–99)
GLUCOSE SERPL-MCNC: 172 MG/DL — HIGH (ref 70–99)
HCT VFR BLD CALC: 24 % — LOW (ref 39–50)
HGB BLD-MCNC: 7.7 G/DL — LOW (ref 13–17)
MAGNESIUM SERPL-MCNC: 2.1 MG/DL — SIGNIFICANT CHANGE UP (ref 1.6–2.6)
MCHC RBC-ENTMCNC: 29.8 PG — SIGNIFICANT CHANGE UP (ref 27–34)
MCHC RBC-ENTMCNC: 32.1 GM/DL — SIGNIFICANT CHANGE UP (ref 32–36)
MCV RBC AUTO: 93 FL — SIGNIFICANT CHANGE UP (ref 80–100)
NRBC # BLD: 0 /100 WBCS — SIGNIFICANT CHANGE UP
NRBC # FLD: 0 K/UL — SIGNIFICANT CHANGE UP
PLATELET # BLD AUTO: 226 K/UL — SIGNIFICANT CHANGE UP (ref 150–400)
POTASSIUM SERPL-MCNC: 5 MMOL/L — SIGNIFICANT CHANGE UP (ref 3.5–5.3)
POTASSIUM SERPL-SCNC: 5 MMOL/L — SIGNIFICANT CHANGE UP (ref 3.5–5.3)
RBC # BLD: 2.58 M/UL — LOW (ref 4.2–5.8)
RBC # FLD: 12.8 % — SIGNIFICANT CHANGE UP (ref 10.3–14.5)
SARS-COV-2 IGG+IGM SERPL QL IA: >250 U/ML — HIGH
SARS-COV-2 IGG+IGM SERPL QL IA: POSITIVE
SODIUM SERPL-SCNC: 138 MMOL/L — SIGNIFICANT CHANGE UP (ref 135–145)
T4 FREE SERPL-MCNC: 1.3 NG/DL — SIGNIFICANT CHANGE UP (ref 0.9–1.8)
TSH SERPL-MCNC: 0.16 UIU/ML — LOW (ref 0.27–4.2)
WBC # BLD: 18.26 K/UL — HIGH (ref 3.8–10.5)
WBC # FLD AUTO: 18.26 K/UL — HIGH (ref 3.8–10.5)

## 2021-05-07 PROCEDURE — 99233 SBSQ HOSP IP/OBS HIGH 50: CPT

## 2021-05-07 PROCEDURE — 71045 X-RAY EXAM CHEST 1 VIEW: CPT | Mod: 26

## 2021-05-07 RX ORDER — DEXAMETHASONE 0.5 MG/5ML
4 ELIXIR ORAL EVERY 12 HOURS
Refills: 0 | Status: DISCONTINUED | OUTPATIENT
Start: 2021-05-07 | End: 2021-05-07

## 2021-05-07 RX ADMIN — Medication 100 MILLIGRAM(S): at 13:26

## 2021-05-07 RX ADMIN — Medication 100 MILLIGRAM(S): at 21:00

## 2021-05-07 RX ADMIN — LEVETIRACETAM 400 MILLIGRAM(S): 250 TABLET, FILM COATED ORAL at 17:20

## 2021-05-07 RX ADMIN — Medication 101.6 MILLIGRAM(S): at 07:12

## 2021-05-07 RX ADMIN — HEPARIN SODIUM 5000 UNIT(S): 5000 INJECTION INTRAVENOUS; SUBCUTANEOUS at 05:10

## 2021-05-07 RX ADMIN — AMLODIPINE BESYLATE 10 MILLIGRAM(S): 2.5 TABLET ORAL at 05:10

## 2021-05-07 RX ADMIN — HEPARIN SODIUM 5000 UNIT(S): 5000 INJECTION INTRAVENOUS; SUBCUTANEOUS at 21:00

## 2021-05-07 RX ADMIN — LEVETIRACETAM 400 MILLIGRAM(S): 250 TABLET, FILM COATED ORAL at 05:10

## 2021-05-07 RX ADMIN — SODIUM CHLORIDE 75 MILLILITER(S): 9 INJECTION INTRAMUSCULAR; INTRAVENOUS; SUBCUTANEOUS at 13:58

## 2021-05-07 RX ADMIN — SODIUM CHLORIDE 50 MILLILITER(S): 9 INJECTION INTRAMUSCULAR; INTRAVENOUS; SUBCUTANEOUS at 22:04

## 2021-05-07 RX ADMIN — CHLORHEXIDINE GLUCONATE 15 MILLILITER(S): 213 SOLUTION TOPICAL at 05:10

## 2021-05-07 RX ADMIN — PANTOPRAZOLE SODIUM 40 MILLIGRAM(S): 20 TABLET, DELAYED RELEASE ORAL at 13:25

## 2021-05-07 RX ADMIN — HEPARIN SODIUM 5000 UNIT(S): 5000 INJECTION INTRAVENOUS; SUBCUTANEOUS at 13:26

## 2021-05-07 NOTE — SWALLOW BEDSIDE ASSESSMENT ADULT - COMMENTS
As per ENT: Flexible laryngoscopy was performed and revealed the following:  Arytenoids with mild edema, no erythema   R TVC fully mobile and without lesions, L TVC paresis noted  Post cricoid area was clear mild Interarytenoid edema  The patient tolerated the procedure well.    As per ENT: A/P: 61y Male h/o emergency cric, tracheal resection, reconstruction, recent bronch showing VC edema p/w stridor and dysphonis. FOE shows w/ L TVC paresis. - patient may benefit from VC injection if sx do not improve, will discuss with attending laryngologist Dr. Hunt regarding further management - recommend SLP eval    SLP received patient sitting in chair, awake, alert, able to follow directions, answer questions and engage in conversation. PA at bedside to assist with opening patient’s collar and removing face tent for evaluation. Patient’s baseline SPO2 99% which did not change throughout the evaluation. Patient denies pain.  Patient reports prior to hospitalization he was experiencing “air that was getting trapped that needed to come out” while pointing to the top of his chest. Patient denied symptoms of dysphagia.  Noted patient's voice sounds hoarse, consistent with ENT's findings.

## 2021-05-07 NOTE — SWALLOW BEDSIDE ASSESSMENT ADULT - SWALLOW EVAL: DIAGNOSIS
Patient consumed trials of puree, solids, honey thick liquids, nectar thick liquids and thin liquids presenting with functional swallow. Oral phase characterized by adequate acceptance, adequate anterior bolus containment, functional mastication and manipulation, anterior to posterior transport and adequate oral cavity clearance. Pharyngeal phase characterized by initiation of the pharyngeal swallow and hyolaryngeal elevation and excursion noted upon palpation.  No clinically overt signs or symptoms of aspiration across trials of puree, solids, honey thick liquids, nectar thick liquids and thin liquids.

## 2021-05-07 NOTE — SWALLOW BEDSIDE ASSESSMENT ADULT - NS ASR SWALLOW FINDINGS DISCUS
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

## 2021-05-07 NOTE — DIETITIAN INITIAL EVALUATION ADULT. - ORAL INTAKE PTA/DIET HISTORY
Pt. presented alert , oriented , wanted to eat , explained the need for speech & swallow evaluation and finding out the appropriate consistency of PO diet if feasible / PO nutrition is indicated . Pt. reports taking PO diet prior to admission , usual wt. 176 # , no known food allergies .

## 2021-05-07 NOTE — DIETITIAN INITIAL EVALUATION ADULT. - OTHER INFO
62 y/o male returns to ER for 2nd time c/o stridor after discharge from tracheal resection 2 wks ago. He has PM Hx of HTN, B/L TKR, and COVID 2-3 months ago ,  CKD, PE  Hematologist Dr. Gil Tee, and recent seizure requiring emergency cric/tracheostomy on 03/25/2021 at [facility] due to tongue injury and lingual hematoma ,discharged on 4/3/2021.  Readmitted on 4/16/21 w/ SOB, and underwent Flexible Bronchoscopy, Balloon dilatation of trachea on 4/19/21. Pt remained inhouse on Heparin gtt awaiting Tracheal resection. Seen by Nephrology for CKD. On 4/23/21 Pt had a Tracheal resection and reconstruction 4/23/21. Post op w Guardian stitch in place; Was brought back to the OR on 4/29 for surveillance bronchoscopy which revealed a patent airway and well healing anastomosis and therefore guardian suture removed and pt discharged home with soft collar in place. Pt presented to ER 4/30 w c/o stridor and was immediately taken to OR for flexible bronchoscopy. Pt again presents today pt with c/o increased stridor and increased mucous production with a cough . Pt. maintained NPO .

## 2021-05-07 NOTE — SWALLOW BEDSIDE ASSESSMENT ADULT - H & P REVIEW
A per ENT Note: 60 y/o male returns to ER for 2nd time c/o stridor after discharge from tracheal resection 2 wks ago. He has PMHx of HTN, B/L TKR, and COVID 2-3 months ago (not hospitalized),  CKD, PE, and recent seizure requiring emergency cric/tracheostomy on 03/25/2021 at [facility] due to tongue injury and lingual hematoma (discharged on 4/3/2021).  Readmitted on 4/16/21 w/ SOB, and underwent Flexible Bronchoscopy, Balloon dilatation of trachea on 4/19/21. Pt remained inhouse on Heparin gtt awaiting Tracheal resection. On 4/23/21 Pt had a Tracheal resection and reconstruction 4/23/21. Post op w Guardian stitch in place; Was brought back to the OR on 4/29 for surveillance bronchoscopy which revealed a patent airway and well healing anastomosis and therefore guardian suture removed and pt discharged home with soft collar in place. Pt presented to ER 4/30 w c/o stridor and was immediately taken to OR for flexible bronchoscopy revealing edematous VCs and fibrin deposit surrounding tracheal anastomosis but patent airway. Pt again presented yesterday with c/o increased stridor and increased mucous production with a cough. Patient endorses dysphagia and dysphonia for past week./yes

## 2021-05-08 LAB
ANION GAP SERPL CALC-SCNC: 13 MMOL/L — SIGNIFICANT CHANGE UP (ref 7–14)
BUN SERPL-MCNC: 56 MG/DL — HIGH (ref 7–23)
CALCIUM SERPL-MCNC: 8.6 MG/DL — SIGNIFICANT CHANGE UP (ref 8.4–10.5)
CHLORIDE SERPL-SCNC: 112 MMOL/L — HIGH (ref 98–107)
CO2 SERPL-SCNC: 17 MMOL/L — LOW (ref 22–31)
CREAT SERPL-MCNC: 2.87 MG/DL — HIGH (ref 0.5–1.3)
GLUCOSE SERPL-MCNC: 157 MG/DL — HIGH (ref 70–99)
HCT VFR BLD CALC: 31 % — LOW (ref 39–50)
HGB BLD-MCNC: 9.8 G/DL — LOW (ref 13–17)
MAGNESIUM SERPL-MCNC: 2.1 MG/DL — SIGNIFICANT CHANGE UP (ref 1.6–2.6)
MCHC RBC-ENTMCNC: 29.4 PG — SIGNIFICANT CHANGE UP (ref 27–34)
MCHC RBC-ENTMCNC: 31.6 GM/DL — LOW (ref 32–36)
MCV RBC AUTO: 93.1 FL — SIGNIFICANT CHANGE UP (ref 80–100)
NRBC # BLD: 0 /100 WBCS — SIGNIFICANT CHANGE UP
NRBC # FLD: 0 K/UL — SIGNIFICANT CHANGE UP
PHOSPHATE SERPL-MCNC: 4.1 MG/DL — SIGNIFICANT CHANGE UP (ref 2.5–4.5)
PLATELET # BLD AUTO: 185 K/UL — SIGNIFICANT CHANGE UP (ref 150–400)
POTASSIUM SERPL-MCNC: 4.6 MMOL/L — SIGNIFICANT CHANGE UP (ref 3.5–5.3)
POTASSIUM SERPL-SCNC: 4.6 MMOL/L — SIGNIFICANT CHANGE UP (ref 3.5–5.3)
RBC # BLD: 3.33 M/UL — LOW (ref 4.2–5.8)
RBC # FLD: 12.8 % — SIGNIFICANT CHANGE UP (ref 10.3–14.5)
SODIUM SERPL-SCNC: 142 MMOL/L — SIGNIFICANT CHANGE UP (ref 135–145)
WBC # BLD: 11.96 K/UL — HIGH (ref 3.8–10.5)
WBC # FLD AUTO: 11.96 K/UL — HIGH (ref 3.8–10.5)

## 2021-05-08 PROCEDURE — 99233 SBSQ HOSP IP/OBS HIGH 50: CPT

## 2021-05-08 PROCEDURE — 71045 X-RAY EXAM CHEST 1 VIEW: CPT | Mod: 26

## 2021-05-08 RX ORDER — PIPERACILLIN AND TAZOBACTAM 4; .5 G/20ML; G/20ML
3.38 INJECTION, POWDER, LYOPHILIZED, FOR SOLUTION INTRAVENOUS ONCE
Refills: 0 | Status: DISCONTINUED | OUTPATIENT
Start: 2021-05-08 | End: 2021-05-08

## 2021-05-08 RX ORDER — PIPERACILLIN AND TAZOBACTAM 4; .5 G/20ML; G/20ML
3.38 INJECTION, POWDER, LYOPHILIZED, FOR SOLUTION INTRAVENOUS EVERY 8 HOURS
Refills: 0 | Status: DISCONTINUED | OUTPATIENT
Start: 2021-05-08 | End: 2021-05-08

## 2021-05-08 RX ORDER — BENZOCAINE AND MENTHOL 5; 1 G/100ML; G/100ML
1 LIQUID ORAL EVERY 4 HOURS
Refills: 0 | Status: DISCONTINUED | OUTPATIENT
Start: 2021-05-08 | End: 2021-05-11

## 2021-05-08 RX ADMIN — AMLODIPINE BESYLATE 10 MILLIGRAM(S): 2.5 TABLET ORAL at 05:13

## 2021-05-08 RX ADMIN — HEPARIN SODIUM 5000 UNIT(S): 5000 INJECTION INTRAVENOUS; SUBCUTANEOUS at 21:26

## 2021-05-08 RX ADMIN — SODIUM CHLORIDE 50 MILLILITER(S): 9 INJECTION INTRAMUSCULAR; INTRAVENOUS; SUBCUTANEOUS at 13:17

## 2021-05-08 RX ADMIN — Medication 100 MILLIGRAM(S): at 05:13

## 2021-05-08 RX ADMIN — HYDROMORPHONE HYDROCHLORIDE 1 MILLIGRAM(S): 2 INJECTION INTRAMUSCULAR; INTRAVENOUS; SUBCUTANEOUS at 13:30

## 2021-05-08 RX ADMIN — OXYCODONE HYDROCHLORIDE 5 MILLIGRAM(S): 5 TABLET ORAL at 22:20

## 2021-05-08 RX ADMIN — HYDROMORPHONE HYDROCHLORIDE 1 MILLIGRAM(S): 2 INJECTION INTRAMUSCULAR; INTRAVENOUS; SUBCUTANEOUS at 17:45

## 2021-05-08 RX ADMIN — Medication 100 MILLIGRAM(S): at 13:17

## 2021-05-08 RX ADMIN — LEVETIRACETAM 400 MILLIGRAM(S): 250 TABLET, FILM COATED ORAL at 17:45

## 2021-05-08 RX ADMIN — OXYCODONE HYDROCHLORIDE 5 MILLIGRAM(S): 5 TABLET ORAL at 21:22

## 2021-05-08 RX ADMIN — HEPARIN SODIUM 5000 UNIT(S): 5000 INJECTION INTRAVENOUS; SUBCUTANEOUS at 05:13

## 2021-05-08 RX ADMIN — LEVETIRACETAM 400 MILLIGRAM(S): 250 TABLET, FILM COATED ORAL at 06:53

## 2021-05-08 RX ADMIN — HYDROMORPHONE HYDROCHLORIDE 1 MILLIGRAM(S): 2 INJECTION INTRAMUSCULAR; INTRAVENOUS; SUBCUTANEOUS at 06:53

## 2021-05-08 RX ADMIN — HYDROMORPHONE HYDROCHLORIDE 1 MILLIGRAM(S): 2 INJECTION INTRAMUSCULAR; INTRAVENOUS; SUBCUTANEOUS at 05:12

## 2021-05-08 RX ADMIN — HEPARIN SODIUM 5000 UNIT(S): 5000 INJECTION INTRAVENOUS; SUBCUTANEOUS at 13:16

## 2021-05-08 RX ADMIN — BENZOCAINE AND MENTHOL 1 LOZENGE: 5; 1 LIQUID ORAL at 21:22

## 2021-05-08 RX ADMIN — HYDROMORPHONE HYDROCHLORIDE 1 MILLIGRAM(S): 2 INJECTION INTRAMUSCULAR; INTRAVENOUS; SUBCUTANEOUS at 13:16

## 2021-05-08 RX ADMIN — HYDROMORPHONE HYDROCHLORIDE 1 MILLIGRAM(S): 2 INJECTION INTRAMUSCULAR; INTRAVENOUS; SUBCUTANEOUS at 17:30

## 2021-05-08 RX ADMIN — BENZOCAINE AND MENTHOL 1 LOZENGE: 5; 1 LIQUID ORAL at 19:33

## 2021-05-08 RX ADMIN — PANTOPRAZOLE SODIUM 40 MILLIGRAM(S): 20 TABLET, DELAYED RELEASE ORAL at 13:16

## 2021-05-09 ENCOUNTER — TRANSCRIPTION ENCOUNTER (OUTPATIENT)
Age: 61
End: 2021-05-09

## 2021-05-09 LAB
ANION GAP SERPL CALC-SCNC: 9 MMOL/L — SIGNIFICANT CHANGE UP (ref 7–14)
BUN SERPL-MCNC: 46 MG/DL — HIGH (ref 7–23)
CALCIUM SERPL-MCNC: 8.3 MG/DL — LOW (ref 8.4–10.5)
CHLORIDE SERPL-SCNC: 111 MMOL/L — HIGH (ref 98–107)
CO2 SERPL-SCNC: 20 MMOL/L — LOW (ref 22–31)
CREAT SERPL-MCNC: 2.83 MG/DL — HIGH (ref 0.5–1.3)
GLUCOSE SERPL-MCNC: 112 MG/DL — HIGH (ref 70–99)
HCT VFR BLD CALC: 25 % — LOW (ref 39–50)
HGB BLD-MCNC: 8 G/DL — LOW (ref 13–17)
MAGNESIUM SERPL-MCNC: 1.7 MG/DL — SIGNIFICANT CHANGE UP (ref 1.6–2.6)
MCHC RBC-ENTMCNC: 29.7 PG — SIGNIFICANT CHANGE UP (ref 27–34)
MCHC RBC-ENTMCNC: 32 GM/DL — SIGNIFICANT CHANGE UP (ref 32–36)
MCV RBC AUTO: 92.9 FL — SIGNIFICANT CHANGE UP (ref 80–100)
NRBC # BLD: 0 /100 WBCS — SIGNIFICANT CHANGE UP
NRBC # FLD: 0 K/UL — SIGNIFICANT CHANGE UP
PHOSPHATE SERPL-MCNC: 3.2 MG/DL — SIGNIFICANT CHANGE UP (ref 2.5–4.5)
PLATELET # BLD AUTO: 187 K/UL — SIGNIFICANT CHANGE UP (ref 150–400)
POTASSIUM SERPL-MCNC: 4.5 MMOL/L — SIGNIFICANT CHANGE UP (ref 3.5–5.3)
POTASSIUM SERPL-SCNC: 4.5 MMOL/L — SIGNIFICANT CHANGE UP (ref 3.5–5.3)
RBC # BLD: 2.69 M/UL — LOW (ref 4.2–5.8)
RBC # FLD: 12.9 % — SIGNIFICANT CHANGE UP (ref 10.3–14.5)
SARS-COV-2 RNA SPEC QL NAA+PROBE: SIGNIFICANT CHANGE UP
SODIUM SERPL-SCNC: 140 MMOL/L — SIGNIFICANT CHANGE UP (ref 135–145)
WBC # BLD: 11.19 K/UL — HIGH (ref 3.8–10.5)
WBC # FLD AUTO: 11.19 K/UL — HIGH (ref 3.8–10.5)

## 2021-05-09 PROCEDURE — 99233 SBSQ HOSP IP/OBS HIGH 50: CPT

## 2021-05-09 RX ORDER — MAGNESIUM SULFATE 500 MG/ML
1 VIAL (ML) INJECTION ONCE
Refills: 0 | Status: COMPLETED | OUTPATIENT
Start: 2021-05-09 | End: 2021-05-09

## 2021-05-09 RX ORDER — OXYCODONE HYDROCHLORIDE 5 MG/1
5 TABLET ORAL EVERY 4 HOURS
Refills: 0 | Status: DISCONTINUED | OUTPATIENT
Start: 2021-05-09 | End: 2021-05-11

## 2021-05-09 RX ADMIN — Medication 100 MILLIGRAM(S): at 13:57

## 2021-05-09 RX ADMIN — BENZOCAINE AND MENTHOL 1 LOZENGE: 5; 1 LIQUID ORAL at 05:09

## 2021-05-09 RX ADMIN — BENZOCAINE AND MENTHOL 1 LOZENGE: 5; 1 LIQUID ORAL at 21:14

## 2021-05-09 RX ADMIN — LEVETIRACETAM 400 MILLIGRAM(S): 250 TABLET, FILM COATED ORAL at 17:45

## 2021-05-09 RX ADMIN — HYDROMORPHONE HYDROCHLORIDE 1 MILLIGRAM(S): 2 INJECTION INTRAMUSCULAR; INTRAVENOUS; SUBCUTANEOUS at 21:19

## 2021-05-09 RX ADMIN — HEPARIN SODIUM 5000 UNIT(S): 5000 INJECTION INTRAVENOUS; SUBCUTANEOUS at 21:14

## 2021-05-09 RX ADMIN — Medication 100 MILLIGRAM(S): at 21:14

## 2021-05-09 RX ADMIN — PANTOPRAZOLE SODIUM 40 MILLIGRAM(S): 20 TABLET, DELAYED RELEASE ORAL at 12:05

## 2021-05-09 RX ADMIN — OXYCODONE HYDROCHLORIDE 5 MILLIGRAM(S): 5 TABLET ORAL at 07:51

## 2021-05-09 RX ADMIN — HYDROMORPHONE HYDROCHLORIDE 1 MILLIGRAM(S): 2 INJECTION INTRAMUSCULAR; INTRAVENOUS; SUBCUTANEOUS at 11:00

## 2021-05-09 RX ADMIN — HEPARIN SODIUM 5000 UNIT(S): 5000 INJECTION INTRAVENOUS; SUBCUTANEOUS at 05:09

## 2021-05-09 RX ADMIN — HYDROMORPHONE HYDROCHLORIDE 1 MILLIGRAM(S): 2 INJECTION INTRAMUSCULAR; INTRAVENOUS; SUBCUTANEOUS at 21:35

## 2021-05-09 RX ADMIN — AMLODIPINE BESYLATE 10 MILLIGRAM(S): 2.5 TABLET ORAL at 05:09

## 2021-05-09 RX ADMIN — HYDROMORPHONE HYDROCHLORIDE 1 MILLIGRAM(S): 2 INJECTION INTRAMUSCULAR; INTRAVENOUS; SUBCUTANEOUS at 11:30

## 2021-05-09 RX ADMIN — Medication 100 GRAM(S): at 06:38

## 2021-05-09 RX ADMIN — Medication 100 MILLIGRAM(S): at 05:09

## 2021-05-09 RX ADMIN — LEVETIRACETAM 400 MILLIGRAM(S): 250 TABLET, FILM COATED ORAL at 05:09

## 2021-05-09 RX ADMIN — HEPARIN SODIUM 5000 UNIT(S): 5000 INJECTION INTRAVENOUS; SUBCUTANEOUS at 13:56

## 2021-05-09 RX ADMIN — OXYCODONE HYDROCHLORIDE 5 MILLIGRAM(S): 5 TABLET ORAL at 06:33

## 2021-05-10 ENCOUNTER — APPOINTMENT (OUTPATIENT)
Dept: OTOLARYNGOLOGY | Facility: HOSPITAL | Age: 61
End: 2021-05-10

## 2021-05-10 ENCOUNTER — APPOINTMENT (OUTPATIENT)
Dept: THORACIC SURGERY | Facility: HOSPITAL | Age: 61
End: 2021-05-10

## 2021-05-10 LAB
ANION GAP SERPL CALC-SCNC: 11 MMOL/L — SIGNIFICANT CHANGE UP (ref 7–14)
APTT BLD: 31.5 SEC — SIGNIFICANT CHANGE UP (ref 27–36.3)
BLD GP AB SCN SERPL QL: NEGATIVE — SIGNIFICANT CHANGE UP
BUN SERPL-MCNC: 38 MG/DL — HIGH (ref 7–23)
CALCIUM SERPL-MCNC: 8.6 MG/DL — SIGNIFICANT CHANGE UP (ref 8.4–10.5)
CHLORIDE SERPL-SCNC: 110 MMOL/L — HIGH (ref 98–107)
CO2 SERPL-SCNC: 19 MMOL/L — LOW (ref 22–31)
CREAT SERPL-MCNC: 2.62 MG/DL — HIGH (ref 0.5–1.3)
GLUCOSE SERPL-MCNC: 111 MG/DL — HIGH (ref 70–99)
HCT VFR BLD CALC: 25.4 % — LOW (ref 39–50)
HGB BLD-MCNC: 7.8 G/DL — LOW (ref 13–17)
INR BLD: 1.16 RATIO — SIGNIFICANT CHANGE UP (ref 0.88–1.16)
MAGNESIUM SERPL-MCNC: 1.8 MG/DL — SIGNIFICANT CHANGE UP (ref 1.6–2.6)
MCHC RBC-ENTMCNC: 29.1 PG — SIGNIFICANT CHANGE UP (ref 27–34)
MCHC RBC-ENTMCNC: 30.7 GM/DL — LOW (ref 32–36)
MCV RBC AUTO: 94.8 FL — SIGNIFICANT CHANGE UP (ref 80–100)
NRBC # BLD: 0 /100 WBCS — SIGNIFICANT CHANGE UP
NRBC # FLD: 0 K/UL — SIGNIFICANT CHANGE UP
PHOSPHATE SERPL-MCNC: 2.9 MG/DL — SIGNIFICANT CHANGE UP (ref 2.5–4.5)
PLATELET # BLD AUTO: 182 K/UL — SIGNIFICANT CHANGE UP (ref 150–400)
POTASSIUM SERPL-MCNC: 4.7 MMOL/L — SIGNIFICANT CHANGE UP (ref 3.5–5.3)
POTASSIUM SERPL-SCNC: 4.7 MMOL/L — SIGNIFICANT CHANGE UP (ref 3.5–5.3)
PROTHROM AB SERPL-ACNC: 13.1 SEC — SIGNIFICANT CHANGE UP (ref 10.6–13.6)
RBC # BLD: 2.68 M/UL — LOW (ref 4.2–5.8)
RBC # FLD: 13 % — SIGNIFICANT CHANGE UP (ref 10.3–14.5)
RH IG SCN BLD-IMP: POSITIVE — SIGNIFICANT CHANGE UP
SODIUM SERPL-SCNC: 140 MMOL/L — SIGNIFICANT CHANGE UP (ref 135–145)
WBC # BLD: 11.13 K/UL — HIGH (ref 3.8–10.5)
WBC # FLD AUTO: 11.13 K/UL — HIGH (ref 3.8–10.5)

## 2021-05-10 PROCEDURE — 71045 X-RAY EXAM CHEST 1 VIEW: CPT | Mod: 26

## 2021-05-10 PROCEDURE — 31624 DX BRONCHOSCOPE/LAVAGE: CPT | Mod: 78

## 2021-05-10 PROCEDURE — 31571 LARYNGOSCOP W/VC INJ + SCOPE: CPT

## 2021-05-10 PROCEDURE — 99233 SBSQ HOSP IP/OBS HIGH 50: CPT

## 2021-05-10 RX ORDER — PANTOPRAZOLE SODIUM 20 MG/1
40 TABLET, DELAYED RELEASE ORAL
Refills: 0 | Status: DISCONTINUED | OUTPATIENT
Start: 2021-05-10 | End: 2021-05-11

## 2021-05-10 RX ORDER — MAGNESIUM SULFATE 500 MG/ML
2 VIAL (ML) INJECTION ONCE
Refills: 0 | Status: COMPLETED | OUTPATIENT
Start: 2021-05-10 | End: 2021-05-10

## 2021-05-10 RX ORDER — PANTOPRAZOLE SODIUM 20 MG/1
40 TABLET, DELAYED RELEASE ORAL
Refills: 0 | Status: DISCONTINUED | OUTPATIENT
Start: 2021-05-10 | End: 2021-05-10

## 2021-05-10 RX ADMIN — Medication 50 GRAM(S): at 06:41

## 2021-05-10 RX ADMIN — HYDROMORPHONE HYDROCHLORIDE 1 MILLIGRAM(S): 2 INJECTION INTRAMUSCULAR; INTRAVENOUS; SUBCUTANEOUS at 01:32

## 2021-05-10 RX ADMIN — HYDROMORPHONE HYDROCHLORIDE 1 MILLIGRAM(S): 2 INJECTION INTRAMUSCULAR; INTRAVENOUS; SUBCUTANEOUS at 12:35

## 2021-05-10 RX ADMIN — OXYCODONE HYDROCHLORIDE 5 MILLIGRAM(S): 5 TABLET ORAL at 21:22

## 2021-05-10 RX ADMIN — PANTOPRAZOLE SODIUM 40 MILLIGRAM(S): 20 TABLET, DELAYED RELEASE ORAL at 19:44

## 2021-05-10 RX ADMIN — HYDROMORPHONE HYDROCHLORIDE 1 MILLIGRAM(S): 2 INJECTION INTRAMUSCULAR; INTRAVENOUS; SUBCUTANEOUS at 05:43

## 2021-05-10 RX ADMIN — OXYCODONE HYDROCHLORIDE 5 MILLIGRAM(S): 5 TABLET ORAL at 21:45

## 2021-05-10 RX ADMIN — HYDROMORPHONE HYDROCHLORIDE 1 MILLIGRAM(S): 2 INJECTION INTRAMUSCULAR; INTRAVENOUS; SUBCUTANEOUS at 12:00

## 2021-05-10 RX ADMIN — HYDROMORPHONE HYDROCHLORIDE 1 MILLIGRAM(S): 2 INJECTION INTRAMUSCULAR; INTRAVENOUS; SUBCUTANEOUS at 06:00

## 2021-05-10 RX ADMIN — HEPARIN SODIUM 5000 UNIT(S): 5000 INJECTION INTRAVENOUS; SUBCUTANEOUS at 21:21

## 2021-05-10 RX ADMIN — Medication 100 MILLIGRAM(S): at 21:22

## 2021-05-10 RX ADMIN — BENZOCAINE AND MENTHOL 1 LOZENGE: 5; 1 LIQUID ORAL at 21:22

## 2021-05-10 RX ADMIN — LEVETIRACETAM 400 MILLIGRAM(S): 250 TABLET, FILM COATED ORAL at 19:44

## 2021-05-10 RX ADMIN — LEVETIRACETAM 400 MILLIGRAM(S): 250 TABLET, FILM COATED ORAL at 05:43

## 2021-05-10 RX ADMIN — HYDROMORPHONE HYDROCHLORIDE 1 MILLIGRAM(S): 2 INJECTION INTRAMUSCULAR; INTRAVENOUS; SUBCUTANEOUS at 01:06

## 2021-05-10 NOTE — BRIEF OPERATIVE NOTE - NSICDXBRIEFPREOP_GEN_ALL_CORE_FT
PRE-OP DIAGNOSIS:  Tracheal stenosis 05-May-2021 18:00:16  Winston Rosado  
PRE-OP DIAGNOSIS:  Vocal cord edema 10-May-2021 16:34:42  Pantera Vazquez

## 2021-05-10 NOTE — BRIEF OPERATIVE NOTE - OPERATION/FINDINGS
Flexible Bronchoscopy, Injection of Posterior Aspect of Left Vocal Cord with Calcium Hydroxylapatite
Flex bronch, BAL, fiberoptic intubation.

## 2021-05-10 NOTE — BRIEF OPERATIVE NOTE - NSICDXBRIEFPROCEDURE_GEN_ALL_CORE_FT
PROCEDURES:  Fiberoptic orotracheal intubation 05-May-2021 17:59:53  Winston Rosado  Flexible bronchoscopy with bronchopulmonary lavage 05-May-2021 18:00:02  Winston Rosado  
PROCEDURES:  Flexible bronchoscopy 10-May-2021 16:34:27  Pantera Vazquez

## 2021-05-10 NOTE — BRIEF OPERATIVE NOTE - NSICDXBRIEFPOSTOP_GEN_ALL_CORE_FT
POST-OP DIAGNOSIS:  Tracheal stenosis 05-May-2021 18:00:24  Winston Rosado  
POST-OP DIAGNOSIS:  Vocal cord edema 10-May-2021 16:34:58  Pantera Vazquez

## 2021-05-11 ENCOUNTER — TRANSCRIPTION ENCOUNTER (OUTPATIENT)
Age: 61
End: 2021-05-11

## 2021-05-11 ENCOUNTER — APPOINTMENT (OUTPATIENT)
Dept: THORACIC SURGERY | Facility: CLINIC | Age: 61
End: 2021-05-11

## 2021-05-11 VITALS
HEART RATE: 75 BPM | OXYGEN SATURATION: 99 % | DIASTOLIC BLOOD PRESSURE: 78 MMHG | SYSTOLIC BLOOD PRESSURE: 122 MMHG | RESPIRATION RATE: 12 BRPM

## 2021-05-11 LAB
ANION GAP SERPL CALC-SCNC: 11 MMOL/L — SIGNIFICANT CHANGE UP (ref 7–14)
ANION GAP SERPL CALC-SCNC: 11 MMOL/L — SIGNIFICANT CHANGE UP (ref 7–14)
BUN SERPL-MCNC: 52 MG/DL — HIGH (ref 7–23)
BUN SERPL-MCNC: 55 MG/DL — HIGH (ref 7–23)
CALCIUM SERPL-MCNC: 8.9 MG/DL — SIGNIFICANT CHANGE UP (ref 8.4–10.5)
CALCIUM SERPL-MCNC: 9.2 MG/DL — SIGNIFICANT CHANGE UP (ref 8.4–10.5)
CHLORIDE SERPL-SCNC: 104 MMOL/L — SIGNIFICANT CHANGE UP (ref 98–107)
CHLORIDE SERPL-SCNC: 104 MMOL/L — SIGNIFICANT CHANGE UP (ref 98–107)
CO2 SERPL-SCNC: 19 MMOL/L — LOW (ref 22–31)
CO2 SERPL-SCNC: 21 MMOL/L — LOW (ref 22–31)
CREAT SERPL-MCNC: 2.83 MG/DL — HIGH (ref 0.5–1.3)
CREAT SERPL-MCNC: 2.83 MG/DL — HIGH (ref 0.5–1.3)
GLUCOSE BLDC GLUCOMTR-MCNC: 166 MG/DL — HIGH (ref 70–99)
GLUCOSE BLDC GLUCOMTR-MCNC: 190 MG/DL — HIGH (ref 70–99)
GLUCOSE BLDC GLUCOMTR-MCNC: 347 MG/DL — HIGH (ref 70–99)
GLUCOSE SERPL-MCNC: 136 MG/DL — HIGH (ref 70–99)
GLUCOSE SERPL-MCNC: 209 MG/DL — HIGH (ref 70–99)
HCT VFR BLD CALC: 24.1 % — LOW (ref 39–50)
HGB BLD-MCNC: 7.8 G/DL — LOW (ref 13–17)
MAGNESIUM SERPL-MCNC: 2.3 MG/DL — SIGNIFICANT CHANGE UP (ref 1.6–2.6)
MAGNESIUM SERPL-MCNC: 2.3 MG/DL — SIGNIFICANT CHANGE UP (ref 1.6–2.6)
MCHC RBC-ENTMCNC: 30 PG — SIGNIFICANT CHANGE UP (ref 27–34)
MCHC RBC-ENTMCNC: 32.4 GM/DL — SIGNIFICANT CHANGE UP (ref 32–36)
MCV RBC AUTO: 92.7 FL — SIGNIFICANT CHANGE UP (ref 80–100)
NRBC # BLD: 0 /100 WBCS — SIGNIFICANT CHANGE UP
NRBC # FLD: 0 K/UL — SIGNIFICANT CHANGE UP
PHOSPHATE SERPL-MCNC: 2.7 MG/DL — SIGNIFICANT CHANGE UP (ref 2.5–4.5)
PHOSPHATE SERPL-MCNC: 3.4 MG/DL — SIGNIFICANT CHANGE UP (ref 2.5–4.5)
PLATELET # BLD AUTO: 185 K/UL — SIGNIFICANT CHANGE UP (ref 150–400)
POTASSIUM SERPL-MCNC: 5 MMOL/L — SIGNIFICANT CHANGE UP (ref 3.5–5.3)
POTASSIUM SERPL-MCNC: 5.9 MMOL/L — HIGH (ref 3.5–5.3)
POTASSIUM SERPL-SCNC: 5 MMOL/L — SIGNIFICANT CHANGE UP (ref 3.5–5.3)
POTASSIUM SERPL-SCNC: 5.9 MMOL/L — HIGH (ref 3.5–5.3)
RBC # BLD: 2.6 M/UL — LOW (ref 4.2–5.8)
RBC # FLD: 12.5 % — SIGNIFICANT CHANGE UP (ref 10.3–14.5)
SODIUM SERPL-SCNC: 134 MMOL/L — LOW (ref 135–145)
SODIUM SERPL-SCNC: 136 MMOL/L — SIGNIFICANT CHANGE UP (ref 135–145)
WBC # BLD: 10.05 K/UL — SIGNIFICANT CHANGE UP (ref 3.8–10.5)
WBC # FLD AUTO: 10.05 K/UL — SIGNIFICANT CHANGE UP (ref 3.8–10.5)

## 2021-05-11 PROCEDURE — 71045 X-RAY EXAM CHEST 1 VIEW: CPT | Mod: 26

## 2021-05-11 PROCEDURE — 99233 SBSQ HOSP IP/OBS HIGH 50: CPT

## 2021-05-11 RX ORDER — DEXAMETHASONE 0.5 MG/5ML
8 ELIXIR ORAL ONCE
Refills: 0 | Status: COMPLETED | OUTPATIENT
Start: 2021-05-11 | End: 2021-05-11

## 2021-05-11 RX ORDER — CALCIUM GLUCONATE 100 MG/ML
1 VIAL (ML) INTRAVENOUS ONCE
Refills: 0 | Status: COMPLETED | OUTPATIENT
Start: 2021-05-11 | End: 2021-05-11

## 2021-05-11 RX ORDER — FUROSEMIDE 40 MG
20 TABLET ORAL ONCE
Refills: 0 | Status: COMPLETED | OUTPATIENT
Start: 2021-05-11 | End: 2021-05-11

## 2021-05-11 RX ORDER — INSULIN HUMAN 100 [IU]/ML
10 INJECTION, SOLUTION SUBCUTANEOUS ONCE
Refills: 0 | Status: COMPLETED | OUTPATIENT
Start: 2021-05-11 | End: 2021-05-11

## 2021-05-11 RX ORDER — SODIUM CHLORIDE 9 MG/ML
250 INJECTION INTRAMUSCULAR; INTRAVENOUS; SUBCUTANEOUS ONCE
Refills: 0 | Status: COMPLETED | OUTPATIENT
Start: 2021-05-11 | End: 2021-05-11

## 2021-05-11 RX ORDER — PANTOPRAZOLE SODIUM 20 MG/1
1 TABLET, DELAYED RELEASE ORAL
Qty: 60 | Refills: 0
Start: 2021-05-11 | End: 2021-06-09

## 2021-05-11 RX ORDER — ACETAMINOPHEN 500 MG
2 TABLET ORAL
Qty: 0 | Refills: 0 | DISCHARGE

## 2021-05-11 RX ORDER — DEXTROSE 50 % IN WATER 50 %
50 SYRINGE (ML) INTRAVENOUS ONCE
Refills: 0 | Status: COMPLETED | OUTPATIENT
Start: 2021-05-11 | End: 2021-05-11

## 2021-05-11 RX ADMIN — Medication 50 MILLILITER(S): at 06:40

## 2021-05-11 RX ADMIN — LEVETIRACETAM 400 MILLIGRAM(S): 250 TABLET, FILM COATED ORAL at 05:28

## 2021-05-11 RX ADMIN — INSULIN HUMAN 10 UNIT(S): 100 INJECTION, SOLUTION SUBCUTANEOUS at 06:40

## 2021-05-11 RX ADMIN — OXYCODONE HYDROCHLORIDE 5 MILLIGRAM(S): 5 TABLET ORAL at 05:29

## 2021-05-11 RX ADMIN — AMLODIPINE BESYLATE 10 MILLIGRAM(S): 2.5 TABLET ORAL at 05:03

## 2021-05-11 RX ADMIN — Medication 101.6 MILLIGRAM(S): at 09:49

## 2021-05-11 RX ADMIN — Medication 100 MILLIGRAM(S): at 05:03

## 2021-05-11 RX ADMIN — SODIUM CHLORIDE 250 MILLILITER(S): 9 INJECTION INTRAMUSCULAR; INTRAVENOUS; SUBCUTANEOUS at 06:41

## 2021-05-11 RX ADMIN — PANTOPRAZOLE SODIUM 40 MILLIGRAM(S): 20 TABLET, DELAYED RELEASE ORAL at 05:03

## 2021-05-11 RX ADMIN — Medication 100 GRAM(S): at 06:40

## 2021-05-11 RX ADMIN — OXYCODONE HYDROCHLORIDE 5 MILLIGRAM(S): 5 TABLET ORAL at 05:45

## 2021-05-11 RX ADMIN — Medication 20 MILLIGRAM(S): at 06:40

## 2021-05-11 RX ADMIN — BENZOCAINE AND MENTHOL 1 LOZENGE: 5; 1 LIQUID ORAL at 05:04

## 2021-05-11 RX ADMIN — HEPARIN SODIUM 5000 UNIT(S): 5000 INJECTION INTRAVENOUS; SUBCUTANEOUS at 05:03

## 2021-05-11 NOTE — DISCHARGE NOTE PROVIDER - NSDCMRMEDTOKEN_GEN_ALL_CORE_FT
aluminum hydroxide-magnesium hydroxide 200 mg-200 mg/5 mL oral suspension: 30 milliliter(s) orally every 6 hours, As needed, Dyspepsia MDD:120ml  apixaban 5 mg oral tablet: 1 tab(s) orally 2 times a day**apply free coupon if available**  cyclobenzaprine 5 mg oral tablet: 1 tab(s) orally 3 times a day, As needed, Muscle Spasm MDD:3  labetalol 100 mg oral tablet: 1 tab(s) orally 3 times a day  levETIRAcetam 750 mg oral tablet: 1 tab(s) orally every 12 hours  Medrol Dosepak 4 mg oral tablet: Take as directed on Dosepak, beginning with 6 tabs on Day 1 (5/12/21), and decreasing 1 tab each consecutive day, until only 1 tab on 5/17/21 MDD:6  Norvasc 10 mg oral tablet: 1 tab(s) orally once a day  polyethylene glycol 3350 oral powder for reconstitution: 17 gram(s) orally once a day, As Needed  Protonix 40 mg oral delayed release tablet: 1 tab(s) orally 2 times a day MDD:2 tabs

## 2021-05-11 NOTE — DISCHARGE NOTE NURSING/CASE MANAGEMENT/SOCIAL WORK - PATIENT PORTAL LINK FT
You can access the FollowMyHealth Patient Portal offered by [facility] by registering at the following website: [email or web address]. By joining [employer]’s FollowMyHealth portal, you will also be able to view your health information using other applications (apps) compatible with our system.

## 2021-05-11 NOTE — DISCHARGE NOTE PROVIDER - CARE PROVIDER_API CALL
Jh Allen (MD)  Surgery; Thoracic Surgery  [address], [place] [address]  Phone: [phone]  Fax: [phone]  Follow Up Time: 2 weeks    Fidel Hunt; PhD)  Otolaryngology  [facility] - Dept of Otolaryngology, [address] [place] [address]  Phone: [phone]  Fax: [phone]  Follow Up Time: 1 week

## 2021-05-11 NOTE — DISCHARGE NOTE PROVIDER - CARE PROVIDERS DIRECT ADDRESSES
[email or web address]

## 2021-05-11 NOTE — DISCHARGE NOTE PROVIDER - PROVIDER TOKENS
PROVIDER:[TOKEN:[[id number]],FOLLOWUP:[2 weeks]],PROVIDER:[TOKEN:[[id number]],FOLLOWUP:[1 week]]

## 2021-05-11 NOTE — DISCHARGE NOTE PROVIDER - NSDCFUADDAPPT_GEN_ALL_CORE_FT
Follow-up with Dr. Allen in 2 weeks [phone]  Follow-up with Dr. Hunt [phone] in 1 week Follow-up with Dr. Allen in 2 weeks [phone]  Plan is for repeat Bronchoscopy with Dr. Allen in 3 weeks    Follow-up with Dr. Hunt [phone] in 1 week

## 2021-05-11 NOTE — PROGRESS NOTE ADULT - SUBJECTIVE AND OBJECTIVE BOX
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 hydroxide/magnesium hydroxide/simethicone Suspension 30 milliLiter(s) Oral every 6 hours PRN Dyspepsia  cyclobenzaprine 5 milliGRAM(s) Oral three times a day PRN Muscle Spasm  hydrALAZINE Injectable 10 milliGRAM(s) IV Push every 6 hours PRN For SBP>170  HYDROmorphone  Injectable 1 milliGRAM(s) IV Push every 3 hours PRN Moderate Pain (4 - 6)  oxyCODONE    IR 5 milliGRAM(s) Oral every 4 hours PRN Moderate Pain (4 - 6)  polyethylene glycol 3350 Oral Powder - Peds 17 Gram(s) Oral daily PRN Constipation      =======================VENTILATOR SETTINGS===================  Mode: AC/ CMV (Assist Control/ Continuous Mandatory Ventilation)  RR (machine): 12  TV (machine): 800  FiO2: 40  PEEP: 5  ITime: 0.64  MAP: 8  PIP: 23        PHYSICAL EXAM============================  General:                 Sedated, intubated  Neuro:                   sedated  Respiratory:	Air entry fair and  bilateral conducted sounds                               Has + Cuff leak  CV:		Regular rate and rhythm. Normal S1/S2                                          Distal pulses present.  Abdomen:	                     Soft, non-distended. Bowel sounds present   Skin:		No rash.  Extremities:	Warm, no cyanosis or edema.  Palpable pulses    ============================LABS=========================                        9.1    9.99  )-----------( 219      ( 06 May 2021 06:37 )             27.4     05-06    137  |  104  |  34<H>  ----------------------------<  174<H>  5.2   |  22  |  2.54<H>    Ca    9.5      06 May 2021 06:37  Phos  4.2     05-06  Mg     2.0     05-06        PT/INR - ( 05 May 2021 07:48 )   PT: 16.2 sec;   INR: 1.43 ratio         PTT - ( 05 May 2021 07:48 )  PTT:34.8 sec  ABG - ( 05 May 2021 21:20 )  pH, Arterial: 7.49  pH, Blood: x     /  pCO2: 31    /  pO2: 177   / HCO3: 25    / Base Excess: 0.3   /  SaO2: 99.7        < from: Xray Chest 1 View- PORTABLE-Urgent (Xray Chest 1 View- PORTABLE-Urgent .) (05.05.21 @ 19:18) >  INTERPRETATION:  Chest one view    HISTORY: Intubation    COMPARISON STUDY: 5/3/2021    Frontal expiratory view of the chest shows the heart to be normal in size. Endotracheal tube is present.    The lungs show minimal left atelectasis and there is no evidence of pneumothorax nor pleural effusion.    IMPRESSION:  Postintubation.    Thank you for the courtesy of this referral.          A/P:   61yMale s/p  Flexible bronchoscopy, Tracheal resection with reconstruction. Took ~3cm of affected area of the proximal trachea, Guardian stitch placed 4/23/2021,  complaining of neck discomfort but able to breathe and work with I.S.                             Neuro:    Vented, sedated                                         Pain control with  Tylenol IV    Hx of seizure disorder: Continue Keppra                            Cardiovascular:                                          HTN: Continue hemodynamic monitoring and  Labetalol as tolerated                             Respiratory:  On Full vent support, Has + Cuff leak around ET tibe                                          Will do bedside FB, with Dr. Perdue and Christhesia at bedside  poss extubation after FB                                          Keep neck in flexed position with limited mobility as allowed. Has soft collar in place                                          CPAP weaning                                         On Decadron                                          Encourage incentive spirometry - as tolerated                                          Continue bronchodilators, pulmonary toilet as tolerated                               GI                                         Continue GI prophylaxis with  Protonix                                                                                                        Renal:                                         Acute on chronic kidney disease. Cr is stable                                         Monitor I/Os and electrolytes                              Hem/ Onc:                                         Hx of PE:  Eliquis on hold                                          Follow CBC in AM                           Infectious disease:                                            Monitor for fever / leukocytosis.                            Endocrine                                             Continue Accu-Checks with coverage    Pt is on SQ Heparin and Venodyne boots for DVT prophylaxis.     Pertinent clinical, laboratory, radiographic, hemodynamic, echocardiographic, respiratory data, microbiologic data and chart were reviewed and analyzed frequently throughout the course of the day and night  Patient seen, examined and plan discussed with CT Surgeon Dr. Allen  / CTICU team during rounds.    Status discussed with patient at bedside, updated plan including bronch today    I spent 30 minutes at bedside providing critical care from 7am to 11pm       Renée Metcalf DO, FACEP    
ANNEMARIE KELLEY            MRN-[id number]         No Known Allergies               60 y/o male returns to ER for 2nd time c/o stridor after discharge from tracheal resection 2 wks ago. He has PMHx of HTN, B/L TKR, and COVID 2-3 months ago (not hospitalized),  CKD, PE (8/2020 - On lovenox or eliquis at home at different times; Hematologist Dr. Gil Tee), and recent seizure requiring emergency cric/tracheostomy on 03/25/2021 at [facility] due to tongue injury and lingual hematoma (discharged on 4/3/2021).  Readmitted on 4/16/21 w/ SOB, and underwent Flexible Bronchoscopy, Balloon dilatation of trachea on 4/19/21. Pt remained inhouse on Heparin gtt awaiting Tracheal resection. Seen by Nephrology for CKD. On 4/23/21 Pt had a Tracheal resection and reconstruction 4/23/21. Post op w Guardian stitch in place; Was brought back to the OR on 4/29 for surveillance bronchoscopy which revealed a patent airway and well healing anastomosis and therefore guardian suture removed and pt discharged home with soft collar in place. Pt presented to ER 4/30 w c/o stridor and was immediately taken to OR for flexible bronchoscopy. Pt again presents today pt with c/o increased stridor and increased mucous production with a cough .    (05 May 2021 08:32)      Procedure:  Flex bronch, tracheal resection      Issues:  Stridor   Severe tracheal stenosis s/p Resection  Hx of PE (dx 10/2020)   Seizure disorder  HTN  CKD              Hx of COVID (2/2021)   Unilateral vocal cord paralysis                     Home Medications:  labetalol 100 mg oral tablet: 1 tab(s) orally 3 times a day (16 Apr 2021 11:48)  Norvasc 10 mg oral tablet: 1 tab(s) orally once a day (16 Apr 2021 11:48)  polyethylene glycol 3350 oral powder for reconstitution: 17 gram(s) orally once a day, As Needed (29 Apr 2021 15:25)  Tylenol 325 mg oral tablet: 2 tab(s) orally every 4 hours, As Needed for mild pain (29 Apr 2021 08:31)      PAST MEDICAL & SURGICAL HISTORY:  HTN (hypertension)    Chronic kidney disease, unspecified CKD stage    Pulmonary embolus  diagnosed about 6 months ago incidentally found on imaging related to falling off a bike and chest pain    2019 novel coronavirus disease (COVID-19)  never hospitilized    Arthritis    Tracheal stenosis    History of total right knee replacement (TKR)  bilateral    History of tracheal stenosis          ICU Vital Signs Last 24 Hrs  T(C): 36.9 (10 May 2021 08:00), Max: 37.1 (09 May 2021 16:00)  T(F): 98.5 (10 May 2021 08:00), Max: 98.7 (09 May 2021 16:00)  HR: 71 (10 May 2021 09:00) (71 - 103)  BP: 122/90 (10 May 2021 09:00) (113/67 - 159/93)  BP(mean): 98 (10 May 2021 09:00) (77 - 110)  ABP: --  ABP(mean): --  RR: 11 (10 May 2021 09:00) (10 - 36)  SpO2: 97% (10 May 2021 09:00) (60% - 99%)    I&O's Detail    09 May 2021 07:01  -  10 May 2021 07:00  --------------------------------------------------------  IN:    IV PiggyBack: 100 mL  Total IN: 100 mL    OUT:    Voided (mL): 2650 mL  Total OUT: 2650 mL    Total NET: -2550 mL      10 May 2021 07:01  -  10 May 2021 10:00  --------------------------------------------------------  IN:  Total IN: 0 mL    OUT:    Voided (mL): 450 mL  Total OUT: 450 mL    Total NET: -450 mL        CAPILLARY BLOOD GLUCOSE          Home Medications:  labetalol 100 mg oral tablet: 1 tab(s) orally 3 times a day (16 Apr 2021 11:48)  Norvasc 10 mg oral tablet: 1 tab(s) orally once a day (16 Apr 2021 11:48)  polyethylene glycol 3350 oral powder for reconstitution: 17 gram(s) orally once a day, As Needed (29 Apr 2021 15:25)  Tylenol 325 mg oral tablet: 2 tab(s) orally every 4 hours, As Needed for mild pain (29 Apr 2021 08:31)      MEDICATIONS  (STANDING):  amLODIPine   Tablet 10 milliGRAM(s) Oral daily  benzocaine 15 mG/menthol 3.6 mG (Sugar-Free) Lozenge 1 Lozenge Oral every 4 hours  heparin   Injectable 5000 Unit(s) SubCutaneous every 8 hours  labetalol 100 milliGRAM(s) Oral three times a day  levETIRAcetam  IVPB 750 milliGRAM(s) IV Intermittent every 12 hours  pantoprazole    Tablet 40 milliGRAM(s) Oral before breakfast    MEDICATIONS  (PRN):  acetaminophen   Tablet .. 650 milliGRAM(s) Oral every 6 hours PRN Mild Pain (1 - 3)  aluminum hydroxide/magnesium hydroxide/simethicone Suspension 30 milliLiter(s) Oral every 6 hours PRN Dyspepsia  cyclobenzaprine 5 milliGRAM(s) Oral three times a day PRN Muscle Spasm  hydrALAZINE Injectable 10 milliGRAM(s) IV Push every 6 hours PRN For SBP>170  HYDROmorphone  Injectable 1 milliGRAM(s) IV Push every 3 hours PRN Moderate Pain (4 - 6)  oxyCODONE    IR 5 milliGRAM(s) Oral every 4 hours PRN Moderate Pain (4 - 6)  polyethylene glycol 3350 Oral Powder - Peds 17 Gram(s) Oral daily PRN Constipation      Physical exam:   CNS:                     Nonfocal                             Cardiovascular:     S1 & S2, regular                           Respiratory:         Air entry is fair and equal on both sides, lungs are clear                          GI:                       Soft, nondistended and nontender, Bowel sounds active                            Ext:                       No cyanosis or edema                               Labs:                                                                           7.8    11.13 )-----------( 182      ( 10 May 2021 04:51 )             25.4             05-10    140  |  110<H>  |  38<H>  ----------------------------<  111<H>  4.7   |  19<L>  |  2.62<H>    Ca    8.6      10 May 2021 04:51  Phos  2.9     05-10  Mg     1.8     05-10                    PT/INR - ( 10 May 2021 04:51 )   PT: 13.1 sec;   INR: 1.16 ratio         PTT - ( 10 May 2021 04:51 )  PTT:31.5 sec      CXR:  < from: Xray Chest 1 View- PORTABLE-Routine (05.08.21 @ 05:41) >  FINDINGS:  The cardiac silhouetteis normal in size. There are no focal consolidations or pleural effusions. The hilar and mediastinal structures appear unremarkable. The osseous structures are intact.    IMPRESSION: Clear lungs.      Plan:   General:  61yMale s/p  Flexible bronchoscopy, Tracheal resection with reconstruction. Took ~3cm of affected area of the proximal trachea, Guardian stitch placed 4/23/2021,  complaining of neck discomfort but able to breathe and work with I.S.                             Neuro:                                         Pain control with  Tylenol /  Oxy.     Hx of seizure disorder: Continue Keppra.                             Cardiovascular:                                          HTN: Continue hemodynamic monitoring and  Labetalol / Norvasc                            Respiratory:                                         Keep neck in flexed position with limited mobility as allowed. Has soft collar in place                                          Pt is on room air, saturating in high 90S                                         Scheduled for bronch today                                         Encourage incentive spirometry - as tolerated                                          Continue bronchodilators, pulmonary toilet as tolerated     Vocal cord paralysis - ENT to inject L-VC                               GI     On dysphagia diet with nectar thickened fluid                                         Continue GI prophylaxis with  Protonix                                                                                                        Renal:                                         Acute on chronic kidney disease. Cr is stable                                         Monitor I/Os and electrolytes                              Hem/ Onc:                                         Hx of PE:  Eliquis on hold                                          Follow CBC in AM                           Infectious disease:                                            Monitor for fever / leukocytosis.                            Endocrine                                             Continue Accu-Checks with coverage.     Pt is on SQ Heparin and Venodyne boots for DVT prophylaxis.     Pertinent clinical, laboratory, radiographic, hemodynamic, echocardiographic, respiratory data, microbiologic data and chart were reviewed and analyzed frequently throughout the course of the day and night  Patient seen, examined and plan discussed with CT Surgeon Dr. Allen  / CTICU team during rounds.    Status discussed with patient at bedside, updated plan including bronch today              Yferi Kidd MD                                                                    
ANNEMARIE KELLYE            MRN-[id number]         No Known Allergies               60 y/o male returns to ER for 2nd time c/o stridor after discharge from tracheal resection 2 wks ago. He has PMHx of HTN, B/L TKR, and COVID 2-3 months ago (not hospitalized),  CKD, PE (8/2020 - On lovenox or eliquis at home at different times; Hematologist Dr. Gil Tee), and recent seizure requiring emergency cric/tracheostomy on 03/25/2021 at [facility] due to tongue injury and lingual hematoma (discharged on 4/3/2021).  Readmitted on 4/16/21 w/ SOB, and underwent Flexible Bronchoscopy, Balloon dilatation of trachea on 4/19/21. Pt remained inhouse on Heparin gtt awaiting Tracheal resection. Seen by Nephrology for CKD. On 4/23/21 Pt had a Tracheal resection and reconstruction 4/23/21. Post op w Guardian stitch in place; Was brought back to the OR on 4/29 for surveillance bronchoscopy which revealed a patent airway and well healing anastomosis and therefore guardian suture removed and pt discharged home with soft collar in place. Pt presented to ER 4/30 w c/o stridor and was immediately taken to OR for flexible bronchoscopy. Pt again presents today pt with c/o increased stridor and increased mucous production with a cough .    (05 May 2021 08:32)      Procedure:  Flex bronch, tracheal resection      Issues:  Stridor   Severe tracheal stenosis s/p Resection  Acute PE (dx 10/2020) on lovenox  Seizure disorder  HTN  CKD              Hx of COVID (2/2021)   Unilateral vocal cord paralysis                           Home Medications:  labetalol 100 mg oral tablet: 1 tab(s) orally 3 times a day (16 Apr 2021 11:48)  Norvasc 10 mg oral tablet: 1 tab(s) orally once a day (16 Apr 2021 11:48)  polyethylene glycol 3350 oral powder for reconstitution: 17 gram(s) orally once a day, As Needed (29 Apr 2021 15:25)  Tylenol 325 mg oral tablet: 2 tab(s) orally every 4 hours, As Needed for mild pain (29 Apr 2021 08:31)      PAST MEDICAL & SURGICAL HISTORY:  HTN (hypertension)    Chronic kidney disease, unspecified CKD stage    Pulmonary embolus  diagnosed about 6 months ago incidentally found on imaging related to falling off a bike and chest pain    2019 novel coronavirus disease (COVID-19)  never hospitilized    Arthritis    Tracheal stenosis    History of total right knee replacement (TKR)  bilateral    History of tracheal stenosis      ICU Vital Signs Last 24 Hrs  T(C): 36.7 (08 May 2021 08:00), Max: 37 (08 May 2021 04:00)  T(F): 98 (08 May 2021 08:00), Max: 98.6 (08 May 2021 04:00)  HR: 69 (08 May 2021 10:00) (61 - 94)  BP: 121/75 (08 May 2021 10:00) (117/69 - 147/84)  BP(mean): 86 (08 May 2021 10:00) (80 - 105)  ABP: --  ABP(mean): --  RR: 16 (08 May 2021 10:00) (11 - 30)  SpO2: 98% (08 May 2021 10:00) (95% - 100%)    I&O's Detail    07 May 2021 07:01  -  08 May 2021 07:00  --------------------------------------------------------  IN:    IV PiggyBack: 100 mL    Oral Fluid: 200 mL    sodium chloride 0.9%: 1350 mL  Total IN: 1650 mL    OUT:    Voided (mL): 2020 mL  Total OUT: 2020 mL    Total NET: -370 mL      08 May 2021 07:01  -  08 May 2021 12:46  --------------------------------------------------------  IN:    sodium chloride 0.9%: 100 mL  Total IN: 100 mL    OUT:    Voided (mL): 300 mL  Total OUT: 300 mL    Total NET: -200 mL        CAPILLARY BLOOD GLUCOSE      POCT Blood Glucose.: 146 mg/dL (07 May 2021 05:17)      Home Medications:  labetalol 100 mg oral tablet: 1 tab(s) orally 3 times a day (16 Apr 2021 11:48)  Norvasc 10 mg oral tablet: 1 tab(s) orally once a day (16 Apr 2021 11:48)  polyethylene glycol 3350 oral powder for reconstitution: 17 gram(s) orally once a day, As Needed (29 Apr 2021 15:25)  Tylenol 325 mg oral tablet: 2 tab(s) orally every 4 hours, As Needed for mild pain (29 Apr 2021 08:31)      MEDICATIONS  (STANDING):  amLODIPine   Tablet 10 milliGRAM(s) Oral daily  heparin   Injectable 5000 Unit(s) SubCutaneous every 8 hours  labetalol 100 milliGRAM(s) Oral three times a day  levETIRAcetam  IVPB 750 milliGRAM(s) IV Intermittent every 12 hours  pantoprazole  Injectable 40 milliGRAM(s) IV Push daily  sodium chloride 0.9%. 1000 milliLiter(s) (50 mL/Hr) IV Continuous <Continuous>    MEDICATIONS  (PRN):  acetaminophen   Tablet .. 650 milliGRAM(s) Oral every 6 hours PRN Mild Pain (1 - 3)  aluminum hydroxide/magnesium hydroxide/simethicone Suspension 30 milliLiter(s) Oral every 6 hours PRN Dyspepsia  cyclobenzaprine 5 milliGRAM(s) Oral three times a day PRN Muscle Spasm  hydrALAZINE Injectable 10 milliGRAM(s) IV Push every 6 hours PRN For SBP>170  HYDROmorphone  Injectable 1 milliGRAM(s) IV Push every 3 hours PRN Moderate Pain (4 - 6)  oxyCODONE    IR 5 milliGRAM(s) Oral every 4 hours PRN Moderate Pain (4 - 6)  polyethylene glycol 3350 Oral Powder - Peds 17 Gram(s) Oral daily PRN Constipation          Physical exam:   CNS:                     Nonfocal                             Cardiovascular:     S1 & S2, regular                           Respiratory:         Air entry is fair and equal on both sides, lungs are clear                          GI:                       Soft, nondistended and nontender, Bowel sounds active                            Ext:                       No cyanosis or edema                                Labs:                                                                           9.8    11.96 )-----------( 185      ( 08 May 2021 05:25 )             31.0             05-08    142  |  112<H>  |  56<H>  ----------------------------<  157<H>  4.6   |  17<L>  |  2.87<H>    Ca    8.6      08 May 2021 05:25  Phos  4.1     05-08  Mg     2.1     05-08              CXR:  < from: Xray Chest 1 View- PORTABLE-Routine (05.07.21 @ 06:03) >  Study is limited since the upper chest and neck were not included on the exam.    The endotracheal tube appears to have been removed since the last exam. Visualized lungs are clear. No effusion or pneumothorax.      COMPARISON:  May 6      IMPRESSION:  1. Limited study since upper chest and neck not included but endotracheal tube is no longer identified.  2. Clear lungs.      Plan:    General: 61yMale s/p  Flexible bronchoscopy, Tracheal resection with reconstruction. Took ~3cm of affected area of the proximal trachea, Guardian stitch placed 4/23/2021,  complaining of neck discomfort but able to breathe and work with I.S.                             Neuro:                                         Pain control with  Tylenol /  Oxy    Hx of seizure disorder: Continue Keppra                            Cardiovascular:                                          HTN: Continue hemodynamic monitoring and  Labetalol / Norvasc                            Respiratory:                                         Keep neck in flexed position with limited mobility as allowed. Has soft collar in place                                          Pt is on room air, saturating in high 90S                                         Scheduled for bronch on Monday                                         Encourage incentive spirometry - as tolerated                                          Continue bronchodilators, pulmonary toilet as tolerated     Vocal cord paralysis - ENT to inject L-VC                               GI     On dysphagia diet with nector thickened fluid                                         Continue GI prophylaxis with  Protonix                                                                                                        Renal:                                         Acute on chronic kidney disease. Cr is stable                                         Monitor I/Os and electrolytes                              Hem/ Onc:                                         Hx of PE:  Eliquis on hold                                          Follow CBC in AM                           Infectious disease:                                            Monitor for fever / leukocytosis.                            Endocrine                                             Continue Accu-Checks with coverage.     Pt is on SQ Heparin and Venodyne boots for DVT prophylaxis.     Pertinent clinical, laboratory, radiographic, hemodynamic, echocardiographic, respiratory data, microbiologic data and chart were reviewed and analyzed frequently throughout the course of the day and night  Patient seen, examined and plan discussed with CT Surgeon Dr. Forbes  / CTICU team during rounds.    Status discussed with patient at bedside, updated plan including bronch today          Yefri Kidd MD                                                                    
OR tomorrow for DLB, injection laryngoplasty.   - will obtain consent  - appreciate NPO/IVF at [facility]  - COVID negative  - hold AC  - preop labs  - please call with any questions/concerns
POST ANESTHESIA EVALUATION    61y Male POSTOP DAY 1 S/P     MENTAL STATUS: Patient participation [  ] Awake     [  ] Arousable     [ X ] Sedated    AIRWAY PATENCY: [  X] Satisfactory  [  ] Other:  Intubated    Vital Signs Last 24 Hrs  T(C): 36.6 (06 May 2021 08:00), Max: 36.8 (05 May 2021 12:52)  T(F): 97.8 (06 May 2021 08:00), Max: 98.3 (05 May 2021 12:52)  HR: 67 (06 May 2021 08:00) (38 - 99)  BP: 114/71 (06 May 2021 08:00) (110/72 - 184/93)  BP(mean): 81 (06 May 2021 08:00) (81 - 129)  RR: 19 (06 May 2021 08:00) (12 - 23)  SpO2: 100% (06 May 2021 08:00) (98% - 100%)  I&O's Summary    05 May 2021 07:01  -  06 May 2021 07:00  --------------------------------------------------------  IN: 516 mL / OUT: 2175 mL / NET: -1659 mL    06 May 2021 07:01  -  06 May 2021 08:47  --------------------------------------------------------  IN: 23.1 mL / OUT: 125 mL / NET: -101.9 mL          NAUSEA/ VOMITTING:  [ X] NONE  [  ] CONTROLLED [  ] OTHER     PAIN: [ X ] CONTROLLED WITH CURRENT REGIMEN  [  ] OTHER    [ X ] NO APPARENT ANESTHESIA COMPLICATIONS      Comments: Care per ICU team
STATUS POST:  61y Male L TVF injection 5/10. naeon. good voice. no sob. tolerating PO diet    Vital Signs Last 24 Hrs  T(C): 36.8 (11 May 2021 04:00), Max: 37.1 (11 May 2021 00:00)  T(F): 98.3 (11 May 2021 04:00), Max: 98.7 (11 May 2021 00:00)  HR: 75 (11 May 2021 05:00) (69 - 101)  BP: 131/74 (11 May 2021 05:00) (100/76 - 156/97)  BP(mean): 86 (11 May 2021 05:00) (76 - 103)  RR: 14 (11 May 2021 05:00) (10 - 36)  SpO2: 97% (11 May 2021 05:00) (94% - 99%)    General: AAOx3, resting in bed, comfortable  good strong voice, minimal hoarseness. no stridor  no sob or resp distress  neck soft    A/P:  61y Male L TVF injection 5/10 for L TVF paresis  - f/up Dr Hunt on d/c  - diet per SLP  - voice rest 5d  - pain medications prn    ----------------------------------------------  Jemal Burciaga MD  Resident  Department of Otolaryngology - Head and Neck Surgery  Adult Page #[phone]  Peds Page #[phone]
ANNEMARIE KELLEY            MRN-[id number]         No Known Allergies                 HPI:  60 y/o male returns to ER for 2nd time c/o stridor after discharge from tracheal resection 2 wks ago. He has PMHx of HTN, B/L TKR, and COVID 2-3 months ago (not hospitalized),  CKD, PE (8/2020 - On lovenox or eliquis at home at different times; Hematologist Dr. Gil Tee), and recent seizure requiring emergency cric/tracheostomy on 03/25/2021 at [facility] due to tongue injury and lingual hematoma (discharged on 4/3/2021).  Readmitted on 4/16/21 w/ SOB, and underwent Flexible Bronchoscopy, Balloon dilatation of trachea on 4/19/21. Pt remained inhouse on Heparin gtt awaiting Tracheal resection. Seen by Nephrology for CKD. On 4/23/21 Pt had a Tracheal resection and reconstruction 4/23/21. Post op w Guardian stitch in place; Was brought back to the OR on 4/29 for surveillance bronchoscopy which revealed a patent airway and well healing anastomosis and therefore guardian suture removed and pt discharged home with soft collar in place. Pt presented to ER 4/30 w c/o stridor and was immediately taken to OR for flexible bronchoscopy. Pt again presents today pt with c/o increased stridor and increased mucous production with a cough .    (05 May 2021 08:32)      Procedure:  Flex bronch, tracheal resection      Issues:  Stridor   Severe tracheal stenosis s/p Resection  Acute PE (dx 10/2020) on lovenox  Seizure disorder  HTN  CKD              Hx of COVID (2/2021)                            Home Medications:  labetalol 100 mg oral tablet: 1 tab(s) orally 3 times a day (16 Apr 2021 11:48)  Norvasc 10 mg oral tablet: 1 tab(s) orally once a day (16 Apr 2021 11:48)  polyethylene glycol 3350 oral powder for reconstitution: 17 gram(s) orally once a day, As Needed (29 Apr 2021 15:25)  Tylenol 325 mg oral tablet: 2 tab(s) orally every 4 hours, As Needed for mild pain (29 Apr 2021 08:31)      PAST MEDICAL & SURGICAL HISTORY:  HTN (hypertension)    Chronic kidney disease, unspecified CKD stage    Pulmonary embolus  diagnosed about 6 months ago incidentally found on imaging related to falling off a bike and chest pain    2019 novel coronavirus disease (COVID-19)  never hospitilized    Arthritis    Tracheal stenosis    History of total right knee replacement (TKR)  bilateral    History of tracheal stenosis      ICU Vital Signs Last 24 Hrs  T(C): 36.8 (05 May 2021 12:52), Max: 36.8 (05 May 2021 12:52)  T(F): 98.3 (05 May 2021 12:52), Max: 98.3 (05 May 2021 12:52)  HR: 77 (05 May 2021 12:52) (70 - 77)  BP: 154/90 (05 May 2021 12:52) (129/78 - 154/90)  BP(mean): --  ABP: --  ABP(mean): --  RR: 13 (05 May 2021 12:52) (13 - 18)  SpO2: 100% (05 May 2021 12:52) (100% - 100%)    I&O's Detail    CAPILLARY BLOOD GLUCOSE    Home Medications:  labetalol 100 mg oral tablet: 1 tab(s) orally 3 times a day (16 Apr 2021 11:48)  Norvasc 10 mg oral tablet: 1 tab(s) orally once a day (16 Apr 2021 11:48)  polyethylene glycol 3350 oral powder for reconstitution: 17 gram(s) orally once a day, As Needed (29 Apr 2021 15:25)  Tylenol 325 mg oral tablet: 2 tab(s) orally every 4 hours, As Needed for mild pain (29 Apr 2021 08:31)      MEDICATIONS  (STANDING):  amLODIPine   Tablet 10 milliGRAM(s) Oral daily  labetalol 100 milliGRAM(s) Oral three times a day  levETIRAcetam 750 milliGRAM(s) Oral two times a day    MEDICATIONS  (PRN):  acetaminophen   Tablet .. 650 milliGRAM(s) Oral every 6 hours PRN Mild Pain (1 - 3)  aluminum hydroxide/magnesium hydroxide/simethicone Suspension 30 milliLiter(s) Oral every 6 hours PRN Dyspepsia  cyclobenzaprine 5 milliGRAM(s) Oral three times a day PRN Muscle Spasm  oxyCODONE    IR 5 milliGRAM(s) Oral every 4 hours PRN Moderate Pain (4 - 6)  polyethylene glycol 3350 Oral Powder - Peds 17 Gram(s) Oral daily PRN Constipation      Physical exam:                             CNS:                     Nonfocal                             Cardiovascular:     S1 & S2, regular                           Respiratory:         Air entry is fair and equal on both sides, lungs are clear                          GI:                       Soft, nondistended and nontender, Bowel sounds active                            Ext:                       No cyanosis or edema                        Labs:                                                                           8.8    11.45 )-----------( 184      ( 05 May 2021 07:48 )             27.2             05-05    136  |  101  |  31<H>  ----------------------------<  113<H>  4.4   |  20<L>  |  2.71<H>    Ca    8.7      05 May 2021 07:48  Phos  3.5     05-05  Mg     1.7     05-05                    PT/INR - ( 05 May 2021 07:48 )   PT: 16.2 sec;   INR: 1.43 ratio      PTT - ( 05 May 2021 07:48 )  PTT:34.8 sec      CXR:  < from: Xray Neck Soft Tissue (05.05.21 @ 06:46) >  No soft tissue abnormality is seen.  Mild tracheal narrowing at the level of the subglottic trachea  No upper airway obstruction secondary to known tracheal stenosis.  Mild tracheal narrowing at about the level of the thoracic inlet presumably corresponding to the known area of tracheal stenosis.    The cervical spine is unremarkable.    IMPRESSION:    Mild tracheal narrowing.      Plan:    General: 61yMale s/p  Flexible bronchoscopy, Tracheal resection with reconstruction. Took ~3cm of affected area of the proximal trachea, Guardian stitch placed 4/23/2021,  complaining of neck discomfort but able to breathe and work with I.S.                             Neuro:                                         Pain control with  Tylenol IV    Hx of seizure disorder: Continue Keppra                            Cardiovascular:                                          HTN: Continue hemodynamic monitoring and  Labetalol as tolerated                             Respiratory:                                         Keep neck in flexed position with limited mobility as allowed. Has soft collar in place                                          Pt is on room air, saturating in high 90S                                         Scheduled for bronch today                                         Encourage incentive spirometry - as tolerated                                          Continue bronchodilators, pulmonary toilet as tolerated                               GI                                         Continue GI prophylaxis with  Protonix                                                                                                        Renal:                                         Acute on chronic kidney disease. Cr is stable                                         Monitor I/Os and electrolytes                              Hem/ Onc:                                         Hx of PE:  Eliquis on hold                                          Follow CBC in AM                           Infectious disease:                                            Monitor for fever / leukocytosis.                            Endocrine                                             Continue Accu-Checks with coverage    Pt is on SQ Heparin and Venodyne boots for DVT prophylaxis.     Pertinent clinical, laboratory, radiographic, hemodynamic, echocardiographic, respiratory data, microbiologic data and chart were reviewed and analyzed frequently throughout the course of the day and night  Patient seen, examined and plan discussed with CT Surgeon Dr. Allen  / CTICU team during rounds.    Status discussed with patient at bedside, updated plan including bronch today          Yefri Kidd MD

## 2021-05-11 NOTE — PROGRESS NOTE ADULT - NSICDXPILOT_GEN_ALL_CORE
[place]

## 2021-05-11 NOTE — DISCHARGE NOTE PROVIDER - NSDCCPCAREPLAN_GEN_ALL_CORE_FT
PRINCIPAL DISCHARGE DIAGNOSIS  Diagnosis: Vocal cord edema  Assessment and Plan of Treatment:

## 2021-05-11 NOTE — DISCHARGE NOTE NURSING/CASE MANAGEMENT/SOCIAL WORK - NSDCFUADDAPPT_GEN_ALL_CORE_FT
Follow-up with Dr. Allen in 2 weeks [phone]  Plan is for repeat Bronchoscopy with Dr. Allen in 3 weeks    Follow-up with Dr. Hunt [phone] in 1 week

## 2021-05-11 NOTE — DISCHARGE NOTE NURSING/CASE MANAGEMENT/SOCIAL WORK - NSDCPEELIQUISDIET_GEN_ALL_CORE
Assessment/Plan:  Sciatic radiculitis  The patient has had 4-5 days a symptoms consistent with sciatic radiculitis  She has no objective weakness  She has no incontinence of bowel or bladder  She has no fevers chills or constitutional symptoms  She is an uncontrolled diabetic and we are going to avoid steroids  Will have her use ice and encouraged her to walk more  We are going to give her some gabapentin to try  We did give her sedation warnings  She is asked call in a few days with report of her condition  She is asked call sooner seek more urgent medical attention if she develops any of the worrisome symptoms we discussed above  She is in agreement with this plan  Diabetes type 2, uncontrolled ([facility] [address] )    Lab Results   Component Value Date    HGBA1C 12 2 (A) 12/31/2020   She has tolerated the metformin well  She is going to increase her dose to 1000 b i d  At meals  She also needs new test strips though she is not sure of her meter  She is going to call back with the brand we will refill for her  She will follow-up as scheduled in April or sooner as needed  She agrees  Diagnoses and all orders for this visit:    Sciatic radiculitis  -     gabapentin (NEURONTIN) 300 mg capsule; Take 1 capsule (300 mg total) by mouth 3 (three) times a day    Uncontrolled type 2 diabetes mellitus with hyperglycemia (HCC)          Subjective:   Chief Complaint   Patient presents with    Hip Pain     R hip to knee area        Patient ID: Genie Kelley is a 74 y o  female  For the past 4-5 days I awaken with pain in my R hip and R posterior leg  It kills me  Starts nocturnally  No daytime pain  Ibuprofen seems to help  Had similar 10 years ago, sciatica  No incontinence B/B  Feels weakness  No slip, fall or other trauma  No fever, increased cough, wheeze or SOB  No lower GI sx   No upper MS c/o      HPI  The patient is a 64-year-old female with history of uncontrolled type 2 diabetes who presents today with concerns over right hip pain which radiates to her right posterior leg and down to her right knee for for 5 days now  She states that awakens her from sleep and it kills me  She takes some ibuprofen which seems to help somewhat  She does not have significant daytime pain  She does have a history of sciatic  She has had no incontinence of bowel or bladder  She does feel like her right lower extremity is getting weaker  She has had no recent slip fall or other trauma  She has had no fever  She has had no increased cough wheezing or shortness of breath  She has had no incontinence of bowel or bladder  She has had no other GI symptoms  She has had no other  symptoms  She has no upper musculoskeletal complaints  She has no rash  She has been compliant with her recently instituted metformin and notes that her blood sugars have dropped at least 40 points  She is taking 500 b i d  She admits that she has not been compliant with exercise regimen  The following portions of the patient's history were reviewed and updated as appropriate: allergies, current medications, past family history, past medical history, past social history, past surgical history and problem list     Review of Systems   Constitution: Negative  Cardiovascular: Negative  Respiratory: Negative  Endocrine: Negative for polydipsia, polyphagia and polyuria  Blood sugar control improving   Skin: Negative  Musculoskeletal: Positive for back pain, muscle weakness and myalgias  Negative for falls and stiffness  Gastrointestinal: Negative for bowel incontinence, constipation and diarrhea  Genitourinary: Negative for bladder incontinence  Neurological: Positive for focal weakness and sensory change  Negative for difficulty with concentration, disturbances in coordination and weakness  Objective:    Physical Exam   Constitutional: She appears well-developed     Significantly overweight and in no distress   Cardiovascular: Normal rate and regular rhythm  Pulmonary/Chest: Effort normal and breath sounds normal  No respiratory distress  She has no wheezes  She has no rales  Musculoskeletal:         General: Tenderness present  No deformity or edema  Comments: She has tenderness in the right lower paralumbar region  No step-off or deformity  Some mild SI tenderness  No sacral notch tenderness  She has normal bulk strength and tone of her lower extremities  She has normal plantar and dorsiflexion range of motion as well as strength at feet  Straight leg raise test is negative  Skin: No rash noted  No erythema  Psychiatric: She has a normal mood and affect  Her behavior is normal    Nursing note and vitals reviewed  Eat healthy foods you enjoy. Apixaban/Eliquis DOES NOT have a special diet. Limit your alcohol intake.

## 2021-05-11 NOTE — DISCHARGE NOTE PROVIDER - HOSPITAL COURSE
60 y/o male w/ PMHx COVID+ (3/2021 - not hospitalized), PE (8/2020 - has taken both lovenox and eliquis at times at home), CKD, w/ recent hospitalization at [facility] after seizure w/ lingual hematoma requiring emergency tracheostomy on 3/25/21.  Patient was discharged from that admission on 4/3/21, decannuulated, and readmitted on 4/16 w/ worsening stridor.  Underwent Tracheal Resection and Reconstruction on 4/23, and discharged home on 4/29, only to be readmitted the following day w/ worsening stridor once again.  Flexible bronchoscopy revealed edematous vocal cords, and the patient was discharged on 5/3/21.  However, once again readmitted on 5/5/21 w/ stridor.  Received IV steroids, and Bronchoscopy on 5/10 revealed good movement of vocal cords, but still edematous, and posterior aspect of the left cord was injected w/ calcium hydroxylapatite.  He is currently hemodynamically stable, w/ O2sat 98% on room-air, with voice very clear and non-hoarse.  He denies any chest pains or SOB, was seen at bedside this morning with Dr. Allen, and is to be discharged home today.  He is being received decadron 10mg IV x1 in OR yesterday, receiving 8mg IV x1 this morning, and to be discharged home with Medrol dose pack (w/ tapered dosing from 24mg on Day 1 tomorrow, to 4mg on Day 6), Protonix twice daily, mechanical soft diet with thickened liquids (patient being provided many thickening packets), follow-up with Dr. Hunt (ENT) in 1 week, and recommendations to continue to wear neck collar at all times, as well as no laying flat.

## 2021-05-11 NOTE — DISCHARGE NOTE PROVIDER - NSDCFUADDINST_GEN_ALL_CORE_FT
Continue to wear neck collar at all times  NO LAYING FLAT    Call Dr. Cuevas office [phone] if you have any worsening shortness of breath or high-pitched voice, any increased chest pains, fevers of 101oF or greater, or any other concern at all, or CALL 911.

## 2021-05-17 ENCOUNTER — INPATIENT (INPATIENT)
Facility: HOSPITAL | Age: 61
LOS: 0 days | Discharge: HOME | End: 2021-05-18
Attending: THORACIC SURGERY (CARDIOTHORACIC VASCULAR SURGERY) | Admitting: THORACIC SURGERY (CARDIOTHORACIC VASCULAR SURGERY)
Payer: MEDICAID

## 2021-05-17 ENCOUNTER — EMERGENCY (EMERGENCY)
Facility: HOSPITAL | Age: 61
LOS: 1 days | Discharge: OTHER INSTITUTION | End: 2021-05-17
Attending: EMERGENCY MEDICINE
Payer: COMMERCIAL

## 2021-05-17 VITALS
WEIGHT: 158.73 LBS | SYSTOLIC BLOOD PRESSURE: 124 MMHG | HEIGHT: 63 IN | DIASTOLIC BLOOD PRESSURE: 73 MMHG | OXYGEN SATURATION: 100 % | HEART RATE: 86 BPM | TEMPERATURE: 98 F

## 2021-05-17 VITALS
HEART RATE: 82 BPM | RESPIRATION RATE: 16 BRPM | OXYGEN SATURATION: 100 % | TEMPERATURE: 98 F | SYSTOLIC BLOOD PRESSURE: 141 MMHG | DIASTOLIC BLOOD PRESSURE: 85 MMHG

## 2021-05-17 VITALS — OXYGEN SATURATION: 99 %

## 2021-05-17 DIAGNOSIS — J38.4 EDEMA OF LARYNX: ICD-10-CM

## 2021-05-17 DIAGNOSIS — Z87.09 PERSONAL HISTORY OF OTHER DISEASES OF THE RESPIRATORY SYSTEM: Chronic | ICD-10-CM

## 2021-05-17 DIAGNOSIS — Z96.651 PRESENCE OF RIGHT ARTIFICIAL KNEE JOINT: Chronic | ICD-10-CM

## 2021-05-17 DIAGNOSIS — R06.1 STRIDOR: ICD-10-CM

## 2021-05-17 DIAGNOSIS — R06.01 ORTHOPNEA: ICD-10-CM

## 2021-05-17 LAB
ALBUMIN SERPL ELPH-MCNC: 3.4 G/DL — SIGNIFICANT CHANGE UP (ref 3.3–5)
ALP SERPL-CCNC: 106 U/L — SIGNIFICANT CHANGE UP (ref 40–120)
ALT FLD-CCNC: 16 U/L — SIGNIFICANT CHANGE UP (ref 10–45)
ANION GAP SERPL CALC-SCNC: 13 MMOL/L — SIGNIFICANT CHANGE UP (ref 5–17)
AST SERPL-CCNC: 11 U/L — SIGNIFICANT CHANGE UP (ref 10–40)
BASOPHILS # BLD AUTO: 0.03 K/UL — SIGNIFICANT CHANGE UP (ref 0–0.2)
BASOPHILS NFR BLD AUTO: 0.2 % — SIGNIFICANT CHANGE UP (ref 0–2)
BILIRUB SERPL-MCNC: 0.2 MG/DL — SIGNIFICANT CHANGE UP (ref 0.2–1.2)
BUN SERPL-MCNC: 40 MG/DL — HIGH (ref 7–23)
CALCIUM SERPL-MCNC: 8.4 MG/DL — SIGNIFICANT CHANGE UP (ref 8.4–10.5)
CHLORIDE SERPL-SCNC: 107 MMOL/L — SIGNIFICANT CHANGE UP (ref 96–108)
CO2 SERPL-SCNC: 19 MMOL/L — LOW (ref 22–31)
CREAT SERPL-MCNC: 2.98 MG/DL — HIGH (ref 0.5–1.3)
EOSINOPHIL # BLD AUTO: 0.14 K/UL — SIGNIFICANT CHANGE UP (ref 0–0.5)
EOSINOPHIL NFR BLD AUTO: 0.9 % — SIGNIFICANT CHANGE UP (ref 0–6)
GLUCOSE SERPL-MCNC: 116 MG/DL — HIGH (ref 70–99)
HCT VFR BLD CALC: 25 % — LOW (ref 39–50)
HGB BLD-MCNC: 8 G/DL — LOW (ref 13–17)
IMM GRANULOCYTES NFR BLD AUTO: 3.5 % — HIGH (ref 0–1.5)
LYMPHOCYTES # BLD AUTO: 16.3 % — SIGNIFICANT CHANGE UP (ref 13–44)
LYMPHOCYTES # BLD AUTO: 2.5 K/UL — SIGNIFICANT CHANGE UP (ref 1–3.3)
MCHC RBC-ENTMCNC: 29.9 PG — SIGNIFICANT CHANGE UP (ref 27–34)
MCHC RBC-ENTMCNC: 32 GM/DL — SIGNIFICANT CHANGE UP (ref 32–36)
MCV RBC AUTO: 93.3 FL — SIGNIFICANT CHANGE UP (ref 80–100)
MONOCYTES # BLD AUTO: 1.27 K/UL — HIGH (ref 0–0.9)
MONOCYTES NFR BLD AUTO: 8.3 % — SIGNIFICANT CHANGE UP (ref 2–14)
NEUTROPHILS # BLD AUTO: 10.85 K/UL — HIGH (ref 1.8–7.4)
NEUTROPHILS NFR BLD AUTO: 70.8 % — SIGNIFICANT CHANGE UP (ref 43–77)
NRBC # BLD: 0 /100 WBCS — SIGNIFICANT CHANGE UP (ref 0–0)
PLATELET # BLD AUTO: 195 K/UL — SIGNIFICANT CHANGE UP (ref 150–400)
POTASSIUM SERPL-MCNC: 4.3 MMOL/L — SIGNIFICANT CHANGE UP (ref 3.5–5.3)
POTASSIUM SERPL-SCNC: 4.3 MMOL/L — SIGNIFICANT CHANGE UP (ref 3.5–5.3)
PROT SERPL-MCNC: 6.1 G/DL — SIGNIFICANT CHANGE UP (ref 6–8.3)
RBC # BLD: 2.68 M/UL — LOW (ref 4.2–5.8)
RBC # FLD: 13.2 % — SIGNIFICANT CHANGE UP (ref 10.3–14.5)
SARS-COV-2 RNA SPEC QL NAA+PROBE: SIGNIFICANT CHANGE UP
SODIUM SERPL-SCNC: 139 MMOL/L — SIGNIFICANT CHANGE UP (ref 135–145)
WBC # BLD: 15.33 K/UL — HIGH (ref 3.8–10.5)
WBC # FLD AUTO: 15.33 K/UL — HIGH (ref 3.8–10.5)

## 2021-05-17 PROCEDURE — 93010 ELECTROCARDIOGRAM REPORT: CPT

## 2021-05-17 PROCEDURE — 80053 COMPREHEN METABOLIC PANEL: CPT

## 2021-05-17 PROCEDURE — 31505 DIAGNOSTIC LARYNGOSCOPY: CPT

## 2021-05-17 PROCEDURE — 99284 EMERGENCY DEPT VISIT MOD MDM: CPT | Mod: 25

## 2021-05-17 PROCEDURE — U0003: CPT

## 2021-05-17 PROCEDURE — 31575 DIAGNOSTIC LARYNGOSCOPY: CPT

## 2021-05-17 PROCEDURE — 99233 SBSQ HOSP IP/OBS HIGH 50: CPT

## 2021-05-17 PROCEDURE — 85025 COMPLETE CBC W/AUTO DIFF WBC: CPT

## 2021-05-17 PROCEDURE — 99222 1ST HOSP IP/OBS MODERATE 55: CPT | Mod: 25

## 2021-05-17 PROCEDURE — 71045 X-RAY EXAM CHEST 1 VIEW: CPT

## 2021-05-17 PROCEDURE — 96375 TX/PRO/DX INJ NEW DRUG ADDON: CPT | Mod: XU

## 2021-05-17 PROCEDURE — 96365 THER/PROPH/DIAG IV INF INIT: CPT | Mod: XU

## 2021-05-17 PROCEDURE — U0005: CPT

## 2021-05-17 PROCEDURE — 93005 ELECTROCARDIOGRAM TRACING: CPT | Mod: XU

## 2021-05-17 PROCEDURE — 99285 EMERGENCY DEPT VISIT HI MDM: CPT | Mod: 25

## 2021-05-17 PROCEDURE — 71045 X-RAY EXAM CHEST 1 VIEW: CPT | Mod: 26,77

## 2021-05-17 PROCEDURE — 96366 THER/PROPH/DIAG IV INF ADDON: CPT | Mod: XU

## 2021-05-17 PROCEDURE — 96368 THER/DIAG CONCURRENT INF: CPT | Mod: XU

## 2021-05-17 PROCEDURE — 71045 X-RAY EXAM CHEST 1 VIEW: CPT | Mod: 26

## 2021-05-17 PROCEDURE — 99285 EMERGENCY DEPT VISIT HI MDM: CPT

## 2021-05-17 RX ORDER — SODIUM CHLORIDE 9 MG/ML
1000 INJECTION, SOLUTION INTRAVENOUS
Refills: 0 | Status: DISCONTINUED | OUTPATIENT
Start: 2021-05-17 | End: 2021-05-17

## 2021-05-17 RX ORDER — ACETAMINOPHEN 500 MG
1000 TABLET ORAL ONCE
Refills: 0 | Status: COMPLETED | OUTPATIENT
Start: 2021-05-17 | End: 2021-05-17

## 2021-05-17 RX ORDER — SODIUM CHLORIDE 9 MG/ML
1000 INJECTION INTRAMUSCULAR; INTRAVENOUS; SUBCUTANEOUS
Refills: 0 | Status: DISCONTINUED | OUTPATIENT
Start: 2021-05-17 | End: 2021-05-18

## 2021-05-17 RX ORDER — MORPHINE SULFATE 50 MG/1
2 CAPSULE, EXTENDED RELEASE ORAL ONCE
Refills: 0 | Status: DISCONTINUED | OUTPATIENT
Start: 2021-05-17 | End: 2021-05-17

## 2021-05-17 RX ORDER — AMLODIPINE BESYLATE 2.5 MG/1
10 TABLET ORAL DAILY
Refills: 0 | Status: DISCONTINUED | OUTPATIENT
Start: 2021-05-18 | End: 2021-05-18

## 2021-05-17 RX ORDER — LABETALOL HCL 100 MG
100 TABLET ORAL THREE TIMES A DAY
Refills: 0 | Status: DISCONTINUED | OUTPATIENT
Start: 2021-05-17 | End: 2021-05-18

## 2021-05-17 RX ORDER — PANTOPRAZOLE SODIUM 20 MG/1
40 TABLET, DELAYED RELEASE ORAL
Refills: 0 | Status: DISCONTINUED | OUTPATIENT
Start: 2021-05-17 | End: 2021-05-18

## 2021-05-17 RX ORDER — LORATADINE 10 MG/1
10 TABLET ORAL DAILY
Refills: 0 | Status: DISCONTINUED | OUTPATIENT
Start: 2021-05-17 | End: 2021-05-18

## 2021-05-17 RX ORDER — LEVETIRACETAM 250 MG/1
750 TABLET, FILM COATED ORAL
Refills: 0 | Status: DISCONTINUED | OUTPATIENT
Start: 2021-05-17 | End: 2021-05-18

## 2021-05-17 RX ORDER — DEXAMETHASONE 0.5 MG/5ML
10 ELIXIR ORAL ONCE
Refills: 0 | Status: COMPLETED | OUTPATIENT
Start: 2021-05-17 | End: 2021-05-17

## 2021-05-17 RX ORDER — LEVETIRACETAM 250 MG/1
1000 TABLET, FILM COATED ORAL ONCE
Refills: 0 | Status: COMPLETED | OUTPATIENT
Start: 2021-05-17 | End: 2021-05-17

## 2021-05-17 RX ORDER — APIXABAN 2.5 MG/1
5 TABLET, FILM COATED ORAL
Refills: 0 | Status: DISCONTINUED | OUTPATIENT
Start: 2021-05-17 | End: 2021-05-18

## 2021-05-17 RX ADMIN — APIXABAN 5 MILLIGRAM(S): 2.5 TABLET, FILM COATED ORAL at 17:55

## 2021-05-17 RX ADMIN — Medication 102 MILLIGRAM(S): at 06:45

## 2021-05-17 RX ADMIN — LEVETIRACETAM 750 MILLIGRAM(S): 250 TABLET, FILM COATED ORAL at 18:44

## 2021-05-17 RX ADMIN — Medication 1000 MILLIGRAM(S): at 18:12

## 2021-05-17 RX ADMIN — Medication 100 MILLIGRAM(S): at 21:44

## 2021-05-17 RX ADMIN — LEVETIRACETAM 400 MILLIGRAM(S): 250 TABLET, FILM COATED ORAL at 06:12

## 2021-05-17 RX ADMIN — SODIUM CHLORIDE 50 MILLILITER(S): 9 INJECTION, SOLUTION INTRAVENOUS at 13:00

## 2021-05-17 RX ADMIN — LEVETIRACETAM 1000 MILLIGRAM(S): 250 TABLET, FILM COATED ORAL at 10:20

## 2021-05-17 RX ADMIN — Medication 400 MILLIGRAM(S): at 17:58

## 2021-05-17 RX ADMIN — Medication 10 MILLIGRAM(S): at 10:20

## 2021-05-17 RX ADMIN — MORPHINE SULFATE 2 MILLIGRAM(S): 50 CAPSULE, EXTENDED RELEASE ORAL at 10:16

## 2021-05-17 RX ADMIN — Medication 102 MILLIGRAM(S): at 10:16

## 2021-05-17 RX ADMIN — SODIUM CHLORIDE 75 MILLILITER(S): 9 INJECTION INTRAMUSCULAR; INTRAVENOUS; SUBCUTANEOUS at 17:20

## 2021-05-17 RX ADMIN — PANTOPRAZOLE SODIUM 40 MILLIGRAM(S): 20 TABLET, DELAYED RELEASE ORAL at 17:58

## 2021-05-17 NOTE — ED ADULT NURSE NOTE - OBJECTIVE STATEMENT
61M with ho sz disorder, trach after resp failure, subsequently de-cannulated with vocal cord edema post op presenting with ongoing stridor. Pt here with occasional inspiratory stridor but no resp distress, phonating well. Pt states having difficulty sleeping because of coughing up secretion sat night. Pt is Aox4, patent airway, clear lung sounds, soft non tender abdomen, strong peripheral pulses, skin is warm dry intact, no changes in bowel/bladder patterns. Plan for ENT consultation for bedside laryngoscopy.

## 2021-05-17 NOTE — CONSULT NOTE ADULT - SUBJECTIVE AND OBJECTIVE BOX
General Surgery Consult Note     Consulting Surgical Team: Thoracic Surgery  Consulting Attending: Dr. Allen     HPI:  61M PMHx HTN, COVID (3 months ago, not requiring hospitalization), CKD, PE, seizure on Keppra, s/p emergent tracheostomy 2/2 seizure and lingual hematoma on 3/25, readmitted 4/16 for SOB requiring flex bronchoscopy and balloon dilation of trachea, tracheal revision and reconstruction with Dr. Allen 4/23 with subsequent presentations for stridor/SOB requiring bronchoscopy (4/30, 5/5) now presenting with new onset cough, acute on chronic stridor. His most recent admission (5/5-5/11) for stridor/SOB and is s/p bronchoscopy with injection of posterior L vocal cord. After discharge, he developed a cough with sputum production, in addition to nasal congestion. Reports that his voice is unchanged. Able to tolerate food and liquids. Denies fever, chills, trouble breathing, sick contacts, h/o allergies. ENT c/s with ED bedside laryngoscopy significant for L VC paralysis, airway patent.     PMH  HTN  Seizure on Keppra    PSH  Emergent tracheostomy 3/25  Tracheal revision and reconstruction 4/23  Flexible bronchoscopy 4/19, 4/29, 4/30, 5/5    MEDS  Home Medications:  Keppra    Allergies  No Known Allergies      Physical Exam  T(C): 37.1 (05-17-21 @ 07:22), Max: 37.1 (05-17-21 @ 07:22)  HR: 86 (05-17-21 @ 07:22) (86 - 88)  BP: 137/89 (05-17-21 @ 07:22) (134/86 - 137/89)  RR: 17 (05-17-21 @ 07:22) (16 - 17)  SpO2: 100% (05-17-21 @ 07:22) (99% - 100%)  Wt(kg): --  Tmax: T(C): , Max: 37.1 (05-17-21 @ 07:22)  Wt(kg): --    Gen: NAD  HEENT: normocephalic, atraumatic, no scleral icterus, no edema, no drooling  CV: RRR  Pulm: Normal work of breathing, no tripoding, speaking full sentences  Abd: Soft, ND, NTP, no rebound, no guarding, no palpable organomegaly/masses  Ext: warm, no edema, palp dp/pt      Labs:                        8.0    15.33 )-----------( 195      ( 17 May 2021 05:50 )             25.0     05-17    139  |  107  |  40<H>  ----------------------------<  116<H>  4.3   |  19<L>  |  2.98<H>    Ca    8.4      17 May 2021 05:50    TPro  6.1  /  Alb  3.4  /  TBili  0.2  /  DBili  x   /  AST  11  /  ALT  16  /  AlkPhos  106  05-17    Imaging  < from: Xray Chest 1 View- PORTABLE-Urgent (Xray Chest 1 View- PORTABLE-Urgent .) (05.17.21 @ 06:02) >  EXAM:  XR CHEST PORTABLE URGENT 1V                        PROCEDURE DATE:  05/17/2021    INTERPRETATION:  CLINICAL INFORMATION: Respiratory stridor, evaluate for pneumonia.    TECHNIQUE: AP view of the chest.    COMPARISON: No comparison available.    FINDINGS:    The lungs are clear. No pleural effusion or pneumothorax.  Heart size is poorly assessed on this view.  Degenerative changes of the spine.    IMPRESSION:    Clear lungs.    < end of copied text >       General Surgery Consult Note     Consulting Surgical Team: Thoracic Surgery  Consulting Attending: Dr. Allen     HPI:  61M PMHx HTN, COVID (3 months ago, not requiring hospitalization), CKD, PE, seizure on Keppra, s/p emergent tracheostomy 2/2 seizure and lingual hematoma on 3/25, readmitted 4/16 for SOB requiring flex bronchoscopy and balloon dilation of trachea, tracheal revision and reconstruction with Dr. Allen 4/23 with subsequent presentations for stridor/SOB requiring bronchoscopy (4/30, 5/5) now presenting with new onset cough, acute on chronic stridor. His most recent admission (5/5-5/11) for stridor/SOB and is s/p bronchoscopy with injection of posterior L vocal cord. After discharge, he developed a cough with sputum production, in addition to nasal congestion. Reports that his voice is unchanged. Able to tolerate food and liquids. Denies fever, chills, trouble breathing, sick contacts, h/o allergies. ENT c/s with ED bedside laryngoscopy significant for L VC paralysis, airway patent.     In ED, patient sitting comfortably in bed. Hemodynamically stable, saturating 100% on RA, afebrile, WBC 15.3.     PMH  HTN  Seizure on Keppra    PSH  Emergent tracheostomy 3/25  Tracheal revision and reconstruction 4/23  Flexible bronchoscopy 4/19, 4/29, 4/30, 5/5    MEDS  Home Medications:  Keppra    Allergies  No Known Allergies      Physical Exam  T(C): 37.1 (05-17-21 @ 07:22), Max: 37.1 (05-17-21 @ 07:22)  HR: 86 (05-17-21 @ 07:22) (86 - 88)  BP: 137/89 (05-17-21 @ 07:22) (134/86 - 137/89)  RR: 17 (05-17-21 @ 07:22) (16 - 17)  SpO2: 100% (05-17-21 @ 07:22) (99% - 100%)  Wt(kg): --  Tmax: T(C): , Max: 37.1 (05-17-21 @ 07:22)  Wt(kg): --    Gen: NAD  HEENT: normocephalic, atraumatic, no scleral icterus, no edema, no drooling  CV: RRR  Pulm: Normal work of breathing, no tripoding, speaking full sentences  Abd: Soft, ND, NTP, no rebound, no guarding, no palpable organomegaly/masses  Ext: warm, no edema, palp dp/pt      Labs:                        8.0    15.33 )-----------( 195      ( 17 May 2021 05:50 )             25.0     05-17    139  |  107  |  40<H>  ----------------------------<  116<H>  4.3   |  19<L>  |  2.98<H>    Ca    8.4      17 May 2021 05:50    TPro  6.1  /  Alb  3.4  /  TBili  0.2  /  DBili  x   /  AST  11  /  ALT  16  /  AlkPhos  106  05-17    Imaging  < from: Xray Chest 1 View- PORTABLE-Urgent (Xray Chest 1 View- PORTABLE-Urgent .) (05.17.21 @ 06:02) >  EXAM:  XR CHEST PORTABLE URGENT 1V                        PROCEDURE DATE:  05/17/2021    INTERPRETATION:  CLINICAL INFORMATION: Respiratory stridor, evaluate for pneumonia.    TECHNIQUE: AP view of the chest.    COMPARISON: No comparison available.    FINDINGS:    The lungs are clear. No pleural effusion or pneumothorax.  Heart size is poorly assessed on this view.  Degenerative changes of the spine.    IMPRESSION:    Clear lungs.    < end of copied text >       General Surgery Consult Note     Consulting Surgical Team: Thoracic Surgery  Consulting Attending: Dr. Allen     HPI:  61M PMHx HTN, COVID (3 months ago, not requiring hospitalization), CKD, PE, seizure on Keppra, s/p emergent tracheostomy 2/2 seizure and lingual hematoma on 3/25, readmitted 4/16 for SOB requiring flex bronchoscopy and balloon dilation of trachea, tracheal revision and reconstruction with Dr. Allen 4/23 with subsequent presentations for stridor/SOB requiring bronchoscopy (4/30, 5/5) now presenting with new onset cough, acute on chronic stridor. His most recent admission (5/5-5/11) for stridor/SOB and is s/p bronchoscopy with injection of posterior L vocal cord. After discharge, he developed a cough with sputum production, in addition to nasal congestion. Reports that his voice is hoarse. Able to tolerate food and liquids. Denies fever, chills, trouble breathing, CP, SOB, sick contacts, h/o allergies. ENT c/s with ED bedside laryngoscopy significant for L VC paralysis, airway patent.     In ED, patient sitting comfortably in bed. Hemodynamically stable, saturating 100% on RA, afebrile, WBC 15.3.     PMH  HTN  Seizure on Keppra    PSH  Emergent tracheostomy 3/25  Tracheal revision and reconstruction 4/23  Flexible bronchoscopy 4/19, 4/29, 4/30, 5/5, 5/11    MEDS  Home Medications:  Keppra    Allergies  No Known Allergies      Physical Exam  T(C): 37.1 (05-17-21 @ 07:22), Max: 37.1 (05-17-21 @ 07:22)  HR: 86 (05-17-21 @ 07:22) (86 - 88)  BP: 137/89 (05-17-21 @ 07:22) (134/86 - 137/89)  RR: 17 (05-17-21 @ 07:22) (16 - 17)  SpO2: 100% (05-17-21 @ 07:22) (99% - 100%)  Wt(kg): --  Tmax: T(C): , Max: 37.1 (05-17-21 @ 07:22)  Wt(kg): --    Gen: NAD  HEENT: normocephalic, atraumatic, no scleral icterus, no edema, no drooling  CV: RRR  Pulm: Normal work of breathing, no tripoding, speaking full sentences  Abd: Soft, ND, NTP, no rebound, no guarding, no palpable organomegaly/masses  Ext: warm, no edema, palp dp/pt      Labs:                        8.0    15.33 )-----------( 195      ( 17 May 2021 05:50 )             25.0     05-17    139  |  107  |  40<H>  ----------------------------<  116<H>  4.3   |  19<L>  |  2.98<H>    Ca    8.4      17 May 2021 05:50    TPro  6.1  /  Alb  3.4  /  TBili  0.2  /  DBili  x   /  AST  11  /  ALT  16  /  AlkPhos  106  05-17    Imaging  < from: Xray Chest 1 View- PORTABLE-Urgent (Xray Chest 1 View- PORTABLE-Urgent .) (05.17.21 @ 06:02) >  EXAM:  XR CHEST PORTABLE URGENT 1V                        PROCEDURE DATE:  05/17/2021    INTERPRETATION:  CLINICAL INFORMATION: Respiratory stridor, evaluate for pneumonia.    TECHNIQUE: AP view of the chest.    COMPARISON: No comparison available.    FINDINGS:    The lungs are clear. No pleural effusion or pneumothorax.  Heart size is poorly assessed on this view.  Degenerative changes of the spine.    IMPRESSION:    Clear lungs.    < end of copied text >

## 2021-05-17 NOTE — ED PROVIDER NOTE - CLINICAL SUMMARY MEDICAL DECISION MAKING FREE TEXT BOX
Attending MD Chahal:   61M with ho sz disorder, trach after resp failure, subsequently de-cannulated with vocal cord edema post op presenting with ongoing stridor. Pt here with occasional inspiratory stridor but no resp distress, phonating well. Plan for ENT consultation for bedside laryngoscopy. Management plan will as per ENT given complex ENT surgical history      *The above represents an initial assessment/impression. Please refer to progress notes for potential changes in patient clinical course*

## 2021-05-17 NOTE — ED PROVIDER NOTE - NSPREEXISTRELATION_ED_A_ED_FT
Patient had procedure by Dr. Allen and ANNE MARIE on 4/26 and patient to be transferred for continuity of care of known pathology.

## 2021-05-17 NOTE — ED PROVIDER NOTE - NS ED ROS FT
GENERAL: No fever or chills, weight changes, nightsweats  EYES: no change in vision  HEENT: no dysplasia, odynophagia, ear pain, rhinorrhea, epistasis   CARDIAC: no chest pain, palpitation   PULMONARY: no productive cough  GI: no abdominal pain, no nausea or no vomiting, no diarrhea or constipation  : No changes in urination for pain/freq.   SKIN: no rashes, abnormal bruising or bleeding  NEURO: no headache, numbness/tingling, extremity weakness   MSK: No joint pain

## 2021-05-17 NOTE — ED PROVIDER NOTE - PHYSICAL EXAMINATION
General: NAD, good hygiene, well developed  HENT: Atraumatic, EOMI, no conjunctivae injection, moist mucosa.  Neck: normal ROM and trachea midline, clean surgical scar.   Cardiovascular: RRR, S1&2, no M or R, radial pulses equal and b/l  Respiratory: CTABL, no wheezes or crackles, no decreased breath sounds  Abdominal: soft and non-tender non distended, neg for guarding, no CVA tenderness   Extremities: no edema of the legs/feet, DP/PT equal b/l  Skin: warm, well perfused  Neurologic: nonfocal, AAOx3  Psych: normal mood and affect

## 2021-05-17 NOTE — ED ADULT NURSE NOTE - NSIMPLEMENTINTERV_GEN_ALL_ED
Implemented All Universal Safety Interventions:  [place] to call system. Call bell, personal items and telephone within reach. Instruct patient to call for assistance. Room bathroom lighting operational. Non-slip footwear when patient is off stretcher. Physically safe environment: no spills, clutter or unnecessary equipment. Stretcher in lowest position, wheels locked, appropriate side rails in place.

## 2021-05-17 NOTE — H&P ADULT - OPHTHALMOLOGIC
negative Cheek Interpolation Flap Text: A decision was made to reconstruct the defect utilizing an interpolation axial flap and a staged reconstruction.  A telfa template was made of the defect.  This telfa template was then used to outline the Cheek Interpolation flap.  The donor area for the pedicle flap was then injected with anesthesia.  The flap was excised through the skin and subcutaneous tissue down to the layer of the underlying musculature.  The interpolation flap was carefully excised within this deep plane to maintain its blood supply.  The edges of the donor site were undermined.   The donor site was closed in a primary fashion.  The pedicle was then rotated into position and sutured.  Once the tube was sutured into place, adequate blood supply was confirmed with blanching and refill.  The pedicle was then wrapped with xeroform gauze and dressed appropriately with a telfa and gauze bandage to ensure continued blood supply and protect the attached pedicle.

## 2021-05-17 NOTE — H&P ADULT - HISTORY OF PRESENT ILLNESS
62 y/o male w/ PMHx COVID+ (3/2021 - not hospitalized), PE (8/2020 - on eliquis at home), CKD, w/ recent hospitalization at [facility] after seizure w/ lingual hematoma requiring emergency tracheostomy on 3/25/21.  Patient was discharged from that admission on 4/3/21, decannuulated, and readmitted on 4/16 w/ worsening stridor.  Underwent Tracheal Resection and Reconstruction on 4/23, and discharged home on 4/29, only to be readmitted the following day w/ worsening stridor once again.  Flexible bronchoscopy revealed edematous vocal cords, and the patient was discharged on 5/3/21.  However, once again readmitted on 5/5/21 w/ stridor.  Received IV steroids, and Bronchoscopy on 5/10 revealed good movement of vocal cords, but still edematous, and posterior aspect of the left cord was injected w/ calcium hydroxylapatite.  He was discharged home on 5/11/21 w/ medrol dose pack, and brought to [facility] ER this morning with complaints of a "scratchy" throat w/ cough.  COVID PCR negative at [facility] this morning, and patient was transferred here, to [facility].  Upon arrival, patient is comfortable on room-air, with O2sat 100%.  Denies any chest pains or SOB.

## 2021-05-17 NOTE — CONSULT NOTE ADULT - ATTENDING COMMENTS
patient is comfortable with inspiratory stridor, but no respiratory distress.  Had recent TCR with Dr. Mondragon of thoracic, injection laryngoplasty with Dr. Hunt.  Complicated airway history as above - previous tracheal stenosis. Will defer to Dr. Allen re: management at this time of recurrence of stridor in the postoperative period but would recommend steroids for now.  Airway from subglottis up appears widely patent, unable to see trachea on fiberoptic laryngoscopy alone.  Please call with any questions or concerns.  Spoke with Dr. Hunt as well to keep him aware.

## 2021-05-17 NOTE — CONSULT NOTE ADULT - SUBJECTIVE AND OBJECTIVE BOX
60 y/o male w/ PMHx COVID, CKD, w/ recent hospitalization at [facility] after seizure w/ lingual hematoma requiring emergency cricothyroidotomy on 3/25/21 by OMFS.  Patient was discharged from that admission on 4/3/21, decannulated, and readmitted on 4/16 w/ worsening stridor.  Underwent tracheal recon on 4/23 with CT surgery, and discharged home on 4/29, readmitted 4/30 with stridor. Flexible bronchoscopy revealed edematous vocal cords, and the patient was discharged on 5/3/21.  Again readmitted on 5/5/21 w/ stridor.  Bronchoscopy on 5/10 revealed good movement of vocal cords, but still edematous, and posterior aspect of the left cord was injected w/ calcium hydroxyapatite by ENT service on 5/10.  He was discharged home on 5/11/21 w/ medrol dose pack, and brought to [facility] ER this morning with complaints of a "scratchy" throat w/ cough.  COVID PCR negative at [facility] this morning. Pt transferred to [facility] for further workup. ENT called for reevaluation.     On exam, pt denies feeling shortness of breath, any respiratory difficulty. Notes that he feels that his voice is improving but still not baseline. States that he came in because for the past 3 days he has been coughing and producing phlegm. COVID test pending.    Physical Exam  T(C): 36.9 (05-17-21 @ 20:39), Max: 36.9 (05-17-21 @ 20:39)  HR: 77 (05-17-21 @ 20:39) (72 - 86)  BP: 129/71 (05-17-21 @ 20:39) (108/74 - 138/68)  RR: 16 (05-17-21 @ 20:39) (14 - 16)  SpO2: 100% (05-17-21 @ 20:39) (100% - 100%)  General: NAD, A+Ox3  No respiratory distress or stertor room air. Slight stridor but pt appears comfortable   Voice quality: raspy but stronger than previous exam   Face:  Symmetric without masses or lesions  OU:  EOMI  Nose: nasal cavity clear bilaterally, inferior turbinates normal, mucosa normal without crusting or bleeding  OC/OP: tongue normal, floor of mouth wnl, no masses or lesions, OP clear  Neck: soft/flat, no LAD  CNII-XII intact    Procedure: Flexible laryngoscopy    Pre-procedure diagnosis/Indication for procedure: To evaluate larynx    Anesthesia: None    Description:    A flexible endoscope was used to examine the left and right nasal cavities, posterior oropharynx, hypopharynx, and larynx. The nasal valve areas were examined for abnormalities or collapse. The inferior and middle turbinates were evaluated. The middle and superior meatuses, the sphenoethmoid recesses, and the nasopharynx were examined and inspected for mucopurulence, polyps, and nasal masses. The oropharynx, tongue base/vallecula, epiglottis, aryepiglottic folds, arytenoids, vocal cords, hypopharynx were also inspected for swelling, inflammation, or dysfunction. The patient tolerated the procedure without complications.    Findings:    NP wnl  BOT/vallecula normal  Epiglottis sharp  AE folds nonedematous  Arytenoids mobile  Vocal folds mobile bilaterally - movement improved, closure appears complete  No masses or lesions visualized in post cricoid space or pyriform sinuses bilaterally      --------------------------------------------------------------      A/P:  60 y/o male w/ PMHx COVID, CKD, w/ recent hospitalization at [facility] after seizure w/ lingual hematoma requiring emergency cricothyroidotomy on 3/25/21 by OMFS. Multiple readmissions for stridor, s/p tracheal resection and recon 4/23/21, s/p vocal cord injection 5/10. now readmitted with cough. Scope exam shows improved vocal cord closure.    - SLP for diet  - antireflux medications (PPI)  - follow up with Dr Hunt in 1 week - [phone] for appt     ---------------------------------------------------------------

## 2021-05-17 NOTE — ED PROVIDER NOTE - ATTENDING CONTRIBUTION TO CARE
Attending MD Chahal:  I personally have seen and examined this patient.  Resident note reviewed and agree on plan of care and except where noted.  See HPI, PE, and MDM for details.

## 2021-05-17 NOTE — ED ADULT NURSE REASSESSMENT NOTE - NS ED NURSE REASSESS COMMENT FT1
Report given to ALLAN Powell by ALLAN Edmond through transfer center.
Report given to ALLAN Trujillo at [facility] at [facility]
Pharmacy called for decadron IVPB. To be sent to [facility].

## 2021-05-17 NOTE — CONSULT NOTE ADULT - SUBJECTIVE AND OBJECTIVE BOX
CC: stridor    HPI: 61M with ho sz disorder, trach after resp failure, subsequently de-cannulated with vocal cord edema post op presenting with ongoing stridor.        PAST MEDICAL & SURGICAL HISTORY:    Allergies    No Known Allergies    Intolerances      MEDICATIONS  (STANDING):  levETIRAcetam  IVPB 1000 milliGRAM(s) IV Intermittent once    MEDICATIONS  (PRN):      Social History: **??**    Family history: **??**    ROS:   ENT: all negative except as noted in HPI   CV: denies palpitations  Pulm: denies SOB, cough, hemoptysis  GI: denies change in apetite, indigestion, n/v  : denies pertinent urinary symptoms, urgency  Neuro: denies numbness/tingling, loss of sensation  Psych: denies anxiety  MS: denies muscle weakness, instability  Heme: denies easy bruising or bleeding  Endo: denies heat/cold intolerance, excessive sweating  Vascular: denies LE edema    Vital Signs Last 24 Hrs  T(C): 36.9 (17 May 2021 05:17), Max: 36.9 (17 May 2021 05:17)  T(F): 98.4 (17 May 2021 05:17), Max: 98.4 (17 May 2021 05:17)  HR: 88 (17 May 2021 05:17) (88 - 88)  BP: 134/86 (17 May 2021 05:17) (134/86 - 134/86)  BP(mean): --  RR: 16 (17 May 2021 05:17) (16 - 16)  SpO2: 99% (17 May 2021 05:17) (99% - 99%)              PHYSICAL EXAM:  Gen: NAD  Skin: No rashes, bruises, or lesions  Head: Normocephalic, Atraumatic  Face: no edema, erythema, or fluctuance. Parotid glands soft without mass  Eyes: no scleral injection  Nose: Nares bilaterally patent, no discharge  Mouth: No Stridor / Drooling / Trismus.  Mucosa moist, tongue/uvula midline, oropharynx clear  Neck: Flat, supple, no lymphadenopathy, trachea midline, no masses  Lymphatic: No lymphadenopathy  Resp: breathing easily, no stridor  CV: no peripheral edema/cyanosis  GI: nondistended   Peripheral vascular: no JVD or edema  Neuro: facial nerve intact, no facial droop        Diagnostic Nasal Endoscopy: (Scope #2 used)    Fiberoptic Indirect laryngoscopy:  (Scope #2 used)        IMAGING/ADDITIONAL STUDIES:  CC: stridor    HPI: 60 y/o male returns to ER for c/o stridor after discharge from [facility] on 5/11 s/p bronchoscopy, injection of posterior aspect of L VC with calcium hydroxylapatite. He has PMHx of HTN, B/L TKR, and COVID 3 months ago (not hospitalized),  CKD, PE, and recent seizure requiring emergency cric/tracheostomy on 03/25/2021 at [facility] due to tongue injury and lingual hematoma (discharged on 4/3/2021).  Readmitted on 4/16/21 w/ SOB, and underwent Flexible Bronchoscopy, Balloon dilatation of trachea on 4/19/21. Pt remained inhouse on Heparin gtt awaiting Tracheal resection. On 4/23/21 Pt had a Tracheal resection and reconstruction 4/23/21. Post op w Guardian stitch in place; Was brought back to the OR on 4/29 for surveillance bronchoscopy which revealed a patent airway and well healing anastomosis and therefore guardian suture removed and pt discharged home with soft collar in place. Pt presented to ER 4/30 w c/o stridor and was immediately taken to OR for flexible bronchoscopy revealing edematous VCs and fibrin deposit surrounding tracheal anastomosis but patent airway. Pt again presented a week and a half ago with increased stridor and increased mucous production with a cough now s/p bronch, VC injection of the left cord.         PAST MEDICAL & SURGICAL HISTORY:    Allergies    No Known Allergies    Intolerances      MEDICATIONS  (STANDING):  levETIRAcetam  IVPB 1000 milliGRAM(s) IV Intermittent once    MEDICATIONS  (PRN):      Social History: lives with family     Family history: no pertinent family history     ROS:   ENT: all negative except as noted in HPI   CV: denies palpitations  Pulm: denies SOB, cough, hemoptysis  GI: denies change in apetite, indigestion, n/v  : denies pertinent urinary symptoms, urgency  Neuro: denies numbness/tingling, loss of sensation  Psych: denies anxiety  MS: denies muscle weakness, instability  Heme: denies easy bruising or bleeding  Endo: denies heat/cold intolerance, excessive sweating  Vascular: denies LE edema    Vital Signs Last 24 Hrs  T(C): 36.9 (17 May 2021 05:17), Max: 36.9 (17 May 2021 05:17)  T(F): 98.4 (17 May 2021 05:17), Max: 98.4 (17 May 2021 05:17)  HR: 88 (17 May 2021 05:17) (88 - 88)  BP: 134/86 (17 May 2021 05:17) (134/86 - 134/86)  BP(mean): --  RR: 16 (17 May 2021 05:17) (16 - 16)  SpO2: 99% (17 May 2021 05:17) (99% - 99%)              PHYSICAL EXAM:  Gen: NAD  Skin: No rashes, bruises, or lesions  Head: Normocephalic, Atraumatic  Face: no edema, erythema, or fluctuance. Parotid glands soft without mass  Eyes: no scleral injection  Nose: Nares bilaterally patent, no discharge  Mouth: No Stridor / Drooling / Trismus.  Mucosa moist, tongue/uvula midline, oropharynx clear  Neck: Flat, supple, no lymphadenopathy, trachea midline, no masses, soft neck collar   Lymphatic: No lymphadenopathy  Resp: breathing easily, no stridor  CV: no peripheral edema/cyanosis  GI: nondistended   Peripheral vascular: no JVD or edema  Neuro: facial nerve intact, no facial droop        Fiberoptic Indirect laryngoscopy:  (Scope #2 used):    Reason for Laryngoscopy:    Nasopharynx, oropharynx, and hypopharynx clear, no bleeding. Tongue base, posterior pharyngeal wall, vallecula, epiglottis, and subglottis appear normal. No erythema, edema, pooling of secretions, masses or lesions. Airway patent, no foreign body visualized. No glottic/supraglottic edema. True vocal cords, arytenoids, vestibular folds, ventricles, pyriform sinuses, and aryepiglottic folds appear normal bilaterally. Patient with left vocal cord paralysis, right vocal cord mobile and intact

## 2021-05-17 NOTE — CONSULT NOTE ADULT - ASSESSMENT
61M PMHx HTN, COVID (3 months ago, not requiring hospitalization), CKD, PE, seizure on Keppra, s/p emergent tracheostomy 2/2 seizure and lingual hematoma on 3/25 with complicated head/neck surgery course requiring multiple bronchoscopies, most recently with L VC injection for paralysis on 5/5 presenting with cough and acute on chronic stridor, bedside laryngoscopy with L VC paralysis and patent airway, saturating 100% on RA. General Surgery consulted for further management.      Plan:  - Transfer to [facility] per Dr. Allen  - Appreciate ENT input    D/w Dr. Allen  Thoracic Surgery 
60 y/o male with complicated head and neck coarse requiring emergency cric/trach, tracheal dilation, tracheal resection and reconstruction, multiple bronchoscopies and L VC paralysis s/p VC injection (done one week ago) c/o stridor and cough. Patient is oxygenating 100% in RA. Denies difficulty breathing.

## 2021-05-17 NOTE — PATIENT PROFILE ADULT - DOES PATIENT HAVE ADVANCE DIRECTIVE
Refill Protocol Appointment Criteria  · Appointment scheduled in the past 12 months or in the next 3 months  Recent Outpatient Visits            2 days ago Dizziness    [facility], [facility], [address], [place], [place]    Office Visit    3 No

## 2021-05-17 NOTE — ED ADULT NURSE NOTE - PAIN: PRESENCE, MLM
Patient Seen in: [facility] Emergency Department    History   Patient presents with:  Fatigue (constitutional, neurologic)  Poor Feed Anorexia (constitutional)  Back Pain (musculoskeletal)    Stated Complaint: generalized fatigue, chills, lower back denies pain/discomfort status: Never Smoker                                                              Smokeless tobacco: Never Used                          Review of Systems    Positive for stated complaint: generalized fatigue, chills, lower back pain  Other systems are as Status                     ---------                               -----------         ------                     CBC W/ DIFFERENTIAL[[id number]]          Normal              Final result                 Please view results for these tests on the indivi

## 2021-05-17 NOTE — ED PROVIDER NOTE - PROGRESS NOTE DETAILS
Brenden Mora, PGY 3: ent scoped the patient and saw chronic left sided vocal cord paralysis but unable to see past the vocal cord, recom dexamethasone and will contact head neck surgeon at [facility]. Discusseed with Daina ARMSTRONG,  from ENT who discussed patient with Dr. Hunt.  Left vocal chord paralysis on scope.  Suggested 10mg of decadron which will be provided.  Dr. Hernandez completed the tracheal revision approximately a month ago.  Will reach out to Dr. Hernandez to inform of the patients status.  ENT does not have any further suggestion for treatment or disposition at this time.

## 2021-05-17 NOTE — ED PROVIDER NOTE - OBJECTIVE STATEMENT
h.o seizure (keppra), emergent tracheostomy 2/2 lingual hematoma 3/25/21, decannulated in 4/16 and s/p tracheal resection and reconstruction 4/23, p.w acute on chronic stridor. patient was evaluated last week for vocal cord paralysis s/p vocal cord injection last week. patient bibems for worsening stridor tonight, sat 100 on RA. patient reports no cough, n/v, headache, or chest pain. patient states no taking keppra due to no medications at home. patient had multiple admission and er visit for stridor and symptom complaints.   [facility] MR: [id number] h.o seizure (keppra), emergent tracheostomy 2/2 lingual hematoma 3/25/21, decannulated in 4/16 and s/p tracheal resection and reconstruction 4/23, p.w acute on chronic stridor. patient was evaluated last week for vocal cord paralysis s/p vocal cord injection last week. patient bibems for worsening stridor tonight, sat 100 on RA. patient reports no cough, n/v, headache, or chest pain. patient states no taking keppra due to no medications at home. patient had multiple admission and er visit for stridor and symptom complaints.   [facility] MR: [id number]      Neftaly: Discussed with surgical team who discussed pt with Dr. Allen. Dr. Allen would like patient transferred to [facility] for care and will be the accepting physician.  I informed ENT team who will make Dr. Hunt aware.  Patient stable and aware of transfer.

## 2021-05-17 NOTE — H&P ADULT - PROBLEM SELECTOR PLAN 1
- Patient admitted to CTI  - COVID PCR negative this morning at [facility]  - Bedside laryngoscopy performed here since arrival by Dr. Hunt (ENT).  Vocal cords moving well, improved closure since last bronchoscopy performed on 5/10/21  - Finished medrol dosepak yesterday; No further steroids  - Plan is for NPO for now, IV fluids, speech and swallow consult, and Barium Esophagram tomorrow  - Patient can be transferred to 8T telemetry  - Continue eliquis for history of PE    Patient seen and discussed at bedside with Dr. Allen

## 2021-05-18 ENCOUNTER — TRANSCRIPTION ENCOUNTER (OUTPATIENT)
Age: 61
End: 2021-05-18

## 2021-05-18 ENCOUNTER — APPOINTMENT (OUTPATIENT)
Dept: THORACIC SURGERY | Facility: CLINIC | Age: 61
End: 2021-05-18

## 2021-05-18 VITALS
RESPIRATION RATE: 18 BRPM | SYSTOLIC BLOOD PRESSURE: 119 MMHG | OXYGEN SATURATION: 99 % | DIASTOLIC BLOOD PRESSURE: 71 MMHG | HEART RATE: 77 BPM | TEMPERATURE: 98 F

## 2021-05-18 DIAGNOSIS — R13.10 DYSPHAGIA, UNSPECIFIED: ICD-10-CM

## 2021-05-18 DIAGNOSIS — J39.8 OTHER SPECIFIED DISEASES OF UPPER RESPIRATORY TRACT: ICD-10-CM

## 2021-05-18 LAB
ANION GAP SERPL CALC-SCNC: 12 MMOL/L — SIGNIFICANT CHANGE UP (ref 7–14)
BASOPHILS # BLD AUTO: 0.03 K/UL — SIGNIFICANT CHANGE UP (ref 0–0.2)
BASOPHILS NFR BLD AUTO: 0.2 % — SIGNIFICANT CHANGE UP (ref 0–2)
BUN SERPL-MCNC: 46 MG/DL — HIGH (ref 7–23)
CALCIUM SERPL-MCNC: 9 MG/DL — SIGNIFICANT CHANGE UP (ref 8.4–10.5)
CHLORIDE SERPL-SCNC: 106 MMOL/L — SIGNIFICANT CHANGE UP (ref 98–107)
CO2 SERPL-SCNC: 19 MMOL/L — LOW (ref 22–31)
CREAT SERPL-MCNC: 2.6 MG/DL — HIGH (ref 0.5–1.3)
EOSINOPHIL # BLD AUTO: 0 K/UL — SIGNIFICANT CHANGE UP (ref 0–0.5)
EOSINOPHIL NFR BLD AUTO: 0 % — SIGNIFICANT CHANGE UP (ref 0–6)
GLUCOSE SERPL-MCNC: 132 MG/DL — HIGH (ref 70–99)
HCT VFR BLD CALC: 24.5 % — LOW (ref 39–50)
HGB BLD-MCNC: 8.3 G/DL — LOW (ref 13–17)
IANC: 13.44 K/UL — HIGH (ref 1.5–8.5)
IMM GRANULOCYTES NFR BLD AUTO: 2.5 % — HIGH (ref 0–1.5)
LYMPHOCYTES # BLD AUTO: 1.24 K/UL — SIGNIFICANT CHANGE UP (ref 1–3.3)
LYMPHOCYTES # BLD AUTO: 8 % — LOW (ref 13–44)
MCHC RBC-ENTMCNC: 30.5 PG — SIGNIFICANT CHANGE UP (ref 27–34)
MCHC RBC-ENTMCNC: 33.9 GM/DL — SIGNIFICANT CHANGE UP (ref 32–36)
MCV RBC AUTO: 90.1 FL — SIGNIFICANT CHANGE UP (ref 80–100)
MONOCYTES # BLD AUTO: 0.44 K/UL — SIGNIFICANT CHANGE UP (ref 0–0.9)
MONOCYTES NFR BLD AUTO: 2.8 % — SIGNIFICANT CHANGE UP (ref 2–14)
NEUTROPHILS # BLD AUTO: 13.44 K/UL — HIGH (ref 1.8–7.4)
NEUTROPHILS NFR BLD AUTO: 86.5 % — HIGH (ref 43–77)
NRBC # BLD: 0 /100 WBCS — SIGNIFICANT CHANGE UP
NRBC # FLD: 0 K/UL — SIGNIFICANT CHANGE UP
PLATELET # BLD AUTO: 221 K/UL — SIGNIFICANT CHANGE UP (ref 150–400)
POTASSIUM SERPL-MCNC: 5.3 MMOL/L — SIGNIFICANT CHANGE UP (ref 3.5–5.3)
POTASSIUM SERPL-SCNC: 5.3 MMOL/L — SIGNIFICANT CHANGE UP (ref 3.5–5.3)
RBC # BLD: 2.72 M/UL — LOW (ref 4.2–5.8)
RBC # FLD: 13.1 % — SIGNIFICANT CHANGE UP (ref 10.3–14.5)
SODIUM SERPL-SCNC: 137 MMOL/L — SIGNIFICANT CHANGE UP (ref 135–145)
WBC # BLD: 15.54 K/UL — HIGH (ref 3.8–10.5)
WBC # FLD AUTO: 15.54 K/UL — HIGH (ref 3.8–10.5)

## 2021-05-18 PROCEDURE — 99231 SBSQ HOSP IP/OBS SF/LOW 25: CPT

## 2021-05-18 PROCEDURE — 74230 X-RAY XM SWLNG FUNCJ C+: CPT | Mod: 26

## 2021-05-18 RX ADMIN — SODIUM CHLORIDE 75 MILLILITER(S): 9 INJECTION INTRAMUSCULAR; INTRAVENOUS; SUBCUTANEOUS at 05:16

## 2021-05-18 RX ADMIN — AMLODIPINE BESYLATE 10 MILLIGRAM(S): 2.5 TABLET ORAL at 05:16

## 2021-05-18 RX ADMIN — LORATADINE 10 MILLIGRAM(S): 10 TABLET ORAL at 12:27

## 2021-05-18 RX ADMIN — PANTOPRAZOLE SODIUM 40 MILLIGRAM(S): 20 TABLET, DELAYED RELEASE ORAL at 05:16

## 2021-05-18 RX ADMIN — APIXABAN 5 MILLIGRAM(S): 2.5 TABLET, FILM COATED ORAL at 05:16

## 2021-05-18 RX ADMIN — LEVETIRACETAM 750 MILLIGRAM(S): 250 TABLET, FILM COATED ORAL at 05:16

## 2021-05-18 RX ADMIN — Medication 100 MILLIGRAM(S): at 05:16

## 2021-05-18 RX ADMIN — Medication 100 MILLIGRAM(S): at 15:00
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
## 2021-05-18 NOTE — PROGRESS NOTE ADULT - PROBLEM SELECTOR PLAN 1
1. Follow swallow therapy recs, monitor diet  2. Will discuss with Dr. Hunt about clearing patient for discharge home from ENT standpoint.

## 2021-05-18 NOTE — DISCHARGE NOTE NURSING/CASE MANAGEMENT/SOCIAL WORK - NSDCFUADDAPPT_GEN_ALL_CORE_FT
Follow-up with Dr. Allen in 2 weeks [phone]  Plan is for repeat Bronchoscopy with Dr. Allen in 2 weeks    Follow-up with Dr. Hunt [phone] in 1 month     Call  to arrange outpatient speech and swallow therapy  [address] [place] [address], [address] [place] [address]

## 2021-05-18 NOTE — PROGRESS NOTE ADULT - ASSESSMENT
S/P VC injection. 
Patient is a 60 yo M, with PMH of HTN, b/l TKR, and COVID 2-3 months ago, not hospitalized  CKD, s/p  PE on lovenox, and recent seizure( unknown etiology) s/p emergency cric on 03/25/2021 from tongue injury lingual hematoma 2/2 to seizure blocking airway (discharged on 4/3/2021) here for vocal cord edema     Problem/Recommendation - 1:  Problem: Tracheal stenosis following tracheostomy. Recommendation: CTS eval appreciated  O2 supplement   NENT eval appreciated  follow up recs     Problem/Recommendation - 2:  ·  Problem: Pulmonary embolus.  Recommendation:  AC as per team       Problem/Recommendation - 3:  ·  Problem: CKD (chronic kidney disease).  Recommendation: monitor BUN/Cr  at his baseline  IV Hydration PRN  monitor urine output.       Problem/Recommendation - 4:  ·  Problem: HTN (hypertension).  Recommendation:   BP control       Problem/Recommendation - 5:  Problem: Prophylactic measure. Recommendation: DVT and GI PPX

## 2021-05-18 NOTE — DISCHARGE NOTE PROVIDER - CARE PROVIDERS DIRECT ADDRESSES
[email or web address]

## 2021-05-18 NOTE — PROGRESS NOTE ADULT - REASON FOR ADMISSION
Scratchy throat/Coughing s/p Vocal Cord injection

## 2021-05-18 NOTE — DISCHARGE NOTE PROVIDER - NSDCFUADDAPPT_GEN_ALL_CORE_FT
Follow-up with Dr. Allen in 2 weeks [phone]  Plan is for repeat Bronchoscopy with Dr. Allen in 2 weeks    Follow-up with Dr. Hunt [phone] in 1 week Follow-up with Dr. Allen in 2 weeks [phone]  Plan is for repeat Bronchoscopy with Dr. Allen in 2 weeks    Follow-up with Dr. Hunt [phone] in 1 week    Call  to arrange outpatient speech and swallow therapy  [address] [place] [address], [address] [place] [address] Follow-up with Dr. Allen in 2 weeks [phone]  Plan is for repeat Bronchoscopy with Dr. Allen in 2 weeks    Follow-up with Dr. Hunt [phone] in 1 month     Call  to arrange outpatient speech and swallow therapy  [address] [place] [address], [address] [place] [address]

## 2021-05-18 NOTE — SWALLOW VFSS/MBS ASSESSMENT ADULT - ADDITIONAL RECOMMENDATIONS
Consider swallow therapy pending discharge plans (e.g. Rehab Center vs Home Care vs OutPatient at [facility] Speech/Swallow Clinic [phone]) Consider swallow therapy and voice eval/therapy pending discharge plans (e.g. Rehab Center vs Home Care vs OutPatient at [facility] Speech/Swallow Clinic [phone])

## 2021-05-18 NOTE — SWALLOW VFSS/MBS ASSESSMENT ADULT - DIAGNOSTIC IMPRESSIONS
Patient presents with Functional Oral and Mild Pharyngeal Stage Dysphagia. The Oral Stage is characterized by adequate oral containment, adequate chewing for solid, adequate bolus manipulation, adequate tongue motion with adequate anterior to posterior transfer of the bolus for puree/solids with adequate oral clearance.  The Pharyngeal Stage is characterized by delayed initiation of the pharyngeal swallow (Bolus head varies to the vallecular and pyriforms for Thin liquids), adequate laryngeal elevation, adequate tongue base retraction, and adequate pharyngeal constriction. There is adequate pharyngeal clearance post swallow for puree/solids. There was Laryngeal Penetration during the swallow for Thin Liquids with retrieval and airway protection maintained. There was No Aspiration observed before, during or after the swallow.

## 2021-05-18 NOTE — DISCHARGE NOTE PROVIDER - NSDCCPCAREPLAN_GEN_ALL_CORE_FT
PRINCIPAL DISCHARGE DIAGNOSIS  Diagnosis: Vocal cord edema  Assessment and Plan of Treatment: Vocal cord edema

## 2021-05-18 NOTE — DISCHARGE NOTE PROVIDER - CARE PROVIDER_API CALL
Jh Allen)  Surgery; Thoracic Surgery  [phone] [place] [address], [place] [address]  Phone: [phone]  Fax: [phone]  Follow Up Time:     Fidel Hunt  OTOLARYNGOLOGY  [id number] [place] [address]  Phone: [phone]  Fax: [phone]  Follow Up Time:

## 2021-05-18 NOTE — SWALLOW VFSS/MBS ASSESSMENT ADULT - COMMENTS
Thoracic Surgery Note 5/18/2021-62 y/o male w/ PMHx COVID+ (3/2021 - not hospitalized), PE (8/2020 - on eliquis at home), CKD, w/ recent hospitalization at [facility] after seizure w/ lingual hematoma requiring emergency tracheostomy on 3/25/21.  Patient was discharged from that admission on 4/3/21, decannuulated, and readmitted on 4/16 w/ worsening stridor.  Underwent Tracheal Resection and Reconstruction on 4/23, and discharged home on 4/29, only to be readmitted the following day w/ worsening stridor once again.  Flexible bronchoscopy revealed edematous vocal cords, and the patient was discharged on 5/3/21.  However, once again readmitted on 5/5/21 w/ stridor.  Received IV steroids, and Bronchoscopy on 5/10 revealed good movement of vocal cords, but still edematous, and posterior aspect of the left cord was injected w/ calcium hydroxylapatite.  He was discharged home on 5/11/21 w/ medrol dose pack, and brought to [facility] ER on 5/17/21. With complaints of a "scratchy" throat w/ cough.  COVID PCR negative at [facility] this morning, and patient was transferred here, to [facility].  Upon arrival, patient is comfortable on room-air, with O2sat 100%.  Denies any chest pains or SOB. Pt was scoped by ENT at bedside in CTICU, paralysis improved. pt was kept NPO until S/S eval and cineesophagogram.

## 2021-05-18 NOTE — SWALLOW VFSS/MBS ASSESSMENT ADULT - RECOMMENDED CONSISTENCY
1.) Regular Consistency with Thin Liquids  2.) Feeding/Swallowing Guidelines: Upright position, small bites, chew solids well, single cup sip of thin liquids  3.) Aspiration Precautions  4.) Reflux Precautions  5.) Maintain Good Oral Hygiene Care

## 2021-05-18 NOTE — DISCHARGE NOTE PROVIDER - HOSPITAL COURSE
60 y/o male w/ PMHx COVID+ (3/2021 - not hospitalized), PE (8/2020 - on eliquis at home), CKD, w/ recent hospitalization at [facility] after seizure w/ lingual hematoma requiring emergency tracheostomy on 3/25/21.  Patient was discharged from that admission on 4/3/21, decannuulated, and readmitted on 4/16 w/ worsening stridor.  Underwent Tracheal Resection and Reconstruction on 4/23, and discharged home on 4/29, only to be readmitted the following day w/ worsening stridor once again.  Flexible bronchoscopy revealed edematous vocal cords, and the patient was discharged on 5/3/21.  However, once again readmitted on 5/5/21 w/ stridor.  Received IV steroids, and Bronchoscopy on 5/10 revealed good movement of vocal cords, but still edematous, and posterior aspect of the left cord was injected w/ calcium hydroxylapatite.  He was discharged home on 5/11/21 w/ medrol dose pack, and brought to [facility] ER on 5/17/21. with complaints of a "scratchy" throat w/ cough.  COVID PCR negative at [facility] this morning, and patient was transferred here, to [facility].  Upon arrival, patient is comfortable on room-air, with O2sat 100%.  Denies any chest pains or SOB.    Pt was scoped by ENT at bedside in CTICU, paralysis improved. pt was kept NPO until S/S eval and cineesophagogram.    60 y/o male w/ PMHx COVID+ (3/2021 - not hospitalized), PE (8/2020 - on eliquis at home), CKD, w/ recent hospitalization at [facility] after seizure w/ lingual hematoma requiring emergency tracheostomy on 3/25/21.  Patient was discharged from that admission on 4/3/21, decannuulated, and readmitted on 4/16 w/ worsening stridor.  Underwent Tracheal Resection and Reconstruction on 4/23, and discharged home on 4/29, only to be readmitted the following day w/ worsening stridor once again.  Flexible bronchoscopy revealed edematous vocal cords, and the patient was discharged on 5/3/21.  However, once again readmitted on 5/5/21 w/ stridor.  Received IV steroids, and Bronchoscopy on 5/10 revealed good movement of vocal cords, but still edematous, and posterior aspect of the left cord was injected w/ calcium hydroxylapatite.  He was discharged home on 5/11/21 w/ medrol dose pack, and brought to [facility] ER on 5/17/21. with complaints of a "scratchy" throat w/ cough.  COVID PCR negative at [facility] this morning, and patient was transferred here, to [facility].  Upon arrival, patient is comfortable on room-air, with O2sat 100%.  Denies any chest pains or SOB.    Pt was scoped by ENT at bedside in CTICU, paralysis improved. pt completed S/S eval and cineesophagogram. He is discharge home on regular diet and thin liquids to follow up with   outpatient speech and swallow therapists for help with the paradoxical movement of his vocal cords. follow up with thoracic and ENT as well. 60 y/o male w/ PMHx COVID+ (3/2021 - not hospitalized), PE (8/2020 - on eliquis at home), CKD, w/ recent hospitalization at [facility] after seizure w/ lingual hematoma requiring emergency tracheostomy on 3/25/21.  Patient was discharged from that admission on 4/3/21, decannuulated, and readmitted on 4/16 w/ worsening stridor.  Underwent Tracheal Resection and Reconstruction on 4/23, and discharged home on 4/29, only to be readmitted the following day w/ worsening stridor once again.  Flexible bronchoscopy revealed edematous vocal cords, and the patient was discharged on 5/3/21.  However, once again readmitted on 5/5/21 w/ stridor.  Received IV steroids, and Bronchoscopy on 5/10 revealed good movement of vocal cords, but still edematous, and posterior aspect of the left cord was injected w/ calcium hydroxylapatite.  He was discharged home on 5/11/21 w/ medrol dose pack, and brought to [facility] ER on 5/17/21. with complaints of a "scratchy" throat w/ cough.  COVID PCR negative at [facility] this morning, and patient was transferred here, to [facility].  Upon arrival, patient is comfortable on room-air, with O2sat 100%.  Denies any chest pains or SOB.    Pt was scoped by ENT at bedside in CTICU, paralysis improved. pt completed S/S eval and cineesophagogram. He is discharge home on regular diet and thin liquids to follow up with   outpatient speech and swallow therapists for help with the paradoxical movement of his vocal cords. follow up with thoracic and ENT as well.   ENT f/u in 1mo as per ENT

## 2021-05-18 NOTE — DISCHARGE NOTE PROVIDER - NSDCMRMEDTOKEN_GEN_ALL_CORE_FT
aluminum hydroxide-magnesium hydroxide 200 mg-200 mg/5 mL oral suspension: 30 milliliter(s) orally every 6 hours, As needed, Dyspepsia MDD:120ml  apixaban 5 mg oral tablet: 1 tab(s) orally 2 times a day**apply free coupon if available**  cyclobenzaprine 5 mg oral tablet: 1 tab(s) orally 3 times a day, As needed, Muscle Spasm MDD:3  labetalol 100 mg oral tablet: 1 tab(s) orally 3 times a day  levETIRAcetam 750 mg oral tablet: 1 tab(s) orally every 12 hours  Norvasc 10 mg oral tablet: 1 tab(s) orally once a day  polyethylene glycol 3350 oral powder for reconstitution: 17 gram(s) orally once a day, As Needed  Protonix 40 mg oral delayed release tablet: 1 tab(s) orally 2 times a day MDD:2 tabs   apixaban 5 mg oral tablet: 1 tab(s) orally 2 times a day**apply free coupon if available**  cyclobenzaprine 5 mg oral tablet: 1 tab(s) orally 3 times a day, As needed, Muscle Spasm MDD:3  labetalol 100 mg oral tablet: 1 tab(s) orally 3 times a day  levETIRAcetam 750 mg oral tablet: 1 tab(s) orally every 12 hours  Norvasc 10 mg oral tablet: 1 tab(s) orally once a day  polyethylene glycol 3350 oral powder for reconstitution: 17 gram(s) orally once a day, As Needed  Protonix 40 mg oral delayed release tablet: 1 tab(s) orally 2 times a day MDD:2 tabs

## 2021-05-18 NOTE — DISCHARGE NOTE NURSING/CASE MANAGEMENT/SOCIAL WORK - PATIENT PORTAL LINK FT
You can access the FollowMyHealth Patient Portal offered by [facility] by registering at the following website: [email or web address]. By joining [facility]’s FollowMyHealth portal, you will also be able to view your health information using other applications (apps) compatible with our system.

## 2021-05-21 ENCOUNTER — NON-APPOINTMENT (OUTPATIENT)
Age: 61
End: 2021-05-21

## 2021-05-24 ENCOUNTER — INPATIENT (INPATIENT)
Facility: HOSPITAL | Age: 61
LOS: 11 days | Discharge: HOME | End: 2021-06-05
Attending: THORACIC SURGERY (CARDIOTHORACIC VASCULAR SURGERY) | Admitting: THORACIC SURGERY (CARDIOTHORACIC VASCULAR SURGERY)
Payer: MEDICAID

## 2021-05-24 VITALS
OXYGEN SATURATION: 100 % | SYSTOLIC BLOOD PRESSURE: 135 MMHG | TEMPERATURE: 99 F | HEART RATE: 92 BPM | RESPIRATION RATE: 26 BRPM | DIASTOLIC BLOOD PRESSURE: 92 MMHG | HEIGHT: 63 IN

## 2021-05-24 DIAGNOSIS — R06.1 STRIDOR: ICD-10-CM

## 2021-05-24 DIAGNOSIS — Z96.651 PRESENCE OF RIGHT ARTIFICIAL KNEE JOINT: Chronic | ICD-10-CM

## 2021-05-24 DIAGNOSIS — Z87.09 PERSONAL HISTORY OF OTHER DISEASES OF THE RESPIRATORY SYSTEM: Chronic | ICD-10-CM

## 2021-05-24 LAB
ALBUMIN SERPL ELPH-MCNC: 3.6 G/DL — SIGNIFICANT CHANGE UP (ref 3.3–5)
ALP SERPL-CCNC: 120 U/L — SIGNIFICANT CHANGE UP (ref 40–120)
ALT FLD-CCNC: 9 U/L — SIGNIFICANT CHANGE UP (ref 4–41)
ANION GAP SERPL CALC-SCNC: 11 MMOL/L — SIGNIFICANT CHANGE UP (ref 7–14)
AST SERPL-CCNC: 11 U/L — SIGNIFICANT CHANGE UP (ref 4–40)
BASOPHILS # BLD AUTO: 0.02 K/UL — SIGNIFICANT CHANGE UP (ref 0–0.2)
BASOPHILS NFR BLD AUTO: 0.2 % — SIGNIFICANT CHANGE UP (ref 0–2)
BILIRUB SERPL-MCNC: 0.4 MG/DL — SIGNIFICANT CHANGE UP (ref 0.2–1.2)
BUN SERPL-MCNC: 24 MG/DL — HIGH (ref 7–23)
CALCIUM SERPL-MCNC: 9.1 MG/DL — SIGNIFICANT CHANGE UP (ref 8.4–10.5)
CHLORIDE SERPL-SCNC: 106 MMOL/L — SIGNIFICANT CHANGE UP (ref 98–107)
CO2 SERPL-SCNC: 21 MMOL/L — LOW (ref 22–31)
CREAT SERPL-MCNC: 2.82 MG/DL — HIGH (ref 0.5–1.3)
EOSINOPHIL # BLD AUTO: 0.31 K/UL — SIGNIFICANT CHANGE UP (ref 0–0.5)
EOSINOPHIL NFR BLD AUTO: 2.6 % — SIGNIFICANT CHANGE UP (ref 0–6)
GLUCOSE SERPL-MCNC: 116 MG/DL — HIGH (ref 70–99)
HCT VFR BLD CALC: 24.7 % — LOW (ref 39–50)
HGB BLD-MCNC: 7.9 G/DL — LOW (ref 13–17)
IANC: 9.13 K/UL — HIGH (ref 1.5–8.5)
IMM GRANULOCYTES NFR BLD AUTO: 0.5 % — SIGNIFICANT CHANGE UP (ref 0–1.5)
LYMPHOCYTES # BLD AUTO: 1.55 K/UL — SIGNIFICANT CHANGE UP (ref 1–3.3)
LYMPHOCYTES # BLD AUTO: 12.8 % — LOW (ref 13–44)
MCHC RBC-ENTMCNC: 29.2 PG — SIGNIFICANT CHANGE UP (ref 27–34)
MCHC RBC-ENTMCNC: 32 GM/DL — SIGNIFICANT CHANGE UP (ref 32–36)
MCV RBC AUTO: 91.1 FL — SIGNIFICANT CHANGE UP (ref 80–100)
MONOCYTES # BLD AUTO: 1.03 K/UL — HIGH (ref 0–0.9)
MONOCYTES NFR BLD AUTO: 8.5 % — SIGNIFICANT CHANGE UP (ref 2–14)
NEUTROPHILS # BLD AUTO: 9.13 K/UL — HIGH (ref 1.8–7.4)
NEUTROPHILS NFR BLD AUTO: 75.4 % — SIGNIFICANT CHANGE UP (ref 43–77)
NRBC # BLD: 0 /100 WBCS — SIGNIFICANT CHANGE UP
NRBC # FLD: 0 K/UL — SIGNIFICANT CHANGE UP
PLATELET # BLD AUTO: 225 K/UL — SIGNIFICANT CHANGE UP (ref 150–400)
POTASSIUM SERPL-MCNC: 4 MMOL/L — SIGNIFICANT CHANGE UP (ref 3.5–5.3)
POTASSIUM SERPL-SCNC: 4 MMOL/L — SIGNIFICANT CHANGE UP (ref 3.5–5.3)
PROT SERPL-MCNC: 6.5 G/DL — SIGNIFICANT CHANGE UP (ref 6–8.3)
RBC # BLD: 2.71 M/UL — LOW (ref 4.2–5.8)
RBC # FLD: 13.1 % — SIGNIFICANT CHANGE UP (ref 10.3–14.5)
SARS-COV-2 RNA SPEC QL NAA+PROBE: SIGNIFICANT CHANGE UP
SODIUM SERPL-SCNC: 138 MMOL/L — SIGNIFICANT CHANGE UP (ref 135–145)
WBC # BLD: 12.1 K/UL — HIGH (ref 3.8–10.5)
WBC # FLD AUTO: 12.1 K/UL — HIGH (ref 3.8–10.5)

## 2021-05-24 PROCEDURE — 99222 1ST HOSP IP/OBS MODERATE 55: CPT

## 2021-05-24 PROCEDURE — 31575 DIAGNOSTIC LARYNGOSCOPY: CPT

## 2021-05-24 PROCEDURE — 99285 EMERGENCY DEPT VISIT HI MDM: CPT

## 2021-05-24 PROCEDURE — 99222 1ST HOSP IP/OBS MODERATE 55: CPT | Mod: 25

## 2021-05-24 PROCEDURE — 71045 X-RAY EXAM CHEST 1 VIEW: CPT | Mod: 26

## 2021-05-24 RX ORDER — DEXAMETHASONE 0.5 MG/5ML
10 ELIXIR ORAL ONCE
Refills: 0 | Status: DISCONTINUED | OUTPATIENT
Start: 2021-05-24 | End: 2021-05-24

## 2021-05-24 RX ORDER — HEPARIN SODIUM 5000 [USP'U]/ML
5000 INJECTION INTRAVENOUS; SUBCUTANEOUS EVERY 8 HOURS
Refills: 0 | Status: DISCONTINUED | OUTPATIENT
Start: 2021-05-24 | End: 2021-06-05

## 2021-05-24 RX ORDER — PANTOPRAZOLE SODIUM 20 MG/1
40 TABLET, DELAYED RELEASE ORAL
Refills: 0 | Status: DISCONTINUED | OUTPATIENT
Start: 2021-05-24 | End: 2021-06-05

## 2021-05-24 RX ORDER — AMLODIPINE BESYLATE 2.5 MG/1
10 TABLET ORAL DAILY
Refills: 0 | Status: DISCONTINUED | OUTPATIENT
Start: 2021-05-24 | End: 2021-06-05

## 2021-05-24 RX ORDER — EPINEPHRINE 11.25MG/ML
0.5 SOLUTION, NON-ORAL INHALATION
Refills: 0 | Status: DISCONTINUED | OUTPATIENT
Start: 2021-05-24 | End: 2021-05-24

## 2021-05-24 RX ORDER — EPINEPHRINE 11.25MG/ML
0.5 SOLUTION, NON-ORAL INHALATION
Refills: 0 | Status: DISCONTINUED | OUTPATIENT
Start: 2021-05-24 | End: 2021-05-25

## 2021-05-24 RX ORDER — DEXAMETHASONE 0.5 MG/5ML
10 ELIXIR ORAL ONCE
Refills: 0 | Status: COMPLETED | OUTPATIENT
Start: 2021-05-24 | End: 2021-05-24

## 2021-05-24 RX ORDER — SODIUM CHLORIDE 9 MG/ML
1000 INJECTION INTRAMUSCULAR; INTRAVENOUS; SUBCUTANEOUS
Refills: 0 | Status: DISCONTINUED | OUTPATIENT
Start: 2021-05-24 | End: 2021-05-25

## 2021-05-24 RX ORDER — CYCLOBENZAPRINE HYDROCHLORIDE 10 MG/1
5 TABLET, FILM COATED ORAL THREE TIMES A DAY
Refills: 0 | Status: DISCONTINUED | OUTPATIENT
Start: 2021-05-24 | End: 2021-06-05

## 2021-05-24 RX ORDER — IPRATROPIUM/ALBUTEROL SULFATE 18-103MCG
3 AEROSOL WITH ADAPTER (GRAM) INHALATION EVERY 6 HOURS
Refills: 0 | Status: DISCONTINUED | OUTPATIENT
Start: 2021-05-24 | End: 2021-06-05

## 2021-05-24 RX ORDER — EPINEPHRINE 11.25MG/ML
5 SOLUTION, NON-ORAL INHALATION
Refills: 0 | Status: DISCONTINUED | OUTPATIENT
Start: 2021-05-24 | End: 2021-05-24

## 2021-05-24 RX ORDER — POLYETHYLENE GLYCOL 3350 17 G/17G
17 POWDER, FOR SOLUTION ORAL DAILY
Refills: 0 | Status: DISCONTINUED | OUTPATIENT
Start: 2021-05-24 | End: 2021-06-05

## 2021-05-24 RX ORDER — LEVETIRACETAM 250 MG/1
750 TABLET, FILM COATED ORAL
Refills: 0 | Status: DISCONTINUED | OUTPATIENT
Start: 2021-05-24 | End: 2021-06-03

## 2021-05-24 RX ORDER — LABETALOL HCL 100 MG
100 TABLET ORAL THREE TIMES A DAY
Refills: 0 | Status: DISCONTINUED | OUTPATIENT
Start: 2021-05-24 | End: 2021-06-05

## 2021-05-24 RX ADMIN — Medication 0.5 MILLILITER(S): at 21:41

## 2021-05-24 RX ADMIN — HEPARIN SODIUM 5000 UNIT(S): 5000 INJECTION INTRAVENOUS; SUBCUTANEOUS at 13:18

## 2021-05-24 RX ADMIN — Medication 100 MILLIGRAM(S): at 23:00

## 2021-05-24 RX ADMIN — LEVETIRACETAM 750 MILLIGRAM(S): 250 TABLET, FILM COATED ORAL at 18:09

## 2021-05-24 RX ADMIN — HEPARIN SODIUM 5000 UNIT(S): 5000 INJECTION INTRAVENOUS; SUBCUTANEOUS at 23:01

## 2021-05-24 RX ADMIN — Medication 0.5 MILLILITER(S): at 13:04

## 2021-05-24 RX ADMIN — Medication 3 MILLILITER(S): at 11:04

## 2021-05-24 RX ADMIN — Medication 3 MILLILITER(S): at 15:27

## 2021-05-24 RX ADMIN — Medication 100 MILLIGRAM(S): at 18:10

## 2021-05-24 RX ADMIN — Medication 102 MILLIGRAM(S): at 06:00

## 2021-05-24 RX ADMIN — PANTOPRAZOLE SODIUM 40 MILLIGRAM(S): 20 TABLET, DELAYED RELEASE ORAL at 18:10

## 2021-05-24 RX ADMIN — Medication 3 MILLILITER(S): at 21:30

## 2021-05-24 NOTE — H&P ADULT - PROBLEM SELECTOR PLAN 1
admit to 8T  hold eliquis in preparation for Flex Bronch on Wednesday 5/26  supportive care  above d/w [place] [id number]

## 2021-05-24 NOTE — ED ADULT NURSE NOTE - OBJECTIVE STATEMENT
patient complaining of shortness of breath this evening. s/p trach in march. since then trach has been taken out, patient has been having difficulty breathing on and off for many weeks and tonight he feels worse. patient has swelling to b/l lower ext which he says is chronic. skin intact alocx4

## 2021-05-24 NOTE — ED PROVIDER NOTE - PHYSICAL EXAMINATION
Gen: well developed male   HEENT: NCAT, EOMI, no nasal discharge, mucous membranes moist  Neck: supple, no wounds or tenderness   CV: RRR, +S1/S2, no M/R/G  Resp: audible stridor, faint expiratory wheezes b/l no rhonchi   GI: Abdomen soft non-distended, NTTP, no masses  MSK: No open wounds, no bruising, no LE edema  Neuro: A&Ox4, following commands, moving all four extremities spontaneously  Psych: appropriate mood Gen: well developed male , speaking full sentences.   HEENT: NCAT, EOMI, no nasal discharge, mucous membranes moist  Neck: supple, no wounds or tenderness, mild stridor over ant neck, no crepitus  CV: RRR, +S1/S2, no M/R/G  Resp: audible upper airway stridor. lungs cta  GI: Abdomen soft non-distended, NTTP, no masses  MSK: No open wounds, no bruising, no LE edema  Neuro: A&Ox4, following commands, moving all four extremities spontaneously  Psych: appropriate mood

## 2021-05-24 NOTE — CONSULT NOTE ADULT - SUBJECTIVE AND OBJECTIVE BOX
HPI:  Patient is a 61y Male with PMH PE (8/2020 - on eliquis at home) CKD, seizure 2 months ago c/b tongue bite and lingual hematoma s/p cricothyrotomy with OMFS on 3/25/21. Pt was discharged 2 weeks later, since then has had readmissions for stridor. S/p tracheal resection and reconstruction on 4/23, vocal cord paralysis s/p injection 5/10. Last seen by ENT team on 5/18/21, scoped and exam improving. Was discharged 5/18/21 on pantoprazole however patient reports he does not have it. Today he presents for congestion and sensation of throat closing up. Received decadron in ER. Denies shortness of breath/difficulty breathing. Is tolerating diet at home - SLP cleared for regular diet/thin liquids on 5/18. States voice is improving. CXR obtained in ER looked clear.       Physical Exam  T(C): 37.3 (05-24-21 @ 05:03), Max: 37.3 (05-24-21 @ 05:03)  HR: 92 (05-24-21 @ 05:03) (92 - 92)  BP: 135/92 (05-24-21 @ 05:03) (135/92 - 135/92)  RR: 26 (05-24-21 @ 05:03) (26 - 26)  SpO2: 100% (05-24-21 @ 05:03) (100% - 100%)  General: NAD, A+Ox3  Inspiratory stridor +. On room air, satting 100%. Not tachypneic. Coughs frequently, productive   Voice quality: weak but unchanged from last exam   Face:  Symmetric without masses or lesions  OU: EOMI   Nose: nasal cavity clear bilaterally, inferior turbinates normal, mucosa normal without crusting or bleeding  OC/OP: tongue normal, floor of mouth wnl, no masses or lesions, OP clear  Neck: soft/flat, no LAD  CNII-XII intact    Procedure: Flexible laryngoscopy    Pre-procedure diagnosis/Indication for procedure: To evaluate larynx    Anesthesia: None    Description:    A flexible endoscope was used to examine the left and right nasal cavities, posterior oropharynx, hypopharynx, and larynx. The nasal valve areas were examined for abnormalities or collapse. The inferior and middle turbinates were evaluated. The middle and superior meatuses, the sphenoethmoid recesses, and the nasopharynx were examined and inspected for mucopurulence, polyps, and nasal masses. The oropharynx, tongue base/vallecula, epiglottis, aryepiglottic folds, arytenoids, vocal cords, hypopharynx were also inspected for swelling, inflammation, or dysfunction. The patient tolerated the procedure without complications.    Findings:    NP wnl  BOT/vallecula normal  Epiglottis sharp  AE folds nonedematous  Arytenoids mobile  Vocal fold mobility unchanged from last exam   Postcricoid secretions     Exam similar to 5/17     --------------------------------------------------------------      A/P:  61y Male with PMH PE (8/2020 - on eliquis at home) CKD, seizure 2 months ago c/b tongue bite and lingual hematoma s/p cricothyrotomy with OMFS on 3/25/21. Pt was discharged 2 weeks later, since then has had readmissions for stridor. S/p tracheal resection and reconstruction on 4/23, vocal cord paralysis s/p injection 5/10. Last seen by ENT 5/18, discharged on that day, now presents for congestion and coughing possibly c/w URI. Scope exam unchanged from 5/17.    - needs to be on antireflux medication   - continue working with SLP  - see Dr. Hunt in clinic this week - call [phone]  - [facility] consult  - call/page with questions       ---------------------------------------------------------------

## 2021-05-24 NOTE — H&P ADULT - HISTORY OF PRESENT ILLNESS
62 y/o male s/p tracheal resection 4/23/21 presents to [facility] ER w c/o stridor and SOB. Difficulty breating is worse at night and prevents him from being able to sleep.  He denies productive cough or fever.  This is his 3rd readmission after his tracheal resection  PT has PMHx COVID+ (3/2021 - not hospitalized), PE (8/2020 - on eliquis at home), CKD, w/ recent hospitalization at [facility] after seizure w/ lingual hematoma requiring emergency tracheostomy on 3/25/21.  Patient underwent Tracheal Resection and Reconstruction on 4/23. ENT injected vocal cords about 2 weeks ago. He was last discharged 5/18/21.

## 2021-05-24 NOTE — H&P ADULT - NSHPREVIEWOFSYSTEMS_GEN_ALL_CORE
Review of Systems: All other review of systems negative, except as noted in HPI  Other Review of Systems: All other review of systems negative, except as noted in HPI

## 2021-05-24 NOTE — H&P ADULT - NSHPPHYSICALEXAM_GEN_ALL_CORE
Physical Exam:   Physical Exam: General: NAD, Pleasant  Neurology: Patient is AA&Ox4, follows commands, and speech fluent  Neck: Neck supple, trachea midline, No JVD; incisional site c/d/i, (+) audible stridor  Respiratory: CTA B/L, (-)rales, rhonchi  CV: S1S2, r/r/r, (-)m/r/g  Abdomen: S/NT/ND, BSx4  Extremities: 2+ peripheral pulses bilat throughout; (-)edema appreciated  Skin: No Rashes, Hematoma, Ecchymosis

## 2021-05-24 NOTE — ED ADULT TRIAGE NOTE - CHIEF COMPLAINT QUOTE
Pt. brought to [facility] ED by [facility] for vocal cord edema. Pt. had trache reversal a month and a half ago. Had trache after a seizure. Obvious stridor noted at triage. 100% o2 sat with 100% NRB. CN aware.

## 2021-05-24 NOTE — ED PROVIDER NOTE - OBJECTIVE STATEMENT
60 y/o male PMH PE (8/2020 - on eliquis at home) CKD, seizure 2 months ago c/b tongue bite and lingual hematoma requiring emergency tracheostomy on 3/25/21. Pt was discharged 2 weeks later, since then has had multiple admissions for stridor, had tracheal resection and reconstruction on 4/23, vocal cord paralysis s/p injection, presents to ED c/o worsening stridor especially at night over the last week assoc with productive cough. Denies bloody sputum, f/c, n/v/d.

## 2021-05-24 NOTE — ED PROVIDER NOTE - ATTENDING CONTRIBUTION TO CARE
61 year old male past medical history PE, CKD, seizure, status post emergency tracheostomy secondary to lingual hematoma complicated by stridor with multiple admissions for strider status post tracheal reconstruction, known vocal cord paralysis, present for stridor and difficulty breathing tonight. Denies fevers chills. Denies cough, chest pain, abdominal pain, nausea, vomiting. Patient arrives patient able to speak for certain says patient not hypoxic or tachypneic. Exam as above. Plan decadron, chest X ray, labs ENT and cardiothoracic surgery consulted

## 2021-05-24 NOTE — ED PROVIDER NOTE - CLINICAL SUMMARY MEDICAL DECISION MAKING FREE TEXT BOX
60 y/o male PMH PE (8/2020 - on eliquis at home), CKD, seizure 2 months ago c/b tongue bite and lingual hematoma requiring emergency tracheostomy on 3/25/21. Pt was discharged 2 weeks later, since then has had multiple admissions for stridor, had tracheal resection and reconstruction on 4/23, vocal cord paralysis s/p injection, presents to ED c/o worsening stridor especially at night over the last week assoc with productive cough. Denies bloody sputum, f/c, n/v/d. concern for edema, vocal cord paralysis, tracheal obstruction / narrowing, URI. plan ENT consult IV steroids CXR labs

## 2021-05-24 NOTE — ED ADULT NURSE NOTE - CHIEF COMPLAINT QUOTE
Pt. brought to [facility] ED by [facility] for vocal cord edema. Pt. had trache reversal a month and a half ago. Had trache after a seizure. Obvious stridor noted at triage. 100% o2 sat with 100% NRB. CN aware.
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
## 2021-05-25 ENCOUNTER — TRANSCRIPTION ENCOUNTER (OUTPATIENT)
Age: 61
End: 2021-05-25

## 2021-05-25 LAB
ANION GAP SERPL CALC-SCNC: 13 MMOL/L — SIGNIFICANT CHANGE UP (ref 7–14)
BUN SERPL-MCNC: 28 MG/DL — HIGH (ref 7–23)
CALCIUM SERPL-MCNC: 9 MG/DL — SIGNIFICANT CHANGE UP (ref 8.4–10.5)
CHLORIDE SERPL-SCNC: 105 MMOL/L — SIGNIFICANT CHANGE UP (ref 98–107)
CO2 SERPL-SCNC: 19 MMOL/L — LOW (ref 22–31)
CREAT SERPL-MCNC: 2.63 MG/DL — HIGH (ref 0.5–1.3)
GLUCOSE SERPL-MCNC: 159 MG/DL — HIGH (ref 70–99)
HCT VFR BLD CALC: 22.4 % — LOW (ref 39–50)
HCT VFR BLD CALC: 23.3 % — LOW (ref 39–50)
HGB BLD-MCNC: 7.3 G/DL — LOW (ref 13–17)
HGB BLD-MCNC: 7.4 G/DL — LOW (ref 13–17)
MAGNESIUM SERPL-MCNC: 1.5 MG/DL — LOW (ref 1.6–2.6)
MCHC RBC-ENTMCNC: 28.8 PG — SIGNIFICANT CHANGE UP (ref 27–34)
MCHC RBC-ENTMCNC: 29.1 PG — SIGNIFICANT CHANGE UP (ref 27–34)
MCHC RBC-ENTMCNC: 31.8 GM/DL — LOW (ref 32–36)
MCHC RBC-ENTMCNC: 32.6 GM/DL — SIGNIFICANT CHANGE UP (ref 32–36)
MCV RBC AUTO: 89.2 FL — SIGNIFICANT CHANGE UP (ref 80–100)
MCV RBC AUTO: 90.7 FL — SIGNIFICANT CHANGE UP (ref 80–100)
NRBC # BLD: 0 /100 WBCS — SIGNIFICANT CHANGE UP
NRBC # BLD: 0 /100 WBCS — SIGNIFICANT CHANGE UP
NRBC # FLD: 0 K/UL — SIGNIFICANT CHANGE UP
NRBC # FLD: 0 K/UL — SIGNIFICANT CHANGE UP
PLATELET # BLD AUTO: 220 K/UL — SIGNIFICANT CHANGE UP (ref 150–400)
PLATELET # BLD AUTO: 225 K/UL — SIGNIFICANT CHANGE UP (ref 150–400)
POTASSIUM SERPL-MCNC: 4.3 MMOL/L — SIGNIFICANT CHANGE UP (ref 3.5–5.3)
POTASSIUM SERPL-SCNC: 4.3 MMOL/L — SIGNIFICANT CHANGE UP (ref 3.5–5.3)
RBC # BLD: 2.51 M/UL — LOW (ref 4.2–5.8)
RBC # BLD: 2.57 M/UL — LOW (ref 4.2–5.8)
RBC # FLD: 12.6 % — SIGNIFICANT CHANGE UP (ref 10.3–14.5)
RBC # FLD: 12.7 % — SIGNIFICANT CHANGE UP (ref 10.3–14.5)
SODIUM SERPL-SCNC: 137 MMOL/L — SIGNIFICANT CHANGE UP (ref 135–145)
WBC # BLD: 10.05 K/UL — SIGNIFICANT CHANGE UP (ref 3.8–10.5)
WBC # BLD: 9.52 K/UL — SIGNIFICANT CHANGE UP (ref 3.8–10.5)
WBC # FLD AUTO: 10.05 K/UL — SIGNIFICANT CHANGE UP (ref 3.8–10.5)
WBC # FLD AUTO: 9.52 K/UL — SIGNIFICANT CHANGE UP (ref 3.8–10.5)

## 2021-05-25 PROCEDURE — 99233 SBSQ HOSP IP/OBS HIGH 50: CPT

## 2021-05-25 PROCEDURE — 71045 X-RAY EXAM CHEST 1 VIEW: CPT | Mod: 26

## 2021-05-25 RX ORDER — MAGNESIUM SULFATE 500 MG/ML
2 VIAL (ML) INJECTION ONCE
Refills: 0 | Status: COMPLETED | OUTPATIENT
Start: 2021-05-25 | End: 2021-05-25

## 2021-05-25 RX ORDER — EPINEPHRINE 11.25MG/ML
0.5 SOLUTION, NON-ORAL INHALATION
Refills: 0 | Status: DISCONTINUED | OUTPATIENT
Start: 2021-05-25 | End: 2021-06-05

## 2021-05-25 RX ADMIN — LEVETIRACETAM 750 MILLIGRAM(S): 250 TABLET, FILM COATED ORAL at 05:30

## 2021-05-25 RX ADMIN — Medication 100 MILLIGRAM(S): at 13:23

## 2021-05-25 RX ADMIN — Medication 3 MILLILITER(S): at 03:32

## 2021-05-25 RX ADMIN — HEPARIN SODIUM 5000 UNIT(S): 5000 INJECTION INTRAVENOUS; SUBCUTANEOUS at 05:30

## 2021-05-25 RX ADMIN — Medication 50 GRAM(S): at 06:55

## 2021-05-25 RX ADMIN — LEVETIRACETAM 750 MILLIGRAM(S): 250 TABLET, FILM COATED ORAL at 18:23

## 2021-05-25 RX ADMIN — HEPARIN SODIUM 5000 UNIT(S): 5000 INJECTION INTRAVENOUS; SUBCUTANEOUS at 13:23

## 2021-05-25 RX ADMIN — Medication 3 MILLILITER(S): at 22:29

## 2021-05-25 RX ADMIN — Medication 100 MILLIGRAM(S): at 05:31

## 2021-05-25 RX ADMIN — PANTOPRAZOLE SODIUM 40 MILLIGRAM(S): 20 TABLET, DELAYED RELEASE ORAL at 05:30

## 2021-05-25 RX ADMIN — Medication 3 MILLILITER(S): at 07:48

## 2021-05-25 RX ADMIN — PANTOPRAZOLE SODIUM 40 MILLIGRAM(S): 20 TABLET, DELAYED RELEASE ORAL at 18:23

## 2021-05-25 RX ADMIN — Medication 100 MILLIGRAM(S): at 21:12

## 2021-05-25 RX ADMIN — HEPARIN SODIUM 5000 UNIT(S): 5000 INJECTION INTRAVENOUS; SUBCUTANEOUS at 21:12

## 2021-05-25 RX ADMIN — Medication 0.5 MILLILITER(S): at 07:52

## 2021-05-25 RX ADMIN — AMLODIPINE BESYLATE 10 MILLIGRAM(S): 2.5 TABLET ORAL at 05:31
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
## 2021-05-26 ENCOUNTER — APPOINTMENT (OUTPATIENT)
Dept: THORACIC SURGERY | Facility: HOSPITAL | Age: 61
End: 2021-05-26
Payer: MEDICAID

## 2021-05-26 LAB
ANION GAP SERPL CALC-SCNC: 12 MMOL/L — SIGNIFICANT CHANGE UP (ref 7–14)
APTT BLD: 30.2 SEC — SIGNIFICANT CHANGE UP (ref 27–36.3)
BLD GP AB SCN SERPL QL: NEGATIVE — SIGNIFICANT CHANGE UP
BUN SERPL-MCNC: 37 MG/DL — HIGH (ref 7–23)
CALCIUM SERPL-MCNC: 8.7 MG/DL — SIGNIFICANT CHANGE UP (ref 8.4–10.5)
CHLORIDE SERPL-SCNC: 107 MMOL/L — SIGNIFICANT CHANGE UP (ref 98–107)
CO2 SERPL-SCNC: 20 MMOL/L — LOW (ref 22–31)
COVID-19 SPIKE DOMAIN AB INTERP: POSITIVE
COVID-19 SPIKE DOMAIN ANTIBODY RESULT: >250 U/ML — HIGH
CREAT SERPL-MCNC: 2.86 MG/DL — HIGH (ref 0.5–1.3)
GLUCOSE SERPL-MCNC: 114 MG/DL — HIGH (ref 70–99)
HCT VFR BLD CALC: 22.9 % — LOW (ref 39–50)
HGB BLD-MCNC: 7.2 G/DL — LOW (ref 13–17)
INR BLD: 1.01 RATIO — SIGNIFICANT CHANGE UP (ref 0.88–1.16)
MCHC RBC-ENTMCNC: 28.8 PG — SIGNIFICANT CHANGE UP (ref 27–34)
MCHC RBC-ENTMCNC: 31.4 GM/DL — LOW (ref 32–36)
MCV RBC AUTO: 91.6 FL — SIGNIFICANT CHANGE UP (ref 80–100)
NRBC # BLD: 0 /100 WBCS — SIGNIFICANT CHANGE UP
NRBC # FLD: 0 K/UL — SIGNIFICANT CHANGE UP
PLATELET # BLD AUTO: 248 K/UL — SIGNIFICANT CHANGE UP (ref 150–400)
POTASSIUM SERPL-MCNC: 3.9 MMOL/L — SIGNIFICANT CHANGE UP (ref 3.5–5.3)
POTASSIUM SERPL-SCNC: 3.9 MMOL/L — SIGNIFICANT CHANGE UP (ref 3.5–5.3)
PROTHROM AB SERPL-ACNC: 11.6 SEC — SIGNIFICANT CHANGE UP (ref 10.6–13.6)
RBC # BLD: 2.5 M/UL — LOW (ref 4.2–5.8)
RBC # FLD: 12.8 % — SIGNIFICANT CHANGE UP (ref 10.3–14.5)
RH IG SCN BLD-IMP: POSITIVE — SIGNIFICANT CHANGE UP
SARS-COV-2 IGG+IGM SERPL QL IA: >250 U/ML — HIGH
SARS-COV-2 IGG+IGM SERPL QL IA: POSITIVE
SODIUM SERPL-SCNC: 139 MMOL/L — SIGNIFICANT CHANGE UP (ref 135–145)
WBC # BLD: 9.24 K/UL — SIGNIFICANT CHANGE UP (ref 3.8–10.5)
WBC # FLD AUTO: 9.24 K/UL — SIGNIFICANT CHANGE UP (ref 3.8–10.5)

## 2021-05-26 PROCEDURE — 99222 1ST HOSP IP/OBS MODERATE 55: CPT | Mod: GC

## 2021-05-26 PROCEDURE — 31622 DX BRONCHOSCOPE/WASH: CPT | Mod: 58

## 2021-05-26 RX ORDER — ALPRAZOLAM 0.25 MG
0.25 TABLET ORAL ONCE
Refills: 0 | Status: DISCONTINUED | OUTPATIENT
Start: 2021-05-26 | End: 2021-05-26

## 2021-05-26 RX ORDER — ACETAMINOPHEN 500 MG
1000 TABLET ORAL ONCE
Refills: 0 | Status: COMPLETED | OUTPATIENT
Start: 2021-05-26 | End: 2021-05-26

## 2021-05-26 RX ORDER — ALBUTEROL 90 UG/1
2.5 AEROSOL, METERED ORAL ONCE
Refills: 0 | Status: COMPLETED | OUTPATIENT
Start: 2021-05-26 | End: 2021-05-26

## 2021-05-26 RX ORDER — LANOLIN ALCOHOL/MO/W.PET/CERES
3 CREAM (GRAM) TOPICAL AT BEDTIME
Refills: 0 | Status: DISCONTINUED | OUTPATIENT
Start: 2021-05-26 | End: 2021-06-05

## 2021-05-26 RX ORDER — LANOLIN ALCOHOL/MO/W.PET/CERES
5 CREAM (GRAM) TOPICAL AT BEDTIME
Refills: 0 | Status: DISCONTINUED | OUTPATIENT
Start: 2021-05-26 | End: 2021-05-26

## 2021-05-26 RX ADMIN — Medication 3 MILLILITER(S): at 04:36

## 2021-05-26 RX ADMIN — HEPARIN SODIUM 5000 UNIT(S): 5000 INJECTION INTRAVENOUS; SUBCUTANEOUS at 06:45

## 2021-05-26 RX ADMIN — PANTOPRAZOLE SODIUM 40 MILLIGRAM(S): 20 TABLET, DELAYED RELEASE ORAL at 17:55

## 2021-05-26 RX ADMIN — Medication 1000 MILLIGRAM(S): at 21:15

## 2021-05-26 RX ADMIN — AMLODIPINE BESYLATE 10 MILLIGRAM(S): 2.5 TABLET ORAL at 17:55

## 2021-05-26 RX ADMIN — Medication 100 MILLIGRAM(S): at 17:56

## 2021-05-26 RX ADMIN — PANTOPRAZOLE SODIUM 40 MILLIGRAM(S): 20 TABLET, DELAYED RELEASE ORAL at 06:46

## 2021-05-26 RX ADMIN — HEPARIN SODIUM 5000 UNIT(S): 5000 INJECTION INTRAVENOUS; SUBCUTANEOUS at 17:55

## 2021-05-26 RX ADMIN — Medication 3 MILLILITER(S): at 10:11

## 2021-05-26 RX ADMIN — Medication 400 MILLIGRAM(S): at 20:37

## 2021-05-26 RX ADMIN — LEVETIRACETAM 750 MILLIGRAM(S): 250 TABLET, FILM COATED ORAL at 17:54

## 2021-05-26 RX ADMIN — Medication 3 MILLILITER(S): at 22:26

## 2021-05-26 RX ADMIN — HEPARIN SODIUM 5000 UNIT(S): 5000 INJECTION INTRAVENOUS; SUBCUTANEOUS at 23:50

## 2021-05-26 RX ADMIN — Medication 400 MILLIGRAM(S): at 04:08

## 2021-05-26 RX ADMIN — Medication 0.25 MILLIGRAM(S): at 23:50

## 2021-05-26 RX ADMIN — Medication 100 MILLIGRAM(S): at 23:50

## 2021-05-26 RX ADMIN — ALBUTEROL 2.5 MILLIGRAM(S): 90 AEROSOL, METERED ORAL at 14:50

## 2021-05-26 RX ADMIN — LEVETIRACETAM 750 MILLIGRAM(S): 250 TABLET, FILM COATED ORAL at 06:46

## 2021-05-26 RX ADMIN — Medication 1000 MILLIGRAM(S): at 09:50

## 2021-05-26 RX ADMIN — Medication 400 MILLIGRAM(S): at 09:36

## 2021-05-26 RX ADMIN — Medication 100 MILLIGRAM(S): at 06:46

## 2021-05-26 RX ADMIN — Medication 1000 MILLIGRAM(S): at 04:47

## 2021-05-26 NOTE — PROGRESS NOTE ADULT - SUBJECTIVE AND OBJECTIVE BOX
Pt is without complaints after his procedure.  He denies stridor or SOB.    VSS  Gen: NAD  Chest: Unlabored breathing  Heart: RRR  A: stable s/p surveillance flex bronch  P: To reduce tracheal swelling as seen on bronch, pt is to be on the maximum PPI dose, to have a GI consult for a pH study, BiPap with low settings, and a Ba swallow in AM.

## 2021-05-26 NOTE — CONSULT NOTE ADULT - ASSESSMENT
Impression:  62 yo M w/ PMHx PE (8/2020), CKD (baseline Cr 2's), anemia, and recent admission (3/2021) for seizure c/b tongue bite and lingual hematoma requiring emergent traecheostomy, w/ recurrent presentations for stridor, s/p tracheal resection and reconstruction 4/23/21, readmitted w/ stridor    # throat sensation/stridor - from tracheal stenosis, ongoing inflammation, follows w/ ENT and CT surgery. Unlikely that esophageal pathology, such as GERD, are contributing to sensation. As per ACG 2020 recommendations, can start with empiric PPI therapy. If confirmation of GERD, can consider further testing. EGD would evaluate for confirmation of reflux and evidence of complications (i.e. erosive esophagitis grade C-D, Barretts, peptic stricture) and has a high specificity but low sensitivity. Patient has critical airway, and is a high procedural risk for complications, and as such would not recommend EGD at this time. Alternatively, if patient is not improving on BID PPI and desire for confirmatory testing (or to rule out GERD), can consider ambulatory pH monitoring with pH impedance, to measure acid exposure time and symptom association. This will be performed over 48 hours as outpatient, preferably while off PPI. Would not recommend BRAVO in this patient.     Recommendations:  - c/w pantoprazole 40mg PO BID  - management of stridor per ENT/CT surgery  - f/u outpatient w/ Dr. Froilan Bassett for ambulatory pH monitoring; please let GI know when patient is to be discharged so we can facilitate follow up  - no plans for endoscopy  - rest of care per primary team    GI will sign off. Please reconsult as necessary    GI will continue to follow.     Didier Lou, PGY4  Gastroenterology Fellow  Available on Microsoft Teams  [phone] ([employer] Short Range Pager)  [phone] (Long Range Pager)    After 5pm, please contact the on-call GI fellow. [phone] Impression:  60 yo M w/ PMHx PE (8/2020), CKD (baseline Cr 2's), anemia, and recent admission (3/2021) for seizure c/b tongue bite and lingual hematoma requiring emergent traecheostomy, w/ recurrent presentations for stridor, s/p tracheal resection and reconstruction 4/23/21, readmitted w/ stridor    # throat sensation/stridor - from tracheal stenosis, ongoing inflammation, follows w/ ENT and CT surgery. Unlikely that esophageal pathology, such as GERD, are contributing to sensation. As per ACG 2020 recommendations, can start with empiric PPI therapy. If confirmation of GERD, can consider further testing. EGD would evaluate for confirmation of reflux and evidence of complications (i.e. erosive esophagitis grade C-D, Barretts, peptic stricture) and has a high specificity but low sensitivity. Patient has critical airway, and is a high procedural risk for complications, and as such would not recommend EGD at this time. Alternatively, if patient is not improving on BID PPI and desire for confirmatory testing (or to rule out GERD), can consider ambulatory pH monitoring with pH impedance, to measure acid exposure time and symptom association. This will be performed over 48 hours as outpatient, preferably while off PPI. Would not recommend BRAVO in this patient.     Recommendations:  - c/w pantoprazole 40mg PO BID  - management of stridor per ENT/CT surgery  - f/u outpatient w/ Dr. Froilan Bassett for ambulatory pH monitoring; please let GI know when patient is to be discharged so we can facilitate follow up  - no plans for endoscopy  - rest of care per primary team    GI will sign off. Please reconsult as necessary    Didier Lou, PGY4  Gastroenterology Fellow  Available on [employer] Teams  [phone] ([employer] Short Range Pager)  [phone] (Long Range Pager)    After 5pm, please contact the on-call GI fellow. [phone] Impression:  60 yo M w/ PMHx PE (8/2020), CKD (baseline Cr 2's), anemia, and recent admission (3/2021) for seizure c/b tongue bite and lingual hematoma requiring emergent traecheostomy, w/ recurrent presentations for stridor, s/p tracheal resection and reconstruction 4/23/21, readmitted w/ stridor    # throat sensation/stridor - from tracheal stenosis, ongoing inflammation, follows w/ ENT and CT surgery. Unlikely that esophageal pathology, such as GERD, are contributing to sensation. As per ACG 2020 recommendations, can start with empiric PPI therapy. If confirmation of GERD, can consider further testing. EGD would evaluate for confirmation of reflux and evidence of complications (i.e. erosive esophagitis grade C-D, Barretts, peptic stricture) and has a high specificity but low sensitivity. Patient has critical airway, and is a high procedural risk for complications, and as such would not recommend EGD at this time. Alternatively, if patient is not improving on BID PPI and desire for confirmatory testing (or to rule out GERD), can consider ambulatory pH monitoring with pH impedance, to measure acid exposure time and symptom association. This will be performed over 48 hours as outpatient, preferably while off PPI. Would not recommend BRAVO in this patient.     Recommendations:  - c/w pantoprazole 40mg PO BID; patient educated to take PPI before meals  - management of stridor per ENT/CT surgery  - f/u outpatient w/ Dr. Froilan Bassett for ambulatory pH monitoring; please let GI know when patient is to be discharged so we can facilitate follow up  - no plans for endoscopy  - rest of care per primary team    GI will sign off. Please reconsult as necessary    Didier Lou, PGY4  Gastroenterology Fellow  Available on Microsoft Teams  [phone] ([employer] Short Range Pager)  [phone] (Long Range Pager)    After 5pm, please contact the on-call GI fellow. [phone]

## 2021-05-26 NOTE — CONSULT NOTE ADULT - ATTENDING COMMENTS
Patient seen and examined, agree with above. Other than stridor, he reports sensation in back of his throat. He denies dysphagia or dyspepsia. He was given PPI to take at home, but was not taking them. Educated patient to take PPI BID before meals as trial first - if symptoms continue, can consider pH impedance testing as outpatient. EGD is likely of minimal if any utility as he has no dysphagia or trouble swallowing.

## 2021-05-26 NOTE — CONSULT NOTE ADULT - SUBJECTIVE AND OBJECTIVE BOX
HPI:  60 yo M w/ PMHx PE (8/2020 on apixaban), CKD, seziure 3/2021 c/b tongue bite and lingual hematoma requiring emergent cricothyrotomy w/ OMFS (3/25/21). Patient discharged and has since had multiple admissions for stridor and SOB. Underwent tracheal resection 4/23/21, vocal cord paralysis s/p injection 5/10/21. Now admitted 5/24/21 with stridor and sensation of throat closing. In the ED, patient received dexamethasone. Underwent rigid bronchoscopy 5/26/21 notable for inflammatory changes in posterior tracheal membrane, anastomosis site appears healed and intact. GI consulted to r/o GERD for contributing factor in recurrent presentations.     Patient himself denies any chest burning, no abd burning, only throat pain. Denies any prior EGD's. Has been prescribed pantoprazole at home, but not sure if he was taking it. No etoh use, no smoking history, stable weight. Had colonoscopy previously.       Allergies:  No Known Allergies      Home Medications:    Hospital Medications:  ALBUTerol    0.083%. 2.5 milliGRAM(s) Nebulizer once  albuterol/ipratropium for Nebulization 3 milliLiter(s) Nebulizer every 6 hours  amLODIPine   Tablet 10 milliGRAM(s) Oral daily  cyclobenzaprine 5 milliGRAM(s) Oral three times a day PRN  heparin   Injectable 5000 Unit(s) SubCutaneous every 8 hours  labetalol 100 milliGRAM(s) Oral three times a day  levETIRAcetam 750 milliGRAM(s) Oral two times a day  pantoprazole    Tablet 40 milliGRAM(s) Oral two times a day  polyethylene glycol 3350 17 Gram(s) Oral daily PRN  racepinephrine  2.25% Inhalation 0.5 milliLiter(s) Inhalation two times a day PRN      PMHX/PSHX:  HTN (hypertension)    Chronic kidney disease, unspecified CKD stage    Pulmonary embolus    2019 novel coronavirus disease (COVID-19)    Arthritis    Tracheal stenosis    H/O total knee replacement, right    History of total right knee replacement (TKR)    History of tracheal stenosis        Family history:      Denies family history of colon cancer/polyps, stomach cancer/polyps, pancreatic cancer/masses, liver cancer/disease, ovarian cancer and endometrial cancer.    Social History:   Tob: Denies  EtOH: Denies  Illicit Drugs: Denies    ROS:     General:  No wt loss, fevers, chills, night sweats, fatigue  Eyes:  Good vision, no reported pain  ENT:  see HPI  CV:  No pain, palpitations, hypo/hypertension  Pulm:  No dyspnea, cough, tachypnea, wheezing  GI:  see HPI  :  No pain, bleeding, incontinence, nocturia  Muscle:  No pain, weakness  Neuro:  No weakness, tingling, memory problems  Psych:  No fatigue, insomnia, mood problems, depression  Endocrine:  No polyuria, polydipsia, cold/heat intolerance  Heme:  No petechiae, ecchymosis, easy bruisability  Skin:  No rash, tattoos, scars, edema    PHYSICAL EXAM:     GENERAL:  agitated, audible stridor while talking  HEENT:  NCAT, no scleral icterus   CHEST:  + stridor, no accessory muscle use  HEART:  Regular rate and rhythm  ABDOMEN:  Soft, non-tender, non-distended, normoactive bowel sounds,  no masses, no hepato-splenomegaly, no signs of chronic liver disease  EXTREMITIES: No edema  SKIN:  No rash/erythema/ecchymoses/petechiae/wounds/abscess/warm/dry  NEURO:  Alert and oriented x 3, no asterixis    Vital Signs:  Vital Signs Last 24 Hrs  T(C): 36 (26 May 2021 13:50), Max: 37 (26 May 2021 08:22)  T(F): 96.8 (26 May 2021 13:50), Max: 98.6 (26 May 2021 08:22)  HR: 67 (26 May 2021 14:30) (58 - 85)  BP: 107/73 (26 May 2021 14:30) (102/60 - 132/82)  BP(mean): 81 (26 May 2021 14:30) (78 - 87)  RR: 13 (26 May 2021 14:30) (11 - 27)  SpO2: 93% (26 May 2021 14:30) (92% - 100%)  Daily Height in cm: 160 (26 May 2021 08:22)    Daily     LABS:                        7.2    9.24  )-----------( 248      ( 26 May 2021 07:07 )             22.9     Mean Cell Volume: 91.6 fL (05-26-21 @ 07:07)    05-26    139  |  107  |  37<H>  ----------------------------<  114<H>  3.9   |  20<L>  |  2.86<H>    Ca    8.7      26 May 2021 07:07  Mg     1.5     05-25        PT/INR - ( 26 May 2021 07:07 )   PT: 11.6 sec;   INR: 1.01 ratio         PTT - ( 26 May 2021 07:07 )  PTT:30.2 sec                            7.2    9.24  )-----------( 248      ( 26 May 2021 07:07 )             22.9                         7.4    10.05 )-----------( 220      ( 25 May 2021 06:54 )             23.3                         7.3    9.52  )-----------( 225      ( 25 May 2021 03:54 )             22.4                         7.9    12.10 )-----------( 225      ( 24 May 2021 06:26 )             24.7       Imaging:             HPI:  60 yo M w/ PMHx PE (8/2020 on apixaban), CKD, seziure 3/2021 c/b tongue bite and lingual hematoma requiring emergent cricothyrotomy w/ OMFS (3/25/21). Patient discharged and has since had multiple admissions for stridor and SOB. Underwent tracheal resection 4/23/21, vocal cord paralysis s/p injection 5/10/21. Now admitted 5/24/21 with stridor and sensation of throat closing. In the ED, patient received dexamethasone. Underwent rigid bronchoscopy 5/26/21 notable for inflammatory changes in posterior tracheal membrane, anastomosis site appears healed and intact. GI consulted to r/o GERD for contributing factor in recurrent presentations.     Patient himself denies any chest burning, no abd burning, only throat pain. Denies any prior EGD's. Has been prescribed pantoprazole at home, but not sure if he was taking it. No etoh use, no smoking history, stable weight. Had colonoscopy previously.      Allergies:  No Known Allergies      Home Medications:    Hospital Medications:  ALBUTerol    0.083%. 2.5 milliGRAM(s) Nebulizer once  albuterol/ipratropium for Nebulization 3 milliLiter(s) Nebulizer every 6 hours  amLODIPine   Tablet 10 milliGRAM(s) Oral daily  cyclobenzaprine 5 milliGRAM(s) Oral three times a day PRN  heparin   Injectable 5000 Unit(s) SubCutaneous every 8 hours  labetalol 100 milliGRAM(s) Oral three times a day  levETIRAcetam 750 milliGRAM(s) Oral two times a day  pantoprazole    Tablet 40 milliGRAM(s) Oral two times a day  polyethylene glycol 3350 17 Gram(s) Oral daily PRN  racepinephrine  2.25% Inhalation 0.5 milliLiter(s) Inhalation two times a day PRN      PMHX/PSHX:  HTN (hypertension)    Chronic kidney disease, unspecified CKD stage    Pulmonary embolus    2019 novel coronavirus disease (COVID-19)    Arthritis    Tracheal stenosis    H/O total knee replacement, right    History of total right knee replacement (TKR)    History of tracheal stenosis        Family history:      Denies family history of colon cancer/polyps, stomach cancer/polyps, pancreatic cancer/masses, liver cancer/disease, ovarian cancer and endometrial cancer.    Social History:   Tob: Denies  EtOH: Denies  Illicit Drugs: Denies    ROS:     General:  No wt loss, fevers, chills, night sweats, fatigue  Eyes:  Good vision, no reported pain  ENT:  see HPI  CV:  No pain, palpitations, hypo/hypertension  Pulm:  No dyspnea, cough, tachypnea, wheezing  GI:  see HPI  :  No pain, bleeding, incontinence, nocturia  Muscle:  No pain, weakness  Neuro:  No weakness, tingling, memory problems  Psych:  No fatigue, insomnia, mood problems, depression  Endocrine:  No polyuria, polydipsia, cold/heat intolerance  Heme:  No petechiae, ecchymosis, easy bruisability  Skin:  No rash, tattoos, scars, edema    PHYSICAL EXAM:     GENERAL:  agitated, audible stridor while talking  HEENT:  NCAT, no scleral icterus   CHEST:  + stridor, no accessory muscle use  HEART:  Regular rate and rhythm  ABDOMEN:  Soft, non-tender, non-distended, normoactive bowel sounds,  no masses, no hepato-splenomegaly, no signs of chronic liver disease  EXTREMITIES: No edema  SKIN:  No rash/erythema/ecchymoses/petechiae/wounds/abscess/warm/dry  NEURO:  Alert and oriented x 3, no asterixis    Vital Signs:  Vital Signs Last 24 Hrs  T(C): 36 (26 May 2021 13:50), Max: 37 (26 May 2021 08:22)  T(F): 96.8 (26 May 2021 13:50), Max: 98.6 (26 May 2021 08:22)  HR: 67 (26 May 2021 14:30) (58 - 85)  BP: 107/73 (26 May 2021 14:30) (102/60 - 132/82)  BP(mean): 81 (26 May 2021 14:30) (78 - 87)  RR: 13 (26 May 2021 14:30) (11 - 27)  SpO2: 93% (26 May 2021 14:30) (92% - 100%)  Daily Height in cm: 160 (26 May 2021 08:22)    Daily     LABS:                        7.2    9.24  )-----------( 248      ( 26 May 2021 07:07 )             22.9     Mean Cell Volume: 91.6 fL (05-26-21 @ 07:07)    05-26    139  |  107  |  37<H>  ----------------------------<  114<H>  3.9   |  20<L>  |  2.86<H>    Ca    8.7      26 May 2021 07:07  Mg     1.5     05-25        PT/INR - ( 26 May 2021 07:07 )   PT: 11.6 sec;   INR: 1.01 ratio         PTT - ( 26 May 2021 07:07 )  PTT:30.2 sec                            7.2    9.24  )-----------( 248      ( 26 May 2021 07:07 )             22.9                         7.4    10.05 )-----------( 220      ( 25 May 2021 06:54 )             23.3                         7.3    9.52  )-----------( 225      ( 25 May 2021 03:54 )             22.4                         7.9    12.10 )-----------( 225      ( 24 May 2021 06:26 )             24.7

## 2021-05-26 NOTE — BRIEF OPERATIVE NOTE - COMMENTS
I, MONICA Parikh served as first assistant on this operation. My involvement was including but not limited to positioning, prepping and draping, As well as other tasks as directed by the surgeon.

## 2021-05-27 PROCEDURE — 74220 X-RAY XM ESOPHAGUS 1CNTRST: CPT | Mod: 26

## 2021-05-27 PROCEDURE — 70490 CT SOFT TISSUE NECK W/O DYE: CPT | Mod: 26

## 2021-05-27 PROCEDURE — 71250 CT THORAX DX C-: CPT | Mod: 26

## 2021-05-27 RX ORDER — SODIUM CHLORIDE 9 MG/ML
1000 INJECTION INTRAMUSCULAR; INTRAVENOUS; SUBCUTANEOUS
Refills: 0 | Status: DISCONTINUED | OUTPATIENT
Start: 2021-05-27 | End: 2021-05-27

## 2021-05-27 RX ORDER — OXYCODONE HYDROCHLORIDE 5 MG/1
5 TABLET ORAL EVERY 4 HOURS
Refills: 0 | Status: DISCONTINUED | OUTPATIENT
Start: 2021-05-27 | End: 2021-06-02

## 2021-05-27 RX ORDER — BENZOCAINE AND MENTHOL 5; 1 G/100ML; G/100ML
1 LIQUID ORAL
Refills: 0 | Status: DISCONTINUED | OUTPATIENT
Start: 2021-05-27 | End: 2021-06-05

## 2021-05-27 RX ADMIN — Medication 3 MILLILITER(S): at 15:16

## 2021-05-27 RX ADMIN — PANTOPRAZOLE SODIUM 40 MILLIGRAM(S): 20 TABLET, DELAYED RELEASE ORAL at 06:45

## 2021-05-27 RX ADMIN — BENZOCAINE AND MENTHOL 1 LOZENGE: 5; 1 LIQUID ORAL at 18:45

## 2021-05-27 RX ADMIN — Medication 100 MILLIGRAM(S): at 15:28

## 2021-05-27 RX ADMIN — AMLODIPINE BESYLATE 10 MILLIGRAM(S): 2.5 TABLET ORAL at 06:44

## 2021-05-27 RX ADMIN — HEPARIN SODIUM 5000 UNIT(S): 5000 INJECTION INTRAVENOUS; SUBCUTANEOUS at 15:27

## 2021-05-27 RX ADMIN — Medication 24 MILLIGRAM(S): at 09:43

## 2021-05-27 RX ADMIN — OXYCODONE HYDROCHLORIDE 5 MILLIGRAM(S): 5 TABLET ORAL at 18:46

## 2021-05-27 RX ADMIN — Medication 100 MILLIGRAM(S): at 22:15

## 2021-05-27 RX ADMIN — HEPARIN SODIUM 5000 UNIT(S): 5000 INJECTION INTRAVENOUS; SUBCUTANEOUS at 08:19

## 2021-05-27 RX ADMIN — PANTOPRAZOLE SODIUM 40 MILLIGRAM(S): 20 TABLET, DELAYED RELEASE ORAL at 17:59

## 2021-05-27 RX ADMIN — Medication 100 MILLIGRAM(S): at 06:44

## 2021-05-27 RX ADMIN — CYCLOBENZAPRINE HYDROCHLORIDE 5 MILLIGRAM(S): 10 TABLET, FILM COATED ORAL at 06:45

## 2021-05-27 RX ADMIN — CYCLOBENZAPRINE HYDROCHLORIDE 5 MILLIGRAM(S): 10 TABLET, FILM COATED ORAL at 01:42

## 2021-05-27 RX ADMIN — CYCLOBENZAPRINE HYDROCHLORIDE 5 MILLIGRAM(S): 10 TABLET, FILM COATED ORAL at 22:15

## 2021-05-27 RX ADMIN — OXYCODONE HYDROCHLORIDE 5 MILLIGRAM(S): 5 TABLET ORAL at 18:44

## 2021-05-27 RX ADMIN — HEPARIN SODIUM 5000 UNIT(S): 5000 INJECTION INTRAVENOUS; SUBCUTANEOUS at 22:15

## 2021-05-27 RX ADMIN — Medication 3 MILLILITER(S): at 03:42

## 2021-05-27 RX ADMIN — Medication 3 MILLILITER(S): at 23:18

## 2021-05-27 RX ADMIN — LEVETIRACETAM 750 MILLIGRAM(S): 250 TABLET, FILM COATED ORAL at 17:59

## 2021-05-27 RX ADMIN — LEVETIRACETAM 750 MILLIGRAM(S): 250 TABLET, FILM COATED ORAL at 06:45

## 2021-05-27 RX ADMIN — Medication 3 MILLIGRAM(S): at 01:42

## 2021-05-27 NOTE — PROGRESS NOTE ADULT - SUBJECTIVE AND OBJECTIVE BOX
Subjective: "I still couldn't sleep well last night. My throat is scratchy and hurts me all the time" Pt states pain worse with bipap mask on. Only wore it intermittently overnight. Pt denies SOB however sounds slightly more stridorous on inspiration this am. No n/v. Pt denies dysphagia. Keep soft collar in place. OOB ad portillo in room.     Vital Signs:  Vital Signs Last 24 Hrs  T(C): 36.9 (05-27-21 @ 12:32), Max: 36.9 (05-27-21 @ 12:32)  T(F): 98.5 (05-27-21 @ 12:32), Max: 98.5 (05-27-21 @ 12:32)  HR: 68 (05-27-21 @ 12:32) (63 - 78)  BP: 113/69 (05-27-21 @ 12:32) (96/56 - 130/71)  RR: 18 (05-27-21 @ 12:32) (11 - 19)  SpO2: 100% (05-27-21 @ 12:32) (92% - 100%) on (O2)    Telemetry/Alarms:  General: WN/WD NAD  Neurology: Awake, nonfocal, SINGH x 4  Eyes: Scleras clear, PERRLA/ EOMI, Gross vision intact  ENT:Gross hearing intact, hoarse voice, some inc'd inspir stridor  Neck: Neck supple, trachea midline, No JVD,   Respiratory: CTA B/L, No wheezing, rales, rhonchi  CV: RRR, S1S2, no murmurs, rubs or gallops  Abdominal: Soft, NT, ND +BS, +BM  Extremities: No edema, + peripheral pulses  Skin: No Rashes, Hematoma, Ecchymosis  Lymphatic: No Neck, axilla, groin LAD  Psych: Oriented x 3, normal affect  Incisions: NEck incision well healed. Collar in place.     Relevant labs, radiology and Medications reviewed                        7.2 9.24  )-----------( 248      ( 26 May 2021 07:07 )             22.9     05-26    139  |  107  |  37<H>  ----------------------------<  114<H>  3.9   |  20<L>  |  2.86<H>    Ca    8.7      26 May 2021 07:07      PT/INR - ( 26 May 2021 07:07 )   PT: 11.6 sec;   INR: 1.01 ratio         PTT - ( 26 May 2021 07:07 )  PTT:30.2 sec  MEDICATIONS  (STANDING):  albuterol/ipratropium for Nebulization 3 milliLiter(s) Nebulizer every 6 hours  amLODIPine   Tablet 10 milliGRAM(s) Oral daily  heparin   Injectable 5000 Unit(s) SubCutaneous every 8 hours  labetalol 100 milliGRAM(s) Oral three times a day  levETIRAcetam 750 milliGRAM(s) Oral two times a day  methylPREDNISolone   Oral   pantoprazole    Tablet 40 milliGRAM(s) Oral two times a day  sodium chloride 0.9%. 1000 milliLiter(s) (75 mL/Hr) IV Continuous <Continuous>    MEDICATIONS  (PRN):  cyclobenzaprine 5 milliGRAM(s) Oral three times a day PRN Muscle Spasm  melatonin 3 milliGRAM(s) Oral at bedtime PRN Insomnia  polyethylene glycol 3350 17 Gram(s) Oral daily PRN Constipation  racepinephrine  2.25% Inhalation 0.5 milliLiter(s) Inhalation two times a day PRN stridor    Pertinent Physical Exam  I&O's Summary    26 May 2021 07:01  -  27 May 2021 07:00  --------------------------------------------------------  IN: 120 mL / OUT: 1500 mL / NET: -1380 mL        Assessment  61y Male  w/ PAST MEDICAL & SURGICAL HISTORY:  HTN (hypertension)    Chronic kidney disease, unspecified CKD stage    Pulmonary embolus  diagnosed about 6 months ago incidentally found on imaging related to falling off a bike and chest pain    2019 novel coronavirus disease (COVID-19)  never hospitilized    Arthritis    Tracheal stenosis    History of total right knee replacement (TKR)  bilateral    History of tracheal stenosis    admitted with complaints of Patient is a 61y old  Male who presents with a chief complaint of Scratchy throat/Coughing s/p Vocal Cord injection (26 May 2021 20:30)      HPI:  60 y/o male s/p tracheal resection 4/23/21 presents to [facility] ER w c/o stridor and SOB. Difficulty breating is worse at night and prevents him from being able to sleep.  He denies productive cough or fever.  This is his 3rd readmission after his tracheal resection  PT has PMHx COVID+ (3/2021 - not hospitalized), PE (8/2020 - on eliquis at home), CKD, w/ recent hospitalization at [facility] after seizure w/ lingual hematoma requiring emergency tracheostomy on 3/25/21.  Patient underwent Tracheal Resection and Reconstruction on 4/23. ENT injected vocal cords about 2 weeks ago. He was last discharged 5/18/21.  (24 May 2021 10:29)      Procedure:  Flex bronch, tracheal resection on 4/23      Issues:  Stridor   Severe tracheal stenosis s/p Resection  Hx of PE (dx 10/2020)   Seizure disorder  HTN  CKD              Hx of COVID (2/2021)               Unilateral vocal cord paralysis  Pt improved in CTICU. Transitioned to floor. Off heliox. On 5/26- pt to OR for Bronch w Dr. Allen. ANastomasis well healing however noted to have significant swelling of posterior membrane of air way above and below anastomotic site. Dr. Allen concerned that pt's swelling and symptoms are related to reflux and aspiration. GI consult called. Pt placed on max PPI therapy. Per GI- pH study would have to be done as outpt. Pt ordered for Barium Swallow to eval for reflux. Ordered for Bipap at night to help keep airway open w positive pressure  PLAN  Neuro: Pain management. Cont pain meds PRN.   Pulm: Encourage coughing, deep breathing and use of incentive spirometry.cont Bipap at night- assess if positive pressure helps symptoms. Medrol pack for increased stridor this morning. Keep HOB elevated. Cont Cervical collar. Will obtain new CT scan of neck and chest  Cardio: Monitor telemetry/alarms  GI: As per Dr. Allen, NPO until speech and swallow eval done again to assess for aspiration. IVF hydration, HOB elevated.   Renal: monitor urine output, supplement electrolytes as needed  Vasc: Heparin SC/SCDs for DVT prophylaxis  Heme: Stable H/H. .   ID: Off antibiotics. Stable.  Therapy: OOB/ambulate  Disposition: Aim to D/C to home once medically optimized  Discussed with Cardiothoracic Team at AM rounds.

## 2021-05-27 NOTE — PROGRESS NOTE ADULT - SUBJECTIVE AND OBJECTIVE BOX
ANESTHESIA POSTOP CHECK    61y Male POSTOP DAY 1     Vital Signs Last 24 Hrs  T(C): 36.8 (27 May 2021 06:40), Max: 37 (26 May 2021 08:22)  T(F): 98.3 (27 May 2021 06:40), Max: 98.6 (26 May 2021 08:22)  HR: 72 (27 May 2021 06:40) (58 - 78)  BP: 115/77 (27 May 2021 06:40) (102/60 - 132/82)  BP(mean): 89 (26 May 2021 15:40) (78 - 99)  RR: 18 (27 May 2021 06:40) (11 - 27)  SpO2: 100% (27 May 2021 06:40) (92% - 100%)          [ x] NO APPARENT ANESTHESIA COMPLICATIONS      Comments:

## 2021-05-27 NOTE — SWALLOW BEDSIDE ASSESSMENT ADULT - COMMENTS
CT Progress Note 5/27/21: 62 y/o male s/p tracheal resection 4/23/21 presents to [facility] ER w c/o stridor and SOB. Difficulty breating is worse at night and prevents him from being able to sleep.  He denies productive cough or fever.  This is his 3rd readmission after his tracheal resection  PT has PMHx COVID+ (3/2021 - not hospitalized), PE (8/2020 - on eliquis at home), CKD, w/ recent hospitalization at [facility] after seizure w/ lingual hematoma requiring emergency tracheostomy on 3/25/21.  Patient underwent Tracheal Resection and Reconstruction on 4/23. ENT injected vocal cords about 2 weeks ago. He was last discharged 5/18/21.     Clinical bedside swallow evaluation order received. Upon arrival, patient off unit at [facility]. This service will f/u as schedule permits. Team made aware.

## 2021-05-28 PROCEDURE — 99231 SBSQ HOSP IP/OBS SF/LOW 25: CPT

## 2021-05-28 RX ADMIN — Medication 100 MILLIGRAM(S): at 13:29

## 2021-05-28 RX ADMIN — Medication 3 MILLILITER(S): at 15:44

## 2021-05-28 RX ADMIN — Medication 4 MILLIGRAM(S): at 13:29

## 2021-05-28 RX ADMIN — HEPARIN SODIUM 5000 UNIT(S): 5000 INJECTION INTRAVENOUS; SUBCUTANEOUS at 13:29

## 2021-05-28 RX ADMIN — OXYCODONE HYDROCHLORIDE 5 MILLIGRAM(S): 5 TABLET ORAL at 18:44

## 2021-05-28 RX ADMIN — PANTOPRAZOLE SODIUM 40 MILLIGRAM(S): 20 TABLET, DELAYED RELEASE ORAL at 18:43

## 2021-05-28 RX ADMIN — LEVETIRACETAM 750 MILLIGRAM(S): 250 TABLET, FILM COATED ORAL at 18:43

## 2021-05-28 RX ADMIN — Medication 3 MILLIGRAM(S): at 22:39

## 2021-05-28 RX ADMIN — OXYCODONE HYDROCHLORIDE 5 MILLIGRAM(S): 5 TABLET ORAL at 23:15

## 2021-05-28 RX ADMIN — AMLODIPINE BESYLATE 10 MILLIGRAM(S): 2.5 TABLET ORAL at 06:03

## 2021-05-28 RX ADMIN — BENZOCAINE AND MENTHOL 1 LOZENGE: 5; 1 LIQUID ORAL at 18:43

## 2021-05-28 RX ADMIN — Medication 8 MILLIGRAM(S): at 22:40

## 2021-05-28 RX ADMIN — OXYCODONE HYDROCHLORIDE 5 MILLIGRAM(S): 5 TABLET ORAL at 22:45

## 2021-05-28 RX ADMIN — Medication 100 MILLIGRAM(S): at 22:40

## 2021-05-28 RX ADMIN — PANTOPRAZOLE SODIUM 40 MILLIGRAM(S): 20 TABLET, DELAYED RELEASE ORAL at 06:03

## 2021-05-28 RX ADMIN — Medication 3 MILLILITER(S): at 04:31

## 2021-05-28 RX ADMIN — Medication 4 MILLIGRAM(S): at 06:03

## 2021-05-28 RX ADMIN — LEVETIRACETAM 750 MILLIGRAM(S): 250 TABLET, FILM COATED ORAL at 06:03

## 2021-05-28 RX ADMIN — Medication 100 MILLIGRAM(S): at 06:03

## 2021-05-28 RX ADMIN — Medication 4 MILLIGRAM(S): at 18:43

## 2021-05-28 RX ADMIN — HEPARIN SODIUM 5000 UNIT(S): 5000 INJECTION INTRAVENOUS; SUBCUTANEOUS at 06:03

## 2021-05-28 RX ADMIN — Medication 3 MILLILITER(S): at 21:05

## 2021-05-28 RX ADMIN — OXYCODONE HYDROCHLORIDE 5 MILLIGRAM(S): 5 TABLET ORAL at 18:43

## 2021-05-28 RX ADMIN — Medication 3 MILLILITER(S): at 10:52

## 2021-05-28 NOTE — PROGRESS NOTE ADULT - ASSESSMENT
Pt is feeling better with improved stridor on Medrol Dose pack. Speech and swallow eval done today-normal. Recommending reg diet w/ thin liquids. Plan to keep pt over weekend for observation as there is a lot of airway edema and significant narrowing of trachea on CT scan, Pt has some degree of tracheal malacia which may account for discrepancy between CT scan and clinical appearance. Cont steroid taper.     Neuro: Pain management prn, cont seizure meds   Pulm: Encourage coughing, deep breathing and use of Bipap at night  Cardio: Monitor telemetry/alarms  GI: Tolerating diet, change to reg diet. Continue stool softeners.  Renal: monitor urine output, supplement electrolytes as needed  Vasc: Heparin SC/SCDs for DVT prophylaxis  Heme: Stable H/H. .   ID: Off antibiotics. Stable.  Therapy: OOB/ambulate  Disposition: as above  Discussed with Cardiothoracic Team at AM rounds.

## 2021-05-28 NOTE — SWALLOW BEDSIDE ASSESSMENT ADULT - SWALLOW EVAL: DIAGNOSIS
Patient demonstrated functional oral and pharyngeal stages of swallowing characterized by adequate mastication (of solids), functional bolus cohesion and transport, adequate pharyngeal triggering and palpable laryngeal elevation with no overt, clinical s/s of laryngeal penetration/aspiration across PO trials of puree, regular solids, nectar-thick and thin liquids.

## 2021-05-28 NOTE — PROGRESS NOTE ADULT - SUBJECTIVE AND OBJECTIVE BOX
Pt stridor improved since yesterday, however still mildly stercorous States he feels a little bit better, denies SOB. He feels as if he has a cold, when he coughs and can clear his throat he feels a bit better. I encourage him to try humidified air, but he says nothing really helps. Pt seen on rounds with Reza.    ICU Vital Signs Last 24 Hrs  T(C): 36.7 (28 May 2021 11:49), Max: 37 (28 May 2021 00:23)  T(F): 98.1 (28 May 2021 11:49), Max: 98.6 (28 May 2021 00:23)  HR: 81 (28 May 2021 11:49) (72 - 89)  BP: 105/59 (28 May 2021 11:49) (105/59 - 133/70)  BP(mean): --  ABP: --  ABP(mean): --  RR: 18 (28 May 2021 11:49) (16 - 18)  SpO2: 100% (28 May 2021 11:49) (97% - 100%)      MEDICATIONS  (STANDING):  albuterol/ipratropium for Nebulization 3 milliLiter(s) Nebulizer every 6 hours  amLODIPine   Tablet 10 milliGRAM(s) Oral daily  heparin   Injectable 5000 Unit(s) SubCutaneous every 8 hours  labetalol 100 milliGRAM(s) Oral three times a day  levETIRAcetam 750 milliGRAM(s) Oral two times a day  methylPREDNISolone 4 milliGRAM(s) Oral before breakfast  methylPREDNISolone 4 milliGRAM(s) Oral after lunch  methylPREDNISolone 4 milliGRAM(s) Oral after dinner  methylPREDNISolone 8 milliGRAM(s) Oral at bedtime  methylPREDNISolone   Oral   pantoprazole    Tablet 40 milliGRAM(s) Oral two times a day    MEDICATIONS  (PRN):  benzocaine 15 mG/menthol 3.6 mG (Sugar-Free) Lozenge 1 Lozenge Oral every 2 hours PRN Sore Throat  cyclobenzaprine 5 milliGRAM(s) Oral three times a day PRN Muscle Spasm  melatonin 3 milliGRAM(s) Oral at bedtime PRN Insomnia  oxyCODONE    IR 5 milliGRAM(s) Oral every 4 hours PRN Moderate Pain (4 - 6)  polyethylene glycol 3350 17 Gram(s) Oral daily PRN Constipation  racepinephrine  2.25% Inhalation 0.5 milliLiter(s) Inhalation two times a day PRN stridor      PAST MEDICAL & SURGICAL HISTORY:  HTN (hypertension)    Chronic kidney disease, unspecified CKD stage    Pulmonary embolus  diagnosed about 6 months ago incidentally found on imaging related to falling off a bike and chest pain    2019 novel coronavirus disease (COVID-19)  never hospitilized    Arthritis    Tracheal stenosis    History of total right knee replacement (TKR)  bilateral    History of tracheal stenosis    < from: CT Chest No Cont (05.27.21 @ 14:31) >  Focal narrowing of the upper trachea to 4 mm is a region of the thyroid    Peripheral reticulation may be related to interstitial lung disease. Peripheral cystic opacities morelikely represent honeycombing then bronchiectasis. Continued follow-up CT recommended.      < end of copied text >      General: WN/WD NAD  Neurology: A&Ox3, nonfocal, SINGH x 4  Eyes: PERRLA/ EOMI, Gross vision intact  ENT/Neck: Neck supple, trachea midline, No JVD, Gross hearing intact  Respiratory: mild sridorCTA B/L, No wheezing, rales, rhonchi  CV: RRR, S1S2, no murmurs, rubs or gallops  Abdominal: Soft, NT, ND +BS,   Extremities: No edema, + peripheral pulses  Skin: No Rashes, Hematoma, Ecchymosis  Incisions:   Tubes:

## 2021-05-28 NOTE — SWALLOW BEDSIDE ASSESSMENT ADULT - COMMENTS
CT Surgery 5/27/21: 60 y/o male s/p tracheal resection 4/23/21 presents to [facility] ER w c/o stridor and SOB. Difficulty breating is worse at night and prevents him from being able to sleep.  He denies productive cough or fever.  This is his 3rd readmission after his tracheal resection  PT has PMHx COVID+ (3/2021 - not hospitalized), PE (8/2020 - on eliquis at home), CKD, w/ recent hospitalization at [facility] after seizure w/ lingual hematoma requiring emergency tracheostomy on 3/25/21.  Patient underwent Tracheal Resection and Reconstruction on 4/23. ENT injected vocal cords about 2 weeks ago. He was last discharged 5/18/21.     CT Chest 5/27/21: Focal narrowing of the upper trachea to 4 mm is a region of the thyroid. Peripheral reticulation may be related to interstitial lung disease. Peripheral cystic opacities more likely represent honeycombing then bronchiectasis. Continued follow-up CT recommended. Initial attempt at bedside swallow evaluation made on 5/27/21, however patient was off unit (see SLP note). Patient was received awake, alert and cooperative this AM. Occasional cough noted at baseline. Patient states, "I can eat. I just have a problem at night when I'm sleeping. I wake up coughing."    Of note, patient is known to this department from previous admission. MBSS completed on 5/18/21, with recommendations for Regular Consistency with Thin Liquids (see chart for full report). CT Surgery 5/27/21: 60 y/o male s/p tracheal resection 4/23/21 presents to [facility] ER w c/o stridor and SOB. Difficulty breating is worse at night and prevents him from being able to sleep.  He denies productive cough or fever.  This is his 3rd readmission after his tracheal resection  PT has PMHx COVID+ (3/2021 - not hospitalized), PE (8/2020 - on eliquis at home), CKD, w/ recent hospitalization at [facility] after seizure w/ lingual hematoma requiring emergency tracheostomy on 3/25/21.  Patient underwent Tracheal Resection and Reconstruction on 4/23. ENT injected vocal cords about 2 weeks ago. He was last discharged 5/18/21.     CT Chest 5/27/21: Focal narrowing of the upper trachea to 4 mm is a region of the thyroid. Peripheral reticulation may be related to interstitial lung disease. Peripheral cystic opacities more likely represent honeycombing then bronchiectasis. Continued follow-up CT recommended.     Initial attempt at bedside swallow evaluation made on 5/27/21, however patient was off unit (see SLP note). Patient was received awake, alert and cooperative this AM. Occasional cough noted at baseline. Patient states, "I can eat. I just have a problem at night when I'm sleeping. I wake up coughing."    Of note, patient is known to this department from previous admission. MBSS completed on 5/18/21, with recommendations for Regular Consistency with Thin Liquids (see chart for full report).

## 2021-05-28 NOTE — SWALLOW BEDSIDE ASSESSMENT ADULT - SWALLOW EVAL: RECOMMENDED FEEDING/EATING TECHNIQUES
allow for swallow between intakes/crush medication (when feasible)/maintain upright posture during/after eating for 30 mins/no straws/oral hygiene/position upright (90 degrees)/small sips/bites

## 2021-05-29 LAB
ANION GAP SERPL CALC-SCNC: 15 MMOL/L — HIGH (ref 7–14)
BUN SERPL-MCNC: 48 MG/DL — HIGH (ref 7–23)
CALCIUM SERPL-MCNC: 9.1 MG/DL — SIGNIFICANT CHANGE UP (ref 8.4–10.5)
CHLORIDE SERPL-SCNC: 105 MMOL/L — SIGNIFICANT CHANGE UP (ref 98–107)
CO2 SERPL-SCNC: 18 MMOL/L — LOW (ref 22–31)
CREAT SERPL-MCNC: 3.14 MG/DL — HIGH (ref 0.5–1.3)
GLUCOSE SERPL-MCNC: 155 MG/DL — HIGH (ref 70–99)
HCT VFR BLD CALC: 25.2 % — LOW (ref 39–50)
HGB BLD-MCNC: 8 G/DL — LOW (ref 13–17)
MCHC RBC-ENTMCNC: 28.5 PG — SIGNIFICANT CHANGE UP (ref 27–34)
MCHC RBC-ENTMCNC: 31.7 GM/DL — LOW (ref 32–36)
MCV RBC AUTO: 89.7 FL — SIGNIFICANT CHANGE UP (ref 80–100)
NRBC # BLD: 0 /100 WBCS — SIGNIFICANT CHANGE UP
NRBC # FLD: 0 K/UL — SIGNIFICANT CHANGE UP
PLATELET # BLD AUTO: 252 K/UL — SIGNIFICANT CHANGE UP (ref 150–400)
POTASSIUM SERPL-MCNC: 4.4 MMOL/L — SIGNIFICANT CHANGE UP (ref 3.5–5.3)
POTASSIUM SERPL-SCNC: 4.4 MMOL/L — SIGNIFICANT CHANGE UP (ref 3.5–5.3)
RBC # BLD: 2.81 M/UL — LOW (ref 4.2–5.8)
RBC # FLD: 12.6 % — SIGNIFICANT CHANGE UP (ref 10.3–14.5)
SODIUM SERPL-SCNC: 138 MMOL/L — SIGNIFICANT CHANGE UP (ref 135–145)
WBC # BLD: 8.01 K/UL — SIGNIFICANT CHANGE UP (ref 3.8–10.5)
WBC # FLD AUTO: 8.01 K/UL — SIGNIFICANT CHANGE UP (ref 3.8–10.5)

## 2021-05-29 RX ORDER — OXYCODONE HYDROCHLORIDE 5 MG/1
1 TABLET ORAL
Qty: 4 | Refills: 0
Start: 2021-05-29 | End: 2021-05-29

## 2021-05-29 RX ADMIN — Medication 3 MILLIGRAM(S): at 21:19

## 2021-05-29 RX ADMIN — Medication 3 MILLILITER(S): at 10:45

## 2021-05-29 RX ADMIN — Medication 100 MILLIGRAM(S): at 13:09

## 2021-05-29 RX ADMIN — Medication 100 MILLIGRAM(S): at 21:19

## 2021-05-29 RX ADMIN — HEPARIN SODIUM 5000 UNIT(S): 5000 INJECTION INTRAVENOUS; SUBCUTANEOUS at 05:44

## 2021-05-29 RX ADMIN — Medication 4 MILLIGRAM(S): at 17:41

## 2021-05-29 RX ADMIN — HEPARIN SODIUM 5000 UNIT(S): 5000 INJECTION INTRAVENOUS; SUBCUTANEOUS at 13:09

## 2021-05-29 RX ADMIN — Medication 100 MILLIGRAM(S): at 05:44

## 2021-05-29 RX ADMIN — LEVETIRACETAM 750 MILLIGRAM(S): 250 TABLET, FILM COATED ORAL at 05:44

## 2021-05-29 RX ADMIN — Medication 3 MILLILITER(S): at 16:25

## 2021-05-29 RX ADMIN — HEPARIN SODIUM 5000 UNIT(S): 5000 INJECTION INTRAVENOUS; SUBCUTANEOUS at 21:18

## 2021-05-29 RX ADMIN — LEVETIRACETAM 750 MILLIGRAM(S): 250 TABLET, FILM COATED ORAL at 17:41

## 2021-05-29 RX ADMIN — Medication 4 MILLIGRAM(S): at 05:44

## 2021-05-29 RX ADMIN — OXYCODONE HYDROCHLORIDE 5 MILLIGRAM(S): 5 TABLET ORAL at 21:49

## 2021-05-29 RX ADMIN — PANTOPRAZOLE SODIUM 40 MILLIGRAM(S): 20 TABLET, DELAYED RELEASE ORAL at 17:41

## 2021-05-29 RX ADMIN — Medication 3 MILLILITER(S): at 03:11

## 2021-05-29 RX ADMIN — PANTOPRAZOLE SODIUM 40 MILLIGRAM(S): 20 TABLET, DELAYED RELEASE ORAL at 05:44

## 2021-05-29 RX ADMIN — Medication 4 MILLIGRAM(S): at 13:09

## 2021-05-29 RX ADMIN — OXYCODONE HYDROCHLORIDE 5 MILLIGRAM(S): 5 TABLET ORAL at 21:19

## 2021-05-29 RX ADMIN — Medication 4 MILLIGRAM(S): at 21:19

## 2021-05-29 RX ADMIN — Medication 3 MILLILITER(S): at 21:25

## 2021-05-29 RX ADMIN — AMLODIPINE BESYLATE 10 MILLIGRAM(S): 2.5 TABLET ORAL at 05:44

## 2021-05-29 NOTE — PROGRESS NOTE ADULT - SUBJECTIVE AND OBJECTIVE BOX
Subjective: pt with raspy voice, admits sore throat  no new complaints   intermittent use of BiPAP at night as tolerated  he is tolerating regular diet with thin liquids  he is ambulating the room and unit ad portillo  pt denies shortness of breath, pain, dysphagia or GERD symptoms    Vital Signs:  Vital Signs Last 24 Hrs  T(C): 37 (05-29-21 @ 05:24), Max: 37 (05-29-21 @ 05:24)  T(F): 98.6 (05-29-21 @ 05:24), Max: 98.6 (05-29-21 @ 05:24)  HR: 80 (05-29-21 @ 05:24) (72 - 98)  BP: 127/77 (05-29-21 @ 05:24) (105/59 - 127/77)  RR: 17 (05-29-21 @ 05:24) (17 - 18)  SpO2: 98% (05-29-21 @ 05:24) (97% - 100%) on (O2)    Telemetry/Alarms:  General: WN/WD NAD  Neurology: Awake, nonfocal, SINGH x 4  Eyes: Scleras clear, PERRLA/ EOMI, Gross vision intact  ENT:Gross hearing intact, grossly patent pharynx, pt wearing surgical collar as instructed  Neck: Neck supple, trachea midline, No JVD,   Respiratory: CTA B/L, No wheezing, rales, rhonchi  CV: RRR, S1S2, no murmurs, rubs or gallops  Abdominal: Soft, NT, ND +BS,   Extremities: No edema, + peripheral pulses  Skin: No Rashes, Hematoma, Ecchymosis  Lymphatic: No Neck, axilla, groin LAD  Psych: Oriented x 3, normal affect    Relevant labs, radiology and Medications reviewed            MEDICATIONS  (STANDING):  albuterol/ipratropium for Nebulization 3 milliLiter(s) Nebulizer every 6 hours  amLODIPine   Tablet 10 milliGRAM(s) Oral daily  heparin   Injectable 5000 Unit(s) SubCutaneous every 8 hours  labetalol 100 milliGRAM(s) Oral three times a day  levETIRAcetam 750 milliGRAM(s) Oral two times a day  methylPREDNISolone 4 milliGRAM(s) Oral before breakfast  methylPREDNISolone 4 milliGRAM(s) Oral after lunch  methylPREDNISolone 4 milliGRAM(s) Oral after dinner  methylPREDNISolone 4 milliGRAM(s) Oral at bedtime  methylPREDNISolone   Oral   pantoprazole    Tablet 40 milliGRAM(s) Oral two times a day    MEDICATIONS  (PRN):  benzocaine 15 mG/menthol 3.6 mG (Sugar-Free) Lozenge 1 Lozenge Oral every 2 hours PRN Sore Throat  cyclobenzaprine 5 milliGRAM(s) Oral three times a day PRN Muscle Spasm  melatonin 3 milliGRAM(s) Oral at bedtime PRN Insomnia  oxyCODONE    IR 5 milliGRAM(s) Oral every 4 hours PRN Moderate Pain (4 - 6)  polyethylene glycol 3350 17 Gram(s) Oral daily PRN Constipation  racepinephrine  2.25% Inhalation 0.5 milliLiter(s) Inhalation two times a day PRN stridor    Pertinent Physical Exam  I&O's Summary    28 May 2021 07:01  -  29 May 2021 07:00  --------------------------------------------------------  IN: 0 mL / OUT: 875 mL / NET: -875 mL        Assessment  61y Male  w/ PAST MEDICAL & SURGICAL HISTORY:  HTN (hypertension)  Chronic kidney disease, unspecified CKD stage  Pulmonary embolus  diagnosed about 6 months ago incidentally found on imaging related to falling off a bike and chest pain  2019 novel coronavirus disease (COVID-19)  never hospitilized  Arthritis  Tracheal stenosis  History of total right knee replacement (TKR)  bilateral  History of tracheal stenosis    62 y/o male with 3rd readmission s/p tracheal resection 4/23/21.  ENT injected vocal cords about 2 weeks ago. He was last discharged 5/18/21.   He is now POD 3 s/p survelliance flex bronch. Tracheal resection anastomasis well healing however noted to have significant swelling of posterior membrane of air way above and below anastomotic site. Dr. Allen concerned that pt's swelling and symptoms are related to reflux and aspiration. GI consult called. Pt placed on max PPI therapy. Per GI- pH study would have to be done as outpt. Pt had Barium Swallow 5/27 that revealed mild reflux. Pt ordered for Bipap at night to help keep airway open w positive pressure. Pt is on medrol dose pack which ends June 1.    PLAN  Neuro: Pain management. Cont pain meds PRN. cepacol for sore throat  Pulm: Encourage coughing, deep breathing and use of incentive spirometry.cont Bipap at night- assess if positive pressure helps symptoms. Medrol pack for increased stridor this morning. Keep HOB elevated. Cont Cervical collar.   Cardio: Monitor telemetry/alarms  GI: PROTONIX 40 BID max dose,  HOB elevated.   Renal: monitor urine output, supplement electrolytes as needed  Vasc: Heparin SC/SCDs for DVT prophylaxis  Heme: Stable H/H. .   ID: Off antibiotics. Stable.  Therapy: OOB/ambulate  Disposition: Aim to D/C to home once medically optimized  Discussed with Cardiothoracic Team at AM rounds.      Subjective: pt with raspy voice, admits sore throat  no new complaints   intermittent use of BiPAP at night as tolerated  he is tolerating regular diet with thin liquids  he is ambulating the room and unit ad portillo  pt denies shortness of breath, pain, dysphagia or GERD symptoms    Vital Signs:  Vital Signs Last 24 Hrs  T(C): 37 (05-29-21 @ 05:24), Max: 37 (05-29-21 @ 05:24)  T(F): 98.6 (05-29-21 @ 05:24), Max: 98.6 (05-29-21 @ 05:24)  HR: 80 (05-29-21 @ 05:24) (72 - 98)  BP: 127/77 (05-29-21 @ 05:24) (105/59 - 127/77)  RR: 17 (05-29-21 @ 05:24) (17 - 18)  SpO2: 98% (05-29-21 @ 05:24) (97% - 100%) on (O2)    Telemetry/Alarms:  General: WN/WD NAD  Neurology: Awake, nonfocal, SINGH x 4  Eyes: Scleras clear, PERRLA/ EOMI, Gross vision intact  ENT:Gross hearing intact, grossly patent pharynx,  Neck: Neck supple, trachea midline, No JVD,   Respiratory: CTA B/L, No wheezing, rales, rhonchi  CV: RRR, S1S2, no murmurs, rubs or gallops  Abdominal: Soft, NT, ND +BS,   Extremities: No edema, + peripheral pulses  Skin: No Rashes, Hematoma, Ecchymosis  Lymphatic: No Neck, axilla, groin LAD  Psych: Oriented x 3, normal affect    Relevant labs, radiology and Medications reviewed            MEDICATIONS  (STANDING):  albuterol/ipratropium for Nebulization 3 milliLiter(s) Nebulizer every 6 hours  amLODIPine   Tablet 10 milliGRAM(s) Oral daily  heparin   Injectable 5000 Unit(s) SubCutaneous every 8 hours  labetalol 100 milliGRAM(s) Oral three times a day  levETIRAcetam 750 milliGRAM(s) Oral two times a day  methylPREDNISolone 4 milliGRAM(s) Oral before breakfast  methylPREDNISolone 4 milliGRAM(s) Oral after lunch  methylPREDNISolone 4 milliGRAM(s) Oral after dinner  methylPREDNISolone 4 milliGRAM(s) Oral at bedtime  methylPREDNISolone   Oral   pantoprazole    Tablet 40 milliGRAM(s) Oral two times a day    MEDICATIONS  (PRN):  benzocaine 15 mG/menthol 3.6 mG (Sugar-Free) Lozenge 1 Lozenge Oral every 2 hours PRN Sore Throat  cyclobenzaprine 5 milliGRAM(s) Oral three times a day PRN Muscle Spasm  melatonin 3 milliGRAM(s) Oral at bedtime PRN Insomnia  oxyCODONE    IR 5 milliGRAM(s) Oral every 4 hours PRN Moderate Pain (4 - 6)  polyethylene glycol 3350 17 Gram(s) Oral daily PRN Constipation  racepinephrine  2.25% Inhalation 0.5 milliLiter(s) Inhalation two times a day PRN stridor    Pertinent Physical Exam  I&O's Summary    28 May 2021 07:01  -  29 May 2021 07:00  --------------------------------------------------------  IN: 0 mL / OUT: 875 mL / NET: -875 mL        Assessment  61y Male  w/ PAST MEDICAL & SURGICAL HISTORY:  HTN (hypertension)  Chronic kidney disease, unspecified CKD stage  Pulmonary embolus  diagnosed about 6 months ago incidentally found on imaging related to falling off a bike and chest pain  2019 novel coronavirus disease (COVID-19)  never hospitilized  Arthritis  Tracheal stenosis  History of total right knee replacement (TKR)  bilateral  History of tracheal stenosis    60 y/o male with 3rd readmission s/p tracheal resection 4/23/21.  ENT injected vocal cords about 2 weeks ago. He was last discharged 5/18/21.   He is now POD 3 s/p survelliance flex bronch. Tracheal resection anastomasis well healing however noted to have significant swelling of posterior membrane of air way above and below anastomotic site. Dr. Allen concerned that pt's swelling and symptoms are related to reflux and aspiration. GI consult called. Pt placed on max PPI therapy. Per GI- pH study would have to be done as outpt. Pt had Barium Swallow 5/27 that revealed mild reflux. Pt ordered for Bipap at night to help keep airway open w positive pressure. Pt is on medrol dose pack which ends June 1.    PLAN  Neuro: Pain management. Cont pain meds PRN. cepacol for sore throat  Pulm: Encourage coughing, deep breathing and use of incentive spirometry.cont Bipap at night- assess if positive pressure helps symptoms. Medrol pack for increased stridor this morning. Keep HOB elevated.   Cardio: Monitor telemetry/alarms  GI: PROTONIX 40 BID max dose,  HOB elevated.   Renal: monitor urine output, supplement electrolytes as needed  Vasc: Heparin SC/SCDs for DVT prophylaxis  Heme: Stable H/H. .   ID: Off antibiotics. Stable.  Therapy: OOB/ambulate  Disposition: Aim to D/C to home once medically optimized  Discussed with Cardiothoracic Team at AM rounds.

## 2021-05-30 PROCEDURE — 99232 SBSQ HOSP IP/OBS MODERATE 35: CPT

## 2021-05-30 RX ADMIN — Medication 3 MILLILITER(S): at 16:27

## 2021-05-30 RX ADMIN — Medication 100 MILLIGRAM(S): at 22:34

## 2021-05-30 RX ADMIN — HEPARIN SODIUM 5000 UNIT(S): 5000 INJECTION INTRAVENOUS; SUBCUTANEOUS at 06:16

## 2021-05-30 RX ADMIN — HEPARIN SODIUM 5000 UNIT(S): 5000 INJECTION INTRAVENOUS; SUBCUTANEOUS at 22:34

## 2021-05-30 RX ADMIN — Medication 3 MILLILITER(S): at 03:03

## 2021-05-30 RX ADMIN — PANTOPRAZOLE SODIUM 40 MILLIGRAM(S): 20 TABLET, DELAYED RELEASE ORAL at 06:15

## 2021-05-30 RX ADMIN — LEVETIRACETAM 750 MILLIGRAM(S): 250 TABLET, FILM COATED ORAL at 06:15

## 2021-05-30 RX ADMIN — PANTOPRAZOLE SODIUM 40 MILLIGRAM(S): 20 TABLET, DELAYED RELEASE ORAL at 17:45

## 2021-05-30 RX ADMIN — Medication 3 MILLILITER(S): at 23:18

## 2021-05-30 RX ADMIN — Medication 100 MILLIGRAM(S): at 06:16

## 2021-05-30 RX ADMIN — HEPARIN SODIUM 5000 UNIT(S): 5000 INJECTION INTRAVENOUS; SUBCUTANEOUS at 14:07

## 2021-05-30 RX ADMIN — Medication 4 MILLIGRAM(S): at 07:04

## 2021-05-30 RX ADMIN — Medication 4 MILLIGRAM(S): at 22:34

## 2021-05-30 RX ADMIN — Medication 4 MILLIGRAM(S): at 14:07

## 2021-05-30 RX ADMIN — LEVETIRACETAM 750 MILLIGRAM(S): 250 TABLET, FILM COATED ORAL at 17:45

## 2021-05-30 RX ADMIN — Medication 100 MILLIGRAM(S): at 14:07

## 2021-05-30 RX ADMIN — BENZOCAINE AND MENTHOL 1 LOZENGE: 5; 1 LIQUID ORAL at 17:49

## 2021-05-30 RX ADMIN — AMLODIPINE BESYLATE 10 MILLIGRAM(S): 2.5 TABLET ORAL at 06:15

## 2021-05-30 NOTE — PROGRESS NOTE ADULT - SUBJECTIVE AND OBJECTIVE BOX
Subjective: "I'm sleeping a little bit better with the bipap on, but I can't wear it too long because it gives me a headache and some pains in my chest" Besides an improved sleep, pt states all his other symptoms are unchanged and not improved. Pt still states painful cough to neck and throat with "slimey" collections of phlegm that he keeps having to cough out, but can't always. Continues to state feelings of his throat "tightening" and "closing up"  States no issues with breathing or any SOB. No stridor. On RA. Ambulating frequently without respiratory complaints.     Vital Signs:  Vital Signs Last 24 Hrs  T(C): 36.8 (05-30-21 @ 06:10), Max: 36.8 (05-29-21 @ 13:07)  T(F): 98.2 (05-30-21 @ 06:10), Max: 98.2 (05-29-21 @ 13:07)  HR: 70 (05-30-21 @ 08:14) (70 - 87)  BP: 116/66 (05-30-21 @ 08:14) (116/66 - 132/78)  RR: 18 (05-30-21 @ 08:14) (18 - 18)  SpO2: 98% (05-30-21 @ 08:14) (96% - 100%) on (O2)    Telemetry/Alarms:  General: WN/WD NAD  Neurology: Awake, nonfocal, SINGH x 4  Eyes: Scleras clear, PERRLA/ EOMI, Gross vision intact  ENT:Gross hearing intact, grossly patent pharynx, no stridor. slightly hoarse voice, no stridor  Neck: Neck supple, trachea midline, No JVD,   Respiratory: Coarse rhonchi throughout but when pt coughs lungs become clear.   CV: RRR, S1S2, no murmurs, rubs or gallops  Abdominal: Soft, NT, ND +BS, +BM yesterday. Tolerating diet. Pt. denies any dysphagia.   Extremities: No edema, + peripheral pulses  Skin: No Rashes, Hematoma, Ecchymosis  Lymphatic: No Neck, axilla, groin LAD  Psych: Oriented x 3, normal affect  Incisions: Neck incision healed.     Relevant labs, radiology and Medications reviewed                        8.0    8.01  )-----------( 252      ( 29 May 2021 06:55 )             25.2     05-29    138  |  105  |  48<H>  ----------------------------<  155<H>  4.4   |  18<L>  |  3.14<H>    Ca    9.1      29 May 2021 06:55        MEDICATIONS  (STANDING):  albuterol/ipratropium for Nebulization 3 milliLiter(s) Nebulizer every 6 hours  amLODIPine   Tablet 10 milliGRAM(s) Oral daily  heparin   Injectable 5000 Unit(s) SubCutaneous every 8 hours  labetalol 100 milliGRAM(s) Oral three times a day  levETIRAcetam 750 milliGRAM(s) Oral two times a day  methylPREDNISolone 4 milliGRAM(s) Oral before breakfast  methylPREDNISolone 4 milliGRAM(s) Oral after lunch  methylPREDNISolone 4 milliGRAM(s) Oral at bedtime  methylPREDNISolone   Oral   pantoprazole    Tablet 40 milliGRAM(s) Oral two times a day    MEDICATIONS  (PRN):  benzocaine 15 mG/menthol 3.6 mG (Sugar-Free) Lozenge 1 Lozenge Oral every 2 hours PRN Sore Throat  cyclobenzaprine 5 milliGRAM(s) Oral three times a day PRN Muscle Spasm  melatonin 3 milliGRAM(s) Oral at bedtime PRN Insomnia  oxyCODONE    IR 5 milliGRAM(s) Oral every 4 hours PRN Moderate Pain (4 - 6)  polyethylene glycol 3350 17 Gram(s) Oral daily PRN Constipation  racepinephrine  2.25% Inhalation 0.5 milliLiter(s) Inhalation two times a day PRN stridor    Pertinent Physical Exam  I&O's Summary    29 May 2021 07:01  -  30 May 2021 07:00  --------------------------------------------------------  IN: 1200 mL / OUT: 1770 mL / NET: -570 mL      Assessment  61y Male  w/ PAST MEDICAL & SURGICAL HISTORY:  HTN (hypertension)    Chronic kidney disease, unspecified CKD stage    Pulmonary embolus  diagnosed about 6 months ago incidentally found on imaging related to falling off a bike and chest pain    2019 novel coronavirus disease (COVID-19)  never hospitilized    Arthritis    Tracheal stenosis    History of total right knee replacement (TKR)  bilateral    History of tracheal stenosis    admitted with complaints of Patient is a 61y old  Male who presents with a chief complaint of Scratchy throat/Coughing s/p Vocal Cord injection (29 May 2021 06:59)  .    HPI:  62 y/o male s/p tracheal resection 4/23/21 presents to [facility] ER w c/o stridor and SOB. Difficulty breating is worse at night and prevents him from being able to sleep.  He denies productive cough or fever.  This is his 3rd readmission after his tracheal resection  PT has PMHx COVID+ (3/2021 - not hospitalized), PE (8/2020 - on eliquis at home), CKD, w/ recent hospitalization at [facility] after seizure w/ lingual hematoma requiring emergency tracheostomy on 3/25/21.  Patient underwent Tracheal Resection and Reconstruction on 4/23. ENT injected vocal cords about 2 weeks ago. He was last discharged 5/18/21.  (24 May 2021 10:29)      Procedure:  Flex bronch, tracheal resection on 4/23      Issues:  Stridor   Severe tracheal stenosis s/p Resection  Hx of PE (dx 10/2020)   Seizure disorder  HTN  CKD              Hx of COVID (2/2021)               Unilateral vocal cord paralysis  Pt improved in CTICU. Transitioned to floor. Off heliox. On 5/26- pt to OR for Bronch w Dr. Allen. ANastomasis well healing however noted to have significant swelling of posterior membrane of air way above and below anastomotic site. Dr. Allen concerned that pt's swelling and symptoms are related to reflux and aspiration. GI consult called. Pt placed on max PPI therapy. Per GI- pH study would have to be done as outpt. Pt ordered for Barium Swallow to eval for reflux. Ordered for Bipap at night to help keep airway open w positive pressure 5/28-CT scan done showing airway narrowing, swelling to surrounding structures. Reviewed by Dr. Allen. Pt cleared for regular diet by Speech and Swallow, no dysphagia seen. Pt kept for continued observation throughout weekend.       PLAN  Neuro: Pain management  Pulm: Encourage coughing, deep breathing and use of incentive spirometry. Cont bipap at night as tolerated. control Medrol taper and observe  Cardio: Monitor telemetry/alarms  GI: Tolerating diet. Continue stool softeners.  Renal: monitor urine output, supplement electrolytes as needed. pt w CRI, close to baseline. Pt euvolemic   Vasc: Heparin SC/SCDs for DVT prophylaxis  Heme: Stable H/H. .   ID: Off antibiotics. Stable.  Therapy: OOB/ambulate  Disposition: Aim to D/C to home once cleared by Dr. Allen.   Discussed with Cardiothoracic Team at AM rounds.

## 2021-05-31 ENCOUNTER — TRANSCRIPTION ENCOUNTER (OUTPATIENT)
Age: 61
End: 2021-05-31

## 2021-05-31 RX ADMIN — LEVETIRACETAM 750 MILLIGRAM(S): 250 TABLET, FILM COATED ORAL at 17:09

## 2021-05-31 RX ADMIN — PANTOPRAZOLE SODIUM 40 MILLIGRAM(S): 20 TABLET, DELAYED RELEASE ORAL at 17:09

## 2021-05-31 RX ADMIN — Medication 100 MILLIGRAM(S): at 22:17

## 2021-05-31 RX ADMIN — Medication 100 MILLIGRAM(S): at 11:22

## 2021-05-31 RX ADMIN — HEPARIN SODIUM 5000 UNIT(S): 5000 INJECTION INTRAVENOUS; SUBCUTANEOUS at 22:17

## 2021-05-31 RX ADMIN — Medication 100 MILLIGRAM(S): at 04:50

## 2021-05-31 RX ADMIN — Medication 3 MILLILITER(S): at 10:42

## 2021-05-31 RX ADMIN — PANTOPRAZOLE SODIUM 40 MILLIGRAM(S): 20 TABLET, DELAYED RELEASE ORAL at 04:50

## 2021-05-31 RX ADMIN — LEVETIRACETAM 750 MILLIGRAM(S): 250 TABLET, FILM COATED ORAL at 04:50

## 2021-05-31 RX ADMIN — Medication 3 MILLIGRAM(S): at 22:21

## 2021-05-31 RX ADMIN — AMLODIPINE BESYLATE 10 MILLIGRAM(S): 2.5 TABLET ORAL at 04:50

## 2021-05-31 RX ADMIN — Medication 3 MILLILITER(S): at 05:06

## 2021-05-31 RX ADMIN — HEPARIN SODIUM 5000 UNIT(S): 5000 INJECTION INTRAVENOUS; SUBCUTANEOUS at 11:22

## 2021-05-31 RX ADMIN — Medication 3 MILLILITER(S): at 23:25

## 2021-05-31 RX ADMIN — Medication 4 MILLIGRAM(S): at 04:50

## 2021-05-31 RX ADMIN — Medication 4 MILLIGRAM(S): at 22:17

## 2021-05-31 RX ADMIN — HEPARIN SODIUM 5000 UNIT(S): 5000 INJECTION INTRAVENOUS; SUBCUTANEOUS at 04:51

## 2021-05-31 NOTE — DISCHARGE NOTE PROVIDER - NSDCFUADDINST_GEN_ALL_CORE_FT
for difficulty breathing, call Dr Allen.   for difficulty breathing, call Dr Allen or call 911/ go to Emergency room

## 2021-05-31 NOTE — PROGRESS NOTE ADULT - SUBJECTIVE AND OBJECTIVE BOX
Subjective: 60 y/o male seen at bedside this morning. No acute events overnight. Patient continues to express frustration over his lack of symptom improvement. Wore Bipap overnight with minimal help.     Vital Signs:  Vital Signs Last 24 Hrs  T(C): 37 (05-31-21 @ 04:45), Max: 37.1 (05-30-21 @ 22:25)  T(F): 98.6 (05-31-21 @ 04:45), Max: 98.7 (05-30-21 @ 22:25)  HR: 68 (05-31-21 @ 07:53) (67 - 79)  BP: 130/77 (05-31-21 @ 04:45) (122/61 - 136/74)  RR: 18 (05-31-21 @ 04:45) (18 - 18)  SpO2: 99% (05-31-21 @ 07:53) (97% - 100%) on (O2)    Pertinent Physical Exam:  Telemetry/Alarms: NSR  General: WN/WD NAD  Neurology: Awake, nonfocal,   ENT:Gross hearing intact, grossly patent pharynx, minimal stridor with talking noted  Neck: Neck supple, trachea midline, No JVD,   Respiratory: CTA B/L, intermittent wheezing, improved since last interaciton  CV: RRR  Psych: Oriented x 3, normal affect  Incisions: CDI      I&O's Summary    30 May 2021 07:01  -  31 May 2021 07:00  --------------------------------------------------------  IN: 480 mL / OUT: 1275 mL / NET: -795 mL        Relevant labs, radiology and Medications reviewed            MEDICATIONS  (STANDING):  albuterol/ipratropium for Nebulization 3 milliLiter(s) Nebulizer every 6 hours  amLODIPine   Tablet 10 milliGRAM(s) Oral daily  heparin   Injectable 5000 Unit(s) SubCutaneous every 8 hours  labetalol 100 milliGRAM(s) Oral three times a day  levETIRAcetam 750 milliGRAM(s) Oral two times a day  methylPREDNISolone 4 milliGRAM(s) Oral before breakfast  methylPREDNISolone 4 milliGRAM(s) Oral at bedtime  methylPREDNISolone   Oral   pantoprazole    Tablet 40 milliGRAM(s) Oral two times a day    MEDICATIONS  (PRN):  benzocaine 15 mG/menthol 3.6 mG (Sugar-Free) Lozenge 1 Lozenge Oral every 2 hours PRN Sore Throat  cyclobenzaprine 5 milliGRAM(s) Oral three times a day PRN Muscle Spasm  melatonin 3 milliGRAM(s) Oral at bedtime PRN Insomnia  oxyCODONE    IR 5 milliGRAM(s) Oral every 4 hours PRN Moderate Pain (4 - 6)  polyethylene glycol 3350 17 Gram(s) Oral daily PRN Constipation  racepinephrine  2.25% Inhalation 0.5 milliLiter(s) Inhalation two times a day PRN stridor      CT Chest:       Assessment  61y Male  w/ PAST MEDICAL & SURGICAL HISTORY:  HTN (hypertension)    Chronic kidney disease, unspecified CKD stage    Pulmonary embolus  diagnosed about 6 months ago incidentally found on imaging related to falling off a bike and chest pain    2019 novel coronavirus disease (COVID-19)  never hospitilized    Arthritis    Tracheal stenosis    History of total right knee replacement (TKR)  bilateral    History of tracheal stenosis    admitted with complaints of Patient is a 61y old  Male who presents with a chief complaint of Scratchy throat/Coughing s/p Vocal Cord injection (30 May 2021 10:14)  .  On (Date), patient underwent Bronchoscopy, flexible, adult    .   Postoperative course/issues:    PLAN  Neuro: Pain management  Pulm: Encourage coughing, deep breathing and use of incentive spirometry. Wean off supplemental oxygen as able. Daily CXR.   Cardio: Monitor telemetry/alarms  GI: Tolerating diet. Continue stool softeners.  Renal: monitor urine output, supplement electrolytes as needed  Vasc: Heparin SC/SCDs for DVT prophylaxis  Heme: Stable H/H. .   ID: Off antibiotics. Stable.  Therapy: OOB/ambulate  Tubes: Monitor Chest tube output  Disposition: Aim to D/C to home on  Discussed with Cardiothoracic Team at AM rounds.   Subjective: 62 y/o male seen at bedside this morning. No acute events overnight. Patient continues to express frustration over his lack of symptom improvement. Wore Bipap overnight with minimal help.     Vital Signs:  Vital Signs Last 24 Hrs  T(C): 37 (05-31-21 @ 04:45), Max: 37.1 (05-30-21 @ 22:25)  T(F): 98.6 (05-31-21 @ 04:45), Max: 98.7 (05-30-21 @ 22:25)  HR: 68 (05-31-21 @ 07:53) (67 - 79)  BP: 130/77 (05-31-21 @ 04:45) (122/61 - 136/74)  RR: 18 (05-31-21 @ 04:45) (18 - 18)  SpO2: 99% (05-31-21 @ 07:53) (97% - 100%) on (O2)    Pertinent Physical Exam:  Telemetry/Alarms: NSR  General: WN/WD NAD  Neurology: Awake, nonfocal,   ENT:Gross hearing intact, grossly patent pharynx, minimal stridor with talking noted  Neck: Neck supple, trachea midline, No JVD,   Respiratory: CTA B/L, intermittent wheezing, improved since last interaciton  CV: RRR  Psych: Oriented x 3, normal affect  Incisions: CDI      I&O's Summary    30 May 2021 07:01  -  31 May 2021 07:00  --------------------------------------------------------  IN: 480 mL / OUT: 1275 mL / NET: -795 mL        Relevant labs, radiology and Medications reviewed            MEDICATIONS  (STANDING):  albuterol/ipratropium for Nebulization 3 milliLiter(s) Nebulizer every 6 hours  amLODIPine   Tablet 10 milliGRAM(s) Oral daily  heparin   Injectable 5000 Unit(s) SubCutaneous every 8 hours  labetalol 100 milliGRAM(s) Oral three times a day  levETIRAcetam 750 milliGRAM(s) Oral two times a day  methylPREDNISolone 4 milliGRAM(s) Oral before breakfast  methylPREDNISolone 4 milliGRAM(s) Oral at bedtime  methylPREDNISolone   Oral   pantoprazole    Tablet 40 milliGRAM(s) Oral two times a day    MEDICATIONS  (PRN):  benzocaine 15 mG/menthol 3.6 mG (Sugar-Free) Lozenge 1 Lozenge Oral every 2 hours PRN Sore Throat  cyclobenzaprine 5 milliGRAM(s) Oral three times a day PRN Muscle Spasm  melatonin 3 milliGRAM(s) Oral at bedtime PRN Insomnia  oxyCODONE    IR 5 milliGRAM(s) Oral every 4 hours PRN Moderate Pain (4 - 6)  polyethylene glycol 3350 17 Gram(s) Oral daily PRN Constipation  racepinephrine  2.25% Inhalation 0.5 milliLiter(s) Inhalation two times a day PRN stridor      CT Chest:   < from: CT Chest No Cont (05.27.21 @ 14:31) >  IMPRESSION:  Focal narrowing of the upper trachea to 4 mm is a region of the thyroid    Peripheral reticulation may be related to interstitial lung disease. Peripheral cystic opacities morelikely represent honeycombing then bronchiectasis. Continued follow-up CT recommended.    < end of copied text >      Assessment    62 y/o male s/p tracheal resection 4/23/21 presents to [facility] ER w c/o stridor and SOB. Difficulty breating is worse at night and prevents him from being able to sleep.  He denies productive cough or fever.  This is his 3rd readmission after his tracheal resection  PT has PMHx COVID+ (3/2021 - not hospitalized), PE (8/2020 - on eliquis at home), CKD, w/ recent hospitalization at [facility] after seizure w/ lingual hematoma requiring emergency tracheostomy on 3/25/21.  Patient underwent Tracheal Resection and Reconstruction on 4/23. ENT injected vocal cords about 2 weeks ago. He was last discharged 5/18/21.    Pt improved in CTICU. Transitioned to floor. Off heliox. On 5/26- pt to OR for Bronch w Dr. Allen. Anastomosis well healing however noted to have significant swelling of posterior membrane of air way above and below anastomotic site. Dr. Allen concerned that pt's swelling and symptoms are related to reflux and aspiration. GI consult called. Pt placed on max PPI therapy. Per GI- pH study would have to be done as outpt. Pt ordered for Barium Swallow to eval for reflux. Ordered for Bipap at night to help keep airway open w positive pressure 5/28-CT scan done showing airway narrowing, swelling to surrounding structures. Reviewed by Dr. Allen. Pt cleared for regular diet by Speech and Swallow, no dysphagia seen.   5/28-5/31 Pt kept for continued observation throughout weekend.     PLAN  Neuro: Pain management as needed.  Pulm: Encourage coughing, deep breathing and use of incentive spirometry. CT chest with narrowing of airway above and below anastemosis. Cont with Medrol steroid taper. Monitoring for worsening of symptoms. PPI for reflux. Bipap at night.   Cardio: Monitor telemetry/alarms  GI: Tolerating diet. Continue stool softeners. cleared by S/S 5/27. Max PPI, GI consulted and will need outpatient pH study   Renal: monitor urine output, supplement electrolytes as needed  Vasc: Heparin SC/SCDs for DVT prophylaxis  Heme: Stable H/H.   ID: Off antibiotics. Stable.  Therapy: OOB/ambulate  Disposition: Cont with PPIs, Steroid taper and bipap. Monitor airway through weekend.  Discussed with Cardiothoracic Team at AM rounds.

## 2021-05-31 NOTE — DISCHARGE NOTE PROVIDER - HOSPITAL COURSE
This 61 year old male is s/p tracheal resection 4/23/21 and presented to the [facility] ER c/o stridor and SOB on 5/24/21. He c/o difficulty breathing that worsened at night and prevented him from sleeping but denied a productive cough or fever. He had last been discharged from [facility] on 5/18/21 and had gone for a vocal cord injection with ENT in the interim.   He was readmitted to the CTICU and given Decadron, Racemic epinephrine, and Heliox.  He went for a surveillance bronchoscopy on 5/26/21 and although the tracheal anastomosis was intact, ther was swelling in the area.  For that, he was given a PPI and nocturnal BiPap.  A CT scan showed a focal narrowing of the trachea at the thyroid to 4 mm.  He was kept for observation. This 61 year old male is s/p tracheal resection 4/23/21 and presented to the [facility] ER c/o stridor and SOB on 5/24/21. He c/o difficulty breathing that worsened at night and prevented him from sleeping but denied a productive cough or fever. He had last been discharged from [facility] on 5/18/21 and had gone for a vocal cord injection with ENT in the interim.   He was readmitted to the CTICU and given Decadron, Racemic epinephrine, and Heliox.  He went for a surveillance bronchoscopy on 5/26/21 and although the tracheal anastomosis was intact, ther was swelling in the area.  For that, he was given a PPI and nocturnal BiPap.  A CT scan showed a focal narrowing of the trachea at the thyroid to 4 mm.  He was kept for observation.  He underwent bronchoscopy reach revealed extreme airway edema and tracheal malacia. Was treated with Steroids and underwent repeat bronchoscopy on 06/03 and a silicon tracheal stent was placed. Pt is presently breathing comfortably. No longer on steroids.

## 2021-05-31 NOTE — DISCHARGE NOTE PROVIDER - NSDCCPCAREPLAN_GEN_ALL_CORE_FT
PRINCIPAL DISCHARGE DIAGNOSIS  Diagnosis: Stridor  Assessment and Plan of Treatment:       SECONDARY DISCHARGE DIAGNOSES  Diagnosis: Cough  Assessment and Plan of Treatment:

## 2021-05-31 NOTE — DISCHARGE NOTE PROVIDER - CARE PROVIDER_API CALL
Jh Allen)  Surgery; Thoracic Surgery  [phone] [place] [address], [place] [address]  Phone: [phone]  Fax: [phone]  Established Patient  Follow Up Time: 2 weeks

## 2021-05-31 NOTE — DISCHARGE NOTE PROVIDER - REASON FOR ADMISSION
Left message for patient to do Protein/Creatinine Ratio this week. Patient scheduled for office visit with Dr Briggs on 10/2/2017.  
Protein/creat urine ratio completed. Lab was printed and put in chart for upcoming appointment.   
Scratchy throat/Coughing s/p Vocal Cord injection

## 2021-05-31 NOTE — DISCHARGE NOTE PROVIDER - NSDCCPTREATMENT_GEN_ALL_CORE_FT
PRINCIPAL PROCEDURE  Procedure: Bronchoscopy, flexible, adult  Findings and Treatment:        PRINCIPAL PROCEDURE  Procedure: Bronchoscopy, flexible, adult  Findings and Treatment:       SECONDARY PROCEDURE  Procedure: Bronchoscopy  Findings and Treatment: tracheasl stent placement

## 2021-05-31 NOTE — DISCHARGE NOTE PROVIDER - NSDCMRMEDTOKEN_GEN_ALL_CORE_FT
apixaban 5 mg oral tablet: 1 tab(s) orally 2 times a day**apply free coupon if available**  cyclobenzaprine 5 mg oral tablet: 1 tab(s) orally 3 times a day, As needed, Muscle Spasm MDD:3  labetalol 100 mg oral tablet: 1 tab(s) orally 3 times a day  levETIRAcetam 750 mg oral tablet: 1 tab(s) orally every 12 hours  Norvasc 10 mg oral tablet: 1 tab(s) orally once a day  polyethylene glycol 3350 oral powder for reconstitution: 17 gram(s) orally once a day, As Needed  Protonix 40 mg oral delayed release tablet: 1 tab(s) orally 2 times a day MDD:2 tabs   acetaminophen 325 mg oral tablet: 3 tab(s) orally every 6 hours, As needed, Mild Pain (1 - 3)  apixaban 5 mg oral tablet: 1 tab(s) orally 2 times a day**apply free coupon if available**  cyclobenzaprine 5 mg oral tablet: 1 tab(s) orally 3 times a day, As needed, Muscle Spasm MDD:3  ipratropium-albuterol 20 mcg-100 mcg/inh inhalation aerosol: 1 puff(s) inhaled 4 times a day   labetalol 100 mg oral tablet: 1 tab(s) orally 3 times a day  levETIRAcetam 750 mg oral tablet: 1 tab(s) orally every 12 hours  Norvasc 10 mg oral tablet: 1 tab(s) orally once a day  polyethylene glycol 3350 oral powder for reconstitution: 17 gram(s) orally once a day, As Needed  Protonix 40 mg oral delayed release tablet: 1 tab(s) orally 2 times a day MDD:2 tabs   acetaminophen 325 mg oral tablet: 3 tab(s) orally every 6 hours, As needed, Mild Pain (1 - 3)  apixaban 5 mg oral tablet: 1 tab(s) orally 2 times a day**apply free coupon if available**  cyclobenzaprine 5 mg oral tablet: 1 tab(s) orally 3 times a day, As needed, Muscle Spasm MDD:3  ipratropium-albuterol 20 mcg-100 mcg/inh inhalation aerosol: 1 puff(s) inhaled 4 times a day   labetalol 100 mg oral tablet: 1 tab(s) orally 3 times a day  levETIRAcetam 750 mg oral tablet: 1 tab(s) orally every 12 hours  Norvasc 10 mg oral tablet: 1 tab(s) orally once a day  polyethylene glycol 3350 oral powder for reconstitution: 17 gram(s) orally once a day, As Needed  Protonix 40 mg oral delayed release tablet: 1 tab(s) orally 2 times a day MDD:2 tabs  Robitussin 100 mg/5 mL oral liquid: 10 milliliter(s) orally every 4 hours, As Needed for cough

## 2021-05-31 NOTE — DISCHARGE NOTE PROVIDER - NSDCFUSCHEDAPPT_GEN_ALL_CORE_FT
ANNEMARIE KELLEY ; 06/01/2021 ; [facility] [phone] [place] [address]  ANNEMARIE KELLEY ; 06/23/2021 ; [facility] [address] [place] [address] ANNEMARIE KELLEY ; 06/23/2021 ; [facility] [address] [place] [address]

## 2021-06-01 ENCOUNTER — APPOINTMENT (OUTPATIENT)
Dept: THORACIC SURGERY | Facility: CLINIC | Age: 61
End: 2021-06-01

## 2021-06-01 LAB
ALBUMIN SERPL ELPH-MCNC: 3.4 G/DL — SIGNIFICANT CHANGE UP (ref 3.3–5)
ALP SERPL-CCNC: 120 U/L — SIGNIFICANT CHANGE UP (ref 40–120)
ALT FLD-CCNC: 9 U/L — SIGNIFICANT CHANGE UP (ref 4–41)
ANION GAP SERPL CALC-SCNC: 12 MMOL/L — SIGNIFICANT CHANGE UP (ref 7–14)
AST SERPL-CCNC: 9 U/L — SIGNIFICANT CHANGE UP (ref 4–40)
BILIRUB SERPL-MCNC: <0.2 MG/DL — SIGNIFICANT CHANGE UP (ref 0.2–1.2)
BUN SERPL-MCNC: 51 MG/DL — HIGH (ref 7–23)
CALCIUM SERPL-MCNC: 9.1 MG/DL — SIGNIFICANT CHANGE UP (ref 8.4–10.5)
CHLORIDE SERPL-SCNC: 105 MMOL/L — SIGNIFICANT CHANGE UP (ref 98–107)
CO2 SERPL-SCNC: 20 MMOL/L — LOW (ref 22–31)
CREAT SERPL-MCNC: 2.92 MG/DL — HIGH (ref 0.5–1.3)
GLUCOSE SERPL-MCNC: 176 MG/DL — HIGH (ref 70–99)
POTASSIUM SERPL-MCNC: 4.7 MMOL/L — SIGNIFICANT CHANGE UP (ref 3.5–5.3)
POTASSIUM SERPL-SCNC: 4.7 MMOL/L — SIGNIFICANT CHANGE UP (ref 3.5–5.3)
PROT SERPL-MCNC: 6.2 G/DL — SIGNIFICANT CHANGE UP (ref 6–8.3)
SODIUM SERPL-SCNC: 137 MMOL/L — SIGNIFICANT CHANGE UP (ref 135–145)

## 2021-06-01 PROCEDURE — 99231 SBSQ HOSP IP/OBS SF/LOW 25: CPT

## 2021-06-01 RX ORDER — LACTULOSE 10 G/15ML
10 SOLUTION ORAL ONCE
Refills: 0 | Status: COMPLETED | OUTPATIENT
Start: 2021-06-01 | End: 2021-06-01

## 2021-06-01 RX ADMIN — BENZOCAINE AND MENTHOL 1 LOZENGE: 5; 1 LIQUID ORAL at 21:02

## 2021-06-01 RX ADMIN — LEVETIRACETAM 750 MILLIGRAM(S): 250 TABLET, FILM COATED ORAL at 18:05

## 2021-06-01 RX ADMIN — OXYCODONE HYDROCHLORIDE 5 MILLIGRAM(S): 5 TABLET ORAL at 02:42

## 2021-06-01 RX ADMIN — Medication 3 MILLILITER(S): at 22:55

## 2021-06-01 RX ADMIN — HEPARIN SODIUM 5000 UNIT(S): 5000 INJECTION INTRAVENOUS; SUBCUTANEOUS at 21:02

## 2021-06-01 RX ADMIN — OXYCODONE HYDROCHLORIDE 5 MILLIGRAM(S): 5 TABLET ORAL at 03:30

## 2021-06-01 RX ADMIN — AMLODIPINE BESYLATE 10 MILLIGRAM(S): 2.5 TABLET ORAL at 05:18

## 2021-06-01 RX ADMIN — Medication 3 MILLILITER(S): at 04:48

## 2021-06-01 RX ADMIN — HEPARIN SODIUM 5000 UNIT(S): 5000 INJECTION INTRAVENOUS; SUBCUTANEOUS at 05:17

## 2021-06-01 RX ADMIN — BENZOCAINE AND MENTHOL 1 LOZENGE: 5; 1 LIQUID ORAL at 02:42

## 2021-06-01 RX ADMIN — Medication 100 MILLIGRAM(S): at 05:17

## 2021-06-01 RX ADMIN — PANTOPRAZOLE SODIUM 40 MILLIGRAM(S): 20 TABLET, DELAYED RELEASE ORAL at 05:17

## 2021-06-01 RX ADMIN — Medication 3 MILLIGRAM(S): at 21:02

## 2021-06-01 RX ADMIN — Medication 3 MILLILITER(S): at 09:28

## 2021-06-01 RX ADMIN — LEVETIRACETAM 750 MILLIGRAM(S): 250 TABLET, FILM COATED ORAL at 05:17

## 2021-06-01 RX ADMIN — LACTULOSE 10 GRAM(S): 10 SOLUTION ORAL at 18:05

## 2021-06-01 RX ADMIN — Medication 100 MILLIGRAM(S): at 21:02

## 2021-06-01 RX ADMIN — Medication 4 MILLIGRAM(S): at 05:17

## 2021-06-01 RX ADMIN — PANTOPRAZOLE SODIUM 40 MILLIGRAM(S): 20 TABLET, DELAYED RELEASE ORAL at 18:05

## 2021-06-01 RX ADMIN — Medication 3 MILLILITER(S): at 15:33

## 2021-06-01 RX ADMIN — HEPARIN SODIUM 5000 UNIT(S): 5000 INJECTION INTRAVENOUS; SUBCUTANEOUS at 12:41

## 2021-06-01 RX ADMIN — Medication 100 MILLIGRAM(S): at 12:41

## 2021-06-01 NOTE — DIETITIAN INITIAL EVALUATION ADULT. - OTHER INFO
Pt has a history of COVID +, Pt has a history of COVID and CKD and tracheal resection and reconstruction. Pt had a Speech and Swallow evaluation on 5/28. A regular diet with thin liquids was recommended. Pt states his appetite and po intake have been good. Pt reports eating 75 to 100 % of his meals. He has no complaints of GI distress.  Reviewed DASH diet with Pt who verbalized understanding of it.

## 2021-06-01 NOTE — PROGRESS NOTE ADULT - SUBJECTIVE AND OBJECTIVE BOX
THORACIC SURGERY PROGRESS NOTE    s/p Tracheal Resection & Multiple Bronchoscopies       Subjective: no overnight events, still with some SOB when laying down, using BiPAP at night      Objective:  VITAL SIGNS:  T(C): 36.9 (06-01-21 @ 12:03), Max: 36.9 (06-01-21 @ 12:03)  HR: 87 (06-01-21 @ 15:34) (68 - 88)  BP: 126/78 (06-01-21 @ 12:03) (126/78 - 131/87)  RR: 16 (06-01-21 @ 12:03) (16 - 18)  SpO2: 100% (06-01-21 @ 15:34) (96% - 100%)      LABS: n/a    06-01    137  |  105  |  51<H>  ----------------------------<  176<H>  4.7   |  20<L>  |  2.92<H>    Ca    9.1      01 Jun 2021 08:32    TPro  6.2  /  Alb  3.4  /  TBili  <0.2  /  DBili  x   /  AST  9   /  ALT  9   /  AlkPhos  120  06-01      MEDICATIONS:  MEDICATIONS  (STANDING):  albuterol/ipratropium for Nebulization 3 milliLiter(s) Nebulizer every 6 hours  amLODIPine   Tablet 10 milliGRAM(s) Oral daily  heparin   Injectable 5000 Unit(s) SubCutaneous every 8 hours  labetalol 100 milliGRAM(s) Oral three times a day  levETIRAcetam 750 milliGRAM(s) Oral two times a day  pantoprazole    Tablet 40 milliGRAM(s) Oral two times a day    MEDICATIONS  (PRN):  benzocaine 15 mG/menthol 3.6 mG (Sugar-Free) Lozenge 1 Lozenge Oral every 2 hours PRN Sore Throat  cyclobenzaprine 5 milliGRAM(s) Oral three times a day PRN Muscle Spasm  melatonin 3 milliGRAM(s) Oral at bedtime PRN Insomnia  oxyCODONE    IR 5 milliGRAM(s) Oral every 4 hours PRN Moderate Pain (4 - 6)  polyethylene glycol 3350 17 Gram(s) Oral daily PRN Constipation  racepinephrine  2.25% Inhalation 0.5 milliLiter(s) Inhalation two times a day PRN stridor        PHYSICAL EXAM:   General: well developed, well nourished, NAD  Neuro: awake, alert, responds to commands, nonfocal, SINGH x 4  Eyes: scleras clear b/l, PERRLA/ EOMI, Gross vision intact  ENT: Gross hearing intact, grossly patent pharynx, no stridor  Neck: Neck supple, trachea midline, No JVD  Respiratory: CTA B/L, No wheezing, rales, rhonchi  CV: RRR, +S1 +S2, no murmurs, rubs or gallops  Abdominal: Soft, NT, ND, no rebound, no guarding, + bowel sounds  Extremities: No edema, + peripheral pulses  Skin: No Rashes, Hematoma, Ecchymosis  Lymphatic: No Neck, axilla, groin LAD  Psych: Oriented x 3, normal affect

## 2021-06-01 NOTE — PROGRESS NOTE ADULT - ASSESSMENT
62 y/o male s/p tracheal resection 4/23/21 presents to [facility] ER w c/o stridor and SOB. Difficulty breating is worse at night and prevents him from being able to sleep.  He denies productive cough or fever.  This is his 3rd readmission after his tracheal resection  PT has PMHx COVID+ (3/2021 - not hospitalized), PE (8/2020 - on eliquis at home), CKD, w/ recent hospitalization at [facility] after seizure w/ lingual hematoma requiring emergency tracheostomy on 3/25/21.  Patient underwent Tracheal Resection and Reconstruction on 4/23. ENT injected vocal cords about 2 weeks ago. He was last discharged 5/18/21.    Pt improved in CTICU. Transitioned to floor. Off heliox. On 5/26- pt to OR for Bronch w Dr. Allen. Anastomosis well healing however noted to have significant swelling of posterior membrane of air way above and below anastomotic site. Dr. Allen concerned that pt's swelling and symptoms are related to reflux and aspiration. GI consult called. Pt placed on max PPI therapy. Per GI- pH study would have to be done as outpt. Pt ordered for Barium Swallow to eval for reflux. Ordered for Bipap at night to help keep airway open w positive pressure 5/28-CT scan done showing airway narrowing, swelling to surrounding structures. Reviewed by Dr. Allen. Pt cleared for regular diet by Speech and Swallow, no dysphagia seen.   5/28-5/31 Pt kept for continued observation throughout weekend.     PLAN  1. Continue steroids, today is last day  2. Encourage coughing, deep breathing & use of incentive spirometry. Daily CXR. Chest PT  3. BiPAP at night  4. Tolerating diet. Continue bowel regimen  5. Continue DVT PPx w/ Heparin SC & Venodynes for DVT prophylaxis  6. OOB/ambulate  6. Monitor Chest tube output  7. Plan from bronchoscopy thursday      Discussed with Cardiothoracic Team at AM rounds.    Selvin Moseley PA-C, MPAS  Thoracic Surgery   Spectra [phone]

## 2021-06-01 NOTE — DIETITIAN INITIAL EVALUATION ADULT. - ORAL INTAKE PTA/DIET HISTORY
Pt states that he has been able to tolerate a regular consistency diet. He reports following a low sodium, low fat diet at home.

## 2021-06-02 LAB
ANION GAP SERPL CALC-SCNC: 14 MMOL/L — SIGNIFICANT CHANGE UP (ref 7–14)
BLD GP AB SCN SERPL QL: NEGATIVE — SIGNIFICANT CHANGE UP
BUN SERPL-MCNC: 54 MG/DL — HIGH (ref 7–23)
CALCIUM SERPL-MCNC: 8.8 MG/DL — SIGNIFICANT CHANGE UP (ref 8.4–10.5)
CHLORIDE SERPL-SCNC: 105 MMOL/L — SIGNIFICANT CHANGE UP (ref 98–107)
CO2 SERPL-SCNC: 19 MMOL/L — LOW (ref 22–31)
CREAT SERPL-MCNC: 3.22 MG/DL — HIGH (ref 0.5–1.3)
GLUCOSE BLDC GLUCOMTR-MCNC: 151 MG/DL — HIGH (ref 70–99)
GLUCOSE SERPL-MCNC: 116 MG/DL — HIGH (ref 70–99)
HCT VFR BLD CALC: 25.3 % — LOW (ref 39–50)
HGB BLD-MCNC: 7.9 G/DL — LOW (ref 13–17)
MAGNESIUM SERPL-MCNC: 1.4 MG/DL — LOW (ref 1.6–2.6)
MCHC RBC-ENTMCNC: 29.2 PG — SIGNIFICANT CHANGE UP (ref 27–34)
MCHC RBC-ENTMCNC: 31.2 GM/DL — LOW (ref 32–36)
MCV RBC AUTO: 93.4 FL — SIGNIFICANT CHANGE UP (ref 80–100)
NRBC # BLD: 0 /100 WBCS — SIGNIFICANT CHANGE UP
NRBC # FLD: 0.02 K/UL — HIGH
PHOSPHATE SERPL-MCNC: 3.2 MG/DL — SIGNIFICANT CHANGE UP (ref 2.5–4.5)
PLATELET # BLD AUTO: 212 K/UL — SIGNIFICANT CHANGE UP (ref 150–400)
POTASSIUM SERPL-MCNC: 4.1 MMOL/L — SIGNIFICANT CHANGE UP (ref 3.5–5.3)
POTASSIUM SERPL-SCNC: 4.1 MMOL/L — SIGNIFICANT CHANGE UP (ref 3.5–5.3)
RBC # BLD: 2.71 M/UL — LOW (ref 4.2–5.8)
RBC # FLD: 13.6 % — SIGNIFICANT CHANGE UP (ref 10.3–14.5)
RH IG SCN BLD-IMP: POSITIVE — SIGNIFICANT CHANGE UP
SARS-COV-2 RNA SPEC QL NAA+PROBE: SIGNIFICANT CHANGE UP
SODIUM SERPL-SCNC: 138 MMOL/L — SIGNIFICANT CHANGE UP (ref 135–145)
WBC # BLD: 9.86 K/UL — SIGNIFICANT CHANGE UP (ref 3.8–10.5)
WBC # FLD AUTO: 9.86 K/UL — SIGNIFICANT CHANGE UP (ref 3.8–10.5)

## 2021-06-02 PROCEDURE — 99232 SBSQ HOSP IP/OBS MODERATE 35: CPT

## 2021-06-02 PROCEDURE — 71045 X-RAY EXAM CHEST 1 VIEW: CPT | Mod: 26

## 2021-06-02 RX ORDER — ACETAMINOPHEN 500 MG
975 TABLET ORAL EVERY 6 HOURS
Refills: 0 | Status: DISCONTINUED | OUTPATIENT
Start: 2021-06-02 | End: 2021-06-05

## 2021-06-02 RX ADMIN — OXYCODONE HYDROCHLORIDE 5 MILLIGRAM(S): 5 TABLET ORAL at 14:52

## 2021-06-02 RX ADMIN — OXYCODONE HYDROCHLORIDE 5 MILLIGRAM(S): 5 TABLET ORAL at 21:24

## 2021-06-02 RX ADMIN — HEPARIN SODIUM 5000 UNIT(S): 5000 INJECTION INTRAVENOUS; SUBCUTANEOUS at 05:16

## 2021-06-02 RX ADMIN — OXYCODONE HYDROCHLORIDE 5 MILLIGRAM(S): 5 TABLET ORAL at 08:27

## 2021-06-02 RX ADMIN — LEVETIRACETAM 750 MILLIGRAM(S): 250 TABLET, FILM COATED ORAL at 05:16

## 2021-06-02 RX ADMIN — OXYCODONE HYDROCHLORIDE 5 MILLIGRAM(S): 5 TABLET ORAL at 09:03

## 2021-06-02 RX ADMIN — LEVETIRACETAM 750 MILLIGRAM(S): 250 TABLET, FILM COATED ORAL at 17:40

## 2021-06-02 RX ADMIN — Medication 3 MILLILITER(S): at 04:08

## 2021-06-02 RX ADMIN — PANTOPRAZOLE SODIUM 40 MILLIGRAM(S): 20 TABLET, DELAYED RELEASE ORAL at 05:16

## 2021-06-02 RX ADMIN — Medication 3 MILLILITER(S): at 23:42

## 2021-06-02 RX ADMIN — Medication 3 MILLILITER(S): at 11:12

## 2021-06-02 RX ADMIN — BENZOCAINE AND MENTHOL 1 LOZENGE: 5; 1 LIQUID ORAL at 08:27

## 2021-06-02 RX ADMIN — OXYCODONE HYDROCHLORIDE 5 MILLIGRAM(S): 5 TABLET ORAL at 21:54

## 2021-06-02 RX ADMIN — Medication 3 MILLIGRAM(S): at 21:24

## 2021-06-02 RX ADMIN — PANTOPRAZOLE SODIUM 40 MILLIGRAM(S): 20 TABLET, DELAYED RELEASE ORAL at 17:40

## 2021-06-02 RX ADMIN — Medication 100 MILLIGRAM(S): at 05:17

## 2021-06-02 RX ADMIN — HEPARIN SODIUM 5000 UNIT(S): 5000 INJECTION INTRAVENOUS; SUBCUTANEOUS at 21:24

## 2021-06-02 RX ADMIN — AMLODIPINE BESYLATE 10 MILLIGRAM(S): 2.5 TABLET ORAL at 05:17

## 2021-06-02 RX ADMIN — Medication 100 MILLIGRAM(S): at 21:25

## 2021-06-02 RX ADMIN — Medication 100 MILLIGRAM(S): at 14:00

## 2021-06-02 NOTE — PROVIDER CONTACT NOTE (OTHER) - SITUATION
Patient's current IV access expires at midnight. Attempted new IV access four times with no success. Current IV is patent and asymptomatic. Patient is going to OR tomorrow.
Patient states he does not want to use BIPAP tonight because it makes his throat hurt more.

## 2021-06-02 NOTE — PROGRESS NOTE ADULT - SUBJECTIVE AND OBJECTIVE BOX
Subjective: "I feel a little better when I wear the mask at night" Pt still c/o pain to left neck with cough and eating at times. Denies CP or SOb. amb ad portillo. Pt states increased phlegm production first thing in the morning but then resolves. Denies dysphagia.     Vital Signs:  Vital Signs Last 24 Hrs  T(C): 36.4 (06-02-21 @ 12:27), Max: 37 (06-01-21 @ 17:46)  T(F): 97.6 (06-02-21 @ 12:27), Max: 98.6 (06-01-21 @ 17:46)  HR: 81 (06-02-21 @ 12:27) (65 - 91)  BP: 114/66 (06-02-21 @ 12:27) (109/64 - 143/84)  RR: 18 (06-02-21 @ 12:27) (16 - 18)  SpO2: 100% (06-02-21 @ 12:27) (98% - 100%) on (O2)    Telemetry/Alarms:  General: WN/WD NAD  Neurology: Awake, nonfocal, SINGH x 4  Eyes: Scleras clear, PERRLA/ EOMI, Gross vision intact  ENT:Gross hearing intact, grossly patent pharynx, slight inspiratory stridor  Neck: Neck supple, trachea midline, No JVD,   Respiratory: CTA B/L, No wheezing, rales, rhonchi +prod cough  CV: RRR, S1S2, no murmurs, rubs or gallops  Abdominal: Soft, NT, ND +BS, +Bm yesterday  Extremities: No edema, + peripheral pulses  Skin: No Rashes, Hematoma, Ecchymosis  Lymphatic: No Neck, axilla, groin LAD  Psych: Oriented x 3, normal affect  Incisions: Neck incision healed. No collar in place.     Relevant labs, radiology and Medications reviewed                        7.9    9.86  )-----------( 212      ( 02 Jun 2021 07:59 )             25.3     06-02    138  |  105  |  54<H>  ----------------------------<  116<H>  4.1   |  19<L>  |  3.22<H>    Ca    8.8      02 Jun 2021 07:59  Phos  3.2     06-02  Mg     1.4     06-02    TPro  6.2  /  Alb  3.4  /  TBili  <0.2  /  DBili  x   /  AST  9   /  ALT  9   /  AlkPhos  120  06-01      MEDICATIONS  (STANDING):  albuterol/ipratropium for Nebulization 3 milliLiter(s) Nebulizer every 6 hours  amLODIPine   Tablet 10 milliGRAM(s) Oral daily  heparin   Injectable 5000 Unit(s) SubCutaneous every 8 hours  labetalol 100 milliGRAM(s) Oral three times a day  levETIRAcetam 750 milliGRAM(s) Oral two times a day  pantoprazole    Tablet 40 milliGRAM(s) Oral two times a day    MEDICATIONS  (PRN):  acetaminophen   Tablet .. 975 milliGRAM(s) Oral every 6 hours PRN Mild Pain (1 - 3)  benzocaine 15 mG/menthol 3.6 mG (Sugar-Free) Lozenge 1 Lozenge Oral every 2 hours PRN Sore Throat  cyclobenzaprine 5 milliGRAM(s) Oral three times a day PRN Muscle Spasm  melatonin 3 milliGRAM(s) Oral at bedtime PRN Insomnia  oxyCODONE    IR 5 milliGRAM(s) Oral every 4 hours PRN Moderate Pain (4 - 6)  polyethylene glycol 3350 17 Gram(s) Oral daily PRN Constipation  racepinephrine  2.25% Inhalation 0.5 milliLiter(s) Inhalation two times a day PRN stridor    Pertinent Physical Exam  I&O's Summary    01 Jun 2021 07:01  -  02 Jun 2021 07:00  --------------------------------------------------------  IN: 0 mL / OUT: 750 mL / NET: -750 mL    02 Jun 2021 07:01  -  02 Jun 2021 16:01  --------------------------------------------------------  IN: 0 mL / OUT: 275 mL / NET: -275 mL        Assessment  61y Male  w/ PAST MEDICAL & SURGICAL HISTORY:  HTN (hypertension)    Chronic kidney disease, unspecified CKD stage    Pulmonary embolus  diagnosed about 6 months ago incidentally found on imaging related to falling off a bike and chest pain    2019 novel coronavirus disease (COVID-19)  never hospitilized    Arthritis    Tracheal stenosis    History of total right knee replacement (TKR)  bilateral    History of tracheal stenosis    admitted with complaints of Patient is a 61y old  Male who presents with a chief complaint of Scratchy throat/Coughing s/p Vocal Cord injection (01 Jun 2021 16:40)    HPI:  62 y/o male s/p tracheal resection 4/23/21 presents to [facility] ER w c/o stridor and SOB. Difficulty breating is worse at night and prevents him from being able to sleep.  He denies productive cough or fever.  This is his 3rd readmission after his tracheal resection  PT has PMHx COVID+ (3/2021 - not hospitalized), PE (8/2020 - on eliquis at home), CKD, w/ recent hospitalization at [facility] after seizure w/ lingual hematoma requiring emergency tracheostomy on 3/25/21.  Patient underwent Tracheal Resection and Reconstruction on 4/23. ENT injected vocal cords about 2 weeks ago. He was last discharged 5/18/21.  (24 May 2021 10:29)      Procedure:  Flex bronch, tracheal resection on 4/23      Issues:  Stridor   Severe tracheal stenosis s/p Resection  Hx of PE (dx 10/2020)   Seizure disorder  HTN  CKD              Hx of COVID (2/2021)               Unilateral vocal cord paralysis  Pt improved in CTICU. Transitioned to floor. Off heliox. On 5/26- pt to OR for Bronch w Dr. Allen. ANastomasis well healing however noted to have significant swelling of posterior membrane of air way above and below anastomotic site. Dr. Allen concerned that pt's swelling and symptoms are related to reflux and aspiration. GI consult called. Pt placed on max PPI therapy. Per GI- pH study would have to be done as outpt. Pt ordered for Barium Swallow to eval for reflux. Ordered for Bipap at night to help keep airway open w positive pressure 5/28-CT scan done showing airway narrowing, swelling to surrounding structures. Reviewed by Dr. Allen. Pt cleared for regular diet by Speech and Swallow, no dysphagia seen. Pt kept for continued observation throughout weekend. 6/1-Completed steroid course. Plan surveillence Bronch tomorrow with Dr. Allen.     PLAN  Neuro: Pain management  Pulm: Encourage coughing, deep breathing and use of incentive spirometry.  Cardio: Monitor telemetry/alarms  GI: Tolerating diet. Continue stool softeners.  Renal: monitor urine output, supplement electrolytes as needed  Vasc: Heparin SC/SCDs for DVT prophylaxis  Heme: Stable H/H. .   ID: Off antibiotics. Stable.  Therapy: OOB/ambulate  Disposition: Plan Bronch tomorrow.   Discussed with Cardiothoracic Team at AM rounds.

## 2021-06-02 NOTE — PROVIDER CONTACT NOTE (OTHER) - ACTION/TREATMENT ORDERED:
Will extend IV access for 12 hours.
AT bedise- patient agrees to try to use BIPAP for part of the night

## 2021-06-03 ENCOUNTER — APPOINTMENT (OUTPATIENT)
Dept: THORACIC SURGERY | Facility: HOSPITAL | Age: 61
End: 2021-06-03

## 2021-06-03 LAB
ANION GAP SERPL CALC-SCNC: 14 MMOL/L — SIGNIFICANT CHANGE UP (ref 7–14)
APTT BLD: 28.6 SEC — SIGNIFICANT CHANGE UP (ref 27–36.3)
BUN SERPL-MCNC: 52 MG/DL — HIGH (ref 7–23)
CALCIUM SERPL-MCNC: 8.9 MG/DL — SIGNIFICANT CHANGE UP (ref 8.4–10.5)
CHLORIDE SERPL-SCNC: 103 MMOL/L — SIGNIFICANT CHANGE UP (ref 98–107)
CO2 SERPL-SCNC: 18 MMOL/L — LOW (ref 22–31)
CREAT SERPL-MCNC: 2.97 MG/DL — HIGH (ref 0.5–1.3)
GLUCOSE SERPL-MCNC: 107 MG/DL — HIGH (ref 70–99)
INR BLD: 1.01 RATIO — SIGNIFICANT CHANGE UP (ref 0.88–1.16)
MAGNESIUM SERPL-MCNC: 1.4 MG/DL — LOW (ref 1.6–2.6)
PHOSPHATE SERPL-MCNC: 3.6 MG/DL — SIGNIFICANT CHANGE UP (ref 2.5–4.5)
POTASSIUM SERPL-MCNC: 4.1 MMOL/L — SIGNIFICANT CHANGE UP (ref 3.5–5.3)
POTASSIUM SERPL-SCNC: 4.1 MMOL/L — SIGNIFICANT CHANGE UP (ref 3.5–5.3)
PROTHROM AB SERPL-ACNC: 11.5 SEC — SIGNIFICANT CHANGE UP (ref 10.6–13.6)
SODIUM SERPL-SCNC: 135 MMOL/L — SIGNIFICANT CHANGE UP (ref 135–145)

## 2021-06-03 PROCEDURE — 31631 BRONCHOSCOPY DILATE W/STENT: CPT | Mod: 78

## 2021-06-03 PROCEDURE — 71045 X-RAY EXAM CHEST 1 VIEW: CPT | Mod: 26

## 2021-06-03 PROCEDURE — 99232 SBSQ HOSP IP/OBS MODERATE 35: CPT

## 2021-06-03 RX ORDER — LEVETIRACETAM 250 MG/1
750 TABLET, FILM COATED ORAL EVERY 12 HOURS
Refills: 0 | Status: COMPLETED | OUTPATIENT
Start: 2021-06-03 | End: 2021-06-04

## 2021-06-03 RX ORDER — LEVETIRACETAM 250 MG/1
750 TABLET, FILM COATED ORAL
Refills: 0 | Status: DISCONTINUED | OUTPATIENT
Start: 2021-06-04 | End: 2021-06-05

## 2021-06-03 RX ORDER — ACETAMINOPHEN 500 MG
1000 TABLET ORAL ONCE
Refills: 0 | Status: COMPLETED | OUTPATIENT
Start: 2021-06-04 | End: 2021-06-05

## 2021-06-03 RX ORDER — HYDROMORPHONE HYDROCHLORIDE 2 MG/ML
1 INJECTION INTRAMUSCULAR; INTRAVENOUS; SUBCUTANEOUS EVERY 8 HOURS
Refills: 0 | Status: DISCONTINUED | OUTPATIENT
Start: 2021-06-03 | End: 2021-06-03

## 2021-06-03 RX ORDER — HYDROMORPHONE HYDROCHLORIDE 2 MG/ML
0.5 INJECTION INTRAMUSCULAR; INTRAVENOUS; SUBCUTANEOUS EVERY 4 HOURS
Refills: 0 | Status: DISCONTINUED | OUTPATIENT
Start: 2021-06-03 | End: 2021-06-05

## 2021-06-03 RX ORDER — DEXAMETHASONE 0.5 MG/5ML
8 ELIXIR ORAL EVERY 8 HOURS
Refills: 0 | Status: COMPLETED | OUTPATIENT
Start: 2021-06-03 | End: 2021-06-04

## 2021-06-03 RX ORDER — ACETAMINOPHEN 500 MG
1000 TABLET ORAL ONCE
Refills: 0 | Status: COMPLETED | OUTPATIENT
Start: 2021-06-03 | End: 2021-06-03

## 2021-06-03 RX ORDER — FENTANYL CITRATE 50 UG/ML
25 INJECTION INTRAVENOUS
Refills: 0 | Status: DISCONTINUED | OUTPATIENT
Start: 2021-06-03 | End: 2021-06-03

## 2021-06-03 RX ORDER — ACETAMINOPHEN 500 MG
1000 TABLET ORAL ONCE
Refills: 0 | Status: COMPLETED | OUTPATIENT
Start: 2021-06-03 | End: 2021-06-04

## 2021-06-03 RX ORDER — ONDANSETRON 8 MG/1
4 TABLET, FILM COATED ORAL ONCE
Refills: 0 | Status: DISCONTINUED | OUTPATIENT
Start: 2021-06-03 | End: 2021-06-05

## 2021-06-03 RX ORDER — HYDRALAZINE HCL 50 MG
10 TABLET ORAL EVERY 6 HOURS
Refills: 0 | Status: DISCONTINUED | OUTPATIENT
Start: 2021-06-03 | End: 2021-06-05

## 2021-06-03 RX ADMIN — Medication 101.6 MILLIGRAM(S): at 23:16

## 2021-06-03 RX ADMIN — Medication 400 MILLIGRAM(S): at 22:28

## 2021-06-03 RX ADMIN — Medication 3 MILLILITER(S): at 22:48

## 2021-06-03 RX ADMIN — Medication 100 MILLIGRAM(S): at 06:18

## 2021-06-03 RX ADMIN — AMLODIPINE BESYLATE 10 MILLIGRAM(S): 2.5 TABLET ORAL at 06:18

## 2021-06-03 RX ADMIN — FENTANYL CITRATE 25 MICROGRAM(S): 50 INJECTION INTRAVENOUS at 11:32

## 2021-06-03 RX ADMIN — HYDROMORPHONE HYDROCHLORIDE 0.5 MILLIGRAM(S): 2 INJECTION INTRAMUSCULAR; INTRAVENOUS; SUBCUTANEOUS at 13:50

## 2021-06-03 RX ADMIN — Medication 101.6 MILLIGRAM(S): at 14:10

## 2021-06-03 RX ADMIN — Medication 3 MILLILITER(S): at 04:10

## 2021-06-03 RX ADMIN — FENTANYL CITRATE 25 MICROGRAM(S): 50 INJECTION INTRAVENOUS at 12:30

## 2021-06-03 RX ADMIN — LEVETIRACETAM 750 MILLIGRAM(S): 250 TABLET, FILM COATED ORAL at 06:18

## 2021-06-03 RX ADMIN — HEPARIN SODIUM 5000 UNIT(S): 5000 INJECTION INTRAVENOUS; SUBCUTANEOUS at 14:10

## 2021-06-03 RX ADMIN — HEPARIN SODIUM 5000 UNIT(S): 5000 INJECTION INTRAVENOUS; SUBCUTANEOUS at 22:28

## 2021-06-03 RX ADMIN — PANTOPRAZOLE SODIUM 40 MILLIGRAM(S): 20 TABLET, DELAYED RELEASE ORAL at 06:18

## 2021-06-03 RX ADMIN — HEPARIN SODIUM 5000 UNIT(S): 5000 INJECTION INTRAVENOUS; SUBCUTANEOUS at 06:18

## 2021-06-03 RX ADMIN — HYDROMORPHONE HYDROCHLORIDE 0.5 MILLIGRAM(S): 2 INJECTION INTRAMUSCULAR; INTRAVENOUS; SUBCUTANEOUS at 22:28

## 2021-06-03 RX ADMIN — HYDROMORPHONE HYDROCHLORIDE 0.5 MILLIGRAM(S): 2 INJECTION INTRAMUSCULAR; INTRAVENOUS; SUBCUTANEOUS at 19:32

## 2021-06-03 RX ADMIN — Medication 3 MILLILITER(S): at 12:42

## 2021-06-03 RX ADMIN — HYDROMORPHONE HYDROCHLORIDE 0.5 MILLIGRAM(S): 2 INJECTION INTRAMUSCULAR; INTRAVENOUS; SUBCUTANEOUS at 14:00

## 2021-06-03 RX ADMIN — HYDROMORPHONE HYDROCHLORIDE 0.5 MILLIGRAM(S): 2 INJECTION INTRAMUSCULAR; INTRAVENOUS; SUBCUTANEOUS at 18:29

## 2021-06-03 RX ADMIN — HYDROMORPHONE HYDROCHLORIDE 0.5 MILLIGRAM(S): 2 INJECTION INTRAMUSCULAR; INTRAVENOUS; SUBCUTANEOUS at 22:55

## 2021-06-03 RX ADMIN — Medication 3 MILLILITER(S): at 15:53

## 2021-06-03 RX ADMIN — Medication 1000 MILLIGRAM(S): at 22:54

## 2021-06-03 NOTE — BRIEF OPERATIVE NOTE - NSICDXBRIEFPREOP_GEN_ALL_CORE_FT
PRE-OP DIAGNOSIS:  Stridor 26-May-2021 13:34:57 Stridor Susan Link  
PRE-OP DIAGNOSIS:  Stridor 26-May-2021 13:34:57 Stridor Susan Link

## 2021-06-03 NOTE — PRE-OP CHECKLIST - DENTURES
[facility] Consultation Note    Reason for Consult:  The primary encounter diagnosis was Diarrhea, unspecified type. Diagnoses of Generalized abdominal pain, Non-intractable vomiting with nausea, unspecified vomiting type, and Excessive gas were also pertinent to this visit.    PCP:   Elvira Montemayor   [address] [facility] [id number]    Referring MD:  Liliana Delcid, Np  [address] [place] [address]    HPI:  This is a 26 y.o. male here for evaluation of the following issue:  Duration 1 yrs  Will get episodes of LLQ abdominal cramping and diarrhea, followed by N/V  Typically lasts a day  Has occurred 5-6 times in the past 1-2 years  Takes zofran for the N/V with relief  Denies international travel or abx  No weight loss, or rectal bleeding    Seen in the urgent care 5/14 for an episode and given dicycomine  Her reports some relief.  Today having mild left sided abdominal cramping     No FHx of IBD     He was hospitalized 10/207 for pancreatitis. CT scan revealed: Spleen is enlarged containing a stable subcentimeter low attenuation focus at the upper pole which is too small to characterize. Small accessory splenule noted. There is apparent mild circumferential wall thickening of the rectum with subtle perirectal fat stranding and increased number of prominent and also mildly enlarged right perirectal and lateral right pelvic lymph nodes, measuring up to 13 mm. The urinary bladder is suboptimally distended with mild circumferential wall thickening. Was otherwise normal    ROS:  Constitutional: No fevers, chills, No weight loss  ENT: No allergies  CV: No chest pain  Pulm: No cough, No shortness of breath  Ophtho: No vision changes  GI: see HPI  Derm: No rash  Heme: No lymphadenopathy, No bruising  MSK: No arthritis  : No dysuria, No hematuria  Endo: No hot or cold intolerance  Neuro: No syncope, No seizure  Psych: No anxiety, No depression    Medical History:  has a past 
medical history of OCD (obsessive compulsive disorder).    Surgical History:  has a past surgical history that includes Appendectomy (2016).    Family History: family history includes Colon cancer in his paternal uncle; Hypertension in his father; Lymphoma (age of onset: 25) in his paternal uncle; Lymphoma (age of onset: 40) in his paternal grandfather; No Known Problems in his mother..     Social History:  reports that he has never smoked. He has never used smokeless tobacco. He reports that he drinks alcohol. He reports that he does not use drugs.    Review of patient's allergies indicates:   Allergen Reactions    Rocephin [ceftriaxone] Hives       Current Outpatient Prescriptions on File Prior to Visit   Medication Sig Dispense Refill    [DISCONTINUED] dicyclomine (BENTYL) 20 mg tablet Take 1 tablet (20 mg total) by mouth before meals and at bedtime as needed. 30 tablet 0    [DISCONTINUED] erythromycin (ROMYCIN) ophthalmic ointment Apply to affected eye nightly 1 Tube 0    [DISCONTINUED] ondansetron (ZOFRAN) 4 MG tablet Take 1 tablet (4 mg total) by mouth 2 (two) times daily. 30 tablet 0    [DISCONTINUED] polymyxin B sulf-trimethoprim (POLYTRIM) 10,000 unit- 1 mg/mL Drop 1 drop every 2 hours while awake 1 Bottle 0     No current facility-administered medications on file prior to visit.          Objective Findings:    Vital Signs:  /86   Pulse 84   Ht 6' (1.829 m)   Wt 101.6 kg (224 lb)   BMI 30.38 kg/m²   Body mass index is 30.38 kg/m².    Physical Exam:  General Appearance: Well appearing in no acute distress  Head:   Normocephalic, without obvious abnormality  Eyes:    No scleral icterus  ENT: Neck supple, Lips, mucosa, and tongue normal  Lungs: CTA bilaterally in anterior and posterior fields, no wheezes, no crackles.  Heart:  Regular rate and rhythm, S1, S2 normal, no murmurs heard  Abdomen: Soft, non tender, non distended with positive bowel sounds in all four quadrants.  Extremities: no 
edema  Skin: No rash  Neurologic: AAO x 3      Labs:  Lab Results   Component Value Date    WBC 8.09 05/18/2018    HGB 15.4 05/18/2018    HCT 47.4 05/18/2018     05/18/2018    CHOL 131 10/29/2017    TRIG 42 10/29/2017    HDL 34 (L) 10/29/2017    ALT 21 05/18/2018    AST 20 05/18/2018     05/18/2018    K 4.5 05/18/2018     05/18/2018    CREATININE 0.9 05/18/2018    BUN 11 05/18/2018    CO2 25 05/18/2018    INR 1.1 10/29/2017       Imaging:    Endoscopy:      Assessment:  1. Diarrhea, unspecified type    2. Generalized abdominal pain    3. Non-intractable vomiting with nausea, unspecified vomiting type    4. Excessive gas       27yo M with intermittent episodes of abdominal cramping pain and diarrhea, followed by N/V. 10/2017 CT scan reveal signs of proctitis. Hx of acute pancreatitis of unknown etiology 10/2017. Social Hx : MSM    Recommendations:  1. Stools to rule out infection and inflammation    2. ABD US to rule out gallstones    3. Labs to evaluate anemia, celiac, elevated lfts    4. Schedule EGD to rule out gastric ulcers    5. Colonoscopy to rule out proctitis, ulcers and IBD    6. sadgas diet    No Follow-up on file.      Order summary:  Orders Placed This Encounter    Stool culture    US Abdomen Complete    Stool Exam-Ova,Cysts,Parasites    Stool Exam-Ova,Cysts,Parasites    Stool Exam-Ova,Cysts,Parasites    Giardia / Cryptosporidum, EIA    Giardia / Cryptosporidum, EIA    Giardia / Cryptosporidum, EIA    WBC, Stool    CBC auto differential    TISSUE TRANSGLUTAMINASE (TTG), IGA    IgA    Comprehensive metabolic panel    Case request GI: ESOPHAGOGASTRODUODENOSCOPY (EGD), COLONOSCOPY         Thank you so much for allowing me to participate in the care of Mitchell Rebecca Lakhani PA-C        
no
no

## 2021-06-03 NOTE — PROGRESS NOTE ADULT - SUBJECTIVE AND OBJECTIVE BOX
ANNEMARIE KELLEY            MRN-[id number]         No Known Allergies                 HPI:  60 y/o male s/p tracheal resection 4/23/21 presents to [facility] ER w c/o stridor and SOB. Difficulty breating is worse at night and prevents him from being able to sleep.  He denies productive cough or fever.  This is his 3rd readmission after his tracheal resection  PT has PMHx COVID+ (3/2021 - not hospitalized), PE (8/2020 - on eliquis at home), CKD, w/ recent hospitalization at [facility] after seizure w/ lingual hematoma requiring emergency tracheostomy on 3/25/21.  Patient underwent Tracheal Resection and Reconstruction on 4/23. ENT injected vocal cords about 2 weeks ago. He was last discharged 5/18/21.  (24 May 2021 10:29)      Procedure:        Flex bronch / Rigid bronch /  Tracheal stent  6/3/2021  Flex bronch, tracheal resection 05/26/2021      Issues:  Severe tracheal stenosis s/p Resection  Hx of PE (dx 10/2020)   Seizure disorder  HTN  CKD              Hx of COVID (2/2021)               Unilateral vocal cord paralysis   Urinary retention                                Home Medications:  labetalol 100 mg oral tablet: 1 tab(s) orally 3 times a day (17 May 2021 14:03)  Norvasc 10 mg oral tablet: 1 tab(s) orally once a day (17 May 2021 14:03)  polyethylene glycol 3350 oral powder for reconstitution: 17 gram(s) orally once a day, As Needed (17 May 2021 14:03)      PAST MEDICAL & SURGICAL HISTORY:  HTN (hypertension)    Chronic kidney disease, unspecified CKD stage    Pulmonary embolus  diagnosed about 6 months ago incidentally found on imaging related to falling off a bike and chest pain    2019 novel coronavirus disease (COVID-19)  never hospitilized    Arthritis    Tracheal stenosis    History of total right knee replacement (TKR)  bilateral    History of tracheal stenosis        ICU Vital Signs Last 24 Hrs  T(C): 36.2 (03 Jun 2021 12:00), Max: 37.2 (03 Jun 2021 00:18)  T(F): 97.2 (03 Jun 2021 12:00), Max: 99 (03 Jun 2021 00:18)  HR: 63 (03 Jun 2021 12:00) (61 - 88)  BP: 117/68 (03 Jun 2021 12:00) (117/68 - 132/75)  BP(mean): 81 (03 Jun 2021 12:00) (81 - 86)  ABP: --  ABP(mean): --  RR: 13 (03 Jun 2021 12:00) (13 - 18)  SpO2: 99% (03 Jun 2021 12:00) (98% - 100%)    I&O's Detail    02 Jun 2021 07:01  -  03 Jun 2021 07:00  --------------------------------------------------------  IN:  Total IN: 0 mL    OUT:    Voided (mL): 1075 mL  Total OUT: 1075 mL    Total NET: -1075 mL      CAPILLARY BLOOD GLUCOSE      POCT Blood Glucose.: 151 mg/dL (02 Jun 2021 09:29)      Home Medications:  labetalol 100 mg oral tablet: 1 tab(s) orally 3 times a day (17 May 2021 14:03)  Norvasc 10 mg oral tablet: 1 tab(s) orally once a day (17 May 2021 14:03)  polyethylene glycol 3350 oral powder for reconstitution: 17 gram(s) orally once a day, As Needed (17 May 2021 14:03)      MEDICATIONS  (STANDING):  acetaminophen  IVPB .. 1000 milliGRAM(s) IV Intermittent once  albuterol/ipratropium for Nebulization 3 milliLiter(s) Nebulizer every 6 hours  amLODIPine   Tablet 10 milliGRAM(s) Oral daily  dexAMETHasone  IVPB 8 milliGRAM(s) IV Intermittent every 8 hours  heparin   Injectable 5000 Unit(s) SubCutaneous every 8 hours  labetalol 100 milliGRAM(s) Oral three times a day  levETIRAcetam 750 milliGRAM(s) Oral two times a day  pantoprazole    Tablet 40 milliGRAM(s) Oral two times a day    MEDICATIONS  (PRN):  acetaminophen   Tablet .. 975 milliGRAM(s) Oral every 6 hours PRN Mild Pain (1 - 3)  benzocaine 15 mG/menthol 3.6 mG (Sugar-Free) Lozenge 1 Lozenge Oral every 2 hours PRN Sore Throat  cyclobenzaprine 5 milliGRAM(s) Oral three times a day PRN Muscle Spasm  HYDROmorphone  Injectable 0.5 milliGRAM(s) IV Push every 4 hours PRN Moderate Pain (4 - 6)  melatonin 3 milliGRAM(s) Oral at bedtime PRN Insomnia  ondansetron Injectable 4 milliGRAM(s) IV Push once PRN Nausea and/or Vomiting  oxyCODONE    IR 5 milliGRAM(s) Oral every 4 hours PRN Moderate Pain (4 - 6)  polyethylene glycol 3350 17 Gram(s) Oral daily PRN Constipation  racepinephrine  2.25% Inhalation 0.5 milliLiter(s) Inhalation two times a day PRN stridor        Physical exam:                 General:               Pt is awake, alert,  appears fairly comfortable                                                  Neuro:                  Nonfocal                             Cardiovascular:   S1 & S2, regular                           Respiratory:         Air entry is fair and equal on both sides, has bilateral rhonchi                           GI:                          Soft, nondistended and nontender, Bowel sounds active                            Ext:                        No cyanosis or edema                            Labs:                                                                           7.9    9.86  )-----------( 212      ( 02 Jun 2021 07:59 )             25.3             06-03    135  |  103  |  52<H>  ----------------------------<  107<H>  4.1   |  18<L>  |  2.97<H>    Ca    8.9      03 Jun 2021 07:27  Phos  3.6     06-03  Mg     1.4     06-03                    PT/INR - ( 03 Jun 2021 07:27 )   PT: 11.5 sec;   INR: 1.01 ratio         PTT - ( 03 Jun 2021 07:27 )  PTT:28.6 sec        CXR:  Lungs are clear. NGT in stomach    Plan:    General: 61yMale admitted with SOB, stridor, underwent bronch and stent placement on 6/3/2021                            Neuro:                                         Pain control with  Tylenol IV  / Dilaudid PRN                            Cardiovascular:                                          Continue hemodynamic monitoring.    HTN: Restart Labetalol when able to take PO                            Respiratory:                                         Continue humidified oxygen                                         Encourage incentive spirometry                                          Continue bronchodilators, pulmonary toilet as tolerated     Continue Decadron                            GI                                         NPO                                         Continue GI prophylaxis with Protonix                                         Continue Zofran / Reglan for nausea - PRN	                                                                 Renal:                                         Continue LR 30cc/hr                                         Monitor I/Os and electrolytes     Urinary retention - Higginbotham inserted                                                 Hem/ Onc:                                         No active issues                                         Follow CBC in AM                           Infectious disease:                                            Monitor for fever / leukocytosis.                            Endocrine                                             Continue Accu-Checks with coverage    Pt is on SQ Heparin and Venodyne boots for DVT prophylaxis.     Pertinent clinical, laboratory, radiographic, hemodynamic, echocardiographic, respiratory data, microbiologic data and chart were reviewed and analyzed frequently throughout the course of the day and night  Patient seen, examined and plan discussed with CT Surgeon Dr. Allen / CTICU team during rounds.    Pt / pt's family updated of the status and plan          Yefri Kidd MD

## 2021-06-03 NOTE — BRIEF OPERATIVE NOTE - OPERATION/FINDINGS
FB performed via LMA. Tracheal resection anastomosis intact and healed. Posterior tracheal membrane appears to have inflammatory changes otherwise no findings of tracheomalacia
Tracheal resection anastomosis healing well and no stenosis of the tracheal rings appreciated; proximal to the anastomosis the posterior membrane observed to be very dynamic with near collapse of the airway on expiration; Using fluoroscopic guidance measurements taken for th placement of the tracheal stent; using rigid bronchoscopy the tracheal stent was deployed and adjusted with flexible bronchoscopy

## 2021-06-03 NOTE — BRIEF OPERATIVE NOTE - NSICDXBRIEFPOSTOP_GEN_ALL_CORE_FT
POST-OP DIAGNOSIS:  Stridor 26-May-2021 13:35:00 Stridor Susan Link  
POST-OP DIAGNOSIS:  Stridor 26-May-2021 13:35:00 Stridor Susan Link

## 2021-06-04 LAB
ALBUMIN SERPL ELPH-MCNC: 3.2 G/DL — LOW (ref 3.3–5)
ALP SERPL-CCNC: 103 U/L — SIGNIFICANT CHANGE UP (ref 40–120)
ALT FLD-CCNC: 8 U/L — SIGNIFICANT CHANGE UP (ref 4–41)
ANION GAP SERPL CALC-SCNC: 16 MMOL/L — HIGH (ref 7–14)
ANION GAP SERPL CALC-SCNC: 16 MMOL/L — HIGH (ref 7–14)
AST SERPL-CCNC: 9 U/L — SIGNIFICANT CHANGE UP (ref 4–40)
BILIRUB SERPL-MCNC: 0.2 MG/DL — SIGNIFICANT CHANGE UP (ref 0.2–1.2)
BUN SERPL-MCNC: 49 MG/DL — HIGH (ref 7–23)
BUN SERPL-MCNC: 56 MG/DL — HIGH (ref 7–23)
CALCIUM SERPL-MCNC: 8.1 MG/DL — LOW (ref 8.4–10.5)
CALCIUM SERPL-MCNC: 9.4 MG/DL — SIGNIFICANT CHANGE UP (ref 8.4–10.5)
CHLORIDE SERPL-SCNC: 100 MMOL/L — SIGNIFICANT CHANGE UP (ref 98–107)
CHLORIDE SERPL-SCNC: 105 MMOL/L — SIGNIFICANT CHANGE UP (ref 98–107)
CO2 SERPL-SCNC: 16 MMOL/L — LOW (ref 22–31)
CO2 SERPL-SCNC: 19 MMOL/L — LOW (ref 22–31)
CREAT SERPL-MCNC: 2.43 MG/DL — HIGH (ref 0.5–1.3)
CREAT SERPL-MCNC: 2.76 MG/DL — HIGH (ref 0.5–1.3)
GLUCOSE SERPL-MCNC: 170 MG/DL — HIGH (ref 70–99)
GLUCOSE SERPL-MCNC: 174 MG/DL — HIGH (ref 70–99)
MAGNESIUM SERPL-MCNC: 1.5 MG/DL — LOW (ref 1.6–2.6)
MAGNESIUM SERPL-MCNC: 1.7 MG/DL — SIGNIFICANT CHANGE UP (ref 1.6–2.6)
PHOSPHATE SERPL-MCNC: 5 MG/DL — HIGH (ref 2.5–4.5)
POTASSIUM SERPL-MCNC: 3.9 MMOL/L — SIGNIFICANT CHANGE UP (ref 3.5–5.3)
POTASSIUM SERPL-MCNC: 5.4 MMOL/L — HIGH (ref 3.5–5.3)
POTASSIUM SERPL-SCNC: 3.9 MMOL/L — SIGNIFICANT CHANGE UP (ref 3.5–5.3)
POTASSIUM SERPL-SCNC: 5.4 MMOL/L — HIGH (ref 3.5–5.3)
PROT SERPL-MCNC: 5.9 G/DL — LOW (ref 6–8.3)
SODIUM SERPL-SCNC: 135 MMOL/L — SIGNIFICANT CHANGE UP (ref 135–145)
SODIUM SERPL-SCNC: 137 MMOL/L — SIGNIFICANT CHANGE UP (ref 135–145)

## 2021-06-04 PROCEDURE — 99233 SBSQ HOSP IP/OBS HIGH 50: CPT

## 2021-06-04 RX ORDER — FUROSEMIDE 40 MG
10 TABLET ORAL ONCE
Refills: 0 | Status: COMPLETED | OUTPATIENT
Start: 2021-06-04 | End: 2021-06-04

## 2021-06-04 RX ADMIN — Medication 101.6 MILLIGRAM(S): at 06:20

## 2021-06-04 RX ADMIN — HEPARIN SODIUM 5000 UNIT(S): 5000 INJECTION INTRAVENOUS; SUBCUTANEOUS at 06:00

## 2021-06-04 RX ADMIN — Medication 3 MILLILITER(S): at 09:50

## 2021-06-04 RX ADMIN — LEVETIRACETAM 750 MILLIGRAM(S): 250 TABLET, FILM COATED ORAL at 17:12

## 2021-06-04 RX ADMIN — Medication 100 MILLIGRAM(S): at 13:39

## 2021-06-04 RX ADMIN — Medication 400 MILLIGRAM(S): at 04:15

## 2021-06-04 RX ADMIN — HYDROMORPHONE HYDROCHLORIDE 0.5 MILLIGRAM(S): 2 INJECTION INTRAMUSCULAR; INTRAVENOUS; SUBCUTANEOUS at 21:26

## 2021-06-04 RX ADMIN — HEPARIN SODIUM 5000 UNIT(S): 5000 INJECTION INTRAVENOUS; SUBCUTANEOUS at 21:14

## 2021-06-04 RX ADMIN — LEVETIRACETAM 400 MILLIGRAM(S): 250 TABLET, FILM COATED ORAL at 06:00

## 2021-06-04 RX ADMIN — Medication 3 MILLILITER(S): at 16:10

## 2021-06-04 RX ADMIN — Medication 3 MILLILITER(S): at 04:16

## 2021-06-04 RX ADMIN — HYDROMORPHONE HYDROCHLORIDE 0.5 MILLIGRAM(S): 2 INJECTION INTRAMUSCULAR; INTRAVENOUS; SUBCUTANEOUS at 16:50

## 2021-06-04 RX ADMIN — Medication 100 MILLIGRAM(S): at 21:14

## 2021-06-04 RX ADMIN — Medication 1000 MILLIGRAM(S): at 05:45

## 2021-06-04 RX ADMIN — PANTOPRAZOLE SODIUM 40 MILLIGRAM(S): 20 TABLET, DELAYED RELEASE ORAL at 17:11

## 2021-06-04 RX ADMIN — HYDROMORPHONE HYDROCHLORIDE 0.5 MILLIGRAM(S): 2 INJECTION INTRAMUSCULAR; INTRAVENOUS; SUBCUTANEOUS at 15:13

## 2021-06-04 RX ADMIN — BENZOCAINE AND MENTHOL 1 LOZENGE: 5; 1 LIQUID ORAL at 15:14

## 2021-06-04 RX ADMIN — HYDROMORPHONE HYDROCHLORIDE 0.5 MILLIGRAM(S): 2 INJECTION INTRAMUSCULAR; INTRAVENOUS; SUBCUTANEOUS at 04:00

## 2021-06-04 RX ADMIN — Medication 10 MILLIGRAM(S): at 07:05

## 2021-06-04 RX ADMIN — HEPARIN SODIUM 5000 UNIT(S): 5000 INJECTION INTRAVENOUS; SUBCUTANEOUS at 13:39

## 2021-06-04 RX ADMIN — Medication 3 MILLILITER(S): at 22:56

## 2021-06-04 NOTE — PROGRESS NOTE ADULT - SUBJECTIVE AND OBJECTIVE BOX
ANNEMARIE KELLEY                     [id number]    HPI:  60 y/o male s/p tracheal resection 4/23/21 presents to [facility] ER w c/o stridor and SOB. Difficulty breating is worse at night and prevents him from being able to sleep.  He denies productive cough or fever.  This is his 3rd readmission after his tracheal resection  PT has PMHx COVID+ (3/2021 - not hospitalized), PE (8/2020 - on eliquis at home), CKD, w/ recent hospitalization at [facility] after seizure w/ lingual hematoma requiring emergency tracheostomy on 3/25/21.  Patient underwent Tracheal Resection and Reconstruction on 4/23. ENT injected vocal cords about 2 weeks ago. He was last discharged 5/18/21.  (24 May 2021 10:29)      Procedure:        Flex bronch / Rigid bronch /  Tracheal stent  6/3/2021  Flex bronch, tracheal resection 05/26/2021      Issues:  Severe tracheal stenosis s/p Resection  Hx of PE (dx 10/2020)   Seizure disorder  HTN  CKD              Hx of COVID (2/2021)               Unilateral vocal cord paralysis   Urinary retention                                Home Medications:  labetalol 100 mg oral tablet: 1 tab(s) orally 3 times a day (17 May 2021 14:03)  Norvasc 10 mg oral tablet: 1 tab(s) orally once a day (17 May 2021 14:03)  polyethylene glycol 3350 oral powder for reconstitution: 17 gram(s) orally once a day, As Needed (17 May 2021 14:03)      PAST MEDICAL & SURGICAL HISTORY:  HTN (hypertension)    Chronic kidney disease, unspecified CKD stage    Pulmonary embolus  diagnosed about 6 months ago incidentally found on imaging related to falling off a bike and chest pain    2019 novel coronavirus disease (COVID-19)  never hospitilized    Arthritis    Tracheal stenosis    History of total right knee replacement (TKR)  bilateral    History of tracheal stenosis              VITAL SIGNS:  Vital Signs Last 24 Hrs  T(C): 36.2 (04 Jun 2021 12:00), Max: 37 (03 Jun 2021 20:00)  T(F): 97.2 (04 Jun 2021 12:00), Max: 98.6 (03 Jun 2021 20:00)  HR: 75 (04 Jun 2021 13:00) (63 - 103)  BP: 125/76 (04 Jun 2021 13:00) (111/59 - 146/115)  BP(mean): 88 (04 Jun 2021 13:00) (70 - 121)  RR: 25 (04 Jun 2021 13:00) (9 - 25)  SpO2: 99% (04 Jun 2021 13:00) (97% - 100%)    I/Os:   I&O's Detail    03 Jun 2021 07:01  -  04 Jun 2021 07:00  --------------------------------------------------------  IN:    IV PiggyBack: 350 mL  Total IN: 350 mL    OUT:    Indwelling Catheter - Urethral (mL): 1885 mL  Total OUT: 1885 mL    Total NET: -1535 mL      04 Jun 2021 07:01  -  04 Jun 2021 14:03  --------------------------------------------------------  IN:  Total IN: 0 mL    OUT:    Voided (mL): 650 mL  Total OUT: 650 mL    Total NET: -650 mL          CAPILLARY BLOOD GLUCOSE          =======================MEDICATIONS===================  MEDICATIONS  (STANDING):  albuterol/ipratropium for Nebulization 3 milliLiter(s) Nebulizer every 6 hours  amLODIPine   Tablet 10 milliGRAM(s) Oral daily  heparin   Injectable 5000 Unit(s) SubCutaneous every 8 hours  labetalol 100 milliGRAM(s) Oral three times a day  levETIRAcetam 750 milliGRAM(s) Oral two times a day  pantoprazole    Tablet 40 milliGRAM(s) Oral two times a day    MEDICATIONS  (PRN):  acetaminophen   Tablet .. 975 milliGRAM(s) Oral every 6 hours PRN Mild Pain (1 - 3)  acetaminophen  IVPB .. 1000 milliGRAM(s) IV Intermittent once PRN Temp greater or equal to 38C (100.4F), Mild Pain (1 - 3)  benzocaine 15 mG/menthol 3.6 mG (Sugar-Free) Lozenge 1 Lozenge Oral every 2 hours PRN Sore Throat  cyclobenzaprine 5 milliGRAM(s) Oral three times a day PRN Muscle Spasm  hydrALAZINE Injectable 10 milliGRAM(s) IV Push every 6 hours PRN SBP>170  HYDROmorphone  Injectable 0.5 milliGRAM(s) IV Push every 4 hours PRN Moderate Pain (4 - 6)  melatonin 3 milliGRAM(s) Oral at bedtime PRN Insomnia  ondansetron Injectable 4 milliGRAM(s) IV Push once PRN Nausea and/or Vomiting  polyethylene glycol 3350 17 Gram(s) Oral daily PRN Constipation  racepinephrine  2.25% Inhalation 0.5 milliLiter(s) Inhalation two times a day PRN stridor      PHYSICAL EXAM============================  General:                         Awake, alert, not in any distress  Neuro:                            Moving all extremities to commands.   Respiratory:	Air entry fair and  bilateral conducted sounds                                           Effort even and unlabored.  CV:		Regular rate and rhythm. Normal S1/S2                                          Distal pulses present.  Abdomen:	                     Soft, non-distended. Bowel sounds present   Skin:		No rash.  Extremities:	Warm, no cyanosis or edema.  Palpable pulses    ============================LABS=========================    06-04    137  |  105  |  49<H>  ----------------------------<  170<H>  3.9   |  16<L>  |  2.43<H>    Ca    8.1<L>      04 Jun 2021 11:55  Phos  5.0     06-04  Mg     1.5     06-04    TPro  5.9<L>  /  Alb  3.2<L>  /  TBili  0.2  /  DBili  x   /  AST  9   /  ALT  8   /  AlkPhos  103  06-04    LIVER FUNCTIONS - ( 04 Jun 2021 11:55 )  Alb: 3.2 g/dL / Pro: 5.9 g/dL / ALK PHOS: 103 U/L / ALT: 8 U/L / AST: 9 U/L / GGT: x           PT/INR - ( 03 Jun 2021 07:27 )   PT: 11.5 sec;   INR: 1.01 ratio         PTT - ( 03 Jun 2021 07:27 )  PTT:28.6 sec      A/P:  61yMale admitted with SOB, stridor, underwent bronch and stent placement on 6/3/2021                            Neuro:                                         Pain control with  Tylenol IV  / Dilaudid PRN                            Cardiovascular:                                          Continue hemodynamic monitoring.    HTN: Restart Labetalol when able to take PO                            Respiratory:                                         Continue humidified oxygen                                         Encourage incentive spirometry                                          Continue bronchodilators, pulmonary toilet as tolerated                                 GI                                         Started clears, advance as tolerated                                          Continue GI prophylaxis with Protonix                                         Continue Zofran / Reglan for nausea - PRN	                                                                 Renal:                                         Continue LR                                          Monitor I/Os and electrolytes     Urinary retention - Higginbotham inserted                                                 Hem/ Onc:                                         No active issues                                         Follow CBC in AM                           Infectious disease:                                            Monitor for fever / leukocytosis.                            Endocrine                                             Continue Accu-Checks with coverage    Pt is on SQ Heparin and Venodyne boots for DVT prophylaxis.     Pertinent clinical, laboratory, radiographic, hemodynamic, echocardiographic, respiratory data, microbiologic data and chart were reviewed and analyzed frequently throughout the course of the day and night  Patient seen, examined and plan discussed with CT Surgeon Dr. Allen / CTICU team during rounds.    Pt / pt's family updated of the status and plan      Renée Metcalf DO, FACEP

## 2021-06-04 NOTE — PROGRESS NOTE ADULT - SUBJECTIVE AND OBJECTIVE BOX
POST ANESTHESIA EVALUATION    61y Male POSTOP DAY 1 S/P bronchoscopy, tracheal dilation    MENTAL STATUS: Patient participation [ x ] Awake     [  ] Arousable     [  ] Sedated    AIRWAY PATENCY: [x  ] Satisfactory  [  ] Other:     Vital Signs Last 24 Hrs  T(C): 36.1 (04 Jun 2021 08:00), Max: 37 (03 Jun 2021 20:00)  T(F): 97 (04 Jun 2021 08:00), Max: 98.6 (03 Jun 2021 20:00)  HR: 103 (04 Jun 2021 08:00) (61 - 103)  BP: 132/82 (04 Jun 2021 08:00) (111/59 - 142/86)  BP(mean): 94 (04 Jun 2021 08:00) (70 - 100)  RR: 17 (04 Jun 2021 08:00) (9 - 25)  SpO2: 100% (04 Jun 2021 08:00) (97% - 100%)  I&O's Summary    03 Jun 2021 07:01  -  04 Jun 2021 07:00  --------------------------------------------------------  IN: 350 mL / OUT: 1885 mL / NET: -1535 mL    04 Jun 2021 07:01  -  04 Jun 2021 09:09  --------------------------------------------------------  IN: 0 mL / OUT: 250 mL / NET: -250 mL          NAUSEA/ VOMITTING:  [x  ] NONE  [  ] CONTROLLED [  ] OTHER     PAIN: [x  ] CONTROLLED WITH CURRENT REGIMEN  [  ] OTHER    [x  ] NO APPARENT ANESTHESIA COMPLICATIONS      Comments:

## 2021-06-05 ENCOUNTER — TRANSCRIPTION ENCOUNTER (OUTPATIENT)
Age: 61
End: 2021-06-05

## 2021-06-05 VITALS
DIASTOLIC BLOOD PRESSURE: 68 MMHG | HEART RATE: 74 BPM | RESPIRATION RATE: 17 BRPM | OXYGEN SATURATION: 97 % | TEMPERATURE: 98 F | SYSTOLIC BLOOD PRESSURE: 113 MMHG

## 2021-06-05 PROCEDURE — 71045 X-RAY EXAM CHEST 1 VIEW: CPT | Mod: 26

## 2021-06-05 PROCEDURE — 99232 SBSQ HOSP IP/OBS MODERATE 35: CPT

## 2021-06-05 RX ORDER — IPRATROPIUM/ALBUTEROL SULFATE 18-103MCG
1 AEROSOL WITH ADAPTER (GRAM) INHALATION
Qty: 1 | Refills: 0
Start: 2021-06-05 | End: 2021-07-04

## 2021-06-05 RX ORDER — ACETAMINOPHEN 500 MG
3 TABLET ORAL
Qty: 0 | Refills: 0 | DISCHARGE
Start: 2021-06-05

## 2021-06-05 RX ADMIN — Medication 400 MILLIGRAM(S): at 10:09

## 2021-06-05 RX ADMIN — Medication 3 MILLILITER(S): at 10:25

## 2021-06-05 RX ADMIN — HYDROMORPHONE HYDROCHLORIDE 0.5 MILLIGRAM(S): 2 INJECTION INTRAMUSCULAR; INTRAVENOUS; SUBCUTANEOUS at 12:00

## 2021-06-05 RX ADMIN — HYDROMORPHONE HYDROCHLORIDE 0.5 MILLIGRAM(S): 2 INJECTION INTRAMUSCULAR; INTRAVENOUS; SUBCUTANEOUS at 12:15

## 2021-06-05 RX ADMIN — HEPARIN SODIUM 5000 UNIT(S): 5000 INJECTION INTRAVENOUS; SUBCUTANEOUS at 06:27

## 2021-06-05 RX ADMIN — BENZOCAINE AND MENTHOL 1 LOZENGE: 5; 1 LIQUID ORAL at 06:34

## 2021-06-05 RX ADMIN — Medication 3 MILLILITER(S): at 04:06

## 2021-06-05 RX ADMIN — AMLODIPINE BESYLATE 10 MILLIGRAM(S): 2.5 TABLET ORAL at 06:26

## 2021-06-05 RX ADMIN — LEVETIRACETAM 750 MILLIGRAM(S): 250 TABLET, FILM COATED ORAL at 10:08

## 2021-06-05 RX ADMIN — PANTOPRAZOLE SODIUM 40 MILLIGRAM(S): 20 TABLET, DELAYED RELEASE ORAL at 06:27

## 2021-06-05 RX ADMIN — Medication 1000 MILLIGRAM(S): at 10:25

## 2021-06-05 RX ADMIN — Medication 100 MILLIGRAM(S): at 06:26

## 2021-06-05 NOTE — PROGRESS NOTE ADULT - NSICDXPILOT_GEN_ALL_CORE
[place]

## 2021-06-05 NOTE — PROGRESS NOTE ADULT - REASON FOR ADMISSION
Scratchy throat/Coughing s/p Vocal Cord injection

## 2021-06-05 NOTE — PROGRESS NOTE ADULT - SUBJECTIVE AND OBJECTIVE BOX
ANNEMARIE KELLEY      61y   Male   [id number]         No Known Allergies             Daily     Daily Weight in k.3 (2021 06:00)Drug Dosing Weight  Height (cm): 160 (2021 08:05)  Weight (kg): 68 (2021 08:05)  BMI (kg/m2): 26.6 (2021 08:05)  BSA (m2): 1.71 (2021 08:05)    HPI:  62 y/o male s/p tracheal resection 21 presents to [facility] ER w c/o stridor and SOB. Difficulty breating is worse at night and prevents him from being able to sleep.  He denies productive cough or fever.  This is his 3rd readmission after his tracheal resection  PT has PMHx COVID+ (3/2021 - not hospitalized), PE (2020 - on eliquis at home), CKD, w/ recent hospitalization at [facility] after seizure w/ lingual hematoma requiring emergency tracheostomy on 3/25/21.  Patient underwent Tracheal Resection and Reconstruction on . ENT injected vocal cords about 2 weeks ago. He was last discharged 21.  (24 May 2021 10:29)      62 y/o male s/p tracheal resection 21 presents to [facility] ER w c/o stridor and SOB. Difficulty breating is worse at night and prevents him from being able to sleep.  He denies productive cough or fever.  This is his 3rd readmission after his tracheal resection  PT has PMHx COVID+ (3/2021 - not hospitalized), PE (2020 - on eliquis at home), CKD, w/ recent hospitalization at [facility] after seizure w/ lingual hematoma requiring emergency tracheostomy on 3/25/21.  Patient underwent Tracheal Resection and Reconstruction on . ENT injected vocal cords about 2 weeks ago. He was last discharged 21.  (24 May 2021 10:29)      Procedure:        Flex bronch / Rigid bronch /  Tracheal stent  6/3/2021  Flex bronch, tracheal resection 2021      Issues:  Severe tracheal stenosis s/p Resection  Hx of PE (dx 10/2020)   Seizure disorder  HTN  CKD              Hx of COVID (2021)               Unilateral vocal cord paralysis   Urinary retention                 Home Medications:  acetaminophen 325 mg oral tablet: 3 tab(s) orally every 6 hours, As needed, Mild Pain (1 - 3) (2021 10:24)  labetalol 100 mg oral tablet: 1 tab(s) orally 3 times a day (17 May 2021 14:03)  Norvasc 10 mg oral tablet: 1 tab(s) orally once a day (17 May 2021 14:03)  polyethylene glycol 3350 oral powder for reconstitution: 17 gram(s) orally once a day, As Needed (17 May 2021 14:03)      PAST MEDICAL & SURGICAL HISTORY:  HTN (hypertension)    Chronic kidney disease, unspecified CKD stage    Pulmonary embolus  diagnosed about 6 months ago incidentally found on imaging related to falling off a bike and chest pain    2019 novel coronavirus disease (COVID-19)  never hospitilized    Arthritis    Tracheal stenosis    History of total right knee replacement (TKR)  bilateral    History of tracheal stenosis      Vital Signs Last 24 Hrs  T(C): 36.7 (2021 08:00), Max: 36.7 (2021 08:00)  T(F): 98 (2021 08:00), Max: 98 (2021 08:00)  HR: 80 (2021 10:00) (64 - 90)  BP: 110/69 (2021 10:00) (110/69 - 146/83)  BP(mean): 78 (2021 10:00) (78 - 103)  RR: 19 (2021 10:00) (10 - 27)  SpO2: 100% (2021 10:00) (86% - 100%)  I&O's Detail    2021 07:01  -  2021 07:00  --------------------------------------------------------  IN:  Total IN: 0 mL    OUT:    Voided (mL): 2350 mL  Total OUT: 2350 mL    Total NET: -2350 mL      2021 07:01  -  2021 11:00  --------------------------------------------------------  IN:  Total IN: 0 mL    OUT:    Voided (mL): 350 mL  Total OUT: 350 mL    Total NET: -350 mL      CAPILLARY BLOOD GLUCOSE      Home Medications:  acetaminophen 325 mg oral tablet: 3 tab(s) orally every 6 hours, As needed, Mild Pain (1 - 3) (2021 10:24)  labetalol 100 mg oral tablet: 1 tab(s) orally 3 times a day (17 May 2021 14:03)  Norvasc 10 mg oral tablet: 1 tab(s) orally once a day (17 May 2021 14:03)  polyethylene glycol 3350 oral powder for reconstitution: 17 gram(s) orally once a day, As Needed (17 May 2021 14:03)      MEDICATIONS  (STANDING):  albuterol/ipratropium for Nebulization 3 milliLiter(s) Nebulizer every 6 hours  amLODIPine   Tablet 10 milliGRAM(s) Oral daily  heparin   Injectable 5000 Unit(s) SubCutaneous every 8 hours  labetalol 100 milliGRAM(s) Oral three times a day  levETIRAcetam 750 milliGRAM(s) Oral two times a day  pantoprazole    Tablet 40 milliGRAM(s) Oral two times a day    MEDICATIONS  (PRN):  acetaminophen   Tablet .. 975 milliGRAM(s) Oral every 6 hours PRN Mild Pain (1 - 3)  benzocaine 15 mG/menthol 3.6 mG (Sugar-Free) Lozenge 1 Lozenge Oral every 2 hours PRN Sore Throat  cyclobenzaprine 5 milliGRAM(s) Oral three times a day PRN Muscle Spasm  hydrALAZINE Injectable 10 milliGRAM(s) IV Push every 6 hours PRN SBP>170  HYDROmorphone  Injectable 0.5 milliGRAM(s) IV Push every 4 hours PRN Moderate Pain (4 - 6)  melatonin 3 milliGRAM(s) Oral at bedtime PRN Insomnia  ondansetron Injectable 4 milliGRAM(s) IV Push once PRN Nausea and/or Vomiting  polyethylene glycol 3350 17 Gram(s) Oral daily PRN Constipation  racepinephrine  2.25% Inhalation 0.5 milliLiter(s) Inhalation two times a day PRN stridor        Physical exam:               General:               Pt is awake, alert,  appears fairly comfortable                                                  Neuro:                  Nonfocal                             Cardiovascular:   S1 & S2, regular                           Respiratory:         Air entry is fair and equal on both sides, has bilateral rhonchi                           GI:                          Soft, nondistended and nontender, Bowel sounds active                            Ext:                        No cyanosis or edema                              Labs:                                                                   137  |  105  |  49<H>  ----------------------------<  170<H>  3.9   |  16<L>  |  2.43<H>    Ca    8.1<L>      2021 11:55  Phos  5.0     06-  Mg     1.5     06-    TPro  5.9<L>  /  Alb  3.2<L>  /  TBili  0.2  /  DBili  x   /  AST  9   /  ALT  8   /  AlkPhos  103  06-                    LIVER FUNCTIONS - ( 2021 11:55 )  Alb: 3.2 g/dL / Pro: 5.9 g/dL / ALK PHOS: 103 U/L / ALT: 8 U/L / AST: 9 U/L / GGT: x                 CXR:   < from: Xray Chest 1 View- PORTABLE-Urgent (Xray Chest 1 View- PORTABLE-Urgent .) (21 @ 13:02) >  An enteric tube is seen with the tip and sidehole in the stomach although there is questionable coiling of the tubing in the pharynx.    The tracheal stent is not identified.    The lungs are clear, the heart is not enlarged and there is no effusion or pneumothorax.      COMPARISON:        IMPRESSION:  1. Enteric tube with tip in stomach although questionable coiling in the hypopharynx.  2. Clear lungs.  3. Tracheal stent not visualized.        Plan:    General: 61yMale admitted with SOB, stridor, underwent bronch and stent placement on 6/3/2021                            Neuro:                                         Pain control with  Tylenol IV  / Dilaudid PRN    Seizure disorder: Continue Keppra                            Cardiovascular:                                          Continue hemodynamic monitoring.    HTN: Restarted Labetalol                             Respiratory:                                         Continue humidified oxygen / air for airway humidification                                         Encourage incentive spirometry                                          Continue bronchodilators, pulmonary toilet as tolerated                               GI                                         Tolerating PO                                         Continue GI prophylaxis with Protonix                                         Continue Zofran / Reglan for nausea - PRN	                                                                 Renal:                                         Monitor I/Os and electrolytes     Urinary retention - Higginbotham inserted. Voiding OK now                                                 Hem/ Onc:                                         No active issues                                         Follow CBC in AM                           Infectious disease:                                                No active issues                            Endocrine                                             Continue Accu-Checks with coverage    Pt is on SQ Heparin and Venodyne boots for DVT prophylaxis.     Pertinent clinical, laboratory, radiographic, hemodynamic, echocardiographic, respiratory data, microbiologic data and chart were reviewed and analyzed frequently throughout the course of the day and night  Patient seen, examined and plan discussed with CT Surgeon Dr. Simon / CTICU team during rounds.    Pt is updated of the status and plan including possible discharge today        Yefri Kidd MD

## 2021-06-05 NOTE — DISCHARGE NOTE NURSING/CASE MANAGEMENT/SOCIAL WORK - PATIENT PORTAL LINK FT
You can access the FollowMyHealth Patient Portal offered by [facility] by registering at the following website: [email or web address]. By joining [employer]’s FollowMyHealth portal, you will also be able to view your health information using other applications (apps) compatible with our system.

## 2021-06-05 NOTE — PROGRESS NOTE ADULT - PROVIDER SPECIALTY LIST ADULT
Anesthesia
Critical Care
Thoracic Surgery
CT Surgery
Anesthesia
Critical Care
Thoracic Surgery
Thoracic Surgery
CT Surgery
CT Surgery
Critical Care

## 2021-06-15 ENCOUNTER — APPOINTMENT (OUTPATIENT)
Dept: THORACIC SURGERY | Facility: CLINIC | Age: 61
End: 2021-06-15
Payer: MEDICAID

## 2021-06-15 VITALS
TEMPERATURE: 99.4 F | WEIGHT: 172 LBS | HEIGHT: 63 IN | BODY MASS INDEX: 30.48 KG/M2 | SYSTOLIC BLOOD PRESSURE: 137 MMHG | DIASTOLIC BLOOD PRESSURE: 84 MMHG | HEART RATE: 90 BPM | OXYGEN SATURATION: 97 % | RESPIRATION RATE: 18 BRPM

## 2021-06-15 DIAGNOSIS — Z82.49 FAMILY HISTORY OF ISCHEMIC HEART DISEASE AND OTHER DISEASES OF THE CIRCULATORY SYSTEM: ICD-10-CM

## 2021-06-15 DIAGNOSIS — Z87.39 PERSONAL HISTORY OF OTHER DISEASES OF THE MUSCULOSKELETAL SYSTEM AND CONNECTIVE TISSUE: ICD-10-CM

## 2021-06-15 DIAGNOSIS — Z87.448 PERSONAL HISTORY OF OTHER DISEASES OF URINARY SYSTEM: ICD-10-CM

## 2021-06-15 DIAGNOSIS — J39.8 OTHER SPECIFIED DISEASES OF UPPER RESPIRATORY TRACT: ICD-10-CM

## 2021-06-15 DIAGNOSIS — Z87.891 PERSONAL HISTORY OF NICOTINE DEPENDENCE: ICD-10-CM

## 2021-06-15 DIAGNOSIS — U07.1 COVID-19: ICD-10-CM

## 2021-06-15 DIAGNOSIS — Z86.79 PERSONAL HISTORY OF OTHER DISEASES OF THE CIRCULATORY SYSTEM: ICD-10-CM

## 2021-06-15 DIAGNOSIS — Z78.9 OTHER SPECIFIED HEALTH STATUS: ICD-10-CM

## 2021-06-15 DIAGNOSIS — Z86.69 PERSONAL HISTORY OF OTHER DISEASES OF THE NERVOUS SYSTEM AND SENSE ORGANS: ICD-10-CM

## 2021-06-15 DIAGNOSIS — Z86.711 PERSONAL HISTORY OF PULMONARY EMBOLISM: ICD-10-CM

## 2021-06-15 PROCEDURE — 99024 POSTOP FOLLOW-UP VISIT: CPT

## 2021-06-15 RX ORDER — AMLODIPINE BESYLATE 10 MG/1
10 TABLET ORAL
Refills: 0 | Status: ACTIVE | COMMUNITY

## 2021-06-15 RX ORDER — ACETAMINOPHEN 325 MG/1
325 TABLET, FILM COATED ORAL
Refills: 0 | Status: ACTIVE | COMMUNITY

## 2021-06-15 RX ORDER — APIXABAN 5 MG/1
5 TABLET, FILM COATED ORAL
Refills: 0 | Status: ACTIVE | COMMUNITY

## 2021-06-15 RX ORDER — LABETALOL HYDROCHLORIDE 100 MG/1
100 TABLET, FILM COATED ORAL
Refills: 0 | Status: ACTIVE | COMMUNITY

## 2021-06-15 RX ORDER — PANTOPRAZOLE SODIUM 40 MG/1
40 TABLET, DELAYED RELEASE ORAL
Refills: 0 | Status: ACTIVE | COMMUNITY

## 2021-06-23 ENCOUNTER — APPOINTMENT (OUTPATIENT)
Dept: OTOLARYNGOLOGY | Facility: CLINIC | Age: 61
End: 2021-06-23
Payer: MEDICAID

## 2021-06-23 VITALS
WEIGHT: 172 LBS | BODY MASS INDEX: 30.48 KG/M2 | HEART RATE: 74 BPM | DIASTOLIC BLOOD PRESSURE: 90 MMHG | HEIGHT: 63 IN | SYSTOLIC BLOOD PRESSURE: 156 MMHG

## 2021-06-23 PROCEDURE — 31579 LARYNGOSCOPY TELESCOPIC: CPT | Mod: NC

## 2021-06-23 PROCEDURE — 99214 OFFICE O/P EST MOD 30 MIN: CPT | Mod: NC,25

## 2021-06-23 RX ORDER — FAMOTIDINE 20 MG/1
20 TABLET, FILM COATED ORAL
Qty: 90 | Refills: 0 | Status: ACTIVE | COMMUNITY
Start: 2021-06-23 | End: 1900-01-01

## 2021-06-23 RX ORDER — OMEPRAZOLE 20 MG/1
20 CAPSULE, DELAYED RELEASE ORAL DAILY
Qty: 90 | Refills: 1 | Status: ACTIVE | COMMUNITY
Start: 2021-06-23 | End: 1900-01-01

## 2021-06-23 RX ORDER — BUDESONIDE 0.5 MG/2ML
0.5 INHALANT ORAL TWICE DAILY
Qty: 1 | Refills: 5 | Status: ACTIVE | COMMUNITY
Start: 2021-06-23 | End: 1900-01-01

## 2021-06-23 NOTE — REASON FOR VISIT
[Initial Evaluation] : an initial evaluation for [FreeTextEntry2] : s/p Micro direct laryngoscopy with  injection laryngoplasty, bronchoscopy by Thoracic Surgery, 05/10/2021.

## 2021-06-23 NOTE — PHYSICAL EXAM
[Midline] : trachea located in midline position [Normal] : no rashes [de-identified] : healed inc c/d/i

## 2021-06-23 NOTE — HISTORY OF PRESENT ILLNESS
[de-identified] : 61 year old male with history of left vocal fold paresis, dysphonia , s/p Micro direct laryngoscopy with  injection laryngoplasty, bronchoscopy by Thoracic Surgery, 05/10/2021. Reports voice has been stable since procedure. Reports feels a lot of phlegm in the back of the throat that he has to cough up. Reports occasional dysphagia, odynophagia with solids. Reports dyspnea on exertion. Denies choking, aspiration. Denies fevers, infections. Describes gasping episodes with difficulty breathing but not all the time.\par

## 2021-06-23 NOTE — CONSULT LETTER
[Consult Letter:] : I had the pleasure of evaluating your patient, [unfilled]. [Please see my note below.] : Please see my note below. [Consult Closing:] : Thank you very much for allowing me to participate in the care of this patient.  If you have any questions, please do not hesitate to contact me. [Sincerely,] : Sincerely, [FreeTextEntry3] : Fidel Hunt MD, PhD\par Chief, Division of Laryngology\par Department of Otolaryngology\par [facility]\par Pediatric Otolaryngology, [facility]\par  of Otolaryngology\par [employer]\par

## 2021-06-30 ENCOUNTER — TRANSCRIPTION ENCOUNTER (OUTPATIENT)
Age: 61
End: 2021-06-30

## 2021-07-01 ENCOUNTER — TRANSCRIPTION ENCOUNTER (OUTPATIENT)
Age: 61
End: 2021-07-01

## 2021-07-01 ENCOUNTER — INPATIENT (INPATIENT)
Facility: HOSPITAL | Age: 61
LOS: 27 days | Discharge: NURSING, REHAB OR LONG-TERM CARE | End: 2021-07-29
Attending: OTOLARYNGOLOGY | Admitting: OTOLARYNGOLOGY
Payer: MEDICAID

## 2021-07-01 VITALS — HEIGHT: 63 IN

## 2021-07-01 DIAGNOSIS — Z87.09 PERSONAL HISTORY OF OTHER DISEASES OF THE RESPIRATORY SYSTEM: Chronic | ICD-10-CM

## 2021-07-01 DIAGNOSIS — R09.2 RESPIRATORY ARREST: ICD-10-CM

## 2021-07-01 DIAGNOSIS — Z96.651 PRESENCE OF RIGHT ARTIFICIAL KNEE JOINT: Chronic | ICD-10-CM

## 2021-07-01 LAB
ALBUMIN SERPL ELPH-MCNC: 3.9 G/DL — SIGNIFICANT CHANGE UP (ref 3.3–5)
ALP SERPL-CCNC: 187 U/L — HIGH (ref 40–120)
ALT FLD-CCNC: 14 U/L — SIGNIFICANT CHANGE UP (ref 4–41)
ANION GAP SERPL CALC-SCNC: 21 MMOL/L — HIGH (ref 7–14)
AST SERPL-CCNC: 23 U/L — SIGNIFICANT CHANGE UP (ref 4–40)
B PERT DNA SPEC QL NAA+PROBE: SIGNIFICANT CHANGE UP
BASOPHILS # BLD AUTO: 0.07 K/UL — SIGNIFICANT CHANGE UP (ref 0–0.2)
BASOPHILS NFR BLD AUTO: 0.3 % — SIGNIFICANT CHANGE UP (ref 0–2)
BILIRUB SERPL-MCNC: 0.2 MG/DL — SIGNIFICANT CHANGE UP (ref 0.2–1.2)
BLOOD GAS ARTERIAL COMPREHENSIVE RESULT: SIGNIFICANT CHANGE UP
BLOOD GAS VENOUS COMPREHENSIVE RESULT: SIGNIFICANT CHANGE UP
BLOOD GAS VENOUS COMPREHENSIVE RESULT: SIGNIFICANT CHANGE UP
BUN SERPL-MCNC: 44 MG/DL — HIGH (ref 7–23)
C PNEUM DNA SPEC QL NAA+PROBE: SIGNIFICANT CHANGE UP
CALCIUM SERPL-MCNC: 9 MG/DL — SIGNIFICANT CHANGE UP (ref 8.4–10.5)
CHLORIDE SERPL-SCNC: 104 MMOL/L — SIGNIFICANT CHANGE UP (ref 98–107)
CO2 SERPL-SCNC: 17 MMOL/L — LOW (ref 22–31)
CREAT SERPL-MCNC: 4.31 MG/DL — HIGH (ref 0.5–1.3)
EOSINOPHIL # BLD AUTO: 0.57 K/UL — HIGH (ref 0–0.5)
EOSINOPHIL NFR BLD AUTO: 2.8 % — SIGNIFICANT CHANGE UP (ref 0–6)
FLUAV SUBTYP SPEC NAA+PROBE: SIGNIFICANT CHANGE UP
FLUBV RNA SPEC QL NAA+PROBE: SIGNIFICANT CHANGE UP
GLUCOSE SERPL-MCNC: 264 MG/DL — HIGH (ref 70–99)
HADV DNA SPEC QL NAA+PROBE: SIGNIFICANT CHANGE UP
HCOV 229E RNA SPEC QL NAA+PROBE: SIGNIFICANT CHANGE UP
HCOV HKU1 RNA SPEC QL NAA+PROBE: SIGNIFICANT CHANGE UP
HCOV NL63 RNA SPEC QL NAA+PROBE: SIGNIFICANT CHANGE UP
HCOV OC43 RNA SPEC QL NAA+PROBE: SIGNIFICANT CHANGE UP
HCT VFR BLD CALC: 27.6 % — LOW (ref 39–50)
HGB BLD-MCNC: 8.2 G/DL — LOW (ref 13–17)
HMPV RNA SPEC QL NAA+PROBE: SIGNIFICANT CHANGE UP
HPIV1 RNA SPEC QL NAA+PROBE: SIGNIFICANT CHANGE UP
HPIV2 RNA SPEC QL NAA+PROBE: SIGNIFICANT CHANGE UP
HPIV3 RNA SPEC QL NAA+PROBE: SIGNIFICANT CHANGE UP
HPIV4 RNA SPEC QL NAA+PROBE: SIGNIFICANT CHANGE UP
IANC: 8.09 K/UL — SIGNIFICANT CHANGE UP (ref 1.5–8.5)
IMM GRANULOCYTES NFR BLD AUTO: 2.1 % — HIGH (ref 0–1.5)
LYMPHOCYTES # BLD AUTO: 47.4 % — HIGH (ref 13–44)
LYMPHOCYTES # BLD AUTO: 9.69 K/UL — HIGH (ref 1–3.3)
MAGNESIUM SERPL-MCNC: 2.2 MG/DL — SIGNIFICANT CHANGE UP (ref 1.6–2.6)
MCHC RBC-ENTMCNC: 28.2 PG — SIGNIFICANT CHANGE UP (ref 27–34)
MCHC RBC-ENTMCNC: 29.7 GM/DL — LOW (ref 32–36)
MCV RBC AUTO: 94.8 FL — SIGNIFICANT CHANGE UP (ref 80–100)
MONOCYTES # BLD AUTO: 1.61 K/UL — HIGH (ref 0–0.9)
MONOCYTES NFR BLD AUTO: 7.9 % — SIGNIFICANT CHANGE UP (ref 2–14)
NEUTROPHILS # BLD AUTO: 8.09 K/UL — HIGH (ref 1.8–7.4)
NEUTROPHILS NFR BLD AUTO: 39.5 % — LOW (ref 43–77)
NRBC # BLD: 0 /100 WBCS — SIGNIFICANT CHANGE UP
NRBC # FLD: 0.02 K/UL — HIGH
PLATELET # BLD AUTO: 299 K/UL — SIGNIFICANT CHANGE UP (ref 150–400)
POTASSIUM SERPL-MCNC: 4.1 MMOL/L — SIGNIFICANT CHANGE UP (ref 3.5–5.3)
POTASSIUM SERPL-SCNC: 4.1 MMOL/L — SIGNIFICANT CHANGE UP (ref 3.5–5.3)
PROT SERPL-MCNC: 6.5 G/DL — SIGNIFICANT CHANGE UP (ref 6–8.3)
RAPID RVP RESULT: SIGNIFICANT CHANGE UP
RBC # BLD: 2.91 M/UL — LOW (ref 4.2–5.8)
RBC # FLD: 14.4 % — SIGNIFICANT CHANGE UP (ref 10.3–14.5)
RSV RNA SPEC QL NAA+PROBE: SIGNIFICANT CHANGE UP
RV+EV RNA SPEC QL NAA+PROBE: SIGNIFICANT CHANGE UP
SARS-COV-2 RNA SPEC QL NAA+PROBE: SIGNIFICANT CHANGE UP
SODIUM SERPL-SCNC: 142 MMOL/L — SIGNIFICANT CHANGE UP (ref 135–145)
WBC # BLD: 20.46 K/UL — HIGH (ref 3.8–10.5)
WBC # FLD AUTO: 20.46 K/UL — HIGH (ref 3.8–10.5)

## 2021-07-01 PROCEDURE — 70450 CT HEAD/BRAIN W/O DYE: CPT | Mod: 26

## 2021-07-01 PROCEDURE — 99221 1ST HOSP IP/OBS SF/LOW 40: CPT | Mod: 25

## 2021-07-01 PROCEDURE — 99291 CRITICAL CARE FIRST HOUR: CPT | Mod: 25

## 2021-07-01 PROCEDURE — 43753 TX GASTRO INTUB W/ASP: CPT | Mod: 59

## 2021-07-01 PROCEDURE — 99292 CRITICAL CARE ADDL 30 MIN: CPT | Mod: 25

## 2021-07-01 PROCEDURE — 70491 CT SOFT TISSUE NECK W/DYE: CPT | Mod: 26

## 2021-07-01 PROCEDURE — 71045 X-RAY EXAM CHEST 1 VIEW: CPT | Mod: 26

## 2021-07-01 PROCEDURE — 99221 1ST HOSP IP/OBS SF/LOW 40: CPT

## 2021-07-01 PROCEDURE — 31605 EMER TRACHEOSTOMY CTHYR MEM: CPT | Mod: GC

## 2021-07-01 PROCEDURE — 36556 INSERT NON-TUNNEL CV CATH: CPT

## 2021-07-01 RX ORDER — HEPARIN SODIUM 5000 [USP'U]/ML
5000 INJECTION INTRAVENOUS; SUBCUTANEOUS EVERY 12 HOURS
Refills: 0 | Status: DISCONTINUED | OUTPATIENT
Start: 2021-07-01 | End: 2021-07-02

## 2021-07-01 RX ORDER — CISATRACURIUM BESYLATE 2 MG/ML
10 INJECTION INTRAVENOUS ONCE
Refills: 0 | Status: DISCONTINUED | OUTPATIENT
Start: 2021-07-01 | End: 2021-07-01

## 2021-07-01 RX ORDER — CISATRACURIUM BESYLATE 2 MG/ML
2 INJECTION INTRAVENOUS
Qty: 200 | Refills: 0 | Status: DISCONTINUED | OUTPATIENT
Start: 2021-07-01 | End: 2021-07-02

## 2021-07-01 RX ORDER — PANTOPRAZOLE SODIUM 20 MG/1
40 TABLET, DELAYED RELEASE ORAL DAILY
Refills: 0 | Status: DISCONTINUED | OUTPATIENT
Start: 2021-07-01 | End: 2021-07-15

## 2021-07-01 RX ORDER — SODIUM BICARBONATE 1 MEQ/ML
50 SYRINGE (ML) INTRAVENOUS ONCE
Refills: 0 | Status: COMPLETED | OUTPATIENT
Start: 2021-07-01 | End: 2021-07-01

## 2021-07-01 RX ORDER — FENTANYL CITRATE 50 UG/ML
50 INJECTION INTRAVENOUS ONCE
Refills: 0 | Status: DISCONTINUED | OUTPATIENT
Start: 2021-07-01 | End: 2021-07-01

## 2021-07-01 RX ORDER — HYDROMORPHONE HYDROCHLORIDE 2 MG/ML
0.5 INJECTION INTRAMUSCULAR; INTRAVENOUS; SUBCUTANEOUS
Refills: 0 | Status: DISCONTINUED | OUTPATIENT
Start: 2021-07-01 | End: 2021-07-03

## 2021-07-01 RX ADMIN — Medication 50 MILLIEQUIVALENT(S): at 22:15

## 2021-07-01 RX ADMIN — FENTANYL CITRATE 50 MICROGRAM(S): 50 INJECTION INTRAVENOUS at 23:16

## 2021-07-01 RX ADMIN — FENTANYL CITRATE 50 MICROGRAM(S): 50 INJECTION INTRAVENOUS at 23:30

## 2021-07-01 NOTE — ED PROVIDER NOTE - ATTENDING CONTRIBUTION TO CARE
[facility]  ---- Message from LENY Draper sent at 8/14/2017  8:12 AM EDT -----  BS elevated but nonfasting, please work on healthy diet and regular exercise to control BS and we will monitor NCV. Liver enzyme still elevated but almost normal, will cont to monitor    
I wrote H&P    Upon my evaluation, this patient had a high probability of imminent or life-threatening deterioration due to concern for respiratory failure and tracheal stenosis, which required my direct attention, intervention, and personal management.  The patient has a  medical condition that impairs one or more vital organ systems.  Frequent personal assessment and adjustment of medical interventions was performed.       I have personally provided 90 minutes of critical care time exclusive of time spent on separately billable procedures. Time includes review of laboratory data, radiology results, discussion with consultants, patient and family; monitoring for potential decompensation, as well as time spent retrieving data and reviewing the chart and documenting the visit. Interventions were performed as documented above.

## 2021-07-01 NOTE — ED ADULT NURSE NOTE - OBJECTIVE STATEMENT
Taya RN charting for 20:36  responded to pt having active seizure wheeled by EMS into Trauma Room A. pt with active seizure, placed into hospital stretcher with BVM initiated, IV initiated to right AC medicated as per VO with 2 mg of Ativan IVP. pt requiring 3 staff members to maintain adequate seal of BMV and ventilate pt,  Code Airway called  in anticipation of difficult airway. pt's head repositioned multiple times, jaw trust maneuver performed with minimum ventilation efficiency. During ventilation effort noted pt to have emesis in mouth, pt turned to the side and oral cavity suctioned. Zofran and additional dose of Ativan administered as per VO. Anesthesia and surgery team at bedside. At 20:42 pt  with bradycardia and no palpable pulse, CPR initiated via chest compressions then switched to Asher  CPR machine. multiple attempts made by anesthesia to suction and establish potent airway via LMA with no success, emergency airway tracheotomy initiated. See Code flow sheet for detailed interventions and timeline. At 20:49 emergent airway established with tube size 5.5, pt's airway suctioned and BVM initiated via the tracheostomy. At 20:51 pt lost pulse and CPR restarted, see Code flow sheet for timeline and interventions. at 20:53 ROSC obtained, pt remained normotensive henceforth. central line placement in progress at that time. mobile Critical care RN at bedside for further care.

## 2021-07-01 NOTE — ED ADULT TRIAGE NOTE - CHIEF COMPLAINT QUOTE
pt arrives via EMS in respiratory distress. arrives 90 percent O2 via nebulizer treatment by EMS, pt tachypneic, began having a seizure in triage, directly roomed to TR-A

## 2021-07-01 NOTE — ED PROVIDER NOTE - CLINICAL SUMMARY MEDICAL DECISION MAKING FREE TEXT BOX
60 yo M with h/o HTN, CRI, Seizure D/O, + Covid19 (1/21), PE 2020, h/o Emergency cricothyroidotomy, tracheal stenosis, s/p tracheal stent vs. reconstruction, in past has had episodes of recurrent stridor with vocal cord edema presents with resp distress then seizing, pt went into cardiac arrest, surgical cric performed, pt regained pulses, pending labs and CT neck, admit to SICU, c/f worsening tracheal stenosis and hypoxia as cause of seizure and cardiac arrest.

## 2021-07-01 NOTE — CONSULT NOTE ADULT - SUBJECTIVE AND OBJECTIVE BOX
Patient is a 62 yo M with h/o HTN, CRI, Seizure D/O, + Covid19 (1/21), PE 2020, h/o Emergency cricothyroidotomy, tracheal stenosis, s/p tracheal stent vs. reconstruction, now in ED, unconscious, ?seizing?, vomiting particulate matter.  Surgeons at bedside. Airway suctioned, LMA 4 placed, no ETCO2. Difficult to ventilate due to copious particulate matter in the airway. Attempt x 1 with 6.0 ETT and Glidescope to intubate.  Difficult to see cords due to particulate matter. ETT met resistance just passed cords. No residual anesthetic issues or complications noted. ETCO2.  ETT removed LMA 5 placed in mouth while surgical airway was established.  Better seal with LMA 5. + ETCO2.  Tracheostomy was created by surgical team. + ETCO2.  After trach was established, review of his history showed a tracheal stent was placed on 6/3/21 by Dr. Allen. Patient is a 60 yo M with h/o HTN, CRI, Seizure D/O, + Covid19 (1/21), PE 2020, h/o Emergency cricothyroidotomy, tracheal stenosis, s/p tracheal stent vs. reconstruction, now in ED, unconscious, ?seizing?, vomiting particulate matter.  Surgeons at bedside. Airway suctioned, LMA 4 placed, no ETCO2. Difficult to ventilate due to copious particulate matter in the airway. Attempt x 1 with 6.0 ETT and 2 visualizations with Glidescope MAC 3 to intubate.  Difficult to see cords due to particulate matter. But ETT was placed passed the cords.  ETT met resistance just as it passed cords. No ETCO2.  ETT was removed.  LMA 5 placed in mouth while surgical airway was established.  Better seal with LMA 5. + ETCO2.  Tracheostomy was created by surgical team. + ETCO2. LMA was removed once surgical airway was established.

## 2021-07-01 NOTE — ED ADULT NURSE NOTE - NSIMPLEMENTINTERV_GEN_ALL_ED
Implemented All Fall Risk Interventions:  [place] to call system. Call bell, personal items and telephone within reach. Instruct patient to call for assistance. Room bathroom lighting operational. Non-slip footwear when patient is off stretcher. Physically safe environment: no spills, clutter or unnecessary equipment. Stretcher in lowest position, wheels locked, appropriate side rails in place. Provide visual cue, wrist band, yellow gown, etc. Monitor gait and stability. Monitor for mental status changes and reorient to person, place, and time. Review medications for side effects contributing to fall risk. Reinforce activity limits and safety measures with patient and family.

## 2021-07-01 NOTE — H&P ADULT - HISTORY OF PRESENT ILLNESS
60 y/o male s/p tracheal resection 4/23/21 presents to [facility] ER w c/o stridor and SOB. Difficulty breating is worse at night and prevents him from being able to sleep.  He denies productive cough or fever.  This is his 3rd readmission after his tracheal resection  PT has PMHx COVID+ (3/2021 - not hospitalized), PE (8/2020 - on eliquis at home), CKD, w/ recent hospitalization at [facility] after seizure w/ lingual hematoma requiring emergency tracheostomy on 3/25/21.  Patient underwent Tracheal Resection and Reconstruction on 4/23. ENT injected vocal cords few months ago. He was last discharged 5/18/21.     PROCEDURE  ENT consulted in ED for surgical airway management. per anesthesia unable to intubate. emergent tracheostomy performed, size 5.5 tracheostomy tube w cuff. secured w silk sutures and neck tie    P/E  tracheostomy tube in place size 5.5, cuff up secored w silk sutures and neck tie  oozing from stoma, minimal  saturating 100%    A/P: 60 y/o male s/p tracheal resection 4/23/21 presents to [facility] ER w c/o stridor and SOB. Difficulty breating is worse at night and prevents him from being able to sleep.  He denies productive cough or fever.  This is his 3rd readmission after his tracheal resection  PT has PMHx COVID+ (3/2021 - not hospitalized), PE (8/2020 - on eliquis at home), CKD, w/ recent hospitalization at [facility] after seizure w/ lingual hematoma requiring emergency tracheostomy on 3/25/21.  Patient underwent Tracheal Resection and Reconstruction on 4/23. ENT injected vocal cords few months ago. He was last discharged 5/18/21. s/p emergent tracheostomy 7/1 size 5.5 trach tube  - Patient had a tracheostomy, size 5.5 (cuffed). Please perform standard tracheostomy care procedures.  - Regular suctioning with soft suction catheter q1  - Humidified air to tracheostomy tube  - CTICU and CTS consult w Zeltsman team  - per discussion w CTS, to take to OR tomorow for bronch and trach revision.  - Call or page us if any issues

## 2021-07-01 NOTE — CONSULT NOTE ADULT - ASSESSMENT
ASSESSMENT:  60 y/o male s/p tracheal resection 4/23/21 presents to [facility] ER w c/o stridor and SOB. Difficulty breathing is worse at night and prevents him from being able to sleep.  He denies productive cough or fever.  This is his 3rd readmission after his tracheal resection  PT has PMHx COVID+ (3/2021 - not hospitalized), PE (8/2020 - on eliquis at home), CKD, w/ recent hospitalization at [facility] after seizure w/ lingual hematoma requiring emergency tracheostomy on 3/25/21.  Patient underwent Tracheal Resection and Reconstruction on 4/23. ENT injected vocal cords few months ago. He was last discharged 5/18/21. Now s/p emergent cric in ED for loss of airway.       PLAN:    Neurologic:   - Sedated with propofol  - Epilepsy controlled with propofol  - Ativan PRN for seizure    Respiratory:   - per discussion w CTS, to take to OR today for bronch and trach revision.  - Mechanical ventilation AC/CMV  - On precautions  - Monitor CXR as patient high likelihood of aspiration event    Cardiovascular:   - Hemodynamic monitoring  - Follow up lactate trend    Gastrointestinal/Nutrition:   -NPO with IVF  - NGT to LCWS    Renal/Genitourinary:  -  Higginbotham in place    Hematologic:   - Sub Q Heparin DVT ppx    Infectious Disease:   - Monitor for leukocytosis with low threshold for starting empiric antibiotics for likely aspiration event    Tubes/Lines/Drains:   - Higgin[place]am  - Right radial A line  - NGT    Disposition:   SICU care  --------------------------------------------------------------------------------------    Critical Care Diagnoses:

## 2021-07-01 NOTE — ED PROVIDER NOTE - PHYSICAL EXAMINATION
GEN - seizing and unresponsive   HEAD - NC/AT   EYES- PERRL  ENT: Tongue protruding from clenched jaw   NECK: surgical scar to anterior neck  PULMONARY - grossly CTAB  CARDIAC -s1s2, RRR  ABDOMEN - distended, +BS, no wincing to abd palpation  SKIN - no rash or bruising

## 2021-07-01 NOTE — CONSULT NOTE ADULT - SUBJECTIVE AND OBJECTIVE BOX
HPI  This 61 year old male with h/o having had a tracheal resection / reconstruction on 4/23/21 for tracheal stenosis presented to the ER after being brought in by EMS for respiratory distress.  Per the chart, he had stridor at home but was awake and speaking en route to the ED where he had a seizure upon arrival. The pt was given Ativan 2mg x2 and eventually the seizure subsided but the pt was left hypoxic and with bradycardia.  CPR was initiated when pulses were lost.  Anesthesia was unable to pass an endotracheal tube so an emergent tracheostomy (slash trach) was performed and a 5.5 cuffed tube was inserted.  The pt was given epi, bicarb, & steroids and regained pulses but required Levophed for hypotension. He began to overbreathe the vent and was sedated with Fentanyl before being admitted to SICU under ENT.        PAST MEDICAL & SURGICAL HISTORY:  HTN   Seizure and resulting lingula hematoma-> Emergency tracheostomy 3/25/21-> Tracheal stenosis-> Tracheal resection/ reconstruction 4/23/21  Tracheal stent 6/3/21  Vocal cord edema->  VC injection 5/10/21  Chronic kidney disease  PE on Eliquis  COVID-19  Arthritis  Right knee replacement (TKR)    REVIEW OF SYSTEMS  Unable to obtain (refer to HPI)    MEDICATIONS  ([facility]):  cisatracurium Infusion 2 MICROgram(s)/kG/Min (8.64 mL/Hr) IV Continuous <Continuous>  fentaNYL    Injectable 50 MICROGram(s) IV Push once  heparin   Injectable 5000 Unit(s) SubCutaneous every 12 hours  pantoprazole  Injectable 40 milliGRAM(s) IV Push daily  HYDROmorphone  Injectable 0.5 milliGRAM(s) IV Push every 3 hours PRN Severe Pain (7 - 10)  LORazepam   Injectable 1 milliGRAM(s) IV Push once PRN Seizure    ALLERGIES  No Known Allergies    Vital Signs Last 24 Hrs  T(C): 35.7 (01 Jul 2021 23:33), Max: 35.7 (01 Jul 2021 23:33)  T(F): 96.3 (01 Jul 2021 23:33), Max: 96.3 (01 Jul 2021 23:33)  HR: 81 (02 Jul 2021 00:00) (35 - 161)  BP: 105/64 (02 Jul 2021 00:00) (0/0 - 206/127)  BP(mean): 73 (02 Jul 2021 00:00) (0 - 145)  RR: 24 (02 Jul 2021 00:00) (0 - 24)  SpO2: 100% (02 Jul 2021 00:00) (77% - 100%)    GEN - seizing and unresponsive   HEAD - NC/AT   ENT: New Tracheostomy  NECK: surgical scar to anterior neck  PULMONARY CTA  CARDIAC RRR  ABDOMEN - Soft,   SKIN - no rash or bruising    LABS:                        8.2    20.46 )-----------( 299      ( 01 Jul 2021 21:31 )             27.6     07-01    142  |  104  |  44<H>  ----------------------------<  264<H>  4.1   |  17<L>  |  4.31<H>    Ca    9.0      01 Jul 2021 21:31  Mg     2.20     07-01    TPro  6.5  /  Alb  3.9  /  TBili  0.2  /  DBili  x   /  AST  23  /  ALT  14  /  AlkPhos  187<H>  07-01

## 2021-07-01 NOTE — CONSULT NOTE ADULT - SUBJECTIVE AND OBJECTIVE BOX
SICU Consultation Note  =====================================================  HPI: Patient is a 60 y/o male s/p tracheal resection 4/23/21 due to stenosis following emergent cric on 3/25/21 due to tongue ligual hematoma 2/2 loss of airway during siezure now presenting to [facility] ER w c/o stridor and SOB. Patient refers symptoms worse at night interfering with sleep and denies associated cough or fever. Patient with multiple previous admissions for SOB and post tracheal resection respiratory symptoms.  PMHx COVID+ (3/2021 - not hospitalized), PE (8/2020 - on eliquis at home), CKD. Patient underwent Tracheal Resection and Reconstruction on 4/23.       Surgery Information    S/P Emergent tracheostomy placement via cricothyrotomy following code airway in ED  Case Duration: 	EBL: 25	    PROCEDURE  ENT consulted in ED for surgical airway management. per anesthesia unable to intubate. Emergent tracheostomy performed, size 5.5 tracheostomy tube w cuff. Secured w silk sutures and neck tie    P/E  tracheostomy tube in place size 5.5, cuff up secored w silk sutures and neck tie  oozing from stoma, minimal  saturating 100%       size 5.5 trach tube  - Patient had a tracheostomy, size 5.5 (cuffed). Please perform standard tracheostomy care procedures.  - Regular suctioning with soft suction catheter q1  - Humidified air to tracheostomy tube  - CTICU and CTS consult w [facility] team  - per discussion w CTS, to take to OR tomorow for bronch and trach revision.  - Call or page us if any issues        (01 Jul 2021 22:19)    Allergies:   PAST MEDICAL & SURGICAL HISTORY:  HTN (hypertension)    Chronic kidney disease, unspecified CKD stage    Pulmonary embolus  diagnosed about 6 months ago incidentally found on imaging related to falling off a bike and chest pain    2019 novel coronavirus disease (COVID-19)  never hospitalized    Arthritis    Tracheal stenosis    History of total right knee replacement (TKR)  bilateral    History of tracheal stenosis      FAMILY HISTORY:  Unable to obtain due to emergent intubation      SOCIAL HISTORY:  Unable to obtain due to emergent intubation    ADVANCE DIRECTIVES: Presumed Full Code    HOME MEDICATIONS:  Pending med rec    CURRENT MEDICATIONS:   --------------------------------------------------------------------------------------  Neurologic Medications  cisatracurium Infusion 2 MICROgram(s)/kG/Min IV Continuous <Continuous>  fentaNYL    Injectable 50 MICROGram(s) IV Push once  HYDROmorphone  Injectable 0.5 milliGRAM(s) IV Push every 3 hours PRN Severe Pain (7 - 10)  LORazepam   Injectable 1 milliGRAM(s) IV Push once PRN Seizure    Respiratory Medications    Cardiovascular Medications    Gastrointestinal Medications  pantoprazole  Injectable 40 milliGRAM(s) IV Push daily    Genitourinary Medications    Hematologic/Oncologic Medications  heparin   Injectable 5000 Unit(s) SubCutaneous every 12 hours    Antimicrobial/Immunologic Medications    Endocrine/Metabolic Medications    Topical/Other Medications    --------------------------------------------------------------------------------------    VITAL SIGNS, INS/OUTS (last 24 hours):  --------------------------------------------------------------------------------------  T(C): 35.7 (07-01-21 @ 23:33), Max: 35.7 (07-01-21 @ 23:33)  HR: 86 (07-01-21 @ 23:46) (35 - 161)  BP: 120/67 (07-01-21 @ 23:33) (0/0 - 206/127)  BP(mean): 78 (07-01-21 @ 23:33) (0 - 145)  ABP: --  ABP(mean): --  RR: 24 (07-01-21 @ 23:46) (0 - 24)  SpO2: 100% (07-01-21 @ 23:46) (77% - 100%)  Wt(kg): --  CVP(mm Hg): --  CI: --  CAPILLARY BLOOD GLUCOSE      POCT Blood Glucose.: 313 mg/dL (01 Jul 2021 21:19)   N/A      --------------------------------------------------------------------------------------    EXAM  NEUROLOGY  RASS: -2    Exam: Sedated    HEENT  Exam: Normocephalic, atraumatic.     RESPIRATORY  Exam: Symmetric chest expansion, intubated via tracheostomy  Mechanical Ventilation: Mode: AC/ CMV (Assist Control/ Continuous Mandatory Ventilation), RR (machine): 24, TV (machine): 500, FiO2: 100, PEEP: 7, ITime: 0.85, MAP: 9, PIP: 42    CARDIOVASCULAR  Exam: S1, S2.  Regular rate and rhythm.    GI/NUTRITION  Exam: Abdomen soft, Non-tender, mildly distended,   Current Diet:  NPO  NGT to LCWS    VASCULAR  Exam: Extremities warm, pink, well-perfused.     MUSCULOSKELETAL  Exam: All extremities moving spontaneously     SKIN:  Exam: Good skin turgor, no skin breakdown.  ***    METABOLIC/FLUIDS/ELECTROLYTES      HEMATOLOGIC  [x] DVT Prophylaxis: heparin   Injectable 5000 Unit(s) SubCutaneous every 12 hours    Transfusions:	[] PRBC	[] Platelets		[] FFP	[] Cryoprecipitate    INFECTIOUS DISEASE  Antimicrobials/Immunologic Medications:    Day #  	of    ***    Tubes/Lines/Drains  ***  [x] Peripheral IV  [] Central Venous Line     	[] R	[] L	[] IJ	[] Fem	[] SC	Date Placed:   [] Arterial Line		[] R	[] L	[] Fem	[] Rad	[] Ax	Date Placed:   [] PICC:         	[] Midline		[] Mediport  [] Urinary Catheter		Date Placed:     LABS  --------------------------------------------------------------------------------------                                            8.2                   Neurophils% (auto):   39.5   (07-01 @ 21:31):    20.46)-----------(299          Lymphocytes% (auto):  47.4                                          27.6                   Eosinphils% (auto):   2.8      Manual%: Neutrophils x    ; Lymphocytes x    ; Eosinophils x    ; Bands%: x    ; Blasts x          07-01    142  |  104  |  44<H>  ----------------------------<  264<H>  4.1   |  17<L>  |  4.31<H>    Ca    9.0      01 Jul 2021 21:31  Mg     2.20     07-01    TPro  6.5  /  Alb  3.9  /  TBili  0.2  /  DBili  x   /  AST  23  /  ALT  14  /  AlkPhos  187<H>  07-01      ABG - ( 01 Jul 2021 22:05 )  pH: 7.28  /  pCO2: 35    /  pO2: 279   / HCO3: 17    / Base Excess: -9.2  /  SaO2: 99.2  / Lactate: x        VBG - ( 01 Jul 2021 22:05 )  pH: 7.18  /  pCO2: 51    /  pO2: 52    / HCO3: 17    / Base Excess: -8.7  /  SvO2: 79.4  / Lactate: 3.8      RECENT CULTURES:      ABG - ( 01 Jul 2021 22:05 )  pH, Arterial: 7.28  pH, Blood: x     /  pCO2: 35    /  pO2: 279   / HCO3: 17    / Base Excess: -9.2  /  SaO2: 99.2                --------------------------------------------------------------------------------------    OTHER LABS    IMAGING RESULTS  Echo:   CT:   Xray:     ASSESSMENT:  61y Male ***    PLAN:  ***  Neurologic:   Respiratory:   Cardiovascular:   Gastrointestinal/Nutrition:   Renal/Genitourinary:   Hematologic:   Infectious Disease:   Tubes/Lines/Drains:   Endocrine:   Disposition:     --------------------------------------------------------------------------------------    Critical Care Diagnoses:

## 2021-07-01 NOTE — CONSULT NOTE ADULT - ASSESSMENT
ASSESSMENT:   s/p emergent trach with h/o tracheal resection/ reconstruction    PLAN:  Plan for OR 7/2 for FB, rigid bronch, Revision of trach with Dr Allen.   Admit to SICU under ENT.

## 2021-07-01 NOTE — ED PROVIDER NOTE - OBJECTIVE STATEMENT
62 yo M with h/o HTN, CRI, Seizure D/O, + Covid19 (1/21), PE 2020, h/o Emergency cricothyroidotomy, tracheal stenosis, s/p tracheal stent vs. reconstruction presents to ED seizing. Per EMS, 911 activated for resp distress, in past has had episodes of stridor that respond to steroids? 62 yo M with h/o HTN, CRI, Seizure D/O, + Covid19 (1/21), PE 2020, h/o Emergency cricothyroidotomy, tracheal stenosis, s/p tracheal stent vs. reconstruction, in past has had episodes of recurrent stridor with vocal cord edema presents with resp distress seizing. Per EMS, 911 activated for resp distress, on arrival to home he was stridorous but awake and speaking, en route to ED had more resp distress and seized on arrival to ED. Pt brought to [facility]uma[facility], given ativan 2mg x2, tongue protruding from clamped jaw while seizing and eventually broke but unable hypoxic and bradying down- GIven complicated airway history, code airway was called  and then lost pulses with initiation of CPR. anesthesia unable to pass endoctracheal tube and surgical cric performed by SICU attending Dr. Tabares. Given epi, bicarb, steroids. Pt regained pulses with normal vitals but became progressively more hypotensive and placed on levophed drip.

## 2021-07-02 ENCOUNTER — APPOINTMENT (OUTPATIENT)
Dept: THORACIC SURGERY | Facility: HOSPITAL | Age: 61
End: 2021-07-02
Payer: MEDICAID

## 2021-07-02 DIAGNOSIS — J39.8 OTHER SPECIFIED DISEASES OF UPPER RESPIRATORY TRACT: ICD-10-CM

## 2021-07-02 LAB
ANION GAP SERPL CALC-SCNC: 15 MMOL/L — HIGH (ref 7–14)
ANION GAP SERPL CALC-SCNC: 19 MMOL/L — HIGH (ref 7–14)
APTT BLD: 48.7 SEC — HIGH (ref 27–36.3)
BLD GP AB SCN SERPL QL: NEGATIVE — SIGNIFICANT CHANGE UP
BLOOD GAS ARTERIAL COMPREHENSIVE RESULT: SIGNIFICANT CHANGE UP
BUN SERPL-MCNC: 47 MG/DL — HIGH (ref 7–23)
BUN SERPL-MCNC: 48 MG/DL — HIGH (ref 7–23)
CALCIUM SERPL-MCNC: 8.8 MG/DL — SIGNIFICANT CHANGE UP (ref 8.4–10.5)
CALCIUM SERPL-MCNC: 8.8 MG/DL — SIGNIFICANT CHANGE UP (ref 8.4–10.5)
CHLORIDE SERPL-SCNC: 104 MMOL/L — SIGNIFICANT CHANGE UP (ref 98–107)
CHLORIDE SERPL-SCNC: 108 MMOL/L — HIGH (ref 98–107)
CO2 SERPL-SCNC: 16 MMOL/L — LOW (ref 22–31)
CO2 SERPL-SCNC: 19 MMOL/L — LOW (ref 22–31)
CREAT SERPL-MCNC: 3.68 MG/DL — HIGH (ref 0.5–1.3)
CREAT SERPL-MCNC: 4.14 MG/DL — HIGH (ref 0.5–1.3)
GLUCOSE SERPL-MCNC: 204 MG/DL — HIGH (ref 70–99)
GLUCOSE SERPL-MCNC: 233 MG/DL — HIGH (ref 70–99)
INR BLD: 1.42 RATIO — HIGH (ref 0.88–1.16)
MAGNESIUM SERPL-MCNC: 1.8 MG/DL — SIGNIFICANT CHANGE UP (ref 1.6–2.6)
MAGNESIUM SERPL-MCNC: 1.9 MG/DL — SIGNIFICANT CHANGE UP (ref 1.6–2.6)
PHOSPHATE SERPL-MCNC: 1.4 MG/DL — LOW (ref 2.5–4.5)
PHOSPHATE SERPL-MCNC: 2.8 MG/DL — SIGNIFICANT CHANGE UP (ref 2.5–4.5)
POTASSIUM SERPL-MCNC: 3.3 MMOL/L — LOW (ref 3.5–5.3)
POTASSIUM SERPL-MCNC: 3.8 MMOL/L — SIGNIFICANT CHANGE UP (ref 3.5–5.3)
POTASSIUM SERPL-SCNC: 3.3 MMOL/L — LOW (ref 3.5–5.3)
POTASSIUM SERPL-SCNC: 3.8 MMOL/L — SIGNIFICANT CHANGE UP (ref 3.5–5.3)
PROTHROM AB SERPL-ACNC: 16.1 SEC — HIGH (ref 10.6–13.6)
RH IG SCN BLD-IMP: POSITIVE — SIGNIFICANT CHANGE UP
SARS-COV-2 RNA SPEC QL NAA+PROBE: SIGNIFICANT CHANGE UP
SODIUM SERPL-SCNC: 139 MMOL/L — SIGNIFICANT CHANGE UP (ref 135–145)
SODIUM SERPL-SCNC: 142 MMOL/L — SIGNIFICANT CHANGE UP (ref 135–145)

## 2021-07-02 PROCEDURE — 31635 BRONCHOSCOPY W/FB REMOVAL: CPT | Mod: 58

## 2021-07-02 PROCEDURE — 71045 X-RAY EXAM CHEST 1 VIEW: CPT | Mod: 26

## 2021-07-02 PROCEDURE — 36620 INSERTION CATHETER ARTERY: CPT

## 2021-07-02 PROCEDURE — 99291 CRITICAL CARE FIRST HOUR: CPT | Mod: 25

## 2021-07-02 RX ORDER — FENTANYL CITRATE 50 UG/ML
0.5 INJECTION INTRAVENOUS
Qty: 2500 | Refills: 0 | Status: DISCONTINUED | OUTPATIENT
Start: 2021-07-02 | End: 2021-07-03

## 2021-07-02 RX ORDER — LEVETIRACETAM 250 MG/1
500 TABLET, FILM COATED ORAL EVERY 12 HOURS
Refills: 0 | Status: DISCONTINUED | OUTPATIENT
Start: 2021-07-02 | End: 2021-07-02

## 2021-07-02 RX ORDER — PROPOFOL 10 MG/ML
10 INJECTION, EMULSION INTRAVENOUS
Qty: 500 | Refills: 0 | Status: DISCONTINUED | OUTPATIENT
Start: 2021-07-02 | End: 2021-07-02

## 2021-07-02 RX ORDER — PROPOFOL 10 MG/ML
10 INJECTION, EMULSION INTRAVENOUS
Qty: 1000 | Refills: 0 | Status: DISCONTINUED | OUTPATIENT
Start: 2021-07-02 | End: 2021-07-03

## 2021-07-02 RX ORDER — SODIUM CHLORIDE 9 MG/ML
1000 INJECTION, SOLUTION INTRAVENOUS
Refills: 0 | Status: DISCONTINUED | OUTPATIENT
Start: 2021-07-02 | End: 2021-07-03

## 2021-07-02 RX ORDER — MIDAZOLAM HYDROCHLORIDE 1 MG/ML
0.03 INJECTION, SOLUTION INTRAMUSCULAR; INTRAVENOUS
Qty: 100 | Refills: 0 | Status: DISCONTINUED | OUTPATIENT
Start: 2021-07-02 | End: 2021-07-03

## 2021-07-02 RX ORDER — LEVETIRACETAM 250 MG/1
750 TABLET, FILM COATED ORAL EVERY 12 HOURS
Refills: 0 | Status: DISCONTINUED | OUTPATIENT
Start: 2021-07-02 | End: 2021-07-03

## 2021-07-02 RX ORDER — FENTANYL CITRATE 50 UG/ML
0.5 INJECTION INTRAVENOUS
Qty: 2500 | Refills: 0 | Status: DISCONTINUED | OUTPATIENT
Start: 2021-07-02 | End: 2021-07-02

## 2021-07-02 RX ORDER — SODIUM CHLORIDE 9 MG/ML
10 INJECTION INTRAMUSCULAR; INTRAVENOUS; SUBCUTANEOUS
Refills: 0 | Status: DISCONTINUED | OUTPATIENT
Start: 2021-07-02 | End: 2021-07-29

## 2021-07-02 RX ORDER — CHLORHEXIDINE GLUCONATE 213 G/1000ML
15 SOLUTION TOPICAL EVERY 12 HOURS
Refills: 0 | Status: DISCONTINUED | OUTPATIENT
Start: 2021-07-02 | End: 2021-07-29

## 2021-07-02 RX ORDER — HEPARIN SODIUM 5000 [USP'U]/ML
5000 INJECTION INTRAVENOUS; SUBCUTANEOUS EVERY 8 HOURS
Refills: 0 | Status: DISCONTINUED | OUTPATIENT
Start: 2021-07-02 | End: 2021-07-29

## 2021-07-02 RX ORDER — CHLORHEXIDINE GLUCONATE 213 G/1000ML
1 SOLUTION TOPICAL
Refills: 0 | Status: DISCONTINUED | OUTPATIENT
Start: 2021-07-02 | End: 2021-07-29

## 2021-07-02 RX ADMIN — FENTANYL CITRATE 3.6 MICROGRAM(S)/KG/HR: 50 INJECTION INTRAVENOUS at 00:57

## 2021-07-02 RX ADMIN — FENTANYL CITRATE 3.6 MICROGRAM(S)/KG/HR: 50 INJECTION INTRAVENOUS at 08:52

## 2021-07-02 RX ADMIN — PROPOFOL 4.32 MICROGRAM(S)/KG/MIN: 10 INJECTION, EMULSION INTRAVENOUS at 22:20

## 2021-07-02 RX ADMIN — CISATRACURIUM BESYLATE 8.64 MICROGRAM(S)/KG/MIN: 2 INJECTION INTRAVENOUS at 01:42

## 2021-07-02 RX ADMIN — SODIUM CHLORIDE 75 MILLILITER(S): 9 INJECTION, SOLUTION INTRAVENOUS at 06:26

## 2021-07-02 RX ADMIN — MIDAZOLAM HYDROCHLORIDE 2.16 MG/KG/HR: 1 INJECTION, SOLUTION INTRAMUSCULAR; INTRAVENOUS at 16:38

## 2021-07-02 RX ADMIN — PROPOFOL 4.32 MICROGRAM(S)/KG/MIN: 10 INJECTION, EMULSION INTRAVENOUS at 08:53

## 2021-07-02 RX ADMIN — HEPARIN SODIUM 5000 UNIT(S): 5000 INJECTION INTRAVENOUS; SUBCUTANEOUS at 22:21

## 2021-07-02 RX ADMIN — SODIUM CHLORIDE 75 MILLILITER(S): 9 INJECTION, SOLUTION INTRAVENOUS at 08:52

## 2021-07-02 RX ADMIN — CHLORHEXIDINE GLUCONATE 1 APPLICATION(S): 213 SOLUTION TOPICAL at 09:13

## 2021-07-02 RX ADMIN — FENTANYL CITRATE 3.6 MICROGRAM(S)/KG/HR: 50 INJECTION INTRAVENOUS at 06:25

## 2021-07-02 RX ADMIN — FENTANYL CITRATE 3.6 MICROGRAM(S)/KG/HR: 50 INJECTION INTRAVENOUS at 22:20

## 2021-07-02 RX ADMIN — CHLORHEXIDINE GLUCONATE 15 MILLILITER(S): 213 SOLUTION TOPICAL at 17:25

## 2021-07-02 RX ADMIN — LEVETIRACETAM 400 MILLIGRAM(S): 250 TABLET, FILM COATED ORAL at 06:14

## 2021-07-02 RX ADMIN — Medication 2 MILLIGRAM(S): at 16:37

## 2021-07-02 RX ADMIN — LEVETIRACETAM 400 MILLIGRAM(S): 250 TABLET, FILM COATED ORAL at 17:25

## 2021-07-02 RX ADMIN — SODIUM CHLORIDE 75 MILLILITER(S): 9 INJECTION, SOLUTION INTRAVENOUS at 22:20

## 2021-07-02 RX ADMIN — PROPOFOL 4.32 MICROGRAM(S)/KG/MIN: 10 INJECTION, EMULSION INTRAVENOUS at 06:25

## 2021-07-02 RX ADMIN — CISATRACURIUM BESYLATE 8.64 MICROGRAM(S)/KG/MIN: 2 INJECTION INTRAVENOUS at 09:11

## 2021-07-02 RX ADMIN — HEPARIN SODIUM 5000 UNIT(S): 5000 INJECTION INTRAVENOUS; SUBCUTANEOUS at 06:14

## 2021-07-02 RX ADMIN — PANTOPRAZOLE SODIUM 40 MILLIGRAM(S): 20 TABLET, DELAYED RELEASE ORAL at 11:57

## 2021-07-02 NOTE — CONSULT NOTE ADULT - SUBJECTIVE AND OBJECTIVE BOX
MRN-[id number]  Patient is a 61y old  Male who presents with a chief complaint of Respiratory distress (01 Jul 2021 23:45)    HPI:  Patient is a 62 yo male  with pmh of s/p tracheal resection 4/23/21, hx of PE on eliquis, hx of Covid, hx of New onset seizures in 3/2021 on Keppra 750mg BID presents  to [facility] with stridor and SOB. Per chart review, patient has had difficulty breathing which  is worse at night and prevents him from being able to sleep. Of note, this is the patient's 3rd readmission after his tracheal resection back in 4/2021. Patient  had emergent tracheostomy in the ED with ENT for airway management. PAtient went to the OR today for revision of tracheostomy.   Neurology was consulted for concern for seizures. Per RN, patient was noted to have hiccuping episodes at bedside. Patient was noted to have new onset of seizures in 3/2021 during his time of Covid + diagnosis. Patient at that time had 2 GTCs events and was started on Keppra 750mg BID. Patient had EEG at that which was negative for seizures. Patient was planned for MRI head, but was suppose to get outpatient. Patient is currently on versed, propofol, and fentanyl gtt. Patient was on nimbex till 1500pm. ROS was unable to obtained.                      PAST MEDICAL & SURGICAL HISTORY:  HTN (hypertension)    Chronic kidney disease, unspecified CKD stage    Pulmonary embolus  diagnosed about 6 months ago incidentally found on imaging related to falling off a bike and chest pain    2019 novel coronavirus disease (COVID-19)  never hospitilized    Arthritis    Tracheal stenosis    History of total right knee replacement (TKR)  bilateral    History of tracheal stenosis      FAMILY HISTORY:    Social Hx:  Nonsmoker, no drug or alcohol use    Home Medications:  acetaminophen 325 mg oral tablet: 3 tab(s) orally every 6 hours, As needed, Mild Pain (1 - 3) (05 Jun 2021 10:24)  labetalol 100 mg oral tablet: 1 tab(s) orally 3 times a day (17 May 2021 14:03)  Norvasc 10 mg oral tablet: 1 tab(s) orally once a day (17 May 2021 14:03)  polyethylene glycol 3350 oral powder for reconstitution: 17 gram(s) orally once a day, As Needed (17 May 2021 14:03)  Robitussin 100 mg/5 mL oral liquid: 10 milliliter(s) orally every 4 hours, As Needed for cough (05 Jun 2021 11:19)    MEDICATIONS  (STANDING):  chlorhexidine 0.12% Liquid 15 milliLiter(s) Oral Mucosa every 12 hours  chlorhexidine 4% Liquid 1 Application(s) Topical <User Schedule>  dextrose 5% + lactated ringers. 1000 milliLiter(s) (75 mL/Hr) IV Continuous <Continuous>  fentaNYL   Infusion. 0.5 MICROgram(s)/kG/Hr (3.6 mL/Hr) IV Continuous <Continuous>  heparin   Injectable 5000 Unit(s) SubCutaneous every 8 hours  levETIRAcetam  IVPB 500 milliGRAM(s) IV Intermittent every 12 hours  midazolam Infusion 0.03 mG/kG/Hr (2.16 mL/Hr) IV Continuous <Continuous>  pantoprazole  Injectable 40 milliGRAM(s) IV Push daily  propofol Infusion 10 MICROgram(s)/kG/Min (4.32 mL/Hr) IV Continuous <Continuous>    MEDICATIONS  (PRN):  HYDROmorphone  Injectable 0.5 milliGRAM(s) IV Push every 3 hours PRN Severe Pain (7 - 10)  sodium chloride 0.9% lock flush 10 milliLiter(s) IV Push every 1 hour PRN Pre/post blood products, medications, blood draw, and to maintain line patency    Allergies  No Known Allergies    Intolerances      REVIEW OF SYSTEMS: unable to obtain.       Vital Signs Last 24 Hrs  T(C): 36.1 (02 Jul 2021 16:00), Max: 36.1 (02 Jul 2021 14:30)  T(F): 97 (02 Jul 2021 16:00), Max: 97 (02 Jul 2021 14:30)  HR: 70 (02 Jul 2021 18:00) (35 - 161)  BP: 76/50 (02 Jul 2021 17:00) (0/0 - 206/127)  BP(mean): 56 (02 Jul 2021 17:00) (0 - 145)  RR: 15 (02 Jul 2021 18:00) (0 - 24)  SpO2: 100% (02 Jul 2021 18:00) (77% - 100%)    GENERAL EXAM:  Constitutional: sedated   HEENT: Pupils pinpoint b/l. min reactivity      NEUROLOGICAL EXAM:  MS: Patient is sedated. Neuro exam is limited due to sedation.   CN: Pupils pinpoint minimally reactive. No OCR reflex noted.   V1-3 intact, no facial asymmetry, t/p midline,   Motor: Strength: 5/5 4x.   Tone: normal. Bulk: normal.   DTR +1 b/l in biceps/triceps/brachoradialis. Patellar mute b/l.   Ankle clonus 5 beats noted b/l.   Sensation: no w/d to noxious stimuli noted. .   babinski mute b/l.     Labs:   cbc                      8.2    20.46 )-----------( 299      ( 01 Jul 2021 21:31 )             27.6     [id number]    142  |  108<H>  |  47<H>  ----------------------------<  233<H>  3.8   |  19<L>  |  3.68<H>    Ca    8.8      02 Jul 2021 11:21  Phos  2.8     07-02  Mg     1.80     07-02    TPro  6.5  /  Alb  3.9  /  TBili  0.2  /  DBili  x   /  AST  23  /  ALT  14  /  AlkPhos  187<H>  07-01    CoagsPT/INR - ( 02 Jul 2021 02:20 )   PT: 16.1 sec;   INR: 1.42 ratio         PTT - ( 02 Jul 2021 02:20 )  PTT:48.7 sec      LFTsLIVER FUNCTIONS - ( 01 Jul 2021 21:31 )  Alb: 3.9 g/dL / Pro: 6.5 g/dL / ALK PHOS: 187 U/L / ALT: 14 U/L / AST: 23 U/L / GGT: x             Radiology:  CT head w/o contrast:   Interval emergent right cricothyroidotomy with tracheostomy tube in situ with subcutaneous emphysema, and pneumomediastinum with opacification bibasilar consolidation in the partially seen lungs please see reports of the chest. Visualized tracheal stent extending 5 cm below T1, NG tube in situ extends below the edge of study. Unchanged  left laryngoplasty with high density material left paraglottic space. Interval loss of tissue planes at tracheostomy margins with fluid attenuation, likely hematoma, seroma, superimposed infectious, inflammatory change not excluded.    Redemonstration decreased attenuation, abnormal tissue with decreased attenuation as noted on CT 5/27/2021 involving thyroid isthmus, poorly delineated tracheal cartilage margins, soft tissue irregularity extends 4 cm below the thyroid isthmus with poorly delineated trachea, interval new superimposed fluid attenuation, hematoma, seroma, supervised infectious inflammatory change not excluded.    Intracranially, redemonstration volume loss, microvascular disease, no new hemorrhage or midline shift, prominent bilateral superior ophthalmic veins. If symptoms persist consider follow-up head CT or MRI if not contraindications.

## 2021-07-02 NOTE — PROGRESS NOTE ADULT - ASSESSMENT
ASSESSMENT:  62 y/o male s/p tracheal resection 4/23/21 presents to [facility] ER w c/o stridor and SOB. Difficulty breathing is worse at night and prevents him from being able to sleep.  He denies productive cough or fever.  This is his 3rd readmission after his tracheal resection  PT has PMHx COVID+ (3/2021 - not hospitalized), PE (8/2020 - on eliquis at home), CKD, w/ recent hospitalization at [facility] after seizure w/ lingual hematoma requiring emergency tracheostomy on 3/25/21.  Patient underwent Tracheal Resection and Reconstruction on 4/23. ENT injected vocal cords few months ago. He was last discharged 5/18/21. Now s/p emergent cric in ED for loss of airway.       PLAN:    Neurologic:   - Sedated with propofol  - Epilepsy controlled with propofol  - Ativan PRN for seizure    Respiratory:   - per discussion w CTS, to take to OR today for bronch and trach revision.  - Mechanical ventilation AC/CMV  - On precautions  - Monitor CXR as patient high likelihood of aspiration event    Cardiovascular:   - Hemodynamic monitoring  - Follow up lactate trend    Gastrointestinal/Nutrition:   -NPO with IVF  - NGT to LCWS    Renal/Genitourinary:  -  Higginbotham in place    Hematologic:   - Sub Q Heparin DVT ppx    Infectious Disease:   - Monitor for leukocytosis with low threshold for starting empiric antibiotics for likely aspiration event    Tubes/Lines/Drains:   - [place]am  - Right radial A line  - NGT    Disposition:   SICU care  --------------------------------------------------------------------------------------    Critical Care Diagnoses:     ASSESSMENT:  62 y/o male s/p tracheal resection 4/23/21 presents to [facility] ER w c/o stridor and SOB. Difficulty breathing is worse at night and prevents him from being able to sleep.  He denies productive cough or fever.  This is his 3rd readmission after his tracheal resection  PT has PMHx COVID+ (3/2021 - not hospitalized), PE (8/2020 - on eliquis at home), CKD, w/ recent hospitalization at [facility] after seizure w/ lingual hematoma requiring emergency tracheostomy on 3/25/21.  Patient underwent Tracheal Resection and Reconstruction on 4/23. ENT injected vocal cords few months ago. He was last discharged 5/18/21. Now s/p emergent cric in ED for loss of airway.       PLAN:    Neurologic:  - Sedated with fentanyl, propofol  - Hx of epilepsy on keppra  - Seizure episode @ 16:00 7/2, s/p ativan 2x, currently on gtt vercet.   - F/u 24hr EEG, CT Head, Neuro consulted         Respiratory:   - s/p bronch and trach revision 7/2  - Mechanical ventilation AC/CMV  - On precautions  - Monitor CXR as patient high likelihood of aspiration event    Cardiovascular:   - Hemodynamic monitoring  - lactate cleared    Gastrointestinal/Nutrition:   -NPO with IVF 75 D5 LR  - NGT to LCWS    Renal/Genitourinary:  -  Higginbotham in place    Hematologic:   - Sub Q Heparin DVT ppx    Infectious Disease:   - Monitor for leukocytosis with low threshold for starting empiric antibiotics for likely aspiration event    Tubes/Lines/Drains:   - [place]am  - Right radial A line  - NGT    Disposition:   SICU care  --------------------------------------------------------------------------------------    Critical Care Diagnoses:

## 2021-07-02 NOTE — PATIENT PROFILE ADULT - NSPROSPHOSPCHAPLAINYN_GEN_A_NUR
Impression: Chalazion left lower eyelid: H00.15. Plan: Recommend hot compresses with lid massage.  Refer to Dr. Mahnaz Shell for eval/tx no

## 2021-07-02 NOTE — CONSULT NOTE ADULT - ASSESSMENT
Patient is a 62 yo male  with pmh of s/p tracheal resection 4/23/21, hx of PE on eliquis, hx of Covid, hx of New onset seizures in 3/2021 on Keppra 750mg BID presents  to [facility] with stridor and SOB. PAtient went to the OR today for revision of tracheostomy. Neurology was consulted for concern for seizures. Per RN, patient was noted to have hiccuping episodes at bedside which is unlike his known seizure episodes of generalized tonic clonic movements noted back in March. Patient will need to be placed back on his home does of Keppra 750mg BID and will need to have vEEG and repeat head imaging.       Impression  Questionable seizure activity, unlike patient's known seizure semiology of GTCs in the setting of hypoxia for emergent trach   Recommendations  vEEG   Place patient on home Keppra dose of 750mg BID   Keppra level   repeat CT head w/o contrast   If patient is stable or when patient is stable MRI brain w/wo contrast   correction of electrolyte abnormalities   Weaning of sedation per primary team     Case to be discussed with Neurology Attending, Dr. Linn.    Patient is a 62 yo male  with pmh of s/p tracheal resection 4/23/21, hx of PE on eliquis, hx of Covid, hx of New onset seizures in 3/2021 on Keppra 750mg BID presents  to [facility] with stridor and SOB. PAtient went to the OR today for revision of tracheostomy. Neurology was consulted for concern for seizures. Per RN, patient was noted to have hiccuping episodes at bedside which is unlike his known seizure episodes of generalized tonic clonic movements noted back in March. Patient will need to be placed back on his home does of Keppra 750mg BID and will need to have vEEG and repeat head imaging.       Impression  Questionable seizure activity, unlike patient's known seizure semiology of GTCs in the setting of hypoxia for emergent trach   Recommendations  vEEG   Place patient on home Keppra dose of 750mg BID   Keppra level   CK and prolactin level   repeat CT head w/o contrast   If patient is stable or when patient is stable MRI brain w/wo contrast   correction of electrolyte abnormalities   Weaning of sedation per primary team   Patient is to follows with Epilepsy team at [address] [place] [address] [address] [place] [address]. [id number]. [phone] upon discharge.     Case to be discussed with Neurology Attending, Dr. Linn.

## 2021-07-02 NOTE — CONSULT NOTE ADULT - ATTENDING COMMENTS
Date of service: 7/3/2021    Patient seen and examined at bedside.     In brief, 60 yo man w. hx tracheal resection 4/23/2021, hx of PE on eliquis, hx of COVID-19 3/2021 and new onset seizure disorder (GTCS x 2) in 3/2021 (on keppra 750 mg BID, rEEG neg for seizures, MRI brain not obtained), p/w stridor and SOB and underwent emergent tracheostomy in ED by ENT and revision 7/2. Neurology consulted 7/2 for concern of seizures.     Patient on propofol @20, versed and fentanyl. Tracheostomy tube in place. Patient unresponsive. Does not open eyes or withdraw to pain. Pupils pinpoint but reactive to light. No spontaneous movements of extremities. Rhythmic jerking movements of upper chest and neck noted- concerning for possible seizure activity.     CT HEAD 7/3 AM with no acute intracranial pathology.     Discussed plan with ICU team. Given clinical suspicion for possible seizure activity. Will load with 20mg/kg of keppra now. Resume home dose keppra (750 mg BID). Plan for VEEG and will follow up on read. If VEEG suggestive of on going seizure activity, would consider increasing maintenance dose of keppra to 1000 mg BID and also titrating versed up for better seizure control.     Once stable, would also recommend MRI brain w/w/o contrast for seizure work up (was planned as outpatient but was not completed)    Neurology will follow.
I have personally examined this patient, reviewed pertinent labs and imaging, and discussed the case with colleagues (ENT), residents, physician assistants, and nurses on SICU rounds.    60  minutes in total were spent in providing direct critical care for the diagnoses, assessment and plan outlined below.  These diagnoses are unrelated to the surgical procedure.  Additionally, time spent in the performance of separately billable procedures was not counted toward the critical care time.  There is no overlap.  Time included review of vitals, labs, imaging, discussion with consultants and coordination with the operating room/emergency department.    The active critical care issues are:  1. airway occlusion resulting in hypoxia associated with seizure/possible aspiration pneumonitis/bradycardic cardiac arrest now s/p surgical airway/ativan/ACLS revival  2. tenuous airway with very small tube  3. h/o PE on t-A-C    Head CT prelim negative for gross lesions; cannot assess neuro exam due to need for chemical paralysis for vent synchrony, continue nimbex until after trach revision by thoracic service.  Then will hold paralysis/sedation for thorough neuro exam.  Propofol for sedation and seizure prophylaxis.  Start home anti-epileptic regimen through NGT.  Hold t-A-C and continue VTE p-heparin given bleeding from neck incision and planned OR.  GI ulcer prophylaxis with PPI.  Norepi for hemodynamic support while recovering from cardiac arrest vasoplegia.  Adjust vent rate for respiratory alkalosis.  Monitor for pneumonia but hopefully tracheal stenosis prevented large volume aspiration despite large volume emesis.

## 2021-07-02 NOTE — PROGRESS NOTE ADULT - SUBJECTIVE AND OBJECTIVE BOX
SICU Daily Progress Note  =====================================================    HPI: Patient is a 62 y/o male s/p tracheal resection 4/23/21 due to stenosis following emergent cric on 3/25/21 due to tongue ligual hematoma 2/2 loss of airway during siezure now presenting to [facility] ER w c/o stridor and SOB. Patient refers symptoms worse at night interfering with sleep and denies associated cough or fever. Patient with multiple previous admissions for SOB and post tracheal resection respiratory symptoms.  PMHx COVID+ (3/2021 - not hospitalized), PE (8/2020 - on eliquis at home), CKD. Patient underwent Tracheal Resection and Reconstruction on 4/23.     Interval Events  S/P Emergent tracheostomy placement via cricothyrotomy following code airway in ED  - arterial line placed   - CTICU and CTS consult w Zeltsman team        Allergies:   PAST MEDICAL & SURGICAL HISTORY:  HTN (hypertension)    Chronic kidney disease, unspecified CKD stage    Pulmonary embolus  diagnosed about 6 months ago incidentally found on imaging related to falling off a bike and chest pain    2019 novel coronavirus disease (COVID-19)  never hospitalized    Arthritis    Tracheal stenosis    History of total right knee replacement (TKR)  bilateral    History of tracheal stenosis      FAMILY HISTORY:  Unable to obtain due to emergent intubation      SOCIAL HISTORY:  Unable to obtain due to emergent intubation    ADVANCE DIRECTIVES: Presumed Full Code    HOME MEDICATIONS:  Pending med rec    MEDICATIONS:   --------------------------------------------------------------------------------------  Neurologic Medications  cisatracurium Infusion 2 MICROgram(s)/kG/Min IV Continuous <Continuous>  fentaNYL   Infusion. 0.5 MICROgram(s)/kG/Hr IV Continuous <Continuous>  HYDROmorphone  Injectable 0.5 milliGRAM(s) IV Push every 3 hours PRN Severe Pain (7 - 10)  levETIRAcetam  IVPB 500 milliGRAM(s) IV Intermittent every 12 hours  LORazepam   Injectable 1 milliGRAM(s) IV Push once PRN Seizure  propofol Infusion 10 MICROgram(s)/kG/Min IV Continuous <Continuous>    Respiratory Medications    Cardiovascular Medications    Gastrointestinal Medications  dextrose 5% + lactated ringers. 1000 milliLiter(s) IV Continuous <Continuous>  pantoprazole  Injectable 40 milliGRAM(s) IV Push daily  sodium chloride 0.9% lock flush 10 milliLiter(s) IV Push every 1 hour PRN Pre/post blood products, medications, blood draw, and to maintain line patency    Genitourinary Medications    Hematologic/Oncologic Medications  heparin   Injectable 5000 Unit(s) SubCutaneous every 12 hours    Antimicrobial/Immunologic Medications    Endocrine/Metabolic Medications    Topical/Other Medications  chlorhexidine 0.12% Liquid 15 milliLiter(s) Oral Mucosa every 12 hours  chlorhexidine 4% Liquid 1 Application(s) Topical <User Schedule>    --------------------------------------------------------------------------------------    VITAL SIGNS, INS/OUTS (last 24 hours):  --------------------------------------------------------------------------------------  \T(C): 34.6 (07-02-21 @ 04:00), Max: 35.7 (07-01-21 @ 23:33)  HR: 61 (07-02-21 @ 04:00) (35 - 161)  BP: 109/66 (07-02-21 @ 04:00) (0/0 - 206/127)  BP(mean): 76 (07-02-21 @ 04:00) (0 - 145)  ABP: 132/71 (07-02-21 @ 04:00) (120/66 - 132/72)  ABP(mean): 93 (07-02-21 @ 04:00) (83 - 93)  RR: 24 (07-02-21 @ 04:00) (0 - 24)  SpO2: 100% (07-02-21 @ 04:00) (77% - 100%)  Wt(kg): --  CVP(mm Hg): --  CI: --  CAPILLARY BLOOD GLUCOSE      POCT Blood Glucose.: 313 mg/dL (01 Jul 2021 21:19)   N/A      07-01 @ 07:01  -  07-02 @ 04:54  --------------------------------------------------------  IN:    Cisatracurium: 90.7 mL    dextrose 5% + lactated ringers: 300 mL    FentaNYL: 86.4 mL    FentaNYL: 43.2 mL    Propofol: 64.6 mL  Total IN: 584.8 mL    OUT:    Indwelling Catheter - Urethral (mL): 510 mL    Nasogastric/Oral tube (mL): 150 mL  Total OUT: 660 mL    Total NET: -75.2 mL        --------------------------------------------------------------------------------------    EXAM  NEUROLOGY  RASS: -2    Exam: Sedated    HEENT  Exam: Normocephalic, atraumatic.     RESPIRATORY  Exam: Symmetric chest expansion, intubated via tracheostomy  Mechanical Ventilation: Mode: AC/ CMV (Assist Control/ Continuous Mandatory Ventilation), RR (machine): 24, TV (machine): 500, FiO2: 100, PEEP: 7, ITime: 0.85, MAP: 9, PIP: 42    CARDIOVASCULAR  Exam: S1, S2.  Regular rate and rhythm.    GI/NUTRITION  Exam: Abdomen soft, Non-tender, mildly distended,   Current Diet:  NPO  NGT to WS    VASCULAR  Exam: Extremities warm, pink, well-perfused.     MUSCULOSKELETAL  Exam: All extremities moving spontaneously     METABOLIC/FLUIDS/ELECTROLYTES  dextrose 5% + lactated ringers. 1000 milliLiter(s) IV Continuous <Continuous>      HEMATOLOGIC  [x] VTE Prophylaxis: heparin   Injectable 5000 Unit(s) SubCutaneous every 12 hours    Transfusions:	[] PRBC	[] Platelets		[] FFP	[] Cryoprecipitate    INFECTIOUS DISEASE  Antimicrobials/Immunologic Medications:    Day #      of     ***    Tubes/Lines/Drains  ***  [x] Peripheral IV  [] Central Venous Line     	[] R	[] L	[] IJ	[] Fem	[] SC	Date Placed:   [x] Arterial Line		[x] R	[] L	[] Fem	[x] Rad	[] Ax	Date 7/1 Placed:   [] PICC		[] Midline		[] Mediport  [] Urinary Catheter		Date Placed:   [x] Necessity of urinary, arterial, and venous catheters discussed    LABS  --------------------------------------------------------------------------------------                                            8.2                   Neurophils% (auto):   39.5   (07-01 @ 21:31):    20.46)-----------(299          Lymphocytes% (auto):  47.4                                          27.6                   Eosinphils% (auto):   2.8      Manual%: Neutrophils x    ; Lymphocytes x    ; Eosinophils x    ; Bands%: x    ; Blasts x          07-02    139  |  104  |  48<H>  ----------------------------<  204<H>  3.3<L>   |  16<L>  |  4.14<H>    Ca    8.8      02 Jul 2021 01:22  Phos  1.4     07-02  Mg     1.90     07-02    TPro  6.5  /  Alb  3.9  /  TBili  0.2  /  DBili  x   /  AST  23  /  ALT  14  /  AlkPhos  187<H>  07-01    ( 07-02 @ 02:20 )   PT: 16.1 sec;   INR: 1.42 ratio  aPTT: 48.7 sec    ABG - ( 02 Jul 2021 01:22 )  pH: 7.58  /  pCO2: 22    /  pO2: 185   / HCO3: 24    / Base Excess: -1.5  /  SaO2: 99.8  / Lactate: x        VBG - ( 01 Jul 2021 22:05 )  pH: 7.18  /  pCO2: 51    /  pO2: 52    / HCO3: 17    / Base Excess: -8.7  /  SvO2: 79.4  / Lactate: 3.8      RECENT CULTURES:      ABG - ( 02 Jul 2021 01:22 )  pH, Arterial: 7.58  pH, Blood: x     /  pCO2: 22    /  pO2: 185   / HCO3: 24    / Base Excess: -1.5  /  SaO2: 99.8              PT/INR - ( 02 Jul 2021 02:20 )   PT: 16.1 sec;   INR: 1.42 ratio         PTT - ( 02 Jul 2021 02:20 )  PTT:48.7 sec  --------------------------------------------------------------------------------------    OTHER LABORATORY:     IMAGING STUDIES:   CXR:     ASSESSMENT:  61y Male    ((insert from previous))***    PLAN:   ***  Neurologic:   Respiratory:   Cardiovascular:   Gastrointestinal/Nutrition:   Renal/Genitourinary:   Hematologic:   Infectious Disease:   Tubes/Lines/Drains:   Endocrine:   Disposition:     --------------------------------------------------------------------------------------    Critical Care Diagnoses: SICU Daily Progress Note  =====================================================    HPI: Patient is a 60 y/o male s/p tracheal resection 4/23/21 due to stenosis following emergent cric on 3/25/21 due to tongue ligual hematoma 2/2 loss of airway during siezure now presenting to [facility] ER w c/o stridor and SOB. Patient refers symptoms worse at night interfering with sleep and denies associated cough or fever. Patient with multiple previous admissions for SOB and post tracheal resection respiratory symptoms.  PMHx COVID+ (3/2021 - not hospitalized), PE (8/2020 - on eliquis at home), CKD. Patient underwent Tracheal Resection and Reconstruction on 4/23.     Interval Events  S/P Emergent tracheostomy placement via cricothyrotomy following code airway in ED  - CTICU and CTS consult w Zeltsman team  - s/p OR revision of tracheostomy  - unwitnessed seizure @ 16:00 7/2 s/p atavan, gtt vercet, neuro consulted, on 24 hr EEG, CT head ordered      Allergies: na    PAST MEDICAL & SURGICAL HISTORY:  HTN (hypertension)    Chronic kidney disease, unspecified CKD stage    Pulmonary embolus  diagnosed about 6 months ago incidentally found on imaging related to falling off a bike and chest pain    2019 novel coronavirus disease (COVID-19)  never hospitalized    Arthritis    Tracheal stenosis    History of total right knee replacement (TKR)  bilateral    History of tracheal stenosis      FAMILY HISTORY:  Unable to obtain due to emergent intubation      SOCIAL HISTORY:  Unable to obtain due to emergent intubation    ADVANCE DIRECTIVES: Presumed Full Code    HOME MEDICATIONS:  Pending med rec    MEDICATIONS:   --------------------------------------------------------------------------------------  Neurologic Medications  cisatracurium Infusion 2 MICROgram(s)/kG/Min IV Continuous <Continuous>  fentaNYL   Infusion. 0.5 MICROgram(s)/kG/Hr IV Continuous <Continuous>  HYDROmorphone  Injectable 0.5 milliGRAM(s) IV Push every 3 hours PRN Severe Pain (7 - 10)  levETIRAcetam  IVPB 500 milliGRAM(s) IV Intermittent every 12 hours  LORazepam   Injectable 1 milliGRAM(s) IV Push once PRN Seizure  propofol Infusion 10 MICROgram(s)/kG/Min IV Continuous <Continuous>    Respiratory Medications    Cardiovascular Medications    Gastrointestinal Medications  dextrose 5% + lactated ringers. 1000 milliLiter(s) IV Continuous <Continuous>  pantoprazole  Injectable 40 milliGRAM(s) IV Push daily  sodium chloride 0.9% lock flush 10 milliLiter(s) IV Push every 1 hour PRN Pre/post blood products, medications, blood draw, and to maintain line patency    Genitourinary Medications    Hematologic/Oncologic Medications  heparin   Injectable 5000 Unit(s) SubCutaneous every 12 hours    Antimicrobial/Immunologic Medications    Endocrine/Metabolic Medications    Topical/Other Medications  chlorhexidine 0.12% Liquid 15 milliLiter(s) Oral Mucosa every 12 hours  chlorhexidine 4% Liquid 1 Application(s) Topical <User Schedule>    --------------------------------------------------------------------------------------    VITAL SIGNS, INS/OUTS (last 24 hours):  --------------------------------------------------------------------------------------  \T(C): 34.6 (07-02-21 @ 04:00), Max: 35.7 (07-01-21 @ 23:33)  HR: 61 (07-02-21 @ 04:00) (35 - 161)  BP: 109/66 (07-02-21 @ 04:00) (0/0 - 206/127)  BP(mean): 76 (07-02-21 @ 04:00) (0 - 145)  ABP: 132/71 (07-02-21 @ 04:00) (120/66 - 132/72)  ABP(mean): 93 (07-02-21 @ 04:00) (83 - 93)  RR: 24 (07-02-21 @ 04:00) (0 - 24)  SpO2: 100% (07-02-21 @ 04:00) (77% - 100%)  Wt(kg): --  CVP(mm Hg): --  CI: --  CAPILLARY BLOOD GLUCOSE      POCT Blood Glucose.: 313 mg/dL (01 Jul 2021 21:19)   N/A      07-01 @ 07:01  -  07-02 @ 04:54  --------------------------------------------------------  IN:    Cisatracurium: 90.7 mL    dextrose 5% + lactated ringers: 300 mL    FentaNYL: 86.4 mL    FentaNYL: 43.2 mL    Propofol: 64.6 mL  Total IN: 584.8 mL    OUT:    Indwelling Catheter - Urethral (mL): 510 mL    Nasogastric/Oral tube (mL): 150 mL  Total OUT: 660 mL    Total NET: -75.2 mL        --------------------------------------------------------------------------------------    EXAM  NEUROLOGY  RASS: -2    Exam: Sedated    HEENT  Exam: Normocephalic, atraumatic.     RESPIRATORY  Exam: Symmetric chest expansion, intubated via tracheostomy  Mechanical Ventilation: Mode: AC/ CMV (Assist Control/ Continuous Mandatory Ventilation), RR (machine): 24, TV (machine): 500, FiO2: 100, PEEP: 7, ITime: 0.85, MAP: 9, PIP: 42    CARDIOVASCULAR  Exam: S1, S2.  Regular rate and rhythm.    GI/NUTRITION  Exam: Abdomen soft, Non-tender, mildly distended,   Current Diet:  NPO  NGT to LCWS    VASCULAR  Exam: Extremities warm, pink, well-perfused.     MUSCULOSKELETAL  Exam: All extremities moving spontaneously     METABOLIC/FLUIDS/ELECTROLYTES  dextrose 5% + lactated ringers. 1000 milliLiter(s) IV Continuous <Continuous>      HEMATOLOGIC  [x] VTE Prophylaxis: heparin   Injectable 5000 Unit(s) SubCutaneous every 12 hours    Transfusions:	[] PRBC	[] Platelets		[] FFP	[] Cryoprecipitate    INFECTIOUS DISEASE  Antimicrobials/Immunologic Medications:    Day #      of     ***    Tubes/Lines/Drains  ***  [x] Peripheral IV  [] Central Venous Line     	[] R	[] L	[] IJ	[] Fem	[] SC	Date Placed:   [x] Arterial Line		[x] R	[] L	[] Fem	[x] Rad	[] Ax	Date 7/1 Placed:   [] PICC		[] Midline		[] Mediport  [] Urinary Catheter		Date Placed:   [x] Necessity of urinary, arterial, and venous catheters discussed    LABS  --------------------------------------------------------------------------------------                                            8.2                   Neurophils% (auto):   39.5   (07-01 @ 21:31):    20.46)-----------(299          Lymphocytes% (auto):  47.4                                          27.6                   Eosinphils% (auto):   2.8      Manual%: Neutrophils x    ; Lymphocytes x    ; Eosinophils x    ; Bands%: x    ; Blasts x          07-02    139  |  104  |  48<H>  ----------------------------<  204<H>  3.3<L>   |  16<L>  |  4.14<H>    Ca    8.8      02 Jul 2021 01:22  Phos  1.4     07-02  Mg     1.90     07-02    TPro  6.5  /  Alb  3.9  /  TBili  0.2  /  DBili  x   /  AST  23  /  ALT  14  /  AlkPhos  187<H>  07-01    ( 07-02 @ 02:20 )   PT: 16.1 sec;   INR: 1.42 ratio  aPTT: 48.7 sec    ABG - ( 02 Jul 2021 01:22 )  pH: 7.58  /  pCO2: 22    /  pO2: 185   / HCO3: 24    / Base Excess: -1.5  /  SaO2: 99.8  / Lactate: x        VBG - ( 01 Jul 2021 22:05 )  pH: 7.18  /  pCO2: 51    /  pO2: 52    / HCO3: 17    / Base Excess: -8.7  /  SvO2: 79.4  / Lactate: 3.8      RECENT CULTURES:      ABG - ( 02 Jul 2021 01:22 )  pH, Arterial: 7.58  pH, Blood: x     /  pCO2: 22    /  pO2: 185   / HCO3: 24    / Base Excess: -1.5  /  SaO2: 99.8              PT/INR - ( 02 Jul 2021 02:20 )   PT: 16.1 sec;   INR: 1.42 ratio         PTT - ( 02 Jul 2021 02:20 )  PTT:48.7 sec  --------------------------------------------------------------------------------------    OTHER LABORATORY:     IMAGING STUDIES:   CXR:     ASSESSMENT:  61y Male    ((insert from previous))***    PLAN:   ***  Neurologic:   Respiratory:   Cardiovascular:   Gastrointestinal/Nutrition:   Renal/Genitourinary:   Hematologic:   Infectious Disease:   Tubes/Lines/Drains:   Endocrine:   Disposition:     --------------------------------------------------------------------------------------    Critical Care Diagnoses:

## 2021-07-02 NOTE — PROGRESS NOTE ADULT - SUBJECTIVE AND OBJECTIVE BOX
see previous note for events    Vital Signs Last 24 Hrs  T(C): 34.9 (02 Jul 2021 00:00), Max: 35.7 (01 Jul 2021 23:33)  T(F): 94.8 (02 Jul 2021 00:00), Max: 96.3 (01 Jul 2021 23:33)  HR: 76 (02 Jul 2021 01:00) (35 - 161)  BP: 107/64 (02 Jul 2021 00:30) (0/0 - 206/127)  BP(mean): 74 (02 Jul 2021 00:30) (0 - 145)  RR: 24 (02 Jul 2021 01:00) (0 - 24)  SpO2: 100% (02 Jul 2021 01:00) (77% - 100%)      P/E  tracheostomy tube in place size 5.5, cuff up secored w silk sutures and neck tie  oozing from stoma, minimal  saturating 100%    A/P: 62 y/o male s/p tracheal resection 4/23/21 presents to [facility] ER w c/o stridor and SOB. Difficulty breating is worse at night and prevents him from being able to sleep.  He denies productive cough or fever.  This is his 3rd readmission after his tracheal resection  PT has PMHx COVID+ (3/2021 - not hospitalized), PE (8/2020 - on eliquis at home), CKD, w/ recent hospitalization at [facility] after seizure w/ lingual hematoma requiring emergency tracheostomy on 3/25/21.  Patient underwent Tracheal Resection and Reconstruction on 4/23. ENT injected vocal cords few months ago. He was last discharged 5/18/21. s/p emergent tracheostomy 7/1 size 5.5 trach tube  - Patient had a tracheostomy, size 5.5 (cuffed). Please perform standard tracheostomy care procedures.  - Regular suctioning with soft suction catheter q1  - Humidified air to tracheostomy tube  - CTICU and CTS consult w [facility] team  - per discussion w CTS, to take to OR tomorow for bronch and trach revision.  - Call or page us if any issues

## 2021-07-03 LAB
ANION GAP SERPL CALC-SCNC: 14 MMOL/L — SIGNIFICANT CHANGE UP (ref 7–14)
BASE EXCESS BLDA CALC-SCNC: -2.5 MMOL/L — LOW (ref -2–2)
BUN SERPL-MCNC: 48 MG/DL — HIGH (ref 7–23)
CALCIUM SERPL-MCNC: 8.9 MG/DL — SIGNIFICANT CHANGE UP (ref 8.4–10.5)
CHLORIDE SERPL-SCNC: 108 MMOL/L — HIGH (ref 98–107)
CK SERPL-CCNC: 342 U/L — HIGH (ref 30–200)
CO2 SERPL-SCNC: 20 MMOL/L — LOW (ref 22–31)
COVID-19 SPIKE DOMAIN AB INTERP: POSITIVE
COVID-19 SPIKE DOMAIN ANTIBODY RESULT: >250 U/ML — HIGH
CREAT SERPL-MCNC: 3.77 MG/DL — HIGH (ref 0.5–1.3)
GLUCOSE SERPL-MCNC: 162 MG/DL — HIGH (ref 70–99)
HCO3 BLDA-SCNC: 22 MMOL/L — SIGNIFICANT CHANGE UP (ref 22–26)
HCT VFR BLD CALC: 18.9 % — CRITICAL LOW (ref 39–50)
HCT VFR BLD CALC: 19.3 % — CRITICAL LOW (ref 39–50)
HCT VFR BLD CALC: 23.6 % — LOW (ref 39–50)
HGB BLD-MCNC: 6.1 G/DL — CRITICAL LOW (ref 13–17)
HGB BLD-MCNC: 6.1 G/DL — CRITICAL LOW (ref 13–17)
HGB BLD-MCNC: 7.7 G/DL — LOW (ref 13–17)
MAGNESIUM SERPL-MCNC: 1.9 MG/DL — SIGNIFICANT CHANGE UP (ref 1.6–2.6)
MCHC RBC-ENTMCNC: 28.2 PG — SIGNIFICANT CHANGE UP (ref 27–34)
MCHC RBC-ENTMCNC: 28.8 PG — SIGNIFICANT CHANGE UP (ref 27–34)
MCHC RBC-ENTMCNC: 29.1 PG — SIGNIFICANT CHANGE UP (ref 27–34)
MCHC RBC-ENTMCNC: 31.6 GM/DL — LOW (ref 32–36)
MCHC RBC-ENTMCNC: 32.3 GM/DL — SIGNIFICANT CHANGE UP (ref 32–36)
MCHC RBC-ENTMCNC: 32.6 GM/DL — SIGNIFICANT CHANGE UP (ref 32–36)
MCV RBC AUTO: 89.1 FL — SIGNIFICANT CHANGE UP (ref 80–100)
MCV RBC AUTO: 89.2 FL — SIGNIFICANT CHANGE UP (ref 80–100)
MCV RBC AUTO: 89.4 FL — SIGNIFICANT CHANGE UP (ref 80–100)
NRBC # BLD: 0 /100 WBCS — SIGNIFICANT CHANGE UP
NRBC # FLD: 0 K/UL — SIGNIFICANT CHANGE UP
PCO2 BLDA: 36 MMHG — SIGNIFICANT CHANGE UP (ref 35–48)
PH BLDA: 7.4 — SIGNIFICANT CHANGE UP (ref 7.35–7.45)
PHOSPHATE SERPL-MCNC: 4.5 MG/DL — SIGNIFICANT CHANGE UP (ref 2.5–4.5)
PLATELET # BLD AUTO: 173 K/UL — SIGNIFICANT CHANGE UP (ref 150–400)
PLATELET # BLD AUTO: 179 K/UL — SIGNIFICANT CHANGE UP (ref 150–400)
PLATELET # BLD AUTO: 182 K/UL — SIGNIFICANT CHANGE UP (ref 150–400)
PO2 BLDA: 135 MMHG — HIGH (ref 83–108)
POTASSIUM SERPL-MCNC: 4.3 MMOL/L — SIGNIFICANT CHANGE UP (ref 3.5–5.3)
POTASSIUM SERPL-SCNC: 4.3 MMOL/L — SIGNIFICANT CHANGE UP (ref 3.5–5.3)
PROLACTIN SERPL-MCNC: 31.4 NG/ML — HIGH (ref 4.1–18.4)
RBC # BLD: 2.12 M/UL — LOW (ref 4.2–5.8)
RBC # BLD: 2.16 M/UL — LOW (ref 4.2–5.8)
RBC # BLD: 2.65 M/UL — LOW (ref 4.2–5.8)
RBC # FLD: 14.3 % — SIGNIFICANT CHANGE UP (ref 10.3–14.5)
RBC # FLD: 14.7 % — HIGH (ref 10.3–14.5)
RBC # FLD: 14.7 % — HIGH (ref 10.3–14.5)
SAO2 % BLDA: 98.9 % — SIGNIFICANT CHANGE UP (ref 95–99)
SARS-COV-2 IGG+IGM SERPL QL IA: >250 U/ML — HIGH
SARS-COV-2 IGG+IGM SERPL QL IA: POSITIVE
SODIUM SERPL-SCNC: 142 MMOL/L — SIGNIFICANT CHANGE UP (ref 135–145)
WBC # BLD: 13.76 K/UL — HIGH (ref 3.8–10.5)
WBC # BLD: 14.16 K/UL — HIGH (ref 3.8–10.5)
WBC # BLD: 14.57 K/UL — HIGH (ref 3.8–10.5)
WBC # FLD AUTO: 13.76 K/UL — HIGH (ref 3.8–10.5)
WBC # FLD AUTO: 14.16 K/UL — HIGH (ref 3.8–10.5)
WBC # FLD AUTO: 14.57 K/UL — HIGH (ref 3.8–10.5)

## 2021-07-03 PROCEDURE — 99291 CRITICAL CARE FIRST HOUR: CPT

## 2021-07-03 PROCEDURE — 99223 1ST HOSP IP/OBS HIGH 75: CPT

## 2021-07-03 PROCEDURE — 70450 CT HEAD/BRAIN W/O DYE: CPT | Mod: 26

## 2021-07-03 RX ORDER — LEVETIRACETAM 250 MG/1
1000 TABLET, FILM COATED ORAL EVERY 12 HOURS
Refills: 0 | Status: DISCONTINUED | OUTPATIENT
Start: 2021-07-03 | End: 2021-07-15

## 2021-07-03 RX ORDER — MAGNESIUM SULFATE 500 MG/ML
1 VIAL (ML) INJECTION ONCE
Refills: 0 | Status: COMPLETED | OUTPATIENT
Start: 2021-07-03 | End: 2021-07-03

## 2021-07-03 RX ORDER — LEVETIRACETAM 250 MG/1
1500 TABLET, FILM COATED ORAL ONCE
Refills: 0 | Status: COMPLETED | OUTPATIENT
Start: 2021-07-03 | End: 2021-07-03

## 2021-07-03 RX ORDER — PROPOFOL 10 MG/ML
10 INJECTION, EMULSION INTRAVENOUS
Qty: 1000 | Refills: 0 | Status: DISCONTINUED | OUTPATIENT
Start: 2021-07-03 | End: 2021-07-17

## 2021-07-03 RX ORDER — MIDAZOLAM HYDROCHLORIDE 1 MG/ML
0.03 INJECTION, SOLUTION INTRAMUSCULAR; INTRAVENOUS
Qty: 100 | Refills: 0 | Status: DISCONTINUED | OUTPATIENT
Start: 2021-07-03 | End: 2021-07-04

## 2021-07-03 RX ORDER — LEVETIRACETAM 250 MG/1
1400 TABLET, FILM COATED ORAL ONCE
Refills: 0 | Status: DISCONTINUED | OUTPATIENT
Start: 2021-07-03 | End: 2021-07-03

## 2021-07-03 RX ORDER — FENTANYL CITRATE 50 UG/ML
0.5 INJECTION INTRAVENOUS
Qty: 2500 | Refills: 0 | Status: DISCONTINUED | OUTPATIENT
Start: 2021-07-03 | End: 2021-07-10

## 2021-07-03 RX ADMIN — FENTANYL CITRATE 3.6 MICROGRAM(S)/KG/HR: 50 INJECTION INTRAVENOUS at 21:09

## 2021-07-03 RX ADMIN — CHLORHEXIDINE GLUCONATE 1 APPLICATION(S): 213 SOLUTION TOPICAL at 05:13

## 2021-07-03 RX ADMIN — HEPARIN SODIUM 5000 UNIT(S): 5000 INJECTION INTRAVENOUS; SUBCUTANEOUS at 05:13

## 2021-07-03 RX ADMIN — PANTOPRAZOLE SODIUM 40 MILLIGRAM(S): 20 TABLET, DELAYED RELEASE ORAL at 14:17

## 2021-07-03 RX ADMIN — SODIUM CHLORIDE 75 MILLILITER(S): 9 INJECTION, SOLUTION INTRAVENOUS at 11:51

## 2021-07-03 RX ADMIN — PROPOFOL 4.32 MICROGRAM(S)/KG/MIN: 10 INJECTION, EMULSION INTRAVENOUS at 11:51

## 2021-07-03 RX ADMIN — HEPARIN SODIUM 5000 UNIT(S): 5000 INJECTION INTRAVENOUS; SUBCUTANEOUS at 14:17

## 2021-07-03 RX ADMIN — FENTANYL CITRATE 3.6 MICROGRAM(S)/KG/HR: 50 INJECTION INTRAVENOUS at 11:51

## 2021-07-03 RX ADMIN — HEPARIN SODIUM 5000 UNIT(S): 5000 INJECTION INTRAVENOUS; SUBCUTANEOUS at 21:59

## 2021-07-03 RX ADMIN — Medication 100 GRAM(S): at 06:02

## 2021-07-03 RX ADMIN — MIDAZOLAM HYDROCHLORIDE 2.16 MG/KG/HR: 1 INJECTION, SOLUTION INTRAMUSCULAR; INTRAVENOUS at 19:16

## 2021-07-03 RX ADMIN — LEVETIRACETAM 400 MILLIGRAM(S): 250 TABLET, FILM COATED ORAL at 17:51

## 2021-07-03 RX ADMIN — LEVETIRACETAM 400 MILLIGRAM(S): 250 TABLET, FILM COATED ORAL at 05:13

## 2021-07-03 RX ADMIN — PROPOFOL 4.32 MICROGRAM(S)/KG/MIN: 10 INJECTION, EMULSION INTRAVENOUS at 19:17

## 2021-07-03 RX ADMIN — CHLORHEXIDINE GLUCONATE 15 MILLILITER(S): 213 SOLUTION TOPICAL at 17:51

## 2021-07-03 RX ADMIN — CHLORHEXIDINE GLUCONATE 15 MILLILITER(S): 213 SOLUTION TOPICAL at 05:12

## 2021-07-03 RX ADMIN — FENTANYL CITRATE 3.6 MICROGRAM(S)/KG/HR: 50 INJECTION INTRAVENOUS at 19:16

## 2021-07-03 RX ADMIN — MIDAZOLAM HYDROCHLORIDE 2.16 MG/KG/HR: 1 INJECTION, SOLUTION INTRAMUSCULAR; INTRAVENOUS at 11:51

## 2021-07-03 RX ADMIN — LEVETIRACETAM 400 MILLIGRAM(S): 250 TABLET, FILM COATED ORAL at 11:50

## 2021-07-03 NOTE — PROGRESS NOTE ADULT - SUBJECTIVE AND OBJECTIVE BOX
Patient seen and examined overnight. POD 1 s/p revsion trach, flex bronch, removal of stent with CTS, seizures overnight. Neuro to see this am. No trach issues.     P/E  Sedated, 6LPC, trach sutured to skin, trach ties, surgicel removed.   Neck soft, flat    A/P: 62 y/o male s/p tracheal resection 4/23/21 presents to [facility] ER w c/o stridor and SOB. Difficulty breating is worse at night and prevents him from being able to sleep.  He denies productive cough or fever.  This is his 3rd readmission after his tracheal resection  PT has PMHx COVID+ (3/2021 - not hospitalized), PE (8/2020 - on eliquis at home), CKD, w/ recent hospitalization at [facility] after seizure w/ lingual hematoma requiring emergency tracheostomy on 3/25/21.  Patient underwent Tracheal Resection and Reconstruction on 4/23. ENT injected vocal cords few months ago. He was last discharged 5/18/21. s/p emergent cric 7/1 now s/p revision trach, flex bronch and removal of stent 7/2 with post-op course complicated by seizures  - Patient has a critical airway, only ENT to manipulate airway  - Routine trach care by nursing  - Suction PRN  - Extra 6LPC and 4LPC at bedside  - Vent per SICU  - Fu neuro  - Tube feeds per SICU   - Call or page us if any issues

## 2021-07-03 NOTE — PROGRESS NOTE ADULT - SUBJECTIVE AND OBJECTIVE BOX
61y Male s/p flex bronch, revision of trach  under  general on 7/2/21    T(C): 35.6 (07-03-21 @ 16:00), Max: 36.9 (07-02-21 @ 20:00)  HR: 63 (07-03-21 @ 16:00) (58 - 79)  BP: --  RR: 16 (07-03-21 @ 16:00) (15 - 18)  SpO2: 100% (07-03-21 @ 16:00) (97% - 100%)  Wt(kg): --    Pt seen,  no anesthesia complications or complaints noted or reported.

## 2021-07-03 NOTE — PROGRESS NOTE ADULT - SUBJECTIVE AND OBJECTIVE BOX
SICU Daily Progress Note  =====================================================  Interval/Overnight Events:       - OR for flex bronchoscopy, revision tracheostomy  - Postop had a seizure, received ativan, versed gtt  - Neurology consulted, EEG and head CT ordered, Keppra increased to home dose    HPI:   Patient is a 62 y/o male s/p tracheal resection 4/23/21 due to stenosis following emergent cric on 3/25/21 due to tongue ligual hematoma 2/2 loss of airway during siezure now presenting to [facility] ER w c/o stridor and SOB. Patient refers symptoms worse at night interfering with sleep and denies associated cough or fever. Patient with multiple previous admissions for SOB and post tracheal resection respiratory symptoms.  PMHx COVID+ (3/2021 - not hospitalized), PE (8/2020 - on eliquis at home), CKD. Patient underwent Tracheal Resection and Reconstruction on 4/23.       Allergies: No Known Allergies          MEDICATIONS:   --------------------------------------------------------------------------------------  Neurologic Medications  fentaNYL   Infusion. 0.5 MICROgram(s)/kG/Hr IV Continuous <Continuous>  HYDROmorphone  Injectable 0.5 milliGRAM(s) IV Push every 3 hours PRN Severe Pain (7 - 10)  levETIRAcetam  IVPB 750 milliGRAM(s) IV Intermittent every 12 hours  midazolam Infusion 0.03 mG/kG/Hr IV Continuous <Continuous>  propofol Infusion 10 MICROgram(s)/kG/Min IV Continuous <Continuous>    Respiratory Medications    Cardiovascular Medications    Gastrointestinal Medications  dextrose 5% + lactated ringers. 1000 milliLiter(s) IV Continuous <Continuous>  pantoprazole  Injectable 40 milliGRAM(s) IV Push daily  sodium chloride 0.9% lock flush 10 milliLiter(s) IV Push every 1 hour PRN Pre/post blood products, medications, blood draw, and to maintain line patency    Genitourinary Medications    Hematologic/Oncologic Medications  heparin   Injectable 5000 Unit(s) SubCutaneous every 8 hours    Antimicrobial/Immunologic Medications    Endocrine/Metabolic Medications    Topical/Other Medications  chlorhexidine 0.12% Liquid 15 milliLiter(s) Oral Mucosa every 12 hours  chlorhexidine 4% Liquid 1 Application(s) Topical <User Schedule>    --------------------------------------------------------------------------------------    VITAL SIGNS, INS/OUTS (last 24 hours):  --------------------------------------------------------------------------------------  I&O's Detail    01 Jul 2021 07:01  -  02 Jul 2021 07:00  --------------------------------------------------------  IN:    Cisatracurium: 125.2 mL    dextrose 5% + lactated ringers: 450 mL    FentaNYL: 86.4 mL    FentaNYL: 86.4 mL    IV PiggyBack: 100 mL    Propofol: 90.4 mL  Total IN: 938.4 mL    OUT:    Indwelling Catheter - Urethral (mL): 785 mL    Nasogastric/Oral tube (mL): 150 mL  Total OUT: 935 mL    Total NET: 3.4 mL      02 Jul 2021 07:01  -  03 Jul 2021 00:31  --------------------------------------------------------  IN:    Cisatracurium: 100.8 mL    dextrose 5% + lactated ringers: 900 mL    FentaNYL: 252 mL    Midazolam: 13.2 mL    Propofol: 140.4 mL  Total IN: 1406.4 mL    OUT:    Indwelling Catheter - Urethral (mL): 900 mL    Nasogastric/Oral tube (mL): 100 mL  Total OUT: 1000 mL    Total NET: 406.4 mL        --------------------------------------------------------------------------------------          INFECTIOUS DISEASE  Antimicrobials/Immunologic Medications:      LABS  --------------------------------------------------------------------------------------  CBC (07-01 @ 21:31)                          8.2<L>                   20.46<H>  )--------------(  299        39.5<L>% Neuts, 47.4<H>% Lymphs, ANC: 8.09<H>                          27.6<L>    BMP (07-02 @ 11:21)       142     |  108<H>  |  47<H> 			Ca++ --      Ca 8.8          ---------------------------------( 233<H>		Mg 1.80         3.8     |  19<L>   |  3.68<H>			Ph 2.8     BMP (07-02 @ 01:22)       139     |  104     |  48<H> 			Ca++ --      Ca 8.8          ---------------------------------( 204<H>		Mg 1.90         3.3<L>  |  16<L>   |  4.14<H>			Ph 1.4<L>    LFTs (07-01 @ 21:31)      TPro 6.5 / Alb 3.9 / TBili 0.2 / DBili -- / AST 23 / ALT 14 / AlkPhos 187<H>    Coags (07-02 @ 02:20)  aPTT 48.7<H> / INR 1.42<H> / PT 16.1<H>      ABG (07-02 @ 01:22)     7.58<H> / 22<L> / 185<H> / 24 / -1.5 / 99.8<H>%     Lactate:    ABG (07-01 @ 22:05)     7.28<L> / 35 / 279<H> / 17<L> / -9.2<L> / 99.2<H>%     Lactate:      VBG (07-01 @ 22:05)     7.18<LL> / 51 / 52<H> / 17<L> / -8.7<L> / 79.4%      Lactate: 3.8<H>  VBG (07-01 @ 21:31)     6.89<LL> / 98<HH> / 68<H> / 11<L> / -13.8<L> / 76.2%      Lactate: 7.7<HH>        --------------------------------------------------------------------------------------     SICU Daily Progress Note  =====================================================  Interval/Overnight Events:       - OR for flex bronchoscopy, revision tracheostomy  - Postop had a seizure, received ativan, versed gtt  - Neurology consulted, EEG and head CT ordered, Keppra increased to home dose  - 1U PRBC overnight for anemia    HPI:   Patient is a 60 y/o male s/p tracheal resection 4/23/21 due to stenosis following emergent cric on 3/25/21 due to tongue ligual hematoma 2/2 loss of airway during siezure now presenting to [facility] ER w c/o stridor and SOB. Patient refers symptoms worse at night interfering with sleep and denies associated cough or fever. Patient with multiple previous admissions for SOB and post tracheal resection respiratory symptoms.  PMHx COVID+ (3/2021 - not hospitalized), PE (8/2020 - on eliquis at home), CKD. Patient underwent Tracheal Resection and Reconstruction on 4/23.       Allergies: No Known Allergies          MEDICATIONS:   --------------------------------------------------------------------------------------  Neurologic Medications  fentaNYL   Infusion. 0.5 MICROgram(s)/kG/Hr IV Continuous <Continuous>  HYDROmorphone  Injectable 0.5 milliGRAM(s) IV Push every 3 hours PRN Severe Pain (7 - 10)  levETIRAcetam  IVPB 750 milliGRAM(s) IV Intermittent every 12 hours  midazolam Infusion 0.03 mG/kG/Hr IV Continuous <Continuous>  propofol Infusion 10 MICROgram(s)/kG/Min IV Continuous <Continuous>    Respiratory Medications    Cardiovascular Medications    Gastrointestinal Medications  dextrose 5% + lactated ringers. 1000 milliLiter(s) IV Continuous <Continuous>  pantoprazole  Injectable 40 milliGRAM(s) IV Push daily  sodium chloride 0.9% lock flush 10 milliLiter(s) IV Push every 1 hour PRN Pre/post blood products, medications, blood draw, and to maintain line patency    Genitourinary Medications    Hematologic/Oncologic Medications  heparin   Injectable 5000 Unit(s) SubCutaneous every 8 hours    Antimicrobial/Immunologic Medications    Endocrine/Metabolic Medications    Topical/Other Medications  chlorhexidine 0.12% Liquid 15 milliLiter(s) Oral Mucosa every 12 hours  chlorhexidine 4% Liquid 1 Application(s) Topical <User Schedule>    --------------------------------------------------------------------------------------    VITAL SIGNS, INS/OUTS (last 24 hours):  --------------------------------------------------------------------------------------  I&O's Detail    01 Jul 2021 07:01  -  02 Jul 2021 07:00  --------------------------------------------------------  IN:    Cisatracurium: 125.2 mL    dextrose 5% + lactated ringers: 450 mL    FentaNYL: 86.4 mL    FentaNYL: 86.4 mL    IV PiggyBack: 100 mL    Propofol: 90.4 mL  Total IN: 938.4 mL    OUT:    Indwelling Catheter - Urethral (mL): 785 mL    Nasogastric/Oral tube (mL): 150 mL  Total OUT: 935 mL    Total NET: 3.4 mL      02 Jul 2021 07:01  -  03 Jul 2021 00:31  --------------------------------------------------------  IN:    Cisatracurium: 100.8 mL    dextrose 5% + lactated ringers: 900 mL    FentaNYL: 252 mL    Midazolam: 13.2 mL    Propofol: 140.4 mL  Total IN: 1406.4 mL    OUT:    Indwelling Catheter - Urethral (mL): 900 mL    Nasogastric/Oral tube (mL): 100 mL  Total OUT: 1000 mL    Total NET: 406.4 mL        --------------------------------------------------------------------------------------          INFECTIOUS DISEASE  Antimicrobials/Immunologic Medications:      LABS  --------------------------------------------------------------------------------------  CBC (07-01 @ 21:31)                          8.2<L>                   20.46<H>  )--------------(  299        39.5<L>% Neuts, 47.4<H>% Lymphs, ANC: 8.09<H>                          27.6<L>    BMP (07-02 @ 11:21)       142     |  108<H>  |  47<H> 			Ca++ --      Ca 8.8          ---------------------------------( 233<H>		Mg 1.80         3.8     |  19<L>   |  3.68<H>			Ph 2.8     BMP (07-02 @ 01:22)       139     |  104     |  48<H> 			Ca++ --      Ca 8.8          ---------------------------------( 204<H>		Mg 1.90         3.3<L>  |  16<L>   |  4.14<H>			Ph 1.4<L>    LFTs (07-01 @ 21:31)      TPro 6.5 / Alb 3.9 / TBili 0.2 / DBili -- / AST 23 / ALT 14 / AlkPhos 187<H>    Coags (07-02 @ 02:20)  aPTT 48.7<H> / INR 1.42<H> / PT 16.1<H>      ABG (07-02 @ 01:22)     7.58<H> / 22<L> / 185<H> / 24 / -1.5 / 99.8<H>%     Lactate:    ABG (07-01 @ 22:05)     7.28<L> / 35 / 279<H> / 17<L> / -9.2<L> / 99.2<H>%     Lactate:      VBG (07-01 @ 22:05)     7.18<LL> / 51 / 52<H> / 17<L> / -8.7<L> / 79.4%      Lactate: 3.8<H>  VBG (07-01 @ 21:31)     6.89<LL> / 98<HH> / 68<H> / 11<L> / -13.8<L> / 76.2%      Lactate: 7.7<HH>        --------------------------------------------------------------------------------------

## 2021-07-03 NOTE — PROGRESS NOTE ADULT - ASSESSMENT
ASSESSMENT:  62 y/o male s/p tracheal resection 4/23/21 presents to [facility] ER w c/o stridor and SOB. Difficulty breathing is worse at night and prevents him from being able to sleep.  He denies productive cough or fever.  This is his 3rd readmission after his tracheal resection  PT has PMHx COVID+ (3/2021 - not hospitalized), PE (8/2020 - on eliquis at home), CKD, w/ recent hospitalization at [facility] after seizure w/ lingual hematoma requiring emergency tracheostomy on 3/25/21.  Patient underwent Tracheal Resection and Reconstruction on 4/23. ENT injected vocal cords few months ago. He was last discharged 5/18/21. Now s/p emergent cric in ED for loss of airway 7/1, subsequent revision tracheostomy 7/2       PLAN:    Neurologic:  - Sedated with fentanyl, propofol, versed  - Hx of epilepsy on keppra 750 BID  - Seizure episode @ 16:00 7/2, s/p ativan 2x, currently on 24h EEG  - F/u CT Head, Neuro consult recs       Respiratory:   - s/p bronch and trach revision 7/2  - Mechanical ventilation AC/CMV  - On precautions  - Monitor CXR as patient high likelihood of aspiration event    Cardiovascular:   - Hemodynamic monitoring  - lactate cleared    Gastrointestinal/Nutrition:   -NPO with IVF 75 D5 LR  - NGT to LCWS    Renal/Genitourinary:  -  Higginbotham in place    Hematologic:   - Sub Q Heparin DVT ppx    Infectious Disease:   - Monitor for leukocytosis with low threshold for starting empiric antibiotics for likely aspiration event    Tubes/Lines/Drains:   - Higginbotham  - Right radial A line  - NGT    Disposition:   Continued critical care management    Riley Quesada, PGY 4  [id number] ASSESSMENT:  60 y/o male s/p tracheal resection 4/23/21 presents to [facility] ER w c/o stridor and SOB. Difficulty breathing is worse at night and prevents him from being able to sleep.  He denies productive cough or fever.  This is his 3rd readmission after his tracheal resection  PT has PMHx COVID+ (3/2021 - not hospitalized), PE (8/2020 - on eliquis at home), CKD, w/ recent hospitalization at [facility] after seizure w/ lingual hematoma requiring emergency tracheostomy on 3/25/21.  Patient underwent Tracheal Resection and Reconstruction on 4/23. ENT injected vocal cords few months ago. He was last discharged 5/18/21. Now s/p emergent cric in ED for loss of airway 7/1, subsequent revision tracheostomy 7/2       PLAN:    Neurologic:  - Sedated with fentanyl, propofol, versed  - Hx of epilepsy on Keppra 750 BID, + loading dose 1500mg (7/3)  - Currently on 24h EEG  - Neuro following   - s/p Ativan 2mg x 2 (last 24 h)    Respiratory:   - s/p bronch and trach revision 7/2  - Mechanical ventilation AC/CMV  - On precautions  - Monitor CXR as patient high likelihood of aspiration event    Cardiovascular:   - Hemodynamic monitoring  - Lactate cleared    Gastrointestinal/Nutrition:   - NPO w/ Jevity tube feeds via NGT    Renal/Genitourinary:  -  Higginbotham in place    Hematologic:  - s/p 1PRBC (7/3 AM) for downtrending H/H; responded appropriately   - Trend H/H  - c/w SQH for VTE prophylaxis    Infectious Disease:   - Monitor for leukocytosis with low threshold for starting empiric antibiotics for likely aspiration event    Tubes/Lines/Drains:   - Higginbotham  - Right radial A line  - NGT    Disposition: SICU

## 2021-07-04 LAB
ANION GAP SERPL CALC-SCNC: 13 MMOL/L — SIGNIFICANT CHANGE UP (ref 7–14)
BLOOD GAS ARTERIAL - LYTES,HGB,ICA,LACT RESULT: SIGNIFICANT CHANGE UP
BLOOD GAS ARTERIAL - LYTES,HGB,ICA,LACT RESULT: SIGNIFICANT CHANGE UP
BUN SERPL-MCNC: 42 MG/DL — HIGH (ref 7–23)
CALCIUM SERPL-MCNC: 8.7 MG/DL — SIGNIFICANT CHANGE UP (ref 8.4–10.5)
CHLORIDE SERPL-SCNC: 112 MMOL/L — HIGH (ref 98–107)
CO2 SERPL-SCNC: 19 MMOL/L — LOW (ref 22–31)
CREAT SERPL-MCNC: 3.15 MG/DL — HIGH (ref 0.5–1.3)
GLUCOSE SERPL-MCNC: 179 MG/DL — HIGH (ref 70–99)
HCT VFR BLD CALC: 24.4 % — LOW (ref 39–50)
HGB BLD-MCNC: 7.6 G/DL — LOW (ref 13–17)
MAGNESIUM SERPL-MCNC: 2.2 MG/DL — SIGNIFICANT CHANGE UP (ref 1.6–2.6)
MCHC RBC-ENTMCNC: 28.3 PG — SIGNIFICANT CHANGE UP (ref 27–34)
MCHC RBC-ENTMCNC: 31.1 GM/DL — LOW (ref 32–36)
MCV RBC AUTO: 90.7 FL — SIGNIFICANT CHANGE UP (ref 80–100)
NRBC # BLD: 0 /100 WBCS — SIGNIFICANT CHANGE UP
NRBC # FLD: 0 K/UL — SIGNIFICANT CHANGE UP
PHOSPHATE SERPL-MCNC: 3.6 MG/DL — SIGNIFICANT CHANGE UP (ref 2.5–4.5)
PLATELET # BLD AUTO: 173 K/UL — SIGNIFICANT CHANGE UP (ref 150–400)
POTASSIUM SERPL-MCNC: 4.1 MMOL/L — SIGNIFICANT CHANGE UP (ref 3.5–5.3)
POTASSIUM SERPL-SCNC: 4.1 MMOL/L — SIGNIFICANT CHANGE UP (ref 3.5–5.3)
RBC # BLD: 2.69 M/UL — LOW (ref 4.2–5.8)
RBC # FLD: 14.9 % — HIGH (ref 10.3–14.5)
SODIUM SERPL-SCNC: 144 MMOL/L — SIGNIFICANT CHANGE UP (ref 135–145)
WBC # BLD: 10.34 K/UL — SIGNIFICANT CHANGE UP (ref 3.8–10.5)
WBC # FLD AUTO: 10.34 K/UL — SIGNIFICANT CHANGE UP (ref 3.8–10.5)

## 2021-07-04 PROCEDURE — 99233 SBSQ HOSP IP/OBS HIGH 50: CPT

## 2021-07-04 PROCEDURE — 71045 X-RAY EXAM CHEST 1 VIEW: CPT | Mod: 26

## 2021-07-04 PROCEDURE — 99291 CRITICAL CARE FIRST HOUR: CPT

## 2021-07-04 PROCEDURE — 74018 RADEX ABDOMEN 1 VIEW: CPT | Mod: 26

## 2021-07-04 PROCEDURE — 71045 X-RAY EXAM CHEST 1 VIEW: CPT | Mod: 26,77

## 2021-07-04 RX ORDER — KETAMINE HYDROCHLORIDE 100 MG/ML
0.5 INJECTION INTRAMUSCULAR; INTRAVENOUS
Qty: 1000 | Refills: 0 | Status: DISCONTINUED | OUTPATIENT
Start: 2021-07-04 | End: 2021-07-05

## 2021-07-04 RX ORDER — ACETAMINOPHEN 500 MG
1000 TABLET ORAL ONCE
Refills: 0 | Status: COMPLETED | OUTPATIENT
Start: 2021-07-04 | End: 2021-07-04

## 2021-07-04 RX ORDER — DORNASE ALFA 1 MG/ML
2.5 SOLUTION RESPIRATORY (INHALATION) ONCE
Refills: 0 | Status: DISCONTINUED | OUTPATIENT
Start: 2021-07-04 | End: 2021-07-07

## 2021-07-04 RX ADMIN — HEPARIN SODIUM 5000 UNIT(S): 5000 INJECTION INTRAVENOUS; SUBCUTANEOUS at 06:01

## 2021-07-04 RX ADMIN — Medication 1000 MILLIGRAM(S): at 16:37

## 2021-07-04 RX ADMIN — PROPOFOL 4.32 MICROGRAM(S)/KG/MIN: 10 INJECTION, EMULSION INTRAVENOUS at 20:03

## 2021-07-04 RX ADMIN — FENTANYL CITRATE 3.6 MICROGRAM(S)/KG/HR: 50 INJECTION INTRAVENOUS at 11:11

## 2021-07-04 RX ADMIN — Medication 2 MILLIGRAM(S): at 14:06

## 2021-07-04 RX ADMIN — Medication 400 MILLIGRAM(S): at 16:07

## 2021-07-04 RX ADMIN — LEVETIRACETAM 400 MILLIGRAM(S): 250 TABLET, FILM COATED ORAL at 06:01

## 2021-07-04 RX ADMIN — MIDAZOLAM HYDROCHLORIDE 2.16 MG/KG/HR: 1 INJECTION, SOLUTION INTRAMUSCULAR; INTRAVENOUS at 20:03

## 2021-07-04 RX ADMIN — CHLORHEXIDINE GLUCONATE 15 MILLILITER(S): 213 SOLUTION TOPICAL at 17:06

## 2021-07-04 RX ADMIN — LEVETIRACETAM 400 MILLIGRAM(S): 250 TABLET, FILM COATED ORAL at 17:06

## 2021-07-04 RX ADMIN — HEPARIN SODIUM 5000 UNIT(S): 5000 INJECTION INTRAVENOUS; SUBCUTANEOUS at 22:05

## 2021-07-04 RX ADMIN — MIDAZOLAM HYDROCHLORIDE 2.16 MG/KG/HR: 1 INJECTION, SOLUTION INTRAMUSCULAR; INTRAVENOUS at 11:11

## 2021-07-04 RX ADMIN — HEPARIN SODIUM 5000 UNIT(S): 5000 INJECTION INTRAVENOUS; SUBCUTANEOUS at 13:15

## 2021-07-04 RX ADMIN — PANTOPRAZOLE SODIUM 40 MILLIGRAM(S): 20 TABLET, DELAYED RELEASE ORAL at 13:15

## 2021-07-04 RX ADMIN — PROPOFOL 4.32 MICROGRAM(S)/KG/MIN: 10 INJECTION, EMULSION INTRAVENOUS at 11:11

## 2021-07-04 RX ADMIN — FENTANYL CITRATE 3.6 MICROGRAM(S)/KG/HR: 50 INJECTION INTRAVENOUS at 20:51

## 2021-07-04 RX ADMIN — CHLORHEXIDINE GLUCONATE 1 APPLICATION(S): 213 SOLUTION TOPICAL at 06:03

## 2021-07-04 RX ADMIN — FENTANYL CITRATE 3.6 MICROGRAM(S)/KG/HR: 50 INJECTION INTRAVENOUS at 20:03

## 2021-07-04 RX ADMIN — CHLORHEXIDINE GLUCONATE 15 MILLILITER(S): 213 SOLUTION TOPICAL at 06:01

## 2021-07-04 RX ADMIN — Medication 2 MILLIGRAM(S): at 20:51

## 2021-07-04 RX ADMIN — KETAMINE HYDROCHLORIDE 3.6 MG/KG/HR: 100 INJECTION INTRAMUSCULAR; INTRAVENOUS at 22:05

## 2021-07-04 NOTE — PROGRESS NOTE ADULT - ASSESSMENT
61M PMH epilepsy on Keppra, PE (8/2020 - on eliquis at home), CKD and COVID+ with seizure w/ lingual hematoma requiring emergent trach on 3/25 and tracheal resection 4/21 for stridor and SOB. Multiple readmissions following sgx for SOB    PLAN:    Neurologic:  - Sedated with fentanyl, propofol, versed  - Keppra 1000 BID, + loading dose 1500mg (7/3)  - Currently on 24h EEG  - CT negative no intracranial pathology    Respiratory:   - s/p bronch and trach revision 7/2  - Mechanical ventilation AC/CMV  - Monitor CXR as patient high likelihood of aspiration event    Cardiovascular:   - Hemodynamic monitoring     Gastrointestinal/Nutrition:   - NPO w/ Jevity tube feeds via NGT    Renal/Genitourinary:  -  Higginbotham in place    Hematologic:  - Trend H/H  - c/w SQH for VTE prophylaxis  - s/p 1PRBC (7/3 AM) for downtrending H/H; responded appropriately     Infectious Disease:   - watch leukocytosis  - monitor for aspiration PNA    61M PMH epilepsy on Keppra, PE (8/2020 - on eliquis at home), CKD and COVID+ with seizure w/ lingual hematoma requiring emergent trach on 3/25 and tracheal resection 4/21 for stridor and SOB. Multiple readmissions following sgx for SOB    PLAN:    Neurologic:  - Sedated with fentanyl, propofol, versed  - Keppra 1000 BID, + loading dose 1500mg (7/3)  - Currently on 24h EEG, can d/c keppra once stable/ no seizures  - CT negative no intracranial pathology  - MRI 7/5    Respiratory:   - s/p bronch and trach revision 7/2  - Mechanical ventilation AC/CMV  - Monitor CXR as patient high likelihood of aspiration event    Cardiovascular:   - Hemodynamic monitoring     Gastrointestinal/Nutrition:   - NPO w/ Jevity tube feeds via NGT    Renal/Genitourinary:  -  Higginbotham in place    Hematologic:  - Trend H/H  - c/w SQH for VTE prophylaxis  - s/p 1PRBC (7/3 AM) for downtrending H/H; responded appropriately     Infectious Disease:   - watch leukocytosis  - monitor for aspiration PNA   - f/u pro-calc

## 2021-07-04 NOTE — PROGRESS NOTE ADULT - ASSESSMENT
Patient is a 62 yo man w. hx tracheal resection 4/23/2021, hx of PE on eliquis, hx of COVID-19 3/2021 and new onset seizure disorder (GTCS x 2) in 3/2021 (on keppra 750 mg BID, rEEG neg for seizures, MRI brain not obtained), p/w stridor and SOB and underwent emergent tracheostomy in ED by ENT and revision 7/2. Neurology consulted 7/2 for concern of seizures. Neuro exam as above with intermittent myoclonic jerks of chest/diaphragm and L shoulder. Intact brainstem reflexes. Exam limited by sedation. EEG so far negative for seizures. Patient noted to be anemic to 6.1 yesterday requiring pRBC, mild leukocytosis yesterday as well, now resolved. Patient with elevated Cr, seems increased even from his high baseline 2/2 CKD. Elevated prolactin of 31, unclear significance     Impression   Thoracic/ proximal LUE myoclonus possible 2/2 infectious vs TME vs ?nimbex adverse effect     Recommendations   - Continue Keppra 1g BID   - if eeg negative for seizures/stable can dc   - MRI brain with and without contrast   - Wean sedation as tolerated, will continue to monitor   - Rest of care per primary team     Case discussed with neurology attending Dr. Linn

## 2021-07-04 NOTE — PROGRESS NOTE ADULT - SUBJECTIVE AND OBJECTIVE BOX
Interval History:  Patient seen and examined at the bedside this AM with neurology attending. No acute events overnight.     MEDICATIONS  (STANDING):  chlorhexidine 0.12% Liquid 15 milliLiter(s) Oral Mucosa every 12 hours  chlorhexidine 4% Liquid 1 Application(s) Topical <User Schedule>  fentaNYL   Infusion. 0.5 MICROgram(s)/kG/Hr (3.6 mL/Hr) IV Continuous <Continuous>  heparin   Injectable 5000 Unit(s) SubCutaneous every 8 hours  levETIRAcetam  IVPB 1000 milliGRAM(s) IV Intermittent every 12 hours  midazolam Infusion 0.03 mG/kG/Hr (2.16 mL/Hr) IV Continuous <Continuous>  pantoprazole  Injectable 40 milliGRAM(s) IV Push daily  propofol Infusion 10 MICROgram(s)/kG/Min (4.32 mL/Hr) IV Continuous <Continuous>    MEDICATIONS  (PRN):  sodium chloride 0.9% lock flush 10 milliLiter(s) IV Push every 1 hour PRN Pre/post blood products, medications, blood draw, and to maintain line patency    Allergies  No Known Allergies    Intolerances      ROS: Pertinent positives in HPI, all other ROS were reviewed and are negative.      Vital Signs Last 24 Hrs  T(C): 36.7 (04 Jul 2021 08:00), Max: 36.7 (04 Jul 2021 08:00)  T(F): 98 (04 Jul 2021 08:00), Max: 98 (04 Jul 2021 08:00)  HR: 74 (04 Jul 2021 09:00) (56 - 102)  BP: --  BP(mean): --  RR: 16 (04 Jul 2021 09:00) (16 - 19)  SpO2: 95% (04 Jul 2021 09:00) (60% - 100%)    NEUROLOGICAL EXAM:  MS: AAOX3, fluent, attends b/l; recent and remote memory intact; normal attention, language and fund of knowledge.   CN: VFF, EOMI, PERRL, no ALICIA, no APD,  V1-3 intact, no facial asymmetry, t/p midline, SCM/trap intact.  Eyes-Fundi: no papilledema.  Motor: Strength: 5/5 4x. Tone: normal. Bulk: normal. DTR 2+ symm.  Plantar flex b/l. Sensation: intact to LT/PP/Vibration/Position/Temperature 4x.   Coordination: intact 4x.   Gait:  Romberg negative, pull test negative; walks with narrow base, pivots in 2 steps.      Labs:   cbc                      7.6    10.34 )-----------( 173      ( 04 Jul 2021 02:58 )             24.4     [id number]    144  |  112<H>  |  42<H>  ----------------------------<  179<H>  4.1   |  19<L>  |  3.15<H>    Ca    8.7      04 Jul 2021 02:58  Phos  3.6     07-04  Mg     2.20     07-04      Coags  Lipids  A1C  CardiacMarkersCARDIAC MARKERS ( 03 Jul 2021 01:58 )  x     / x     / 342 U/L / x     / x          LFTs  UA Interval History:  Patient seen and examined at the bedside this AM with neurology attending. No acute events overnight.     MEDICATIONS  (STANDING):  chlorhexidine 0.12% Liquid 15 milliLiter(s) Oral Mucosa every 12 hours  chlorhexidine 4% Liquid 1 Application(s) Topical <User Schedule>  fentaNYL   Infusion. 0.5 MICROgram(s)/kG/Hr (3.6 mL/Hr) IV Continuous <Continuous>  heparin   Injectable 5000 Unit(s) SubCutaneous every 8 hours  levETIRAcetam  IVPB 1000 milliGRAM(s) IV Intermittent every 12 hours  midazolam Infusion 0.03 mG/kG/Hr (2.16 mL/Hr) IV Continuous <Continuous>  pantoprazole  Injectable 40 milliGRAM(s) IV Push daily  propofol Infusion 10 MICROgram(s)/kG/Min (4.32 mL/Hr) IV Continuous <Continuous>    MEDICATIONS  (PRN):  sodium chloride 0.9% lock flush 10 milliLiter(s) IV Push every 1 hour PRN Pre/post blood products, medications, blood draw, and to maintain line patency    Allergies  No Known Allergies    Intolerances      ROS: Pertinent positives in HPI, all other ROS were reviewed and are negative.      Vital Signs Last 24 Hrs  T(C): 36.7 (04 Jul 2021 08:00), Max: 36.7 (04 Jul 2021 08:00)  T(F): 98 (04 Jul 2021 08:00), Max: 98 (04 Jul 2021 08:00)  HR: 74 (04 Jul 2021 09:00) (56 - 102)  BP: --  BP(mean): --  RR: 16 (04 Jul 2021 09:00) (16 - 19)  SpO2: 95% (04 Jul 2021 09:00) (60% - 100%)    NEUROLOGICAL EXAM:  MS: Intubated, sedated on fentanyl, propofol and versed.   CN: No OCR. no facial asymmetry +corneal reflex b/l, + cough. Pupil 2mm and reactive b/l  Motor: Strength: No spontaneous movements. Does not withdraw to painful stimuli. Intermittent clonic movement of chest/diaphragm and L shoulder exacerbated by cough testing  Tone: normal. Bulk: normal.   Plantar flex b/l.   No ankle clonus b/l  Sensation: Does not grimace to painful stimuli x4  Coordination & Gait: unable to assess      Labs:   cbc                      7.6    10.34 )-----------( 173      ( 04 Jul 2021 02:58 )             24.4     [id number]    144  |  112<H>  |  42<H>  ----------------------------<  179<H>  4.1   |  19<L>  |  3.15<H>    Ca    8.7      04 Jul 2021 02:58  Phos  3.6     07-04  Mg     2.20     07-04      Coags  Lipids  A1C  CardiacMarkersCARDIAC MARKERS ( 03 Jul 2021 01:58 )  x     / x     / 342 U/L / x     / x          LFTs  UA

## 2021-07-04 NOTE — PROGRESS NOTE ADULT - ASSESSMENT
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 Had seizure-like activity overnight, EEG ordered. Patient remains sedated, unknown mental status following hypoxemic event; continue sedation weaning as appropriate.   - Patient will require long-term feeding access in the near future.   - Will continue to follow      Jamey Culp PGY6  Thoracic Surgery  [id number]

## 2021-07-04 NOTE — PROGRESS NOTE ADULT - SUBJECTIVE AND OBJECTIVE BOX
Thoracic Surgery  CC: emergent tracheostomy / tracheostomy revision  HPI: 61M h/o severe tracheal stenosis requiring emergent trach [3/25/21] w/eventual tracheal resection / reconstruction [/], placement of tracheal stent [6/3] for residual stenosis who presented w/respiratory failure following a seizure, requiring emergent tracheostomy in the ED [] and subsequent tracheal revision []  24/Overnight events: Patient seen and examined at bedside, stable. Tracheostomy site secure and without signs of infection, receiving appropriate tidal volumes. Had seizure-like activity overnight, EEG ordered. Patient remains sedated, unknown mental status following hypoxemic event.        Objective:  Vital Signs Last 24 Hrs  T(C): 36.7 (2021 08:00), Max: 36.7 (2021 08:00)  T(F): 98 (2021 08:00), Max: 98 (2021 08:00)  HR: 77 (2021 07:00) (56 - 102)  BP: --  BP(mean): --  RR: 16 (2021 07:00) (16 - 19)  SpO2: 100% (2021 07:00) (60% - 100%)    Physical Exam:  General: intubated, sedated; receiving EEG  Respiratory: requiring minimal mechanical ventilatory support [FIO2 40%, PEEP 7]    MEDICATIONS  (STANDING):  chlorhexidine 0.12% Liquid 15 milliLiter(s) Oral Mucosa every 12 hours  chlorhexidine 4% Liquid 1 Application(s) Topical <User Schedule>  fentaNYL   Infusion. 0.5 MICROgram(s)/kG/Hr (3.6 mL/Hr) IV Continuous <Continuous>  heparin   Injectable 5000 Unit(s) SubCutaneous every 8 hours  levETIRAcetam  IVPB 1000 milliGRAM(s) IV Intermittent every 12 hours  midazolam Infusion 0.03 mG/kG/Hr (2.16 mL/Hr) IV Continuous <Continuous>  pantoprazole  Injectable 40 milliGRAM(s) IV Push daily  propofol Infusion 10 MICROgram(s)/kG/Min (4.32 mL/Hr) IV Continuous <Continuous>    MEDICATIONS  (PRN):  sodium chloride 0.9% lock flush 10 milliLiter(s) IV Push every 1 hour PRN Pre/post blood products, medications, blood draw, and to maintain line patency    I&O's Detail    2021 07:01  -  2021 07:00  --------------------------------------------------------  IN:    dextrose 5% + lactated ringers: 675 mL    FentaNYL: 85.2 mL    FentaNYL: 429.6 mL    IV PiggyBack: 100 mL    Jevity 1.2: 650 mL    Midazolam: 46.1 mL    Midazolam: 8.8 mL    Propofol: 304.6 mL    Propofol: 43.7 mL  Total IN: 2343 mL    OUT:    Indwelling Catheter - Urethral (mL): 2255 mL  Total OUT: 2255 mL    Total NET: 88 mL      2021 07:01  -  2021 08:34  --------------------------------------------------------  IN:    FentaNYL: 21.6 mL    Midazolam: 2.9 mL    Propofol: 15.1 mL  Total IN: 39.6 mL    OUT:    Indwelling Catheter - Urethral (mL): 100 mL  Total OUT: 100 mL    Total NET: -60.4 mL        Daily     Daily Weight in k.9 (2021 04:00)    LABS:                        7.6    10.34 )-----------( 173      ( 2021 02:58 )             24.4     07-04    144  |  112<H>  |  42<H>  ----------------------------<  179<H>  4.1   |  19<L>  |  3.15<H>    Ca    8.7      2021 02:58  Phos  3.6     07-04  Mg     2.20     07-04

## 2021-07-04 NOTE — PROGRESS NOTE ADULT - SUBJECTIVE AND OBJECTIVE BOX
Patient seen and examined overnight. POD 2 s/p revsion trach, flex bronch, removal of stent with CTS, seizures overnight. recieve CTH yesterday which was negative, neuro requesting video EEG      P/E  Sedated, 6LPC, trach sutured to skin, trach ties, surgicel removed.   Neck soft, flat      ICU Vital Signs Last 24 Hrs  T(C): 36.7 (04 Jul 2021 08:00), Max: 36.7 (04 Jul 2021 08:00)  T(F): 98 (04 Jul 2021 08:00), Max: 98 (04 Jul 2021 08:00)  HR: 77 (04 Jul 2021 07:00) (56 - 102)  BP: --  BP(mean): --  ABP: 124/64 (04 Jul 2021 07:00) (111/63 - 165/84)  ABP(mean): 83 (04 Jul 2021 07:00) (77 - 108)  RR: 16 (04 Jul 2021 07:00) (16 - 19)  SpO2: 100% (04 Jul 2021 07:00) (60% - 100%)        A/P: 60 y/o male s/p tracheal resection 4/23/21 presents to [facility] ER w c/o stridor and SOB. Difficulty breating is worse at night and prevents him from being able to sleep.  He denies productive cough or fever.  This is his 3rd readmission after his tracheal resection  PT has PMHx COVID+ (3/2021 - not hospitalized), PE (8/2020 - on eliquis at home), CKD, w/ recent hospitalization at [facility] after seizure w/ lingual hematoma requiring emergency tracheostomy on 3/25/21.  Patient underwent Tracheal Resection and Reconstruction on 4/23. ENT injected vocal cords few months ago. He was last discharged 5/18/21. s/p emergent cric 7/1 now s/p revision trach, flex bronch and removal of stent 7/2 with post-op course complicated by seizures  - Patient has a critical airway, only ENT to manipulate airway  - Routine trach care by nursing  - Suction PRN  - Extra 6LPC and 4LPC at bedside  - Vent per SICU  - video EEG as per neuro   - Tube feeds per SICU   - Call or page us if any issues

## 2021-07-04 NOTE — EEG REPORT - NS EEG TEXT BOX
[facility]   COMPREHENSIVE EPILEPSY CENTER   REPORT OF CONTINUOUS VIDEO EEG     [facility]: [address], [place] [address], Ph#: [phone]  [facility]: [phone] [place] [address], [place] [address], Ph#: [phone]  Office: [address] [place] [address], [place] [address], [place] [address] Ph#: [phone]    Patient Name: ANNEMARIE KELLEY  Age and : 61y (56)  MRN #: [id number]  Location: [place] [address]  Referring Physician: Aram Roland    Study Date: 2021- 2021 11:00 am -08:00 am 21 hours    _____________________________________________________________  STUDY INFORMATION    EEG Recording Technique:  The patient underwent continuous Video-EEG monitoring, using Telemetry System hardware on the XLTek Digital System. EEG and video data were stored on a computer hard drive with important events saved in digital archive files. The material was reviewed by a physician (electroencephalographer / epileptologist) on a daily basis. Jonathan and seizure detection algorithms were utilized and reviewed. An EEG Technician attended to the patient, and was available throughout daytime work hours.  The epilepsy center neurologist was available in person or on call 24-hours per day.    EEG Placement and Labeling of Electrodes:  The EEG was performed utilizing 20 channel referential EEG connections (coronal over temporal over parasagittal montage) using all standard 10-20 electrode placements with EKG, with additional electrodes placed in the inferior temporal region using the modified 10-10 montage electrode placements for elective admissions, or if deemed necessary. Recording was at a sampling rate of 256 samples per second per channel. Time synchronized digital video recording was done simultaneously with EEG recording. A low light infrared camera was used for low light recording.     _____________________________________________________________  HISTORY    Patient is a 61y old  Male who presents with a chief complaint of respiratory failure / seizure (2021 08:33)      PERTINENT MEDICATION:  MEDICATIONS  (STANDING):  chlorhexidine 0.12% Liquid 15 milliLiter(s) Oral Mucosa every 12 hours  chlorhexidine 4% Liquid 1 Application(s) Topical <User Schedule>  dornase walt Solution 2.5 milliGRAM(s) Inhalation once  fentaNYL   Infusion. 0.5 MICROgram(s)/kG/Hr (3.6 mL/Hr) IV Continuous <Continuous>  heparin   Injectable 5000 Unit(s) SubCutaneous every 8 hours  levETIRAcetam  IVPB 1000 milliGRAM(s) IV Intermittent every 12 hours  LORazepam   Injectable 2 milliGRAM(s) IV Push once  midazolam Infusion 0.03 mG/kG/Hr (2.16 mL/Hr) IV Continuous <Continuous>  pantoprazole  Injectable 40 milliGRAM(s) IV Push daily  propofol Infusion 10 MICROgram(s)/kG/Min (4.32 mL/Hr) IV Continuous <Continuous>    _____________________________________________________________  INTERPRETATION    Findings: The background was discontinuous minimally variable and reactive.   Background predominantly consisted of theta and delta activities. No posterior dominant rhythm seen.    Burst of generalized spikes for 2-3 seconds with inter burst periods of suppression of 1-3 seconds were noticed.    Focal Slowing:   None were present.    Sleep Background:  Drowsiness and stage II sleep transients were not recorded.    Other Non-Epileptiform Findings:  None were present.    Interictal Epileptiform Activity:   None were present.    Events:  Clinical events: None recorded.  Seizures: None recorded.    Artifacts:  Intermittent myogenic and movement artifacts were noted.    ECG:  The heart rate on single channel ECG was predominantly between 60-80 BPM.    _____________________________________________________________  EEG SUMMARY/CLASSIFICATION    Abnormal EEG in a sedated patient.  - Moderate to severe generalized slowing with burst suppression pattern.    _____________________________________________________________  EEG IMPRESSION/CLINICAL CORRELATE    Abnormal EEG study.    Moderate to severe nonspecific diffuse or multifocal cerebral dysfunction with burst suppression pattern   No epileptiform pattern or seizure seen.        Meghann Vo MD  Epilepsy Attending, [facility] Epilepsy [place]

## 2021-07-04 NOTE — PROGRESS NOTE ADULT - SUBJECTIVE AND OBJECTIVE BOX
SICU Daily Progress Note  =====================================================    7/1 s/p emergent cric in ED for loss of airway following seizure and emesis poss aspiration  7/2 s/p bronch and revision tracheostomy     Interval/Overnight Events:       - tube feeds started  - Increased propofol for seizure activity; + Loading dose of Keppra (1500mg)  - Keppra 750 BID started  - EEG leads placed today, on 24h video EEG having twiches  - increased to 1000 BID overnight per neurology       POD #  2      	SICU Day #   4    HPI:    Patient is a 60 y/o male s/p tracheal resection 4/23/21 due to stenosis following emergent cric on 3/25/21 due to tongue ligual hematoma 2/2 loss of airway during siezure now presenting to [facility] ER w c/o stridor and SOB. Patient refers symptoms worse at night interfering with sleep and denies associated cough or fever. Patient with multiple previous admissions for SOB and post tracheal resection respiratory symptoms.  PMHx COVID+ (3/2021 - not hospitalized), PE (8/2020 - on eliquis at home), CKD. Patient underwent Tracheal Resection and Reconstruction on 4/23.     Allergies: No Known Allergies      MEDICATIONS:   --------------------------------------------------------------------------------------  Neurologic Medications  fentaNYL   Infusion. 0.5 MICROgram(s)/kG/Hr IV Continuous <Continuous>  levETIRAcetam  IVPB 1000 milliGRAM(s) IV Intermittent every 12 hours  midazolam Infusion 0.03 mG/kG/Hr IV Continuous <Continuous>  propofol Infusion 10 MICROgram(s)/kG/Min IV Continuous <Continuous>    Gastrointestinal Medications  pantoprazole  Injectable 40 milliGRAM(s) IV Push daily  sodium chloride 0.9% lock flush 10 milliLiter(s) IV Push every 1 hour PRN Pre/post blood products, medications, blood draw, and to maintain line patency    Hematologic/Oncologic Medications  heparin   Injectable 5000 Unit(s) SubCutaneous every 8 hours    Topical/Other Medications  chlorhexidine 0.12% Liquid 15 milliLiter(s) Oral Mucosa every 12 hours  chlorhexidine 4% Liquid 1 Application(s) Topical <User Schedule>    --------------------------------------------------------------------------------------  Vital Signs Last 24 Hrs  T(C): 36.1 (04 Jul 2021 00:00), Max: 36.9 (03 Jul 2021 04:00)  T(F): 97 (04 Jul 2021 00:00), Max: 98.4 (03 Jul 2021 04:00)  HR: 65 (04 Jul 2021 00:00) (56 - 79)  BP: --  BP(mean): --  RR: 16 (04 Jul 2021 00:00) (16 - 18)  SpO2: 97% (04 Jul 2021 00:00) (97% - 100%)  --------------------------------------------------------------------------------------    02 Jul 2021 07:01  -  03 Jul 2021 07:00  --------------------------------------------------------  IN:    Cisatracurium: 100.8 mL    dextrose 5% + lactated ringers: 1800 mL    FentaNYL: 471.6 mL    IV PiggyBack: 150 mL    Midazolam: 35.2 mL    PRBCs (Packed Red Blood Cells): 300 mL    Propofol: 229.2 mL  Total IN: 3086.8 mL    OUT:    Indwelling Catheter - Urethral (mL): 1825 mL    Nasogastric/Oral tube (mL): 200 mL  Total OUT: 2025 mL    Total NET: 1061.8 mL      03 Jul 2021 07:01  -  04 Jul 2021 01:01  --------------------------------------------------------  IN:    dextrose 5% + lactated ringers: 675 mL    FentaNYL: 85.2 mL    FentaNYL: 278.4 mL    Jevity 1.2: 400 mL    Midazolam: 8.8 mL    Midazolam: 28.6 mL    Propofol: 198.9 mL    Propofol: 43.7 mL  Total IN: 1718.6 mL    OUT:    Indwelling Catheter - Urethral (mL): 1705 mL  Total OUT: 1705 mL    Total NET: 13.6 mL      --------------------------------------------------------------------------------------    EXAM  NEUROLOGY  Exam: sedated    HEENT  Exam: trach in place, EEG in place     RESPIRATORY  Exam: nonlabored respirations     CARDIOVASCULAR  Exam: RRR on monitor     GI/NUTRITION  Exam: Abdomen soft, NTND        HEMATOLOGIC  [x] VTE Prophylaxis: heparin   Injectable 5000 Unit(s) SubCutaneous every 8 hours    Transfusions:	[] PRBC	[] Platelets		[] FFP	[] Cryoprecipitate    INFECTIOUS DISEASE  Antimicrobials/Immunologic Medications:    Tubes/Lines/Drains:   - Higginbotham  - Right radial A line  - NGT  - R PIV  - L PIV   - R Fem Central       LABS  --------------------------------------------------------------------------------------    LABS:  07-03 @ 01:58 Creatine 342 U/L<H> [30 - 200]  cret                        7.7    14.57 )-----------( 173      ( 03 Jul 2021 09:12 )             23.6     07-03    142  |  108<H>  |  48<H>  ----------------------------<  162<H>  4.3   |  20<L>  |  3.77<H>    Ca    8.9      03 Jul 2021 01:58  Phos  4.5     07-03  Mg     1.90     07-03      PT/INR - ( 02 Jul 2021 02:20 )   PT: 16.1 sec;   INR: 1.42 ratio         PTT - ( 02 Jul 2021 02:20 )  PTT:48.7 sec  --------------------------------------------------------------------------------------     SICU Daily Progress Note  =====================================================    7/1 s/p emergent cric in ED for loss of airway following seizure and emesis poss aspiration  7/2 s/p bronch and revision tracheostomy     Interval/Overnight Events:       - tube feeds started  - increased to 1000 BID overnight per neurology after continuing seizures  - per neuro, consdier d/c keppra once pt stable/ no seizures  - f/u pro-calcitonin      POD #  2      	SICU Day #   4    HPI:    Patient is a 62 y/o male s/p tracheal resection 4/23/21 due to stenosis following emergent cric on 3/25/21 due to tongue ligual hematoma 2/2 loss of airway during siezure now presenting to [facility] ER w c/o stridor and SOB. Patient refers symptoms worse at night interfering with sleep and denies associated cough or fever. Patient with multiple previous admissions for SOB and post tracheal resection respiratory symptoms.  PMHx COVID+ (3/2021 - not hospitalized), PE (8/2020 - on eliquis at home), CKD. Patient underwent Tracheal Resection and Reconstruction on 4/23.     Allergies: No Known Allergies      MEDICATIONS:   --------------------------------------------------------------------------------------  Neurologic Medications  fentaNYL   Infusion. 0.5 MICROgram(s)/kG/Hr IV Continuous <Continuous>  levETIRAcetam  IVPB 1000 milliGRAM(s) IV Intermittent every 12 hours  midazolam Infusion 0.03 mG/kG/Hr IV Continuous <Continuous>  propofol Infusion 10 MICROgram(s)/kG/Min IV Continuous <Continuous>    Gastrointestinal Medications  pantoprazole  Injectable 40 milliGRAM(s) IV Push daily  sodium chloride 0.9% lock flush 10 milliLiter(s) IV Push every 1 hour PRN Pre/post blood products, medications, blood draw, and to maintain line patency    Hematologic/Oncologic Medications  heparin   Injectable 5000 Unit(s) SubCutaneous every 8 hours    Topical/Other Medications  chlorhexidine 0.12% Liquid 15 milliLiter(s) Oral Mucosa every 12 hours  chlorhexidine 4% Liquid 1 Application(s) Topical <User Schedule>    --------------------------------------------------------------------------------------  Vital Signs Last 24 Hrs  T(C): 36.1 (04 Jul 2021 00:00), Max: 36.9 (03 Jul 2021 04:00)  T(F): 97 (04 Jul 2021 00:00), Max: 98.4 (03 Jul 2021 04:00)  HR: 65 (04 Jul 2021 00:00) (56 - 79)  BP: --  BP(mean): --  RR: 16 (04 Jul 2021 00:00) (16 - 18)  SpO2: 97% (04 Jul 2021 00:00) (97% - 100%)  --------------------------------------------------------------------------------------    02 Jul 2021 07:01  -  03 Jul 2021 07:00  --------------------------------------------------------  IN:    Cisatracurium: 100.8 mL    dextrose 5% + lactated ringers: 1800 mL    FentaNYL: 471.6 mL    IV PiggyBack: 150 mL    Midazolam: 35.2 mL    PRBCs (Packed Red Blood Cells): 300 mL    Propofol: 229.2 mL  Total IN: 3086.8 mL    OUT:    Indwelling Catheter - Urethral (mL): 1825 mL    Nasogastric/Oral tube (mL): 200 mL  Total OUT: 2025 mL    Total NET: 1061.8 mL      03 Jul 2021 07:01  -  04 Jul 2021 01:01  --------------------------------------------------------  IN:    dextrose 5% + lactated ringers: 675 mL    FentaNYL: 85.2 mL    FentaNYL: 278.4 mL    Jevity 1.2: 400 mL    Midazolam: 8.8 mL    Midazolam: 28.6 mL    Propofol: 198.9 mL    Propofol: 43.7 mL  Total IN: 1718.6 mL    OUT:    Indwelling Catheter - Urethral (mL): 1705 mL  Total OUT: 1705 mL    Total NET: 13.6 mL      --------------------------------------------------------------------------------------    EXAM  NEUROLOGY  Exam: sedated    HEENT  Exam: trach in place, EEG in place     RESPIRATORY  Exam: nonlabored respirations     CARDIOVASCULAR  Exam: RRR on monitor     GI/NUTRITION  Exam: Abdomen soft, NTND        HEMATOLOGIC  [x] VTE Prophylaxis: heparin   Injectable 5000 Unit(s) SubCutaneous every 8 hours    Transfusions:	[] PRBC	[] Platelets		[] FFP	[] Cryoprecipitate    INFECTIOUS DISEASE  Antimicrobials/Immunologic Medications:    Tubes/Lines/Drains:   - Higginbotham  - Right radial A line  - NGT  - R PIV  - L PIV   - R Fem Central       LABS  --------------------------------------------------------------------------------------    LABS:  07-03 @ 01:58 Creatine 342 U/L<H> [30 - 200]  cret                        7.7    14.57 )-----------( 173      ( 03 Jul 2021 09:12 )             23.6     07-03    142  |  108<H>  |  48<H>  ----------------------------<  162<H>  4.3   |  20<L>  |  3.77<H>    Ca    8.9      03 Jul 2021 01:58  Phos  4.5     07-03  Mg     1.90     07-03      PT/INR - ( 02 Jul 2021 02:20 )   PT: 16.1 sec;   INR: 1.42 ratio         PTT - ( 02 Jul 2021 02:20 )  PTT:48.7 sec  --------------------------------------------------------------------------------------

## 2021-07-05 LAB
ALBUMIN SERPL ELPH-MCNC: 2.7 G/DL — LOW (ref 3.3–5)
AMMONIA BLD-MCNC: 50 UMOL/L — SIGNIFICANT CHANGE UP (ref 11–55)
ANION GAP SERPL CALC-SCNC: 12 MMOL/L — SIGNIFICANT CHANGE UP (ref 7–14)
APPEARANCE UR: ABNORMAL
BASE EXCESS BLDA CALC-SCNC: -3.9 MMOL/L — LOW (ref -2–2)
BASE EXCESS BLDV CALC-SCNC: -2.3 MMOL/L — SIGNIFICANT CHANGE UP (ref -3–2)
BILIRUB UR-MCNC: NEGATIVE — SIGNIFICANT CHANGE UP
BLOOD GAS ARTERIAL - LYTES,HGB,ICA,LACT RESULT: SIGNIFICANT CHANGE UP
BLOOD GAS VENOUS - CREATININE: SIGNIFICANT CHANGE UP MG/DL (ref 0.5–1.3)
BLOOD GAS VENOUS COMPREHENSIVE RESULT: SIGNIFICANT CHANGE UP
BUN SERPL-MCNC: 43 MG/DL — HIGH (ref 7–23)
CALCIUM SERPL-MCNC: 8.8 MG/DL — SIGNIFICANT CHANGE UP (ref 8.4–10.5)
CHLORIDE BLDV-SCNC: 119 MMOL/L — HIGH (ref 96–108)
CHLORIDE SERPL-SCNC: 112 MMOL/L — HIGH (ref 98–107)
CK SERPL-CCNC: 161 U/L — SIGNIFICANT CHANGE UP (ref 30–200)
CO2 SERPL-SCNC: 20 MMOL/L — LOW (ref 22–31)
COLOR SPEC: SIGNIFICANT CHANGE UP
CREAT SERPL-MCNC: 3.24 MG/DL — HIGH (ref 0.5–1.3)
DIFF PNL FLD: ABNORMAL
GAS PNL BLDV: 149 MMOL/L — HIGH (ref 136–146)
GLUCOSE BLDV-MCNC: 144 MG/DL — HIGH (ref 70–99)
GLUCOSE SERPL-MCNC: 144 MG/DL — HIGH (ref 70–99)
GLUCOSE UR QL: NEGATIVE — SIGNIFICANT CHANGE UP
GRAM STN FLD: SIGNIFICANT CHANGE UP
HCO3 BLDA-SCNC: 21 MMOL/L — LOW (ref 22–26)
HCO3 BLDV-SCNC: 20 MMOL/L — SIGNIFICANT CHANGE UP (ref 20–27)
HCT VFR BLD CALC: 25.9 % — LOW (ref 39–50)
HCT VFR BLDA CALC: 45.7 % — SIGNIFICANT CHANGE UP (ref 39–51)
HGB BLD CALC-MCNC: 14.9 G/DL — SIGNIFICANT CHANGE UP (ref 13–17)
HGB BLD-MCNC: 7.8 G/DL — LOW (ref 13–17)
KETONES UR-MCNC: NEGATIVE — SIGNIFICANT CHANGE UP
LACTATE BLDV-MCNC: 1.1 MMOL/L — SIGNIFICANT CHANGE UP (ref 0.5–2)
LEUKOCYTE ESTERASE UR-ACNC: ABNORMAL
MAGNESIUM SERPL-MCNC: 2.3 MG/DL — SIGNIFICANT CHANGE UP (ref 1.6–2.6)
MCHC RBC-ENTMCNC: 28.7 PG — SIGNIFICANT CHANGE UP (ref 27–34)
MCHC RBC-ENTMCNC: 30.1 GM/DL — LOW (ref 32–36)
MCV RBC AUTO: 95.2 FL — SIGNIFICANT CHANGE UP (ref 80–100)
NITRITE UR-MCNC: POSITIVE
NRBC # BLD: 0 /100 WBCS — SIGNIFICANT CHANGE UP
NRBC # FLD: 0 K/UL — SIGNIFICANT CHANGE UP
PCO2 BLDA: 43 MMHG — SIGNIFICANT CHANGE UP (ref 35–48)
PCO2 BLDV: 55 MMHG — HIGH (ref 41–51)
PH BLDA: 7.31 — LOW (ref 7.35–7.45)
PH BLDV: 7.26 — LOW (ref 7.32–7.43)
PH UR: 6.5 — SIGNIFICANT CHANGE UP (ref 5–8)
PHOSPHATE SERPL-MCNC: 4.6 MG/DL — HIGH (ref 2.5–4.5)
PLATELET # BLD AUTO: 194 K/UL — SIGNIFICANT CHANGE UP (ref 150–400)
PO2 BLDA: 72 MMHG — LOW (ref 83–108)
PO2 BLDV: 28 MMHG — LOW (ref 35–40)
POTASSIUM BLDV-SCNC: 4.5 MMOL/L — SIGNIFICANT CHANGE UP (ref 3.4–4.5)
POTASSIUM SERPL-MCNC: 4.5 MMOL/L — SIGNIFICANT CHANGE UP (ref 3.5–5.3)
POTASSIUM SERPL-SCNC: 4.5 MMOL/L — SIGNIFICANT CHANGE UP (ref 3.5–5.3)
PROCALCITONIN SERPL-MCNC: 1.37 NG/ML — HIGH (ref 0.02–0.1)
PROLACTIN SERPL-MCNC: 33.1 NG/ML — HIGH (ref 4.1–18.4)
PROT UR-MCNC: ABNORMAL
RBC # BLD: 2.72 M/UL — LOW (ref 4.2–5.8)
RBC # FLD: 15.3 % — HIGH (ref 10.3–14.5)
SAO2 % BLDA: 93.8 % — LOW (ref 95–99)
SAO2 % BLDV: 46.6 % — LOW (ref 60–85)
SODIUM SERPL-SCNC: 144 MMOL/L — SIGNIFICANT CHANGE UP (ref 135–145)
SP GR SPEC: 1.02 — SIGNIFICANT CHANGE UP (ref 1.01–1.02)
SPECIMEN SOURCE: SIGNIFICANT CHANGE UP
UROBILINOGEN FLD QL: SIGNIFICANT CHANGE UP
WBC # BLD: 8.73 K/UL — SIGNIFICANT CHANGE UP (ref 3.8–10.5)
WBC # FLD AUTO: 8.73 K/UL — SIGNIFICANT CHANGE UP (ref 3.8–10.5)

## 2021-07-05 PROCEDURE — 99292 CRITICAL CARE ADDL 30 MIN: CPT

## 2021-07-05 PROCEDURE — 99233 SBSQ HOSP IP/OBS HIGH 50: CPT | Mod: GC

## 2021-07-05 PROCEDURE — 71045 X-RAY EXAM CHEST 1 VIEW: CPT | Mod: 26

## 2021-07-05 PROCEDURE — 99291 CRITICAL CARE FIRST HOUR: CPT

## 2021-07-05 RX ORDER — VALPROIC ACID (AS SODIUM SALT) 250 MG/5ML
500 SOLUTION, ORAL ORAL THREE TIMES A DAY
Refills: 0 | Status: DISCONTINUED | OUTPATIENT
Start: 2021-07-05 | End: 2021-07-15

## 2021-07-05 RX ORDER — PIPERACILLIN AND TAZOBACTAM 4; .5 G/20ML; G/20ML
3.38 INJECTION, POWDER, LYOPHILIZED, FOR SOLUTION INTRAVENOUS EVERY 12 HOURS
Refills: 0 | Status: COMPLETED | OUTPATIENT
Start: 2021-07-05 | End: 2021-07-12

## 2021-07-05 RX ORDER — CEFTRIAXONE 500 MG/1
1000 INJECTION, POWDER, FOR SOLUTION INTRAMUSCULAR; INTRAVENOUS EVERY 24 HOURS
Refills: 0 | Status: DISCONTINUED | OUTPATIENT
Start: 2021-07-05 | End: 2021-07-05

## 2021-07-05 RX ORDER — VALPROIC ACID (AS SODIUM SALT) 250 MG/5ML
1500 SOLUTION, ORAL ORAL ONCE
Refills: 0 | Status: COMPLETED | OUTPATIENT
Start: 2021-07-05 | End: 2021-07-05

## 2021-07-05 RX ORDER — CALCIUM GLUCONATE 100 MG/ML
1 VIAL (ML) INTRAVENOUS ONCE
Refills: 0 | Status: DISCONTINUED | OUTPATIENT
Start: 2021-07-05 | End: 2021-07-05

## 2021-07-05 RX ORDER — HYDRALAZINE HCL 50 MG
10 TABLET ORAL ONCE
Refills: 0 | Status: COMPLETED | OUTPATIENT
Start: 2021-07-05 | End: 2021-07-05

## 2021-07-05 RX ORDER — SODIUM CHLORIDE 9 MG/ML
1000 INJECTION, SOLUTION INTRAVENOUS
Refills: 0 | Status: DISCONTINUED | OUTPATIENT
Start: 2021-07-05 | End: 2021-07-06

## 2021-07-05 RX ORDER — PIPERACILLIN AND TAZOBACTAM 4; .5 G/20ML; G/20ML
3.38 INJECTION, POWDER, LYOPHILIZED, FOR SOLUTION INTRAVENOUS ONCE
Refills: 0 | Status: COMPLETED | OUTPATIENT
Start: 2021-07-05 | End: 2021-07-05

## 2021-07-05 RX ORDER — LABETALOL HCL 100 MG
10 TABLET ORAL ONCE
Refills: 0 | Status: COMPLETED | OUTPATIENT
Start: 2021-07-05 | End: 2021-07-05

## 2021-07-05 RX ORDER — VANCOMYCIN HCL 1 G
1000 VIAL (EA) INTRAVENOUS ONCE
Refills: 0 | Status: COMPLETED | OUTPATIENT
Start: 2021-07-05 | End: 2021-07-05

## 2021-07-05 RX ORDER — LABETALOL HCL 100 MG
100 TABLET ORAL THREE TIMES A DAY
Refills: 0 | Status: DISCONTINUED | OUTPATIENT
Start: 2021-07-05 | End: 2021-07-14

## 2021-07-05 RX ORDER — ACETAMINOPHEN 500 MG
975 TABLET ORAL EVERY 6 HOURS
Refills: 0 | Status: DISCONTINUED | OUTPATIENT
Start: 2021-07-05 | End: 2021-07-15

## 2021-07-05 RX ORDER — ALBUTEROL 90 UG/1
2 AEROSOL, METERED ORAL EVERY 4 HOURS
Refills: 0 | Status: DISCONTINUED | OUTPATIENT
Start: 2021-07-05 | End: 2021-07-29

## 2021-07-05 RX ADMIN — PROPOFOL 4.32 MICROGRAM(S)/KG/MIN: 10 INJECTION, EMULSION INTRAVENOUS at 12:18

## 2021-07-05 RX ADMIN — Medication 975 MILLIGRAM(S): at 19:33

## 2021-07-05 RX ADMIN — FENTANYL CITRATE 3.6 MICROGRAM(S)/KG/HR: 50 INJECTION INTRAVENOUS at 12:18

## 2021-07-05 RX ADMIN — CHLORHEXIDINE GLUCONATE 15 MILLILITER(S): 213 SOLUTION TOPICAL at 19:33

## 2021-07-05 RX ADMIN — FENTANYL CITRATE 3.6 MICROGRAM(S)/KG/HR: 50 INJECTION INTRAVENOUS at 19:50

## 2021-07-05 RX ADMIN — PIPERACILLIN AND TAZOBACTAM 200 GRAM(S): 4; .5 INJECTION, POWDER, LYOPHILIZED, FOR SOLUTION INTRAVENOUS at 18:53

## 2021-07-05 RX ADMIN — Medication 975 MILLIGRAM(S): at 11:17

## 2021-07-05 RX ADMIN — Medication 2 MILLIGRAM(S): at 03:32

## 2021-07-05 RX ADMIN — Medication 10 MILLIGRAM(S): at 17:50

## 2021-07-05 RX ADMIN — Medication 975 MILLIGRAM(S): at 16:27

## 2021-07-05 RX ADMIN — Medication 32.5 MILLIGRAM(S): at 22:14

## 2021-07-05 RX ADMIN — CEFTRIAXONE 100 MILLIGRAM(S): 500 INJECTION, POWDER, FOR SOLUTION INTRAMUSCULAR; INTRAVENOUS at 05:34

## 2021-07-05 RX ADMIN — Medication 2 MILLIGRAM(S): at 17:34

## 2021-07-05 RX ADMIN — PANTOPRAZOLE SODIUM 40 MILLIGRAM(S): 20 TABLET, DELAYED RELEASE ORAL at 12:17

## 2021-07-05 RX ADMIN — Medication 2 MILLIGRAM(S): at 14:54

## 2021-07-05 RX ADMIN — Medication 975 MILLIGRAM(S): at 19:00

## 2021-07-05 RX ADMIN — Medication 10 MILLIGRAM(S): at 05:51

## 2021-07-05 RX ADMIN — Medication 975 MILLIGRAM(S): at 06:21

## 2021-07-05 RX ADMIN — Medication 250 MILLIGRAM(S): at 19:49

## 2021-07-05 RX ADMIN — FENTANYL CITRATE 3.6 MICROGRAM(S)/KG/HR: 50 INJECTION INTRAVENOUS at 09:44

## 2021-07-05 RX ADMIN — PROPOFOL 4.32 MICROGRAM(S)/KG/MIN: 10 INJECTION, EMULSION INTRAVENOUS at 19:49

## 2021-07-05 RX ADMIN — LEVETIRACETAM 400 MILLIGRAM(S): 250 TABLET, FILM COATED ORAL at 17:51

## 2021-07-05 RX ADMIN — SODIUM CHLORIDE 75 MILLILITER(S): 9 INJECTION, SOLUTION INTRAVENOUS at 18:54

## 2021-07-05 RX ADMIN — HEPARIN SODIUM 5000 UNIT(S): 5000 INJECTION INTRAVENOUS; SUBCUTANEOUS at 14:27

## 2021-07-05 RX ADMIN — Medication 2 MILLIGRAM(S): at 21:45

## 2021-07-05 RX ADMIN — CHLORHEXIDINE GLUCONATE 1 APPLICATION(S): 213 SOLUTION TOPICAL at 05:34

## 2021-07-05 RX ADMIN — CHLORHEXIDINE GLUCONATE 15 MILLILITER(S): 213 SOLUTION TOPICAL at 05:44

## 2021-07-05 RX ADMIN — Medication 975 MILLIGRAM(S): at 06:52

## 2021-07-05 RX ADMIN — LEVETIRACETAM 400 MILLIGRAM(S): 250 TABLET, FILM COATED ORAL at 05:34

## 2021-07-05 RX ADMIN — HEPARIN SODIUM 5000 UNIT(S): 5000 INJECTION INTRAVENOUS; SUBCUTANEOUS at 21:21

## 2021-07-05 RX ADMIN — HEPARIN SODIUM 5000 UNIT(S): 5000 INJECTION INTRAVENOUS; SUBCUTANEOUS at 05:33

## 2021-07-05 RX ADMIN — SODIUM CHLORIDE 75 MILLILITER(S): 9 INJECTION, SOLUTION INTRAVENOUS at 19:49

## 2021-07-05 NOTE — PROGRESS NOTE ADULT - SUBJECTIVE AND OBJECTIVE BOX
Thoracic Surgery  CC: emergent tracheostomy / tracheostomy revision  HPI: 61M h/o severe tracheal stenosis requiring emergent trach [3/25/21] w/eventual tracheal resection / reconstruction [/], placement of tracheal stent [6/3] for residual stenosis who presented w/respiratory failure following a seizure, requiring emergent tracheostomy in the ED [] and subsequent tracheal revision []  24/Overnight events: Patient seen and examined at bedside, stable. Tracheostomy site secure and without signs of infection, receiving appropriate tidal volumes. Had seizure-like activity upon initial presentation, EEG without epileptiform patterns or seizures. Patient remains sedated, unknown mental status following hypoxemic event;  MRI pending.      Objective:  Vital Signs Last 24 Hrs  T(C): 39.2 (2021 08:00), Max: 39.4 (2021 04:00)  T(F): 102.6 (2021 08:00), Max: 102.9 (2021 04:00)  HR: 97 (2021 08:00) (74 - 107)  BP: --  BP(mean): --  RR: 20 (2021 08:00) (16 - 22)  SpO2: 97% (2021 08:00) (92% - 100%)    Physical Exam:  General: intubated, sedated   Respiratory: requiring moderate mechanical ventilatory support [FIO2 40%, PEEP 10]      MEDICATIONS  (STANDING):  ALBUTerol    90 MICROgram(s) HFA Inhaler 2 Puff(s) Inhalation every 4 hours  cefTRIAXone   IVPB 1000 milliGRAM(s) IV Intermittent every 24 hours  chlorhexidine 0.12% Liquid 15 milliLiter(s) Oral Mucosa every 12 hours  chlorhexidine 4% Liquid 1 Application(s) Topical <User Schedule>  dornase walt Solution 2.5 milliGRAM(s) Inhalation once  fentaNYL   Infusion. 0.5 MICROgram(s)/kG/Hr (3.6 mL/Hr) IV Continuous <Continuous>  heparin   Injectable 5000 Unit(s) SubCutaneous every 8 hours  ketamine Infusion. 0.5 mG/kG/Hr (3.6 mL/Hr) IV Continuous <Continuous>  labetalol 100 milliGRAM(s) Oral three times a day  levETIRAcetam  IVPB 1000 milliGRAM(s) IV Intermittent every 12 hours  pantoprazole  Injectable 40 milliGRAM(s) IV Push daily  propofol Infusion 10 MICROgram(s)/kG/Min (4.32 mL/Hr) IV Continuous <Continuous>    MEDICATIONS  (PRN):  acetaminophen    Suspension .. 975 milliGRAM(s) Oral every 6 hours PRN Temp greater or equal to 38.5C (101.3F)  sodium chloride 0.9% lock flush 10 milliLiter(s) IV Push every 1 hour PRN Pre/post blood products, medications, blood draw, and to maintain line patency    I&O's Detail    2021 07:01  -  2021 07:00  --------------------------------------------------------  IN:    FentaNYL: 471.6 mL    IV PiggyBack: 150 mL    Jevity 1.2: 1050 mL    Ketamine: 36.6 mL    Midazolam: 45.7 mL    Propofol: 277.9 mL  Total IN: 2031.8 mL    OUT:    Indwelling Catheter - Urethral (mL): 2430 mL  Total OUT: 2430 mL    Total NET: -398.2 mL      2021 07:01  -  2021 08:45  --------------------------------------------------------  IN:    FentaNYL: 28.8 mL    Ketamine: 7.8 mL  Total IN: 36.6 mL    OUT:    Indwelling Catheter - Urethral (mL): 60 mL  Total OUT: 60 mL    Total NET: -23.4 mL        Daily     Daily Weight in k.9 (2021 06:00)    LABS:                        7.8    8.73  )-----------( 194      ( 2021 01:22 )             25.9     07-    144  |  112<H>  |  43<H>  ----------------------------<  144<H>  4.5   |  20<L>  |  3.24<H>    Ca    8.8      2021 01:22  Phos  4.6     07-05  Mg     2.30     07-05        Urinalysis Basic - ( 2021 01:57 )    Color: Light Yellow / Appearance: Slightly Turbid / S.018 / pH: x  Gluc: x / Ketone: Negative  / Bili: Negative / Urobili: <2 mg/dL   Blood: x / Protein: 100 mg/dL / Nitrite: Positive   Leuk Esterase: Large / RBC: 20-30 /HPF / WBC >50 /HPF   Sq Epi: x / Non Sq Epi: 0-2 /HPF / Bacteria: Many

## 2021-07-05 NOTE — EEG REPORT - NS EEG TEXT BOX
[facility]   COMPREHENSIVE EPILEPSY CENTER   REPORT OF CONTINUOUS VIDEO EEG     [facility]: [address], [place] [address], Ph#: [phone]  [facility]: [address], [place] [address], Ph#: [phone]  Office: [address] [place] [address], [place] [address], [place] [address] Ph#: [phone]    Patient Name: ANNEMARIE KELLEY  Age and : 61y (56)  MRN #: [id number]  Location: [place] [address]  Referring Physician: Aram Roland    Study Date: 2021- 2021 08:00 am -08:00 am 24 hours    _____________________________________________________________  STUDY INFORMATION    EEG Recording Technique:  The patient underwent continuous Video-EEG monitoring, using Telemetry System hardware on the XLTek Digital System. EEG and video data were stored on a computer hard drive with important events saved in digital archive files. The material was reviewed by a physician (electroencephalographer / epileptologist) on a daily basis. Jonathan and seizure detection algorithms were utilized and reviewed. An EEG Technician attended to the patient, and was available throughout daytime work hours.  The epilepsy center neurologist was available in person or on call 24-hours per day.    EEG Placement and Labeling of Electrodes:  The EEG was performed utilizing 20 channel referential EEG connections (coronal over temporal over parasagittal montage) using all standard 10-20 electrode placements with EKG, with additional electrodes placed in the inferior temporal region using the modified 10-10 montage electrode placements for elective admissions, or if deemed necessary. Recording was at a sampling rate of 256 samples per second per channel. Time synchronized digital video recording was done simultaneously with EEG recording. A low light infrared camera was used for low light recording.     _____________________________________________________________  HISTORY    Patient is a 61y old  Male who presents with a chief complaint of respiratory failure / seizure (2021 08:33)      PERTINENT MEDICATION:  levETIRAcetam  IVPB 1000 milliGRAM(s) IV Intermittent every 12 hours  midazolam Infusion 0.03 mG/kG/Hr (2.16 mL/Hr) IV Continuous <Continuous>  propofol Infusion 10 MICROgram(s)/kG/Min (4.32 mL/Hr) IV Continuous <Continuous>  _____________________________________________________________  INTERPRETATION    Findings:   The background was discontinuous minimally variable and reactive.   No posterior dominant rhythm seen.    Background diffusely attenuated with some theta and polymorphic delta with superimposed  Bursts of generalized spikes for 2-3 seconds with inter burst periods of suppression of 1-3 seconds were noticed.    Focal Slowing:   None were present.    Sleep Background:  Drowsiness and stage II sleep transients were not recorded.    Other Non-Epileptiform Findings:  None were present.    Interictal Epileptiform Activity:   generalized 1-2.5 Hz fluctuating periodic discharges    Events:  Clinical events: None recorded.  Seizures: None recorded.    Artifacts:  Intermittent myogenic and movement artifacts were noted.    ECG:  The heart rate on single channel ECG was predominantly between 60-80 BPM.    _____________________________________________________________  EEG SUMMARY/CLASSIFICATION    Abnormal EEG in a sedated patient.  - Generalized periodic discharges  - Moderate to severe generalized slowing with burst suppression pattern.    _____________________________________________________________  EEG IMPRESSION/CLINICAL CORRELATE    Abnormal EEG study.  generalized cortical hyperexcitability with GPDs indicating increased risk of generalized seizure onset  Moderate to severe nonspecific diffuse or multifocal cerebral dysfunction     Danilo Abdul MD PGY-5  Epilepsy Fellow    This Preliminary report is based on fellow review. Final report pending attending review.    Reading Room: [phone]  On Call Service After Hours: [phone] [facility]   COMPREHENSIVE EPILEPSY CENTER   REPORT OF CONTINUOUS VIDEO EEG     [facility]: [address], [place] [address], Ph#: [phone]  [facility]: [address], [place] [address], Ph#: [phone]  Office: [address] [place] [address], [place] [address], [place] [address] Ph#: [phone]    Patient Name: ANNEMARIE KELLEY  Age and : 61y (56)  MRN #: [id number]  Location: [place] [address]  Referring Physician: Aram Roland    Study Date: 2021- 2021 08:00 am -08:00 am 24 hours    _____________________________________________________________  STUDY INFORMATION    EEG Recording Technique:  The patient underwent continuous Video-EEG monitoring, using Telemetry System hardware on the XLTek Digital System. EEG and video data were stored on a computer hard drive with important events saved in digital archive files. The material was reviewed by a physician (electroencephalographer / epileptologist) on a daily basis. Jonathan and seizure detection algorithms were utilized and reviewed. An EEG Technician attended to the patient, and was available throughout daytime work hours.  The epilepsy center neurologist was available in person or on call 24-hours per day.    EEG Placement and Labeling of Electrodes:  The EEG was performed utilizing 20 channel referential EEG connections (coronal over temporal over parasagittal montage) using all standard 10-20 electrode placements with EKG, with additional electrodes placed in the inferior temporal region using the modified 10-10 montage electrode placements for elective admissions, or if deemed necessary. Recording was at a sampling rate of 256 samples per second per channel. Time synchronized digital video recording was done simultaneously with EEG recording. A low light infrared camera was used for low light recording.     _____________________________________________________________  HISTORY    Patient is a 61y old  Male who presents with a chief complaint of respiratory failure / seizure (2021 08:33)      PERTINENT MEDICATION:  levETIRAcetam  IVPB 1000 milliGRAM(s) IV Intermittent every 12 hours  midazolam Infusion 0.03 mG/kG/Hr (2.16 mL/Hr) IV Continuous <Continuous>  propofol Infusion 10 MICROgram(s)/kG/Min (4.32 mL/Hr) IV Continuous <Continuous>  _____________________________________________________________  INTERPRETATION    Findings:   The background was discontinuous minimally variable and reactive.   No posterior dominant rhythm seen.    Background diffusely attenuated with some theta and polymorphic delta with superimposed  Bursts of generalized spikes for 2-3 seconds with inter burst periods of suppression of 1-3 seconds were noticed.    Focal Slowing:   None were present.    Sleep Background:  Drowsiness and stage II sleep transients were not recorded.    Other Non-Epileptiform Findings:  None were present.    Interictal Epileptiform Activity:   generalized 1-2.5 Hz fluctuating periodic discharges    Events:  Clinical events: None recorded.  Seizures: None recorded.    Artifacts:  Intermittent myogenic and movement artifacts were noted.    ECG:  The heart rate on single channel ECG was predominantly between 60-80 BPM.    _____________________________________________________________  EEG SUMMARY/CLASSIFICATION    Abnormal EEG in a sedated patient.  - Generalized periodic discharges  - Moderate to severe generalized slowing with burst suppression pattern.    _____________________________________________________________  EEG IMPRESSION/CLINICAL CORRELATE    Abnormal EEG study.  generalized cortical hyperexcitability with GPDs indicating increased risk of generalized seizure onset  Moderate to severe nonspecific diffuse or multifocal cerebral dysfunction     Danilo Abdul MD PGY-5  Epilepsy Fellow      Reading Room: [phone]  On Call Service After Hours: [phone]

## 2021-07-05 NOTE — PROGRESS NOTE ADULT - SUBJECTIVE AND OBJECTIVE BOX
NAEON - EEG was negative for seizures. Neuro would like MRI of brain. Otherwise hemodynamically stable.    P/E  Sedated, 6LPC, trach sutured to skin, trach ties.  Neck soft, flat    ICU Vital Signs Last 24 Hrs  T(C): 37.8 (04 Jul 2021 20:00), Max: 37.8 (04 Jul 2021 16:00)  T(F): 100 (04 Jul 2021 20:00), Max: 100 (04 Jul 2021 16:00)  HR: 92 (04 Jul 2021 23:15) (65 - 102)  BP: --  BP(mean): --  ABP: 125/62 (04 Jul 2021 23:15) (91/56 - 165/84)  ABP(mean): 83 (04 Jul 2021 23:15) (68 - 108)  RR: 16 (04 Jul 2021 23:15) (16 - 19)  SpO2: 99% (04 Jul 2021 23:15) (60% - 100%)          A/P: 62 y/o male s/p tracheal resection 4/23/21 presents to [facility] ER w c/o stridor and SOB. Difficulty breating is worse at night and prevents him from being able to sleep.  He denies productive cough or fever.  This is his 3rd readmission after his tracheal resection  PT has PMHx COVID+ (3/2021 - not hospitalized), PE (8/2020 - on eliquis at home), CKD, w/ recent hospitalization at [facility] after seizure w/ lingual hematoma requiring emergency tracheostomy on 3/25/21.  Patient underwent Tracheal Resection and Reconstruction on 4/23. ENT injected vocal cords few months ago. He was last discharged 5/18/21. s/p emergent cric 7/1 now s/p revision trach, flex bronch and removal of stent 7/2 with post-op course complicated by seizures  - Patient has a critical airway, only ENT to manipulate airway  - Routine trach care by nursing  - Suction PRN  - Extra 6LPC and 4LPC at bedside  - Vent per SICU  - Appreciate neuro reccs: MRI brain  - Tube feeds per SICU   - Call or page us if any issues

## 2021-07-05 NOTE — PROGRESS NOTE ADULT - ASSESSMENT
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 Had seizure-like activity overnight, EEG ordered. Patient remains sedated, unknown mental status following hypoxemic event; continue sedation weaning as appropriate.   - Patient will require long-term feeding access in the near future; likely plan for PEG this coming week  - Will continue to follow      Jamey Culp PGY6  Thoracic Surgery  [id number]

## 2021-07-05 NOTE — PROGRESS NOTE ADULT - SUBJECTIVE AND OBJECTIVE BOX
SICU Daily Progress Note  =====================================================  Interval/Overnight Events:  - Ativan 2mg x 1 for seizure activity   - Chest PT/pulmozyme started  - IV Tylenol x 1 for 100F  - Ketamine drip started for sustained seizure (vs. diaphragmatic myoclonus), versed discontinued  - MRI with and without IV contrast for brain ordered; f/u    HPI: Patient is a 62 y/o male s/p tracheal resection 4/23/21 due to stenosis following emergent cric on 3/25/21 due to tongue lingual hematoma 2/2 loss of airway during seizure now presenting to [facility] ER w c/o stridor and SOB. Patient refers symptoms worse at night interfering with sleep and denies associated cough or fever. Patient with multiple previous admissions for SOB and post tracheal resection respiratory symptoms.  PMHx COVID+ (3/2021 - not hospitalized), PE (8/2020 - on eliquis at home), CKD. Patient underwent Tracheal Resection and Reconstruction on 4/23. Now s/p emergent cric in ED for loss of airway 7/1, subsequent revision tracheostomy 7/2 .    Allergies: No Known Allergies    MEDICATIONS:   --------------------------------------------------------------------------------------  Neurologic Medications  fentaNYL   Infusion. 0.5 MICROgram(s)/kG/Hr IV Continuous <Continuous>  ketamine Infusion. 0.5 mG/kG/Hr IV Continuous <Continuous>  levETIRAcetam  IVPB 1000 milliGRAM(s) IV Intermittent every 12 hours  propofol Infusion 10 MICROgram(s)/kG/Min IV Continuous <Continuous>    Respiratory Medications  dornase walt Solution 2.5 milliGRAM(s) Inhalation once    Cardiovascular Medications    Gastrointestinal Medications  pantoprazole  Injectable 40 milliGRAM(s) IV Push daily  sodium chloride 0.9% lock flush 10 milliLiter(s) IV Push every 1 hour PRN Pre/post blood products, medications, blood draw, and to maintain line patency    Genitourinary Medications    Hematologic/Oncologic Medications  heparin   Injectable 5000 Unit(s) SubCutaneous every 8 hours    Antimicrobial/Immunologic Medications    Endocrine/Metabolic Medications    Topical/Other Medications  chlorhexidine 0.12% Liquid 15 milliLiter(s) Oral Mucosa every 12 hours  chlorhexidine 4% Liquid 1 Application(s) Topical <User Schedule>    --------------------------------------------------------------------------------------  VITAL SIGNS, INS/OUTS (last 24 hours):  --------------------------------------------------------------------------------------  Vital Signs Last 24 Hrs  T(C): 37.8 (04 Jul 2021 20:00), Max: 37.8 (04 Jul 2021 16:00)  T(F): 100 (04 Jul 2021 20:00), Max: 100 (04 Jul 2021 16:00)  HR: 92 (04 Jul 2021 23:15) (65 - 102)  BP: --  BP(mean): --  RR: 16 (04 Jul 2021 23:15) (16 - 19)  SpO2: 99% (04 Jul 2021 23:15) (60% - 100%)  --------------------------------------------------------------------------------------    EXAM  NEUROLOGY  GCS: 3T   Exam: Sedated, unable to assess neuro exam    HEENT  Exam: Normocephalic, +tracheostomy without surrounding erythema    RESPIRATORY  Exam: Lungs clear to auscultation, Normal expansion/effort.   Mechanical Ventilation: Mode: AC/ CMV (Assist Control/ Continuous Mandatory Ventilation), RR (machine): 16, TV (machine): 400, FiO2: 40, PEEP: 10, ITime: 0.75, MAP: 14, PIP: 27    CARDIOVASCULAR  Exam: S1, S2.  Regular rate and rhythm.     GI/NUTRITION  Exam: Abdomen soft, Non-tender, Non-distended.     VASCULAR  Exam: Extremities warm, pink, well-perfused.    SKIN  Exam: Good skin turgor, no skin breakdown.     METABOLIC/FLUIDS/ELECTROLYTES      HEMATOLOGIC  [x] VTE Prophylaxis: heparin   Injectable 5000 Unit(s) SubCutaneous every 8 hours    Transfusions:	[] PRBC	[] Platelets		[] FFP	[] Cryoprecipitate    INFECTIOUS DISEASE  Antimicrobials/Immunologic Medications:    Tubes/Lines/Drains  [x] Peripheral IV  [] Central Venous Line     	[] R	[] L	[] IJ	[] Fem	[] SC	Date Placed:   [x] Arterial Line		[x] R	[] L	[] Fem	[x] Rad	[] Ax	Date Placed: 7/2  [] PICC		[] Midline		[] Mediport  [] Urinary Catheter		Date Placed:   [x] Necessity of urinary, arterial, and venous catheters discussed    LABS  --------------------------------------------------------------------------------------  ((Insert SICU Labs here))***  --------------------------------------------------------------------------------------    OTHER LABORATORY:     IMAGING STUDIES:   CXR:  SICU Daily Progress Note  =====================================================  Interval/Overnight Events:  - Ativan 2mg x 1 for seizure activity   - Chest PT/pulmozyme started  - IV Tylenol x 1 for 100F  - Ketamine drip started for sustained seizure (vs. diaphragmatic myoclonus), versed discontinued  - MRI with and without IV contrast for brain ordered; f/u  - Febrile to 102.9 started on Ceftriaxone     HPI: Patient is a 62 y/o male s/p tracheal resection 4/23/21 due to stenosis following emergent cric on 3/25/21 due to tongue lingual hematoma 2/2 loss of airway during seizure now presenting to [facility] ER w c/o stridor and SOB. Patient refers symptoms worse at night interfering with sleep and denies associated cough or fever. Patient with multiple previous admissions for SOB and post tracheal resection respiratory symptoms.  PMHx COVID+ (3/2021 - not hospitalized), PE (8/2020 - on eliquis at home), CKD. Patient underwent Tracheal Resection and Reconstruction on 4/23. Now s/p emergent cric in ED for loss of airway 7/1, subsequent revision tracheostomy 7/2 .    Allergies: No Known Allergies    MEDICATIONS:   --------------------------------------------------------------------------------------  Neurologic Medications  fentaNYL   Infusion. 0.5 MICROgram(s)/kG/Hr IV Continuous <Continuous>  ketamine Infusion. 0.5 mG/kG/Hr IV Continuous <Continuous>  levETIRAcetam  IVPB 1000 milliGRAM(s) IV Intermittent every 12 hours  propofol Infusion 10 MICROgram(s)/kG/Min IV Continuous <Continuous>    Respiratory Medications  dornase walt Solution 2.5 milliGRAM(s) Inhalation once    Cardiovascular Medications    Gastrointestinal Medications  pantoprazole  Injectable 40 milliGRAM(s) IV Push daily  sodium chloride 0.9% lock flush 10 milliLiter(s) IV Push every 1 hour PRN Pre/post blood products, medications, blood draw, and to maintain line patency    Genitourinary Medications    Hematologic/Oncologic Medications  heparin   Injectable 5000 Unit(s) SubCutaneous every 8 hours    Antimicrobial/Immunologic Medications    Endocrine/Metabolic Medications    Topical/Other Medications  chlorhexidine 0.12% Liquid 15 milliLiter(s) Oral Mucosa every 12 hours  chlorhexidine 4% Liquid 1 Application(s) Topical <User Schedule>    --------------------------------------------------------------------------------------  VITAL SIGNS, INS/OUTS (last 24 hours):  --------------------------------------------------------------------------------------  Vital Signs Last 24 Hrs  T(C): 37.8 (04 Jul 2021 20:00), Max: 37.8 (04 Jul 2021 16:00)  T(F): 100 (04 Jul 2021 20:00), Max: 100 (04 Jul 2021 16:00)  HR: 92 (04 Jul 2021 23:15) (65 - 102)  BP: --  BP(mean): --  RR: 16 (04 Jul 2021 23:15) (16 - 19)  SpO2: 99% (04 Jul 2021 23:15) (60% - 100%)  --------------------------------------------------------------------------------------    EXAM  NEUROLOGY  GCS: 3T   Exam: Sedated, unable to assess neuro exam    HEENT  Exam: Normocephalic, +tracheostomy without surrounding erythema, purulent nasal discharge    RESPIRATORY  Exam: Lungs clear to auscultation, Normal expansion/effort.   Mechanical Ventilation: Mode: AC/ CMV (Assist Control/ Continuous Mandatory Ventilation), RR (machine): 16, TV (machine): 400, FiO2: 40, PEEP: 10, ITime: 0.75, MAP: 14, PIP: 27    CARDIOVASCULAR  Exam: S1, S2.  Regular rate and rhythm.     GI/NUTRITION  Exam: Abdomen soft, Non-tender, Non-distended.     VASCULAR  Exam: Extremities warm, pink, well-perfused.    SKIN  Exam: Good skin turgor, no skin breakdown.     METABOLIC/FLUIDS/ELECTROLYTES      HEMATOLOGIC  [x] VTE Prophylaxis: heparin   Injectable 5000 Unit(s) SubCutaneous every 8 hours    Transfusions:	[] PRBC	[] Platelets		[] FFP	[] Cryoprecipitate    INFECTIOUS DISEASE  Antimicrobials/Immunologic Medications:    Tubes/Lines/Drains  [x] Peripheral IV  [] Central Venous Line     	[] R	[] L	[] IJ	[] Fem	[] SC	Date Placed:   [x] Arterial Line		[x] R	[] L	[] Fem	[x] Rad	[] Ax	Date Placed: 7/2  [] PICC		[] Midline		[] Mediport  [] Urinary Catheter		Date Placed:   [x] Necessity of urinary, arterial, and venous catheters discussed    LABS  --------------------------------------------------------------------------------------  ((Insert SICU Labs here))***  --------------------------------------------------------------------------------------    OTHER LABORATORY:     IMAGING STUDIES:   CXR:

## 2021-07-05 NOTE — PROGRESS NOTE ADULT - ASSESSMENT
61M PMH epilepsy on Keppra, PE (8/2020 - on eliquis at home), CKD and COVID+ with seizure w/ lingual hematoma requiring emergent trach on 3/25 and tracheal resection 4/21 for stridor and SOB. Multiple readmissions following sgx for SOB. 7/1 s/p emergent cric in ED for loss of airway following seizure and emesis possible aspiration. 7/2 s/p bronch and revision tracheostomy     PLAN:    Neurologic:  - Sedated with fentanyl, propofol and ketamine  - Wean off fentanyl   - Keppra 1000 BID s/p loading dose 1500mg (7/3)  - Currently on 24h EEG, per neurology discontinue EEG  - CT negative no intracranial pathology  - MRI Brain w/wout IV contrast per neurology    Respiratory:   - s/p bronch and trach revision 7/2  - Mechanical ventilation AC/CMV    Cardiovascular:   - Hemodynamically stable off vasopressors      Gastrointestinal/Nutrition:   - NPO w/ Jevity tube feeds via NGT    Renal/Genitourinary:  - Higginbotham in place  - Monitor intake & output    Hematologic:  - Trend H/H  - c/w SQH for VTE prophylaxis  - s/p 1PRBC (7/3 AM) for downtrending H/H; responded appropriately     Infectious Disease:   - Afebrile, monitor off antibiotics   - Monitor for aspiration PNA     Tubes/Lines/Drains:   - Higginbotham  - Right radial A line  - NGT  - R PIV  - L PIV   - R Fem Central     Disposition: SICU   61M PMH epilepsy on Keppra, PE (8/2020 - on eliquis at home), CKD and COVID+ with seizure w/ lingual hematoma requiring emergent trach on 3/25 and tracheal resection 4/21 for stridor and SOB. Multiple readmissions following sgx for SOB. 7/1 s/p emergent cric in ED for loss of airway following seizure and emesis possible aspiration. 7/2 s/p bronch and revision tracheostomy     PLAN:    Neurologic:  - Sedated with fentanyl, propofol and ketamine; propofol held  - Wean ketamine  - Weaning off fentanyl  - Keppra 1000 BID s/p loading dose 1500mg (7/3)  - Currently on 24h EEG, per neurology discontinue EEG; confirm epileptic vs myoclonic movements  - CT negative no intracranial pathology  - MRI Brain w/wout IV contrast per neurology    Respiratory:   - s/p bronch and trach revision 7/2  - Mechanical ventilation AC/CMV    Cardiovascular:   - Hemodynamically stable off vasopressors      Gastrointestinal/Nutrition:   - NPO w/ Jevity tube feeds via NGT at goal   - On Protonix for stress ulcer ppx    Renal/Genitourinary:  - Higginbotham in place  - Monitor intake & output    Hematologic:  - Trend H/H  - c/w SQH for VTE prophylaxis  - s/p 1PRBC (7/3 AM) for downtrending H/H; responded appropriately     Infectious Disease:   - Febrile this AM, started on Ceftriaxone for UTI coverage, positive UA  - Monitor for aspiration PNA     Tubes/Lines/Drains:   - Higginbotham  - Right radial A line  - NGT  - R PIV  - L PIV   - R Fem Central     Disposition: SICU

## 2021-07-06 LAB
ALBUMIN SERPL ELPH-MCNC: 2.6 G/DL — LOW (ref 3.3–5)
ANION GAP SERPL CALC-SCNC: 14 MMOL/L — SIGNIFICANT CHANGE UP (ref 7–14)
BASOPHILS # BLD AUTO: 0.02 K/UL — SIGNIFICANT CHANGE UP (ref 0–0.2)
BASOPHILS NFR BLD AUTO: 0.2 % — SIGNIFICANT CHANGE UP (ref 0–2)
BLOOD GAS ARTERIAL - LYTES,HGB,ICA,LACT RESULT: SIGNIFICANT CHANGE UP
BLOOD GAS ARTERIAL - LYTES,HGB,ICA,LACT RESULT: SIGNIFICANT CHANGE UP
BUN SERPL-MCNC: 45 MG/DL — HIGH (ref 7–23)
CALCIUM SERPL-MCNC: 8.9 MG/DL — SIGNIFICANT CHANGE UP (ref 8.4–10.5)
CHLORIDE SERPL-SCNC: 113 MMOL/L — HIGH (ref 98–107)
CO2 SERPL-SCNC: 18 MMOL/L — LOW (ref 22–31)
CREAT SERPL-MCNC: 3.51 MG/DL — HIGH (ref 0.5–1.3)
EOSINOPHIL # BLD AUTO: 0.17 K/UL — SIGNIFICANT CHANGE UP (ref 0–0.5)
EOSINOPHIL NFR BLD AUTO: 1.5 % — SIGNIFICANT CHANGE UP (ref 0–6)
GLUCOSE SERPL-MCNC: 136 MG/DL — HIGH (ref 70–99)
HCT VFR BLD CALC: 22.6 % — LOW (ref 39–50)
HGB BLD-MCNC: 7.1 G/DL — LOW (ref 13–17)
IANC: 8.03 K/UL — SIGNIFICANT CHANGE UP (ref 1.5–8.5)
IMM GRANULOCYTES NFR BLD AUTO: 1.5 % — SIGNIFICANT CHANGE UP (ref 0–1.5)
LYMPHOCYTES # BLD AUTO: 1.81 K/UL — SIGNIFICANT CHANGE UP (ref 1–3.3)
LYMPHOCYTES # BLD AUTO: 15.5 % — SIGNIFICANT CHANGE UP (ref 13–44)
MAGNESIUM SERPL-MCNC: 2.3 MG/DL — SIGNIFICANT CHANGE UP (ref 1.6–2.6)
MCHC RBC-ENTMCNC: 28.5 PG — SIGNIFICANT CHANGE UP (ref 27–34)
MCHC RBC-ENTMCNC: 31.4 GM/DL — LOW (ref 32–36)
MCV RBC AUTO: 90.8 FL — SIGNIFICANT CHANGE UP (ref 80–100)
MONOCYTES # BLD AUTO: 1.45 K/UL — HIGH (ref 0–0.9)
MONOCYTES NFR BLD AUTO: 12.4 % — SIGNIFICANT CHANGE UP (ref 2–14)
NEUTROPHILS # BLD AUTO: 8.03 K/UL — HIGH (ref 1.8–7.4)
NEUTROPHILS NFR BLD AUTO: 68.9 % — SIGNIFICANT CHANGE UP (ref 43–77)
NRBC # BLD: 0 /100 WBCS — SIGNIFICANT CHANGE UP
NRBC # FLD: 0 K/UL — SIGNIFICANT CHANGE UP
PHOSPHATE SERPL-MCNC: 4.9 MG/DL — HIGH (ref 2.5–4.5)
PLATELET # BLD AUTO: 168 K/UL — SIGNIFICANT CHANGE UP (ref 150–400)
POTASSIUM SERPL-MCNC: 4.3 MMOL/L — SIGNIFICANT CHANGE UP (ref 3.5–5.3)
POTASSIUM SERPL-SCNC: 4.3 MMOL/L — SIGNIFICANT CHANGE UP (ref 3.5–5.3)
RBC # BLD: 2.49 M/UL — LOW (ref 4.2–5.8)
RBC # FLD: 15.1 % — HIGH (ref 10.3–14.5)
SODIUM SERPL-SCNC: 145 MMOL/L — SIGNIFICANT CHANGE UP (ref 135–145)
VALPROATE SERPL-MCNC: 22.9 UG/ML — LOW (ref 50–100)
VALPROATE SERPL-MCNC: 64 UG/ML — SIGNIFICANT CHANGE UP (ref 50–100)
WBC # BLD: 11.45 K/UL — HIGH (ref 3.8–10.5)
WBC # FLD AUTO: 11.45 K/UL — HIGH (ref 3.8–10.5)

## 2021-07-06 PROCEDURE — 99233 SBSQ HOSP IP/OBS HIGH 50: CPT

## 2021-07-06 PROCEDURE — 99291 CRITICAL CARE FIRST HOUR: CPT

## 2021-07-06 PROCEDURE — 71045 X-RAY EXAM CHEST 1 VIEW: CPT | Mod: 26

## 2021-07-06 RX ADMIN — Medication 27.5 MILLIGRAM(S): at 05:22

## 2021-07-06 RX ADMIN — HEPARIN SODIUM 5000 UNIT(S): 5000 INJECTION INTRAVENOUS; SUBCUTANEOUS at 13:29

## 2021-07-06 RX ADMIN — LEVETIRACETAM 400 MILLIGRAM(S): 250 TABLET, FILM COATED ORAL at 05:22

## 2021-07-06 RX ADMIN — PROPOFOL 4.32 MICROGRAM(S)/KG/MIN: 10 INJECTION, EMULSION INTRAVENOUS at 05:22

## 2021-07-06 RX ADMIN — HEPARIN SODIUM 5000 UNIT(S): 5000 INJECTION INTRAVENOUS; SUBCUTANEOUS at 21:43

## 2021-07-06 RX ADMIN — SODIUM CHLORIDE 75 MILLILITER(S): 9 INJECTION, SOLUTION INTRAVENOUS at 05:22

## 2021-07-06 RX ADMIN — PIPERACILLIN AND TAZOBACTAM 25 GRAM(S): 4; .5 INJECTION, POWDER, LYOPHILIZED, FOR SOLUTION INTRAVENOUS at 05:22

## 2021-07-06 RX ADMIN — Medication 100 MILLIGRAM(S): at 13:29

## 2021-07-06 RX ADMIN — HEPARIN SODIUM 5000 UNIT(S): 5000 INJECTION INTRAVENOUS; SUBCUTANEOUS at 05:24

## 2021-07-06 RX ADMIN — FENTANYL CITRATE 3.6 MICROGRAM(S)/KG/HR: 50 INJECTION INTRAVENOUS at 14:00

## 2021-07-06 RX ADMIN — PROPOFOL 4.32 MICROGRAM(S)/KG/MIN: 10 INJECTION, EMULSION INTRAVENOUS at 13:33

## 2021-07-06 RX ADMIN — PIPERACILLIN AND TAZOBACTAM 25 GRAM(S): 4; .5 INJECTION, POWDER, LYOPHILIZED, FOR SOLUTION INTRAVENOUS at 17:02

## 2021-07-06 RX ADMIN — CHLORHEXIDINE GLUCONATE 1 APPLICATION(S): 213 SOLUTION TOPICAL at 05:23

## 2021-07-06 RX ADMIN — PANTOPRAZOLE SODIUM 40 MILLIGRAM(S): 20 TABLET, DELAYED RELEASE ORAL at 13:29

## 2021-07-06 RX ADMIN — Medication 100 MILLIGRAM(S): at 21:43

## 2021-07-06 RX ADMIN — CHLORHEXIDINE GLUCONATE 15 MILLILITER(S): 213 SOLUTION TOPICAL at 05:23

## 2021-07-06 RX ADMIN — LEVETIRACETAM 400 MILLIGRAM(S): 250 TABLET, FILM COATED ORAL at 17:59

## 2021-07-06 RX ADMIN — FENTANYL CITRATE 3.6 MICROGRAM(S)/KG/HR: 50 INJECTION INTRAVENOUS at 05:21

## 2021-07-06 RX ADMIN — CHLORHEXIDINE GLUCONATE 15 MILLILITER(S): 213 SOLUTION TOPICAL at 17:01

## 2021-07-06 RX ADMIN — Medication 27.5 MILLIGRAM(S): at 21:43

## 2021-07-06 NOTE — PROGRESS NOTE ADULT - SUBJECTIVE AND OBJECTIVE BOX
EEg notable for possible status epilepticus. Planned for MRI today. Pt noted to be febrile yesterday with tmax of 102.9.     P/E  Sedated, 6LPC, trach sutured to skin, trach ties.  Neck soft, flat    ICU Vital Signs Last 24 Hrs  T(C): 36.8 (06 Jul 2021 00:00), Max: 39.4 (05 Jul 2021 04:00)  T(F): 98.2 (06 Jul 2021 00:00), Max: 102.9 (05 Jul 2021 04:00)  HR: 71 (06 Jul 2021 00:00) (70 - 118)  BP: --  BP(mean): --  ABP: 107/58 (06 Jul 2021 00:00) (87/50 - 201/101)  ABP(mean): 75 (06 Jul 2021 00:00) (63 - 133)  RR: 20 (06 Jul 2021 00:00) (16 - 24)  SpO2: 99% (06 Jul 2021 00:00) (84% - 100%)            A/P: 62 y/o male s/p tracheal resection 4/23/21 presents to [facility] ER w c/o stridor and SOB. Difficulty breating is worse at night and prevents him from being able to sleep.  He denies productive cough or fever.  This is his 3rd readmission after his tracheal resection  PT has PMHx COVID+ (3/2021 - not hospitalized), PE (8/2020 - on eliquis at home), CKD, w/ recent hospitalization at [facility] after seizure w/ lingual hematoma requiring emergency tracheostomy on 3/25/21.  Patient underwent Tracheal Resection and Reconstruction on 4/23. ENT injected vocal cords few months ago. He was last discharged 5/18/21. s/p emergent cric 7/1 now s/p revision trach, flex bronch and removal of stent 7/2 with post-op course complicated by seizures  - Patient has a critical airway, only ENT to manipulate airway  - Routine trach care by nursing  - Suction PRN  - Extra 6LPC and 4LPC at bedside  - Vent per SICU  - Appreciate neuro reccs: MRI brain  - Tube feeds per SICU   - Call or page us if any issues

## 2021-07-06 NOTE — DIETITIAN INITIAL EVALUATION ADULT. - OTHER INFO
60 y/o male s/p tracheal resection 4/23/21 presents to [facility] ER w c/o stridor and SOB. Difficulty breating is worse at night and prevents him from being able to sleep.  He denies productive cough or fever.  This is his 3rd readmission after his tracheal resection  PT has PMHx COVID+ (3/2021 - not hospitalized), PE (8/2020 - on eliquis at home), CKD, w/ recent hospitalization at [facility] after seizure w/ lingual hematoma requiring emergency tracheostomy on 3/25/21.  Patient underwent Tracheal Resection and Reconstruction on 4/23. ENT injected vocal cords few months ago.    Now s/p emergent cric in ED for loss of airway 7/1, subsequent revision tracheostomy 7/2 .

## 2021-07-06 NOTE — DIETITIAN INITIAL EVALUATION ADULT. - ENTERAL
Restart TF as medically feasible with Jevity 1.2 @20mL/h and advance as tolerated to goal of 50mL/h to provide 1440kcal w/66gm protein.  With propofol = 1673kcal.  Adjust TF as needed to continue to meet current needs.

## 2021-07-06 NOTE — PROGRESS NOTE ADULT - ASSESSMENT
61M PMH epilepsy on Keppra, PE (8/2020 - on eliquis at home), CKD and COVID+ with seizure w/ lingual hematoma requiring emergent trach on 3/25 and tracheal resection 4/21 for stridor and SOB. Multiple readmissions following sgx for SOB. 7/1 s/p emergent cric in ED for loss of airway following seizure and emesis possible aspiration. 7/2 s/p bronch and revision tracheostomy     PLAN:    Neurologic:  - Sedated with fentanyl & propofol  - Keppra 1000 BID s/p loading dose 1500mg (7/3)  - Depakote 500mg TID  - CT negative no intracranial pathology  - EEG significant for hyperexcitability and GPDs  - MRI Brain w/wout IV contrast pending    Respiratory:   - s/p bronch and trach revision 7/2  - Mechanical ventilation AC/CMV    Cardiovascular:   - Hemodynamically stable off vasopressors      Gastrointestinal/Nutrition:   - NPO w/ Jevity tube feeds via NGT at goal   - On Protonix for stress ulcer ppx    Renal/Genitourinary:  - Higginbotham in place  - Monitor intake & output    Hematologic:  - Trend H/H  - c/w SQH for VTE prophylaxis  - s/p 1PRBC (7/3 AM) for downtrending H/H; responded appropriately     Infectious Disease:   - Blood/Urine/Sputum/Nasal discharge cultures pending  - Continue Vancomycin/Zosyn    Tubes/Lines/Drains:   - Higginbotham  - Right radial A line  - NGT  - R PIV  - L PIV   - R Fem Central     Disposition: SICU 61M PMH epilepsy on Keppra, PE (8/2020 - on eliquis at home), CKD and COVID+ with seizure w/ lingual hematoma requiring emergent trach on 3/25 and tracheal resection 4/21 for stridor and SOB. Multiple readmissions following sgx for SOB. 7/1 s/p emergent cric in ED for loss of airway following seizure and emesis possible aspiration. 7/2 s/p bronch and revision tracheostomy     PLAN:    Neurologic:  - Sedated with fentanyl & propofol  - Keppra 1000 BID s/p loading dose 1500mg (7/3)  - Depakote 500mg TID  - CT negative no intracranial pathology  - EEG significant for hyperexcitability and GPDs  - MRI Brain w/wout IV contrast pending    Respiratory:   - s/p bronch and trach revision 7/2  - Mechanical ventilation AC/CMV    Cardiovascular:   - Hemodynamically stable off vasopressors      Gastrointestinal/Nutrition:   - NPO w/ Jevity tube feeds via NGT at goal   - On Protonix for stress ulcer ppx    Renal/Genitourinary:  - Higginbotham in place  - Monitor intake & output    Hematologic:  - Trend H/H  - c/w SQH for VTE prophylaxis  - s/p 1PRBC (7/3 AM) for downtrending H/H; responded appropriately     Infectious Disease:   - Blood/Urine/Sputum/Nasal discharge cultures pending  - Continue Vancomycin/Zosyn  - CT Head No Cont 07.03.21 "sinus opacification withair-fluid levels in the frontal sinuses, maxillary sinuses, sphenoid sinuses and ethmoid air cells bilaterally"  - Sputum Cx prelim with few pseudomonas    Tubes/Lines/Drains:   - Higginbotham  - Right radial A line  - NGT  - R PIV  - L PIV   - R Fem Central     Disposition: SICU 61M PMH epilepsy on Keppra, PE (8/2020 - on eliquis at home), CKD and COVID+ with seizure w/ lingual hematoma requiring emergent trach on 3/25 and tracheal resection 4/21 for stridor and SOB. Multiple readmissions following sgx for SOB. 7/1 s/p emergent cric in ED for loss of airway following seizure and emesis possible aspiration. 7/2 s/p bronch and revision tracheostomy     PLAN:    Neurologic:  - Sedated with fentanyl & propofol  - Keppra 1000 BID s/p loading dose 1500mg (7/3)  - Depakote 500mg TID  - CT negative no intracranial pathology  - EEG significant for hyperexcitability and GPDs  - MRI Brain w/wout IV contrast pending    Respiratory:   - s/p bronch and trach revision 7/2  - Mechanical ventilation AC/CMV    Cardiovascular:   - Hemodynamically stable off vasopressors      Gastrointestinal/Nutrition:   - NPO w/ Jevity tube feeds via NGT at goal   - On Protonix for stress ulcer ppx    Renal/Genitourinary:  - Higginbotham in place  - Monitor intake & output    Hematologic:  - Trend H/H  - c/w SQH for VTE prophylaxis  - s/p 1PRBC (7/3 AM) for downtrending H/H; responded appropriately     Infectious Disease:   - Blood/Sputum/Nasal discharge cultures pending  - CT 7/3 with bilateral opacification of sinuses (grayish drainage)  - Sputum Cx prelim with pseudomonas  - Urine Cx prelim with Citrobacter  - Continue Vancomycin (dosed by level)/Zosyn  - Continue Zosyn    Tubes/Lines/Drains:   - Higginbotham  - Right radial A line  - NGT  - R PIV  - L PIV     Disposition: SICU

## 2021-07-06 NOTE — PROGRESS NOTE ADULT - SUBJECTIVE AND OBJECTIVE BOX
SICU Daily Progress Note  =====================================================  Interval/Overnight Events:   - Persistently febrile with purulent discharge from bilateral nares  - EEG w/ hyperexcitability and GPDs  - Depakote load & depakote TID started in addition to Keppra  - Ceftriaxone changed to Zosyn and Vancomycin started    HPI: Patient is a 60 y/o male s/p tracheal resection 4/23/21 due to stenosis following emergent cric on 3/25/21 due to tongue lingual hematoma 2/2 loss of airway during seizure now presenting to [facility] ER w c/o stridor and SOB. Patient refers symptoms worse at night interfering with sleep and denies associated cough or fever. Patient with multiple previous admissions for SOB and post tracheal resection respiratory symptoms.  PMHx COVID+ (3/2021 - not hospitalized), PE (8/2020 - on eliquis at home), CKD. Patient underwent Tracheal Resection and Reconstruction on 4/23. Now s/p emergent cric in ED for loss of airway 7/1, subsequent revision tracheostomy 7/2 .    Allergies: No Known Allergies      MEDICATIONS:   --------------------------------------------------------------------------------------  Neurologic Medications  acetaminophen    Suspension .. 975 milliGRAM(s) Oral every 6 hours PRN Temp greater or equal to 38.5C (101.3F)  fentaNYL   Infusion. 0.5 MICROgram(s)/kG/Hr IV Continuous <Continuous>  levETIRAcetam  IVPB 1000 milliGRAM(s) IV Intermittent every 12 hours  propofol Infusion 10 MICROgram(s)/kG/Min IV Continuous <Continuous>  valproate sodium IVPB 500 milliGRAM(s) IV Intermittent three times a day    Respiratory Medications  ALBUTerol    90 MICROgram(s) HFA Inhaler 2 Puff(s) Inhalation every 4 hours  dornase walt Solution 2.5 milliGRAM(s) Inhalation once    Cardiovascular Medications  labetalol 100 milliGRAM(s) Oral three times a day    Gastrointestinal Medications  dextrose 5% + sodium chloride 0.45%. 1000 milliLiter(s) IV Continuous <Continuous>  pantoprazole  Injectable 40 milliGRAM(s) IV Push daily  sodium chloride 0.9% lock flush 10 milliLiter(s) IV Push every 1 hour PRN Pre/post blood products, medications, blood draw, and to maintain line patency    Genitourinary Medications    Hematologic/Oncologic Medications  heparin   Injectable 5000 Unit(s) SubCutaneous every 8 hours    Antimicrobial/Immunologic Medications  piperacillin/tazobactam IVPB.. 3.375 Gram(s) IV Intermittent every 12 hours    Endocrine/Metabolic Medications    Topical/Other Medications  chlorhexidine 0.12% Liquid 15 milliLiter(s) Oral Mucosa every 12 hours  chlorhexidine 4% Liquid 1 Application(s) Topical <User Schedule>    --------------------------------------------------------------------------------------    VITAL SIGNS, INS/OUTS (last 24 hours):  --------------------------------------------------------------------------------------  ICU Vital Signs Last 24 Hrs  T(C): 36.8 (06 Jul 2021 00:00), Max: 39.4 (05 Jul 2021 04:00)  T(F): 98.2 (06 Jul 2021 00:00), Max: 102.9 (05 Jul 2021 04:00)  HR: 71 (06 Jul 2021 00:00) (70 - 118)  BP: --  BP(mean): --  ABP: 107/58 (06 Jul 2021 00:00) (87/50 - 201/101)  ABP(mean): 75 (06 Jul 2021 00:00) (63 - 133)  RR: 20 (06 Jul 2021 00:00) (16 - 24)  SpO2: 99% (06 Jul 2021 00:00) (84% - 100%)  --------------------------------------------------------------------------------------    EXAM  NEUROLOGY  GCS: 3T   Exam: Sedated, unable to assess neuro exam    HEENT  Exam: Normocephalic, +tracheostomy without surrounding erythema, purulent nasal discharge    RESPIRATORY  Exam: Lungs clear to auscultation, Normal expansion/effort.   Mechanical Ventilation: Mode: AC/ CMV (Assist Control/ Continuous Mandatory Ventilation), RR (machine): 16, TV (machine): 400, FiO2: 40, PEEP: 10, ITime: 0.75, MAP: 14, PIP: 27    CARDIOVASCULAR  Exam: S1, S2.  Regular rate and rhythm.     GI/NUTRITION  Exam: Abdomen soft, Non-tender, Non-distended.     VASCULAR  Exam: Extremities warm, pink, well-perfused.    SKIN  Exam: Good skin turgor, no skin breakdown.     METABOLIC/FLUIDS/ELECTROLYTES  dextrose 5% + sodium chloride 0.45%. 1000 milliLiter(s) IV Continuous <Continuous>    HEMATOLOGIC  [x] VTE Prophylaxis: heparin   Injectable 5000 Unit(s) SubCutaneous every 8 hours    Transfusions:	[] PRBC	[] Platelets		[] FFP	[] Cryoprecipitate    INFECTIOUS DISEASE  Antimicrobials/Immunologic Medications:  piperacillin/tazobactam IVPB.. 3.375 Gram(s) IV Intermittent every 12 hours    Day # 1    Tubes/Lines/Drains   [x] Peripheral IV  [] Central Venous Line     	[] R	[] L	[] IJ	[] Fem	[] SC	Date Placed:   [] Arterial Line		[] R	[] L	[] Fem	[] Rad	[] Ax	Date Placed:   [] PICC		[] Midline		[] Mediport  [] Urinary Catheter		Date Placed:   [x] Necessity of urinary, arterial, and venous catheters discussed    LABS  --------------------------------------------------------------------------------------  ((Insert SICU Labs here))***  --------------------------------------------------------------------------------------    OTHER LABORATORY:     IMAGING STUDIES:   CXR:  SICU Daily Progress Note  =====================================================  Interval/Overnight Events:   - Persistently febrile with purulent discharge from bilateral nares  - EEG w/ Generalized cortical hyperexcitability with GPDs with frequent intermittent myoclonic seizures, severe diffuse cerebral dysfunction; Neuro ok to DC VEEG  - D/c groin CVC  - Palliative c/s  - F/u MRI  - Possible MICU tx  - Feeds restarted; IVF DCd      HPI: Patient is a 62 y/o male s/p tracheal resection 4/23/21 due to stenosis following emergent cric on 3/25/21 due to tongue lingual hematoma 2/2 loss of airway during seizure now presenting to [facility] ER w c/o stridor and SOB. Patient refers symptoms worse at night interfering with sleep and denies associated cough or fever. Patient with multiple previous admissions for SOB and post tracheal resection respiratory symptoms.  PMHx COVID+ (3/2021 - not hospitalized), PE (8/2020 - on eliquis at home), CKD. Patient underwent Tracheal Resection and Reconstruction on 4/23. Now s/p emergent cric in ED for loss of airway 7/1, subsequent revision tracheostomy 7/2 .    Allergies: No Known Allergies      MEDICATIONS:   --------------------------------------------------------------------------------------  Neurologic Medications  acetaminophen    Suspension .. 975 milliGRAM(s) Oral every 6 hours PRN Temp greater or equal to 38.5C (101.3F)  fentaNYL   Infusion. 0.5 MICROgram(s)/kG/Hr IV Continuous <Continuous>  levETIRAcetam  IVPB 1000 milliGRAM(s) IV Intermittent every 12 hours  propofol Infusion 10 MICROgram(s)/kG/Min IV Continuous <Continuous>  valproate sodium IVPB 500 milliGRAM(s) IV Intermittent three times a day    Respiratory Medications  ALBUTerol    90 MICROgram(s) HFA Inhaler 2 Puff(s) Inhalation every 4 hours  dornase walt Solution 2.5 milliGRAM(s) Inhalation once    Cardiovascular Medications  labetalol 100 milliGRAM(s) Oral three times a day    Gastrointestinal Medications  dextrose 5% + sodium chloride 0.45%. 1000 milliLiter(s) IV Continuous <Continuous>  pantoprazole  Injectable 40 milliGRAM(s) IV Push daily  sodium chloride 0.9% lock flush 10 milliLiter(s) IV Push every 1 hour PRN Pre/post blood products, medications, blood draw, and to maintain line patency    Genitourinary Medications    Hematologic/Oncologic Medications  heparin   Injectable 5000 Unit(s) SubCutaneous every 8 hours    Antimicrobial/Immunologic Medications  piperacillin/tazobactam IVPB.. 3.375 Gram(s) IV Intermittent every 12 hours    Endocrine/Metabolic Medications    Topical/Other Medications  chlorhexidine 0.12% Liquid 15 milliLiter(s) Oral Mucosa every 12 hours  chlorhexidine 4% Liquid 1 Application(s) Topical <User Schedule>    --------------------------------------------------------------------------------------    VITAL SIGNS, INS/OUTS (last 24 hours):  --------------------------------------------------------------------------------------  ICU Vital Signs Last 24 Hrs  T(C): 36.8 (06 Jul 2021 00:00), Max: 39.4 (05 Jul 2021 04:00)  T(F): 98.2 (06 Jul 2021 00:00), Max: 102.9 (05 Jul 2021 04:00)  HR: 71 (06 Jul 2021 00:00) (70 - 118)  BP: --  BP(mean): --  ABP: 107/58 (06 Jul 2021 00:00) (87/50 - 201/101)  ABP(mean): 75 (06 Jul 2021 00:00) (63 - 133)  RR: 20 (06 Jul 2021 00:00) (16 - 24)  SpO2: 99% (06 Jul 2021 00:00) (84% - 100%)  --------------------------------------------------------------------------------------    EXAM  NEUROLOGY  GCS: 3T   Exam: Sedated, unable to assess neuro exam    HEENT  Exam: Normocephalic, +tracheostomy without surrounding erythema, purulent nasal discharge    RESPIRATORY  Exam: Lungs clear to auscultation, Normal expansion/effort.   Mechanical Ventilation: Mode: AC/ CMV (Assist Control/ Continuous Mandatory Ventilation), RR (machine): 16, TV (machine): 400, FiO2: 40, PEEP: 10, ITime: 0.75, MAP: 14, PIP: 27    CARDIOVASCULAR  Exam: S1, S2.  Regular rate and rhythm.     GI/NUTRITION  Exam: Abdomen soft, Non-tender, Non-distended.     VASCULAR  Exam: Extremities warm, pink, well-perfused.    SKIN  Exam: Good skin turgor, no skin breakdown.     METABOLIC/FLUIDS/ELECTROLYTES  dextrose 5% + sodium chloride 0.45%. 1000 milliLiter(s) IV Continuous <Continuous>    HEMATOLOGIC  [x] VTE Prophylaxis: heparin   Injectable 5000 Unit(s) SubCutaneous every 8 hours    Transfusions:	[] PRBC	[] Platelets		[] FFP	[] Cryoprecipitate    INFECTIOUS DISEASE  Antimicrobials/Immunologic Medications:  piperacillin/tazobactam IVPB.. 3.375 Gram(s) IV Intermittent every 12 hours    Day # 1    Tubes/Lines/Drains   [x] Peripheral IV  [] Central Venous Line     	[] R	[] L	[] IJ	[] Fem	[] SC	Date Placed:   [] Arterial Line		[] R	[] L	[] Fem	[] Rad	[] Ax	Date Placed:   [] PICC		[] Midline		[] Mediport  [] Urinary Catheter		Date Placed:   [x] Necessity of urinary, arterial, and venous catheters discussed    LABS  --------------------------------------------------------------------------------------  ((Insert SICU Labs here))***  --------------------------------------------------------------------------------------    OTHER LABORATORY:     IMAGING STUDIES:   CXR:

## 2021-07-06 NOTE — PROGRESS NOTE ADULT - SUBJECTIVE AND OBJECTIVE BOX
Interval History:  Patient seen and examined at the bedside this AM with neurology attending. No acute events overnight.     MEDICATIONS  (STANDING):  chlorhexidine 0.12% Liquid 15 milliLiter(s) Oral Mucosa every 12 hours  chlorhexidine 4% Liquid 1 Application(s) Topical <User Schedule>  fentaNYL   Infusion. 0.5 MICROgram(s)/kG/Hr (3.6 mL/Hr) IV Continuous <Continuous>  heparin   Injectable 5000 Unit(s) SubCutaneous every 8 hours  levETIRAcetam  IVPB 1000 milliGRAM(s) IV Intermittent every 12 hours  midazolam Infusion 0.03 mG/kG/Hr (2.16 mL/Hr) IV Continuous <Continuous>  pantoprazole  Injectable 40 milliGRAM(s) IV Push daily  propofol Infusion 10 MICROgram(s)/kG/Min (4.32 mL/Hr) IV Continuous <Continuous>    MEDICATIONS  (PRN):  sodium chloride 0.9% lock flush 10 milliLiter(s) IV Push every 1 hour PRN Pre/post blood products, medications, blood draw, and to maintain line patency    Allergies  No Known Allergies    Intolerances      ROS: Pertinent positives in HPI, all other ROS were reviewed and are negative.      Vital Signs Last 24 Hrs  T(C): 36.7 (04 Jul 2021 08:00), Max: 36.7 (04 Jul 2021 08:00)  T(F): 98 (04 Jul 2021 08:00), Max: 98 (04 Jul 2021 08:00)  HR: 74 (04 Jul 2021 09:00) (56 - 102)  BP: --  BP(mean): --  RR: 16 (04 Jul 2021 09:00) (16 - 19)  SpO2: 95% (04 Jul 2021 09:00) (60% - 100%)    NEUROLOGICAL EXAM:  MS: Intubated, sedated on fentanyl, propofol and versed.   CN: No OCR. no facial asymmetry +corneal reflex b/l, + cough. Pupil 2mm and reactive b/l  Motor: Strength: No spontaneous movements. Does not withdraw to painful stimuli. Intermittent clonic movement of chest/diaphragm and L shoulder exacerbated by cough testing  Tone: normal. Bulk: normal.   Plantar flex b/l.   No ankle clonus b/l  Sensation: Does not grimace to painful stimuli x4  Coordination & Gait: unable to assess      Labs:   cbc                      7.6    10.34 )-----------( 173      ( 04 Jul 2021 02:58 )             24.4     [id number]    144  |  112<H>  |  42<H>  ----------------------------<  179<H>  4.1   |  19<L>  |  3.15<H>    Ca    8.7      04 Jul 2021 02:58  Phos  3.6     07-04  Mg     2.20     07-04      Coags  Lipids  A1C  CardiacMarkersCARDIAC MARKERS ( 03 Jul 2021 01:58 )  x     / x     / 342 U/L / x     / x          LFTs  UA Interval History: Patient seen and examined at the bedside this AM with neurology attending.  PT still having myoclonic jerks of abdomen, especially when given minimal stimulus. Overnight had ativan challenge which he responded to both electrographically and clinically with improvement of myoclonic jerks and EEG frequency of GPDs.  Subsequently was loaded with Depakote and started on maintenance.     MEDICATIONS  (STANDING):  ALBUTerol    90 MICROgram(s) HFA Inhaler 2 Puff(s) Inhalation every 4 hours  chlorhexidine 0.12% Liquid 15 milliLiter(s) Oral Mucosa every 12 hours  chlorhexidine 4% Liquid 1 Application(s) Topical <User Schedule>  dornase walt Solution 2.5 milliGRAM(s) Inhalation once  fentaNYL   Infusion. 0.5 MICROgram(s)/kG/Hr (3.6 mL/Hr) IV Continuous <Continuous>  heparin   Injectable 5000 Unit(s) SubCutaneous every 8 hours  labetalol 100 milliGRAM(s) Oral three times a day  levETIRAcetam  IVPB 1000 milliGRAM(s) IV Intermittent every 12 hours  pantoprazole  Injectable 40 milliGRAM(s) IV Push daily  piperacillin/tazobactam IVPB.. 3.375 Gram(s) IV Intermittent every 12 hours  propofol Infusion 10 MICROgram(s)/kG/Min (4.32 mL/Hr) IV Continuous <Continuous>  valproate sodium IVPB 500 milliGRAM(s) IV Intermittent three times a day    MEDICATIONS  (PRN):  acetaminophen    Suspension .. 975 milliGRAM(s) Oral every 6 hours PRN Temp greater or equal to 38.5C (101.3F)  sodium chloride 0.9% lock flush 10 milliLiter(s) IV Push every 1 hour PRN Pre/post blood products, medications, blood draw, and to maintain line patency    ROS: Pertinent positives in HPI, all other ROS were reviewed and are negative.      Vital Signs Last 24 Hrs  T(C): 37.7 (06 Jul 2021 20:00), Max: 37.7 (06 Jul 2021 20:00)  T(F): 99.8 (06 Jul 2021 20:00), Max: 99.8 (06 Jul 2021 20:00)  HR: 77 (06 Jul 2021 20:00) (64 - 90)  BP: --  BP(mean): --  RR: 16 (06 Jul 2021 20:00) (16 - 20)  SpO2: 100% (06 Jul 2021 20:00) (98% - 100%)    NEUROLOGICAL EXAM:  MS: Intubated, sedated. No purposeful response to noxious stimuli.   CN: No OCR. no facial asymmetry +corneal reflex b/l, + cough. Pupil 2mm and reactive b/l  Motor: Does not withdraw to painful stimuli. Intermittent clonic movement of chest/diaphragm and L shoulder exacerbated by cough testing.  No clonus at rest or on dorsiflexion of foot.   Tone: Decreased throughout Bulk: normal.   Plantar flex b/l.   No ankle clonus b/l  Sensation: Does not grimace to painful stimuli x4    Labs:                         7.1    11.45 )-----------( 168      ( 06 Jul 2021 01:15 )             22.6   07-06    145  |  113<H>  |  45<H>  ----------------------------<  136<H>  4.3   |  18<L>  |  3.51<H>    Ca    8.9      06 Jul 2021 01:15  Phos  4.9     07-06  Mg     2.30     07-06    TPro  x   /  Alb  2.6<L>  /  TBili  x   /  DBili  x   /  AST  x   /  ALT  x   /  AlkPhos  x   07-06 07/06/21  Abnormal EEG in a sedated patient.  - 1-2.5 hz Generalized periodic discharges with associated myoclonic seizures  - Severe generalized slowing with frequent discontinuity and attenuation    _____________________________________________________________  EEG IMPRESSION/CLINICAL CORRELATE    Abnormal EEG study.  Generalized cortical hyperexcitability with GPDs with frequent intermittent myoclonic seizures  Severe diffuse cerebral dysfunction

## 2021-07-06 NOTE — DIETITIAN INITIAL EVALUATION ADULT. - ORAL INTAKE PTA/DIET HISTORY
Unable to obtain nutrition hx at this time.  Pt is intubated/sedated.  Spoke w/RN-TF currently on hold 2/2 secretions.  Plan to restart TF later today.  Pt w/recent LI admission-rDN note reviewed (6/1/21)-pt with good appetite/po intake.  On DASH/TLC diet.  Wt on that admission 68kg. NKA to food noted.
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
## 2021-07-06 NOTE — PROGRESS NOTE ADULT - ASSESSMENT
INCOMPLETE  60 yo man w. hx tracheal resection 4/23/2021, hx of PE on eliquis, hx of COVID-19 3/2021 and new onset seizure disorder (GTCS x 2) in 3/2021 (on keppra 750 mg BID, rEEG neg for seizures, MRI brain not obtained), p/w stridor and SOB and underwent emergent tracheostomy in ED by ENT and revision 7/2. Neurology consulted 7/2 for concern of seizures. Neuro exam as above with intermittent myoclonic jerks of chest/diaphragm and L shoulder. Intact brainstem reflexes. Exam limited by sedation. EEG so far negative for seizures. Patient noted to be anemic to 6.1 yesterday requiring pRBC, mild leukocytosis yesterday as well, now resolved. Patient with elevated Cr, seems increased even from his high baseline 2/2 CKD. 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     Recommendations   - Continue Keppra 1g BID, Continue Depakote 500 BID.   - Depakote level in AM with CMP. Given such low albumin, this is causing his level to be actually supratherapeutic.  As such, should repeat and see if this trend continues to see if need to adjust dose.    - EEG w/ continued GPDs and hyperexcitability.  Should D/C now in order to get MRI brain w/wo contrast to help determine underlying cause of this  - Wean sedation as tolerated, will continue to monitor   - Rest of care per primary team     Case discussed with neurology attending Dr. Linn  60 yo man w. hx tracheal resection 4/23/2021, hx of PE on eliquis, hx of COVID-19 3/2021 and new onset seizure disorder (GTCS x 2) in 3/2021 (on keppra 750 mg BID, rEEG neg for seizures, MRI brain not obtained), p/w stridor and SOB s/p emergent tracheostomy in ED by ENT and revision 7/2. Neurology consulted 7/2 for concern of seizures manifest as myoclonic jerks at rest of abdomen and of L shoulder, increasing in severity with stimulus. Exam limited by sedation, but notable for intermittent myoclonic jerks of chest/diaphragm and L shoulder which worsen w stimulus.      EEG initially negative for seizures, but evolved to demonstrate 1-2.5Hz GPD a/w myoclonic seizures which responded electrographically and clinically to ativan challenge.   VPA level erratic 22.9 in evening up to 64 07/06 AM.      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       Recommendations   - Continue Keppra 1g BID, Continue Depakote 500 IV TID  - Depakote level in AM with CMP. Given such low albumin, may be supra-therapeutic but should continue to trend.    - EEG w/ continued GPDs and hyperexcitability.  Should D/C now in order to get MRI brain w/wo contrast to help determine underlying cause of these worsening myoclonic seizure.   - Wean sedation as tolerated, will continue to monitor   - Rest of care per primary team     Jh Pop,   PGY-4, Chief Neurology Resident  [id number] general neurology during day, [id number] stroke neurology during day, pager [phone] for new consults.   [id number] Overnight (4:15PM-7:30AM) and Weekends for both general neurology and stroke.     Please note attending attestation to follow.

## 2021-07-07 DIAGNOSIS — Z51.5 ENCOUNTER FOR PALLIATIVE CARE: ICD-10-CM

## 2021-07-07 DIAGNOSIS — J96.01 ACUTE RESPIRATORY FAILURE WITH HYPOXIA: ICD-10-CM

## 2021-07-07 DIAGNOSIS — G93.1 ANOXIC BRAIN DAMAGE, NOT ELSEWHERE CLASSIFIED: ICD-10-CM

## 2021-07-07 LAB
-  AMIKACIN: SIGNIFICANT CHANGE UP
-  AMIKACIN: SIGNIFICANT CHANGE UP
-  AMOXICILLIN/CLAVULANIC ACID: SIGNIFICANT CHANGE UP
-  AMPICILLIN/SULBACTAM: SIGNIFICANT CHANGE UP
-  AMPICILLIN: SIGNIFICANT CHANGE UP
-  AZTREONAM: SIGNIFICANT CHANGE UP
-  AZTREONAM: SIGNIFICANT CHANGE UP
-  CEFAZOLIN: SIGNIFICANT CHANGE UP
-  CEFEPIME: SIGNIFICANT CHANGE UP
-  CEFEPIME: SIGNIFICANT CHANGE UP
-  CEFOXITIN: SIGNIFICANT CHANGE UP
-  CEFTAZIDIME: SIGNIFICANT CHANGE UP
-  CEFTRIAXONE: SIGNIFICANT CHANGE UP
-  CIPROFLOXACIN: SIGNIFICANT CHANGE UP
-  CIPROFLOXACIN: SIGNIFICANT CHANGE UP
-  ERTAPENEM: SIGNIFICANT CHANGE UP
-  GENTAMICIN: SIGNIFICANT CHANGE UP
-  GENTAMICIN: SIGNIFICANT CHANGE UP
-  IMIPENEM: SIGNIFICANT CHANGE UP
-  IMIPENEM: SIGNIFICANT CHANGE UP
-  LEVOFLOXACIN: SIGNIFICANT CHANGE UP
-  LEVOFLOXACIN: SIGNIFICANT CHANGE UP
-  MEROPENEM: SIGNIFICANT CHANGE UP
-  MEROPENEM: SIGNIFICANT CHANGE UP
-  NITROFURANTOIN: SIGNIFICANT CHANGE UP
-  PIPERACILLIN/TAZOBACTAM: SIGNIFICANT CHANGE UP
-  PIPERACILLIN/TAZOBACTAM: SIGNIFICANT CHANGE UP
-  TIGECYCLINE: SIGNIFICANT CHANGE UP
-  TOBRAMYCIN: SIGNIFICANT CHANGE UP
-  TOBRAMYCIN: SIGNIFICANT CHANGE UP
-  TRIMETHOPRIM/SULFAMETHOXAZOLE: SIGNIFICANT CHANGE UP
ANION GAP SERPL CALC-SCNC: 14 MMOL/L — SIGNIFICANT CHANGE UP (ref 7–14)
ANION GAP SERPL CALC-SCNC: 16 MMOL/L — HIGH (ref 7–14)
BASOPHILS # BLD AUTO: 0.02 K/UL — SIGNIFICANT CHANGE UP (ref 0–0.2)
BASOPHILS NFR BLD AUTO: 0.2 % — SIGNIFICANT CHANGE UP (ref 0–2)
BLOOD GAS ARTERIAL - LYTES,HGB,ICA,LACT RESULT: SIGNIFICANT CHANGE UP
BUN SERPL-MCNC: 44 MG/DL — HIGH (ref 7–23)
BUN SERPL-MCNC: 49 MG/DL — HIGH (ref 7–23)
CALCIUM SERPL-MCNC: 8.7 MG/DL — SIGNIFICANT CHANGE UP (ref 8.4–10.5)
CALCIUM SERPL-MCNC: 9.1 MG/DL — SIGNIFICANT CHANGE UP (ref 8.4–10.5)
CHLORIDE SERPL-SCNC: 112 MMOL/L — HIGH (ref 98–107)
CHLORIDE SERPL-SCNC: 113 MMOL/L — HIGH (ref 98–107)
CO2 SERPL-SCNC: 18 MMOL/L — LOW (ref 22–31)
CO2 SERPL-SCNC: 20 MMOL/L — LOW (ref 22–31)
CREAT SERPL-MCNC: 3.32 MG/DL — HIGH (ref 0.5–1.3)
CREAT SERPL-MCNC: 3.45 MG/DL — HIGH (ref 0.5–1.3)
CULTURE RESULTS: SIGNIFICANT CHANGE UP
EOSINOPHIL # BLD AUTO: 0.62 K/UL — HIGH (ref 0–0.5)
EOSINOPHIL NFR BLD AUTO: 6 % — SIGNIFICANT CHANGE UP (ref 0–6)
GLUCOSE SERPL-MCNC: 113 MG/DL — HIGH (ref 70–99)
GLUCOSE SERPL-MCNC: 167 MG/DL — HIGH (ref 70–99)
HCT VFR BLD CALC: 23.8 % — LOW (ref 39–50)
HGB BLD-MCNC: 7.3 G/DL — LOW (ref 13–17)
IANC: 7.07 K/UL — SIGNIFICANT CHANGE UP (ref 1.5–8.5)
IMM GRANULOCYTES NFR BLD AUTO: 1.9 % — HIGH (ref 0–1.5)
LEVETIRACETAM SERPL-MCNC: 16.8 UG/ML — SIGNIFICANT CHANGE UP (ref 10–40)
LYMPHOCYTES # BLD AUTO: 1.2 K/UL — SIGNIFICANT CHANGE UP (ref 1–3.3)
LYMPHOCYTES # BLD AUTO: 11.7 % — LOW (ref 13–44)
MAGNESIUM SERPL-MCNC: 2.6 MG/DL — SIGNIFICANT CHANGE UP (ref 1.6–2.6)
MAGNESIUM SERPL-MCNC: 2.8 MG/DL — HIGH (ref 1.6–2.6)
MCHC RBC-ENTMCNC: 28.6 PG — SIGNIFICANT CHANGE UP (ref 27–34)
MCHC RBC-ENTMCNC: 30.7 GM/DL — LOW (ref 32–36)
MCV RBC AUTO: 93.3 FL — SIGNIFICANT CHANGE UP (ref 80–100)
METHOD TYPE: SIGNIFICANT CHANGE UP
METHOD TYPE: SIGNIFICANT CHANGE UP
MONOCYTES # BLD AUTO: 1.19 K/UL — HIGH (ref 0–0.9)
MONOCYTES NFR BLD AUTO: 11.6 % — SIGNIFICANT CHANGE UP (ref 2–14)
NEUTROPHILS # BLD AUTO: 7.07 K/UL — SIGNIFICANT CHANGE UP (ref 1.8–7.4)
NEUTROPHILS NFR BLD AUTO: 68.6 % — SIGNIFICANT CHANGE UP (ref 43–77)
NRBC # BLD: 0 /100 WBCS — SIGNIFICANT CHANGE UP
NRBC # FLD: 0 K/UL — SIGNIFICANT CHANGE UP
ORGANISM # SPEC MICROSCOPIC CNT: SIGNIFICANT CHANGE UP
OSMOLALITY UR: 358 MOSM/KG — SIGNIFICANT CHANGE UP (ref 50–1200)
PHOSPHATE SERPL-MCNC: 6.2 MG/DL — HIGH (ref 2.5–4.5)
PHOSPHATE SERPL-MCNC: 6.3 MG/DL — HIGH (ref 2.5–4.5)
PLATELET # BLD AUTO: 187 K/UL — SIGNIFICANT CHANGE UP (ref 150–400)
POTASSIUM SERPL-MCNC: 4.6 MMOL/L — SIGNIFICANT CHANGE UP (ref 3.5–5.3)
POTASSIUM SERPL-MCNC: 4.7 MMOL/L — SIGNIFICANT CHANGE UP (ref 3.5–5.3)
POTASSIUM SERPL-SCNC: 4.6 MMOL/L — SIGNIFICANT CHANGE UP (ref 3.5–5.3)
POTASSIUM SERPL-SCNC: 4.7 MMOL/L — SIGNIFICANT CHANGE UP (ref 3.5–5.3)
RBC # BLD: 2.55 M/UL — LOW (ref 4.2–5.8)
RBC # FLD: 15.3 % — HIGH (ref 10.3–14.5)
SODIUM SERPL-SCNC: 146 MMOL/L — HIGH (ref 135–145)
SODIUM SERPL-SCNC: 147 MMOL/L — HIGH (ref 135–145)
SPECIMEN SOURCE: SIGNIFICANT CHANGE UP
VALPROATE SERPL-MCNC: 21.7 UG/ML — LOW (ref 50–100)
WBC # BLD: 10.3 K/UL — SIGNIFICANT CHANGE UP (ref 3.8–10.5)
WBC # FLD AUTO: 10.3 K/UL — SIGNIFICANT CHANGE UP (ref 3.8–10.5)

## 2021-07-07 PROCEDURE — 71045 X-RAY EXAM CHEST 1 VIEW: CPT | Mod: 26

## 2021-07-07 PROCEDURE — 99222 1ST HOSP IP/OBS MODERATE 55: CPT | Mod: GC

## 2021-07-07 PROCEDURE — 99291 CRITICAL CARE FIRST HOUR: CPT

## 2021-07-07 RX ADMIN — PIPERACILLIN AND TAZOBACTAM 25 GRAM(S): 4; .5 INJECTION, POWDER, LYOPHILIZED, FOR SOLUTION INTRAVENOUS at 05:19

## 2021-07-07 RX ADMIN — LEVETIRACETAM 400 MILLIGRAM(S): 250 TABLET, FILM COATED ORAL at 16:34

## 2021-07-07 RX ADMIN — Medication 27.5 MILLIGRAM(S): at 12:13

## 2021-07-07 RX ADMIN — HEPARIN SODIUM 5000 UNIT(S): 5000 INJECTION INTRAVENOUS; SUBCUTANEOUS at 12:13

## 2021-07-07 RX ADMIN — CHLORHEXIDINE GLUCONATE 15 MILLILITER(S): 213 SOLUTION TOPICAL at 05:19

## 2021-07-07 RX ADMIN — Medication 27.5 MILLIGRAM(S): at 05:19

## 2021-07-07 RX ADMIN — Medication 2 MILLIGRAM(S): at 05:18

## 2021-07-07 RX ADMIN — LEVETIRACETAM 400 MILLIGRAM(S): 250 TABLET, FILM COATED ORAL at 05:18

## 2021-07-07 RX ADMIN — PIPERACILLIN AND TAZOBACTAM 25 GRAM(S): 4; .5 INJECTION, POWDER, LYOPHILIZED, FOR SOLUTION INTRAVENOUS at 16:34

## 2021-07-07 RX ADMIN — CHLORHEXIDINE GLUCONATE 1 APPLICATION(S): 213 SOLUTION TOPICAL at 05:20

## 2021-07-07 RX ADMIN — CHLORHEXIDINE GLUCONATE 15 MILLILITER(S): 213 SOLUTION TOPICAL at 16:34

## 2021-07-07 RX ADMIN — HEPARIN SODIUM 5000 UNIT(S): 5000 INJECTION INTRAVENOUS; SUBCUTANEOUS at 05:19

## 2021-07-07 RX ADMIN — Medication 100 MILLIGRAM(S): at 12:13

## 2021-07-07 RX ADMIN — Medication 27.5 MILLIGRAM(S): at 22:22

## 2021-07-07 RX ADMIN — Medication 100 MILLIGRAM(S): at 05:20

## 2021-07-07 RX ADMIN — HEPARIN SODIUM 5000 UNIT(S): 5000 INJECTION INTRAVENOUS; SUBCUTANEOUS at 22:22

## 2021-07-07 RX ADMIN — Medication 100 MILLIGRAM(S): at 22:22

## 2021-07-07 RX ADMIN — Medication 1 APPLICATION(S): at 17:10

## 2021-07-07 RX ADMIN — PANTOPRAZOLE SODIUM 40 MILLIGRAM(S): 20 TABLET, DELAYED RELEASE ORAL at 12:12

## 2021-07-07 NOTE — ADVANCED PRACTICE NURSE CONSULT - ASSESSMENT
General: Patient with tracheostomy ventilator dependent. Peritubular skin of tracheostomy intact, of note patient has bloody secretions around tracheostomy cannula. Right nare NGT for enteral feeds, peritubular skin intact with dry serous crust to septum, able to remove with gentle cleansing. Patient with facial twitching. Patient bedbound, indwelling catheter in place, has not has a BM as of yet as per primary RN. Skin warm, dry with increased moisture in intertriginous folds, adequate skin turgor.    Moisture associated dermatitis- within intertriginous folds of his neck. Increased moisture with fissures to the left neck, exposing pink-moist dermis.     Risk for moisture associated dermatitis to abdominal pannus- increased moisture with chronic hyperpigmentation.

## 2021-07-07 NOTE — CONSULT NOTE ADULT - ASSESSMENT
62 y/o male s/p tracheal resection 4/23/21 presents to [facility] ER w c/o stridor and SOB. Difficulty breating is worse at night and prevents him from being able to sleep.  He denies productive cough or fever.  This is his 3rd readmission after his tracheal resection  PT has PMHx COVID+ (3/2021 - not hospitalized), PE (8/2020 - on eliquis at home), CKD, w/ recent hospitalization at [facility] after seizure w/ lingual hematoma requiring emergency tracheostomy on 3/25/21.  Patient underwent Tracheal Resection and Reconstruction on 4/23. ENT injected vocal cords few months ago. He was last discharged 5/18/21. s/p emergent trach in ED after hypoxia.  Concern for anoxic brain injury.  Asked to see for goc

## 2021-07-07 NOTE — CONSULT NOTE ADULT - SUBJECTIVE AND OBJECTIVE BOX
HPI:  60 y/o male s/p tracheal resection 4/23/21 presents to [facility] ER w c/o stridor and SOB. Difficulty breating is worse at night and prevents him from being able to sleep.  He denies productive cough or fever.  This is his 3rd readmission after his tracheal resection  PT has PMHx COVID+ (3/2021 - not hospitalized), PE (8/2020 - on eliquis at home), CKD, w/ recent hospitalization at [facility] after seizure w/ lingual hematoma requiring emergency tracheostomy on 3/25/21.  Patient underwent Tracheal Resection and Reconstruction on 4/23. ENT injected vocal cords few months ago. He was last discharged 5/18/21.     PROCEDURE  ENT consulted in ED for surgical airway management. per anesthesia unable to intubate. emergent tracheostomy performed, size 5.5 tracheostomy tube w cuff. secured w silk sutures and neck tie    Pt in SICU.  Concern for Anoxic brain injury.  On fentanyl and propofol due to seizure like activity and ability for primary team to obtain MRI for further evaluation.                 (01 Jul 2021 22:19)    PERTINENT PM/SXH:   HTN (hypertension)    Chronic kidney disease, unspecified CKD stage    Pulmonary embolus    2019 novel coronavirus disease (COVID-19)    Arthritis    Tracheal stenosis      H/O total knee replacement, right    History of total right knee replacement (TKR)    History of tracheal stenosis      FAMILY HISTORY:    ITEMS NOT CHECKED ARE NOT PRESENT    SOCIAL HISTORY:   Significant other/partner:  [x ]  Children:  [x ]  Orthodox/Spirituality:  Substance hx:  [ ]   Tobacco hx:  [ ]   Alcohol hx: [ ]   Home Opioid hx:  [ ] I-Stop Reference No:  Living Situation: [ x]Home  [ ]Long term care  [ ]Rehab [ ]Other    ADVANCE DIRECTIVES:    DNR  MOLST  [ ]  Living Will  [ ]   DECISION MAKER(s):  [ ] Health Care Proxy(s)  [ ] Surrogate(s)  [ ] Guardian           Name(s): Phone Number(s):  ghazala    BASELINE (I)ADL(s) (prior to admission):  [place]: [x ]Total  [ ] Moderate [ ]Dependent    Allergies    No Known Allergies    Intolerances    MEDICATIONS  (STANDING):  ALBUTerol    90 MICROgram(s) HFA Inhaler 2 Puff(s) Inhalation every 4 hours  chlorhexidine 0.12% Liquid 15 milliLiter(s) Oral Mucosa every 12 hours  chlorhexidine 4% Liquid 1 Application(s) Topical <User Schedule>  dornase walt Solution 2.5 milliGRAM(s) Inhalation once  fentaNYL   Infusion. 0.5 MICROgram(s)/kG/Hr (3.6 mL/Hr) IV Continuous <Continuous>  heparin   Injectable 5000 Unit(s) SubCutaneous every 8 hours  labetalol 100 milliGRAM(s) Oral three times a day  levETIRAcetam  IVPB 1000 milliGRAM(s) IV Intermittent every 12 hours  pantoprazole  Injectable 40 milliGRAM(s) IV Push daily  piperacillin/tazobactam IVPB.. 3.375 Gram(s) IV Intermittent every 12 hours  propofol Infusion 10 MICROgram(s)/kG/Min (4.32 mL/Hr) IV Continuous <Continuous>  valproate sodium IVPB 500 milliGRAM(s) IV Intermittent three times a day    MEDICATIONS  (PRN):  acetaminophen    Suspension .. 975 milliGRAM(s) Oral every 6 hours PRN Temp greater or equal to 38.5C (101.3F)  sodium chloride 0.9% lock flush 10 milliLiter(s) IV Push every 1 hour PRN Pre/post blood products, medications, blood draw, and to maintain line patency    PRESENT SYMPTOMS: [ x]Unable to obtain due to poor mentation   Source if other than patient:  [ ]Family   [ ]Team     Pain (Impact on QOL):    Location -         Minimal acceptable level (0-10 scale):                    Aggrevating factors -  Quality -  Radiation -  Severity (0-10 scale) -    Timing -    PAIN AD Score:     http://geriatrictoolkit.[email or web address]/painad.pdf (press ctrl +  left click to view)    Dyspnea:                           [ ]Mild [ ]Moderate [ ]Severe  Anxiety:                             [ ]Mild [ ]Moderate [ ]Severe  Fatigue:                             [ ]Mild [ ]Moderate [ ]Severe  Nausea:                             [ ]Mild [ ]Moderate [ ]Severe  Loss of appetite:              [ ]Mild [ ]Moderate [ ]Severe  Constipation:                    [ ]Mild [ ]Moderate [ ]Severe    Other Symptoms:  [ ]All other review of systems negative     Karnofsky Performance Score/Palliative Performance Status Version 2:      10   %  PHYSICAL EXAM:  Vital Signs Last 24 Hrs  T(C): 36.7 (07 Jul 2021 12:00), Max: 37.7 (06 Jul 2021 20:00)  T(F): 98 (07 Jul 2021 12:00), Max: 99.8 (06 Jul 2021 20:00)  HR: 71 (07 Jul 2021 14:00) (67 - 78)  BP: --  BP(mean): --  RR: 16 (07 Jul 2021 14:00) (16 - 21)  SpO2: 97% (07 Jul 2021 14:00) (94% - 100%) I&O's Summary    06 Jul 2021 07:01  -  07 Jul 2021 07:00  --------------------------------------------------------  IN: 2034.8 mL / OUT: 2720 mL / NET: -685.2 mL    07 Jul 2021 07:01  -  07 Jul 2021 15:21  --------------------------------------------------------  IN: 611.6 mL / OUT: 650 mL / NET: -38.4 mL    GENERAL:  [ ]Alert  [ ]Oriented x   [ ]Lethargic  [ ]Cachexia  [ ]Unarousable  [ ]Verbal  [x ]Non-Verbal  Behavioral:   [ ] Anxiety  [ ] Delirium [ ] Agitation [ ] Other  HEENT:  [ ]Normal   [ ]Dry mouth   [x ]ET Tube/Trach  [ ]Oral lesions  PULMONARY:   [ x]Clear [ ]Tachypnea  [ ]Audible excessive secretions   [ ]Rhonchi        [ ]Right [ ]Left [ ]Bilateral  [ ]Crackles        [ ]Right [ ]Left [ ]Bilateral  [ ]Wheezing     [ ]Right [ ]Left [ ]Bilateral  CARDIOVASCULAR:    [x ]Regular [ ]Irregular [ ]Tachy  [ ]Jaswant [ ]Murmur [ ]Other  GASTROINTESTINAL:  [ x]Soft  [ ]Distended   [x ]+BS  [ ]Non tender [ ]Tender  [ ]PEG [ x]OGT/ NGT  Last BM:   GENITOURINARY:  [ ]Normal [ ] Incontinent   [ ]Oliguria/Anuria   [ x]Higginbotham  MUSCULOSKELETAL:   [ ]Normal   [ ]Weakness  [ x]Bed/Wheelchair bound [ ]Edema  NEUROLOGIC:   [ ]No focal deficits  [ ] Cognitive impairment  [ ] Dysphagia [ ]Dysarthria [ ] Paresis [ x]Other sedated  SKIN:   [x ]Normal   [ ]Pressure ulcer(s)  [ ]Rash    CRITICAL CARE:  [ ] Shock Present  [ ]Septic [ ]Cardiogenic [ ]Neurologic [ ]Hypovolemic  [ ]  Vasopressors [ ]  Inotropes   [ ] Respiratory failure present  [ ] Acute  [ ] Chronic [ ] Hypoxic  [ ] Hypercarbic [ ] Other  [ ] Other organ failure     LABS:                        7.3    10.30 )-----------( 187      ( 07 Jul 2021 01:37 )             23.8   07-07    146<H>  |  112<H>  |  49<H>  ----------------------------<  167<H>  4.7   |  20<L>  |  3.32<H>    Ca    8.7      07 Jul 2021 13:49  Phos  6.2     07-07  Mg     2.80     07-07    TPro  x   /  Alb  2.6<L>  /  TBili  x   /  DBili  x   /  AST  x   /  ALT  x   /  AlkPhos  x   07-06        RADIOLOGY & ADDITIONAL STUDIES:    PROTEIN CALORIE MALNUTRITION:   [ ] PPSV2 < or = to 30% [ ] significant weight loss  [ ] poor nutritional intake [ ] catabolic state [ ] anasarca     Albumin, Serum: 2.6 g/dL (07-06-21 @ 01:15)  Artificial Nutrition [ ]     REFERRALS:   [ ]Chaplaincy  [ ] Hospice  [ ]Child Life  [ ]Social Work  [ ]Case management [ ]Holistic Therapy   Goals of Care Discussion Document:

## 2021-07-07 NOTE — CONSULT NOTE ADULT - PROBLEM SELECTOR RECOMMENDATION 2
await mri results  will need to be off propofol and fentanyl to understand true mental status  however, may require iv seizure meds for his continues myoclonus

## 2021-07-07 NOTE — PROGRESS NOTE ADULT - SUBJECTIVE AND OBJECTIVE BOX
SICU Daily Progress Note  =====================================================  Interval/Overnight Events:   - EEG w/ Generalized cortical hyperexcitability with GPDs with frequent intermittent myoclonic seizures, severe diffuse cerebral dysfunction  - Nasal discharge cultures significant for pseudomonas, urine culture w/ citerobacter     HPI: Patient is a 60 y/o male s/p tracheal resection 4/23/21 due to stenosis following emergent cric on 3/25/21 due to tongue lingual hematoma 2/2 loss of airway during seizure now presenting to [facility] ER w c/o stridor and SOB. Patient refers symptoms worse at night interfering with sleep and denies associated cough or fever. Patient with multiple previous admissions for SOB and post tracheal resection respiratory symptoms.  PMHx COVID+ (3/2021 - not hospitalized), PE (8/2020 - on eliquis at home), CKD. Patient underwent Tracheal Resection and Reconstruction on 4/23. Now s/p emergent cric in ED for loss of airway 7/1, subsequent revision tracheostomy 7/2 .    Allergies: No Known Allergies    MEDICATIONS:   --------------------------------------------------------------------------------------  Neurologic Medications  acetaminophen    Suspension .. 975 milliGRAM(s) Oral every 6 hours PRN Temp greater or equal to 38.5C (101.3F)  fentaNYL   Infusion. 0.5 MICROgram(s)/kG/Hr IV Continuous <Continuous>  levETIRAcetam  IVPB 1000 milliGRAM(s) IV Intermittent every 12 hours  propofol Infusion 10 MICROgram(s)/kG/Min IV Continuous <Continuous>  valproate sodium IVPB 500 milliGRAM(s) IV Intermittent three times a day    Respiratory Medications  ALBUTerol    90 MICROgram(s) HFA Inhaler 2 Puff(s) Inhalation every 4 hours  dornase walt Solution 2.5 milliGRAM(s) Inhalation once    Cardiovascular Medications  labetalol 100 milliGRAM(s) Oral three times a day    Gastrointestinal Medications  pantoprazole  Injectable 40 milliGRAM(s) IV Push daily  sodium chloride 0.9% lock flush 10 milliLiter(s) IV Push every 1 hour PRN Pre/post blood products, medications, blood draw, and to maintain line patency    Genitourinary Medications    Hematologic/Oncologic Medications  heparin   Injectable 5000 Unit(s) SubCutaneous every 8 hours    Antimicrobial/Immunologic Medications  piperacillin/tazobactam IVPB.. 3.375 Gram(s) IV Intermittent every 12 hours    Endocrine/Metabolic Medications    Topical/Other Medications  chlorhexidine 0.12% Liquid 15 milliLiter(s) Oral Mucosa every 12 hours  chlorhexidine 4% Liquid 1 Application(s) Topical <User Schedule>    --------------------------------------------------------------------------------------  VITAL SIGNS, INS/OUTS (last 24 hours):  --------------------------------------------------------------------------------------  ICU Vital Signs Last 24 Hrs  T(C): 37.4 (07 Jul 2021 00:00), Max: 37.7 (06 Jul 2021 20:00)  T(F): 99.3 (07 Jul 2021 00:00), Max: 99.8 (06 Jul 2021 20:00)  HR: 73 (07 Jul 2021 01:00) (64 - 78)  BP: --  BP(mean): --  ABP: 133/80 (07 Jul 2021 01:00) (90/59 - 146/75)  ABP(mean): 96 (07 Jul 2021 01:00) (68 - 99)  RR: 16 (07 Jul 2021 01:00) (16 - 21)  SpO2: 100% (07 Jul 2021 01:00) (98% - 100%)  --------------------------------------------------------------------------------------    EXAM  NEUROLOGY  GCS: 3T   Exam: Sedated, unable to assess neuro exam    HEENT  Exam: Normocephalic, +tracheostomy without surrounding erythema, purulent nasal discharge    RESPIRATORY  Exam: Lungs clear to auscultation, Normal expansion/effort.   Mechanical Ventilation: Mode: AC/ CMV (Assist Control/ Continuous Mandatory Ventilation), RR (machine): 16, TV (machine): 400, FiO2: 40, PEEP: 10, ITime: 0.75, MAP: 14, PIP: 27    CARDIOVASCULAR  Exam: S1, S2.  Regular rate and rhythm.     GI/NUTRITION  Exam: Abdomen soft, Non-tender, Non-distended.     VASCULAR  Exam: Extremities warm, pink, well-perfused.    SKIN  Exam: Good skin turgor, no skin breakdown.     METABOLIC/FLUIDS/ELECTROLYTES      HEMATOLOGIC  [x] VTE Prophylaxis: heparin   Injectable 5000 Unit(s) SubCutaneous every 8 hours    Transfusions:	[] PRBC	[] Platelets		[] FFP	[] Cryoprecipitate    INFECTIOUS DISEASE  Antimicrobials/Immunologic Medications:  piperacillin/tazobactam IVPB.. 3.375 Gram(s) IV Intermittent every 12 hours    Day # 2    Tubes/Lines/Drains   [x] Peripheral IV  [] Central Venous Line     	[] R	[] L	[] IJ	[] Fem	[] SC	Date Placed:   [] Arterial Line		[] R	[] L	[] Fem	[] Rad	[] Ax	Date Placed:   [] PICC		[] Midline		[] Mediport  [] Urinary Catheter		Date Placed:   [x] Necessity of urinary, arterial, and venous catheters discussed    LABS  --------------------------------------------------------------------------------------  ((Insert SICU Labs here))***  --------------------------------------------------------------------------------------    OTHER LABORATORY:     IMAGING STUDIES:   CXR:  SICU Daily Progress Note  =====================================================  Interval/Overnight Events:   - EEG w/ Generalized cortical hyperexcitability with GPDs with frequent intermittent myoclonic seizures, severe diffuse cerebral dysfunction; discontinued VEEG monitoring  - Nasal discharge cultures significant for pseudomonas, urine culture w/ Citrobacter   - Vent parameters improving FIO2 40->30 and PEEP 10 ->5    HPI: Patient is a 60 y/o male s/p tracheal resection 21 due to stenosis following emergent cric on 3/25/21 due to tongue lingual hematoma / loss of airway during seizure now presenting to [facility] ER w c/o stridor and SOB. Patient refers symptoms worse at night interfering with sleep and denies associated cough or fever. Patient with multiple previous admissions for SOB and post tracheal resection respiratory symptoms.  PMHx COVID+ (3/2021 - not hospitalized), PE (2020 - on eliquis at home), CKD. Patient underwent Tracheal Resection and Reconstruction on . Now s/p emergent cric in ED for loss of airway , subsequent revision tracheostomy  .    Allergies: No Known Allergies    MEDICATIONS:   --------------------------------------------------------------------------------------  Neurologic Medications  acetaminophen    Suspension .. 975 milliGRAM(s) Oral every 6 hours PRN Temp greater or equal to 38.5C (101.3F)  fentaNYL   Infusion. 0.5 MICROgram(s)/kG/Hr IV Continuous <Continuous>  levETIRAcetam  IVPB 1000 milliGRAM(s) IV Intermittent every 12 hours  propofol Infusion 10 MICROgram(s)/kG/Min IV Continuous <Continuous>  valproate sodium IVPB 500 milliGRAM(s) IV Intermittent three times a day    Respiratory Medications  ALBUTerol    90 MICROgram(s) HFA Inhaler 2 Puff(s) Inhalation every 4 hours  dornase walt Solution 2.5 milliGRAM(s) Inhalation once    Cardiovascular Medications  labetalol 100 milliGRAM(s) Oral three times a day    Gastrointestinal Medications  pantoprazole  Injectable 40 milliGRAM(s) IV Push daily  sodium chloride 0.9% lock flush 10 milliLiter(s) IV Push every 1 hour PRN Pre/post blood products, medications, blood draw, and to maintain line patency    Genitourinary Medications    Hematologic/Oncologic Medications  heparin   Injectable 5000 Unit(s) SubCutaneous every 8 hours    Antimicrobial/Immunologic Medications  piperacillin/tazobactam IVPB.. 3.375 Gram(s) IV Intermittent every 12 hours    Endocrine/Metabolic Medications    Topical/Other Medications  chlorhexidine 0.12% Liquid 15 milliLiter(s) Oral Mucosa every 12 hours  chlorhexidine 4% Liquid 1 Application(s) Topical <User Schedule>    --------------------------------------------------------------------------------------  VITAL SIGNS, INS/OUTS (last 24 hours):  --------------------------------------------------------------------------------------  ICU Vital Signs Last 24 Hrs  T(C): 37.4 (2021 00:00), Max: 37.7 (2021 20:00)  T(F): 99.3 (2021 00:00), Max: 99.8 (2021 20:00)  HR: 73 (2021 01:00) (64 - 78)  BP: --  BP(mean): --  ABP: 133/80 (2021 01:00) (90/59 - 146/75)  ABP(mean): 96 (2021 01:00) (68 - 99)  RR: 16 (2021 01:00) (16 - 21)  SpO2: 100% (2021 01:00) (98% - 100%)  --------------------------------------------------------------------------------------    EXAM  NEUROLOGY  GCS: 3T RASS -4   Exam: Sedated, unable to assess neuro exam, VEEG monitoring removed    HEENT  Exam: Normocephalic, +tracheostomy without surrounding erythema, purulent nasal discharge    RESPIRATORY  Exam: Lungs clear to auscultation, Normal expansion/effort.   Mechanical Ventilation: Mode: AC/ CMV (Assist Control/ Continuous Mandatory Ventilation), RR (machine): 16, TV (machine): 400, FiO2: 30, PEEP: 5, ITime: 0.75, MAP: 14, PIP: 27    CARDIOVASCULAR  Exam: S1, S2.  Regular rate and rhythm.     GI/NUTRITION  Exam: Abdomen soft, moderately distended.     VASCULAR  Exam: Extremities warm, pink, well-perfused., right groin s/p femoral central line removal soft no palpable collection CDI    SKIN  Exam: Good skin turgor, no skin breakdown.     METABOLIC/FLUIDS/ELECTROLYTES      HEMATOLOGIC  [x] VTE Prophylaxis: heparin   Injectable 5000 Unit(s) SubCutaneous every 8 hours    Transfusions:	[1] PRBC	[] Platelets		[] FFP	[] Cryoprecipitate    INFECTIOUS DISEASE  Antimicrobials/Immunologic Medications:  piperacillin/tazobactam IVPB.. 3.375 Gram(s) IV Intermittent every 12 hours    Day # 2    Tubes/Lines/Drains   [x] Peripheral IV  [] Central Venous Line     	[] R	[] L	[] IJ	[] Fem	[] SC	Date Placed:   [x] Arterial Line		[x] R	[] L	[] Fem	[] Rad	[] Ax	Date Placed:   [] PICC		[] Midline		[] Mediport  [] Urinary Catheter		Date Placed:   [x] Necessity of urinary, arterial, and venous catheters discussed    LABS  --------------------------------------------------------------------------------------  CBC (:37)                          7.3<L>                   10.30   )--------------(  187        68.6  % Neuts, 11.7<L>% Lymphs, ANC: 7.07                            23.8<L>  CBC ( 01:15)                          7.1<L>                   11.45<H>  )--------------(  168        68.9  % Neuts, 15.5  % Lymphs, ANC: 8.03<H>                          22.6<L>    BMP (:37)       147<H>  |  113<H>  |  44<H> 			Ca++ --      Ca 9.1          ---------------------------------( 113<H>		Mg 2.60         4.6     |  18<L>   |  3.45<H>			Ph 6.3<H>  BMP (:15)       145     |  113<H>  |  45<H> 			Ca++ --      Ca 8.9          ---------------------------------( 136<H>		Mg 2.30         4.3     |  18<L>   |  3.51<H>			Ph 4.9<H>    LFTs ( 01:15)      TPro -- / Alb 2.6<L> / TBili -- / DBili -- / AST -- / ALT -- / AlkPhos --  LFTs ( 23:22)      TPro -- / Alb 2.7<L> / TBili -- / DBili -- / AST -- / ALT -- / AlkPhos --        ABG (:37)     7.38 / 36 / 153<H> / 22 / -3.5<L> / 99.0%     Lactate:    ABG ( @ 03:13)     7.37 / 36 / 155<H> / 21<L> / -4.0<L> / 98.9%     Lactate:        Urinalysis ( @ 01:57):     Color: Light Yellow / Appearance: Slightly Turbid<!> / S.018 / pH: 6.5 / Gluc: Negative / Ketones: Negative / Bili: Negative / Urobili: <2 mg/dL / Protein :100 mg/dL<!> / Nitrites: Positive<!> / Leuk.Est: Large<!> / RBC: 20-30 / WBC: >50 / Sq Epi:  / Non Sq Epi: 0-2 / Bacteria Many<!>       -> .Blood Blood-Peripheral Culture ( @ 06:00)     NG    NG    No growth to date.    -> .Sputum Sputum Culture ( @ 05:56)       Rare polymorphonuclear leukocytes per low power field  No Squamous epithelial cells per low power field  Few Gram Negative Rods seen per oil power field    NG    Few Pseudomonas aeruginosa  Normal Respiratory Taylor present    -> .Urine Catheterized Culture ( @ 03:50)     NG    Citrobacter koseri    >100,000 CFU/ml Citrobacter koseri    -> .Blood Culture ( @ 08:16)     NG    NG    No growth to date.      --------------------------------------------------------------------------------------

## 2021-07-07 NOTE — CONSULT NOTE ADULT - PROBLEM SELECTOR RECOMMENDATION 9
s/p emergent trach in ED  pt with h/o of tracheal repair due to previous issues with hypoxia after seizure and covid  vent support as tolerated

## 2021-07-07 NOTE — PROGRESS NOTE ADULT - ASSESSMENT
61M PMH epilepsy on Keppra, PE (8/2020 - on eliquis at home), CKD and COVID+ with seizure w/ lingual hematoma requiring emergent trach on 3/25 and tracheal resection 4/21 for stridor and SOB. Multiple readmissions following sgx for SOB.    7/1 s/p emergent cric in ED for loss of airway following seizure and emesis poss aspiration  7/2 s/p bronch and revision tracheostomy     PLAN:    Neurologic:  - Sedated with fentanyl & propofol  - Keppra 1000 BID s/p loading dose 1500mg (7/3)  - Depakote 500mg TID  - CT negative no intracranial pathology  - EEG significant for hyperexcitability and GPDs: cortical storming and stimulus myoclonus  - MRI Brain w/wout IV contrast pending    Respiratory:   - s/p bronch and trach revision 7/2  - Mechanical ventilation AC/CMV    Cardiovascular:   - Hemodynamically stable off vasopressors      Gastrointestinal/Nutrition:   - NPO w/ Jevity tube feeds via NGT at goal   - On Protonix for stress ulcer ppx    Renal/Genitourinary:  - Higginbotham in place  - Monitor intake & output    Hematologic:  - Trend H/H  - c/w SQH for VTE prophylaxis  - s/p 1PRBC (7/3 AM) for downtrending H/H; responded appropriately     Infectious Disease:   - CT 7/3 with bilateral opacification of sinuses (grayish drainage)  - Sputum Cx prelim with pseudomonas  - Urine Cx prelim with Citrobacter  - Continue Zosyn    Tubes/Lines/Drains:   - Higginbotham  - Right radial A line  - NGT  - R PIV  - L PIV     Disposition: SICU   61M PMH epilepsy on Keppra, PE (8/2020 - on eliquis at home), CKD and COVID+ with seizure w/ lingual hematoma requiring emergent trach on 3/25 and tracheal resection 4/21 for stridor and SOB. Multiple readmissions following sgx for SOB.    7/1 s/p emergent cric in ED for loss of airway following seizure and emesis poss aspiration  7/2 s/p bronch and revision tracheostomy     PLAN:    Neurologic:  - Sedated with fentanyl & propofol  - Keppra 1000 BID s/p loading dose 1500mg (7/3)  - Depakote 500mg TID  - CT negative no intracranial pathology  - EEG significant for hyperexcitability and GPDs: cortical storming and stimulus myoclonus  - MRI Brain w/wout IV contrast pending    Respiratory:   - s/p bronch and trach revision 7/2  - Mechanical ventilation AC/CMV    Cardiovascular:   - Hemodynamically stable off vasopressors      Gastrointestinal/Nutrition:   - NPO w/ Jevity tube feeds via NGT at goal   - On Protonix for stress ulcer ppx    Renal/Genitourinary:  - Higginbotham in place  - Monitor intake & output    Hematologic:  - Trend H/H  - c/w SQH for VTE prophylaxis  - s/p 1PRBC (7/3 AM) for downtrending H/H; responded appropriately     Infectious Disease:   - CT 7/3 with bilateral opacification of sinuses (grayish drainage)  - Sputum Cx prelim with pseudomonas  - Urine Cx prelim with Citrobacter  - Continue Zosyn    Tubes/Lines/Drains:   - Higginbotham  - Right radial A line  - NGT  - R PIV  - L PIV     Disposition: SICU 61M PMH epilepsy on Keppra, PE (8/2020 - on eliquis at home), CKD and COVID+ with seizure w/ lingual hematoma requiring emergent trach on 3/25 and tracheal resection 4/21 for stridor and SOB. Multiple readmissions following sgx for SOB.    7/1 s/p emergent cric in ED for loss of airway following seizure and emesis poss aspiration  7/2 s/p bronch and revision tracheostomy     PLAN:    Neurologic:  - Sedated with fentanyl & propofol  - Keppra 1000 BID s/p loading dose 1500mg (7/3)  - Depakote 500mg TID  - CT negative no intracranial pathology  - EEG significant for hyperexcitability and GPDs: cortical storming and stimulus myoclonus  - MRI Brain w/wout IV contrast pending    Respiratory:   - s/p bronch and trach revision 7/2  - Mechanical ventilation AC/CMV    Cardiovascular:   - Hemodynamically stable off vasopressors      Gastrointestinal/Nutrition:   - NPO w/ Jevity tube feeds via NGT at goal   - On Protonix for stress ulcer ppx    Renal/Genitourinary:  - Higginbotham in place  - Monitor intake & output  - F/U urine osmolarity and repeat BMP     Hematologic:  - Trend H/H  - c/w SQH for VTE prophylaxis  - s/p 1PRBC (7/3 AM) for downtrending H/H; responded appropriately     Infectious Disease:   - CT 7/3 with bilateral opacification of sinuses (grayish drainage)  - Sputum Cx prelim with pseudomonas  - Urine Cx prelim with Citrobacter  - Continue Zosyn    Tubes/Lines/Drains:   - Higginbotham  - Right radial A line  - NGT  - R PIV  - L PIV     Disposition: SICU

## 2021-07-07 NOTE — CONSULT NOTE ADULT - PROBLEM SELECTOR RECOMMENDATION 3
overall prognosis is poor  reached out to pt's wife who was at work  states she will be in tomorrow around 4pm  will reach out once MRI done and read to discuss overall medical condition GOC/AD

## 2021-07-07 NOTE — PROGRESS NOTE ADULT - SUBJECTIVE AND OBJECTIVE BOX
interval  7/6: EEg notable for possible status epilepticus. Planned for MRI today. Pt noted to be febrile yesterday with tmax of 102.9.   7/7: naeon. cxr appears to be slightly worse in effusions. pending mri    P/E  Sedated, 6LPC, trach sutured to skin, trach ties.  Neck soft, flat    Vital Signs Last 24 Hrs  T(C): 37.2 (07 Jul 2021 04:00), Max: 37.7 (06 Jul 2021 20:00)  T(F): 98.9 (07 Jul 2021 04:00), Max: 99.8 (06 Jul 2021 20:00)  HR: 76 (07 Jul 2021 05:00) (64 - 78)  BP: --  BP(mean): --  RR: 16 (07 Jul 2021 05:00) (16 - 21)  SpO2: 98% (07 Jul 2021 05:00) (98% - 100%)          A/P: 62 y/o male s/p tracheal resection 4/23/21 presents to [facility] ER w c/o stridor and SOB. Difficulty breating is worse at night and prevents him from being able to sleep.  He denies productive cough or fever.  This is his 3rd readmission after his tracheal resection  PT has PMHx COVID+ (3/2021 - not hospitalized), PE (8/2020 - on eliquis at home), CKD, w/ recent hospitalization at [facility] after seizure w/ lingual hematoma requiring emergency tracheostomy on 3/25/21.  Patient underwent Tracheal Resection and Reconstruction on 4/23. ENT injected vocal cords few months ago. He was last discharged 5/18/21. s/p emergent cric 7/1 now s/p revision trach, flex bronch and removal of stent 7/2 with post-op course complicated by seizures  - Patient has a critical airway, only ENT to manipulate airway  - Routine trach care by nursing  - Suction PRN  - Extra 6LPC and 4LPC at bedside  - Vent per SICU  - Appreciate neuro reccs: MRI brain  - Tube feeds per SICU   - Call or page us if any issues

## 2021-07-08 LAB
ANION GAP SERPL CALC-SCNC: 16 MMOL/L — HIGH (ref 7–14)
BLOOD GAS ARTERIAL COMPREHENSIVE RESULT: SIGNIFICANT CHANGE UP
BUN SERPL-MCNC: 48 MG/DL — HIGH (ref 7–23)
CALCIUM SERPL-MCNC: 9.1 MG/DL — SIGNIFICANT CHANGE UP (ref 8.4–10.5)
CHLORIDE SERPL-SCNC: 112 MMOL/L — HIGH (ref 98–107)
CO2 SERPL-SCNC: 19 MMOL/L — LOW (ref 22–31)
CREAT SERPL-MCNC: 3.33 MG/DL — HIGH (ref 0.5–1.3)
CULTURE RESULTS: SIGNIFICANT CHANGE UP
GLUCOSE SERPL-MCNC: 113 MG/DL — HIGH (ref 70–99)
HCT VFR BLD CALC: 25.7 % — LOW (ref 39–50)
HGB BLD-MCNC: 7.7 G/DL — LOW (ref 13–17)
MAGNESIUM SERPL-MCNC: 2.8 MG/DL — HIGH (ref 1.6–2.6)
MCHC RBC-ENTMCNC: 28.3 PG — SIGNIFICANT CHANGE UP (ref 27–34)
MCHC RBC-ENTMCNC: 30 GM/DL — LOW (ref 32–36)
MCV RBC AUTO: 94.5 FL — SIGNIFICANT CHANGE UP (ref 80–100)
NRBC # BLD: 0 /100 WBCS — SIGNIFICANT CHANGE UP
NRBC # FLD: 0 K/UL — SIGNIFICANT CHANGE UP
PHOSPHATE SERPL-MCNC: 6.3 MG/DL — HIGH (ref 2.5–4.5)
PLATELET # BLD AUTO: 208 K/UL — SIGNIFICANT CHANGE UP (ref 150–400)
POTASSIUM SERPL-MCNC: 5 MMOL/L — SIGNIFICANT CHANGE UP (ref 3.5–5.3)
POTASSIUM SERPL-SCNC: 5 MMOL/L — SIGNIFICANT CHANGE UP (ref 3.5–5.3)
RBC # BLD: 2.72 M/UL — LOW (ref 4.2–5.8)
RBC # FLD: 15.4 % — HIGH (ref 10.3–14.5)
SODIUM SERPL-SCNC: 147 MMOL/L — HIGH (ref 135–145)
SPECIMEN SOURCE: SIGNIFICANT CHANGE UP
WBC # BLD: 9.72 K/UL — SIGNIFICANT CHANGE UP (ref 3.8–10.5)
WBC # FLD AUTO: 9.72 K/UL — SIGNIFICANT CHANGE UP (ref 3.8–10.5)

## 2021-07-08 PROCEDURE — 70553 MRI BRAIN STEM W/O & W/DYE: CPT | Mod: 26

## 2021-07-08 PROCEDURE — 71045 X-RAY EXAM CHEST 1 VIEW: CPT | Mod: 26

## 2021-07-08 PROCEDURE — 99291 CRITICAL CARE FIRST HOUR: CPT

## 2021-07-08 PROCEDURE — 99292 CRITICAL CARE ADDL 30 MIN: CPT

## 2021-07-08 PROCEDURE — 99232 SBSQ HOSP IP/OBS MODERATE 35: CPT | Mod: GC

## 2021-07-08 RX ADMIN — Medication 100 MILLIGRAM(S): at 13:03

## 2021-07-08 RX ADMIN — Medication 100 MILLIGRAM(S): at 06:17

## 2021-07-08 RX ADMIN — LEVETIRACETAM 400 MILLIGRAM(S): 250 TABLET, FILM COATED ORAL at 06:16

## 2021-07-08 RX ADMIN — CHLORHEXIDINE GLUCONATE 15 MILLILITER(S): 213 SOLUTION TOPICAL at 17:59

## 2021-07-08 RX ADMIN — CHLORHEXIDINE GLUCONATE 1 APPLICATION(S): 213 SOLUTION TOPICAL at 06:17

## 2021-07-08 RX ADMIN — PROPOFOL 4.32 MICROGRAM(S)/KG/MIN: 10 INJECTION, EMULSION INTRAVENOUS at 22:28

## 2021-07-08 RX ADMIN — LEVETIRACETAM 400 MILLIGRAM(S): 250 TABLET, FILM COATED ORAL at 17:59

## 2021-07-08 RX ADMIN — FENTANYL CITRATE 3.6 MICROGRAM(S)/KG/HR: 50 INJECTION INTRAVENOUS at 13:04

## 2021-07-08 RX ADMIN — CHLORHEXIDINE GLUCONATE 15 MILLILITER(S): 213 SOLUTION TOPICAL at 06:17

## 2021-07-08 RX ADMIN — Medication 27.5 MILLIGRAM(S): at 06:16

## 2021-07-08 RX ADMIN — PANTOPRAZOLE SODIUM 40 MILLIGRAM(S): 20 TABLET, DELAYED RELEASE ORAL at 12:52

## 2021-07-08 RX ADMIN — PIPERACILLIN AND TAZOBACTAM 25 GRAM(S): 4; .5 INJECTION, POWDER, LYOPHILIZED, FOR SOLUTION INTRAVENOUS at 06:16

## 2021-07-08 RX ADMIN — PIPERACILLIN AND TAZOBACTAM 25 GRAM(S): 4; .5 INJECTION, POWDER, LYOPHILIZED, FOR SOLUTION INTRAVENOUS at 17:59

## 2021-07-08 RX ADMIN — Medication 100 MILLIGRAM(S): at 22:29

## 2021-07-08 RX ADMIN — FENTANYL CITRATE 3.6 MICROGRAM(S)/KG/HR: 50 INJECTION INTRAVENOUS at 22:28

## 2021-07-08 RX ADMIN — Medication 1 APPLICATION(S): at 18:43

## 2021-07-08 RX ADMIN — Medication 27.5 MILLIGRAM(S): at 13:04

## 2021-07-08 RX ADMIN — PROPOFOL 4.32 MICROGRAM(S)/KG/MIN: 10 INJECTION, EMULSION INTRAVENOUS at 13:04

## 2021-07-08 RX ADMIN — HEPARIN SODIUM 5000 UNIT(S): 5000 INJECTION INTRAVENOUS; SUBCUTANEOUS at 13:04

## 2021-07-08 RX ADMIN — HEPARIN SODIUM 5000 UNIT(S): 5000 INJECTION INTRAVENOUS; SUBCUTANEOUS at 06:17

## 2021-07-08 RX ADMIN — Medication 27.5 MILLIGRAM(S): at 22:29

## 2021-07-08 RX ADMIN — Medication 1 APPLICATION(S): at 06:53

## 2021-07-08 RX ADMIN — HEPARIN SODIUM 5000 UNIT(S): 5000 INJECTION INTRAVENOUS; SUBCUTANEOUS at 22:28

## 2021-07-08 NOTE — PROGRESS NOTE ADULT - ASSESSMENT
62 y/o male s/p tracheal resection 4/23/21 presents to [facility] ER w c/o stridor and SOB. Difficulty breating is worse at night and prevents him from being able to sleep.  He denies productive cough or fever.  This is his 3rd readmission after his tracheal resection  PT has PMHx COVID+ (3/2021 - not hospitalized), PE (8/2020 - on eliquis at home), CKD, w/ recent hospitalization at [facility] after seizure w/ lingual hematoma requiring emergency tracheostomy on 3/25/21.  Patient underwent Tracheal Resection and Reconstruction on 4/23. ENT injected vocal cords few months ago. He was last discharged 5/18/21. s/p emergent trach in ED after hypoxia.  Concern for anoxic brain injury.  Asked to see for goc

## 2021-07-08 NOTE — PROGRESS NOTE ADULT - SUBJECTIVE AND OBJECTIVE BOX
SUBJECTIVE AND OBJECTIVE:  pt intubated, sedated  INTERVAL HPI/OVERNIGHT EVENTS:    DNR on chart:   Allergies    No Known Allergies    Intolerances    MEDICATIONS  (STANDING):  ALBUTerol    90 MICROgram(s) HFA Inhaler 2 Puff(s) Inhalation every 4 hours  chlorhexidine 0.12% Liquid 15 milliLiter(s) Oral Mucosa every 12 hours  chlorhexidine 4% Liquid 1 Application(s) Topical <User Schedule>  fentaNYL   Infusion. 0.5 MICROgram(s)/kG/Hr (3.6 mL/Hr) IV Continuous <Continuous>  heparin   Injectable 5000 Unit(s) SubCutaneous every 8 hours  labetalol 100 milliGRAM(s) Oral three times a day  levETIRAcetam  IVPB 1000 milliGRAM(s) IV Intermittent every 12 hours  pantoprazole  Injectable 40 milliGRAM(s) IV Push daily  petrolatum Ophthalmic Ointment 1 Application(s) Both EYES two times a day  piperacillin/tazobactam IVPB.. 3.375 Gram(s) IV Intermittent every 12 hours  propofol Infusion 10 MICROgram(s)/kG/Min (4.32 mL/Hr) IV Continuous <Continuous>  valproate sodium IVPB 500 milliGRAM(s) IV Intermittent three times a day    MEDICATIONS  (PRN):  acetaminophen    Suspension .. 975 milliGRAM(s) Oral every 6 hours PRN Temp greater or equal to 38.5C (101.3F)  sodium chloride 0.9% lock flush 10 milliLiter(s) IV Push every 1 hour PRN Pre/post blood products, medications, blood draw, and to maintain line patency      ITEMS UNCHECKED ARE NOT PRESENT    PRESENT SYMPTOMS: [xUnable to obtain due to poor mentation   Source if other than patient:  [ ]Family   [ ]Team     Pain (Impact on QOL):    Location:  Minimal acceptable level (0-10 scale):            Aggravating factors:  Quality:  Radiation:  Severity (0-10 scale):    Timing:    Dyspnea:                           [ ]Mild [ ]Moderate [ ]Severe  Anxiety:                             [ ]Mild [ ]Moderate [ ]Severe  Fatigue:                             [ ]Mild [ ]Moderate [ ]Severe  Nausea:                             [ ]Mild [ ]Moderate [ ]Severe  Loss of appetite:              [ ]Mild [ ]Moderate [ ]Severe  Constipation:                    [ ]Mild [ ]Moderate [ ]Severe    PAIN AD Score:	  [email or web address] (Ctrl + left click to view)    Other Symptoms:  [ ]All other review of systems negative     Karnofsky Performance Score/Palliative Performance Status Version 2:   10      %    http://palliative.info/resource_material/PPSv2.pdf  PHYSICAL EXAM:  Vital Signs Last 24 Hrs  T(C): 36.6 (08 Jul 2021 12:30), Max: 37.3 (08 Jul 2021 04:00)  T(F): 97.8 (08 Jul 2021 12:30), Max: 99.2 (08 Jul 2021 04:00)  HR: 73 (08 Jul 2021 15:00) (68 - 90)  BP: 100/71 (08 Jul 2021 15:00) (96/61 - 111/69)  BP(mean): 78 (08 Jul 2021 15:00) (69 - 78)  RR: 17 (08 Jul 2021 15:00) (15 - 25)  SpO2: 97% (08 Jul 2021 15:00) (95% - 100%) I&O's Summary    07 Jul 2021 07:01  -  08 Jul 2021 07:00  --------------------------------------------------------  IN: 1884.8 mL / OUT: 2075 mL / NET: -190.2 mL    08 Jul 2021 07:01  -  08 Jul 2021 15:19  --------------------------------------------------------  IN: 775.5 mL / OUT: 1000 mL / NET: -224.5 mL     GENERAL:  [ ]Alert  [ ]Oriented x   [ ]Lethargic  [ ]Cachexia  [ ]Unarousable  [ ]Verbal  [x ]Non-Verbal    Behavioral:   [ ] Anxiety  [ ] Delirium [ ] Agitation [ ] Other    HEENT:  [ ]Normal   [ ]Dry mouth   [x ]ET Tube/Trach  [ ]Oral lesions    PULMONARY:   [x ]Clear [ ]Tachypnea  [ ]Audible excessive secretions   [ ]Rhonchi        [ ]Right [ ]Left [ ]Bilateral  [ ]Crackles        [ ]Right [ ]Left [ ]Bilateral  [ ]Wheezing     [ ]Right [ ]Left [ ]Bilateral    CARDIOVASCULAR:    [x ]Regular [ ]Irregular [ ]Tachy  [ ]Jaswant [ ]Murmur [ ]Other    GASTROINTESTINAL:  [x ]Soft  [ ]Distended   [x ]+BS  [ ]Non tender [ ]Tender  [ ]PEG [x ]OGT/ NGT   Last BM:    GENITOURINARY:  [ ]Normal [ ] Incontinent   [ ]Oliguria/Anuria   [ x]Higginbotham    MUSCULOSKELETAL:   [ ]Normal   [ ]Weakness  [x ]Bed/Wheelchair bound [ ]Edema    NEUROLOGIC:   [ ]No focal deficits  [ ] Cognitive impairment  [ ] Dysphagia [ ]Dysarthria [ ] Paresis [x ]Other sedated    SKIN:   [x ]Normal   [ ]Pressure ulcer(s)  [ ]Rash    CRITICAL CARE:  [ ] Shock Present  [ ]Septic [ ]Cardiogenic [ ]Neurologic [ ]Hypovolemic  [ ]  Vasopressors [ ]  Inotropes   [ ] Respiratory failure present  [ ] Acute  [ ] Chronic [ ] Hypoxic  [ ] Hypercarbic [ ] Other  [ ] Other organ failure     LABS:                        7.7    9.72  )-----------( 208      ( 08 Jul 2021 01:43 )             25.7   07-08    147<H>  |  112<H>  |  48<H>  ----------------------------<  113<H>  5.0   |  19<L>  |  3.33<H>    Ca    9.1      08 Jul 2021 01:43  Phos  6.3     07-08  Mg     2.80     07-08          RADIOLOGY & ADDITIONAL STUDIES:    Protein Calorie Malnutrition Present: [ ] yes [ ] no  [ ] PPSV2 < or = 30%  [ ] significant weight loss [ ] poor nutritional intake [ ] anasarca [ ] catabolic state Albumin, Serum: 2.6 g/dL (07-06-21 @ 01:15)  Artificial Nutrition [ ]     REFERRALS:   [ ]Chaplaincy  [ ] Hospice  [ ]Child Life  [ ]Social Work  [ ]Case management [ ]Holistic Therapy   Goals of Care Document:

## 2021-07-08 NOTE — PROGRESS NOTE ADULT - SUBJECTIVE AND OBJECTIVE BOX
7/6: EEg notable for possible status epilepticus. Planned for MRI today. Pt noted to be febrile yesterday with tmax of 102.9.   7/7: naeon. cxr appears to be slightly worse in effusions. pending mri  7/8: negative blood cultures, plan for MRI today     P/E  Sedated, 6LPC, trach sutured to skin, trach ties.  Neck soft, flat    Vital Signs Last 24 Hrs  T(C): 37.2 (08 Jul 2021 00:00), Max: 37.2 (07 Jul 2021 04:00)  T(F): 99 (08 Jul 2021 00:00), Max: 99 (07 Jul 2021 20:00)  HR: 86 (08 Jul 2021 00:00) (67 - 86)  BP: --  BP(mean): --  RR: 20 (08 Jul 2021 00:00) (15 - 25)  SpO2: 98% (08 Jul 2021 00:00) (94% - 100%)        A/P: 60 y/o male s/p tracheal resection 4/23/21 presents to [facility] ER w c/o stridor and SOB. Difficulty breating is worse at night and prevents him from being able to sleep.  He denies productive cough or fever.  This is his 3rd readmission after his tracheal resection  PT has PMHx COVID+ (3/2021 - not hospitalized), PE (8/2020 - on eliquis at home), CKD, w/ recent hospitalization at [facility] after seizure w/ lingual hematoma requiring emergency tracheostomy on 3/25/21.  Patient underwent Tracheal Resection and Reconstruction on 4/23. ENT injected vocal cords few months ago. He was last discharged 5/18/21. s/p emergent cric 7/1 now s/p revision trach, flex bronch and removal of stent 7/2 with post-op course complicated by seizures  - Patient has a critical airway, only ENT to manipulate airway  - Routine trach care by nursing  - Suction PRN  - Extra 6LPC and 4LPC at bedside  - Vent per SICU  - Appreciate neuro reccs: MRI brain  - Tube feeds per SICU   - Call or page us if any issues

## 2021-07-08 NOTE — PROGRESS NOTE ADULT - SUBJECTIVE AND OBJECTIVE BOX
SICU Morning Progress Note       Interval/Overnight Events:       - 7/7 decreased vent settings O2 from 40 to 30 and PEEP from 10 to 5  - blood cultures negative, final  - MRI planned for 7/8    HPI: 62 y/o male s/p tracheal resection 4/23/21 presents to [facility] ER w c/o stridor and SOB. Difficulty breathing is worse at night and prevents him from being able to sleep.  He denies productive cough or fever.  This is his 3rd readmission after his tracheal resection  PT has PMHx COVID+ (3/2021 - not hospitalized), PE (8/2020 - on eliquis at home), CKD, w/ recent hospitalization at [facility] after seizure w/ lingual hematoma requiring emergency tracheostomy on 3/25/21.  Patient underwent Tracheal Resection and Reconstruction on 4/23. ENT injected vocal cords few months ago. He was last discharged 5/18/21. Now s/p emergent cric in ED for loss of airway 7/1, subsequent revision tracheostomy 7/2     Allergies: No Known Allergies    MEDICATIONS:   Neurologic Medications  acetaminophen    Suspension .. 975 milliGRAM(s) Oral every 6 hours PRN Temp greater or equal to 38.5C (101.3F)  fentaNYL   Infusion. 0.5 MICROgram(s)/kG/Hr IV Continuous <Continuous>  levETIRAcetam  IVPB 1000 milliGRAM(s) IV Intermittent every 12 hours  propofol Infusion 10 MICROgram(s)/kG/Min IV Continuous <Continuous>  valproate sodium IVPB 500 milliGRAM(s) IV Intermittent three times a day  Respiratory Medications  ALBUTerol    90 MICROgram(s) HFA Inhaler 2 Puff(s) Inhalation every 4 hours  Cardiovascular Medications  labetalol 100 milliGRAM(s) Oral three times a day  Gastrointestinal Medications  pantoprazole  Injectable 40 milliGRAM(s) IV Push daily  sodium chloride 0.9% lock flush 10 milliLiter(s) IV Push every 1 hour PRN Pre/post blood products, medications, blood draw, and to maintain line patency  Genitourinary Medications  Hematologic/Oncologic Medications  heparin   Injectable 5000 Unit(s) SubCutaneous every 8 hours  Antimicrobial/Immunologic Medications  piperacillin/tazobactam IVPB.. 3.375 Gram(s) IV Intermittent every 12 hours  Endocrine/Metabolic Medications  Topical/Other Medications  chlorhexidine 0.12% Liquid 15 milliLiter(s) Oral Mucosa every 12 hours  chlorhexidine 4% Liquid 1 Application(s) Topical <User Schedule>  petrolatum Ophthalmic Ointment 1 Application(s) Both EYES two times a day    VITAL SIGNS, INS/OUTS (last 24 hours):  ((Insert SICU Vitals/Is+Os here))***  EXAM  NEUROLOGY  RASS: -4   Exam: NAD, Does not withdraw to painful stimuli. Intermittent clonic movement of chest/diaphragm  RESPIRATORY  Exam: Lungs clear to auscultation, Normal expansion/effort.   Mechanical Ventilation: Mode: AC/ CMV (Assist Control/ Continuous Mandatory Ventilation), RR (machine): 16, TV (machine): 400, FiO2: 30, PEEP: 5, ITime: 0.66, MAP: 9, PIP: 26  CARDIOVASCULAR  Exam: S1, S2.  Regular rate and rhythm.    VASCULAR  Exam: Extremities warm, pink, well-perfused.   METABOLIC/FLUIDS/ELECTROLYTES   HEMATOLOGIC [x] VTE Prophylaxis: heparin   Injectable 5000 Unit(s) SubCutaneous every 8 hours    Transfusions:	[] PRBC	[] Platelets		[] FFP	[] Cryoprecipitate  INFECTIOUS DISEASE  Antimicrobials/Immunologic Medications:  piperacillin/tazobactam IVPB.. 3.375 Gram(s) IV Intermittent every 12 hours    Tubes/Lines/Drains    [x] Peripheral IV  [] Central Venous Line     	[] R	[] L	[] IJ	[] Fem	[] SC	Date Placed:   [] Arterial Line		[] R	[] L	[] Fem	[] Rad	[] Ax	Date Placed:   [] PICC		[] Midline		[] Mediport  [] Urinary Catheter		Date Placed:   [x] Necessity of urinary, arterial, and venous catheters discussed  LABS  --------------------------------------------------------------------------------------  ((Insert SICU Labs here))***      61M PMH epilepsy on Keppra, PE (8/2020 - on eliquis at home), CKD and COVID+ with seizure w/ lingual hematoma requiring emergent trach on 3/25 and tracheal resection 4/21 for stridor and SOB. Multiple readmissions following sgx for SOB.    7/1 s/p emergent cric in ED for loss of airway following seizure and emesis poss aspiration  7/2 s/p bronch and revision tracheostomy     Neurologic:  - Sedated with fentanyl & propofol  - Keppra 1000 BID s/p loading dose 1500mg (7/3)  - Depakote 500mg TID  - CT negative no intracranial pathology  - EEG significant for hyperexcitability and GPDs: cortical storming and stimulus myoclonus  - MRI Brain w/wout IV contrast pending    Respiratory:   - s/p bronch and trach revision 7/2  - Mechanical ventilation AC/CMV    Cardiovascular:   - Hemodynamically stable off vasopressors      Gastrointestinal/Nutrition:   - NPO w/ Jevity tube feeds via NGT at goal   - On Protonix for stress ulcer ppx    Renal/Genitourinary:  - Higginbotham in place  - Monitor intake & output    Hematologic:  - Trend H/H  - c/w SQH for VTE prophylaxis  - s/p 1PRBC (7/3 AM) for downtrending H/H; responded appropriately     Infectious Disease:   - CT 7/3 with bilateral opacification of sinuses (grayish drainage)  - Sputum Cx prelim with pseudomonas  - Urine Cx prelim with Citrobacter  - Continue Zosyn    Tubes/Lines/Drains:   - Higginbotham  - Right radial A line 7/2  - NGT  - R PIV  - L PIV     IBW/ABW (admit): 52/60 (72)    Disposition: SICU

## 2021-07-09 LAB
ANION GAP SERPL CALC-SCNC: 14 MMOL/L — SIGNIFICANT CHANGE UP (ref 7–14)
ANION GAP SERPL CALC-SCNC: 15 MMOL/L — HIGH (ref 7–14)
BLOOD GAS ARTERIAL COMPREHENSIVE RESULT: SIGNIFICANT CHANGE UP
BUN SERPL-MCNC: 50 MG/DL — HIGH (ref 7–23)
BUN SERPL-MCNC: 52 MG/DL — HIGH (ref 7–23)
CALCIUM SERPL-MCNC: 9 MG/DL — SIGNIFICANT CHANGE UP (ref 8.4–10.5)
CALCIUM SERPL-MCNC: 9.5 MG/DL — SIGNIFICANT CHANGE UP (ref 8.4–10.5)
CHLORIDE SERPL-SCNC: 112 MMOL/L — HIGH (ref 98–107)
CHLORIDE SERPL-SCNC: 114 MMOL/L — HIGH (ref 98–107)
CO2 SERPL-SCNC: 18 MMOL/L — LOW (ref 22–31)
CO2 SERPL-SCNC: 18 MMOL/L — LOW (ref 22–31)
CREAT SERPL-MCNC: 3.23 MG/DL — HIGH (ref 0.5–1.3)
CREAT SERPL-MCNC: 3.25 MG/DL — HIGH (ref 0.5–1.3)
GLUCOSE BLDC GLUCOMTR-MCNC: 130 MG/DL — HIGH (ref 70–99)
GLUCOSE BLDC GLUCOMTR-MCNC: 145 MG/DL — HIGH (ref 70–99)
GLUCOSE SERPL-MCNC: 161 MG/DL — HIGH (ref 70–99)
GLUCOSE SERPL-MCNC: 173 MG/DL — HIGH (ref 70–99)
HCT VFR BLD CALC: 25.1 % — LOW (ref 39–50)
HGB BLD-MCNC: 7.6 G/DL — LOW (ref 13–17)
MAGNESIUM SERPL-MCNC: 2.9 MG/DL — HIGH (ref 1.6–2.6)
MCHC RBC-ENTMCNC: 28.3 PG — SIGNIFICANT CHANGE UP (ref 27–34)
MCHC RBC-ENTMCNC: 30.3 GM/DL — LOW (ref 32–36)
MCV RBC AUTO: 93.3 FL — SIGNIFICANT CHANGE UP (ref 80–100)
NRBC # BLD: 0 /100 WBCS — SIGNIFICANT CHANGE UP
NRBC # FLD: 0 K/UL — SIGNIFICANT CHANGE UP
PHOSPHATE SERPL-MCNC: 6.1 MG/DL — HIGH (ref 2.5–4.5)
PLATELET # BLD AUTO: 211 K/UL — SIGNIFICANT CHANGE UP (ref 150–400)
POTASSIUM SERPL-MCNC: 5.4 MMOL/L — HIGH (ref 3.5–5.3)
POTASSIUM SERPL-MCNC: 5.8 MMOL/L — HIGH (ref 3.5–5.3)
POTASSIUM SERPL-SCNC: 5.4 MMOL/L — HIGH (ref 3.5–5.3)
POTASSIUM SERPL-SCNC: 5.8 MMOL/L — HIGH (ref 3.5–5.3)
RBC # BLD: 2.69 M/UL — LOW (ref 4.2–5.8)
RBC # FLD: 15.4 % — HIGH (ref 10.3–14.5)
SODIUM SERPL-SCNC: 144 MMOL/L — SIGNIFICANT CHANGE UP (ref 135–145)
SODIUM SERPL-SCNC: 147 MMOL/L — HIGH (ref 135–145)
WBC # BLD: 8.39 K/UL — SIGNIFICANT CHANGE UP (ref 3.8–10.5)
WBC # FLD AUTO: 8.39 K/UL — SIGNIFICANT CHANGE UP (ref 3.8–10.5)

## 2021-07-09 PROCEDURE — 71045 X-RAY EXAM CHEST 1 VIEW: CPT | Mod: 26

## 2021-07-09 PROCEDURE — 99291 CRITICAL CARE FIRST HOUR: CPT

## 2021-07-09 PROCEDURE — 99233 SBSQ HOSP IP/OBS HIGH 50: CPT

## 2021-07-09 RX ORDER — DEXTROSE 50 % IN WATER 50 %
50 SYRINGE (ML) INTRAVENOUS ONCE
Refills: 0 | Status: COMPLETED | OUTPATIENT
Start: 2021-07-09 | End: 2021-07-09

## 2021-07-09 RX ORDER — INSULIN HUMAN 100 [IU]/ML
10 INJECTION, SOLUTION SUBCUTANEOUS ONCE
Refills: 0 | Status: COMPLETED | OUTPATIENT
Start: 2021-07-09 | End: 2021-07-09

## 2021-07-09 RX ORDER — ALBUTEROL 90 UG/1
10 AEROSOL, METERED ORAL ONCE
Refills: 0 | Status: COMPLETED | OUTPATIENT
Start: 2021-07-09 | End: 2021-07-09

## 2021-07-09 RX ORDER — CALCIUM GLUCONATE 100 MG/ML
2 VIAL (ML) INTRAVENOUS ONCE
Refills: 0 | Status: COMPLETED | OUTPATIENT
Start: 2021-07-09 | End: 2021-07-09

## 2021-07-09 RX ORDER — ALBUTEROL 90 UG/1
10 AEROSOL, METERED ORAL ONCE
Refills: 0 | Status: DISCONTINUED | OUTPATIENT
Start: 2021-07-09 | End: 2021-07-09

## 2021-07-09 RX ORDER — INSULIN HUMAN 100 [IU]/ML
5 INJECTION, SOLUTION SUBCUTANEOUS ONCE
Refills: 0 | Status: COMPLETED | OUTPATIENT
Start: 2021-07-09 | End: 2021-07-09

## 2021-07-09 RX ORDER — SENNA PLUS 8.6 MG/1
5 TABLET ORAL AT BEDTIME
Refills: 0 | Status: DISCONTINUED | OUTPATIENT
Start: 2021-07-09 | End: 2021-07-20

## 2021-07-09 RX ADMIN — LEVETIRACETAM 400 MILLIGRAM(S): 250 TABLET, FILM COATED ORAL at 05:12

## 2021-07-09 RX ADMIN — PIPERACILLIN AND TAZOBACTAM 25 GRAM(S): 4; .5 INJECTION, POWDER, LYOPHILIZED, FOR SOLUTION INTRAVENOUS at 17:08

## 2021-07-09 RX ADMIN — Medication 100 MILLIGRAM(S): at 05:23

## 2021-07-09 RX ADMIN — PROPOFOL 4.32 MICROGRAM(S)/KG/MIN: 10 INJECTION, EMULSION INTRAVENOUS at 21:03

## 2021-07-09 RX ADMIN — INSULIN HUMAN 10 UNIT(S): 100 INJECTION, SOLUTION SUBCUTANEOUS at 05:22

## 2021-07-09 RX ADMIN — LEVETIRACETAM 400 MILLIGRAM(S): 250 TABLET, FILM COATED ORAL at 17:08

## 2021-07-09 RX ADMIN — PANTOPRAZOLE SODIUM 40 MILLIGRAM(S): 20 TABLET, DELAYED RELEASE ORAL at 11:03

## 2021-07-09 RX ADMIN — Medication 1 APPLICATION(S): at 17:08

## 2021-07-09 RX ADMIN — Medication 50 MILLILITER(S): at 05:11

## 2021-07-09 RX ADMIN — Medication 27.5 MILLIGRAM(S): at 13:36

## 2021-07-09 RX ADMIN — Medication 200 GRAM(S): at 05:11

## 2021-07-09 RX ADMIN — FENTANYL CITRATE 3.6 MICROGRAM(S)/KG/HR: 50 INJECTION INTRAVENOUS at 21:04

## 2021-07-09 RX ADMIN — Medication 100 MILLIGRAM(S): at 15:58

## 2021-07-09 RX ADMIN — Medication 100 MILLIGRAM(S): at 21:28

## 2021-07-09 RX ADMIN — Medication 200 GRAM(S): at 09:59

## 2021-07-09 RX ADMIN — FENTANYL CITRATE 3.6 MICROGRAM(S)/KG/HR: 50 INJECTION INTRAVENOUS at 07:22

## 2021-07-09 RX ADMIN — Medication 27.5 MILLIGRAM(S): at 05:23

## 2021-07-09 RX ADMIN — HEPARIN SODIUM 5000 UNIT(S): 5000 INJECTION INTRAVENOUS; SUBCUTANEOUS at 05:23

## 2021-07-09 RX ADMIN — INSULIN HUMAN 5 UNIT(S): 100 INJECTION, SOLUTION SUBCUTANEOUS at 10:00

## 2021-07-09 RX ADMIN — CHLORHEXIDINE GLUCONATE 15 MILLILITER(S): 213 SOLUTION TOPICAL at 17:08

## 2021-07-09 RX ADMIN — PIPERACILLIN AND TAZOBACTAM 25 GRAM(S): 4; .5 INJECTION, POWDER, LYOPHILIZED, FOR SOLUTION INTRAVENOUS at 05:11

## 2021-07-09 RX ADMIN — ALBUTEROL 10 MILLIGRAM(S): 90 AEROSOL, METERED ORAL at 05:35

## 2021-07-09 RX ADMIN — Medication 27.5 MILLIGRAM(S): at 21:27

## 2021-07-09 RX ADMIN — CHLORHEXIDINE GLUCONATE 15 MILLILITER(S): 213 SOLUTION TOPICAL at 05:23

## 2021-07-09 RX ADMIN — HEPARIN SODIUM 5000 UNIT(S): 5000 INJECTION INTRAVENOUS; SUBCUTANEOUS at 21:28

## 2021-07-09 RX ADMIN — HEPARIN SODIUM 5000 UNIT(S): 5000 INJECTION INTRAVENOUS; SUBCUTANEOUS at 13:35

## 2021-07-09 RX ADMIN — SENNA PLUS 5 MILLILITER(S): 8.6 TABLET ORAL at 21:28

## 2021-07-09 RX ADMIN — CHLORHEXIDINE GLUCONATE 1 APPLICATION(S): 213 SOLUTION TOPICAL at 05:24

## 2021-07-09 RX ADMIN — Medication 1 APPLICATION(S): at 05:24

## 2021-07-09 RX ADMIN — PROPOFOL 4.32 MICROGRAM(S)/KG/MIN: 10 INJECTION, EMULSION INTRAVENOUS at 07:22

## 2021-07-09 RX ADMIN — Medication 50 MILLILITER(S): at 10:00

## 2021-07-09 NOTE — PROGRESS NOTE ADULT - SUBJECTIVE AND OBJECTIVE BOX
SICU AM PROGRESS NOTE  ===============================  Interval Events:  - Persistently febrile with purulent discharge from bilateral nares  - Neuro recs d/c VEEG  - Feeds restarted; IVF DC'd  - MRI on 7/8 demonstrating no acute hemorrhage or infarct. Nonspecific small vessel white matter ischemic changes. No abnormal enhancement. Significant pan sinus opacifications.  -Family GOC meeting with neurology team and palliative today      HPI  62 y/o male s/p tracheal resection 4/23/21 presents to [facility] ER w c/o stridor and SOB. Difficulty breathing is worse at night and prevents him from being able to sleep.  He denies productive cough or fever.  This is his 3rd readmission after his tracheal resection  PT has PMHx COVID+ (3/2021 - not hospitalized), PE (8/2020 - on eliquis at home), CKD, w/ recent hospitalization at [facility] after seizure w/ lingual hematoma requiring emergency tracheostomy on 3/25/21.  Patient underwent Tracheal Resection and Reconstruction on 4/23. ENT injected vocal cords few months ago. He was last discharged 5/18/21. Now s/p emergent cric in ED for loss of airway 7/1 ENT consulted in ED for surgical airway management. per anesthesia unable to intubate. Emergent tracheostomy performed, size 5.5 tracheostomy tube w cuff. secured w silk sutures and neck tie. 7/2 patient RTOR for bronch and trach revision with ENT. Post-operatively patient had seizure received ativan x1 and started on versed gtt, neurology consulted. Patient transitioned to ketamine gtt 7/5, versed d/c'd. 7/5 EEG demonstrated generalized cortical hyperexcitability with GPDs indicating increased risk of generalized seizure onset, Moderate to severe nonspecific diffuse or multifocal cerebral dysfunction.        VITAL SIGNS, INS/OUTS (last 24 hours):  --------------------------------------------------------------------------------------  T(C): 37.1 (07-09-21 @ 00:00), Max: 37.3 (07-08-21 @ 04:00)  HR: 79 (07-09-21 @ 00:00) (71 - 90)  BP: 98/59 (07-08-21 @ 20:00) (96/52 - 111/69)  BP(mean): 69 (07-08-21 @ 20:00) (64 - 79)  ABP: 109/62 (07-09-21 @ 00:00) (108/61 - 177/90)  ABP(mean): 78 (07-09-21 @ 00:00) (78 - 119)  RR: 16 (07-09-21 @ 00:00) (15 - 27)  SpO2: 97% (07-09-21 @ 00:00) (94% - 99%)  Wt(kg): --  CVP(mm Hg): --  CI: --  CAPILLARY BLOOD GLUCOSE       N/A      07-07 @ 07:01  -  07-08 @ 07:00  --------------------------------------------------------  IN:    FentaNYL: 172.8 mL    IV PiggyBack: 250 mL    Jevity 1.2: 1150 mL    Propofol: 312 mL  Total IN: 1884.8 mL    OUT:    Indwelling Catheter - Urethral (mL): 2075 mL  Total OUT: 2075 mL    Total NET: -190.2 mL      07-08 @ 07:01  -  07-09 @ 02:08  --------------------------------------------------------  IN:    Enteral Tube Flush: 750 mL    FentaNYL: 115.2 mL    IV PiggyBack: 300 mL    Jevity 1.2: 850 mL    Propofol: 212.3 mL  Total IN: 2227.5 mL    OUT:    Indwelling Catheter - Urethral (mL): 1975 mL  Total OUT: 1975 mL    Total NET: 252.5 mL  --------------------------------------------------------------------------------------    EXAM  NEUROLOGY  RASS: -4   Exam: NAD, Does not withdraw to painful stimuli. Intermittent clonic movement of chest/diaphragm    RESPIRATORY  Exam: Lungs clear to auscultation, Normal expansion/effort.   [x] Tracheostomy   [] Intubated  Mechanical Ventilation: Mode: AC/ CMV (Assist Control/ Continuous Mandatory Ventilation), RR (machine): 16, TV (machine): 400, FiO2: 30, PEEP: 5, ITime: 0.8, MAP: 10, PIP: 29    CARDIOVASCULAR  Exam: S1, S2.  Regular rate and rhythm    GI/NUTRITION  Exam: Abdomen soft, Non-tender, Non-distended.   Nasogastric tube in place.   Current Diet:  Tube feeds    METABOLIC/FLUIDS/ELECTROLYTES      HEMATOLOGIC  [x] DVT Prophylaxis: heparin   Injectable 5000 Unit(s) SubCutaneous every 8 hours    Transfusions:	[] PRBC	[] Platelets		[] FFP	[] Cryoprecipitate    INFECTIOUS DISEASE  Antimicrobials/Immunologic Medications:  piperacillin/tazobactam IVPB.. 3.375 Gram(s) IV Intermittent every 12 hours    VASCULAR  Exam: Extremities warm, pink, well-perfused.      MUSCULOSKELETAL  Exam: All extremities moving spontaneously without limitations.      SKIN  Exam: Good skin turgor, no skin breakdown.      LABS  --------------------------------------------------------------------------------------  CBC (07-09 @ 01:17)                              7.6<L>                         8.39    )----------------(  211        --    % Neutrophils, --    % Lymphocytes, ANC: --                                  25.1<L>  CBC (07-08 @ 01:43)                              7.7<L>                         9.72    )----------------(  208        --    % Neutrophils, --    % Lymphocytes, ANC: --                                  25.7<L>    BMP (07-08 @ 01:43)             147<H>  |  112<H>  |  48<H> 		Ca++ --      Ca 9.1                ---------------------------------( 113<H>		Mg 2.80<H>             5.0     |  19<L>   |  3.33<H>			Ph 6.3<H>          ABG (07-09 @ 01:17)     7.28<L> / 46 / 109<H> / 20<L> / -5.1<L> / 97.5%     Lactate:     ABG (07-08 @ 01:43)     7.35 / 41 / 105 / 22 / -2.9<L> / 97.9%     Lactate:           -> .Nose Nose Culture (07-06 @ 09:43)     NG    NG    No MRSA isolated  No Staph Aureus (MSSA) isolated "This can represent normal nasal  carriage.  PCR is more sensitive for identifying MRSA/MSSA carriage"    -> .Blood Blood-Peripheral Culture (07-05 @ 06:00)     NG    NG    No growth to date.    -> .Urine Catheterized Culture (07-05 @ 03:50)     NG    Citrobacter koseri    >100,000 CFU/ml Citrobacter koseri    -> .Sputum Sputum Culture (07-05 @ 01:45)       Rare polymorphonuclear leukocytes per low power field  No Squamous epithelial cells per low power field  Few Gram Negative Rods seen per oil power field    Pseudomonas aeruginosa    Few Pseudomonas aeruginosa  Normal Respiratory Taylor present    -> .Blood Culture (07-01 @ 21:08)     NG    NG    No Growth Final  --------------------------------------------------------------------------------------    IMAGING STUDIES

## 2021-07-09 NOTE — PROGRESS NOTE ADULT - ASSESSMENT
61M PMH epilepsy on Keppra, PE (8/2020 - on eliquis at home), CKD and COVID+ with seizure w/ lingual hematoma requiring emergent trach on 3/25 and tracheal resection 4/21 for stridor and SOB. Multiple readmissions following sgx for SOB. Now s/p emergent cric in ED for loss of airway 7/1 ENT consulted in ED for surgical airway management. per anesthesia unable to intubate. Emergent tracheostomy performed, size 5.5 tracheostomy tube w cuff. secured w silk sutures and neck tie. 7/2 patient RTOR for bronch and trach revision with ENT. Post-operatively patient had seizure VEEG confirming seizure activity, MRI 7/8 demonstrating no acute hemorrhage or infarct. Nonspecific small vessel white matter ischemic changes. No abnormal enhancement. Significant pan sinus opacifications. Family GOC discussion pending, prognosis guarded.    Neurologic:  - Sedated with fentanyl & propofol  - Keppra 1000 BID s/p loading dose 1500mg (7/3)  - Depakote 500mg TID  - CT negative no intracranial pathology  - EEG significant for hyperexcitability and GPDs: cortical storming and stimulus myoclonus  - MRI Brain w/wout IV contrast pending    Respiratory:   - s/p bronch and trach revision 7/2  - Mechanical ventilation AC/CMV    Cardiovascular:   - Hemodynamically stable off vasopressors      Gastrointestinal/Nutrition:   - NPO w/ Jevity tube feeds via NGT at goal   - On Protonix for stress ulcer ppx    Renal/Genitourinary:  - Higginbotham in place  - Monitor intake & output    Hematologic:  - Trend H/H  - c/w SQH for VTE prophylaxis  - s/p 1PRBC (7/3 AM) for downtrending H/H; responded appropriately     Infectious Disease:   - CT 7/3 with bilateral opacification of sinuses (grayish drainage)  - Sputum Cx prelim with pseudomonas  - Urine Cx prelim with Citrobacter  - Continue Zosyn    Tubes/Lines/Drains:   - Higginbotham  - Right radial A line 7/2  - NGT  - R PIV  - L PIV     IBW/ABW (admit): 52/60 (72)    Disposition: SICU

## 2021-07-09 NOTE — PROGRESS NOTE ADULT - SUBJECTIVE AND OBJECTIVE BOX
Interval History: Patient seen and examined at the bedside this AM with neurology attending.  PT still having myoclonic jerks of abdomen, especially when given minimal stimulus.  Though does have less compared to prior when at rest, immediately activates when minimally stimulated.  Per RN, during episodes at times has upgoing toes with myoclonic events mostly on left.     MEDICATIONS  (STANDING):  ALBUTerol    90 MICROgram(s) HFA Inhaler 2 Puff(s) Inhalation every 4 hours  chlorhexidine 0.12% Liquid 15 milliLiter(s) Oral Mucosa every 12 hours  chlorhexidine 4% Liquid 1 Application(s) Topical <User Schedule>  fentaNYL   Infusion. 0.5 MICROgram(s)/kG/Hr (3.6 mL/Hr) IV Continuous <Continuous>  heparin   Injectable 5000 Unit(s) SubCutaneous every 8 hours  labetalol 100 milliGRAM(s) Oral three times a day  levETIRAcetam  IVPB 1000 milliGRAM(s) IV Intermittent every 12 hours  pantoprazole  Injectable 40 milliGRAM(s) IV Push daily  petrolatum Ophthalmic Ointment 1 Application(s) Both EYES two times a day  piperacillin/tazobactam IVPB.. 3.375 Gram(s) IV Intermittent every 12 hours  propofol Infusion 10 MICROgram(s)/kG/Min (4.32 mL/Hr) IV Continuous <Continuous>  senna Syrup 5 milliLiter(s) Oral at bedtime  valproate sodium IVPB 500 milliGRAM(s) IV Intermittent three times a day    MEDICATIONS  (PRN):  acetaminophen    Suspension .. 975 milliGRAM(s) Oral every 6 hours PRN Temp greater or equal to 38.5C (101.3F)  sodium chloride 0.9% lock flush 10 milliLiter(s) IV Push every 1 hour PRN Pre/post blood products, medications, blood draw, and to maintain line patency      ROS: Pertinent positives in HPI, all other ROS were reviewed and are negative.      Vital Signs Last 24 Hrs  T(C): 37.7 (06 Jul 2021 20:00), Max: 37.7 (06 Jul 2021 20:00)  T(F): 99.8 (06 Jul 2021 20:00), Max: 99.8 (06 Jul 2021 20:00)  HR: 77 (06 Jul 2021 20:00) (64 - 90)  BP: --  BP(mean): --  RR: 16 (06 Jul 2021 20:00) (16 - 20)  SpO2: 100% (06 Jul 2021 20:00) (98% - 100%)    NEUROLOGICAL EXAM:  MS: Intubated, sedated. No purposeful response to noxious stimuli.   CN: No OCR. no facial asymmetry +corneal reflex b/l, + cough. Pupil 2mm and reactive b/l  Motor: Does not withdraw to painful stimuli. Intermittent myoclonic movement of chest/diaphragm and both shoulders, exacerbated by minimal stimulus.  No clonus at rest or on dorsiflexion of foot.   Tone: Decreased throughout Bulk: normal.   Reflexes: 2+ B/L biceps, patellar.  Upgoing toe on the left.  No ankle clonus b/l  Sensation: Does not grimace to painful stimuli x4 but significant increase in myoclonic jerks    Labs:                         7.6    8.39  )-----------( 211      ( 09 Jul 2021 01:17 )             25.1     07-09    147<H>  |  114<H>  |  52<H>  ----------------------------<  173<H>  5.4<H>   |  18<L>  |  3.23<H>    Ca    9.5      09 Jul 2021 07:59  Phos  6.1     07-09  Mg     2.90     07-09    Valproic Acid Level, Serum: 21.70 ug/mL (07.07.21 @ 01:37)   Valproic Acid Level, Serum: 64.00 ug/mL (07.06.21 @ 01:15)     07/07/21 vEEG SUMMARY/CLASSIFICATION  Abnormal EEG in a sedated patient.  - 1-3 hz Generalized periodic discharges with associated stimulus dependent myoclonic seizures  - Severe generalized slowing with frequent discontinuity and attenuation more notable in the latter portion of the recording  _____________________________________________________________  EEG IMPRESSION/CLINICAL CORRELATE  Abnormal EEG study.  Generalized cortical hyperexcitability with GPDs with frequent intermittent myoclonic seizures  Severe diffuse cerebral dysfunction     MR Head w/wo IV Cont (07.08.21 @ 12:22) Nonspecific small vessel white matter ischemic changes. No acute infarcts hemorrhage ormass. No abnormal enhancement. Significant pan sinus opacification.

## 2021-07-09 NOTE — PROGRESS NOTE ADULT - SUBJECTIVE AND OBJECTIVE BOX
7/6: EEg notable for possible status epilepticus. Planned for MRI today. Pt noted to be febrile yesterday with tmax of 102.9.   7/7: naeon. cxr appears to be slightly worse in effusions. pending mri  7/8: negative blood cultures, plan for MRI today   7/9: NAEON - MRI with no significant intracranial pathology.    P/E  Sedated, 6LPC, trach sutured to skin, trach ties.  Neck soft, flat    ICU Vital Signs Last 24 Hrs  T(C): 37.1 (09 Jul 2021 00:00), Max: 37.3 (08 Jul 2021 04:00)  T(F): 98.8 (09 Jul 2021 00:00), Max: 99.2 (08 Jul 2021 04:00)  HR: 79 (09 Jul 2021 00:00) (71 - 90)  BP: 98/59 (08 Jul 2021 20:00) (96/52 - 111/69)  BP(mean): 69 (08 Jul 2021 20:00) (64 - 79)  ABP: 109/62 (09 Jul 2021 00:00) (108/61 - 177/90)  ABP(mean): 78 (09 Jul 2021 00:00) (78 - 119)  RR: 16 (09 Jul 2021 00:00) (15 - 27)  SpO2: 97% (09 Jul 2021 00:00) (94% - 99%)          A/P: 62 y/o male s/p tracheal resection 4/23/21 presents to [facility] ER w c/o stridor and SOB. Difficulty breating is worse at night and prevents him from being able to sleep.  He denies productive cough or fever.  This is his 3rd readmission after his tracheal resection  PT has PMHx COVID+ (3/2021 - not hospitalized), PE (8/2020 - on eliquis at home), CKD, w/ recent hospitalization at [facility] after seizure w/ lingual hematoma requiring emergency tracheostomy on 3/25/21.  Patient underwent Tracheal Resection and Reconstruction on 4/23. ENT injected vocal cords few months ago. He was last discharged 5/18/21. s/p emergent cric 7/1 now s/p revision trach, flex bronch and removal of stent 7/2 with post-op course complicated by seizures  - Patient has a critical airway, only ENT to manipulate airway  - Routine trach care by nursing  - Suction PRN  - Extra 6LPC and 4LPC at bedside  - Vent per SICU  - Tube feeds per SICU   - Call or page us if any issues

## 2021-07-10 LAB
CULTURE RESULTS: SIGNIFICANT CHANGE UP
CULTURE RESULTS: SIGNIFICANT CHANGE UP
SPECIMEN SOURCE: SIGNIFICANT CHANGE UP
SPECIMEN SOURCE: SIGNIFICANT CHANGE UP

## 2021-07-10 PROCEDURE — 99291 CRITICAL CARE FIRST HOUR: CPT

## 2021-07-10 RX ADMIN — Medication 27.5 MILLIGRAM(S): at 06:00

## 2021-07-10 RX ADMIN — LEVETIRACETAM 400 MILLIGRAM(S): 250 TABLET, FILM COATED ORAL at 06:01

## 2021-07-10 RX ADMIN — FENTANYL CITRATE 3.6 MICROGRAM(S)/KG/HR: 50 INJECTION INTRAVENOUS at 03:25

## 2021-07-10 RX ADMIN — Medication 100 MILLIGRAM(S): at 06:01

## 2021-07-10 RX ADMIN — FENTANYL CITRATE 3.6 MICROGRAM(S)/KG/HR: 50 INJECTION INTRAVENOUS at 07:25

## 2021-07-10 RX ADMIN — CHLORHEXIDINE GLUCONATE 15 MILLILITER(S): 213 SOLUTION TOPICAL at 06:01

## 2021-07-10 RX ADMIN — HEPARIN SODIUM 5000 UNIT(S): 5000 INJECTION INTRAVENOUS; SUBCUTANEOUS at 06:01

## 2021-07-10 RX ADMIN — LEVETIRACETAM 400 MILLIGRAM(S): 250 TABLET, FILM COATED ORAL at 18:09

## 2021-07-10 RX ADMIN — HEPARIN SODIUM 5000 UNIT(S): 5000 INJECTION INTRAVENOUS; SUBCUTANEOUS at 13:13

## 2021-07-10 RX ADMIN — Medication 27.5 MILLIGRAM(S): at 21:53

## 2021-07-10 RX ADMIN — Medication 100 MILLIGRAM(S): at 13:13

## 2021-07-10 RX ADMIN — CHLORHEXIDINE GLUCONATE 1 APPLICATION(S): 213 SOLUTION TOPICAL at 06:02

## 2021-07-10 RX ADMIN — Medication 1 APPLICATION(S): at 18:46

## 2021-07-10 RX ADMIN — PIPERACILLIN AND TAZOBACTAM 25 GRAM(S): 4; .5 INJECTION, POWDER, LYOPHILIZED, FOR SOLUTION INTRAVENOUS at 06:00

## 2021-07-10 RX ADMIN — PIPERACILLIN AND TAZOBACTAM 25 GRAM(S): 4; .5 INJECTION, POWDER, LYOPHILIZED, FOR SOLUTION INTRAVENOUS at 18:30

## 2021-07-10 RX ADMIN — HEPARIN SODIUM 5000 UNIT(S): 5000 INJECTION INTRAVENOUS; SUBCUTANEOUS at 22:00

## 2021-07-10 RX ADMIN — PROPOFOL 4.32 MICROGRAM(S)/KG/MIN: 10 INJECTION, EMULSION INTRAVENOUS at 07:25

## 2021-07-10 RX ADMIN — Medication 100 MILLIGRAM(S): at 21:53

## 2021-07-10 RX ADMIN — Medication 27.5 MILLIGRAM(S): at 13:13

## 2021-07-10 RX ADMIN — Medication 1 APPLICATION(S): at 06:02

## 2021-07-10 RX ADMIN — PANTOPRAZOLE SODIUM 40 MILLIGRAM(S): 20 TABLET, DELAYED RELEASE ORAL at 11:05

## 2021-07-10 RX ADMIN — CHLORHEXIDINE GLUCONATE 15 MILLILITER(S): 213 SOLUTION TOPICAL at 18:09

## 2021-07-10 NOTE — PROGRESS NOTE ADULT - SUBJECTIVE AND OBJECTIVE BOX
SICU AM PROGRESS NOTE  ===============================  HPI  62 y/o male s/p tracheal resection 4/23/21 presents to [facility] ER w c/o stridor and SOB. Difficulty breathing is worse at night and prevents him from being able to sleep.  He denies productive cough or fever.  This is his 3rd readmission after his tracheal resection  PT has PMHx COVID+ (3/2021 - not hospitalized), PE (8/2020 - on eliquis at home), CKD, w/ recent hospitalization at [facility] after seizure w/ lingual hematoma requiring emergency tracheostomy on 3/25/21.  Patient underwent Tracheal Resection and Reconstruction on 4/23. ENT injected vocal cords few months ago. He was last discharged 5/18/21. Now s/p emergent cric in ED for loss of airway 7/1 ENT consulted in ED for surgical airway management. per anesthesia unable to intubate. Emergent tracheostomy performed, size 5.5 tracheostomy tube w cuff. secured w silk sutures and neck tie. 7/2 patient RTOR for bronch and trach revision with ENT. Post-operatively patient had seizure received ativan x1 and started on versed gtt, neurology consulted. Patient transitioned to ketamine gtt 7/5, versed d/c'd. 7/5 EEG demonstrated generalized cortical hyperexcitability with GPDs indicating increased risk of generalized seizure onset, Moderate to severe nonspecific diffuse or multifocal cerebral dysfunction.    Interval Events:   -No acute events overnight  -Ethics involved, will coordinate GOC discussion with family and care team    VITAL SIGNS, INS/OUTS (last 24 hours):  --------------------------------------------------------------------------------------  T(C): 37.3 (07-09-21 @ 20:00), Max: 37.3 (07-09-21 @ 20:00)  HR: 82 (07-10-21 @ 02:00) (71 - 85)  ABP: 124/65 (07-10-21 @ 02:00) (97/57 - 141/73)  ABP(mean): 84 (07-10-21 @ 02:00) (71 - 99)  RR: 18 (07-10-21 @ 02:00) (16 - 27)  SpO2: 97% (07-10-21 @ 02:00) (94% - 100%)  POCT Blood Glucose.: 145 mg/dL (09 Jul 2021 10:02)  POCT Blood Glucose.: 130 mg/dL (09 Jul 2021 05:18)    07-08 @ 07:01  -  07-09 @ 07:00  --------------------------------------------------------  IN:    Enteral Tube Flush: 1000 mL    FentaNYL: 158.4 mL    IV PiggyBack: 500 mL    Jevity 1.2: 1150 mL    Propofol: 290.3 mL  Total IN: 3098.7 mL    OUT:    Indwelling Catheter - Urethral (mL): 2825 mL  Total OUT: 2825 mL    Total NET: 273.7 mL      07-09 @ 07:01  -  07-10 @ 02:47  --------------------------------------------------------  IN:    Enteral Tube Flush: 250 mL    FentaNYL: 136.8 mL    IV PiggyBack: 100 mL    Jevity 1.2: 950 mL    Propofol: 281 mL  Total IN: 1717.8 mL    OUT:    Indwelling Catheter - Urethral (mL): 2615 mL  Total OUT: 2615 mL    Total NET: -897.2 mL  --------------------------------------------------------------------------------------    EXAM  NEUROLOGY  Exam: Normal, NAD, alert, oriented x3, no focal deficits. PERRLA.      RESPIRATORY  Exam: Lungs clear to auscultation, Normal expansion/effort.   [x] Tracheostomy   [] Intubated  Mechanical Ventilation: Mode: AC/ CMV (Assist Control/ Continuous Mandatory Ventilation), RR (machine): 18, TV (machine): 400, FiO2: 30, PEEP: 5, ITime: 0.95, MAP: 11, PIP: 24    CARDIOVASCULAR  Exam: S1, S2.  Regular rate and rhythm    GI/NUTRITION  Exam: Abdomen soft, Non-tender, Non-distended.   Nasogastric tube in place.      METABOLIC/FLUIDS/ELECTROLYTES      HEMATOLOGIC  [x] DVT Prophylaxis: heparin   Injectable 5000 Unit(s) SubCutaneous every 8 hours    Transfusions:	[] PRBC	[] Platelets		[] FFP	[] Cryoprecipitate    INFECTIOUS DISEASE  Antimicrobials/Immunologic Medications:  piperacillin/tazobactam IVPB.. 3.375 Gram(s) IV Intermittent every 12 hours      VASCULAR  Exam: Extremities warm, pink, well-perfused.     MUSCULOSKELETAL  Exam: All extremities moving spontaneously without limitations.    SKIN  Exam: Good skin turgor, no skin breakdown.     LABS  --------------------------------------------------------------------------------------  ((Insert SICU Labs here))***  --------------------------------------------------------------------------------------    IMAGING STUDIES     SICU AM PROGRESS NOTE  ===============================  HPI  62 y/o male s/p tracheal resection 4/23/21 presents to [facility] ER w c/o stridor and SOB. Difficulty breathing is worse at night and prevents him from being able to sleep.  He denies productive cough or fever.  This is his 3rd readmission after his tracheal resection  PT has PMHx COVID+ (3/2021 - not hospitalized), PE (8/2020 - on eliquis at home), CKD, w/ recent hospitalization at [facility] after seizure w/ lingual hematoma requiring emergency tracheostomy on 3/25/21.  Patient underwent Tracheal Resection and Reconstruction on 4/23. ENT injected vocal cords few months ago. He was last discharged 5/18/21. Now s/p emergent cric in ED for loss of airway 7/1 ENT consulted in ED for surgical airway management. per anesthesia unable to intubate. Emergent tracheostomy performed, size 5.5 tracheostomy tube w cuff. secured w silk sutures and neck tie. 7/2 patient RTOR for bronch and trach revision with ENT. Post-operatively patient had seizure received ativan x1 and started on versed gtt, neurology consulted. Patient transitioned to ketamine gtt 7/5, versed d/c'd. 7/5 EEG demonstrated generalized cortical hyperexcitability with GPDs indicating increased risk of generalized seizure onset, Moderate to severe nonspecific diffuse or multifocal cerebral dysfunction.    Interval Events:   -No acute events overnight  -Ethics involved, will coordinate GOC discussion with family and care team    VITAL SIGNS, INS/OUTS (last 24 hours):  --------------------------------------------------------------------------------------  T(C): 37.3 (07-09-21 @ 20:00), Max: 37.3 (07-09-21 @ 20:00)  HR: 82 (07-10-21 @ 02:00) (71 - 85)  ABP: 124/65 (07-10-21 @ 02:00) (97/57 - 141/73)  ABP(mean): 84 (07-10-21 @ 02:00) (71 - 99)  RR: 18 (07-10-21 @ 02:00) (16 - 27)  SpO2: 97% (07-10-21 @ 02:00) (94% - 100%)  POCT Blood Glucose.: 145 mg/dL (09 Jul 2021 10:02)  POCT Blood Glucose.: 130 mg/dL (09 Jul 2021 05:18)    07-08 @ 07:01  -  07-09 @ 07:00  --------------------------------------------------------  IN:    Enteral Tube Flush: 1000 mL    FentaNYL: 158.4 mL    IV PiggyBack: 500 mL    Jevity 1.2: 1150 mL    Propofol: 290.3 mL  Total IN: 3098.7 mL    OUT:    Indwelling Catheter - Urethral (mL): 2825 mL  Total OUT: 2825 mL    Total NET: 273.7 mL      07-09 @ 07:01  -  07-10 @ 02:47  --------------------------------------------------------  IN:    Enteral Tube Flush: 250 mL    FentaNYL: 136.8 mL    IV PiggyBack: 100 mL    Jevity 1.2: 950 mL    Propofol: 281 mL  Total IN: 1717.8 mL    OUT:    Indwelling Catheter - Urethral (mL): 2615 mL  Total OUT: 2615 mL    Total NET: -897.2 mL  --------------------------------------------------------------------------------------    EXAM  NEUROLOGY  Exam: sedated,    RESPIRATORY  Exam: Lungs clear  CARDIOVASCULAR  Exam: RR  GI/NUTRITION  Exam: Abdomen soft, Non-tender, Non-distended.   Nasogastric tube in place.    VASCULAR  Exam: Extremities warm, pink, well-perfused.

## 2021-07-10 NOTE — PROGRESS NOTE ADULT - SUBJECTIVE AND OBJECTIVE BOX
7/6: EEg notable for possible status epilepticus. Planned for MRI today. Pt noted to be febrile yesterday with tmax of 102.9.   7/7: naeon. cxr appears to be slightly worse in effusions. pending mri  7/8: negative blood cultures, plan for MRI today   7/9: NAEON - MRI with no significant intracranial pathology.  7/10: NAEON    P/E  Sedated, 6LPC, trach sutured to skin, trach ties.  Neck soft, flat    ICU Vital Signs Last 24 Hrs  T(C): 37.1 (09 Jul 2021 00:00), Max: 37.3 (08 Jul 2021 04:00)  T(F): 98.8 (09 Jul 2021 00:00), Max: 99.2 (08 Jul 2021 04:00)  HR: 79 (09 Jul 2021 00:00) (71 - 90)  BP: 98/59 (08 Jul 2021 20:00) (96/52 - 111/69)  BP(mean): 69 (08 Jul 2021 20:00) (64 - 79)  ABP: 109/62 (09 Jul 2021 00:00) (108/61 - 177/90)  ABP(mean): 78 (09 Jul 2021 00:00) (78 - 119)  RR: 16 (09 Jul 2021 00:00) (15 - 27)  SpO2: 97% (09 Jul 2021 00:00) (94% - 99%)          A/P: 62 y/o male s/p tracheal resection 4/23/21 presents to [facility] ER w c/o stridor and SOB. Difficulty breating is worse at night and prevents him from being able to sleep.  He denies productive cough or fever.  This is his 3rd readmission after his tracheal resection  PT has PMHx COVID+ (3/2021 - not hospitalized), PE (8/2020 - on eliquis at home), CKD, w/ recent hospitalization at [facility] after seizure w/ lingual hematoma requiring emergency tracheostomy on 3/25/21.  Patient underwent Tracheal Resection and Reconstruction on 4/23. ENT injected vocal cords few months ago. He was last discharged 5/18/21. s/p emergent cric 7/1 now s/p revision trach, flex bronch and removal of stent 7/2 with post-op course complicated by seizures  - Patient has a critical airway, only ENT to manipulate airway  - Routine trach care by nursing  - Suction PRN  - Extra 6LPC and 4LPC at bedside  - Vent per SICU  - Tube feeds per SICU

## 2021-07-11 PROCEDURE — 99291 CRITICAL CARE FIRST HOUR: CPT

## 2021-07-11 RX ORDER — FENTANYL CITRATE 50 UG/ML
0.5 INJECTION INTRAVENOUS
Qty: 2500 | Refills: 0 | Status: DISCONTINUED | OUTPATIENT
Start: 2021-07-11 | End: 2021-07-14

## 2021-07-11 RX ADMIN — PIPERACILLIN AND TAZOBACTAM 25 GRAM(S): 4; .5 INJECTION, POWDER, LYOPHILIZED, FOR SOLUTION INTRAVENOUS at 05:58

## 2021-07-11 RX ADMIN — HEPARIN SODIUM 5000 UNIT(S): 5000 INJECTION INTRAVENOUS; SUBCUTANEOUS at 13:21

## 2021-07-11 RX ADMIN — Medication 2 MILLIGRAM(S): at 12:32

## 2021-07-11 RX ADMIN — PANTOPRAZOLE SODIUM 40 MILLIGRAM(S): 20 TABLET, DELAYED RELEASE ORAL at 11:28

## 2021-07-11 RX ADMIN — Medication 27.5 MILLIGRAM(S): at 13:21

## 2021-07-11 RX ADMIN — Medication 100 MILLIGRAM(S): at 22:10

## 2021-07-11 RX ADMIN — FENTANYL CITRATE 3.6 MICROGRAM(S)/KG/HR: 50 INJECTION INTRAVENOUS at 19:46

## 2021-07-11 RX ADMIN — CHLORHEXIDINE GLUCONATE 15 MILLILITER(S): 213 SOLUTION TOPICAL at 05:59

## 2021-07-11 RX ADMIN — HEPARIN SODIUM 5000 UNIT(S): 5000 INJECTION INTRAVENOUS; SUBCUTANEOUS at 22:10

## 2021-07-11 RX ADMIN — LEVETIRACETAM 400 MILLIGRAM(S): 250 TABLET, FILM COATED ORAL at 05:58

## 2021-07-11 RX ADMIN — CHLORHEXIDINE GLUCONATE 15 MILLILITER(S): 213 SOLUTION TOPICAL at 18:11

## 2021-07-11 RX ADMIN — FENTANYL CITRATE 3.6 MICROGRAM(S)/KG/HR: 50 INJECTION INTRAVENOUS at 08:04

## 2021-07-11 RX ADMIN — Medication 100 MILLIGRAM(S): at 13:21

## 2021-07-11 RX ADMIN — Medication 27.5 MILLIGRAM(S): at 22:09

## 2021-07-11 RX ADMIN — Medication 1 APPLICATION(S): at 18:11

## 2021-07-11 RX ADMIN — HEPARIN SODIUM 5000 UNIT(S): 5000 INJECTION INTRAVENOUS; SUBCUTANEOUS at 05:59

## 2021-07-11 RX ADMIN — Medication 1 APPLICATION(S): at 05:59

## 2021-07-11 RX ADMIN — PROPOFOL 4.32 MICROGRAM(S)/KG/MIN: 10 INJECTION, EMULSION INTRAVENOUS at 08:05

## 2021-07-11 RX ADMIN — PROPOFOL 4.32 MICROGRAM(S)/KG/MIN: 10 INJECTION, EMULSION INTRAVENOUS at 19:46

## 2021-07-11 RX ADMIN — Medication 27.5 MILLIGRAM(S): at 05:58

## 2021-07-11 RX ADMIN — PIPERACILLIN AND TAZOBACTAM 25 GRAM(S): 4; .5 INJECTION, POWDER, LYOPHILIZED, FOR SOLUTION INTRAVENOUS at 18:40

## 2021-07-11 RX ADMIN — CHLORHEXIDINE GLUCONATE 1 APPLICATION(S): 213 SOLUTION TOPICAL at 05:59

## 2021-07-11 RX ADMIN — LEVETIRACETAM 400 MILLIGRAM(S): 250 TABLET, FILM COATED ORAL at 18:08

## 2021-07-11 RX ADMIN — Medication 100 MILLIGRAM(S): at 05:59

## 2021-07-11 NOTE — PROGRESS NOTE ADULT - ASSESSMENT
60 y/o male PMHx COVID+ (3/2021 - not hospitalized), PE (8/2020 - on Eliquis at home), CKD, w/ recent hospitalization at [facility] after seizure w/ lingual hematoma requiring emergency tracheostomy on 3/25/21 s/p Tracheal Resection and Reconstruction on 4/23 presented to ED in respiratory distress requiring emergent tracheostomy on 7/1and subsequent revision tracheostomy on 7/2. Post op course complicated by seizures and concern for anoxic brain injury     PLAN:  Neurologic:  - Sedated with fentanyl & propofol  - Keppra 1000 BID s/p loading dose 1500mg (7/3)  - Depakote 500mg TID  - CT negative no intracranial pathology  - EEG significant for hyperexcitability and GPDs: cortical storming and stimulus myoclonus  - MRI Brain WNL, no anoxic injury  - per neuro, wean sedation, can add clonapin TID for movement  - consult ethics  - plan for meeting with family, palliative, neuro early next week    Respiratory:   - s/p bronch and trach revision 7/2  - Mechanical ventilation AC/CMV    Cardiovascular:   - Hemodynamically stable off vasopressors      Gastrointestinal/Nutrition:   - NPO w/ Jevity tube feeds via NGT at goal   - On Protonix for stress ulcer ppx    Renal/Genitourinary:  - Higginbotham in place  - Monitor intake & output    Hematologic:  - Trend H/H  - c/w SQH for VTE prophylaxis  - s/p 1PRBC (7/3 AM) for downtrending H/H; responded appropriately     Infectious Disease:   - CT 7/3 with bilateral opacification of sinuses (grayish drainage)  - Sputum Cx  with pseudomonas  - Urine Cx with Citrobacter  - Continue Zosyn      Tubes/Lines/Drains:   - Higginbotham  - Right radial A line 7/2  - NGT  - R PIV  - L PIV    60 y/o male PMHx COVID+ (3/2021 - not hospitalized), PE (8/2020 - on Eliquis at home), CKD, w/ recent hospitalization at [facility] after seizure w/ lingual hematoma requiring emergency tracheostomy on 3/25/21 s/p Tracheal Resection and Reconstruction on 4/23 presented to ED in respiratory distress requiring emergent tracheostomy on 7/1and subsequent revision tracheostomy on 7/2. Post op course complicated by seizures and concern for anoxic brain injury     PLAN:  Neurologic:  - Sedated with fentanyl & propofol  - Keppra 1000 BID s/p loading dose 1500mg (7/3)  - Depakote 500mg TID  - CT negative no intracranial pathology  - EEG significant for hyperexcitability and GPDs: cortical storming and stimulus myoclonus  - MRI Brain WNL, no anoxic injury  - per neuro, wean sedation, can add clonapin TID for movement  - consult ethics  - plan for meeting with family, palliative, neuro early on Tuesday between 4 and 5 pm.     Respiratory:   - s/p bronch and trach revision 7/2  - Mechanical ventilation AC/CMV    Cardiovascular:   - Hemodynamically stable off vasopressors      Gastrointestinal/Nutrition:   - NPO w/ Jevity tube feeds via NGT at goal   - On Protonix for stress ulcer ppx    Renal/Genitourinary:  - Higginbotham in place  - Monitor intake & output    Hematologic:  - Trend H/H  - c/w SQH for VTE prophylaxis  - s/p 1PRBC (7/3 AM) for downtrending H/H; responded appropriately     Infectious Disease:   - CT 7/3 with bilateral opacification of sinuses (grayish drainage)  - Sputum Cx  with pseudomonas  - Urine Cx with Citrobacter  - Continue Zosyn      Tubes/Lines/Drains:   - Higginbotham  - Right radial A line 7/2  - NGT  - R PIV  - L PIV

## 2021-07-11 NOTE — PROGRESS NOTE ADULT - SUBJECTIVE AND OBJECTIVE BOX
7/6: EEg notable for possible status epilepticus. Planned for MRI today. Pt noted to be febrile yesterday with tmax of 102.9.   7/7: naeon. cxr appears to be slightly worse in effusions. pending mri  7/8: negative blood cultures, plan for MRI today   7/9: NAEON - MRI with no significant intracranial pathology.  7/10: NAEON  7/11: naeon     P/E  Sedated, 6LPC, trach sutured to skin, trach ties.  Neck soft, flat    Vital Signs Last 24 Hrs  T(C): 36.2 (11 Jul 2021 08:00), Max: 36.5 (10 Jul 2021 12:00)  T(F): 97.2 (11 Jul 2021 08:00), Max: 97.7 (10 Jul 2021 12:00)  HR: 88 (11 Jul 2021 11:00) (71 - 88)  BP: --  BP(mean): --  RR: 18 (11 Jul 2021 11:00) (17 - 19)  SpO2: 98% (11 Jul 2021 11:00) (94% - 100%)        A/P: 60 y/o male s/p tracheal resection 4/23/21 presents to [facility] ER w c/o stridor and SOB. Difficulty breating is worse at night and prevents him from being able to sleep.  He denies productive cough or fever.  This is his 3rd readmission after his tracheal resection  PT has PMHx COVID+ (3/2021 - not hospitalized), PE (8/2020 - on eliquis at home), CKD, w/ recent hospitalization at [facility] after seizure w/ lingual hematoma requiring emergency tracheostomy on 3/25/21.  Patient underwent Tracheal Resection and Reconstruction on 4/23. ENT injected vocal cords few months ago. He was last discharged 5/18/21. s/p emergent cric 7/1 now s/p revision trach, flex bronch and removal of stent 7/2 with post-op course complicated by seizures  - GOC discussion next week  - Patient has a critical airway, only ENT to manipulate airway  - Routine trach care by nursing  - Suction PRN  - Extra 6LPC and 4LPC at bedside  - Vent per SICU  - Tube feeds per SICU

## 2021-07-11 NOTE — PROGRESS NOTE ADULT - SUBJECTIVE AND OBJECTIVE BOX
SICU AM PROGRESS NOTE  ===============================  Interval Events:   -No acute events overnight  -plan for [facility] family meeting     HPI  60 y/o male s/p tracheal resection 4/23/21 presents to [facility] ER w c/o stridor and SOB. Difficulty breathing is worse at night and prevents him from being able to sleep.  He denies productive cough or fever.  This is his 3rd readmission after his tracheal resection  PT has PMHx COVID+ (3/2021 - not hospitalized), PE (8/2020 - on eliquis at home), CKD, w/ recent hospitalization at [facility] after seizure w/ lingual hematoma requiring emergency tracheostomy on 3/25/21.  Patient underwent Tracheal Resection and Reconstruction on 4/23. ENT injected vocal cords few months ago. He was last discharged 5/18/21. Now s/p emergent cric in ED for loss of airway 7/1 ENT consulted in ED for surgical airway management. per anesthesia unable to intubate. Emergent tracheostomy performed, size 5.5 tracheostomy tube w cuff. secured w silk sutures and neck tie. 7/2 patient RTOR for bronch and trach revision with ENT. Post-operatively patient had seizure received ativan x1 and started on versed gtt, neurology consulted. Patient transitioned to ketamine gtt 7/5, versed d/c'd. 7/5 EEG demonstrated generalized cortical hyperexcitability with GPDs indicating increased risk of generalized seizure onset, Moderate to severe nonspecific diffuse or multifocal cerebral dysfunction.      VITAL SIGNS, INS/OUTS (last 24 hours):  --------------------------------------------------------------------------------------  T(C): 36.2 (07-10-21 @ 20:00), Max: 37.3 (07-10-21 @ 04:00)  HR: 77 (07-10-21 @ 23:12) (74 - 87)  BP: --  ABP: 114/62 (07-10-21 @ 21:00) (95/55 - 146/73)  ABP(mean): 79 (07-10-21 @ 21:00) (69 - 100)  RR: 18 (07-10-21 @ 21:00) (18 - 21)  SpO2: 98% (07-10-21 @ 23:12) (94% - 100%)  Wt(kg): --  CVP(mm Hg): --      07-09 @ 07:01  -  07-10 @ 07:00  --------------------------------------------------------  IN:    Enteral Tube Flush: 250 mL    FentaNYL: 172.8 mL    Free Water: 500 mL    IV PiggyBack: 350 mL    Jevity 1.2: 1200 mL    Propofol: 356 mL  Total IN: 2828.8 mL    OUT:    Indwelling Catheter - Urethral (mL): 3265 mL    Rectal Tube (mL): 100 mL  Total OUT: 3365 mL    Total NET: -536.2 mL      07-10 @ 07:01  -  07-11 @ 00:45  --------------------------------------------------------  IN:    Enteral Tube Flush: 60 mL    FentaNYL: 108 mL    Free Water: 250 mL    IV PiggyBack: 200 mL    Jevity 1.2: 650 mL    Propofol: 226.5 mL  Total IN: 1494.5 mL    OUT:    Indwelling Catheter - Urethral (mL): 2075 mL    Rectal Tube (mL): 300 mL  Total OUT: 2375 mL    Total NET: -880.5 mL    --------------------------------------------------------------------------------------    EXAM  NEUROLOGY  Exam: sedated    RESPIRATORY  Exam: Lungs clear    CARDIOVASCULAR  Exam: RR    GI/NUTRITION  Exam: Abdomen soft, Non-tender, Non-distended.   Nasogastric tube in place.      VASCULAR  Exam: Extremities warm, pink, well-perfused.

## 2021-07-12 LAB
ALBUMIN SERPL ELPH-MCNC: 3 G/DL — LOW (ref 3.3–5)
ALP SERPL-CCNC: 106 U/L — SIGNIFICANT CHANGE UP (ref 40–120)
ALT FLD-CCNC: 10 U/L — SIGNIFICANT CHANGE UP (ref 4–41)
ANION GAP SERPL CALC-SCNC: 14 MMOL/L — SIGNIFICANT CHANGE UP (ref 7–14)
AST SERPL-CCNC: 27 U/L — SIGNIFICANT CHANGE UP (ref 4–40)
BILIRUB SERPL-MCNC: <0.2 MG/DL — SIGNIFICANT CHANGE UP (ref 0.2–1.2)
BLOOD GAS ARTERIAL COMPREHENSIVE RESULT: SIGNIFICANT CHANGE UP
BUN SERPL-MCNC: 54 MG/DL — HIGH (ref 7–23)
CALCIUM SERPL-MCNC: 9.6 MG/DL — SIGNIFICANT CHANGE UP (ref 8.4–10.5)
CHLORIDE SERPL-SCNC: 108 MMOL/L — HIGH (ref 98–107)
CO2 SERPL-SCNC: 19 MMOL/L — LOW (ref 22–31)
CREAT SERPL-MCNC: 3.23 MG/DL — HIGH (ref 0.5–1.3)
GLUCOSE SERPL-MCNC: 146 MG/DL — HIGH (ref 70–99)
POTASSIUM SERPL-MCNC: 4.9 MMOL/L — SIGNIFICANT CHANGE UP (ref 3.5–5.3)
POTASSIUM SERPL-SCNC: 4.9 MMOL/L — SIGNIFICANT CHANGE UP (ref 3.5–5.3)
PROT SERPL-MCNC: 6.6 G/DL — SIGNIFICANT CHANGE UP (ref 6–8.3)
SODIUM SERPL-SCNC: 141 MMOL/L — SIGNIFICANT CHANGE UP (ref 135–145)

## 2021-07-12 PROCEDURE — 99291 CRITICAL CARE FIRST HOUR: CPT

## 2021-07-12 RX ORDER — PHENYLEPHRINE HYDROCHLORIDE 10 MG/ML
0.1 INJECTION INTRAVENOUS
Qty: 160 | Refills: 0 | Status: DISCONTINUED | OUTPATIENT
Start: 2021-07-12 | End: 2021-07-12

## 2021-07-12 RX ORDER — SODIUM CHLORIDE 9 MG/ML
1000 INJECTION, SOLUTION INTRAVENOUS ONCE
Refills: 0 | Status: COMPLETED | OUTPATIENT
Start: 2021-07-12 | End: 2021-07-12

## 2021-07-12 RX ADMIN — SODIUM CHLORIDE 1000 MILLILITER(S): 9 INJECTION, SOLUTION INTRAVENOUS at 22:33

## 2021-07-12 RX ADMIN — Medication 27.5 MILLIGRAM(S): at 13:45

## 2021-07-12 RX ADMIN — HEPARIN SODIUM 5000 UNIT(S): 5000 INJECTION INTRAVENOUS; SUBCUTANEOUS at 22:32

## 2021-07-12 RX ADMIN — CHLORHEXIDINE GLUCONATE 15 MILLILITER(S): 213 SOLUTION TOPICAL at 05:22

## 2021-07-12 RX ADMIN — PIPERACILLIN AND TAZOBACTAM 25 GRAM(S): 4; .5 INJECTION, POWDER, LYOPHILIZED, FOR SOLUTION INTRAVENOUS at 05:22

## 2021-07-12 RX ADMIN — LEVETIRACETAM 400 MILLIGRAM(S): 250 TABLET, FILM COATED ORAL at 17:08

## 2021-07-12 RX ADMIN — Medication 2 MILLIGRAM(S): at 08:25

## 2021-07-12 RX ADMIN — Medication 100 MILLIGRAM(S): at 05:23

## 2021-07-12 RX ADMIN — SENNA PLUS 5 MILLILITER(S): 8.6 TABLET ORAL at 22:48

## 2021-07-12 RX ADMIN — Medication 27.5 MILLIGRAM(S): at 05:22

## 2021-07-12 RX ADMIN — CHLORHEXIDINE GLUCONATE 1 APPLICATION(S): 213 SOLUTION TOPICAL at 05:22

## 2021-07-12 RX ADMIN — PROPOFOL 4.32 MICROGRAM(S)/KG/MIN: 10 INJECTION, EMULSION INTRAVENOUS at 22:33

## 2021-07-12 RX ADMIN — FENTANYL CITRATE 3.6 MICROGRAM(S)/KG/HR: 50 INJECTION INTRAVENOUS at 22:33

## 2021-07-12 RX ADMIN — HEPARIN SODIUM 5000 UNIT(S): 5000 INJECTION INTRAVENOUS; SUBCUTANEOUS at 13:45

## 2021-07-12 RX ADMIN — Medication 1 APPLICATION(S): at 05:23

## 2021-07-12 RX ADMIN — LEVETIRACETAM 400 MILLIGRAM(S): 250 TABLET, FILM COATED ORAL at 05:24

## 2021-07-12 RX ADMIN — Medication 1 APPLICATION(S): at 17:10

## 2021-07-12 RX ADMIN — Medication 27.5 MILLIGRAM(S): at 22:32

## 2021-07-12 RX ADMIN — PANTOPRAZOLE SODIUM 40 MILLIGRAM(S): 20 TABLET, DELAYED RELEASE ORAL at 11:08

## 2021-07-12 RX ADMIN — PROPOFOL 4.32 MICROGRAM(S)/KG/MIN: 10 INJECTION, EMULSION INTRAVENOUS at 08:32

## 2021-07-12 RX ADMIN — HEPARIN SODIUM 5000 UNIT(S): 5000 INJECTION INTRAVENOUS; SUBCUTANEOUS at 05:23

## 2021-07-12 RX ADMIN — CHLORHEXIDINE GLUCONATE 15 MILLILITER(S): 213 SOLUTION TOPICAL at 17:07

## 2021-07-12 RX ADMIN — FENTANYL CITRATE 3.6 MICROGRAM(S)/KG/HR: 50 INJECTION INTRAVENOUS at 08:32

## 2021-07-12 RX ADMIN — Medication 100 MILLIGRAM(S): at 22:32

## 2021-07-12 NOTE — CONSULT NOTE ADULT - SUBJECTIVE AND OBJECTIVE BOX
61 year old male with stridor and shortness of breath. past medical history of COVID 19, March 2021. has had multiple admissions for tracheal resections and reconstruction.     SICU requesting family/team meeting which will be on Tuesday 7/13/2021.     Discussed with SICU resident who will set meeting.      Full Consult to follow.    Lulu Jimenez D.Min., BSN, [facility]    [phone]

## 2021-07-12 NOTE — PROGRESS NOTE ADULT - ASSESSMENT
60 y/o male PMHx COVID+ (3/2021 - not hospitalized), PE (8/2020 - on Eliquis at home), CKD, w/ recent hospitalization at [facility] after seizure w/ lingual hematoma requiring emergency tracheostomy on 3/25/21 s/p Tracheal Resection and Reconstruction on 4/23 presented to ED in respiratory distress requiring emergent tracheostomy on 7/1and subsequent revision tracheostomy on 7/2. Post op course complicated by seizures and concern for anoxic brain injury     PLAN:  Neurologic:  - Sedated with fentanyl & propofol  - Keppra 1000 BID s/p loading dose 1500mg (7/3)  - Depakote 500mg TID  - CT negative no intracranial pathology  - EEG significant for hyperexcitability and GPDs: cortical storming and stimulus myoclonus  - MRI Brain WNL, no anoxic injury  - per neuro, wean sedation, can add clonapin TID for movement  - consult ethics  - plan for meeting with family, palliative, neuro early on Tuesday between 4 and 5 pm.     Respiratory:   - s/p bronch and trach revision 7/2  - Mechanical ventilation AC/CMV    Cardiovascular:   - Hemodynamically stable off vasopressors      Gastrointestinal/Nutrition:   - NPO w/ Jevity tube feeds via NGT at goal   - On Protonix for stress ulcer ppx    Renal/Genitourinary:  - Higginbotham in place  - Monitor intake & output    Hematologic:  - Trend H/H  - c/w SQH for VTE prophylaxis  - s/p 1PRBC (7/3 AM) for downtrending H/H; responded appropriately     Infectious Disease:   - CT 7/3 with bilateral opacification of sinuses (grayish drainage)  - Sputum Cx  with pseudomonas  - Urine Cx with Citrobacter  - Continue Zosyn    Tubes/Lines/Drains:   - Higginbotham  - Right radial A line 7/2  - NGT  - R PIV  - L PIV
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
## 2021-07-12 NOTE — PROGRESS NOTE ADULT - SUBJECTIVE AND OBJECTIVE BOX
7/6: EEg notable for possible status epilepticus. Planned for MRI today. Pt noted to be febrile yesterday with tmax of 102.9.   7/7: naeon. cxr appears to be slightly worse in effusions. pending mri  7/8: negative blood cultures, plan for MRI today   7/9: NAEON - MRI with no significant intracranial pathology.  7/10: NAEON  7/11: naeon   7/12: naeon. GOC today    P/E  Sedated, 6LPC, trach sutured to skin, trach ties. no bleeding  Neck soft, flat    Vital Signs Last 24 Hrs  T(C): 37.1 (12 Jul 2021 04:00), Max: 37.1 (11 Jul 2021 20:00)  T(F): 98.8 (12 Jul 2021 04:00), Max: 98.8 (12 Jul 2021 04:00)  HR: 81 (12 Jul 2021 06:00) (71 - 88)  BP: --  BP(mean): --  RR: 18 (12 Jul 2021 06:00) (10 - 24)  SpO2: 100% (12 Jul 2021 06:00) (97% - 100%)      A/P: 62 y/o male s/p tracheal resection 4/23/21 presents to [facility] ER w c/o stridor and SOB. Difficulty breating is worse at night and prevents him from being able to sleep.  He denies productive cough or fever.  This is his 3rd readmission after his tracheal resection  PT has PMHx COVID+ (3/2021 - not hospitalized), PE (8/2020 - on eliquis at home), CKD, w/ recent hospitalization at [facility] after seizure w/ lingual hematoma requiring emergency tracheostomy on 3/25/21.  Patient underwent Tracheal Resection and Reconstruction on 4/23. ENT injected vocal cords few months ago. He was last discharged 5/18/21. s/p emergent cric 7/1 now s/p revision trach, flex bronch and removal of stent 7/2 with post-op course complicated by seizures  - GOC discussion this week  - Patient has a critical airway, only ENT to manipulate airway  - Routine trach care by nursing  - Suction PRN  - Extra 6LPC and 4LPC at bedside  - Vent per SICU  - Tube feeds per SICU

## 2021-07-13 LAB
ANION GAP SERPL CALC-SCNC: 16 MMOL/L — HIGH (ref 7–14)
BASE EXCESS BLDA CALC-SCNC: -3.8 MMOL/L — LOW (ref -2–2)
BUN SERPL-MCNC: 50 MG/DL — HIGH (ref 7–23)
CALCIUM SERPL-MCNC: 9.6 MG/DL — SIGNIFICANT CHANGE UP (ref 8.4–10.5)
CHLORIDE SERPL-SCNC: 107 MMOL/L — SIGNIFICANT CHANGE UP (ref 98–107)
CO2 SERPL-SCNC: 17 MMOL/L — LOW (ref 22–31)
CREAT SERPL-MCNC: 3.06 MG/DL — HIGH (ref 0.5–1.3)
GLUCOSE SERPL-MCNC: 151 MG/DL — HIGH (ref 70–99)
HCO3 BLDA-SCNC: 21 MMOL/L — LOW (ref 22–26)
HCT VFR BLD CALC: 26 % — LOW (ref 39–50)
HGB BLD-MCNC: 8.3 G/DL — LOW (ref 13–17)
MAGNESIUM SERPL-MCNC: 2.4 MG/DL — SIGNIFICANT CHANGE UP (ref 1.6–2.6)
MCHC RBC-ENTMCNC: 28.7 PG — SIGNIFICANT CHANGE UP (ref 27–34)
MCHC RBC-ENTMCNC: 31.9 GM/DL — LOW (ref 32–36)
MCV RBC AUTO: 90 FL — SIGNIFICANT CHANGE UP (ref 80–100)
NRBC # BLD: 0 /100 WBCS — SIGNIFICANT CHANGE UP
NRBC # FLD: 0 K/UL — SIGNIFICANT CHANGE UP
PCO2 BLDA: 41 MMHG — SIGNIFICANT CHANGE UP (ref 35–48)
PH BLDA: 7.33 — LOW (ref 7.35–7.45)
PHOSPHATE SERPL-MCNC: 6.1 MG/DL — HIGH (ref 2.5–4.5)
PLATELET # BLD AUTO: 216 K/UL — SIGNIFICANT CHANGE UP (ref 150–400)
PO2 BLDA: 111 MMHG — HIGH (ref 83–108)
POTASSIUM SERPL-MCNC: 5.3 MMOL/L — SIGNIFICANT CHANGE UP (ref 3.5–5.3)
POTASSIUM SERPL-SCNC: 5.3 MMOL/L — SIGNIFICANT CHANGE UP (ref 3.5–5.3)
RBC # BLD: 2.89 M/UL — LOW (ref 4.2–5.8)
RBC # FLD: 14.5 % — SIGNIFICANT CHANGE UP (ref 10.3–14.5)
SAO2 % BLDA: 97.2 % — SIGNIFICANT CHANGE UP (ref 95–99)
SODIUM SERPL-SCNC: 140 MMOL/L — SIGNIFICANT CHANGE UP (ref 135–145)
WBC # BLD: 15.87 K/UL — HIGH (ref 3.8–10.5)
WBC # FLD AUTO: 15.87 K/UL — HIGH (ref 3.8–10.5)

## 2021-07-13 PROCEDURE — 99233 SBSQ HOSP IP/OBS HIGH 50: CPT | Mod: GC

## 2021-07-13 PROCEDURE — 71045 X-RAY EXAM CHEST 1 VIEW: CPT | Mod: 26

## 2021-07-13 PROCEDURE — 99233 SBSQ HOSP IP/OBS HIGH 50: CPT

## 2021-07-13 RX ORDER — PHENYLEPHRINE HYDROCHLORIDE 10 MG/ML
0.1 INJECTION INTRAVENOUS
Qty: 40 | Refills: 0 | Status: DISCONTINUED | OUTPATIENT
Start: 2021-07-13 | End: 2021-07-14

## 2021-07-13 RX ADMIN — Medication 1 APPLICATION(S): at 17:50

## 2021-07-13 RX ADMIN — PHENYLEPHRINE HYDROCHLORIDE 2.7 MICROGRAM(S)/KG/MIN: 10 INJECTION INTRAVENOUS at 08:05

## 2021-07-13 RX ADMIN — FENTANYL CITRATE 3.6 MICROGRAM(S)/KG/HR: 50 INJECTION INTRAVENOUS at 08:06

## 2021-07-13 RX ADMIN — PROPOFOL 4.32 MICROGRAM(S)/KG/MIN: 10 INJECTION, EMULSION INTRAVENOUS at 08:05

## 2021-07-13 RX ADMIN — PANTOPRAZOLE SODIUM 40 MILLIGRAM(S): 20 TABLET, DELAYED RELEASE ORAL at 12:05

## 2021-07-13 RX ADMIN — LEVETIRACETAM 400 MILLIGRAM(S): 250 TABLET, FILM COATED ORAL at 17:49

## 2021-07-13 RX ADMIN — CHLORHEXIDINE GLUCONATE 15 MILLILITER(S): 213 SOLUTION TOPICAL at 06:19

## 2021-07-13 RX ADMIN — HEPARIN SODIUM 5000 UNIT(S): 5000 INJECTION INTRAVENOUS; SUBCUTANEOUS at 06:10

## 2021-07-13 RX ADMIN — Medication 100 MILLIGRAM(S): at 06:19

## 2021-07-13 RX ADMIN — CHLORHEXIDINE GLUCONATE 1 APPLICATION(S): 213 SOLUTION TOPICAL at 06:19

## 2021-07-13 RX ADMIN — Medication 27.5 MILLIGRAM(S): at 06:10

## 2021-07-13 RX ADMIN — CHLORHEXIDINE GLUCONATE 15 MILLILITER(S): 213 SOLUTION TOPICAL at 17:49

## 2021-07-13 RX ADMIN — LEVETIRACETAM 400 MILLIGRAM(S): 250 TABLET, FILM COATED ORAL at 06:10

## 2021-07-13 RX ADMIN — HEPARIN SODIUM 5000 UNIT(S): 5000 INJECTION INTRAVENOUS; SUBCUTANEOUS at 22:22

## 2021-07-13 RX ADMIN — Medication 27.5 MILLIGRAM(S): at 13:42

## 2021-07-13 RX ADMIN — HEPARIN SODIUM 5000 UNIT(S): 5000 INJECTION INTRAVENOUS; SUBCUTANEOUS at 13:41

## 2021-07-13 RX ADMIN — Medication 27.5 MILLIGRAM(S): at 22:22

## 2021-07-13 RX ADMIN — Medication 1 APPLICATION(S): at 06:19

## 2021-07-13 NOTE — PROGRESS NOTE ADULT - ASSESSMENT
60 yo man w. hx tracheal resection 4/23/2021, hx of PE on eliquis, hx of COVID-19 3/2021 and new onset seizure disorder (GTCS x 2) in 3/2021 (on keppra 750 mg BID, rEEG neg for seizures, MRI brain not obtained), p/w stridor and SOB s/p emergent tracheostomy in ED by ENT and revision 7/2. Neurology consulted 7/2 for concern of seizures manifest as myoclonic jerks at rest of abdomen and of L shoulder, increasing in severity with stimulus. Exam limited by sedation, but notable for intermittent myoclonic jerks of chest/diaphragm and L shoulder which worsen w stimulus.  EEG initially negative for seizures, but evolved to demonstrate 1-2.5Hz GPD a/w myoclonic seizures which responded electrographically and clinically to ativan challenge. MRI brain unremarkable but can be normal in at least 30% of cases of anoxic brain injury.      Impression:  Thoracic/proximal LUE myoclonus of undetermined etiology but concerning for Samir Egan myoclonus/cortical storming due to undetermined primary neurologic insult perhaps from hypoxia, extremely guarded prognosis.     Recommendations   - Continue Keppra 1 g BID, Continue Depakote 500 mg IV TID.  - Can add Klonopin 1 mg TID.   - Family meeting planned for 7/13 4-5 pm  - Rest of care per primary team.     Case discussed with SICU team and neurology attending, Dr. Thibodeaux.

## 2021-07-13 NOTE — PROGRESS NOTE ADULT - SUBJECTIVE AND OBJECTIVE BOX
7/6: EEg notable for possible status epilepticus. Planned for MRI today. Pt noted to be febrile yesterday with tmax of 102.9.   7/7: naeon. cxr appears to be slightly worse in effusions. pending mri  7/8: negative blood cultures, plan for MRI today   7/9: NAEON - MRI with no significant intracranial pathology.  7/10: NAEON  7/11: naeon   7/12: naeon. GOC today  7/13: naeon. waiting go duscission    P/E  Sedated, 6LPC, trach sutured to skin, trach ties. no bleeding  Neck soft, flat    Vital Signs Last 24 Hrs  T(C): 37.1 (13 Jul 2021 04:00), Max: 37.1 (13 Jul 2021 04:00)  T(F): 98.7 (13 Jul 2021 04:00), Max: 98.7 (13 Jul 2021 04:00)  HR: 83 (13 Jul 2021 06:52) (78 - 86)  BP: 111/66 (13 Jul 2021 06:52) (78/44 - 120/72)  BP(mean): 77 (13 Jul 2021 06:52) (51 - 96)  RR: 22 (13 Jul 2021 06:52) (10 - 30)  SpO2: 98% (13 Jul 2021 06:52) (95% - 100%)      A/P: 60 y/o male s/p tracheal resection 4/23/21 presents to [facility] ER w c/o stridor and SOB. Difficulty breating is worse at night and prevents him from being able to sleep.  He denies productive cough or fever.  This is his 3rd readmission after his tracheal resection  PT has PMHx COVID+ (3/2021 - not hospitalized), PE (8/2020 - on eliquis at home), CKD, w/ recent hospitalization at [facility] after seizure w/ lingual hematoma requiring emergency tracheostomy on 3/25/21.  Patient underwent Tracheal Resection and Reconstruction on 4/23. ENT injected vocal cords few months ago. He was last discharged 5/18/21. s/p emergent cric 7/1 now s/p revision trach, flex bronch and removal of stent 7/2 with post-op course complicated by seizures  - GOC discussion this week  - Patient has a critical airway, only ENT to manipulate airway  - Routine trach care by nursing  - Suction PRN  - Extra 6LPC and 4LPC at bedside  - Vent per SICU  - Tube feeds per SICU

## 2021-07-13 NOTE — PROGRESS NOTE ADULT - SUBJECTIVE AND OBJECTIVE BOX
SICU Daily Progress Note  =====================================================  24 Hour Interval/Overnight Events:      - X2 mucus plug with desat responded appropriately to Ambu bag     HPI:  62 y/o male s/p tracheal resection 4/23/21 presents to [facility] ER w c/o stridor and SOB. Difficulty breathing is worse at night and prevents him from being able to sleep.  He denies productive cough or fever.  This is his 3rd readmission after his tracheal resection  PT has PMHx COVID+ (3/2021 - not hospitalized), PE (8/2020 - on Eliquis at home), CKD, w/ recent hospitalization at [facility] after seizure w/ lingual hematoma requiring emergency tracheostomy on 3/25/21.  Patient underwent Tracheal Resection and Reconstruction on 4/23. ENT injected vocal cords few months ago. He was last discharged 5/18/21.    (01 Jul 2021 22:19)    --------------------------------------------------------------------------------------  Allergies: No Known Allergies      MEDICATIONS:   --------------------------------------------------------------------------------------  Neurologic Medications  acetaminophen    Suspension .. 975 milliGRAM(s) Oral every 6 hours PRN Temp greater or equal to 38.5C (101.3F)  fentaNYL   Infusion. 0.5 MICROgram(s)/kG/Hr IV Continuous <Continuous>  levETIRAcetam  IVPB 1000 milliGRAM(s) IV Intermittent every 12 hours  propofol Infusion 10 MICROgram(s)/kG/Min IV Continuous <Continuous>  valproate sodium IVPB 500 milliGRAM(s) IV Intermittent three times a day    Respiratory Medications  ALBUTerol    90 MICROgram(s) HFA Inhaler 2 Puff(s) Inhalation every 4 hours    Cardiovascular Medications  labetalol 100 milliGRAM(s) Oral three times a day    Gastrointestinal Medications  pantoprazole  Injectable 40 milliGRAM(s) IV Push daily  senna Syrup 5 milliLiter(s) Oral at bedtime  sodium chloride 0.9% lock flush 10 milliLiter(s) IV Push every 1 hour PRN Pre/post blood products, medications, blood draw, and to maintain line patency    Genitourinary Medications    Hematologic/Oncologic Medications  heparin   Injectable 5000 Unit(s) SubCutaneous every 8 hours    Antimicrobial/Immunologic Medications    Endocrine/Metabolic Medications    Topical/Other Medications  chlorhexidine 0.12% Liquid 15 milliLiter(s) Oral Mucosa every 12 hours  chlorhexidine 4% Liquid 1 Application(s) Topical <User Schedule>  petrolatum Ophthalmic Ointment 1 Application(s) Both EYES two times a day    --------------------------------------------------------------------------------------    VITAL SIGNS, INS/OUTS (last 24 hours):  --------------------------------------------------------------------------------------  T(C): 36.4 (07-12-21 @ 20:00), Max: 37.1 (07-12-21 @ 04:00)  HR: 81 (07-13-21 @ 01:00) (78 - 84)  BP: 82/55 (07-13-21 @ 01:00) (82/55 - 112/76)  BP(mean): 61 (07-13-21 @ 01:00) (58 - 96)  ABP: 89/49 (07-12-21 @ 08:06) (89/49 - 136/71)  ABP(mean): 64 (07-12-21 @ 08:06) (64 - 94)  RR: 18 (07-13-21 @ 01:00) (10 - 30)  SpO2: 100% (07-13-21 @ 01:00) (96% - 100%)  Wt(kg): --  CVP(mm Hg): --  CI: --  CAPILLARY BLOOD GLUCOSE       N/A      07-11 @ 07:01  -  07-12 @ 07:00  --------------------------------------------------------  IN:    FentaNYL: 172.8 mL    Free Water: 1000 mL    IV PiggyBack: 1150 mL    Jevity 1.2: 1150 mL    Propofol: 347.3 mL  Total IN: 3820.1 mL    OUT:    Indwelling Catheter - Urethral (mL): 2925 mL    Rectal Tube (mL): 225 mL  Total OUT: 3150 mL    Total NET: 670.1 mL      07-12 @ 07:01  -  07-13 @ 02:31  --------------------------------------------------------  IN:    FentaNYL: 183.6 mL    Free Water: 750 mL    IV PiggyBack: 205 mL    Jevity 1.2: 850 mL    Lactated Ringers Bolus: 1000 mL    Propofol: 256.7 mL  Total IN: 3245.3 mL    OUT:    Indwelling Catheter - Urethral (mL): 1925 mL    Rectal Tube (mL): 200 mL  Total OUT: 2125 mL    Total NET: 1120.3 mL        --------------------------------------------------------------------------------------    EXAM    NEUROLOGY  Exam: Normal, NAD, alert, oriented x3, no focal deficits.    HEENT  Exam: Normocephalic, atraumatic, EOMI.     RESPIRATORY  Exam: Lungs clear to auscultation, Normal expansion/effort.  Mode: AC/ CMV (Assist Control/ Continuous Mandatory Ventilation), RR (machine): 8, TV (machine): 400, FiO2: 30, PEEP: 5, ITime: 0.95, MAP: 11, PIP: 25    CARDIOVASCULAR  Exam: S1, S2.  Regular rate and rhythm.      GI/NUTRITION  Exam: Abdomen soft, Non-tender, Non-distended.   Current Diet: Diet, NPO with Tube Feed        METABOLIC/FLUIDS/ELECTROLYTES      HEMATOLOGIC  [x] VTE Prophylaxis: heparin   Injectable 5000 Unit(s) SubCutaneous every 8 hours      LABS  --------------------------------------------------------------------------------------    BMP (07-12 @ 17:00)       141     |  108<H>  |  54<H> 			Ca++ --      Ca 9.6          ---------------------------------( 146<H>		Mg --           4.9     |  19<L>   |  3.23<H>			Ph --        LFTs (07-12 @ 17:00)      TPro 6.6 / Alb 3.0<L> / TBili <0.2 / DBili -- / AST 27 / ALT 10 / AlkPhos 106        ABG (07-12 @ 17:10)     7.38 / 34<L> / 108 / 21<L> / -4.3<L> / 97.8%     Lactate:          -> .Nose Nose Culture (07-06 @ 09:43)     NG    NG    No MRSA isolated  No Staph Aureus (MSSA) isolated "This can represent normal nasal  carriage.  PCR is more sensitive for identifying MRSA/MSSA carriage"    -> .Blood Blood-Peripheral Culture (07-05 @ 04:41)     NG    NG    No Growth Final    -> .Urine Catheterized Culture (07-05 @ 03:50)     NG    Citrobacter koseri    >100,000 CFU/ml Citrobacter koseri    -> .Sputum Sputum Culture (07-05 @ 01:45)       Rare polymorphonuclear leukocytes per low power field  No Squamous epithelial cells per low power field  Few Gram Negative Rods seen per oil power field    Pseudomonas aeruginosa    Few Pseudomonas aeruginosa  Normal Respiratory Taylor present      -------------------------------------------------------------------------------------- SICU Daily Progress Note  =====================================================  24 Hour Interval/Overnight Events:      - X2 mucus plug with desat responded appropriately to Ambu bag  - Restarted Daniel due to continued MAP in the 50s     HPI:  60 y/o male s/p tracheal resection 4/23/21 presents to [facility] ER w c/o stridor and SOB. Difficulty breathing is worse at night and prevents him from being able to sleep.  He denies productive cough or fever.  This is his 3rd readmission after his tracheal resection  PT has PMHx COVID+ (3/2021 - not hospitalized), PE (8/2020 - on Eliquis at home), CKD, w/ recent hospitalization at [facility] after seizure w/ lingual hematoma requiring emergency tracheostomy on 3/25/21.  Patient underwent Tracheal Resection and Reconstruction on 4/23. ENT injected vocal cords few months ago. He was last discharged 5/18/21.    (01 Jul 2021 22:19)    --------------------------------------------------------------------------------------  Allergies: No Known Allergies      MEDICATIONS:   --------------------------------------------------------------------------------------  Neurologic Medications  acetaminophen    Suspension .. 975 milliGRAM(s) Oral every 6 hours PRN Temp greater or equal to 38.5C (101.3F)  fentaNYL   Infusion. 0.5 MICROgram(s)/kG/Hr IV Continuous <Continuous>  levETIRAcetam  IVPB 1000 milliGRAM(s) IV Intermittent every 12 hours  propofol Infusion 10 MICROgram(s)/kG/Min IV Continuous <Continuous>  valproate sodium IVPB 500 milliGRAM(s) IV Intermittent three times a day    Respiratory Medications  ALBUTerol    90 MICROgram(s) HFA Inhaler 2 Puff(s) Inhalation every 4 hours    Cardiovascular Medications  labetalol 100 milliGRAM(s) Oral three times a day    Gastrointestinal Medications  pantoprazole  Injectable 40 milliGRAM(s) IV Push daily  senna Syrup 5 milliLiter(s) Oral at bedtime  sodium chloride 0.9% lock flush 10 milliLiter(s) IV Push every 1 hour PRN Pre/post blood products, medications, blood draw, and to maintain line patency    Genitourinary Medications    Hematologic/Oncologic Medications  heparin   Injectable 5000 Unit(s) SubCutaneous every 8 hours    Antimicrobial/Immunologic Medications    Endocrine/Metabolic Medications    Topical/Other Medications  chlorhexidine 0.12% Liquid 15 milliLiter(s) Oral Mucosa every 12 hours  chlorhexidine 4% Liquid 1 Application(s) Topical <User Schedule>  petrolatum Ophthalmic Ointment 1 Application(s) Both EYES two times a day    --------------------------------------------------------------------------------------    VITAL SIGNS, INS/OUTS (last 24 hours):  --------------------------------------------------------------------------------------  T(C): 36.4 (07-12-21 @ 20:00), Max: 37.1 (07-12-21 @ 04:00)  HR: 81 (07-13-21 @ 01:00) (78 - 84)  BP: 82/55 (07-13-21 @ 01:00) (82/55 - 112/76)  BP(mean): 61 (07-13-21 @ 01:00) (58 - 96)  ABP: 89/49 (07-12-21 @ 08:06) (89/49 - 136/71)  ABP(mean): 64 (07-12-21 @ 08:06) (64 - 94)  RR: 18 (07-13-21 @ 01:00) (10 - 30)  SpO2: 100% (07-13-21 @ 01:00) (96% - 100%)  Wt(kg): --  CVP(mm Hg): --  CI: --  CAPILLARY BLOOD GLUCOSE       N/A      07-11 @ 07:01  -  07-12 @ 07:00  --------------------------------------------------------  IN:    FentaNYL: 172.8 mL    Free Water: 1000 mL    IV PiggyBack: 1150 mL    Jevity 1.2: 1150 mL    Propofol: 347.3 mL  Total IN: 3820.1 mL    OUT:    Indwelling Catheter - Urethral (mL): 2925 mL    Rectal Tube (mL): 225 mL  Total OUT: 3150 mL    Total NET: 670.1 mL      07-12 @ 07:01  -  07-13 @ 02:31  --------------------------------------------------------  IN:    FentaNYL: 183.6 mL    Free Water: 750 mL    IV PiggyBack: 205 mL    Jevity 1.2: 850 mL    Lactated Ringers Bolus: 1000 mL    Propofol: 256.7 mL  Total IN: 3245.3 mL    OUT:    Indwelling Catheter - Urethral (mL): 1925 mL    Rectal Tube (mL): 200 mL  Total OUT: 2125 mL    Total NET: 1120.3 mL        --------------------------------------------------------------------------------------    EXAM    NEUROLOGY  Exam: Normal, NAD, alert, oriented x3, no focal deficits.    HEENT  Exam: Normocephalic, atraumatic, EOMI.     RESPIRATORY  Exam: Lungs clear to auscultation, Normal expansion/effort.  Mode: AC/ CMV (Assist Control/ Continuous Mandatory Ventilation), RR (machine): 8, TV (machine): 400, FiO2: 30, PEEP: 5, ITime: 0.95, MAP: 11, PIP: 25    CARDIOVASCULAR  Exam: S1, S2.  Regular rate and rhythm.      GI/NUTRITION  Exam: Abdomen soft, Non-tender, Non-distended.   Current Diet: Diet, NPO with Tube Feed        METABOLIC/FLUIDS/ELECTROLYTES      HEMATOLOGIC  [x] VTE Prophylaxis: heparin   Injectable 5000 Unit(s) SubCutaneous every 8 hours      LABS  --------------------------------------------------------------------------------------    BMP (07-12 @ 17:00)       141     |  108<H>  |  54<H> 			Ca++ --      Ca 9.6          ---------------------------------( 146<H>		Mg --           4.9     |  19<L>   |  3.23<H>			Ph --        LFTs (07-12 @ 17:00)      TPro 6.6 / Alb 3.0<L> / TBili <0.2 / DBili -- / AST 27 / ALT 10 / AlkPhos 106        ABG (07-12 @ 17:10)     7.38 / 34<L> / 108 / 21<L> / -4.3<L> / 97.8%     Lactate:          -> .Nose Nose Culture (07-06 @ 09:43)     NG    NG    No MRSA isolated  No Staph Aureus (MSSA) isolated "This can represent normal nasal  carriage.  PCR is more sensitive for identifying MRSA/MSSA carriage"    -> .Blood Blood-Peripheral Culture (07-05 @ 04:41)     NG    NG    No Growth Final    -> .Urine Catheterized Culture (07-05 @ 03:50)     NG    Citrobacter koseri    >100,000 CFU/ml Citrobacter koseri    -> .Sputum Sputum Culture (07-05 @ 01:45)       Rare polymorphonuclear leukocytes per low power field  No Squamous epithelial cells per low power field  Few Gram Negative Rods seen per oil power field    Pseudomonas aeruginosa    Few Pseudomonas aeruginosa  Normal Respiratory Taylor present      --------------------------------------------------------------------------------------
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
## 2021-07-13 NOTE — PROGRESS NOTE ADULT - ASSESSMENT
62 y/o male PMHx COVID+ (3/2021 - not hospitalized), PE (8/2020 - on Eliquis at home), CKD, w/ recent hospitalization at [facility] after seizure w/ lingual hematoma requiring emergency tracheostomy on 3/25/21 s/p Tracheal Resection and Reconstruction on 4/23 presented to ED in respiratory distress requiring emergent tracheostomy on 7/1and subsequent revision tracheostomy on 7/2. Post op course complicated by seizures and concern for anoxic brain injury .    PLAN:  Neurologic:  - Sedated with fentanyl & propofol  - Keppra 1000 BID s/p loading dose 1500mg (7/3)  - Depakote 500mg TID  - CT negative no intracranial pathology  - EEG significant for hyperexcitability and GPDs: cortical storming and stimulus myoclonus  - MRI Brain WNL, no anoxic injury  - per neuro, wean sedation, can add clonapin TID for movement  - consult ethics  - plan for meeting with family, palliative, neuro early on Tuesday between 4 and 5 pm.     Respiratory:   - s/p bronch and trach revision 7/2  - Mechanical ventilation AC/CMV    Cardiovascular:   - Hemodynamically stable off vasopressors      Gastrointestinal/Nutrition:   - NPO w/ Jevity tube feeds via NGT at goal   - On Protonix for stress ulcer ppx    Renal/Genitourinary:  - Higginbotham in place  - Monitor intake & output    Hematologic:  - Trend H/H  - c/w SQH for VTE prophylaxis  - s/p 1PRBC (7/3 AM) for downtrending H/H; responded appropriately     Infectious Disease:   - CT 7/3 with bilateral opacification of sinuses (grayish drainage)  - Sputum Cx  with pseudomonas  - Urine Cx with Citrobacter  - Continue Zosyn    Tubes/Lines/Drains:   - Higginbotham  - Right radial A line 7/2  - NGT  - R PIV  - L PIV

## 2021-07-14 ENCOUNTER — APPOINTMENT (OUTPATIENT)
Dept: THORACIC SURGERY | Facility: HOSPITAL | Age: 61
End: 2021-07-14

## 2021-07-14 LAB
ANION GAP SERPL CALC-SCNC: 14 MMOL/L — SIGNIFICANT CHANGE UP (ref 7–14)
ANION GAP SERPL CALC-SCNC: 15 MMOL/L — HIGH (ref 7–14)
ANION GAP SERPL CALC-SCNC: 18 MMOL/L — HIGH (ref 7–14)
APPEARANCE UR: CLEAR — SIGNIFICANT CHANGE UP
BILIRUB UR-MCNC: NEGATIVE — SIGNIFICANT CHANGE UP
BLOOD GAS ARTERIAL - LYTES,HGB,ICA,LACT RESULT: SIGNIFICANT CHANGE UP
BUN SERPL-MCNC: 52 MG/DL — HIGH (ref 7–23)
BUN SERPL-MCNC: 53 MG/DL — HIGH (ref 7–23)
BUN SERPL-MCNC: 54 MG/DL — HIGH (ref 7–23)
CALCIUM SERPL-MCNC: 10.7 MG/DL — HIGH (ref 8.4–10.5)
CALCIUM SERPL-MCNC: 9.1 MG/DL — SIGNIFICANT CHANGE UP (ref 8.4–10.5)
CALCIUM SERPL-MCNC: 9.8 MG/DL — SIGNIFICANT CHANGE UP (ref 8.4–10.5)
CHLORIDE SERPL-SCNC: 105 MMOL/L — SIGNIFICANT CHANGE UP (ref 98–107)
CHLORIDE SERPL-SCNC: 106 MMOL/L — SIGNIFICANT CHANGE UP (ref 98–107)
CHLORIDE SERPL-SCNC: 108 MMOL/L — HIGH (ref 98–107)
CO2 SERPL-SCNC: 18 MMOL/L — LOW (ref 22–31)
CO2 SERPL-SCNC: 19 MMOL/L — LOW (ref 22–31)
CO2 SERPL-SCNC: 19 MMOL/L — LOW (ref 22–31)
COLOR SPEC: SIGNIFICANT CHANGE UP
CREAT SERPL-MCNC: 3.11 MG/DL — HIGH (ref 0.5–1.3)
CREAT SERPL-MCNC: 3.22 MG/DL — HIGH (ref 0.5–1.3)
CREAT SERPL-MCNC: 3.23 MG/DL — HIGH (ref 0.5–1.3)
DIFF PNL FLD: ABNORMAL
GLUCOSE BLDC GLUCOMTR-MCNC: 141 MG/DL — HIGH (ref 70–99)
GLUCOSE BLDC GLUCOMTR-MCNC: 161 MG/DL — HIGH (ref 70–99)
GLUCOSE BLDC GLUCOMTR-MCNC: 170 MG/DL — HIGH (ref 70–99)
GLUCOSE SERPL-MCNC: 134 MG/DL — HIGH (ref 70–99)
GLUCOSE SERPL-MCNC: 154 MG/DL — HIGH (ref 70–99)
GLUCOSE SERPL-MCNC: 170 MG/DL — HIGH (ref 70–99)
GLUCOSE UR QL: NEGATIVE — SIGNIFICANT CHANGE UP
HCT VFR BLD CALC: 28.2 % — LOW (ref 39–50)
HGB BLD-MCNC: 8.8 G/DL — LOW (ref 13–17)
KETONES UR-MCNC: NEGATIVE — SIGNIFICANT CHANGE UP
LEUKOCYTE ESTERASE UR-ACNC: NEGATIVE — SIGNIFICANT CHANGE UP
MAGNESIUM SERPL-MCNC: 2.3 MG/DL — SIGNIFICANT CHANGE UP (ref 1.6–2.6)
MAGNESIUM SERPL-MCNC: 2.4 MG/DL — SIGNIFICANT CHANGE UP (ref 1.6–2.6)
MCHC RBC-ENTMCNC: 28.6 PG — SIGNIFICANT CHANGE UP (ref 27–34)
MCHC RBC-ENTMCNC: 31.2 GM/DL — LOW (ref 32–36)
MCV RBC AUTO: 91.6 FL — SIGNIFICANT CHANGE UP (ref 80–100)
NITRITE UR-MCNC: NEGATIVE — SIGNIFICANT CHANGE UP
NRBC # BLD: 0 /100 WBCS — SIGNIFICANT CHANGE UP
NRBC # FLD: 0 K/UL — SIGNIFICANT CHANGE UP
PH UR: 6 — SIGNIFICANT CHANGE UP (ref 5–8)
PHOSPHATE SERPL-MCNC: 6.2 MG/DL — HIGH (ref 2.5–4.5)
PHOSPHATE SERPL-MCNC: 6.3 MG/DL — HIGH (ref 2.5–4.5)
PLATELET # BLD AUTO: 229 K/UL — SIGNIFICANT CHANGE UP (ref 150–400)
POTASSIUM SERPL-MCNC: 5.7 MMOL/L — HIGH (ref 3.5–5.3)
POTASSIUM SERPL-MCNC: 5.7 MMOL/L — HIGH (ref 3.5–5.3)
POTASSIUM SERPL-MCNC: 5.9 MMOL/L — HIGH (ref 3.5–5.3)
POTASSIUM SERPL-SCNC: 5.7 MMOL/L — HIGH (ref 3.5–5.3)
POTASSIUM SERPL-SCNC: 5.7 MMOL/L — HIGH (ref 3.5–5.3)
POTASSIUM SERPL-SCNC: 5.9 MMOL/L — HIGH (ref 3.5–5.3)
PROCALCITONIN SERPL-MCNC: 0.28 NG/ML — HIGH (ref 0.02–0.1)
PROT UR-MCNC: ABNORMAL
RBC # BLD: 3.08 M/UL — LOW (ref 4.2–5.8)
RBC # FLD: 14.4 % — SIGNIFICANT CHANGE UP (ref 10.3–14.5)
SODIUM SERPL-SCNC: 140 MMOL/L — SIGNIFICANT CHANGE UP (ref 135–145)
SODIUM SERPL-SCNC: 141 MMOL/L — SIGNIFICANT CHANGE UP (ref 135–145)
SODIUM SERPL-SCNC: 141 MMOL/L — SIGNIFICANT CHANGE UP (ref 135–145)
SP GR SPEC: 1.02 — SIGNIFICANT CHANGE UP (ref 1.01–1.02)
UROBILINOGEN FLD QL: SIGNIFICANT CHANGE UP
WBC # BLD: 18.19 K/UL — HIGH (ref 3.8–10.5)
WBC # FLD AUTO: 18.19 K/UL — HIGH (ref 3.8–10.5)

## 2021-07-14 PROCEDURE — 99233 SBSQ HOSP IP/OBS HIGH 50: CPT | Mod: GC

## 2021-07-14 PROCEDURE — 93010 ELECTROCARDIOGRAM REPORT: CPT

## 2021-07-14 RX ORDER — ALBUTEROL 90 UG/1
2.5 AEROSOL, METERED ORAL
Refills: 0 | Status: DISCONTINUED | OUTPATIENT
Start: 2021-07-14 | End: 2021-07-14

## 2021-07-14 RX ORDER — FENTANYL CITRATE 50 UG/ML
0.5 INJECTION INTRAVENOUS
Qty: 2500 | Refills: 0 | Status: DISCONTINUED | OUTPATIENT
Start: 2021-07-14 | End: 2021-07-14

## 2021-07-14 RX ORDER — SODIUM CHLORIDE 9 MG/ML
1000 INJECTION INTRAMUSCULAR; INTRAVENOUS; SUBCUTANEOUS ONCE
Refills: 0 | Status: COMPLETED | OUTPATIENT
Start: 2021-07-14 | End: 2021-07-14

## 2021-07-14 RX ORDER — CALCIUM GLUCONATE 100 MG/ML
2 VIAL (ML) INTRAVENOUS ONCE
Refills: 0 | Status: COMPLETED | OUTPATIENT
Start: 2021-07-14 | End: 2021-07-14

## 2021-07-14 RX ORDER — DEXTROSE 50 % IN WATER 50 %
50 SYRINGE (ML) INTRAVENOUS ONCE
Refills: 0 | Status: COMPLETED | OUTPATIENT
Start: 2021-07-14 | End: 2021-07-14

## 2021-07-14 RX ORDER — PHENYLEPHRINE HYDROCHLORIDE 10 MG/ML
0.1 INJECTION INTRAVENOUS
Qty: 160 | Refills: 0 | Status: DISCONTINUED | OUTPATIENT
Start: 2021-07-14 | End: 2021-07-15

## 2021-07-14 RX ORDER — ALBUTEROL 90 UG/1
10 AEROSOL, METERED ORAL
Refills: 0 | Status: DISCONTINUED | OUTPATIENT
Start: 2021-07-14 | End: 2021-07-14

## 2021-07-14 RX ORDER — INSULIN HUMAN 100 [IU]/ML
10 INJECTION, SOLUTION SUBCUTANEOUS ONCE
Refills: 0 | Status: COMPLETED | OUTPATIENT
Start: 2021-07-14 | End: 2021-07-14

## 2021-07-14 RX ORDER — FUROSEMIDE 40 MG
20 TABLET ORAL ONCE
Refills: 0 | Status: COMPLETED | OUTPATIENT
Start: 2021-07-14 | End: 2021-07-14

## 2021-07-14 RX ORDER — LABETALOL HCL 100 MG
100 TABLET ORAL EVERY 12 HOURS
Refills: 0 | Status: DISCONTINUED | OUTPATIENT
Start: 2021-07-14 | End: 2021-07-17

## 2021-07-14 RX ADMIN — CHLORHEXIDINE GLUCONATE 15 MILLILITER(S): 213 SOLUTION TOPICAL at 05:02

## 2021-07-14 RX ADMIN — Medication 200 GRAM(S): at 02:35

## 2021-07-14 RX ADMIN — Medication 975 MILLIGRAM(S): at 06:00

## 2021-07-14 RX ADMIN — Medication 20 MILLIGRAM(S): at 09:55

## 2021-07-14 RX ADMIN — PROPOFOL 4.32 MICROGRAM(S)/KG/MIN: 10 INJECTION, EMULSION INTRAVENOUS at 00:36

## 2021-07-14 RX ADMIN — Medication 100 MILLIGRAM(S): at 05:22

## 2021-07-14 RX ADMIN — Medication 50 MILLILITER(S): at 03:33

## 2021-07-14 RX ADMIN — Medication 1 APPLICATION(S): at 05:22

## 2021-07-14 RX ADMIN — INSULIN HUMAN 10 UNIT(S): 100 INJECTION, SOLUTION SUBCUTANEOUS at 15:06

## 2021-07-14 RX ADMIN — HEPARIN SODIUM 5000 UNIT(S): 5000 INJECTION INTRAVENOUS; SUBCUTANEOUS at 14:54

## 2021-07-14 RX ADMIN — Medication 27.5 MILLIGRAM(S): at 05:22

## 2021-07-14 RX ADMIN — SODIUM CHLORIDE 1000 MILLILITER(S): 9 INJECTION INTRAMUSCULAR; INTRAVENOUS; SUBCUTANEOUS at 06:40

## 2021-07-14 RX ADMIN — Medication 27.5 MILLIGRAM(S): at 22:21

## 2021-07-14 RX ADMIN — CHLORHEXIDINE GLUCONATE 1 APPLICATION(S): 213 SOLUTION TOPICAL at 04:00

## 2021-07-14 RX ADMIN — Medication 1 APPLICATION(S): at 17:05

## 2021-07-14 RX ADMIN — SODIUM CHLORIDE 1000 MILLILITER(S): 9 INJECTION INTRAMUSCULAR; INTRAVENOUS; SUBCUTANEOUS at 14:54

## 2021-07-14 RX ADMIN — Medication 20 MILLIGRAM(S): at 16:54

## 2021-07-14 RX ADMIN — PANTOPRAZOLE SODIUM 40 MILLIGRAM(S): 20 TABLET, DELAYED RELEASE ORAL at 12:17

## 2021-07-14 RX ADMIN — PHENYLEPHRINE HYDROCHLORIDE 1.35 MICROGRAM(S)/KG/MIN: 10 INJECTION INTRAVENOUS at 09:55

## 2021-07-14 RX ADMIN — LEVETIRACETAM 400 MILLIGRAM(S): 250 TABLET, FILM COATED ORAL at 05:03

## 2021-07-14 RX ADMIN — Medication 50 MILLILITER(S): at 14:53

## 2021-07-14 RX ADMIN — HEPARIN SODIUM 5000 UNIT(S): 5000 INJECTION INTRAVENOUS; SUBCUTANEOUS at 05:03

## 2021-07-14 RX ADMIN — Medication 975 MILLIGRAM(S): at 05:02

## 2021-07-14 RX ADMIN — Medication 27.5 MILLIGRAM(S): at 14:54

## 2021-07-14 RX ADMIN — INSULIN HUMAN 10 UNIT(S): 100 INJECTION, SOLUTION SUBCUTANEOUS at 03:33

## 2021-07-14 RX ADMIN — FENTANYL CITRATE 3.6 MICROGRAM(S)/KG/HR: 50 INJECTION INTRAVENOUS at 02:36

## 2021-07-14 RX ADMIN — HEPARIN SODIUM 5000 UNIT(S): 5000 INJECTION INTRAVENOUS; SUBCUTANEOUS at 22:21

## 2021-07-14 RX ADMIN — CHLORHEXIDINE GLUCONATE 15 MILLILITER(S): 213 SOLUTION TOPICAL at 17:05

## 2021-07-14 RX ADMIN — LEVETIRACETAM 400 MILLIGRAM(S): 250 TABLET, FILM COATED ORAL at 17:05

## 2021-07-14 NOTE — PROGRESS NOTE ADULT - ASSESSMENT
HPI: 60 y/o male PMHx COVID+ (3/2021 - not hospitalized), PE (8/2020 - on Eliquis at home), CKD, w/ recent hospitalization at [facility] after seizure w/ lingual hematoma requiring emergency tracheostomy on 3/25/21 s/p Tracheal Resection and Reconstruction on 4/23 presented to ED in respiratory distress requiring emergent tracheostomy on 7/1and subsequent revision tracheostomy on 7/2. Post op course complicated by seizures and concern for anoxic brain injury .      PLAN:  Neurologic:  - Sedated with fentanyl & propofol  - Depakote 500mg TID  - EEG significant for hyperexcitability and GPDs: cortical storming and stimulus myoclonus  - MRI Brain WNL, no anoxic injury  - per neuro, wean sedation, can add Klonopin TID for movement    Respiratory:   - s/p bronch and trach revision 7/2  - Mechanical ventilation AC/CMV    Cardiovascular:   - Hemodynamically stable off vasopressors      Gastrointestinal/Nutrition:   - NPO w/ Jevity tube feeds via NGT at goal   - On Protonix for stress ulcer ppx    Renal/Genitourinary:  - Higginbotham in place  - Monitor intake & output  - rising lactate likely from muscle breakdown    Hematologic:  - Trend H/H  - c/w SQH for VTE prophylaxis  - s/p 1PRBC (7/3 AM) for downtrending H/H; responded appropriately     Infectious Disease:   - CT 7/3 with bilateral opacification of sinuses (grayish drainage)  - Sputum Cx  with pseudomonas  - Urine Cx with Citrobacter  - s/p course of Zosyn     Tubes/Lines/Drains:   - Higginbotham  - Right radial A line 7/2  - NGT  - R PIV  - L PIV     IBW/ABW (admit): 52/60 (72)    Disposition: SICU       HPI: 60 y/o male PMHx COVID+ (3/2021 - not hospitalized), PE (8/2020 - on Eliquis at home), CKD, w/ recent hospitalization at [facility] after seizure w/ lingual hematoma requiring emergency tracheostomy on 3/25/21 s/p Tracheal Resection and Reconstruction on 4/23 presented to ED in respiratory distress requiring emergent tracheostomy on 7/1and subsequent revision tracheostomy on 7/2. Post op course complicated by seizures and concern for anoxic brain injury.    PLAN:  Neurologic:  - Sedated with fentanyl & propofol  - Depakote 500mg TID  - EEG significant for hyperexcitability and GPDs: cortical storming and stimulus myoclonus  - MRI Brain WNL, no anoxic injury  - per neuro, wean sedation, can add Klonopin TID for movement    Respiratory:   - s/p bronch and trach revision 7/2  - Mechanical ventilation AC/CMV  - excessive secretions  - 1 z20mg of lasix    Cardiovascular:   - Hemodynamically stable off vasopressors   - decrease labetalol to 100 BID       Gastrointestinal/Nutrition:   - NPO w/ Jevity tube feeds via NGT at goal   - On Protonix for stress ulcer ppx    Renal/Genitourinary:  - Higginbotham in place  - Monitor intake & output  - rising lactate likely from muscle breakdown    Hematologic:  - Trend H/H  - c/w SQH for VTE prophylaxis  - s/p 1PRBC (7/3 AM) for downtrending H/H; responded appropriately   - trend WBC    Infectious Disease:   - CT 7/3 with bilateral opacification of sinuses (grayish drainage)  - Sputum Cx  with pseudomonas  - Urine Cx with Citrobacter  - s/p course of Zosyn   - check procalcitonin; if 2x 7/5 - consider abx    Tubes/Lines/Drains:   - Higginbotham  - Right radial A line 7/2  - NGT  - R PIV  - L PIV     IBW/ABW (admit): 52/60 (72)    Disposition: SICU HPI: 62 y/o male PMHx COVID+ (3/2021 - not hospitalized), PE (8/2020 - on Eliquis at home), CKD, w/ recent hospitalization at [facility] after seizure w/ lingual hematoma requiring emergency tracheostomy on 3/25/21 s/p Tracheal Resection and Reconstruction on 4/23 presented to ED in respiratory distress requiring emergent tracheostomy on 7/1and subsequent revision tracheostomy on 7/2. Post op course complicated by seizures and concern for anoxic brain injury.    PLAN:  Neurologic:  - Sedated with fentanyl & propofol  - Depakote 500mg TID  - EEG significant for hyperexcitability and GPDs: cortical storming and stimulus myoclonus  - MRI Brain WNL, no anoxic injury  - per neuro, wean sedation, can add Klonopin TID for movement    Respiratory:   - s/p bronch and trach revision 7/2  - Mechanical ventilation AC/CMV  - excessive secretions  - 1 20mg of lasix    Cardiovascular:   - Hemodynamically stable off vasopressors   - decrease labetalol to 100 BID     Gastrointestinal/Nutrition:   - NPO w/ Jevity tube feeds via NGT at goal   - On Protonix for stress ulcer ppx    Renal/Genitourinary:  - Higginbotham in place  - Monitor intake & output  - Increasing sCr and new hyperkalemia s/p cocktail and 1 L bolus     Hematologic:  - Trend H/H  - c/w SQH for VTE prophylaxis  - s/p 1PRBC (7/3 AM) for downtrending H/H; responded appropriately   - trend WBC    Infectious Disease:   - CT 7/3 with bilateral opacification of sinuses (grayish drainage)  - Sputum Cx  with pseudomonas  - Urine Cx with Citrobacter  - s/p course of Zosyn   - check procalcitonin; if 2x 7/5 - consider abx    Tubes/Lines/Drains:   - Higginbotham  - Right radial A line 7/2  - NGT  - R PIV  - L PIV     IBW/ABW (admit): 52/60 (72)    Disposition: SICU

## 2021-07-14 NOTE — PROGRESS NOTE ADULT - SUBJECTIVE AND OBJECTIVE BOX
7/6: EEg notable for possible status epilepticus. Planned for MRI today. Pt noted to be febrile yesterday with tmax of 102.9.   7/7: naeon. cxr appears to be slightly worse in effusions. pending mri  7/8: negative blood cultures, plan for MRI today   7/9: NAEON - MRI with no significant intracranial pathology.  7/10: NAEON  7/11: naeon   7/12: naeon. GOC today  7/13: naeon. waiting [facility] duscission  7/14:  naeon. waiting [facility] duscission    P/E  Sedated, 6LPC, trach sutured to skin, trach ties. no bleeding  Neck soft, flat    ICU Vital Signs Last 24 Hrs  T(C): 37.7 (14 Jul 2021 08:00), Max: 38.4 (14 Jul 2021 04:00)  T(F): 99.9 (14 Jul 2021 08:00), Max: 101.2 (14 Jul 2021 04:00)  HR: 86 (14 Jul 2021 11:00) (69 - 117)  BP: 104/63 (14 Jul 2021 11:00) (75/49 - 140/85)  BP(mean): 73 (14 Jul 2021 11:00) (55 - 98)  ABP: --  ABP(mean): --  RR: 17 (14 Jul 2021 11:00) (17 - 23)  SpO2: 98% (14 Jul 2021 11:00) (95% - 100%)        A/P: 62 y/o male s/p tracheal resection 4/23/21 presents to [facility] ER w c/o stridor and SOB. Difficulty breating is worse at night and prevents him from being able to sleep.  He denies productive cough or fever.  This is his 3rd readmission after his tracheal resection  PT has PMHx COVID+ (3/2021 - not hospitalized), PE (8/2020 - on eliquis at home), CKD, w/ recent hospitalization at [facility] after seizure w/ lingual hematoma requiring emergency tracheostomy on 3/25/21.  Patient underwent Tracheal Resection and Reconstruction on 4/23. ENT injected vocal cords few months ago. He was last discharged 5/18/21. s/p emergent cric 7/1 now s/p revision trach, flex bronch and removal of stent 7/2 with post-op course complicated by seizures  - GOC discussion this week  - Patient has a critical airway, only ENT to manipulate airway  - Routine trach care by nursing  - Suction PRN  - Extra 6LPC and 4LPC at bedside  - Vent per SICU  - Tube feeds per SICU

## 2021-07-14 NOTE — PROGRESS NOTE ADULT - SUBJECTIVE AND OBJECTIVE BOX
SICU Daily Progress Note  =====================================================  Interval Events:  - meeting held 7/13, ongoing discussion regarding GOC  - anna gtt D/C'ed   - hyperkalemia to 5.7, s/p 2g calcium gluconate and shift with insulin & dextrose        HPI: 60 y/o male PMHx COVID+ (3/2021 - not hospitalized), PE (8/2020 - on Eliquis at home), CKD, w/ recent hospitalization at [facility] after seizure w/ lingual hematoma requiring emergency tracheostomy on 3/25/21 s/p Tracheal Resection and Reconstruction on 4/23 presented to ED in respiratory distress requiring emergent tracheostomy on 7/1and subsequent revision tracheostomy on 7/2. Post op course complicated by seizures and concern for anoxic brain injury .  Allergies: No Known Allergies      MEDICATIONS:   --------------------------------------------------------------------------------------  Neurologic Medications  acetaminophen    Suspension .. 975 milliGRAM(s) Oral every 6 hours PRN Temp greater or equal to 38.5C (101.3F)  fentaNYL   Infusion. 0.5 MICROgram(s)/kG/Hr IV Continuous <Continuous>  levETIRAcetam  IVPB 1000 milliGRAM(s) IV Intermittent every 12 hours  propofol Infusion 10 MICROgram(s)/kG/Min IV Continuous <Continuous>  valproate sodium IVPB 500 milliGRAM(s) IV Intermittent three times a day    Respiratory Medications  ALBUTerol    90 MICROgram(s) HFA Inhaler 2 Puff(s) Inhalation every 4 hours    Cardiovascular Medications  labetalol 100 milliGRAM(s) Oral three times a day    Gastrointestinal Medications  pantoprazole  Injectable 40 milliGRAM(s) IV Push daily  senna Syrup 5 milliLiter(s) Oral at bedtime  sodium chloride 0.9% lock flush 10 milliLiter(s) IV Push every 1 hour PRN Pre/post blood products, medications, blood draw, and to maintain line patency    Hematologic/Oncologic Medications  heparin   Injectable 5000 Unit(s) SubCutaneous every 8 hours    Endocrine/Metabolic Medications  dextrose 50% Injectable 50 milliLiter(s) IV Push once  insulin regular  human recombinant 10 Unit(s) IV Push once    Topical/Other Medications  chlorhexidine 0.12% Liquid 15 milliLiter(s) Oral Mucosa every 12 hours  chlorhexidine 4% Liquid 1 Application(s) Topical <User Schedule>  petrolatum Ophthalmic Ointment 1 Application(s) Both EYES two times a day    --------------------------------------------------------------------------------------  Vital Signs Last 24 Hrs  T(C): 36.8 (14 Jul 2021 00:00), Max: 37.4 (13 Jul 2021 08:00)  T(F): 98.2 (14 Jul 2021 00:00), Max: 99.4 (13 Jul 2021 08:00)  HR: 101 (14 Jul 2021 03:00) (69 - 101)  BP: 122/84 (14 Jul 2021 03:00) (85/56 - 133/73)  BP(mean): 92 (14 Jul 2021 03:00) (61 - 92)  RR: 20 (14 Jul 2021 03:00) (17 - 30)  SpO2: 96% (14 Jul 2021 03:00) (95% - 100%)  --------------------------------------------------------------------------------------    12 Jul 2021 07:01  -  13 Jul 2021 07:00  --------------------------------------------------------  IN:    FentaNYL: 259.2 mL    Free Water: 1350 mL    IV PiggyBack: 360 mL    Jevity 1.2: 1150 mL    Lactated Ringers Bolus: 1000 mL    Phenylephrine: 78.3 mL    Propofol: 362.4 mL  Total IN: 4559.9 mL    OUT:    Indwelling Catheter - Urethral (mL): 3000 mL    Rectal Tube (mL): 300 mL  Total OUT: 3300 mL    Total NET: 1259.9 mL      13 Jul 2021 07:01  -  14 Jul 2021 03:21  --------------------------------------------------------  IN:    FentaNYL: 190.8 mL    FentaNYL: 7.2 mL    Free Water: 750 mL    IV PiggyBack: 305 mL    Jevity 1.2: 1000 mL    Phenylephrine: 307.8 mL    Propofol: 259.7 mL  Total IN: 2820.5 mL    OUT:    Indwelling Catheter - Urethral (mL): 2555 mL    Rectal Tube (mL): 75 mL  Total OUT: 2630 mL    Total NET: 190.5 mL        --------------------------------------------------------------------------------------    EXAM  NEUROLOGY  Exam: sedated    HEENT  Exam: Normocephalic, atraumatic     RESPIRATORY  Exam: Mode: AC/ CMV (Assist Control/ Continuous Mandatory Ventilation)  RR (machine): 18  TV (machine): 400  FiO2: 30  PEEP: 5  ITime: 0.94  MAP: 10  PIP: 25    CARDIOVASCULAR  Exam: RRR    GI/NUTRITION  Exam: Abdomen soft, Non-tender, Non-distended          LABS  --------------------------------------------------------------------------------------    LABS:  lionel                        8.8    18.19 )-----------( 229      ( 14 Jul 2021 01:48 )             28.2     07-14    140  |  106  |  52<H>  ----------------------------<  134<H>  5.7<H>   |  19<L>  |  3.11<H>    Ca    9.8      14 Jul 2021 01:48  Phos  6.3     07-14  Mg     2.30     07-14    TPro  6.6  /  Alb  3.0<L>  /  TBili  <0.2  /  DBili  x   /  AST  27  /  ALT  10  /  AlkPhos  106  07-12      --------------------------------------------------------------------------------------

## 2021-07-15 LAB
ANION GAP SERPL CALC-SCNC: 17 MMOL/L — HIGH (ref 7–14)
ANION GAP SERPL CALC-SCNC: 19 MMOL/L — HIGH (ref 7–14)
ANION GAP SERPL CALC-SCNC: 20 MMOL/L — HIGH (ref 7–14)
BUN SERPL-MCNC: 54 MG/DL — HIGH (ref 7–23)
BUN SERPL-MCNC: 59 MG/DL — HIGH (ref 7–23)
BUN SERPL-MCNC: 60 MG/DL — HIGH (ref 7–23)
CALCIUM SERPL-MCNC: 9.6 MG/DL — SIGNIFICANT CHANGE UP (ref 8.4–10.5)
CALCIUM SERPL-MCNC: 9.9 MG/DL — SIGNIFICANT CHANGE UP (ref 8.4–10.5)
CALCIUM SERPL-MCNC: 9.9 MG/DL — SIGNIFICANT CHANGE UP (ref 8.4–10.5)
CHLORIDE SERPL-SCNC: 105 MMOL/L — SIGNIFICANT CHANGE UP (ref 98–107)
CHLORIDE SERPL-SCNC: 107 MMOL/L — SIGNIFICANT CHANGE UP (ref 98–107)
CHLORIDE SERPL-SCNC: 108 MMOL/L — HIGH (ref 98–107)
CK SERPL-CCNC: 303 U/L — HIGH (ref 30–200)
CO2 SERPL-SCNC: 17 MMOL/L — LOW (ref 22–31)
CO2 SERPL-SCNC: 18 MMOL/L — LOW (ref 22–31)
CO2 SERPL-SCNC: 18 MMOL/L — LOW (ref 22–31)
CREAT SERPL-MCNC: 3.21 MG/DL — HIGH (ref 0.5–1.3)
CREAT SERPL-MCNC: 3.27 MG/DL — HIGH (ref 0.5–1.3)
CREAT SERPL-MCNC: 3.37 MG/DL — HIGH (ref 0.5–1.3)
GLUCOSE SERPL-MCNC: 170 MG/DL — HIGH (ref 70–99)
GLUCOSE SERPL-MCNC: 188 MG/DL — HIGH (ref 70–99)
GLUCOSE SERPL-MCNC: 190 MG/DL — HIGH (ref 70–99)
HCT VFR BLD CALC: 26.5 % — LOW (ref 39–50)
HGB BLD-MCNC: 8.1 G/DL — LOW (ref 13–17)
MAGNESIUM SERPL-MCNC: 2.3 MG/DL — SIGNIFICANT CHANGE UP (ref 1.6–2.6)
MAGNESIUM SERPL-MCNC: 2.4 MG/DL — SIGNIFICANT CHANGE UP (ref 1.6–2.6)
MCHC RBC-ENTMCNC: 28.3 PG — SIGNIFICANT CHANGE UP (ref 27–34)
MCHC RBC-ENTMCNC: 30.6 GM/DL — LOW (ref 32–36)
MCV RBC AUTO: 92.7 FL — SIGNIFICANT CHANGE UP (ref 80–100)
NRBC # BLD: 0 /100 WBCS — SIGNIFICANT CHANGE UP
NRBC # FLD: 0 K/UL — SIGNIFICANT CHANGE UP
PHENOBARB SERPL-MCNC: 11 UG/ML — SIGNIFICANT CHANGE UP (ref 10–40)
PHOSPHATE SERPL-MCNC: 6.6 MG/DL — HIGH (ref 2.5–4.5)
PHOSPHATE SERPL-MCNC: 7 MG/DL — HIGH (ref 2.5–4.5)
PLATELET # BLD AUTO: 189 K/UL — SIGNIFICANT CHANGE UP (ref 150–400)
POTASSIUM SERPL-MCNC: 4.7 MMOL/L — SIGNIFICANT CHANGE UP (ref 3.5–5.3)
POTASSIUM SERPL-MCNC: 5.5 MMOL/L — HIGH (ref 3.5–5.3)
POTASSIUM SERPL-MCNC: 5.6 MMOL/L — HIGH (ref 3.5–5.3)
POTASSIUM SERPL-SCNC: 4.7 MMOL/L — SIGNIFICANT CHANGE UP (ref 3.5–5.3)
POTASSIUM SERPL-SCNC: 5.5 MMOL/L — HIGH (ref 3.5–5.3)
POTASSIUM SERPL-SCNC: 5.6 MMOL/L — HIGH (ref 3.5–5.3)
RBC # BLD: 2.86 M/UL — LOW (ref 4.2–5.8)
RBC # FLD: 14.4 % — SIGNIFICANT CHANGE UP (ref 10.3–14.5)
SODIUM SERPL-SCNC: 142 MMOL/L — SIGNIFICANT CHANGE UP (ref 135–145)
SODIUM SERPL-SCNC: 143 MMOL/L — SIGNIFICANT CHANGE UP (ref 135–145)
SODIUM SERPL-SCNC: 144 MMOL/L — SIGNIFICANT CHANGE UP (ref 135–145)
TRIGL SERPL-MCNC: 314 MG/DL — HIGH
WBC # BLD: 16.89 K/UL — HIGH (ref 3.8–10.5)
WBC # FLD AUTO: 16.89 K/UL — HIGH (ref 3.8–10.5)

## 2021-07-15 PROCEDURE — 99232 SBSQ HOSP IP/OBS MODERATE 35: CPT

## 2021-07-15 PROCEDURE — 99497 ADVNCD CARE PLAN 30 MIN: CPT | Mod: GC

## 2021-07-15 PROCEDURE — 99291 CRITICAL CARE FIRST HOUR: CPT

## 2021-07-15 PROCEDURE — 71045 X-RAY EXAM CHEST 1 VIEW: CPT | Mod: 26

## 2021-07-15 RX ORDER — CALCIUM GLUCONATE 100 MG/ML
1 VIAL (ML) INTRAVENOUS ONCE
Refills: 0 | Status: COMPLETED | OUTPATIENT
Start: 2021-07-15 | End: 2021-07-15

## 2021-07-15 RX ORDER — CLONAZEPAM 1 MG
1 TABLET ORAL THREE TIMES A DAY
Refills: 0 | Status: DISCONTINUED | OUTPATIENT
Start: 2021-07-15 | End: 2021-07-20

## 2021-07-15 RX ORDER — SODIUM ZIRCONIUM CYCLOSILICATE 10 G/10G
10 POWDER, FOR SUSPENSION ORAL ONCE
Refills: 0 | Status: COMPLETED | OUTPATIENT
Start: 2021-07-15 | End: 2021-07-15

## 2021-07-15 RX ORDER — LEVETIRACETAM 250 MG/1
1000 TABLET, FILM COATED ORAL
Refills: 0 | Status: DISCONTINUED | OUTPATIENT
Start: 2021-07-15 | End: 2021-07-20

## 2021-07-15 RX ORDER — PHENOBARBITAL 60 MG
130 TABLET ORAL EVERY 12 HOURS
Refills: 0 | Status: DISCONTINUED | OUTPATIENT
Start: 2021-07-15 | End: 2021-07-20

## 2021-07-15 RX ORDER — ACETAMINOPHEN 500 MG
975 TABLET ORAL EVERY 6 HOURS
Refills: 0 | Status: DISCONTINUED | OUTPATIENT
Start: 2021-07-15 | End: 2021-07-16

## 2021-07-15 RX ORDER — PANTOPRAZOLE SODIUM 20 MG/1
40 TABLET, DELAYED RELEASE ORAL DAILY
Refills: 0 | Status: DISCONTINUED | OUTPATIENT
Start: 2021-07-15 | End: 2021-07-20

## 2021-07-15 RX ORDER — DIVALPROEX SODIUM 500 MG/1
750 TABLET, DELAYED RELEASE ORAL
Refills: 0 | Status: DISCONTINUED | OUTPATIENT
Start: 2021-07-15 | End: 2021-07-20

## 2021-07-15 RX ORDER — PHENOBARBITAL 60 MG
700 TABLET ORAL ONCE
Refills: 0 | Status: DISCONTINUED | OUTPATIENT
Start: 2021-07-15 | End: 2021-07-15

## 2021-07-15 RX ORDER — FUROSEMIDE 40 MG
40 TABLET ORAL ONCE
Refills: 0 | Status: COMPLETED | OUTPATIENT
Start: 2021-07-15 | End: 2021-07-15

## 2021-07-15 RX ADMIN — SODIUM ZIRCONIUM CYCLOSILICATE 10 GRAM(S): 10 POWDER, FOR SUSPENSION ORAL at 05:02

## 2021-07-15 RX ADMIN — Medication 100 MILLIGRAM(S): at 17:05

## 2021-07-15 RX ADMIN — Medication 100 MILLIGRAM(S): at 05:01

## 2021-07-15 RX ADMIN — Medication 101 MILLIGRAM(S): at 17:06

## 2021-07-15 RX ADMIN — DIVALPROEX SODIUM 750 MILLIGRAM(S): 500 TABLET, DELAYED RELEASE ORAL at 17:05

## 2021-07-15 RX ADMIN — HEPARIN SODIUM 5000 UNIT(S): 5000 INJECTION INTRAVENOUS; SUBCUTANEOUS at 13:07

## 2021-07-15 RX ADMIN — PANTOPRAZOLE SODIUM 40 MILLIGRAM(S): 20 TABLET, DELAYED RELEASE ORAL at 13:07

## 2021-07-15 RX ADMIN — CHLORHEXIDINE GLUCONATE 15 MILLILITER(S): 213 SOLUTION TOPICAL at 05:01

## 2021-07-15 RX ADMIN — Medication 1 APPLICATION(S): at 06:00

## 2021-07-15 RX ADMIN — Medication 1 APPLICATION(S): at 05:11

## 2021-07-15 RX ADMIN — Medication 100 GRAM(S): at 03:43

## 2021-07-15 RX ADMIN — Medication 1 MILLIGRAM(S): at 13:06

## 2021-07-15 RX ADMIN — HEPARIN SODIUM 5000 UNIT(S): 5000 INJECTION INTRAVENOUS; SUBCUTANEOUS at 22:14

## 2021-07-15 RX ADMIN — LEVETIRACETAM 1000 MILLIGRAM(S): 250 TABLET, FILM COATED ORAL at 17:06

## 2021-07-15 RX ADMIN — CHLORHEXIDINE GLUCONATE 15 MILLILITER(S): 213 SOLUTION TOPICAL at 17:05

## 2021-07-15 RX ADMIN — HEPARIN SODIUM 5000 UNIT(S): 5000 INJECTION INTRAVENOUS; SUBCUTANEOUS at 05:01

## 2021-07-15 RX ADMIN — CHLORHEXIDINE GLUCONATE 1 APPLICATION(S): 213 SOLUTION TOPICAL at 05:10

## 2021-07-15 RX ADMIN — LEVETIRACETAM 1000 MILLIGRAM(S): 250 TABLET, FILM COATED ORAL at 05:02

## 2021-07-15 RX ADMIN — Medication 1 MILLIGRAM(S): at 22:13

## 2021-07-15 RX ADMIN — Medication 105.38 MILLIGRAM(S): at 09:34

## 2021-07-15 RX ADMIN — Medication 1 MILLIGRAM(S): at 05:01

## 2021-07-15 RX ADMIN — DIVALPROEX SODIUM 750 MILLIGRAM(S): 500 TABLET, DELAYED RELEASE ORAL at 09:33

## 2021-07-15 RX ADMIN — Medication 40 MILLIGRAM(S): at 03:43

## 2021-07-15 NOTE — PROGRESS NOTE ADULT - ASSESSMENT
62 y/o male s/p tracheal resection 4/23/21 presents to [facility] ER w c/o stridor and SOB. Difficulty breating is worse at night and prevents him from being able to sleep.  He denies productive cough or fever.  This is his 3rd readmission after his tracheal resection  PT has PMHx COVID+ (3/2021 - not hospitalized), PE (8/2020 - on eliquis at home), CKD, w/ recent hospitalization at [facility] after seizure w/ lingual hematoma requiring emergency tracheostomy on 3/25/21.  Patient underwent Tracheal Resection and Reconstruction on 4/23. ENT injected vocal cords few months ago. He was last discharged 5/18/21. s/p emergent trach in ED after hypoxia.  Concern for anoxic brain injury.  Asked to see for goc

## 2021-07-15 NOTE — PROGRESS NOTE ADULT - ASSESSMENT
HPI: 60 y/o male PMHx COVID+ (3/2021 - not hospitalized), PE (8/2020 - on Eliquis at home), CKD, w/ recent hospitalization at [facility] after seizure w/ lingual hematoma requiring emergency tracheostomy on 3/25/21 s/p Tracheal Resection and Reconstruction on 4/23 presented to ED in respiratory distress requiring emergent tracheostomy on 7/1and subsequent revision tracheostomy on 7/2. Post op course complicated by seizures and concern for anoxic brain injury.    PLAN:  Neurologic:  - Sedated with propofol, check triglyceride level  - Depakote 750mg BID, Keppra 1000mg BID  - Added Klonopin 1mg TID for myoclonic jerks  - EEG significant for hyperexcitability and GPDs: cortical storming and stimulus myoclonus  - MRI Brain WNL, no anoxic injury    Respiratory:   - s/p bronch and trach revision 7/2  - Mechanical ventilation AC/CMV  - excessive secretions    Cardiovascular:   - Hemodynamically stable off vasopressors   - Labetalol 100 BID for hypertension     Gastrointestinal/Nutrition:   - Jevity tube feeds via NGT at goal   - Protonix daily for stress ulcer ppx    Renal/Genitourinary:  - Higginbotham in place  - Monitor intake & output  - Increasing sCr and new hyperkalemia s/p cocktail and 1 L bolus     Hematologic:  - Trend H/H  - c/w SQH for VTE prophylaxis    Infectious Disease:   - Sputum Cx  with pseudomonas  - Urine Cx with Citrobacter  - s/p course of Zosyn   - Increasing leukocytosis, low procalcitonin, continue to monitor     Tubes/Lines/Drains:   - Higginbotham  - Right radial A line 7/2  - NGT  - R PIV  - L PIV     IBW/ABW (admit): 52/60 (72)    Disposition: SICU HPI: 60 y/o male PMHx COVID+ (3/2021 - not hospitalized), PE (8/2020 - on Eliquis at home), CKD, w/ recent hospitalization at [facility] after seizure w/ lingual hematoma requiring emergency tracheostomy on 3/25/21 s/p Tracheal Resection and Reconstruction on 4/23 presented to ED in respiratory distress requiring emergent tracheostomy on 7/1and subsequent revision tracheostomy on 7/2. Post op course complicated by seizures and concern for anoxic brain injury.    Interval/Overnight Events:   - Persistent hyperkalemia s/p 40mg IV lasix. lokelma, calcium fatal cardiac arrhythmia prevention  - replaced propofol with phenobarb  - f/u BMP for K  - f/u US bladder    PLAN:  Neurologic:  - Turn off propofol --> start phenobarbital 700 / 130 q 12  - Depakote 750mg BID, Keppra 1000mg BID  - Klonopin 1mg TID for myoclonic jerks  - EEG significant for hyperexcitability and GPDs: cortical storming and stimulus myoclonus  - MRI Brain WNL, no anoxic injury    Respiratory:   - s/p bronch and trach revision 7/2  - Mechanical ventilation AC/CMV  - excessive secretions    Cardiovascular:   - Hemodynamically stable off vasopressors   - Labetalol 100 BID for hypertension     Gastrointestinal/Nutrition:   - Nepro tube feeds via NGT at goal  - Protonix daily for stress ulcer ppx    Renal/Genitourinary:  - Higginbotham removed -> failed trial of void -> f/u Bladder scan  - Monitor intake & output  - Increasing sCr and new hyperkalemia s/p cocktail and 1 L bolus     Hematologic:  - Trend H/H  - c/w SQH for VTE prophylaxis    Infectious Disease:   - Sputum Cx  with pseudomonas  - Urine Cx with Citrobacter  - s/p course of Zosyn   - Increasing leukocytosis, low procalcitonin, continue to monitor    Tubes/Lines/Drains:   - NGT  - R PIV  - L PIV     IBW/ABW (admit): 52/60 (72)    Disposition: SICU    IBW/ABW (admit): 52/60 (72)    Disposition: SICU

## 2021-07-15 NOTE — PROGRESS NOTE ADULT - SUBJECTIVE AND OBJECTIVE BOX
SICU Daily Progress Note  =====================================================  Interval/Overnight Events:   - Continuous fentanyl infusion discontinued   - Persistent hyperkalemia s/p 40mg IV lasix x2, insulin/D50/albuterol  - 2L bolus IV fluids    HPI: 62 y/o male PMHx COVID+ (3/2021 - not hospitalized), PE (8/2020 - on Eliquis at home), CKD, w/ recent hospitalization at [facility] after seizure w/ lingual hematoma requiring emergency tracheostomy on 3/25/21 s/p Tracheal Resection and Reconstruction on 4/23 presented to ED in respiratory distress requiring emergent tracheostomy on 7/1and subsequent revision tracheostomy on 7/2. Post op course complicated by seizures and concern for anoxic brain injury .    Allergies: No Known Allergies      MEDICATIONS:   --------------------------------------------------------------------------------------  Neurologic Medications  acetaminophen    Suspension .. 975 milliGRAM(s) Oral every 6 hours PRN Temp greater or equal to 38.5C (101.3F)  levETIRAcetam  IVPB 1000 milliGRAM(s) IV Intermittent every 12 hours  propofol Infusion 10 MICROgram(s)/kG/Min IV Continuous <Continuous>  valproate sodium IVPB 500 milliGRAM(s) IV Intermittent three times a day    Respiratory Medications  ALBUTerol    90 MICROgram(s) HFA Inhaler 2 Puff(s) Inhalation every 4 hours    Cardiovascular Medications  labetalol 100 milliGRAM(s) Oral every 12 hours  phenylephrine    Infusion 0.1 MICROgram(s)/kG/Min IV Continuous <Continuous>    Gastrointestinal Medications  pantoprazole  Injectable 40 milliGRAM(s) IV Push daily  senna Syrup 5 milliLiter(s) Oral at bedtime  sodium chloride 0.9% lock flush 10 milliLiter(s) IV Push every 1 hour PRN Pre/post blood products, medications, blood draw, and to maintain line patency    Genitourinary Medications    Hematologic/Oncologic Medications  heparin   Injectable 5000 Unit(s) SubCutaneous every 8 hours    Antimicrobial/Immunologic Medications    Endocrine/Metabolic Medications    Topical/Other Medications  chlorhexidine 0.12% Liquid 15 milliLiter(s) Oral Mucosa every 12 hours  chlorhexidine 4% Liquid 1 Application(s) Topical <User Schedule>  petrolatum Ophthalmic Ointment 1 Application(s) Both EYES two times a day    --------------------------------------------------------------------------------------  VITAL SIGNS, INS/OUTS (last 24 hours):  --------------------------------------------------------------------------------------  ICU Vital Signs Last 24 Hrs  T(C): 37.4 (14 Jul 2021 20:00), Max: 38.4 (14 Jul 2021 04:00)  T(F): 99.4 (14 Jul 2021 20:00), Max: 101.2 (14 Jul 2021 04:00)  HR: 96 (15 Jul 2021 00:00) (82 - 117)  BP: 159/101 (15 Jul 2021 00:00) (75/49 - 159/101)  BP(mean): 115 (15 Jul 2021 00:00) (55 - 115)  ABP: --  ABP(mean): --  RR: 26 (15 Jul 2021 00:00) (17 - 27)  SpO2: 100% (15 Jul 2021 00:00) (95% - 100%)  --------------------------------------------------------------------------------------    EXAM  NEUROLOGY  Exam: Sedated, not following commands, intermittent myoclonic jerks    HEENT  Exam: Normocephalic, atraumatic    RESPIRATORY  Exam: Lungs clear to auscultation, Normal expansion/effort.   Mechanical Ventilation: Mode: AC/ CMV (Assist Control/ Continuous Mandatory Ventilation), RR (machine): 18, TV (machine): 400, FiO2: 30, PEEP: 5, ITime: 0.94, MAP: 11, PIP: 24    CARDIOVASCULAR  Exam: S1, S2.  Regular rate and rhythm.     GI/NUTRITION  Exam: Abdomen soft, Non-tender, Non-distended.     VASCULAR  Exam: Extremities warm, pink, well-perfused.     SKIN  Exam: Good skin turgor, no skin breakdown.    METABOLIC/FLUIDS/ELECTROLYTES    HEMATOLOGIC  [x] VTE Prophylaxis: heparin   Injectable 5000 Unit(s) SubCutaneous every 8 hours    Transfusions:	[] PRBC	[] Platelets		[] FFP	[] Cryoprecipitate    INFECTIOUS DISEASE  Antimicrobials/Immunologic Medications:    Tubes/Lines/Drains    [x] Peripheral IV  [] Central Venous Line     	[] R	[] L	[] IJ	[] Fem	[] SC	Date Placed:   [] Arterial Line		[] R	[] L	[] Fem	[] Rad	[] Ax	Date Placed:   [] PICC		[] Midline		[] Mediport  [] Urinary Catheter		Date Placed:   [x] Necessity of urinary, arterial, and venous catheters discussed    LABS  --------------------------------------------------------------------------------------  ((Insert SICU Labs here))***  --------------------------------------------------------------------------------------    OTHER LABORATORY:     IMAGING STUDIES:   CXR:  SICU Daily Progress Note  =====================================================  Interval/Overnight Events:   - Persistent hyperkalemia s/p 40mg IV lasix. lokelma, calcium fatal cardiac arrhythmia prevention  - replaced propofol with phenobarb  - f/u BMP for K  - f/u US bladder    HPI: 60 y/o male PMHx COVID+ (3/2021 - not hospitalized), PE (8/2020 - on Eliquis at home), CKD, w/ recent hospitalization at [facility] after seizure w/ lingual hematoma requiring emergency tracheostomy on 3/25/21 s/p Tracheal Resection and Reconstruction on 4/23 presented to ED in respiratory distress requiring emergent tracheostomy on 7/1and subsequent revision tracheostomy on 7/2. Post op course complicated by seizures and concern for anoxic brain injury .    Allergies: No Known Allergies      MEDICATIONS:   --------------------------------------------------------------------------------------  Neurologic Medications  acetaminophen    Suspension .. 975 milliGRAM(s) Oral every 6 hours PRN Temp greater or equal to 38.5C (101.3F)  levETIRAcetam  IVPB 1000 milliGRAM(s) IV Intermittent every 12 hours  propofol Infusion 10 MICROgram(s)/kG/Min IV Continuous <Continuous>  valproate sodium IVPB 500 milliGRAM(s) IV Intermittent three times a day    Respiratory Medications  ALBUTerol    90 MICROgram(s) HFA Inhaler 2 Puff(s) Inhalation every 4 hours    Cardiovascular Medications  labetalol 100 milliGRAM(s) Oral every 12 hours  phenylephrine    Infusion 0.1 MICROgram(s)/kG/Min IV Continuous <Continuous>    Gastrointestinal Medications  pantoprazole  Injectable 40 milliGRAM(s) IV Push daily  senna Syrup 5 milliLiter(s) Oral at bedtime  sodium chloride 0.9% lock flush 10 milliLiter(s) IV Push every 1 hour PRN Pre/post blood products, medications, blood draw, and to maintain line patency    Genitourinary Medications    Hematologic/Oncologic Medications  heparin   Injectable 5000 Unit(s) SubCutaneous every 8 hours    Antimicrobial/Immunologic Medications    Endocrine/Metabolic Medications    Topical/Other Medications  chlorhexidine 0.12% Liquid 15 milliLiter(s) Oral Mucosa every 12 hours  chlorhexidine 4% Liquid 1 Application(s) Topical <User Schedule>  petrolatum Ophthalmic Ointment 1 Application(s) Both EYES two times a day    --------------------------------------------------------------------------------------  VITAL SIGNS, INS/OUTS (last 24 hours):  --------------------------------------------------------------------------------------  ICU Vital Signs Last 24 Hrs  T(C): 37.4 (14 Jul 2021 20:00), Max: 38.4 (14 Jul 2021 04:00)  T(F): 99.4 (14 Jul 2021 20:00), Max: 101.2 (14 Jul 2021 04:00)  HR: 96 (15 Jul 2021 00:00) (82 - 117)  BP: 159/101 (15 Jul 2021 00:00) (75/49 - 159/101)  BP(mean): 115 (15 Jul 2021 00:00) (55 - 115)  ABP: --  ABP(mean): --  RR: 26 (15 Jul 2021 00:00) (17 - 27)  SpO2: 100% (15 Jul 2021 00:00) (95% - 100%)  --------------------------------------------------------------------------------------    EXAM  NEUROLOGY  Exam: Sedated, not following commands, intermittent myoclonic jerks    HEENT  Exam: Normocephalic, atraumatic    RESPIRATORY  Exam: Lungs clear to auscultation, Normal expansion/effort.   Mechanical Ventilation: Mode: AC/ CMV (Assist Control/ Continuous Mandatory Ventilation), RR (machine): 18, TV (machine): 400, FiO2: 30, PEEP: 5, ITime: 0.94, MAP: 11, PIP: 24    CARDIOVASCULAR  Exam: S1, S2.  Regular rate and rhythm.     GI/NUTRITION  Exam: Abdomen soft, Non-tender, Non-distended.     VASCULAR  Exam: Extremities warm, pink, well-perfused.     SKIN  Exam: Good skin turgor, no skin breakdown.    METABOLIC/FLUIDS/ELECTROLYTES    HEMATOLOGIC  [x] VTE Prophylaxis: heparin   Injectable 5000 Unit(s) SubCutaneous every 8 hours    Transfusions:	[] PRBC	[] Platelets		[] FFP	[] Cryoprecipitate    INFECTIOUS DISEASE  Antimicrobials/Immunologic Medications:    Tubes/Lines/Drains    [x] Peripheral IV  [] Central Venous Line     	[] R	[] L	[] IJ	[] Fem	[] SC	Date Placed:   [] Arterial Line		[] R	[] L	[] Fem	[] Rad	[] Ax	Date Placed:   [] PICC		[] Midline		[] Mediport  [] Urinary Catheter		Date Placed:   [x] Necessity of urinary, arterial, and venous catheters discussed    LABS  --------------------------------------------------------------------------------------  ((Insert SICU Labs here))***  --------------------------------------------------------------------------------------    OTHER LABORATORY:     IMAGING STUDIES:   CXR:

## 2021-07-15 NOTE — PROGRESS NOTE ADULT - PROBLEM SELECTOR PLAN 1
complicated course now s/p emergent trach  continue support as tolerated
s/p emergent trach  remains on vent unable to wean due to mental status

## 2021-07-15 NOTE — CONSULT NOTE ADULT - ASSESSMENT
Recommendation: The team should engage in further conversations with the patient’s family to arrive at a clinically appropriate resolution that is in the patient’s best interests. The team should communicate with the patient’s family and explain the clinical trajectory and plan of care. The family would appreciate if the team could communicate with them regularly regarding the patient’s care. This will help greatly to ameliorate their concerns regarding the patient’s care.    Thank you for including the Ethics Consultation Service. Ethics commends the team for their virtue in the care and support for Mr. Juan. Ethics will remain available for any discussions and decision points that may occur.     ADDENDUM 7/15/2021 at 1600: Family opted for PEG placement and subsequent long term care facility as per Palliative Care.     CASE DISCUSSED with ATTENDING: Dr. Emily Yusuf, Ethics Attending and NENO Nava DNP, Director of Medical Ethics.  More than 50% of the time of this consultation was spent in coordination of Care of Patient.	  REFERENCES:   i.	(i) Miguel TL & Aris JF. Principles of Biomedical Ethics. [place]: [employer]; 2019.   ii.	(ii) [facility] § 2994-d.1. (2020).   iii.	(iii) Grisel ED, Orly RM & Naida NADIYA. Ethics, Trust, and the Professions: Philosophical and Cultural Aspects. [place], [place]: [employer]; 1991.

## 2021-07-15 NOTE — CONSULT NOTE ADULT - CONSULT REQUESTED DATE/TIME
01-Jul-2021 22:00
01-Jul-2021 21:13
02-Jul-2021 18:37
09-Jul-2021
01-Jul-2021 23:45
13-Jul-2021
07-Jul-2021 15:21

## 2021-07-15 NOTE — PROGRESS NOTE ADULT - SUBJECTIVE AND OBJECTIVE BOX
7/6: EEg notable for possible status epilepticus. Planned for MRI today. Pt noted to be febrile yesterday with tmax of 102.9.   7/7: naeon. cxr appears to be slightly worse in effusions. pending mri  7/8: negative blood cultures, plan for MRI today   7/9: NAEON - MRI with no significant intracranial pathology.  7/10: NAEON  7/11: naeon   7/12: naeon. GOC today  7/13: naeon. waiting [facility] duscission  7/14:  naeon. waiting [facility] duscission  7/15: NAEON. [facility]    P/E  Sedated, 6LPC, trach sutured to skin, trach ties. no bleeding  Neck soft, flat    ICU Vital Signs Last 24 Hrs  T(C): 38.1 (15 Jul 2021 08:00), Max: 38.1 (15 Jul 2021 08:00)  T(F): 100.5 (15 Jul 2021 08:00), Max: 100.5 (15 Jul 2021 08:00)  HR: 96 (15 Jul 2021 08:00) (82 - 107)  BP: 135/97 (15 Jul 2021 08:00) (75/49 - 159/101)  BP(mean): 105 (15 Jul 2021 08:00) (55 - 115)  ABP: --  ABP(mean): --  RR: 22 (15 Jul 2021 08:00) (13 - 28)  SpO2: 96% (15 Jul 2021 08:00) (96% - 100%)      A/P: 60 y/o male s/p tracheal resection 4/23/21 presents to [facility] ER w c/o stridor and SOB. Difficulty breating is worse at night and prevents him from being able to sleep.  He denies productive cough or fever.  This is his 3rd readmission after his tracheal resection  PT has PMHx COVID+ (3/2021 - not hospitalized), PE (8/2020 - on eliquis at home), CKD, w/ recent hospitalization at [facility] after seizure w/ lingual hematoma requiring emergency tracheostomy on 3/25/21.  Patient underwent Tracheal Resection and Reconstruction on 4/23. ENT injected vocal cords few months ago. He was last discharged 5/18/21. s/p emergent cric 7/1 now s/p revision trach, flex bronch and removal of stent 7/2 with post-op course complicated by seizures  - GOC discussion this week  - Patient has a critical airway, only ENT to manipulate airway  - Routine trach care by nursing  - Suction PRN  - Extra 6LPC and 4LPC at bedside  - Vent per SICU  - Tube feeds per SICU

## 2021-07-15 NOTE — PROGRESS NOTE ADULT - PROBLEM SELECTOR PLAN 2
MRI noted  spoke with neurology  despite MRI, 30% of pt can still have anoxic brain injuy despite findings given pt's clinical signs  pt remains on sedation due to his severe myoclonus  plan for them to speak with family about above findings
will need to have PEG placed and all medications for myoclonus transitioned

## 2021-07-15 NOTE — PROGRESS NOTE ADULT - SUBJECTIVE AND OBJECTIVE BOX
SUBJECTIVE AND OBJECTIVE:  pt trach on vent +myoclonus  INTERVAL HPI/OVERNIGHT EVENTS:    DNR on chart:   Allergies    No Known Allergies    Intolerances    MEDICATIONS  (STANDING):  ALBUTerol    90 MICROgram(s) HFA Inhaler 2 Puff(s) Inhalation every 4 hours  chlorhexidine 0.12% Liquid 15 milliLiter(s) Oral Mucosa every 12 hours  chlorhexidine 4% Liquid 1 Application(s) Topical <User Schedule>  clonazePAM  Tablet 1 milliGRAM(s) Oral three times a day  diVALproex Sprinkle 750 milliGRAM(s) Oral two times a day  heparin   Injectable 5000 Unit(s) SubCutaneous every 8 hours  labetalol 100 milliGRAM(s) Oral every 12 hours  levETIRAcetam  Solution 1000 milliGRAM(s) Oral two times a day  pantoprazole   Suspension 40 milliGRAM(s) Oral daily  petrolatum Ophthalmic Ointment 1 Application(s) Both EYES two times a day  PHENobarbital IVPB 130 milliGRAM(s) IV Intermittent every 12 hours  propofol Infusion 10 MICROgram(s)/kG/Min (4.32 mL/Hr) IV Continuous <Continuous>  senna Syrup 5 milliLiter(s) Oral at bedtime    MEDICATIONS  (PRN):  acetaminophen    Suspension .. 975 milliGRAM(s) Oral every 6 hours PRN Temp greater or equal to 38C (100.4F)  sodium chloride 0.9% lock flush 10 milliLiter(s) IV Push every 1 hour PRN Pre/post blood products, medications, blood draw, and to maintain line patency      ITEMS UNCHECKED ARE NOT PRESENT    PRESENT SYMPTOMS: [x ]Unable to obtain due to poor mentation   Source if other than patient:  [ ]Family   [ ]Team     Pain (Impact on QOL):    Location:  Minimal acceptable level (0-10 scale):            Aggravating factors:  Quality:  Radiation:  Severity (0-10 scale):    Timing:    Dyspnea:                           [ ]Mild [ ]Moderate [ ]Severe  Anxiety:                             [ ]Mild [ ]Moderate [ ]Severe  Fatigue:                             [ ]Mild [ ]Moderate [ ]Severe  Nausea:                             [ ]Mild [ ]Moderate [ ]Severe  Loss of appetite:              [ ]Mild [ ]Moderate [ ]Severe  Constipation:                    [ ]Mild [ ]Moderate [ ]Severe    PAIN AD Score:	  [email or web address].[email or web address].[email or web address] (Ctrl + left click to view)    Other Symptoms:  [ ]All other review of systems negative     Karnofsky Performance Score/Palliative Performance Status Version 2:    10     %    http://palliative.info/resource_material/[email or web address].pdf  PHYSICAL EXAM:  Vital Signs Last 24 Hrs  T(C): 37.6 (15 Jul 2021 16:00), Max: 38.1 (15 Jul 2021 08:00)  T(F): 99.7 (15 Jul 2021 16:00), Max: 100.5 (15 Jul 2021 08:00)  HR: 94 (15 Jul 2021 16:00) (83 - 107)  BP: 112/68 (15 Jul 2021 16:00) (101/66 - 159/101)  BP(mean): 78 (15 Jul 2021 16:00) (73 - 115)  RR: 18 (15 Jul 2021 16:00) (9 - 28)  SpO2: 98% (15 Jul 2021 16:00) (96% - 100%) I&O's Summary    2021 07:01  -  15 Jul 2021 07:00  --------------------------------------------------------  IN: 2713.9 mL / OUT: 3950 mL / NET: -1236.1 mL    15 Jul 2021 07:01  -  15 Jul 2021 16:05  --------------------------------------------------------  IN: 380.4 mL / OUT: 875 mL / NET: -494.6 mL     GENERAL:  [ ]Alert  [ ]Oriented x   [ ]Lethargic  [ ]Cachexia  [ ]Unarousable  [ ]Verbal  [x ]Non-Verbal    Behavioral:   [ ] Anxiety  [ ] Delirium [ ] Agitation [ ] Other    HEENT:  [ ]Normal   [ ]Dry mouth   [ x]ET Tube/Trach  [ ]Oral lesions    PULMONARY:   [ x]Clear [ ]Tachypnea  [ ]Audible excessive secretions   [ ]Rhonchi        [ ]Right [ ]Left [ ]Bilateral  [ ]Crackles        [ ]Right [ ]Left [ ]Bilateral  [ ]Wheezing     [ ]Right [ ]Left [ ]Bilateral    CARDIOVASCULAR:    [ ]Regular [ ]Irregular [x ]Tachy  [ ]Jaswant [ ]Murmur [ ]Other    GASTROINTESTINAL:  [ x]Soft  [ ]Distended   [x ]+BS  [x ]Non tender [ ]Tender  [ ]PEG [x ]OGT/ NGT   Last BM:   21 @ 07:01  -  07-10-21 @ 07:00  --------------------------------------------------------  OUT: 100 mL    07-10-21 @ 07:01  -  21 @ 07:00  --------------------------------------------------------  OUT: 500 mL    21 @ 07:01  -  21 @ 07:00  --------------------------------------------------------  OUT: 225 mL    21 @ 07:01  -  21 @ 07:00  --------------------------------------------------------  OUT: 300 mL    21 @ 07:01  -  21 @ 07:00  --------------------------------------------------------  OUT: 150 mL    21 @ 07:01  -  07-15-21 @ 07:00  --------------------------------------------------------  OUT: 300 mL     GENITOURINARY:  [ ]Normal [ ] Incontinent   [ ]Oliguria/Anuria   [x ]Higginbotham    MUSCULOSKELETAL:   [ ]Normal   [ ]Weakness  [x ]Bed/Wheelchair bound [ ]Edema    NEUROLOGIC:   [ ]No focal deficits  [ ] Cognitive impairment  [ ] Dysphagia [ ]Dysarthria [ ] Paresis [ ]Other anoxia    SKIN:   [x ]Normal   [ ]Pressure ulcer(s)  [ ]Rash    CRITICAL CARE:  [ ] Shock Present  [ ]Septic [ ]Cardiogenic [ ]Neurologic [ ]Hypovolemic  [ ]  Vasopressors [ ]  Inotropes   [ ] Respiratory failure present  [ ] Acute  [ ] Chronic [ ] Hypoxic  [ ] Hypercarbic [ ] Other  [ ] Other organ failure     LABS:                        8.1    16.89 )-----------( 189      ( 15 Jul 2021 01:53 )             26.5   07-15    144  |  107  |  60<H>  ----------------------------<  188<H>  5.5<H>   |  18<L>  |  3.27<H>    Ca    9.9      15 Jul 2021 12:36  Phos  7.0     07-15  Mg     2.40     07-15        Urinalysis Basic - ( 2021 07:57 )    Color: Light Yellow / Appearance: Clear / S.017 / pH: x  Gluc: x / Ketone: Negative  / Bili: Negative / Urobili: <2 mg/dL   Blood: x / Protein: 30 mg/dL / Nitrite: Negative   Leuk Esterase: Negative / RBC: 6 /HPF / WBC 3 /HPF   Sq Epi: x / Non Sq Epi: 1 /HPF / Bacteria: Few      RADIOLOGY & ADDITIONAL STUDIES:    Protein Calorie Malnutrition Present: [ ] yes [ ] no  [ ] PPSV2 < or = 30%  [ ] significant weight loss [ ] poor nutritional intake [ ] anasarca [ ] catabolic state Albumin, Serum: 3.0 g/dL (21 @ 17:00)  Artificial Nutrition [ ]     REFERRALS:   [ ]Chaplaincy  [ ] Hospice  [ ]Child Life  [ ]Social Work  [ ]Case management [ ]Holistic Therapy   Goals of Care Document:

## 2021-07-15 NOTE — PROGRESS NOTE ADULT - PROBLEM SELECTOR PLAN 3
overall prognosis is grave  will speak to family once neurology has touched base with them
see goc  family wish to pursue peg  pt remains full code  will sign off

## 2021-07-15 NOTE — CONSULT NOTE ADULT - CONSULT REASON
To assist in the ethical dilemma posed by a 61-year-old male with complex medical history now s/p emergent tracheostomy with concern for anoxic brain injury, currently with poor prognosis, regarding goals of care.

## 2021-07-15 NOTE — CONSULT NOTE ADULT - SUBJECTIVE AND OBJECTIVE BOX
Consult requested by: NENO Conway MD (SICU Attending)              Attending: Dr. Aram Roland (Medicine Attending)     Consultant: Jethro Monk JD, MS, [facility] (Ethicist) and Adina Schwab, PA (Ethics Fellow)  Contact#: [phone] (Office) & [phone] (Fellow Cell)     Consult purpose: To assist in the ethical dilemma posed by a 61-year-old male with complex medical history now s/p emergent tracheostomy with concern for anoxic brain injury, currently with poor prognosis, regarding goals of care.    Clinical summary: This is a 61 year-old male with a past medical history notable for COVID + in 3/2021 (not hospitalized), pulmonary embolism (PE) 8/2020 on Eliquis, chronic kidney disease (CKD), new onset seizure disorder resulting in lingual hematoma requiring emergency tracheostomy in 3/2021, s/p tracheal resection and reconstruction and tracheal stent in 4/2021. This is patient’s third admission since March 2021. His current admission was due to respiratory distress requiring emergency tracheostomy on 7/1/21 followed by revision tracheostomy and stent removal on 7/2. Patient subsequently admitted to SICU. Thoracic Surgery, ENT, Palliative Care and Neurology on consult.   Post op course complicated by seizures and concern for anoxic brain injury. As per Neurology, patient with brainstem reflexes intact but otherwise doing neurologically poor, with continuous stimulus induced seizures. Neurology also noted that EEG alone portends a bad prognosis. Neurology noted that patient unlikely to return to meaningful neurologic function. Ethics consult was initiated to facilitate optimization of interdisciplinary communication with the family given the patient’s current state and his grave prognosis.    PROGNOSIS ESTIMATE (SURVIVAL IN HRS, DAYS, WKS, MOS, YRS): guarded   PATIENT DECISION-MAKING CAPACITY:  Has Capacity  Patient Lacks Capacity (due to acute illness)  PATIENT AWARE OF:   DIAGNOSIS:  Yes  No  Unknown		PROGNOSIS:  Yes  No  Unknown  NAME OF MEDICAL DECISION-MAKER SHOULD PATIENT LACK CAPACITY (RELATIONSHIP): WifeAlaina  ROLE:  Health Care Agent  Legal Surrogate 	CONTACT#: [phone]  OTHER DECISION-MAKER (I.E., [facility] OR SURROGATE) AWARE OF:   DIAGNOSIS:  Yes  No  Unknown 		PROGNOSIS:  Yes  No  Unknown  OTHER STAKEHOLDERS: Multiple family members including children in [facility]  EVIDENCE OF PATIENT’S PREFERENCE OF LIFE-SUSTAINING TREATMENT (WRITTEN OR ORAL): None  RESUSCITATION STATUS: DNR:  Yes  No  DNI: Yes  No     Discussions:   Discussion between Patient’s family (Wife Alaina Zambrano, wife’s sister Jeannie Rose, Jeannie’s  Carolyne Rose, patient’s sister Sam, and patient’s niece Nicole), primary SICU team Resident (Dr. Herbie Santacruz) as well as 2 students, ENT Resident (Dr. Jemal Burciaga), Neurology Attending (Dr. Milena Thibodeaux), and Ethics (OLIVIA Dunn, Ethicist, and Adina Schwab, Fellow) on 7/13/2021: We presented the role of Ethics and the role of each participant in the meeting. The family was hoping the ENT physician who performed the emergent tracheostomy/revision would be present to discuss the condition of the patient at that time. ENT, SICU, and Neurology participated in the discussion explaining that Sally has had an acute respiratory event which required an emergency tracheostomy, and this resulted in a lack of oxygen to his brain until the emergent airway was established. He is now having significant seizure activity which is inhibiting the ability to wean him off sedation, and this could be due to anoxic brain injury despite not seeing an acute change on imaging as it is not always evident on imaging. Unfortunately, it is unlikely for him to improve from this condition and he will likely remain in his current mental state with possibly only minimal improvement.  Neurology explained his current neurologic status and prognosis, and presented the options of placing a PEG tube for feedings and permanent tracheostomy and transitioning him to a facility, but that this involved risks including bed sores and infections, versus a more palliative approach. The family is not able to make any decisions at this time. They would like to process this information and would like to discuss this with their family in [facility], including the patient’s children in [facility]. Additionally, the family would like to speak with the SICU Attending, ENT Attending who did the surgery, as well as meet with palliative care. They also requested medical records and questioned how much time they have to make this decision. They want to continue all possible treatments for now while they make a decision. Ethics provided emotional support.    Bioethical analysis: The conflict presented in this case pertains to the patient’s autonomy and the practitioner’s duty to beneficence and nonmaleficence.    The conflict that exists in this situation is one hospital clinicians frequently encounter for critically ill patients who have poor prognosis for recovery. In this case, clinicians are concerned with the risks involved to sustain life. However, before any light can be shed on the ethical principles of beneficence, nonmaleficence, autonomy and justice, there must be adequate and abiding communication between each of the parties involved in the care of the patient –especially between the (often large and interdisciplinary) medical team, and the patient and his family/surrogate.    The principle of respect for autonomous persons acknowledges a patient’s right to hold views, to make choices and to take actions based on their values and beliefs(i). Furthermore, this principle acknowledges the patient’s dignity and bodily integrity; that is, his distinct personhood, personal experience and interpretation of suffering, life-story, etc., which must be respected.(i). Essential to this principle is the capacity for autonomous choice(i). In other words, the patient’s ability to decide what can and cannot be done to them according to their set of values. To protect the patient’s autonomy when they lose the ability to decide, we must find the appropriate protector of the patient’s autonomy. According to the Family Healthcare Decisions Act hierarchy, the wife would be the appropriate legal surrogate decision maker (ii).    The clinician’s domain of VIRTUE is the source of the bioethical principles of beneficence and nonmaleficence –both of which should be aimed to honor a patient’s moral status: wanting to help a patient live optimally, while avoiding inflicting harm (which may be experienced psycho-spiritually and physically)(i). Thus, the health care team must show respect for Mr. Juan as a person with dignity. This moral status requires careful consideration of his chance for recovery and a life he may come to enjoy. In this case, his prognosis is poor and he will unlikely recover to any meaningful quality of life. The family is not aware of any previously stated wishes from the patient should such a situation occur. In this situation, the surrogate decision maker would utilize best interest standards which would take the best interests of the patient into account, maximizing benefit and minimizing harm or risks, in essence quality of life. (i).    The team should adhere to the principles of beneficence and nonmaleficence. Nonmaleficence obligates us to refrain from causing harm, or suffering, to patients while treating them, while beneficence requires us to promote the best possible health or quality of life –in other words, to remediate a harm and to maximize benefit for the patient’s health and well-being. The continuing task is to responsibly attend to each patient’s specific concerns, to be responsive to each individual within his or her unique circumstances and condition(iii). The goal should be to respect the patient’s autonomy and provide care that is consistent with the patient’s best interests.    Communication is central in the practitioner-patient relationship.(iii) This is not only owed to patients. It also extends to a patient’s surrogate decision-maker. Patients and surrogates are sometimes thrown to a difficult situation and can bring their fears and anxieties to an already delicate situation. Practical tools such as extensive communication with the patient and the surrogate and active listening can help in this regard. This patient has a wife and siblings that care deeply for him and have been actively involved in his care. They have many questions regarding the diagnosis and prognosis. The team should address those concerns.

## 2021-07-15 NOTE — PROGRESS NOTE ADULT - CONVERSATION DETAILS
spoke with wife at bedside  still having hard time accepting pt's anoxic brain injury- continues to state but his mri is normal  she is very spiritual and praying for miracle for him to recover  agreed to go forward with PEG placement and understands that pt will need SNF to care for him  we also discussed that if in the future he has not improved and she wishes to focus on comfort at that time she would be able to do that

## 2021-07-16 LAB
ANION GAP SERPL CALC-SCNC: 18 MMOL/L — HIGH (ref 7–14)
APPEARANCE UR: CLEAR — SIGNIFICANT CHANGE UP
BILIRUB UR-MCNC: NEGATIVE — SIGNIFICANT CHANGE UP
BUN SERPL-MCNC: 67 MG/DL — HIGH (ref 7–23)
CALCIUM SERPL-MCNC: 9.5 MG/DL — SIGNIFICANT CHANGE UP (ref 8.4–10.5)
CHLORIDE SERPL-SCNC: 106 MMOL/L — SIGNIFICANT CHANGE UP (ref 98–107)
CO2 SERPL-SCNC: 17 MMOL/L — LOW (ref 22–31)
COLOR SPEC: SIGNIFICANT CHANGE UP
CREAT SERPL-MCNC: 3.61 MG/DL — HIGH (ref 0.5–1.3)
DIFF PNL FLD: ABNORMAL
EPI CELLS # UR: SIGNIFICANT CHANGE UP
GLUCOSE SERPL-MCNC: 159 MG/DL — HIGH (ref 70–99)
GLUCOSE UR QL: NEGATIVE — SIGNIFICANT CHANGE UP
GRAM STN FLD: SIGNIFICANT CHANGE UP
HCT VFR BLD CALC: 25.1 % — LOW (ref 39–50)
HGB BLD-MCNC: 7.8 G/DL — LOW (ref 13–17)
HYALINE CASTS # UR AUTO: ABNORMAL (ref 0–7)
KETONES UR-MCNC: NEGATIVE — SIGNIFICANT CHANGE UP
LEUKOCYTE ESTERASE UR-ACNC: NEGATIVE — SIGNIFICANT CHANGE UP
MAGNESIUM SERPL-MCNC: 2.4 MG/DL — SIGNIFICANT CHANGE UP (ref 1.6–2.6)
MCHC RBC-ENTMCNC: 28.5 PG — SIGNIFICANT CHANGE UP (ref 27–34)
MCHC RBC-ENTMCNC: 31.1 GM/DL — LOW (ref 32–36)
MCV RBC AUTO: 91.6 FL — SIGNIFICANT CHANGE UP (ref 80–100)
NITRITE UR-MCNC: NEGATIVE — SIGNIFICANT CHANGE UP
NRBC # BLD: 0 /100 WBCS — SIGNIFICANT CHANGE UP
NRBC # FLD: 0 K/UL — SIGNIFICANT CHANGE UP
PH UR: 6 — SIGNIFICANT CHANGE UP (ref 5–8)
PHENOBARB SERPL-MCNC: 13.3 UG/ML — SIGNIFICANT CHANGE UP (ref 10–40)
PHOSPHATE SERPL-MCNC: 6.2 MG/DL — HIGH (ref 2.5–4.5)
PLATELET # BLD AUTO: 204 K/UL — SIGNIFICANT CHANGE UP (ref 150–400)
POTASSIUM SERPL-MCNC: 4.8 MMOL/L — SIGNIFICANT CHANGE UP (ref 3.5–5.3)
POTASSIUM SERPL-SCNC: 4.8 MMOL/L — SIGNIFICANT CHANGE UP (ref 3.5–5.3)
PROCALCITONIN SERPL-MCNC: 0.31 NG/ML — HIGH (ref 0.02–0.1)
PROT UR-MCNC: ABNORMAL
RBC # BLD: 2.74 M/UL — LOW (ref 4.2–5.8)
RBC # FLD: 14.9 % — HIGH (ref 10.3–14.5)
RBC CASTS # UR COMP ASSIST: SIGNIFICANT CHANGE UP /HPF (ref 0–4)
SODIUM SERPL-SCNC: 141 MMOL/L — SIGNIFICANT CHANGE UP (ref 135–145)
SP GR SPEC: 1.02 — SIGNIFICANT CHANGE UP (ref 1.01–1.02)
SPECIMEN SOURCE: SIGNIFICANT CHANGE UP
UROBILINOGEN FLD QL: SIGNIFICANT CHANGE UP
WBC # BLD: 17.52 K/UL — HIGH (ref 3.8–10.5)
WBC # FLD AUTO: 17.52 K/UL — HIGH (ref 3.8–10.5)
WBC UR QL: SIGNIFICANT CHANGE UP /HPF (ref 0–5)

## 2021-07-16 PROCEDURE — 71045 X-RAY EXAM CHEST 1 VIEW: CPT | Mod: 26

## 2021-07-16 PROCEDURE — 99291 CRITICAL CARE FIRST HOUR: CPT

## 2021-07-16 RX ORDER — ACETAMINOPHEN 500 MG
650 TABLET ORAL EVERY 6 HOURS
Refills: 0 | Status: DISCONTINUED | OUTPATIENT
Start: 2021-07-16 | End: 2021-07-20

## 2021-07-16 RX ADMIN — CHLORHEXIDINE GLUCONATE 15 MILLILITER(S): 213 SOLUTION TOPICAL at 05:05

## 2021-07-16 RX ADMIN — SENNA PLUS 5 MILLILITER(S): 8.6 TABLET ORAL at 21:10

## 2021-07-16 RX ADMIN — Medication 100 MILLIGRAM(S): at 05:06

## 2021-07-16 RX ADMIN — Medication 1 APPLICATION(S): at 05:27

## 2021-07-16 RX ADMIN — DIVALPROEX SODIUM 750 MILLIGRAM(S): 500 TABLET, DELAYED RELEASE ORAL at 17:40

## 2021-07-16 RX ADMIN — Medication 650 MILLIGRAM(S): at 06:31

## 2021-07-16 RX ADMIN — CHLORHEXIDINE GLUCONATE 1 APPLICATION(S): 213 SOLUTION TOPICAL at 05:07

## 2021-07-16 RX ADMIN — Medication 1 MILLIGRAM(S): at 13:05

## 2021-07-16 RX ADMIN — LEVETIRACETAM 1000 MILLIGRAM(S): 250 TABLET, FILM COATED ORAL at 05:07

## 2021-07-16 RX ADMIN — Medication 101 MILLIGRAM(S): at 18:39

## 2021-07-16 RX ADMIN — Medication 1 MILLIGRAM(S): at 05:06

## 2021-07-16 RX ADMIN — Medication 650 MILLIGRAM(S): at 06:35

## 2021-07-16 RX ADMIN — HEPARIN SODIUM 5000 UNIT(S): 5000 INJECTION INTRAVENOUS; SUBCUTANEOUS at 05:06

## 2021-07-16 RX ADMIN — HEPARIN SODIUM 5000 UNIT(S): 5000 INJECTION INTRAVENOUS; SUBCUTANEOUS at 13:05

## 2021-07-16 RX ADMIN — Medication 1 MILLIGRAM(S): at 21:10

## 2021-07-16 RX ADMIN — DIVALPROEX SODIUM 750 MILLIGRAM(S): 500 TABLET, DELAYED RELEASE ORAL at 05:07

## 2021-07-16 RX ADMIN — PANTOPRAZOLE SODIUM 40 MILLIGRAM(S): 20 TABLET, DELAYED RELEASE ORAL at 13:04

## 2021-07-16 RX ADMIN — LEVETIRACETAM 1000 MILLIGRAM(S): 250 TABLET, FILM COATED ORAL at 17:40

## 2021-07-16 RX ADMIN — Medication 1 APPLICATION(S): at 18:38

## 2021-07-16 RX ADMIN — HEPARIN SODIUM 5000 UNIT(S): 5000 INJECTION INTRAVENOUS; SUBCUTANEOUS at 21:10

## 2021-07-16 RX ADMIN — Medication 101 MILLIGRAM(S): at 05:06

## 2021-07-16 RX ADMIN — CHLORHEXIDINE GLUCONATE 15 MILLILITER(S): 213 SOLUTION TOPICAL at 17:40

## 2021-07-16 NOTE — PROGRESS NOTE ADULT - ASSESSMENT
HPI: 60 y/o male PMHx COVID+ (3/2021 - not hospitalized), PE (8/2020 - on Eliquis at home), CKD, w/ recent hospitalization at [facility] after seizure w/ lingual hematoma requiring emergency tracheostomy on 3/25/21 s/p Tracheal Resection and Reconstruction on 4/23 presented to ED in respiratory distress requiring emergent tracheostomy on 7/1and subsequent revision tracheostomy on 7/2. Post op course complicated by seizures and concern for anoxic brain injury.    PLAN:  Neurologic:  - Phenobarbital load w/ 700mg / 130mg q 12  - Depakote 750mg BID, Keppra 1000mg BID  - Klonopin 1mg TID for myoclonic jerks  - EEG significant for hyperexcitability and GPDs: cortical storming and stimulus myoclonus  - MRI Brain WNL, no anoxic injury    Respiratory:   - s/p bronch and trach revision 7/2  - Mechanical ventilation AC/CMV    Cardiovascular:   - Hemodynamically stable off vasopressors   - Labetalol 100 BID for hypertension     Gastrointestinal/Nutrition:   - Nepro tube feeds via NGT at goal  - Protonix daily for stress ulcer ppx  - Family requesting PEG placement, will f/u w/ CTS    Renal/Genitourinary:  - Higginbotham removed -> failed trial of void -> f/u Bladder scan  - Monitor intake & output  - Increasing sCr and new hyperkalemia s/p cocktail and 1 L bolus     Hematologic:  - Trend H/H  - c/w SQH for VTE prophylaxis    Infectious Disease:   - Sputum Cx  with pseudomonas  - Urine Cx with Citrobacter  - s/p course of Zosyn   - Increasing leukocytosis, low procalcitonin, continue to monitor    Tubes/Lines/Drains:   - NGT  - R PIV  - L PIV     IBW/ABW (admit): 52/60 (72)    Disposition: SICU   HPI: 60 y/o male PMHx COVID+ (3/2021 - not hospitalized), PE (8/2020 - on Eliquis at home), CKD, w/ recent hospitalization at [facility] after seizure w/ lingual hematoma requiring emergency tracheostomy on 3/25/21 s/p Tracheal Resection and Reconstruction on 4/23 presented to ED in respiratory distress requiring emergent tracheostomy on 7/1and subsequent revision tracheostomy on 7/2. Post op course complicated by seizures and concern for anoxic brain injury.    PLAN:  Neurologic:  - Phenobarbital load w/ 700mg / 130mg q 12  - Depakote 750mg BID, Keppra 1000mg BID  - Klonopin 1mg TID for myoclonic jerks  - EEG significant for hyperexcitability and GPDs: cortical storming and stimulus myoclonus  - MRI Brain WNL, no anoxic injury    Respiratory:   - s/p bronch and trach revision 7/2  - Mechanical ventilation AC/CMV    Cardiovascular:   - Hemodynamically stable off vasopressors   - Labetalol 100 BID for hypertension     Gastrointestinal/Nutrition:   - Nepro tube feeds via NGT at goal  - Protonix daily for stress ulcer ppx  - Family requesting PEG placement, will f/u w/ CTS  -     Renal/Genitourinary:  - Higginbotham removed -> failed trial of void -> f/u Bladder scan  - Monitor intake & output  - Increasing sCr and new hyperkalemia s/p cocktail and 1 L bolus     Hematologic:  - Trend H/H  - c/w SQH for VTE prophylaxis    Infectious Disease:   - Sputum Cx  with pseudomonas  - Urine Cx with Citrobacter  - s/p course of Zosyn   - Increasing leukocytosis, low procalcitonin, continue to monitor    Tubes/Lines/Drains:   - NGT  - R PIV  - L PIV     IBW/ABW (admit): 52/60 (72)    Disposition: SICU   HPI: 60 y/o male PMHx COVID+ (3/2021 - not hospitalized), PE (8/2020 - on Eliquis at home), CKD, w/ recent hospitalization at [facility] after seizure w/ lingual hematoma requiring emergency tracheostomy on 3/25/21 s/p Tracheal Resection and Reconstruction on 4/23 presented to ED in respiratory distress requiring emergent tracheostomy on 7/1and subsequent revision tracheostomy on 7/2. Post op course complicated by seizures and concern for anoxic brain injury.    PLAN:  Neurologic:  - Phenobarbital load w/ 700mg / 130mg q 12  - Depakote 750mg BID, Keppra 1000mg BID  - Klonopin 1mg TID for myoclonic jerks  - EEG significant for hyperexcitability and GPDs: cortical storming and stimulus myoclonus  - MRI Brain WNL, no anoxic injury  - change phenobarb to PO after levels higher  - if you see family, need to ask where to send/fax letters     Respiratory:   - s/p bronch and trach revision 7/2  - Mechanical ventilation AC/CMV    Cardiovascular:   - Hemodynamically stable off vasopressors   - Labetalol 100 BID for hypertension     Gastrointestinal/Nutrition:   - Nepro tube feeds via NGT at goal  - Protonix daily for stress ulcer ppx  - CTS ([facility]) willing to perform PEG  - Meds PO        Renal/Genitourinary:  - Higginbotham removed -> failed trial of void -> f/u Bladder scan  - Monitor intake & output  - Increasing sCr and new hyperkalemia s/p cocktail and 1 L bolus     Hematologic:  - Trend H/H  - c/w SQH for VTE prophylaxis    Infectious Disease:   - Sputum Cx  with pseudomonas  - Urine Cx with Citrobacter  - s/p course of Zosyn   - Increasing leukocytosis, low procalcitonin, continue to monitor  - f/u fever work up (b/c, urinalysis, sputum, CXR, pro-thea)    Tubes/Lines/Drains:   - NGT  - R PIV  - L PIV     IBW/ABW (admit): 52/60 (72)    Disposition: SICU

## 2021-07-16 NOTE — PROGRESS NOTE ADULT - SUBJECTIVE AND OBJECTIVE BOX
7/6: EEg notable for possible status epilepticus. Planned for MRI today. Pt noted to be febrile yesterday with tmax of 102.9.   7/7: naeon. cxr appears to be slightly worse in effusions. pending mri  7/8: negative blood cultures, plan for MRI today   7/9: NAEON - MRI with no significant intracranial pathology.  7/10: NAEON  7/11: naeon   7/12: naeon. GOC today  7/13: naeon. waiting [facility] duscission  7/14:  naeon. waiting [facility] duscission  7/15: NAEON. [facility]  7/16: per [facility] full measures until family arrives. to get peg    P/E  Sedated, 6LPC, trach sutured to skin, trach ties. no bleeding  Neck soft, flat    Vital Signs Last 24 Hrs  T(C): 38.6 (16 Jul 2021 04:00), Max: 38.6 (16 Jul 2021 04:00)  T(F): 101.4 (16 Jul 2021 04:00), Max: 101.4 (16 Jul 2021 04:00)  HR: 92 (16 Jul 2021 07:20) (90 - 110)  BP: 119/80 (16 Jul 2021 06:00) (88/60 - 148/95)  BP(mean): 89 (16 Jul 2021 06:00) (66 - 105)  RR: 18 (16 Jul 2021 06:00) (9 - 24)  SpO2: 99% (16 Jul 2021 07:20) (95% - 100%)    A/P: 62 y/o male s/p tracheal resection 4/23/21 presents to [facility] ER w c/o stridor and SOB. Difficulty breating is worse at night and prevents him from being able to sleep.  He denies productive cough or fever.  This is his 3rd readmission after his tracheal resection  PT has PMHx COVID+ (3/2021 - not hospitalized), PE (8/2020 - on eliquis at home), CKD, w/ recent hospitalization at [facility] after seizure w/ lingual hematoma requiring emergency tracheostomy on 3/25/21.  Patient underwent Tracheal Resection and Reconstruction on 4/23. ENT injected vocal cords few months ago. He was last discharged 5/18/21. s/p emergent cric 7/1 now s/p revision trach, flex bronch and removal of stent 7/2 with post-op course complicated by seizures  - GOC: full code for now. Family would like PEG placement and second opinion   - Patient has a critical airway, only ENT to manipulate airway  - Routine trach care by nursing  - Suction PRN  - Extra 6LPC and 4LPC at bedside  - Vent per SICU  - Tube feeds per SICU

## 2021-07-16 NOTE — PROGRESS NOTE ADULT - SUBJECTIVE AND OBJECTIVE BOX
SICU Daily Progress Note  =====================================================  Interval/Overnight Events:   - Loaded with phenobarbital  - Propofol discontinued  - Family would like PEG placement and second opinion     HPI: 62 y/o male PMHx COVID+ (3/2021 - not hospitalized), PE (8/2020 - on Eliquis at home), CKD, w/ recent hospitalization at [facility] after seizure w/ lingual hematoma requiring emergency tracheostomy on 3/25/21 s/p Tracheal Resection and Reconstruction on 4/23 presented to ED in respiratory distress requiring emergent tracheostomy on 7/1and subsequent revision tracheostomy on 7/2. Post op course complicated by seizures and concern for anoxic brain injury .    Allergies: No Known Allergies    MEDICATIONS:   --------------------------------------------------------------------------------------  Neurologic Medications  acetaminophen    Suspension .. 975 milliGRAM(s) Oral every 6 hours PRN Temp greater or equal to 38C (100.4F)  clonazePAM  Tablet 1 milliGRAM(s) Oral three times a day  diVALproex Sprinkle 750 milliGRAM(s) Oral two times a day  levETIRAcetam  Solution 1000 milliGRAM(s) Oral two times a day  PHENobarbital IVPB 130 milliGRAM(s) IV Intermittent every 12 hours  propofol Infusion 10 MICROgram(s)/kG/Min IV Continuous <Continuous>    Respiratory Medications  ALBUTerol    90 MICROgram(s) HFA Inhaler 2 Puff(s) Inhalation every 4 hours    Cardiovascular Medications  labetalol 100 milliGRAM(s) Oral every 12 hours    Gastrointestinal Medications  pantoprazole   Suspension 40 milliGRAM(s) Oral daily  senna Syrup 5 milliLiter(s) Oral at bedtime  sodium chloride 0.9% lock flush 10 milliLiter(s) IV Push every 1 hour PRN Pre/post blood products, medications, blood draw, and to maintain line patency    Genitourinary Medications    Hematologic/Oncologic Medications  heparin   Injectable 5000 Unit(s) SubCutaneous every 8 hours    Antimicrobial/Immunologic Medications    Endocrine/Metabolic Medications    Topical/Other Medications  chlorhexidine 0.12% Liquid 15 milliLiter(s) Oral Mucosa every 12 hours  chlorhexidine 4% Liquid 1 Application(s) Topical <User Schedule>  petrolatum Ophthalmic Ointment 1 Application(s) Both EYES two times a day    --------------------------------------------------------------------------------------  VITAL SIGNS, INS/OUTS (last 24 hours):  --------------------------------------------------------------------------------------  ICU Vital Signs Last 24 Hrs  T(C): 36.9 (15 Jul 2021 20:00), Max: 38.1 (15 Jul 2021 08:00)  T(F): 98.4 (15 Jul 2021 20:00), Max: 100.5 (15 Jul 2021 08:00)  HR: 110 (15 Jul 2021 23:42) (90 - 110)  BP: 108/67 (15 Jul 2021 22:00) (88/60 - 151/101)  BP(mean): 77 (15 Jul 2021 22:00) (66 - 110)  ABP: --  ABP(mean): --  RR: 24 (15 Jul 2021 22:00) (9 - 28)  SpO2: 96% (15 Jul 2021 23:42) (96% - 100%)  --------------------------------------------------------------------------------------    EXAM  NEUROLOGY  Exam: Sedated, not following commands, intermittent myoclonic jerks    HEENT  Exam: Normocephalic, atraumatic    RESPIRATORY  Exam: Lungs clear to auscultation, Normal expansion/effort.   Mechanical Ventilation: Mode: AC/ CMV (Assist Control/ Continuous Mandatory Ventilation), RR (machine): 18, TV (machine): 400, FiO2: 30, PEEP: 5, ITime: 0.94, MAP: 11, PIP: 24    CARDIOVASCULAR  Exam: S1, S2.  Regular rate and rhythm.     GI/NUTRITION  Exam: Abdomen soft, Non-tender, Non-distended.     VASCULAR  Exam: Extremities warm, pink, well-perfused.     SKIN  Exam: Good skin turgor, no skin breakdown.    METABOLIC/FLUIDS/ELECTROLYTES    HEMATOLOGIC  [x] VTE Prophylaxis: heparin   Injectable 5000 Unit(s) SubCutaneous every 8 hours    Transfusions:	[] PRBC	[] Platelets		[] FFP	[] Cryoprecipitate    INFECTIOUS DISEASE  Antimicrobials/Immunologic Medications:    Tubes/Lines/Drain  [x] Peripheral IV  [] Central Venous Line     	[] R	[] L	[] IJ	[] Fem	[] SC	Date Placed:   [] Arterial Line		[] R	[] L	[] Fem	[] Rad	[] Ax	Date Placed:   [] PICC		[] Midline		[] Mediport  [] Urinary Catheter		Date Placed:   [x] Necessity of urinary, arterial, and venous catheters discussed    LABS  --------------------------------------------------------------------------------------  ((Insert SICU Labs here))***  --------------------------------------------------------------------------------------    OTHER LABORATORY:     IMAGING STUDIES:   CXR:    SICU Daily Progress Note  =====================================================  Interval/Overnight Events:   - Need to confirm with family what address to send/fax letter to for travel and nursing home fund   - [facility] to do PEG  - Need to do KEIKO for nursing home    HPI: 62 y/o male PMHx COVID+ (3/2021 - not hospitalized), PE (8/2020 - on Eliquis at home), CKD, w/ recent hospitalization at [facility] after seizure w/ lingual hematoma requiring emergency tracheostomy on 3/25/21 s/p Tracheal Resection and Reconstruction on 4/23 presented to ED in respiratory distress requiring emergent tracheostomy on 7/1and subsequent revision tracheostomy on 7/2. Post op course complicated by seizures and concern for anoxic brain injury .    Allergies: No Known Allergies    MEDICATIONS:   --------------------------------------------------------------------------------------  Neurologic Medications  acetaminophen    Suspension .. 975 milliGRAM(s) Oral every 6 hours PRN Temp greater or equal to 38C (100.4F)  clonazePAM  Tablet 1 milliGRAM(s) Oral three times a day  diVALproex Sprinkle 750 milliGRAM(s) Oral two times a day  levETIRAcetam  Solution 1000 milliGRAM(s) Oral two times a day  PHENobarbital IVPB 130 milliGRAM(s) IV Intermittent every 12 hours  propofol Infusion 10 MICROgram(s)/kG/Min IV Continuous <Continuous>    Respiratory Medications  ALBUTerol    90 MICROgram(s) HFA Inhaler 2 Puff(s) Inhalation every 4 hours    Cardiovascular Medications  labetalol 100 milliGRAM(s) Oral every 12 hours    Gastrointestinal Medications  pantoprazole   Suspension 40 milliGRAM(s) Oral daily  senna Syrup 5 milliLiter(s) Oral at bedtime  sodium chloride 0.9% lock flush 10 milliLiter(s) IV Push every 1 hour PRN Pre/post blood products, medications, blood draw, and to maintain line patency    Genitourinary Medications    Hematologic/Oncologic Medications  heparin   Injectable 5000 Unit(s) SubCutaneous every 8 hours    Antimicrobial/Immunologic Medications    Endocrine/Metabolic Medications    Topical/Other Medications  chlorhexidine 0.12% Liquid 15 milliLiter(s) Oral Mucosa every 12 hours  chlorhexidine 4% Liquid 1 Application(s) Topical <User Schedule>  petrolatum Ophthalmic Ointment 1 Application(s) Both EYES two times a day    --------------------------------------------------------------------------------------  VITAL SIGNS, INS/OUTS (last 24 hours):  --------------------------------------------------------------------------------------  ICU Vital Signs Last 24 Hrs  T(C): 36.9 (15 Jul 2021 20:00), Max: 38.1 (15 Jul 2021 08:00)  T(F): 98.4 (15 Jul 2021 20:00), Max: 100.5 (15 Jul 2021 08:00)  HR: 110 (15 Jul 2021 23:42) (90 - 110)  BP: 108/67 (15 Jul 2021 22:00) (88/60 - 151/101)  BP(mean): 77 (15 Jul 2021 22:00) (66 - 110)  ABP: --  ABP(mean): --  RR: 24 (15 Jul 2021 22:00) (9 - 28)  SpO2: 96% (15 Jul 2021 23:42) (96% - 100%)  --------------------------------------------------------------------------------------    EXAM  NEUROLOGY  Exam: Sedated, not following commands, intermittent myoclonic jerks    HEENT  Exam: Normocephalic, atraumatic    RESPIRATORY  Exam: Lungs clear to auscultation, Normal expansion/effort.   Mechanical Ventilation: Mode: AC/ CMV (Assist Control/ Continuous Mandatory Ventilation), RR (machine): 18, TV (machine): 400, FiO2: 30, PEEP: 5, ITime: 0.94, MAP: 11, PIP: 24    CARDIOVASCULAR  Exam: S1, S2.  Regular rate and rhythm.     GI/NUTRITION  Exam: Abdomen soft, Non-tender, Non-distended.     VASCULAR  Exam: Extremities warm, pink, well-perfused.     SKIN  Exam: Good skin turgor, no skin breakdown.    METABOLIC/FLUIDS/ELECTROLYTES    HEMATOLOGIC  [x] VTE Prophylaxis: heparin   Injectable 5000 Unit(s) SubCutaneous every 8 hours    Transfusions:	[] PRBC	[] Platelets		[] FFP	[] Cryoprecipitate    INFECTIOUS DISEASE  Antimicrobials/Immunologic Medications:    Tubes/Lines/Drain  [x] Peripheral IV  [] Central Venous Line     	[] R	[] L	[] IJ	[] Fem	[] SC	Date Placed:   [] Arterial Line		[] R	[] L	[] Fem	[] Rad	[] Ax	Date Placed:   [] PICC		[] Midline		[] Mediport  [] Urinary Catheter		Date Placed:   [x] Necessity of urinary, arterial, and venous catheters discussed    LABS  --------------------------------------------------------------------------------------  ((Insert SICU Labs here))***  --------------------------------------------------------------------------------------    OTHER LABORATORY:     IMAGING STUDIES:   CXR:    SICU Daily Progress Note  =====================================================  Interval/Overnight Events:   - Need to confirm with family what address to send/fax letter to for travel and alf fund   - CT (Zeltsman) to do PEG  - Need to do KEIKO for nursing home  - Meds converted to PO  - Eventually change phenobarb to PO once levels higher  - f/u fever work up (b/c, urinalysis, sputum, CXR, pro-thea)    HPI: 60 y/o male PMHx COVID+ (3/2021 - not hospitalized), PE (8/2020 - on Eliquis at home), CKD, w/ recent hospitalization at [facility] after seizure w/ lingual hematoma requiring emergency tracheostomy on 3/25/21 s/p Tracheal Resection and Reconstruction on 4/23 presented to ED in respiratory distress requiring emergent tracheostomy on 7/1and subsequent revision tracheostomy on 7/2. Post op course complicated by seizures and concern for anoxic brain injury .    Allergies: No Known Allergies    MEDICATIONS:   --------------------------------------------------------------------------------------  Neurologic Medications  acetaminophen    Suspension .. 975 milliGRAM(s) Oral every 6 hours PRN Temp greater or equal to 38C (100.4F)  clonazePAM  Tablet 1 milliGRAM(s) Oral three times a day  diVALproex Sprinkle 750 milliGRAM(s) Oral two times a day  levETIRAcetam  Solution 1000 milliGRAM(s) Oral two times a day  PHENobarbital IVPB 130 milliGRAM(s) IV Intermittent every 12 hours  propofol Infusion 10 MICROgram(s)/kG/Min IV Continuous <Continuous>    Respiratory Medications  ALBUTerol    90 MICROgram(s) HFA Inhaler 2 Puff(s) Inhalation every 4 hours    Cardiovascular Medications  labetalol 100 milliGRAM(s) Oral every 12 hours    Gastrointestinal Medications  pantoprazole   Suspension 40 milliGRAM(s) Oral daily  senna Syrup 5 milliLiter(s) Oral at bedtime  sodium chloride 0.9% lock flush 10 milliLiter(s) IV Push every 1 hour PRN Pre/post blood products, medications, blood draw, and to maintain line patency    Genitourinary Medications    Hematologic/Oncologic Medications  heparin   Injectable 5000 Unit(s) SubCutaneous every 8 hours    Antimicrobial/Immunologic Medications    Endocrine/Metabolic Medications    Topical/Other Medications  chlorhexidine 0.12% Liquid 15 milliLiter(s) Oral Mucosa every 12 hours  chlorhexidine 4% Liquid 1 Application(s) Topical <User Schedule>  petrolatum Ophthalmic Ointment 1 Application(s) Both EYES two times a day    --------------------------------------------------------------------------------------  VITAL SIGNS, INS/OUTS (last 24 hours):  --------------------------------------------------------------------------------------  ICU Vital Signs Last 24 Hrs  T(C): 36.9 (15 Jul 2021 20:00), Max: 38.1 (15 Jul 2021 08:00)  T(F): 98.4 (15 Jul 2021 20:00), Max: 100.5 (15 Jul 2021 08:00)  HR: 110 (15 Jul 2021 23:42) (90 - 110)  BP: 108/67 (15 Jul 2021 22:00) (88/60 - 151/101)  BP(mean): 77 (15 Jul 2021 22:00) (66 - 110)  ABP: --  ABP(mean): --  RR: 24 (15 Jul 2021 22:00) (9 - 28)  SpO2: 96% (15 Jul 2021 23:42) (96% - 100%)  --------------------------------------------------------------------------------------    EXAM  NEUROLOGY  Exam: Sedated, not following commands, intermittent myoclonic jerks    HEENT  Exam: Normocephalic, atraumatic    RESPIRATORY  Exam: Lungs clear to auscultation, Normal expansion/effort.   Mechanical Ventilation: Mode: AC/ CMV (Assist Control/ Continuous Mandatory Ventilation), RR (machine): 18, TV (machine): 400, FiO2: 30, PEEP: 5, ITime: 0.94, MAP: 11, PIP: 24    CARDIOVASCULAR  Exam: S1, S2.  Regular rate and rhythm.     GI/NUTRITION  Exam: Abdomen soft, Non-tender, Non-distended.     VASCULAR  Exam: Extremities warm, pink, well-perfused.     SKIN  Exam: Good skin turgor, no skin breakdown.    METABOLIC/FLUIDS/ELECTROLYTES    HEMATOLOGIC  [x] VTE Prophylaxis: heparin   Injectable 5000 Unit(s) SubCutaneous every 8 hours    Transfusions:	[] PRBC	[] Platelets		[] FFP	[] Cryoprecipitate    INFECTIOUS DISEASE  Antimicrobials/Immunologic Medications:    Tubes/Lines/Drain  [x] Peripheral IV  [] Central Venous Line     	[] R	[] L	[] IJ	[] Fem	[] SC	Date Placed:   [] Arterial Line		[] R	[] L	[] Fem	[] Rad	[] Ax	Date Placed:   [] PICC		[] Midline		[] Mediport  [] Urinary Catheter		Date Placed:   [x] Necessity of urinary, arterial, and venous catheters discussed    LABS  --------------------------------------------------------------------------------------  ((Insert SICU Labs here))***  --------------------------------------------------------------------------------------    OTHER LABORATORY:     IMAGING STUDIES:   CXR:

## 2021-07-17 LAB
ANION GAP SERPL CALC-SCNC: 19 MMOL/L — HIGH (ref 7–14)
BUN SERPL-MCNC: 81 MG/DL — HIGH (ref 7–23)
CALCIUM SERPL-MCNC: 9.6 MG/DL — SIGNIFICANT CHANGE UP (ref 8.4–10.5)
CHLORIDE SERPL-SCNC: 108 MMOL/L — HIGH (ref 98–107)
CO2 SERPL-SCNC: 18 MMOL/L — LOW (ref 22–31)
CREAT SERPL-MCNC: 3.61 MG/DL — HIGH (ref 0.5–1.3)
GLUCOSE BLDC GLUCOMTR-MCNC: 142 MG/DL — HIGH (ref 70–99)
GLUCOSE BLDC GLUCOMTR-MCNC: 161 MG/DL — HIGH (ref 70–99)
GLUCOSE SERPL-MCNC: 148 MG/DL — HIGH (ref 70–99)
HCT VFR BLD CALC: 26.7 % — LOW (ref 39–50)
HGB BLD-MCNC: 8.1 G/DL — LOW (ref 13–17)
MAGNESIUM SERPL-MCNC: 2.8 MG/DL — HIGH (ref 1.6–2.6)
MCHC RBC-ENTMCNC: 28.1 PG — SIGNIFICANT CHANGE UP (ref 27–34)
MCHC RBC-ENTMCNC: 30.3 GM/DL — LOW (ref 32–36)
MCV RBC AUTO: 92.7 FL — SIGNIFICANT CHANGE UP (ref 80–100)
NRBC # BLD: 0 /100 WBCS — SIGNIFICANT CHANGE UP
NRBC # FLD: 0 K/UL — SIGNIFICANT CHANGE UP
PHENOBARB SERPL-MCNC: 18.2 UG/ML — SIGNIFICANT CHANGE UP (ref 10–40)
PHOSPHATE SERPL-MCNC: 6.1 MG/DL — HIGH (ref 2.5–4.5)
PLATELET # BLD AUTO: 227 K/UL — SIGNIFICANT CHANGE UP (ref 150–400)
POTASSIUM SERPL-MCNC: 4.8 MMOL/L — SIGNIFICANT CHANGE UP (ref 3.5–5.3)
POTASSIUM SERPL-SCNC: 4.8 MMOL/L — SIGNIFICANT CHANGE UP (ref 3.5–5.3)
RBC # BLD: 2.88 M/UL — LOW (ref 4.2–5.8)
RBC # FLD: 15.4 % — HIGH (ref 10.3–14.5)
SODIUM SERPL-SCNC: 145 MMOL/L — SIGNIFICANT CHANGE UP (ref 135–145)
WBC # BLD: 16.02 K/UL — HIGH (ref 3.8–10.5)
WBC # FLD AUTO: 16.02 K/UL — HIGH (ref 3.8–10.5)

## 2021-07-17 PROCEDURE — 99233 SBSQ HOSP IP/OBS HIGH 50: CPT

## 2021-07-17 RX ORDER — LABETALOL HCL 100 MG
100 TABLET ORAL EVERY 12 HOURS
Refills: 0 | Status: DISCONTINUED | OUTPATIENT
Start: 2021-07-17 | End: 2021-07-20

## 2021-07-17 RX ADMIN — CHLORHEXIDINE GLUCONATE 1 APPLICATION(S): 213 SOLUTION TOPICAL at 13:48

## 2021-07-17 RX ADMIN — Medication 1 MILLIGRAM(S): at 05:47

## 2021-07-17 RX ADMIN — Medication 1 APPLICATION(S): at 18:01

## 2021-07-17 RX ADMIN — Medication 101 MILLIGRAM(S): at 18:51

## 2021-07-17 RX ADMIN — Medication 1 APPLICATION(S): at 05:47

## 2021-07-17 RX ADMIN — HEPARIN SODIUM 5000 UNIT(S): 5000 INJECTION INTRAVENOUS; SUBCUTANEOUS at 21:46

## 2021-07-17 RX ADMIN — LEVETIRACETAM 1000 MILLIGRAM(S): 250 TABLET, FILM COATED ORAL at 18:51

## 2021-07-17 RX ADMIN — SENNA PLUS 5 MILLILITER(S): 8.6 TABLET ORAL at 21:46

## 2021-07-17 RX ADMIN — HEPARIN SODIUM 5000 UNIT(S): 5000 INJECTION INTRAVENOUS; SUBCUTANEOUS at 13:27

## 2021-07-17 RX ADMIN — DIVALPROEX SODIUM 750 MILLIGRAM(S): 500 TABLET, DELAYED RELEASE ORAL at 05:50

## 2021-07-17 RX ADMIN — PANTOPRAZOLE SODIUM 40 MILLIGRAM(S): 20 TABLET, DELAYED RELEASE ORAL at 13:27

## 2021-07-17 RX ADMIN — Medication 1 MILLIGRAM(S): at 21:46

## 2021-07-17 RX ADMIN — CHLORHEXIDINE GLUCONATE 15 MILLILITER(S): 213 SOLUTION TOPICAL at 05:49

## 2021-07-17 RX ADMIN — DIVALPROEX SODIUM 750 MILLIGRAM(S): 500 TABLET, DELAYED RELEASE ORAL at 18:50

## 2021-07-17 RX ADMIN — Medication 100 MILLIGRAM(S): at 18:50

## 2021-07-17 RX ADMIN — CHLORHEXIDINE GLUCONATE 15 MILLILITER(S): 213 SOLUTION TOPICAL at 18:01

## 2021-07-17 RX ADMIN — Medication 101 MILLIGRAM(S): at 05:50

## 2021-07-17 RX ADMIN — LEVETIRACETAM 1000 MILLIGRAM(S): 250 TABLET, FILM COATED ORAL at 05:50

## 2021-07-17 RX ADMIN — HEPARIN SODIUM 5000 UNIT(S): 5000 INJECTION INTRAVENOUS; SUBCUTANEOUS at 05:50

## 2021-07-17 RX ADMIN — Medication 1 MILLIGRAM(S): at 13:27

## 2021-07-17 NOTE — PROGRESS NOTE ADULT - SUBJECTIVE AND OBJECTIVE BOX
SICU Daily Progress Note  =====================================================  Interval/Overnight Events:      - Need to confirm with family what address to send/fax letter to for travel and [employer] fund   - CT (Zeltsman) to do PEG 7/19 (NPO Sunday night, type and screen, coags, COVID swab sunday)  - Need to do KEIKO for nursing home  - Meds converted to PO  - Eventually change phenobarb to PO once levels higher  - f/u fever work up (b/c, urinalysis, sputum, CXR, pro-thea)      HPI:   HPI: 60 y/o male PMHx COVID+ (3/2021 - not hospitalized), PE (8/2020 - on Eliquis at home), CKD, w/ recent hospitalization at [facility] after seizure w/ lingual hematoma requiring emergency tracheostomy on 3/25/21 s/p Tracheal Resection and Reconstruction on 4/23 presented to ED in respiratory distress requiring emergent tracheostomy on 7/1and subsequent revision tracheostomy on 7/2. Post op course complicated by seizures and concern for anoxic brain injury .    Allergies: No Known Allergies      MEDICATIONS:   --------------------------------------------------------------------------------------  Neurologic Medications  acetaminophen    Suspension .. 650 milliGRAM(s) Oral every 6 hours PRN Temp greater or equal to 38C (100.4F)  clonazePAM  Tablet 1 milliGRAM(s) Oral three times a day  diVALproex Sprinkle 750 milliGRAM(s) Oral two times a day  levETIRAcetam  Solution 1000 milliGRAM(s) Oral two times a day  PHENobarbital IVPB 130 milliGRAM(s) IV Intermittent every 12 hours  propofol Infusion 10 MICROgram(s)/kG/Min IV Continuous <Continuous>    Respiratory Medications  ALBUTerol    90 MICROgram(s) HFA Inhaler 2 Puff(s) Inhalation every 4 hours    Cardiovascular Medications  labetalol 100 milliGRAM(s) Oral every 12 hours    Gastrointestinal Medications  pantoprazole   Suspension 40 milliGRAM(s) Oral daily  senna Syrup 5 milliLiter(s) Oral at bedtime  sodium chloride 0.9% lock flush 10 milliLiter(s) IV Push every 1 hour PRN Pre/post blood products, medications, blood draw, and to maintain line patency    Genitourinary Medications    Hematologic/Oncologic Medications  heparin   Injectable 5000 Unit(s) SubCutaneous every 8 hours    Antimicrobial/Immunologic Medications    Endocrine/Metabolic Medications    Topical/Other Medications  chlorhexidine 0.12% Liquid 15 milliLiter(s) Oral Mucosa every 12 hours  chlorhexidine 4% Liquid 1 Application(s) Topical <User Schedule>  petrolatum Ophthalmic Ointment 1 Application(s) Both EYES two times a day    --------------------------------------------------------------------------------------    VITAL SIGNS, INS/OUTS (last 24 hours):  --------------------------------------------------------------------------------------  ICU Vital Signs Last 24 Hrs  T(C): 37.4 (17 Jul 2021 00:00), Max: 38.6 (16 Jul 2021 04:00)  T(F): 99.3 (17 Jul 2021 00:00), Max: 101.4 (16 Jul 2021 04:00)  HR: 96 (17 Jul 2021 01:00) (84 - 102)  BP: 129/93 (17 Jul 2021 01:00) (91/73 - 132/88)  BP(mean): 104 (17 Jul 2021 01:00) (71 - 104)  ABP: --  ABP(mean): --  RR: 18 (17 Jul 2021 01:00) (16 - 21)  SpO2: 98% (17 Jul 2021 01:00) (93% - 100%)    --------------------------------------------------------------------------------------    HEMATOLOGIC  [x] VTE Prophylaxis: heparin   Injectable 5000 Unit(s) SubCutaneous every 8 hours    Transfusions:	[] PRBC	[] Platelets		[] FFP	[] Cryoprecipitate    INFECTIOUS DISEASE  Antimicrobials/Immunologic Medications:    Day #      of     ***    Tubes/Lines/Drains  ***  [x] Peripheral IV  [] Central Venous Line     	[] R	[] L	[] IJ	[] Fem	[] SC	Date Placed:   [] Arterial Line		[] R	[] L	[] Fem	[] Rad	[] Ax	Date Placed:   [] PICC		[] Midline		[] Mediport  [] Urinary Catheter		Date Placed:   [x] Necessity of urinary, arterial, and venous catheters discussed    LABS  --------------------------------------------------------------------------------------  ((Insert SICU Labs here))***  --------------------------------------------------------------------------------------    EXAM  NEUROLOGY  Exam: Sedated, not following commands, intermittent myoclonic jerks    HEENT  Exam: Normocephalic, atraumatic    RESPIRATORY  Exam: Lungs clear to auscultation, Normal expansion/effort.   Mechanical Ventilation: Mode: AC/ CMV (Assist Control/ Continuous Mandatory Ventilation), RR (machine): 18, TV (machine): 400, FiO2: 30, PEEP: 5, ITime: 0.94, MAP: 11, PIP: 24    CARDIOVASCULAR  Exam: S1, S2.  Regular rate and rhythm.     GI/NUTRITION  Exam: Abdomen soft, Non-tender, Non-distended.     VASCULAR  Exam: Extremities warm, pink, well-perfused.     SKIN  Exam: Good skin turgor, no skin breakdown.      OTHER LABORATORY:     IMAGING STUDIES:     A/P:    PLAN:  Neurologic:  - Phenobarbital load w/ 700mg / 130mg q 12  - Depakote 750mg BID, Keppra 1000mg BID  - Klonopin 1mg TID for myoclonic jerks  - EEG significant for hyperexcitability and GPDs: cortical storming and stimulus myoclonus  - MRI Brain WNL, no anoxic injury  - change phenobarb to PO after levels higher  - if you see family, need to ask where to send/fax letters     Respiratory:   - s/p bronch and trach revision 7/2  - Mechanical ventilation AC/CMV    Cardiovascular:   - Hemodynamically stable off vasopressors   - Labetalol 100 BID for hypertension     Gastrointestinal/Nutrition:   - Nepro tube feeds via NGT at goal  - Protonix daily for stress ulcer ppx  - CTS (Zeltsman) willing to perform PEG  - Meds PO    Renal/Genitourinary:  - Higginbotham removed -> failed trial of void -> f/u Bladder scan  - Monitor intake & output    Hematologic:  - Trend H/H  - c/w SQH for VTE prophylaxis    Infectious Disease:   - Sputum Cx  with pseudomonas  - Urine Cx with Citrobacter  - s/p course of Zosyn   - Increasing leukocytosis, low procalcitonin, continue to monitor  - f/u fever work up (b/c, urinalysis, sputum, CXR, pro-thea)    Tubes/Lines/Drains:   - NGT  - R PIV  - L PIV     IBW/ABW (admit): 52/60 (72)    Disposition: SICU

## 2021-07-17 NOTE — PROGRESS NOTE ADULT - SUBJECTIVE AND OBJECTIVE BOX
7/6: EEg notable for possible status epilepticus. Planned for MRI today. Pt noted to be febrile yesterday with tmax of 102.9.   7/7: naeon. cxr appears to be slightly worse in effusions. pending mri  7/8: negative blood cultures, plan for MRI today   7/9: NAEON - MRI with no significant intracranial pathology.  7/10: NAEON  7/11: naeon   7/12: naeon. GOC today  7/13: naeon. waiting [facility] duscission  7/14:  naeon. waiting [facility] duscission  7/15: NAEON. [facility]  7/16: per [facility] full measures until family arrives. to get peg  7/17: NAEON, febrile overnight    P/E  Sedated, 6LPC, trach sutured to skin, trach ties. no bleeding  Neck soft, flat    Vital Signs Last 24 Hrs  T(C): 37.9 (17 Jul 2021 04:00), Max: 37.9 (16 Jul 2021 08:00)  T(F): 100.2 (17 Jul 2021 04:00), Max: 100.2 (16 Jul 2021 08:00)  HR: 96 (17 Jul 2021 07:00) (84 - 107)  BP: 108/79 (17 Jul 2021 07:00) (91/73 - 136/89)  BP(mean): 89 (17 Jul 2021 07:00) (71 - 104)  RR: 18 (17 Jul 2021 07:00) (16 - 19)  SpO2: 99% (17 Jul 2021 07:00) (93% - 100%)        A/P: 60 y/o male s/p tracheal resection 4/23/21 presents to [facility] ER w c/o stridor and SOB. Difficulty breating is worse at night and prevents him from being able to sleep.  He denies productive cough or fever.  This is his 3rd readmission after his tracheal resection  PT has PMHx COVID+ (3/2021 - not hospitalized), PE (8/2020 - on eliquis at home), CKD, w/ recent hospitalization at [facility] after seizure w/ lingual hematoma requiring emergency tracheostomy on 3/25/21.  Patient underwent Tracheal Resection and Reconstruction on 4/23. ENT injected vocal cords few months ago. He was last discharged 5/18/21. s/p emergent cric 7/1 now s/p revision trach, flex bronch and removal of stent 7/2 with post-op course complicated by seizures  - GOC: full code for now. Family would like PEG placement and second opinion   - Plan for PEG monday   - Patient has a critical airway, only ENT to manipulate airway  - Routine trach care by nursing  - Suction PRN  - Extra 6LPC and 4LPC at bedside  - Vent per SICU  - Tube feeds per SICU

## 2021-07-18 ENCOUNTER — TRANSCRIPTION ENCOUNTER (OUTPATIENT)
Age: 61
End: 2021-07-18

## 2021-07-18 LAB
-  AMIKACIN: SIGNIFICANT CHANGE UP
-  AMIKACIN: SIGNIFICANT CHANGE UP
-  AZTREONAM: SIGNIFICANT CHANGE UP
-  AZTREONAM: SIGNIFICANT CHANGE UP
-  CEFEPIME: SIGNIFICANT CHANGE UP
-  CEFEPIME: SIGNIFICANT CHANGE UP
-  CEFTAZIDIME: SIGNIFICANT CHANGE UP
-  CEFTAZIDIME: SIGNIFICANT CHANGE UP
-  CIPROFLOXACIN: SIGNIFICANT CHANGE UP
-  CIPROFLOXACIN: SIGNIFICANT CHANGE UP
-  GENTAMICIN: SIGNIFICANT CHANGE UP
-  GENTAMICIN: SIGNIFICANT CHANGE UP
-  IMIPENEM: SIGNIFICANT CHANGE UP
-  IMIPENEM: SIGNIFICANT CHANGE UP
-  LEVOFLOXACIN: SIGNIFICANT CHANGE UP
-  LEVOFLOXACIN: SIGNIFICANT CHANGE UP
-  MEROPENEM: SIGNIFICANT CHANGE UP
-  MEROPENEM: SIGNIFICANT CHANGE UP
-  PIPERACILLIN/TAZOBACTAM: SIGNIFICANT CHANGE UP
-  PIPERACILLIN/TAZOBACTAM: SIGNIFICANT CHANGE UP
-  TOBRAMYCIN: SIGNIFICANT CHANGE UP
-  TOBRAMYCIN: SIGNIFICANT CHANGE UP
ANION GAP SERPL CALC-SCNC: 19 MMOL/L — HIGH (ref 7–14)
ANION GAP SERPL CALC-SCNC: 19 MMOL/L — HIGH (ref 7–14)
BUN SERPL-MCNC: 87 MG/DL — HIGH (ref 7–23)
BUN SERPL-MCNC: 91 MG/DL — HIGH (ref 7–23)
CALCIUM SERPL-MCNC: 9.6 MG/DL — SIGNIFICANT CHANGE UP (ref 8.4–10.5)
CALCIUM SERPL-MCNC: 9.9 MG/DL — SIGNIFICANT CHANGE UP (ref 8.4–10.5)
CHLORIDE SERPL-SCNC: 111 MMOL/L — HIGH (ref 98–107)
CHLORIDE SERPL-SCNC: 113 MMOL/L — HIGH (ref 98–107)
CO2 SERPL-SCNC: 18 MMOL/L — LOW (ref 22–31)
CO2 SERPL-SCNC: 18 MMOL/L — LOW (ref 22–31)
CREAT SERPL-MCNC: 3.47 MG/DL — HIGH (ref 0.5–1.3)
CREAT SERPL-MCNC: 3.53 MG/DL — HIGH (ref 0.5–1.3)
CULTURE RESULTS: SIGNIFICANT CHANGE UP
GLUCOSE SERPL-MCNC: 137 MG/DL — HIGH (ref 70–99)
GLUCOSE SERPL-MCNC: 162 MG/DL — HIGH (ref 70–99)
HCT VFR BLD CALC: 25.2 % — LOW (ref 39–50)
HGB BLD-MCNC: 7.7 G/DL — LOW (ref 13–17)
MAGNESIUM SERPL-MCNC: 2.9 MG/DL — HIGH (ref 1.6–2.6)
MAGNESIUM SERPL-MCNC: 2.9 MG/DL — HIGH (ref 1.6–2.6)
MCHC RBC-ENTMCNC: 28.4 PG — SIGNIFICANT CHANGE UP (ref 27–34)
MCHC RBC-ENTMCNC: 30.6 GM/DL — LOW (ref 32–36)
MCV RBC AUTO: 93 FL — SIGNIFICANT CHANGE UP (ref 80–100)
METHOD TYPE: SIGNIFICANT CHANGE UP
METHOD TYPE: SIGNIFICANT CHANGE UP
NRBC # BLD: 0 /100 WBCS — SIGNIFICANT CHANGE UP
NRBC # FLD: 0 K/UL — SIGNIFICANT CHANGE UP
ORGANISM # SPEC MICROSCOPIC CNT: SIGNIFICANT CHANGE UP
PHOSPHATE SERPL-MCNC: 4.7 MG/DL — HIGH (ref 2.5–4.5)
PHOSPHATE SERPL-MCNC: 5.4 MG/DL — HIGH (ref 2.5–4.5)
PLATELET # BLD AUTO: 220 K/UL — SIGNIFICANT CHANGE UP (ref 150–400)
POTASSIUM SERPL-MCNC: 4.7 MMOL/L — SIGNIFICANT CHANGE UP (ref 3.5–5.3)
POTASSIUM SERPL-MCNC: 4.7 MMOL/L — SIGNIFICANT CHANGE UP (ref 3.5–5.3)
POTASSIUM SERPL-SCNC: 4.7 MMOL/L — SIGNIFICANT CHANGE UP (ref 3.5–5.3)
POTASSIUM SERPL-SCNC: 4.7 MMOL/L — SIGNIFICANT CHANGE UP (ref 3.5–5.3)
RBC # BLD: 2.71 M/UL — LOW (ref 4.2–5.8)
RBC # FLD: 15.7 % — HIGH (ref 10.3–14.5)
SARS-COV-2 RNA SPEC QL NAA+PROBE: SIGNIFICANT CHANGE UP
SODIUM SERPL-SCNC: 148 MMOL/L — HIGH (ref 135–145)
SODIUM SERPL-SCNC: 150 MMOL/L — HIGH (ref 135–145)
SPECIMEN SOURCE: SIGNIFICANT CHANGE UP
WBC # BLD: 18.18 K/UL — HIGH (ref 3.8–10.5)
WBC # FLD AUTO: 18.18 K/UL — HIGH (ref 3.8–10.5)

## 2021-07-18 PROCEDURE — 71045 X-RAY EXAM CHEST 1 VIEW: CPT | Mod: 26

## 2021-07-18 PROCEDURE — 99233 SBSQ HOSP IP/OBS HIGH 50: CPT

## 2021-07-18 RX ORDER — CEFEPIME 1 G/1
1000 INJECTION, POWDER, FOR SOLUTION INTRAMUSCULAR; INTRAVENOUS EVERY 24 HOURS
Refills: 0 | Status: COMPLETED | OUTPATIENT
Start: 2021-07-18 | End: 2021-07-22

## 2021-07-18 RX ADMIN — Medication 1 MILLIGRAM(S): at 16:31

## 2021-07-18 RX ADMIN — LEVETIRACETAM 1000 MILLIGRAM(S): 250 TABLET, FILM COATED ORAL at 17:12

## 2021-07-18 RX ADMIN — Medication 100 MILLIGRAM(S): at 17:14

## 2021-07-18 RX ADMIN — LEVETIRACETAM 1000 MILLIGRAM(S): 250 TABLET, FILM COATED ORAL at 05:04

## 2021-07-18 RX ADMIN — CHLORHEXIDINE GLUCONATE 1 APPLICATION(S): 213 SOLUTION TOPICAL at 05:04

## 2021-07-18 RX ADMIN — Medication 1 APPLICATION(S): at 17:23

## 2021-07-18 RX ADMIN — CEFEPIME 100 MILLIGRAM(S): 1 INJECTION, POWDER, FOR SOLUTION INTRAMUSCULAR; INTRAVENOUS at 17:13

## 2021-07-18 RX ADMIN — Medication 101 MILLIGRAM(S): at 05:03

## 2021-07-18 RX ADMIN — CHLORHEXIDINE GLUCONATE 15 MILLILITER(S): 213 SOLUTION TOPICAL at 05:02

## 2021-07-18 RX ADMIN — CHLORHEXIDINE GLUCONATE 15 MILLILITER(S): 213 SOLUTION TOPICAL at 17:23

## 2021-07-18 RX ADMIN — HEPARIN SODIUM 5000 UNIT(S): 5000 INJECTION INTRAVENOUS; SUBCUTANEOUS at 16:00

## 2021-07-18 RX ADMIN — PANTOPRAZOLE SODIUM 40 MILLIGRAM(S): 20 TABLET, DELAYED RELEASE ORAL at 13:50

## 2021-07-18 RX ADMIN — Medication 1 MILLIGRAM(S): at 23:52

## 2021-07-18 RX ADMIN — Medication 1 APPLICATION(S): at 05:04

## 2021-07-18 RX ADMIN — Medication 1 MILLIGRAM(S): at 05:03

## 2021-07-18 RX ADMIN — Medication 101 MILLIGRAM(S): at 17:12

## 2021-07-18 RX ADMIN — HEPARIN SODIUM 5000 UNIT(S): 5000 INJECTION INTRAVENOUS; SUBCUTANEOUS at 05:03

## 2021-07-18 RX ADMIN — HEPARIN SODIUM 5000 UNIT(S): 5000 INJECTION INTRAVENOUS; SUBCUTANEOUS at 23:10

## 2021-07-18 RX ADMIN — DIVALPROEX SODIUM 750 MILLIGRAM(S): 500 TABLET, DELAYED RELEASE ORAL at 05:02

## 2021-07-18 RX ADMIN — DIVALPROEX SODIUM 750 MILLIGRAM(S): 500 TABLET, DELAYED RELEASE ORAL at 17:13

## 2021-07-18 NOTE — PROGRESS NOTE ADULT - SUBJECTIVE AND OBJECTIVE BOX
7/6: EEg notable for possible status epilepticus. Planned for MRI today. Pt noted to be febrile yesterday with tmax of 102.9.   7/7: naeon. cxr appears to be slightly worse in effusions. pending mri  7/8: negative blood cultures, plan for MRI today   7/9: NAEON - MRI with no significant intracranial pathology.  7/10: NAEON  7/11: naeon   7/12: naeon. GOC today  7/13: naeon. waiting [facility] duscission  7/14:  naeon. waiting [facility] duscission  7/15: NAEON. goc  7/16: per [facility] full measures until family arrives. to get peg  7/17: NAEON, febrile overnight  7/18: NAEON - plan for PEG sergio    ICU Vital Signs Last 24 Hrs  T(C): 37.3 (18 Jul 2021 04:00), Max: 37.7 (17 Jul 2021 16:00)  T(F): 99.2 (18 Jul 2021 04:00), Max: 99.8 (17 Jul 2021 16:00)  HR: 88 (18 Jul 2021 09:00) (87 - 99)  BP: 112/75 (18 Jul 2021 09:00) (91/68 - 141/93)  BP(mean): 87 (18 Jul 2021 09:00) (77 - 107)  ABP: --  ABP(mean): --  RR: 18 (18 Jul 2021 09:00) (17 - 18)  SpO2: 99% (18 Jul 2021 09:00) (96% - 100%)      P/E  Sedated, 6LPC, trach sutured to skin, trach ties. no bleeding  Neck soft, flat          A/P: 60 y/o male s/p tracheal resection 4/23/21 presents to [facility] ER w c/o stridor and SOB. Difficulty breating is worse at night and prevents him from being able to sleep.  He denies productive cough or fever.  This is his 3rd readmission after his tracheal resection  PT has PMHx COVID+ (3/2021 - not hospitalized), PE (8/2020 - on eliquis at home), CKD, w/ recent hospitalization at [facility] after seizure w/ lingual hematoma requiring emergency tracheostomy on 3/25/21.  Patient underwent Tracheal Resection and Reconstruction on 4/23. ENT injected vocal cords few months ago. He was last discharged 5/18/21. s/p emergent cric 7/1 now s/p revision trach, flex bronch and removal of stent 7/2 with post-op course complicated by seizures  - GOC: full code for now. Family would like PEG placement and second opinion   - Plan for PEG monday   - Patient has a critical airway, only ENT to manipulate airway  - Routine trach care by nursing  - Suction PRN  - Extra 6LPC and 4LPC at bedside  - Vent per SICU  - Tube feeds per SICU

## 2021-07-18 NOTE — PROGRESS NOTE ADULT - SUBJECTIVE AND OBJECTIVE BOX
SICU Daily Progress Note  =====================================================  Interval/Overnight Events:     .  - Need to confirm with family what address to send/fax letter to for travel and halfway fund   - CT ([facility]) to do PEG 7/19 (NPO Sunday night, type and screen, coags, COVID swab sunday)  - Need to do KE[facility]KO for nursing home  - Meds converted to PO  - Eventually change phenobarb to PO once levels higher  - f/u fever work up (b/c, urinalysis, sputum, CXR, pro-thea)  - Off of propofol      HPI:  HPI: 62 y/o male PMHx COVID+ (3/2021 - not hospitalized), PE (8/2020 - on Eliquis at home), CKD, w/ recent hospitalization at [facility] after seizure w/ lingual hematoma requiring emergency tracheostomy on 3/25/21 s/p Tracheal Resection and Reconstruction on 4/23 presented to ED in respiratory distress requiring emergent tracheostomy on 7/1and subsequent revision tracheostomy on 7/2. Post op course complicated by seizures and concern for anoxic brain injury .    IMAGING STUDIES:       Allergies: No Known Allergies      MEDICATIONS:   --------------------------------------------------------------------------------------  Neurologic Medications  acetaminophen    Suspension .. 650 milliGRAM(s) Oral every 6 hours PRN Temp greater or equal to 38C (100.4F)  clonazePAM  Tablet 1 milliGRAM(s) Oral three times a day  diVALproex Sprinkle 750 milliGRAM(s) Oral two times a day  levETIRAcetam  Solution 1000 milliGRAM(s) Oral two times a day  PHENobarbital IVPB 130 milliGRAM(s) IV Intermittent every 12 hours    Respiratory Medications  ALBUTerol    90 MICROgram(s) HFA Inhaler 2 Puff(s) Inhalation every 4 hours    Cardiovascular Medications  labetalol 100 milliGRAM(s) Oral every 12 hours    Gastrointestinal Medications  pantoprazole   Suspension 40 milliGRAM(s) Oral daily  senna Syrup 5 milliLiter(s) Oral at bedtime  sodium chloride 0.9% lock flush 10 milliLiter(s) IV Push every 1 hour PRN Pre/post blood products, medications, blood draw, and to maintain line patency    Genitourinary Medications    Hematologic/Oncologic Medications  heparin   Injectable 5000 Unit(s) SubCutaneous every 8 hours    Antimicrobial/Immunologic Medications    Endocrine/Metabolic Medications    Topical/Other Medications  chlorhexidine 0.12% Liquid 15 milliLiter(s) Oral Mucosa every 12 hours  chlorhexidine 4% Liquid 1 Application(s) Topical <User Schedule>  petrolatum Ophthalmic Ointment 1 Application(s) Both EYES two times a day    --------------------------------------------------------------------------------------    VITAL SIGNS, INS/OUTS (last 24 hours):  --------------------------------------------------------------------------------------  ICU Vital Signs Last 24 Hrs  T(C): 37.3 (18 Jul 2021 04:00), Max: 37.7 (17 Jul 2021 08:00)  T(F): 99.2 (18 Jul 2021 04:00), Max: 99.9 (17 Jul 2021 08:00)  HR: 90 (18 Jul 2021 05:00) (87 - 99)  BP: 106/76 (18 Jul 2021 05:00) (91/68 - 141/93)  BP(mean): 89 (18 Jul 2021 05:00) (77 - 107)  ABP: --  ABP(mean): --  RR: 18 (18 Jul 2021 05:00) (17 - 18)  SpO2: 99% (18 Jul 2021 05:00) (96% - 100%)    --------------------------------------------------------------------------------------    EXAM  NEUROLOGY  Exam: Sedated, not following commands, intermittent myoclonic jerks    HEENT  Exam: Normocephalic, atraumatic    RESPIRATORY  Exam: Lungs clear to auscultation, Normal expansion/effort.   Mechanical Ventilation: Mode: AC/ CMV (Assist Control/ Continuous Mandatory Ventilation), RR (machine): 18, TV (machine): 400, FiO2: 30, PEEP: 5, ITime: 0.94, MAP: 11, PIP: 24    CARDIOVASCULAR  Exam: S1, S2.  Regular rate and rhythm.     GI/NUTRITION  Exam: Abdomen soft, Non-tender, Non-distended.     VASCULAR  Exam: Extremities warm, pink, well-perfused.     SKIN  Exam: Good skin turgor, no skin breakdown.      OTHER LABORATORY:     IMAGING STUDIES:         METABOLIC/FLUIDS/ELECTROLYTES      HEMATOLOGIC  [x] VTE Prophylaxis: heparin   Injectable 5000 Unit(s) SubCutaneous every 8 hours    Transfusions:	[] PRBC	[] Platelets		[] FFP	[] Cryoprecipitate    INFECTIOUS DISEASE  Antimicrobials/Immunologic Medications:    Day #      of     ***    Tubes/Lines/Drains  ***  [x] Peripheral IV  [] Central Venous Line     	[] R	[] L	[] IJ	[] Fem	[] SC	Date Placed:   [] Arterial Line		[] R	[] L	[] Fem	[] Rad	[] Ax	Date Placed:   [] PICC		[] Midline		[] Mediport  [] Urinary Catheter		Date Placed:   [x] Necessity of urinary, arterial, and venous catheters discussed    LABS  --------------------------------------------------------------------------------------  ((Insert SICU Labs here))***  --------------------------------------------------------------------------------------    OTHER LABORATORY:     IMAGING STUDIES:   CXR:       PLAN:  Neurologic:  - Phenobarbital load w/ 700mg / 130mg q 12  - Depakote 750mg BID, Keppra 1000mg BID  - Klonopin 1mg TID for myoclonic jerks  - EEG significant for hyperexcitability and GPDs: cortical storming and stimulus myoclonus  - MRI Brain WNL, no anoxic injury  - change phenobarb to PO after levels higher  - if you see family, need to ask where to send/fax letters     Respiratory:   - s/p bronch and trach revision 7/2  - Mechanical ventilation AC/CMV    Cardiovascular:   - Hemodynamically stable off vasopressors   - Labetalol 100 BID for hypertension     Gastrointestinal/Nutrition:   - Nepro tube feeds via NGT at goal  - Protonix daily for stress ulcer ppx  - CTS ([facility]) willing to perform PEG  - Meds PO    Renal/Genitourinary:  - Higginbotham removed -> failed trial of void -> f/u Bladder scan  - Monitor intake & output    Hematologic:  - Trend H/H  - c/w SQH for VTE prophylaxis    Infectious Disease:   - Sputum Cx  with pseudomonas  - Urine Cx with Citrobacter  - s/p course of Zosyn   - Increasing leukocytosis, low procalcitonin, continue to monitor  - f/u fever work up (b/c, urinalysis, sputum, CXR, pro-thea)    Tubes/Lines/Drains:   - NGT  - R PIV  - L PIV     IBW/ABW (admit): 52/60 (72)    Disposition: SICU       SICU Daily Progress Note  =====================================================  Interval/Overnight Events:     .  - Need to confirm with family what address to send/fax letter to for travel and jail fund   - CT ([facility]) to do PEG  (NPO  night, type and screen, coags, COVID swab )  - Need to do KEIKO for nursing home  - Meds converted to PO  - Eventually change phenobarb to PO once levels higher  - f/u fever work up (b/c, urinalysis, sputum, CXR, pro-thea)  - Off of propofol      HPI:  HPI: 62 y/o male PMHx COVID+ (3/2021 - not hospitalized), PE (2020 - on Eliquis at home), CKD, w/ recent hospitalization at [facility] after seizure w/ lingual hematoma requiring emergency tracheostomy on 3/25/21 s/p Tracheal Resection and Reconstruction on  presented to ED in respiratory distress requiring emergent tracheostomy on  subsequent revision tracheostomy on . Post op course complicated by seizures and concern for anoxic brain injury .    IMAGING STUDIES:       Allergies: No Known Allergies      MEDICATIONS:   --------------------------------------------------------------------------------------  Neurologic Medications  acetaminophen    Suspension .. 650 milliGRAM(s) Oral every 6 hours PRN Temp greater or equal to 38C (100.4F)  clonazePAM  Tablet 1 milliGRAM(s) Oral three times a day  diVALproex Sprinkle 750 milliGRAM(s) Oral two times a day  levETIRAcetam  Solution 1000 milliGRAM(s) Oral two times a day  PHENobarbital IVPB 130 milliGRAM(s) IV Intermittent every 12 hours    Respiratory Medications  ALBUTerol    90 MICROgram(s) HFA Inhaler 2 Puff(s) Inhalation every 4 hours    Cardiovascular Medications  labetalol 100 milliGRAM(s) Oral every 12 hours    Gastrointestinal Medications  pantoprazole   Suspension 40 milliGRAM(s) Oral daily  senna Syrup 5 milliLiter(s) Oral at bedtime  sodium chloride 0.9% lock flush 10 milliLiter(s) IV Push every 1 hour PRN Pre/post blood products, medications, blood draw, and to maintain line patency    Genitourinary Medications    Hematologic/Oncologic Medications  heparin   Injectable 5000 Unit(s) SubCutaneous every 8 hours    Antimicrobial/Immunologic Medications    Endocrine/Metabolic Medications    Topical/Other Medications  chlorhexidine 0.12% Liquid 15 milliLiter(s) Oral Mucosa every 12 hours  chlorhexidine 4% Liquid 1 Application(s) Topical <User Schedule>  petrolatum Ophthalmic Ointment 1 Application(s) Both EYES two times a day    --------------------------------------------------------------------------------------    VITAL SIGNS, INS/OUTS (last 24 hours):  --------------------------------------------------------------------------------------  ICU Vital Signs Last 24 Hrs  T(C): 37.3 (2021 04:00), Max: 37.7 (2021 08:00)  T(F): 99.2 (2021 04:00), Max: 99.9 (2021 08:00)  HR: 90 (2021 05:00) (87 - 99)  BP: 106/76 (2021 05:00) (91/68 - 141/93)  BP(mean): 89 (2021 05:00) (77 - 107)  ABP: --  ABP(mean): --  RR: 18 (2021 05:00) (17 - 18)  SpO2: 99% (2021 05:00) (96% - 100%)    --------------------------------------------------------------------------------------    EXAM  NEUROLOGY  Exam: Sedated, not following commands, intermittent myoclonic jerks    HEENT  Exam: Normocephalic, atraumatic    RESPIRATORY  Exam: Lungs clear to auscultation, Normal expansion/effort.   Mechanical Ventilation: Mode: AC/ CMV (Assist Control/ Continuous Mandatory Ventilation), RR (machine): 18, TV (machine): 400, FiO2: 30, PEEP: 5, ITime: 0.94, MAP: 11, PIP: 24    CARDIOVASCULAR  Exam: S1, S2.  Regular rate and rhythm.     GI/NUTRITION  Exam: Abdomen soft, Non-tender, Non-distended.     VASCULAR  Exam: Extremities warm, pink, well-perfused.     SKIN  Exam: Good skin turgor, no skin breakdown.      OTHER LABORATORY:     IMAGING STUDIES:         METABOLIC/FLUIDS/ELECTROLYTES      HEMATOLOGIC  [x] VTE Prophylaxis: heparin   Injectable 5000 Unit(s) SubCutaneous every 8 hours    Transfusions:	[] PRBC	[] Platelets		[] FFP	[] Cryoprecipitate    INFECTIOUS DISEASE  Antimicrobials/Immunologic Medications:  LABS  --------------------------------------------------------------------------------------  CBC ( @ 03:04)                          7.7<L>                   18.18<H>  )--------------(  220        --    % Neuts, --    % Lymphs, ANC: --                              25.2<L>  CBC ( @ 03:16)                          8.1<L>                   16.02<H>  )--------------(  227        --    % Neuts, --    % Lymphs, ANC: --                              26.7<L>    BMP ( @ 03:04)       148<H>  |  111<H>  |  87<H> 			Ca++ --      Ca 9.6          ---------------------------------( 137<H>		Mg 2.90<H>       4.7     |  18<L>   |  3.53<H>			Ph 5.4<H>  BMP ( @ 03:16)       145     |  108<H>  |  81<H> 			Ca++ --      Ca 9.6          ---------------------------------( 148<H>		Mg 2.80<H>       4.8     |  18<L>   |  3.61<H>			Ph 6.1<H>              Urinalysis ( @ 08:29):     Color: Light Yellow / Appearance: Clear / S.018 / pH: 6.0 / Gluc: Negative / Ketones: Negative / Bili: Negative / Urobili: <2 mg/dL / Protein :100 mg/dL<!> / Nitrites: Negative / Leuk.Est: Negative / RBC: 2-5 / WBC: 2-5 / Sq Epi:  / Non Sq Epi: Occasional / Bacteria      Urinalysis ( @ 07:57):     Color: Light Yellow / Appearance: Clear / S.017 / pH: 6.0 / Gluc: Negative / Ketones: Negative / Bili: Negative / Urobili: <2 mg/dL / Protein :30 mg/dL<!> / Nitrites: Negative / Leuk.Est: Negative / RBC: 6<H> / WBC: 3 / Sq Epi:  / Non Sq Epi: 1 / Bacteria Few<!>       -> .Blood Blood-Venous Culture ( @ 10:19)     NG    NG    No growth to date.    -> .Sputum Sputum trap Culture ( @ 09:00)       Few polymorphonuclear leukocytes per low power field  No Squamous epithelial cells per low power field  Rare Gram Variable Rods per oil power field    Pseudomonas aeruginosa  Pseudomonas aeruginosa    Moderate Pseudomonas aeruginosa  Moderate Pseudomonas aeruginosa #2  Normal Respiratory Taylor absent      --------------------------------------------------------------------------------------      PLAN:  Neurologic:  - Phenobarbital load w/ 700mg / 130mg q 12  - Depakote 750mg BID, Keppra 1000mg BID  - Klonopin 1mg TID for myoclonic jerks  - EEG significant for hyperexcitability and GPDs: cortical storming and stimulus myoclonus  - MRI Brain WNL, no anoxic injury  - change phenobarb to PO after levels higher  - if you see family, need to ask where to send/fax letters     Respiratory:   - s/p bronch and trach revision   - Mechanical ventilation AC/CMV    Cardiovascular:   - Hemodynamically stable off vasopressors   - Labetalol 100 BID for hypertension     Gastrointestinal/Nutrition:   - Nepro tube feeds via NGT at goal; NPO at midnight for OR  - Protonix daily for stress ulcer ppx  - CTS ([facility]) willing to perform PEG; preop   - Meds PO    Renal/Genitourinary:  - Higginbotham removed -> failed trial of void -> f/u Bladder scan  - Monitor intake & output    Hematologic:  - Trend H/H  - c/w SQH for VTE prophylaxis    Infectious Disease:   - Sputum Cx  with pseudomonas pan sensitive  - Urine Cx with Citrobacter  - s/p course of Zosyn   - Increasing leukocytosis, low procalcitonin, continue to monitor  - f/u fever work up (b/c, urinalysis, sputum, CXR, pro-thea)    Tubes/Lines/Drains:   - NGT  - R PIV  - L PIV     IBW/ABW (admit):  (72)    Disposition: SICU       SICU Daily Progress Note  =====================================================  Interval/Overnight Events:     .  - Need to confirm with family what address to send/fax letter to for travel and [employer] fund   - CT ([facility]) to do PEG  (NPO  night, type and screen, coags, COVID swab )  - Need to do [facility]KO for nursing home  - Meds converted to PO  - Eventually change phenobarb to PO once levels higher  - f/u fever work up (b/c, urinalysis, sputum, CXR, pro-thea)  - Off of propofol      HPI:  HPI: 62 y/o male PMHx COVID+ (3/2021 - not hospitalized), PE (2020 - on Eliquis at home), CKD, w/ recent hospitalization at [facility] after seizure w/ lingual hematoma requiring emergency tracheostomy on 3/25/21 s/p Tracheal Resection and Reconstruction on  presented to ED in respiratory distress requiring emergent tracheostomy on  subsequent revision tracheostomy on . Post op course complicated by seizures and concern for anoxic brain injury .    IMAGING STUDIES:       Allergies: No Known Allergies      MEDICATIONS:   --------------------------------------------------------------------------------------  Neurologic Medications  acetaminophen    Suspension .. 650 milliGRAM(s) Oral every 6 hours PRN Temp greater or equal to 38C (100.4F)  clonazePAM  Tablet 1 milliGRAM(s) Oral three times a day  diVALproex Sprinkle 750 milliGRAM(s) Oral two times a day  levETIRAcetam  Solution 1000 milliGRAM(s) Oral two times a day  PHENobarbital IVPB 130 milliGRAM(s) IV Intermittent every 12 hours    Respiratory Medications  ALBUTerol    90 MICROgram(s) HFA Inhaler 2 Puff(s) Inhalation every 4 hours    Cardiovascular Medications  labetalol 100 milliGRAM(s) Oral every 12 hours    Gastrointestinal Medications  pantoprazole   Suspension 40 milliGRAM(s) Oral daily  senna Syrup 5 milliLiter(s) Oral at bedtime  sodium chloride 0.9% lock flush 10 milliLiter(s) IV Push every 1 hour PRN Pre/post blood products, medications, blood draw, and to maintain line patency    Genitourinary Medications    Hematologic/Oncologic Medications  heparin   Injectable 5000 Unit(s) SubCutaneous every 8 hours    Antimicrobial/Immunologic Medications    Endocrine/Metabolic Medications    Topical/Other Medications  chlorhexidine 0.12% Liquid 15 milliLiter(s) Oral Mucosa every 12 hours  chlorhexidine 4% Liquid 1 Application(s) Topical <User Schedule>  petrolatum Ophthalmic Ointment 1 Application(s) Both EYES two times a day    --------------------------------------------------------------------------------------    VITAL SIGNS, INS/OUTS (last 24 hours):  --------------------------------------------------------------------------------------  ICU Vital Signs Last 24 Hrs  T(C): 37.3 (2021 04:00), Max: 37.7 (2021 08:00)  T(F): 99.2 (2021 04:00), Max: 99.9 (2021 08:00)  HR: 90 (2021 05:00) (87 - 99)  BP: 106/76 (2021 05:00) (91/68 - 141/93)  BP(mean): 89 (2021 05:00) (77 - 107)  ABP: --  ABP(mean): --  RR: 18 (2021 05:00) (17 - 18)  SpO2: 99% (2021 05:00) (96% - 100%)    --------------------------------------------------------------------------------------    EXAM  NEUROLOGY  Exam: Sedated, not following commands, intermittent myoclonic jerks    HEENT  Exam: Normocephalic, atraumatic    RESPIRATORY  Exam: Lungs clear to auscultation, Normal expansion/effort.   Mechanical Ventilation: Mode: AC/ CMV (Assist Control/ Continuous Mandatory Ventilation), RR (machine): 18, TV (machine): 400, FiO2: 30, PEEP: 5, ITime: 0.94, MAP: 11, PIP: 24    CARDIOVASCULAR  Exam: S1, S2.  Regular rate and rhythm.     GI/NUTRITION  Exam: Abdomen soft, Non-tender, Non-distended.     VASCULAR  Exam: Extremities warm, pink, well-perfused.     SKIN  Exam: Good skin turgor, no skin breakdown.      OTHER LABORATORY:     IMAGING STUDIES:         METABOLIC/FLUIDS/ELECTROLYTES      HEMATOLOGIC  [x] VTE Prophylaxis: heparin   Injectable 5000 Unit(s) SubCutaneous every 8 hours    Transfusions:	[] PRBC	[] Platelets		[] FFP	[] Cryoprecipitate    INFECTIOUS DISEASE  Antimicrobials/Immunologic Medications:  LABS  --------------------------------------------------------------------------------------  CBC ( @ 03:04)                          7.7<L>                   18.18<H>  )--------------(  220        --    % Neuts, --    % Lymphs, ANC: --                              25.2<L>  CBC ( @ 03:16)                          8.1<L>                   16.02<H>  )--------------(  227        --    % Neuts, --    % Lymphs, ANC: --                              26.7<L>    BMP ( @ 03:04)       148<H>  |  111<H>  |  87<H> 			Ca++ --      Ca 9.6          ---------------------------------( 137<H>		Mg 2.90<H>       4.7     |  18<L>   |  3.53<H>			Ph 5.4<H>  BMP ( @ 03:16)       145     |  108<H>  |  81<H> 			Ca++ --      Ca 9.6          ---------------------------------( 148<H>		Mg 2.80<H>       4.8     |  18<L>   |  3.61<H>			Ph 6.1<H>              Urinalysis ( @ 08:29):     Color: Light Yellow / Appearance: Clear / S.018 / pH: 6.0 / Gluc: Negative / Ketones: Negative / Bili: Negative / Urobili: <2 mg/dL / Protein :100 mg/dL<!> / Nitrites: Negative / Leuk.Est: Negative / RBC: 2-5 / WBC: 2-5 / Sq Epi:  / Non Sq Epi: Occasional / Bacteria      Urinalysis ( @ 07:57):     Color: Light Yellow / Appearance: Clear / S.017 / pH: 6.0 / Gluc: Negative / Ketones: Negative / Bili: Negative / Urobili: <2 mg/dL / Protein :30 mg/dL<!> / Nitrites: Negative / Leuk.Est: Negative / RBC: 6<H> / WBC: 3 / Sq Epi:  / Non Sq Epi: 1 / Bacteria Few<!>       -> .Blood Blood-Venous Culture ( @ 10:19)     NG    NG    No growth to date.    -> .Sputum Sputum trap Culture ( @ 09:00)       Few polymorphonuclear leukocytes per low power field  No Squamous epithelial cells per low power field  Rare Gram Variable Rods per oil power field    Pseudomonas aeruginosa  Pseudomonas aeruginosa    Moderate Pseudomonas aeruginosa  Moderate Pseudomonas aeruginosa #2  Normal Respiratory Taylor absent      --------------------------------------------------------------------------------------      PLAN:  Neurologic:  - Phenobarbital load w/ 700mg / 130mg q 12  - Depakote 750mg BID, Keppra 1000mg BID  - Klonopin 1mg TID for myoclonic jerks  - EEG significant for hyperexcitability and GPDs: cortical storming and stimulus myoclonus  - MRI Brain WNL, no anoxic injury  - change phenobarb to PO after levels higher  - if you see family, need to ask where to send/fax letters     Respiratory:   - s/p bronch and trach revision   - Mechanical ventilation AC/CMV    Cardiovascular:   - Hemodynamically stable off vasopressors   - Labetalol 100 BID for hypertension     Gastrointestinal/Nutrition:   - Nepro tube feeds via NGT at goal; NPO at midnight for OR  - Protonix daily for stress ulcer ppx  - CTS ([facility]) willing to perform PEG; preop   - Meds PO    Renal/Genitourinary:  - Higginbotham removed -> failed trial of void -> f/u Bladder scan  - Monitor intake & output    Hematologic:  - Trend H/H  - c/w SQH for VTE prophylaxis    Infectious Disease:   - Sputum Cx  with pseudomonas pan sensitive; started for 5 days Cefepime Q24 corrected for renal function  - Urine Cx with Citrobacter  - s/p course of Zosyn   - Persistent leukocytosis, low procalcitonin, continue to monitor  - Starting     Tubes/Lines/Drains:   - NGT  - R PIV  - L PIV     IBW/ABW (admit):  (72)    Disposition: SICU

## 2021-07-19 ENCOUNTER — APPOINTMENT (OUTPATIENT)
Dept: THORACIC SURGERY | Facility: HOSPITAL | Age: 61
End: 2021-07-19
Payer: MEDICAID

## 2021-07-19 LAB
ANION GAP SERPL CALC-SCNC: 18 MMOL/L — HIGH (ref 7–14)
APTT BLD: 32.2 SEC — SIGNIFICANT CHANGE UP (ref 27–36.3)
BLD GP AB SCN SERPL QL: NEGATIVE — SIGNIFICANT CHANGE UP
BUN SERPL-MCNC: 92 MG/DL — HIGH (ref 7–23)
CA-I BLD-SCNC: 1.29 MMOL/L — SIGNIFICANT CHANGE UP (ref 1.15–1.29)
CALCIUM SERPL-MCNC: 9.7 MG/DL — SIGNIFICANT CHANGE UP (ref 8.4–10.5)
CHLORIDE SERPL-SCNC: 113 MMOL/L — HIGH (ref 98–107)
CO2 SERPL-SCNC: 17 MMOL/L — LOW (ref 22–31)
CREAT SERPL-MCNC: 3.51 MG/DL — HIGH (ref 0.5–1.3)
GLUCOSE BLDC GLUCOMTR-MCNC: 148 MG/DL — HIGH (ref 70–99)
GLUCOSE SERPL-MCNC: 113 MG/DL — HIGH (ref 70–99)
HCT VFR BLD CALC: 23.1 % — LOW (ref 39–50)
HGB BLD-MCNC: 7.1 G/DL — LOW (ref 13–17)
INR BLD: 1.32 RATIO — HIGH (ref 0.88–1.16)
MAGNESIUM SERPL-MCNC: 3.1 MG/DL — HIGH (ref 1.6–2.6)
MCHC RBC-ENTMCNC: 28.6 PG — SIGNIFICANT CHANGE UP (ref 27–34)
MCHC RBC-ENTMCNC: 30.7 GM/DL — LOW (ref 32–36)
MCV RBC AUTO: 93.1 FL — SIGNIFICANT CHANGE UP (ref 80–100)
NRBC # BLD: 0 /100 WBCS — SIGNIFICANT CHANGE UP
NRBC # FLD: 0 K/UL — SIGNIFICANT CHANGE UP
PHENOBARB SERPL-MCNC: 24.4 UG/ML — SIGNIFICANT CHANGE UP (ref 10–40)
PHOSPHATE SERPL-MCNC: 4.8 MG/DL — HIGH (ref 2.5–4.5)
PLATELET # BLD AUTO: 245 K/UL — SIGNIFICANT CHANGE UP (ref 150–400)
POTASSIUM SERPL-MCNC: 4.6 MMOL/L — SIGNIFICANT CHANGE UP (ref 3.5–5.3)
POTASSIUM SERPL-SCNC: 4.6 MMOL/L — SIGNIFICANT CHANGE UP (ref 3.5–5.3)
PROTHROM AB SERPL-ACNC: 14.8 SEC — HIGH (ref 10.6–13.6)
RBC # BLD: 2.48 M/UL — LOW (ref 4.2–5.8)
RBC # FLD: 15.9 % — HIGH (ref 10.3–14.5)
RH IG SCN BLD-IMP: POSITIVE — SIGNIFICANT CHANGE UP
SODIUM SERPL-SCNC: 148 MMOL/L — HIGH (ref 135–145)
WBC # BLD: 17.28 K/UL — HIGH (ref 3.8–10.5)
WBC # FLD AUTO: 17.28 K/UL — HIGH (ref 3.8–10.5)

## 2021-07-19 PROCEDURE — 99291 CRITICAL CARE FIRST HOUR: CPT

## 2021-07-19 PROCEDURE — 43246 EGD PLACE GASTROSTOMY TUBE: CPT | Mod: 52,79

## 2021-07-19 PROCEDURE — 99232 SBSQ HOSP IP/OBS MODERATE 35: CPT

## 2021-07-19 RX ORDER — SODIUM CHLORIDE 9 MG/ML
1000 INJECTION, SOLUTION INTRAVENOUS
Refills: 0 | Status: DISCONTINUED | OUTPATIENT
Start: 2021-07-19 | End: 2021-07-20

## 2021-07-19 RX ADMIN — CHLORHEXIDINE GLUCONATE 15 MILLILITER(S): 213 SOLUTION TOPICAL at 18:36

## 2021-07-19 RX ADMIN — Medication 1 MILLIGRAM(S): at 21:14

## 2021-07-19 RX ADMIN — SENNA PLUS 5 MILLILITER(S): 8.6 TABLET ORAL at 00:11

## 2021-07-19 RX ADMIN — Medication 1 MILLIGRAM(S): at 05:36

## 2021-07-19 RX ADMIN — DIVALPROEX SODIUM 750 MILLIGRAM(S): 500 TABLET, DELAYED RELEASE ORAL at 05:35

## 2021-07-19 RX ADMIN — PANTOPRAZOLE SODIUM 40 MILLIGRAM(S): 20 TABLET, DELAYED RELEASE ORAL at 13:34

## 2021-07-19 RX ADMIN — HEPARIN SODIUM 5000 UNIT(S): 5000 INJECTION INTRAVENOUS; SUBCUTANEOUS at 05:35

## 2021-07-19 RX ADMIN — CHLORHEXIDINE GLUCONATE 1 APPLICATION(S): 213 SOLUTION TOPICAL at 05:35

## 2021-07-19 RX ADMIN — HEPARIN SODIUM 5000 UNIT(S): 5000 INJECTION INTRAVENOUS; SUBCUTANEOUS at 23:08

## 2021-07-19 RX ADMIN — Medication 1 APPLICATION(S): at 18:37

## 2021-07-19 RX ADMIN — DIVALPROEX SODIUM 750 MILLIGRAM(S): 500 TABLET, DELAYED RELEASE ORAL at 21:15

## 2021-07-19 RX ADMIN — LEVETIRACETAM 1000 MILLIGRAM(S): 250 TABLET, FILM COATED ORAL at 21:15

## 2021-07-19 RX ADMIN — CEFEPIME 100 MILLIGRAM(S): 1 INJECTION, POWDER, FOR SOLUTION INTRAMUSCULAR; INTRAVENOUS at 17:27

## 2021-07-19 RX ADMIN — Medication 101 MILLIGRAM(S): at 05:36

## 2021-07-19 RX ADMIN — Medication 100 MILLIGRAM(S): at 23:08

## 2021-07-19 RX ADMIN — HEPARIN SODIUM 5000 UNIT(S): 5000 INJECTION INTRAVENOUS; SUBCUTANEOUS at 16:11

## 2021-07-19 RX ADMIN — CHLORHEXIDINE GLUCONATE 15 MILLILITER(S): 213 SOLUTION TOPICAL at 05:35

## 2021-07-19 RX ADMIN — Medication 1 APPLICATION(S): at 05:36

## 2021-07-19 RX ADMIN — Medication 101 MILLIGRAM(S): at 17:28

## 2021-07-19 RX ADMIN — LEVETIRACETAM 1000 MILLIGRAM(S): 250 TABLET, FILM COATED ORAL at 05:36

## 2021-07-19 NOTE — BRIEF OPERATIVE NOTE - OPERATION/FINDINGS
s/p removal of tracheal stent, revision tracheotomy
PEG 2cm from skin
Code airway called by ED attending for patient BIBEMS for respiratory distress, witnessed large volume emesis and likely aspiration, unable to be intubated orally by anesthesiologist due to vomitus and obstruction to glidescope, I performed vertical neck incision, transverse tracheotomy and difficult tracheal intubation due to stenosis, end-tidal CO2 confirmed and tube secured in place
Flex bronch, removal of tracheal stent, revision of tracheostomy with size 6 Shiley

## 2021-07-19 NOTE — BRIEF OPERATIVE NOTE - NSICDXBRIEFPREOP_GEN_ALL_CORE_FT
PRE-OP DIAGNOSIS:  History of tracheal stenosis 01-Jul-2021 22:21:18  Lee Tabares  
PRE-OP DIAGNOSIS:  Adult failure to thrive 19-Jul-2021 13:52:31  Dusty Owens  
PRE-OP DIAGNOSIS:  History of tracheal stenosis 01-Jul-2021 22:21:18  Lee Tabares

## 2021-07-19 NOTE — PROGRESS NOTE ADULT - SUBJECTIVE AND OBJECTIVE BOX
SICU Daily Progress Note  =====================================================  24 Hour Interval/Overnight Events:      - CT (Zeltsman) to do PEG  (NPO  night, type and screen, coags, COVID swab )  - Eventually change phenobarb to PO once levels higher  - Initiated Cefepime 1GQ24 for Pseudomonas coverage leukocytosis afebrile 5 days     HPI:  62 y/o male s/p tracheal resection 21 presents to [facility] ER w c/o stridor and SOB. Difficulty breating is worse at night and prevents him from being able to sleep.  He denies productive cough or fever.  This is his 3rd readmission after his tracheal resection  PT has PMHx COVID+ (3/2021 - not hospitalized), PE (2020 - on eliquis at home), CKD, w/ recent hospitalization at [facility] after seizure w/ lingual hematoma requiring emergency tracheostomy on 3/25/21.  Patient underwent Tracheal Resection and Reconstruction on . ENT injected vocal cords few months ago. He was last discharged 21.     PROCEDURE  ENT consulted in ED for surgical airway management. per anesthesia unable to intubate. emergent tracheostomy performed, size 5.5 tracheostomy tube w cuff. secured w silk sutures and neck tie    P/E  tracheostomy tube in place size 5.5, cuff up secored w silk sutures and neck tie  oozing from stoma, minimal  saturating 100%    A/P: 62 y/o male s/p tracheal resection 21 presents to [facility] ER w c/o stridor and SOB. Difficulty breating is worse at night and prevents him from being able to sleep.  He denies productive cough or fever.  This is his 3rd readmission after his tracheal resection  PT has PMHx COVID+ (3/2021 - not hospitalized), PE (2020 - on eliquis at home), CKD, w/ recent hospitalization at [facility] after seizure w/ lingual hematoma requiring emergency tracheostomy on 3/25/21.  Patient underwent Tracheal Resection and Reconstruction on . ENT injected vocal cords few months ago. He was last discharged 21. s/p emergent tracheostomy  size 5.5 trach tube  - Patient had a tracheostomy, size 5.5 (cuffed). Please perform standard tracheostomy care procedures.  - Regular suctioning with soft suction catheter q1  - Humidified air to tracheostomy tube  - CTICU and CTS consult w Tiffany team  - per discussion w CTS, to take to OR tomorow for bronch and trach revision.  - Call or page us if any issues        (2021 22:19)      --------------------------------------------------------------------------------------  Allergies: No Known Allergies      MEDICATIONS:   --------------------------------------------------------------------------------------  Neurologic Medications  acetaminophen    Suspension .. 650 milliGRAM(s) Oral every 6 hours PRN Temp greater or equal to 38C (100.4F)  clonazePAM  Tablet 1 milliGRAM(s) Oral three times a day  diVALproex Sprinkle 750 milliGRAM(s) Oral two times a day  levETIRAcetam  Solution 1000 milliGRAM(s) Oral two times a day  PHENobarbital IVPB 130 milliGRAM(s) IV Intermittent every 12 hours    Respiratory Medications  ALBUTerol    90 MICROgram(s) HFA Inhaler 2 Puff(s) Inhalation every 4 hours    Cardiovascular Medications  labetalol 100 milliGRAM(s) Oral every 12 hours    Gastrointestinal Medications  pantoprazole   Suspension 40 milliGRAM(s) Oral daily  senna Syrup 5 milliLiter(s) Oral at bedtime  sodium chloride 0.9% lock flush 10 milliLiter(s) IV Push every 1 hour PRN Pre/post blood products, medications, blood draw, and to maintain line patency    Genitourinary Medications    Hematologic/Oncologic Medications  heparin   Injectable 5000 Unit(s) SubCutaneous every 8 hours    Antimicrobial/Immunologic Medications  cefepime   IVPB 1000 milliGRAM(s) IV Intermittent every 24 hours    Endocrine/Metabolic Medications    Topical/Other Medications  chlorhexidine 0.12% Liquid 15 milliLiter(s) Oral Mucosa every 12 hours  chlorhexidine 4% Liquid 1 Application(s) Topical <User Schedule>  petrolatum Ophthalmic Ointment 1 Application(s) Both EYES two times a day    --------------------------------------------------------------------------------------    VITAL SIGNS, INS/OUTS (last 24 hours):  --------------------------------------------------------------------------------------  T(C): 37.3 (21 @ 20:00), Max: 37.3 (21 @ 04:00)  HR: 95 (21 @ 23:45) (84 - 96)  BP: 115/79 (21 @ 23:00) (92/68 - 131/87)  BP(mean): 90 (21 @ 23:00) (76 - 98)  ABP: --  ABP(mean): --  RR: 18 (21 @ 23:00) (18 - 18)  SpO2: 99% (21 @ 23:45) (96% - 100%)  Wt(kg): --  CVP(mm Hg): --  CI: --  CAPILLARY BLOOD GLUCOSE       N/A       @ 07:01  -   @ 07:00  --------------------------------------------------------  IN:    Enteral Tube Flush: 150 mL    IV PiggyBack: 200 mL    Nepro with Carb Steady: 782 mL  Total IN: 1132 mL    OUT:    Indwelling Catheter - Urethral (mL): 1575 mL    Rectal Tube (mL): 50 mL  Total OUT: 1625 mL    Total NET: -493 mL       @ 07: @ 00:04  --------------------------------------------------------  IN:    Enteral Tube Flush: 90 mL    Nepro with Carb Steady: 544 mL  Total IN: 634 mL    OUT:    Indwelling Catheter - Urethral (mL): 1065 mL  Total OUT: 1065 mL    Total NET: -431 mL        --------------------------------------------------------------------------------------    EXAM    NEUROLOGY  Exam: Sedated    HEENT  Exam: Normocephalic, atraumatic, EOMI.     RESPIRATORY  Exam: Lungs clear to auscultation, Normal expansion/effort.  Mode: AC/ CMV (Assist Control/ Continuous Mandatory Ventilation), RR (machine): 18, TV (machine): 400, FiO2: 30, PEEP: 5, ITime: 0.94, MAP: 10, PIP: 23    CARDIOVASCULAR  Exam: S1, S2.  Regular rate and rhythm.      GI/NUTRITION  Exam: Abdomen soft, Non-tender, Non-distended.   Current Diet: Diet, NPO after Midnight  Diet, NPO with Tube Feed        METABOLIC/FLUIDS/ELECTROLYTES      HEMATOLOGIC  [x] VTE Prophylaxis: heparin   Injectable 5000 Unit(s) SubCutaneous every 8 hours      LABS  --------------------------------------------------------------------------------------  CBC ( @ 03:04)                          7.7<L>                   18.18<H>  )--------------(  220        --    % Neuts, --    % Lymphs, ANC: --                              25.2<L>    BMP ( @ 16:18)       150<H>  |  113<H>  |  91<H> 			Ca++ --      Ca 9.9          ---------------------------------( 162<H>		Mg 2.90<H>       4.7     |  18<L>   |  3.47<H>			Ph 4.7<H>  BMP ( @ 03:04)       148<H>  |  111<H>  |  87<H> 			Ca++ --      Ca 9.6          ---------------------------------( 137<H>		Mg 2.90<H>       4.7     |  18<L>   |  3.53<H>			Ph 5.4<H>              Urinalysis ( @ 08:29):     Color: Light Yellow / Appearance: Clear / S.018 / pH: 6.0 / Gluc: Negative / Ketones: Negative / Bili: Negative / Urobili: <2 mg/dL / Protein :100 mg/dL<!> / Nitrites: Negative / Leuk.Est: Negative / RBC: 2-5 / WBC: 2-5 / Sq Epi:  / Non Sq Epi: Occasional / Bacteria      Urinalysis ( @ 07:57):     Color: Light Yellow / Appearance: Clear / S.017 / pH: 6.0 / Gluc: Negative / Ketones: Negative / Bili: Negative / Urobili: <2 mg/dL / Protein :30 mg/dL<!> / Nitrites: Negative / Leuk.Est: Negative / RBC: 6<H> / WBC: 3 / Sq Epi:  / Non Sq Epi: 1 / Bacteria Few<!>       -> .Blood Blood-Venous Culture ( @ 10:19)     NG    NG    No growth to date.    -> .Sputum Sputum trap Culture ( @ 09:00)       Few polymorphonuclear leukocytes per low power field  No Squamous epithelial cells per low power field  Rare Gram Variable Rods per oil power field    Pseudomonas aeruginosa  Pseudomonas aeruginosa    Moderate Pseudomonas aeruginosa  Moderate Pseudomonas aeruginosa #2  Normal Respiratory Taylor absent      -------------------------------------------------------------------------------------- SICU Daily Progress Note  =====================================================  24 Hour Interval/Overnight Events:      - CT ([facility]) to do PEG  (NPO  night, type and screen, coags, COVID swab )  - Initiated Cefepime 1GQ24 for Pseudomonas coverage leukocytosis afebrile 5 days  - plan to change to phenobarb via PEG 6h after placed     HPI:  62 y/o male s/p tracheal resection 21 presents to [facility] ER w c/o stridor and SOB. Difficulty breating is worse at night and prevents him from being able to sleep.  He denies productive cough or fever.  This is his 3rd readmission after his tracheal resection  PT has PMHx COVID+ (3/2021 - not hospitalized), PE (2020 - on eliquis at home), CKD, w/ recent hospitalization at [facility] after seizure w/ lingual hematoma requiring emergency tracheostomy on 3/25/21.  Patient underwent Tracheal Resection and Reconstruction on . ENT injected vocal cords few months ago. He was last discharged 21.     PROCEDURE  ENT consulted in ED for surgical airway management. per anesthesia unable to intubate. emergent tracheostomy performed, size 5.5 tracheostomy tube w cuff. secured w silk sutures and neck tie    P/E  tracheostomy tube in place size 5.5, cuff up secored w silk sutures and neck tie  oozing from stoma, minimal  saturating 100%    A/P: 62 y/o male s/p tracheal resection 21 presents to [facility] ER w c/o stridor and SOB. Difficulty breating is worse at night and prevents him from being able to sleep.  He denies productive cough or fever.  This is his 3rd readmission after his tracheal resection  PT has PMHx COVID+ (3/2021 - not hospitalized), PE (2020 - on eliquis at home), CKD, w/ recent hospitalization at [facility] after seizure w/ lingual hematoma requiring emergency tracheostomy on 3/25/21.  Patient underwent Tracheal Resection and Reconstruction on . ENT injected vocal cords few months ago. He was last discharged 21. s/p emergent tracheostomy  size 5.5 trach tube  - Patient had a tracheostomy, size 5.5 (cuffed). Please perform standard tracheostomy care procedures.  - Regular suctioning with soft suction catheter q1  - Humidified air to tracheostomy tube  - CTICU and CTS consult w [facility] team  - per discussion w CTS, to take to OR tomorow for bronch and trach revision.  - Call or page us if any issues        (2021 22:19)      --------------------------------------------------------------------------------------  Allergies: No Known Allergies      MEDICATIONS:   --------------------------------------------------------------------------------------  Neurologic Medications  acetaminophen    Suspension .. 650 milliGRAM(s) Oral every 6 hours PRN Temp greater or equal to 38C (100.4F)  clonazePAM  Tablet 1 milliGRAM(s) Oral three times a day  diVALproex Sprinkle 750 milliGRAM(s) Oral two times a day  levETIRAcetam  Solution 1000 milliGRAM(s) Oral two times a day  PHENobarbital IVPB 130 milliGRAM(s) IV Intermittent every 12 hours    Respiratory Medications  ALBUTerol    90 MICROgram(s) HFA Inhaler 2 Puff(s) Inhalation every 4 hours    Cardiovascular Medications  labetalol 100 milliGRAM(s) Oral every 12 hours    Gastrointestinal Medications  pantoprazole   Suspension 40 milliGRAM(s) Oral daily  senna Syrup 5 milliLiter(s) Oral at bedtime  sodium chloride 0.9% lock flush 10 milliLiter(s) IV Push every 1 hour PRN Pre/post blood products, medications, blood draw, and to maintain line patency    Genitourinary Medications    Hematologic/Oncologic Medications  heparin   Injectable 5000 Unit(s) SubCutaneous every 8 hours    Antimicrobial/Immunologic Medications  cefepime   IVPB 1000 milliGRAM(s) IV Intermittent every 24 hours    Endocrine/Metabolic Medications    Topical/Other Medications  chlorhexidine 0.12% Liquid 15 milliLiter(s) Oral Mucosa every 12 hours  chlorhexidine 4% Liquid 1 Application(s) Topical <User Schedule>  petrolatum Ophthalmic Ointment 1 Application(s) Both EYES two times a day    --------------------------------------------------------------------------------------    VITAL SIGNS, INS/OUTS (last 24 hours):  --------------------------------------------------------------------------------------  T(C): 37.3 (21 @ 20:00), Max: 37.3 (21 @ 04:00)  HR: 95 (21 @ 23:45) (84 - 96)  BP: 115/79 (21 @ 23:00) (92/68 - 131/87)  BP(mean): 90 (21 @ 23:00) (76 - 98)  ABP: --  ABP(mean): --  RR: 18 (21 @ 23:00) (18 - 18)  SpO2: 99% (21 @ 23:45) (96% - 100%)  Wt(kg): --  CVP(mm Hg): --  CI: --  CAPILLARY BLOOD GLUCOSE       N/A       @ 07:01  -   @ 07:00  --------------------------------------------------------  IN:    Enteral Tube Flush: 150 mL    IV PiggyBack: 200 mL    Nepro with Carb Steady: 782 mL  Total IN: 1132 mL    OUT:    Indwelling Catheter - Urethral (mL): 1575 mL    Rectal Tube (mL): 50 mL  Total OUT: 1625 mL    Total NET: -493 mL       @ 07: @ 00:04  --------------------------------------------------------  IN:    Enteral Tube Flush: 90 mL    Nepro with Carb Steady: 544 mL  Total IN: 634 mL    OUT:    Indwelling Catheter - Urethral (mL): 1065 mL  Total OUT: 1065 mL    Total NET: -431 mL        --------------------------------------------------------------------------------------    EXAM    NEUROLOGY  Exam: Sedated    HEENT  Exam: Normocephalic, atraumatic, EOMI.     RESPIRATORY  Exam: Lungs clear to auscultation, Normal expansion/effort.  Mode: AC/ CMV (Assist Control/ Continuous Mandatory Ventilation), RR (machine): 18, TV (machine): 400, FiO2: 30, PEEP: 5, ITime: 0.94, MAP: 10, PIP: 23    CARDIOVASCULAR  Exam: S1, S2.  Regular rate and rhythm.      GI/NUTRITION  Exam: Abdomen soft, Non-tender, Non-distended.   Current Diet: Diet, NPO after Midnight  Diet, NPO with Tube Feed        METABOLIC/FLUIDS/ELECTROLYTES      HEMATOLOGIC  [x] VTE Prophylaxis: heparin   Injectable 5000 Unit(s) SubCutaneous every 8 hours      LABS  --------------------------------------------------------------------------------------  CBC ( @ 03:04)                          7.7<L>                   18.18<H>  )--------------(  220        --    % Neuts, --    % Lymphs, ANC: --                              25.2<L>    BMP ( @ 16:18)       150<H>  |  113<H>  |  91<H> 			Ca++ --      Ca 9.9          ---------------------------------( 162<H>		Mg 2.90<H>       4.7     |  18<L>   |  3.47<H>			Ph 4.7<H>  BMP ( @ 03:04)       148<H>  |  111<H>  |  87<H> 			Ca++ --      Ca 9.6          ---------------------------------( 137<H>		Mg 2.90<H>       4.7     |  18<L>   |  3.53<H>			Ph 5.4<H>              Urinalysis ( @ 08:29):     Color: Light Yellow / Appearance: Clear / S.018 / pH: 6.0 / Gluc: Negative / Ketones: Negative / Bili: Negative / Urobili: <2 mg/dL / Protein :100 mg/dL<!> / Nitrites: Negative / Leuk.Est: Negative / RBC: 2-5 / WBC: 2-5 / Sq Epi:  / Non Sq Epi: Occasional / Bacteria      Urinalysis ( @ 07:57):     Color: Light Yellow / Appearance: Clear / S.017 / pH: 6.0 / Gluc: Negative / Ketones: Negative / Bili: Negative / Urobili: <2 mg/dL / Protein :30 mg/dL<!> / Nitrites: Negative / Leuk.Est: Negative / RBC: 6<H> / WBC: 3 / Sq Epi:  / Non Sq Epi: 1 / Bacteria Few<!>       -> .Blood Blood-Venous Culture ( @ 10:19)     NG    NG    No growth to date.    -> .Sputum Sputum trap Culture ( @ 09:00)       Few polymorphonuclear leukocytes per low power field  No Squamous epithelial cells per low power field  Rare Gram Variable Rods per oil power field    Pseudomonas aeruginosa  Pseudomonas aeruginosa    Moderate Pseudomonas aeruginosa  Moderate Pseudomonas aeruginosa #2  Normal Respiratory Taylor absent      --------------------------------------------------------------------------------------

## 2021-07-19 NOTE — PROGRESS NOTE ADULT - SUBJECTIVE AND OBJECTIVE BOX
7/6: EEg notable for possible status epilepticus. Planned for MRI today. Pt noted to be febrile yesterday with tmax of 102.9.   7/7: naeon. cxr appears to be slightly worse in effusions. pending mri  7/8: negative blood cultures, plan for MRI today   7/9: NAEON - MRI with no significant intracranial pathology.  7/10: NAEON  7/11: naeon   7/12: naeon. GOC today  7/13: naeon. waiting [facility] duscission  7/14:  naeon. waiting [facility] duscission  7/15: NAEON. goc  7/16: per [facility] full measures until family arrives. to get peg  7/17: NAEON, febrile overnight  7/18: NAEON - plan for PEG sergio  7/19: No acute events overnight. On abx for fever, plan for PEG today.     ICU Vital Signs Last 24 Hrs  T(C): 37.7 (19 Jul 2021 04:00), Max: 37.7 (19 Jul 2021 00:00)  T(F): 99.9 (19 Jul 2021 04:00), Max: 99.9 (19 Jul 2021 00:00)  HR: 92 (19 Jul 2021 07:03) (84 - 96)  BP: 107/82 (19 Jul 2021 06:00) (92/68 - 131/87)  BP(mean): 91 (19 Jul 2021 06:00) (75 - 98)  ABP: --  ABP(mean): --  RR: 18 (19 Jul 2021 06:00) (14 - 20)  SpO2: 100% (19 Jul 2021 07:03) (96% - 100%)        P/E  Sedated, 6LPC, trach sutured to skin, trach ties. no bleeding  Neck soft, flat          A/P: 60 y/o male s/p tracheal resection 4/23/21 presents to [facility] ER w c/o stridor and SOB. Difficulty breating is worse at night and prevents him from being able to sleep.  He denies productive cough or fever.  This is his 3rd readmission after his tracheal resection  PT has PMHx COVID+ (3/2021 - not hospitalized), PE (8/2020 - on eliquis at home), CKD, w/ recent hospitalization at [facility] after seizure w/ lingual hematoma requiring emergency tracheostomy on 3/25/21.  Patient underwent Tracheal Resection and Reconstruction on 4/23. ENT injected vocal cords few months ago. He was last discharged 5/18/21. s/p emergent cric 7/1 now s/p revision trach, flex bronch and removal of stent 7/2 with post-op course complicated by seizures  - GOC: full code for now. Family would like PEG placement and second opinion   - Plan for PEG today  - Patient has a critical airway, only ENT to manipulate airway  - Routine trach care by nursing  - Suction PRN  - Extra 6LPC and 4LPC at bedside  - Vent per SICU  - Tube feeds per SICU

## 2021-07-19 NOTE — BRIEF OPERATIVE NOTE - NSICDXBRIEFPOSTOP_GEN_ALL_CORE_FT
POST-OP DIAGNOSIS:  Tracheal stenosis 01-Jul-2021 22:21:46  Lee Tabares  
POST-OP DIAGNOSIS:  Tracheal stenosis 01-Jul-2021 22:21:46  Lee Tabares  
POST-OP DIAGNOSIS:  Adult failure to thrive 19-Jul-2021 13:52:42  Dusty Owens

## 2021-07-19 NOTE — BRIEF OPERATIVE NOTE - NSICDXBRIEFPROCEDURE_GEN_ALL_CORE_FT
PROCEDURES:  Emergency tracheostomy by cricothyroid membrane 01-Jul-2021 22:20:25  Lee Tabares  
PROCEDURES:  Simple revision, tracheostomy 02-Jul-2021 13:47:22  Thomas Tabares  Tracheal stent removal 02-Jul-2021 13:47:50  Winston Rosado  Flexible bronchoscopy 02-Jul-2021 13:47:57  Winston Rosado  
PROCEDURES:  EGD, with PEG 19-Jul-2021 13:52:10  Dusty Owens

## 2021-07-19 NOTE — PROGRESS NOTE ADULT - ASSESSMENT
62 y/o male PMHx COVID+ (3/2021 - not hospitalized), PE (8/2020 - on Eliquis at home), CKD, w/ recent hospitalization at [facility] after seizure w/ lingual hematoma requiring emergency tracheostomy on 3/25/21 s/p Tracheal Resection and Reconstruction on 4/23 presented to ED in respiratory distress requiring emergent tracheostomy on 7/1and subsequent revision tracheostomy on 7/2. Post op course complicated by seizures and concern for anoxic brain injury.    PLAN:  Neurologic:  - Phenobarbital load w/ 700mg / 130mg q 12  - Depakote 750mg BID, Keppra 1000mg BID  - Klonopin 1mg TID for myoclonic jerks  - EEG significant for hyperexcitability and GPDs: cortical storming and stimulus myoclonus  - MRI Brain WNL, no anoxic injury  - Change phenobarb to PO after levels higher    Respiratory:   - s/p bronch and trach revision 7/2  - Mechanical ventilation AC/CMV    Cardiovascular:   - Hemodynamically stable off vasopressors   - Labetalol 100 BID for hypertension     Gastrointestinal/Nutrition:   - Nepro tube feeds via NGT at goal -> Held for OR today  - Protonix daily for stress ulcer ppx  - CTS ([facility]tsman) to perform PEG 7/19    Renal/Genitourinary:  - Higginbotham  - Monitor intake & output    Hematologic:  - Trend H/H  - c/w SQH for VTE prophylaxis    Infectious Disease:   - Afebrile  - Sputum Cx  with pseudomonas -> Cefepime (7/18- )  - Urine Cx with Citrobacter  - s/p course of Zosyn     Tubes/Lines/Drains:   - NGT  - R PIV  - L PIV     Disposition: SICU   62 y/o male PMHx COVID+ (3/2021 - not hospitalized), PE (8/2020 - on Eliquis at home), CKD, w/ recent hospitalization at [facility] after seizure w/ lingual hematoma requiring emergency tracheostomy on 3/25/21 s/p Tracheal Resection and Reconstruction on 4/23 presented to ED in respiratory distress requiring emergent tracheostomy on 7/1and subsequent revision tracheostomy on 7/2. Post op course complicated by seizures and concern for anoxic brain injury.    PLAN:  Neurologic:  - Phenobarbital load w/ 700mg / 130mg q 12  - Depakote 750mg BID, Keppra 1000mg BID  - Klonopin 1mg TID for myoclonic jerks  - EEG significant for hyperexcitability and GPDs: cortical storming and stimulus myoclonus  - MRI Brain WNL, no anoxic injury  - Change phenobarb to PEG, 6h after PEG placed    Respiratory:   - s/p bronch and trach revision 7/2  - Mechanical ventilation AC/CMV    Cardiovascular:   - Hemodynamically stable off vasopressors   - Labetalol 100 BID for hypertension     Gastrointestinal/Nutrition:   - Nepro tube feeds via NGT at goal -> Held for OR today  - Protonix daily for stress ulcer ppx  - CTS ([facility]ts[facility]) to perform PEG 7/19    Renal/Genitourinary:  - Higginbotham  - Monitor intake & output    Hematologic:  - Trend H/H  - c/w SQH for VTE prophylaxis    Infectious Disease:   - Afebrile  - Sputum Cx  with pseudomonas -> Cefepime (7/18- )  - Urine Cx with Citrobacter  - s/p course of Zosyn     Tubes/Lines/Drains:   - NGT  - R PIV  - L PIV     Disposition: SICU

## 2021-07-19 NOTE — BRIEF OPERATIVE NOTE - ASSISTANT(S)
Thomas Tabares MD, Eula Concepcion
Jemal Burciaga MD
Winston Rosado
Kannan Owens PGY3, Elmer ANDERSON

## 2021-07-20 LAB
ANION GAP SERPL CALC-SCNC: 20 MMOL/L — HIGH (ref 7–14)
BUN SERPL-MCNC: 92 MG/DL — HIGH (ref 7–23)
CALCIUM SERPL-MCNC: 9.7 MG/DL — SIGNIFICANT CHANGE UP (ref 8.4–10.5)
CHLORIDE SERPL-SCNC: 115 MMOL/L — HIGH (ref 98–107)
CO2 SERPL-SCNC: 17 MMOL/L — LOW (ref 22–31)
CREAT SERPL-MCNC: 3.45 MG/DL — HIGH (ref 0.5–1.3)
GLUCOSE SERPL-MCNC: 141 MG/DL — HIGH (ref 70–99)
HCT VFR BLD CALC: 26.7 % — LOW (ref 39–50)
HGB BLD-MCNC: 8.1 G/DL — LOW (ref 13–17)
MAGNESIUM SERPL-MCNC: 3.1 MG/DL — HIGH (ref 1.6–2.6)
MCHC RBC-ENTMCNC: 28.4 PG — SIGNIFICANT CHANGE UP (ref 27–34)
MCHC RBC-ENTMCNC: 30.3 GM/DL — LOW (ref 32–36)
MCV RBC AUTO: 93.7 FL — SIGNIFICANT CHANGE UP (ref 80–100)
NRBC # BLD: 0 /100 WBCS — SIGNIFICANT CHANGE UP
NRBC # FLD: 0 K/UL — SIGNIFICANT CHANGE UP
PHENOBARB SERPL-MCNC: 27 UG/ML — SIGNIFICANT CHANGE UP (ref 10–40)
PHOSPHATE SERPL-MCNC: 4.8 MG/DL — HIGH (ref 2.5–4.5)
PLATELET # BLD AUTO: 214 K/UL — SIGNIFICANT CHANGE UP (ref 150–400)
POTASSIUM SERPL-MCNC: 4.4 MMOL/L — SIGNIFICANT CHANGE UP (ref 3.5–5.3)
POTASSIUM SERPL-SCNC: 4.4 MMOL/L — SIGNIFICANT CHANGE UP (ref 3.5–5.3)
RBC # BLD: 2.85 M/UL — LOW (ref 4.2–5.8)
RBC # FLD: 16.2 % — HIGH (ref 10.3–14.5)
SODIUM SERPL-SCNC: 152 MMOL/L — HIGH (ref 135–145)
WBC # BLD: 15.3 K/UL — HIGH (ref 3.8–10.5)
WBC # FLD AUTO: 15.3 K/UL — HIGH (ref 3.8–10.5)

## 2021-07-20 PROCEDURE — 99291 CRITICAL CARE FIRST HOUR: CPT

## 2021-07-20 RX ORDER — SENNA PLUS 8.6 MG/1
5 TABLET ORAL AT BEDTIME
Refills: 0 | Status: DISCONTINUED | OUTPATIENT
Start: 2021-07-20 | End: 2021-07-29

## 2021-07-20 RX ORDER — PANTOPRAZOLE SODIUM 20 MG/1
40 TABLET, DELAYED RELEASE ORAL DAILY
Refills: 0 | Status: DISCONTINUED | OUTPATIENT
Start: 2021-07-20 | End: 2021-07-29

## 2021-07-20 RX ORDER — LABETALOL HCL 100 MG
100 TABLET ORAL EVERY 12 HOURS
Refills: 0 | Status: DISCONTINUED | OUTPATIENT
Start: 2021-07-20 | End: 2021-07-26

## 2021-07-20 RX ORDER — ACETAMINOPHEN 500 MG
650 TABLET ORAL EVERY 6 HOURS
Refills: 0 | Status: DISCONTINUED | OUTPATIENT
Start: 2021-07-20 | End: 2021-07-29

## 2021-07-20 RX ORDER — PHENOBARBITAL 60 MG
129.6 TABLET ORAL
Refills: 0 | Status: DISCONTINUED | OUTPATIENT
Start: 2021-07-20 | End: 2021-07-21

## 2021-07-20 RX ORDER — LEVETIRACETAM 250 MG/1
1000 TABLET, FILM COATED ORAL
Refills: 0 | Status: DISCONTINUED | OUTPATIENT
Start: 2021-07-20 | End: 2021-07-29

## 2021-07-20 RX ORDER — CLONAZEPAM 1 MG
1 TABLET ORAL THREE TIMES A DAY
Refills: 0 | Status: DISCONTINUED | OUTPATIENT
Start: 2021-07-20 | End: 2021-07-27

## 2021-07-20 RX ORDER — DIVALPROEX SODIUM 500 MG/1
750 TABLET, DELAYED RELEASE ORAL
Refills: 0 | Status: DISCONTINUED | OUTPATIENT
Start: 2021-07-20 | End: 2021-07-27

## 2021-07-20 RX ADMIN — LEVETIRACETAM 1000 MILLIGRAM(S): 250 TABLET, FILM COATED ORAL at 05:01

## 2021-07-20 RX ADMIN — Medication 650 MILLIGRAM(S): at 08:00

## 2021-07-20 RX ADMIN — PANTOPRAZOLE SODIUM 40 MILLIGRAM(S): 20 TABLET, DELAYED RELEASE ORAL at 13:03

## 2021-07-20 RX ADMIN — DIVALPROEX SODIUM 750 MILLIGRAM(S): 500 TABLET, DELAYED RELEASE ORAL at 05:01

## 2021-07-20 RX ADMIN — Medication 1 MILLIGRAM(S): at 05:01

## 2021-07-20 RX ADMIN — CHLORHEXIDINE GLUCONATE 15 MILLILITER(S): 213 SOLUTION TOPICAL at 05:02

## 2021-07-20 RX ADMIN — HEPARIN SODIUM 5000 UNIT(S): 5000 INJECTION INTRAVENOUS; SUBCUTANEOUS at 13:03

## 2021-07-20 RX ADMIN — Medication 100 MILLIGRAM(S): at 17:06

## 2021-07-20 RX ADMIN — Medication 1 MILLIGRAM(S): at 21:51

## 2021-07-20 RX ADMIN — Medication 100 MILLIGRAM(S): at 05:02

## 2021-07-20 RX ADMIN — Medication 101 MILLIGRAM(S): at 05:02

## 2021-07-20 RX ADMIN — SENNA PLUS 5 MILLILITER(S): 8.6 TABLET ORAL at 03:46

## 2021-07-20 RX ADMIN — CEFEPIME 100 MILLIGRAM(S): 1 INJECTION, POWDER, FOR SOLUTION INTRAMUSCULAR; INTRAVENOUS at 17:05

## 2021-07-20 RX ADMIN — Medication 1 APPLICATION(S): at 17:07

## 2021-07-20 RX ADMIN — Medication 1 MILLIGRAM(S): at 13:03

## 2021-07-20 RX ADMIN — DIVALPROEX SODIUM 750 MILLIGRAM(S): 500 TABLET, DELAYED RELEASE ORAL at 17:06

## 2021-07-20 RX ADMIN — HEPARIN SODIUM 5000 UNIT(S): 5000 INJECTION INTRAVENOUS; SUBCUTANEOUS at 05:02

## 2021-07-20 RX ADMIN — Medication 1 MILLIGRAM(S): at 13:14

## 2021-07-20 RX ADMIN — LEVETIRACETAM 1000 MILLIGRAM(S): 250 TABLET, FILM COATED ORAL at 17:07

## 2021-07-20 RX ADMIN — HEPARIN SODIUM 5000 UNIT(S): 5000 INJECTION INTRAVENOUS; SUBCUTANEOUS at 21:51

## 2021-07-20 RX ADMIN — CHLORHEXIDINE GLUCONATE 1 APPLICATION(S): 213 SOLUTION TOPICAL at 05:02

## 2021-07-20 RX ADMIN — Medication 1 APPLICATION(S): at 05:03

## 2021-07-20 RX ADMIN — Medication 129.6 MILLIGRAM(S): at 17:05

## 2021-07-20 RX ADMIN — CHLORHEXIDINE GLUCONATE 15 MILLILITER(S): 213 SOLUTION TOPICAL at 17:06

## 2021-07-20 RX ADMIN — Medication 650 MILLIGRAM(S): at 06:49

## 2021-07-20 NOTE — PROGRESS NOTE ADULT - ASSESSMENT
60 y/o male PMHx COVID+ (3/2021 - not hospitalized), PE (8/2020 - on Eliquis at home), CKD, w/ recent hospitalization at [facility] after seizure w/ lingual hematoma requiring emergency tracheostomy on 3/25/21 s/p Tracheal Resection and Reconstruction on 4/23 presented to ED in respiratory distress requiring emergent tracheostomy on 7/1 and subsequent revision tracheostomy on 7/2, s/p PEG 7/19. Post op course complicated by seizures and concern for anoxic brain injury.    PLAN:  Neurologic:  - Phenobarbital load w/ 700mg / 130mg q 12  - Depakote 750mg BID, Keppra 1000mg BID  - Klonopin 1mg TID for myoclonic jerks  - EEG significant for hyperexcitability and GPDs: cortical storming and stimulus myoclonus  - MRI Brain WNL, no anoxic injury  - Eventually change phenobarb to PEG     Respiratory:   - s/p bronch and trach revision 7/2  - Mechanical ventilation AC/CMV    Cardiovascular:   - Hemodynamically stable off vasopressors   - Labetalol 100 BID for hypertension     Gastrointestinal/Nutrition:   - Nepro tube feeds via PEG at goal   - Protonix daily for stress ulcer ppx    Renal/Genitourinary:  - Higginbotham  - Monitor intake & output  - Trend BMP, electrolyte repletions PRN    Hematologic:  - Trend H/H  - c/w SQH for VTE prophylaxis    Infectious Disease:   - Afebrile  - Sputum Cx  with pseudomonas -> Cefepime (7/18- )  - Urine Cx with Citrobacter  - s/p course of Zosyn     Tubes/Lines/Drains:   - PEG  - Trach  - R PIV  - L PIV     Disposition: SICU   60 y/o male PMHx COVID+ (3/2021 - not hospitalized), PE (8/2020 - on Eliquis at home), CKD, w/ recent hospitalization at [facility] after seizure w/ lingual hematoma requiring emergency tracheostomy on 3/25/21 s/p Tracheal Resection and Reconstruction on 4/23 presented to ED in respiratory distress requiring emergent tracheostomy on 7/1 and subsequent revision tracheostomy on 7/2, s/p PEG 7/19. Post op course complicated by seizures and concern for anoxic brain injury.    24 Hour Interval/Overnight Events:      - TF restarted at midnight, meds via PEG restarted at 8pm  - NGT to be removed at 1400 7/20  - IV Phenobarbital hanged to PO  - 250 q 6 free water  - Call social work for placement    PLAN:  Neurologic:  - Phenobarbital load w/ 700mg / 130mg q 12 --> change to PO  - Depakote 750mg BID, Keppra 1000mg BID  - Klonopin 1mg TID for myoclonic jerks  - EEG significant for hyperexcitability and GPDs: cortical storming and stimulus myoclonus  - MRI Brain WNL, no anoxic injury  - Eventualy change phenobarb to PEG     Respiratory:   - s/p bronch and trach revision 7/2  - Mechanical ventilation AC/CMV    Cardiovascular:   - Hemodynamically stable off vasopressors   - Labetalol 100 BID for hypertension     Gastrointestinal/Nutrition:   - PEG @ Goal  - Protonix daily for stress ulcer ppx  - CTS (Zeltsman) PEG 7/19    Renal/Genitourinary:  - dc'ed fluids   - Higginbotham  - Monitor intake & output  - 250 q 6 free water    Hematologic:  - Trend H/H  - c/w SQH for VTE prophylaxis    Infectious Disease:   - Afebrile  - Sputum Cx with pseudomonas -> Cefepime (7/18- ) (5 days)  - Urine Cx with Citrobacter  - s/p course of Zosyn     Tubes/Lines/Drains:   - NGT  - R PIV  - L PIV     Disposition: SICU

## 2021-07-20 NOTE — PROGRESS NOTE ADULT - SUBJECTIVE AND OBJECTIVE BOX
Pt is s/p EGD/ PEG  The PEG is capped and the site is clean and dry  Plan for the PEG to be used for feeds 24 h after placement (14:00 7/20)  Thoracic surgery will follow up on 7/21 to loosen the bumper.

## 2021-07-20 NOTE — PROGRESS NOTE ADULT - ASSESSMENT
POST ANESTHESIA EVALUATION    61y Male POSTOP DAY 1 S/P esophagoscopy/duodenoscopy/PEG    MENTAL STATUS: Patient participation [  ] Awake     [  ] Arousable     [ x ] Sedated    AIRWAY PATENCY: [ x ] Satisfactory  [  ] Other:     Vital Signs Last 24 Hrs  T(C): 37.2 (20 Jul 2021 08:00), Max: 38 (20 Jul 2021 04:00)  T(F): 99 (20 Jul 2021 08:00), Max: 100.4 (20 Jul 2021 04:00)  HR: 88 (20 Jul 2021 10:00) (86 - 98)  BP: 103/73 (20 Jul 2021 10:00) (90/66 - 129/80)  BP(mean): 80 (20 Jul 2021 10:00) (69 - 98)  RR: 14 (20 Jul 2021 10:00) (12 - 25)  SpO2: 98% (20 Jul 2021 10:00) (88% - 100%)  I&O's Summary    19 Jul 2021 07:01  -  20 Jul 2021 07:00  --------------------------------------------------------  IN: 887 mL / OUT: 1885 mL / NET: -998 mL    20 Jul 2021 07:01  -  20 Jul 2021 10:33  --------------------------------------------------------  IN: 162 mL / OUT: 50 mL / NET: 112 mL          NAUSEA/ VOMITTING:  [ x ] NONE  [  ] CONTROLLED [  ] OTHER     PAIN: [ x ] CONTROLLED WITH CURRENT REGIMEN  [  ] OTHER    [ x ] NO APPARENT ANESTHESIA COMPLICATIONS      Comments:

## 2021-07-20 NOTE — PROGRESS NOTE ADULT - SUBJECTIVE AND OBJECTIVE BOX
7/6: EEg notable for possible status epilepticus. Planned for MRI today. Pt noted to be febrile yesterday with tmax of 102.9.   7/7: naeon. cxr appears to be slightly worse in effusions. pending mri  7/8: negative blood cultures, plan for MRI today   7/9: NAEON - MRI with no significant intracranial pathology.  7/10: NAEON  7/11: naeon   7/12: naeon. GOC today  7/13: naeon. waiting [facility] duscission  7/14:  naeon. waiting [facility] duscission  7/15: NAEON. c  7/16: per [facility] full measures until family arrives. to get peg  7/17: NAEON, febrile overnight  7/18: NAEON - plan for PEG sergio  7/19: No acute events overnight. On abx for fever, plan for PEG today.   7/20 No acute events overnight, PEG tube in place, had another episode of fever    ICU Vital Signs Last 24 Hrs  T(C): 38 (20 Jul 2021 04:00), Max: 38 (20 Jul 2021 04:00)  T(F): 100.4 (20 Jul 2021 04:00), Max: 100.4 (20 Jul 2021 04:00)  HR: 89 (20 Jul 2021 06:36) (86 - 99)  BP: 109/74 (20 Jul 2021 06:00) (90/66 - 134/88)  BP(mean): 81 (20 Jul 2021 06:00) (69 - 100)  ABP: --  ABP(mean): --  RR: 16 (20 Jul 2021 06:00) (12 - 25)  SpO2: 98% (20 Jul 2021 06:36) (88% - 100%)          P/E  Sedated, 6LPC, trach sutured to skin, trach ties. no bleeding  Neck soft, flat          A/P: 60 y/o male s/p tracheal resection 4/23/21 presents to [facility] ER w c/o stridor and SOB. Difficulty breating is worse at night and prevents him from being able to sleep.  He denies productive cough or fever.  This is his 3rd readmission after his tracheal resection  PT has PMHx COVID+ (3/2021 - not hospitalized), PE (8/2020 - on eliquis at home), CKD, w/ recent hospitalization at [facility] after seizure w/ lingual hematoma requiring emergency tracheostomy on 3/25/21.  Patient underwent Tracheal Resection and Reconstruction on 4/23. ENT injected vocal cords few months ago. He was last discharged 5/18/21. s/p emergent cric 7/1 now s/p revision trach, flex bronch and removal of stent 7/2 with post-op course complicated by seizures  - GOC: full code for now. Family would like PEG placement and second opinion   - Patient has a critical airway, only ENT to manipulate airway  - Routine trach care by nursing  - Suction PRN  - Extra 6LPC and 4LPC at bedside  - Vent per SICU  - Tube feeds per SICU   - Rest of care per SICU

## 2021-07-20 NOTE — PROGRESS NOTE ADULT - SUBJECTIVE AND OBJECTIVE BOX
Subjective: Remains intubated. PEG in use, with minimal s/s drainage around the tube    Vital Signs:  Vital Signs Last 24 Hrs  T(C): 37.2 (07-20-21 @ 08:00), Max: 38 (07-20-21 @ 04:00)  T(F): 99 (07-20-21 @ 08:00), Max: 100.4 (07-20-21 @ 04:00)  HR: 89 (07-20-21 @ 10:39) (86 - 98)  BP: 103/73 (07-20-21 @ 10:00) (90/66 - 129/80)  RR: 14 (07-20-21 @ 10:00) (12 - 25)  SpO2: 98% (07-20-21 @ 10:39) (88% - 100%) on (O2)                         8.1    15.30 )-----------( 214      ( 20 Jul 2021 03:50 )             26.7     07-20    152<H>  |  115<H>  |  92<H>  ----------------------------<  141<H>  4.4   |  17<L>  |  3.45<H>    Ca    9.7      20 Jul 2021 03:50  Phos  4.8     07-20  Mg     3.10     07-20      PT/INR - ( 19 Jul 2021 03:10 )   PT: 14.8 sec;   INR: 1.32 ratio         PTT - ( 19 Jul 2021 03:10 )  PTT:32.2 sec  ABG:  CXR:  MEDICATIONS  (STANDING):  ALBUTerol    90 MICROgram(s) HFA Inhaler 2 Puff(s) Inhalation every 4 hours  cefepime   IVPB 1000 milliGRAM(s) IV Intermittent every 24 hours  chlorhexidine 0.12% Liquid 15 milliLiter(s) Oral Mucosa every 12 hours  chlorhexidine 4% Liquid 1 Application(s) Topical <User Schedule>  clonazePAM  Tablet 1 milliGRAM(s) Oral three times a day  diVALproex Sprinkle 750 milliGRAM(s) Oral two times a day  heparin   Injectable 5000 Unit(s) SubCutaneous every 8 hours  labetalol 100 milliGRAM(s) Oral every 12 hours  levETIRAcetam  Solution 1000 milliGRAM(s) Oral two times a day  pantoprazole   Suspension 40 milliGRAM(s) Oral daily  petrolatum Ophthalmic Ointment 1 Application(s) Both EYES two times a day  PHENobarbital IVPB 130 milliGRAM(s) IV Intermittent every 12 hours  senna Syrup 5 milliLiter(s) Oral at bedtime    MEDICATIONS  (PRN):  acetaminophen    Suspension .. 650 milliGRAM(s) Oral every 6 hours PRN Temp greater or equal to 38C (100.4F)  sodium chloride 0.9% lock flush 10 milliLiter(s) IV Push every 1 hour PRN Pre/post blood products, medications, blood draw, and to maintain line patency    Pertinent Physical Exam  I&O's Summary    19 Jul 2021 07:01  -  20 Jul 2021 07:00  --------------------------------------------------------  IN: 887 mL / OUT: 1885 mL / NET: -998 mL    20 Jul 2021 07:01  -  20 Jul 2021 10:54  --------------------------------------------------------  IN: 162 mL / OUT: 50 mL / NET: 112 mL        Assessment  61y Male  w/ PAST MEDICAL & SURGICAL HISTORY:  HTN (hypertension)    Chronic kidney disease, unspecified CKD stage    Pulmonary embolus  diagnosed about 6 months ago incidentally found on imaging related to falling off a bike and chest pain    2019 novel coronavirus disease (COVID-19)  never hospitilized    Arthritis    Tracheal stenosis    History of total right knee replacement (TKR)  bilateral    History of tracheal stenosis    Patient is a 61y old  Male who presents with a chief complaint of sob (15 Jul 2021 16:05).  On (Date), patient underwent Emergency tracheostomy by cricothyroid membrane    Revision, tracheostomy scar    Simple revision, tracheostomy    Tracheal stent removal    Flexible bronchoscopy    EGD, with PEG    Postoperative course/issues:    PLAN    Cont local wound care  Flush tube Q4/PRN after each use with 30 ml lukewarm water

## 2021-07-20 NOTE — PROGRESS NOTE ADULT - SUBJECTIVE AND OBJECTIVE BOX
SICU Daily Progress Note  =====================================================  24 Hour Interval/Overnight Events:      - s/p PEG   - plan to change to phenobarb via PEG   - TF restarted at midnight, meds via PEG restarted at 8pm     HPI:  62 y/o male s/p tracheal resection 21 presents to [facility] ER w c/o stridor and SOB. Difficulty breating is worse at night and prevents him from being able to sleep.  He denies productive cough or fever.  This is his 3rd readmission after his tracheal resection  PT has PMHx COVID+ (3/2021 - not hospitalized), PE (2020 - on eliquis at home), CKD, w/ recent hospitalization at [facility] after seizure w/ lingual hematoma requiring emergency tracheostomy on 3/25/21.  Patient underwent Tracheal Resection and Reconstruction on . ENT injected vocal cords few months ago. He was last discharged 21.     PROCEDURE  ENT consulted in ED for surgical airway management. per anesthesia unable to intubate. emergent tracheostomy performed, size 5.5 tracheostomy tube w cuff. secured w silk sutures and neck tie    P/E  tracheostomy tube in place size 5.5, cuff up secored w silk sutures and neck tie  oozing from stoma, minimal  saturating 100%    A/P: 62 y/o male s/p tracheal resection 21 presents to [facility] ER w c/o stridor and SOB. Difficulty breating is worse at night and prevents him from being able to sleep.  He denies productive cough or fever.  This is his 3rd readmission after his tracheal resection  PT has PMHx COVID+ (3/2021 - not hospitalized), PE (2020 - on eliquis at home), CKD, w/ recent hospitalization at [facility] after seizure w/ lingual hematoma requiring emergency tracheostomy on 3/25/21.  Patient underwent Tracheal Resection and Reconstruction on . ENT injected vocal cords few months ago. He was last discharged 21. s/p emergent tracheostomy  size 5.5 trach tube  - Patient had a tracheostomy, size 5.5 (cuffed). Please perform standard tracheostomy care procedures.  - Regular suctioning with soft suction catheter q1  - Humidified air to tracheostomy tube  - CTICU and CTS consult w [facility] team  - per discussion w CTS, to take to OR tomorow for bronch and trach revision.  - Call or page us if any issues        (2021 22:19)      --------------------------------------------------------------------------------------  Allergies: No Known Allergies      MEDICATIONS:   --------------------------------------------------------------------------------------  Neurologic Medications  acetaminophen    Suspension .. 650 milliGRAM(s) Oral every 6 hours PRN Temp greater or equal to 38C (100.4F)  clonazePAM  Tablet 1 milliGRAM(s) Oral three times a day  diVALproex Sprinkle 750 milliGRAM(s) Oral two times a day  levETIRAcetam  Solution 1000 milliGRAM(s) Oral two times a day  PHENobarbital IVPB 130 milliGRAM(s) IV Intermittent every 12 hours    Respiratory Medications  ALBUTerol    90 MICROgram(s) HFA Inhaler 2 Puff(s) Inhalation every 4 hours    Cardiovascular Medications  labetalol 100 milliGRAM(s) Oral every 12 hours    Gastrointestinal Medications  dextrose 5% + sodium chloride 0.45%. 1000 milliLiter(s) IV Continuous <Continuous>  pantoprazole   Suspension 40 milliGRAM(s) Oral daily  senna Syrup 5 milliLiter(s) Oral at bedtime  sodium chloride 0.9% lock flush 10 milliLiter(s) IV Push every 1 hour PRN Pre/post blood products, medications, blood draw, and to maintain line patency    Genitourinary Medications    Hematologic/Oncologic Medications  heparin   Injectable 5000 Unit(s) SubCutaneous every 8 hours    Antimicrobial/Immunologic Medications  cefepime   IVPB 1000 milliGRAM(s) IV Intermittent every 24 hours    Endocrine/Metabolic Medications    Topical/Other Medications  chlorhexidine 0.12% Liquid 15 milliLiter(s) Oral Mucosa every 12 hours  chlorhexidine 4% Liquid 1 Application(s) Topical <User Schedule>  petrolatum Ophthalmic Ointment 1 Application(s) Both EYES two times a day    --------------------------------------------------------------------------------------    VITAL SIGNS, INS/OUTS (last 24 hours):  --------------------------------------------------------------------------------------  T(C): 36.9 (21 @ 20:00), Max: 37.7 (21 @ 04:00)  HR: 93 (21 @ 23:00) (86 - 99)  BP: 128/90 (21 @ 23:00) (94/66 - 134/88)  BP(mean): 97 (21 @ 23:00) (69 - 100)  ABP: --  ABP(mean): --  RR: 18 (21 @ 23:00) (12 - 25)  SpO2: 98% (21 @ 23:00) (88% - 100%)  Wt(kg): --  CVP(mm Hg): --  CI: --  CAPILLARY BLOOD GLUCOSE       N/A       @ 07:01  -   @ 07:00  --------------------------------------------------------  IN:    Enteral Tube Flush: 120 mL    IV PiggyBack: 100 mL    Nepro with Carb Steady: 544 mL  Total IN: 764 mL    OUT:    Indwelling Catheter - Urethral (mL): 1565 mL  Total OUT: 1565 mL    Total NET: -801 mL       @ 07:01  -   @ 00:25  --------------------------------------------------------  IN:    dextrose 5% + sodium chloride 0.45%: 525 mL    Enteral Tube Flush: 90 mL    Nepro with Carb Steady: 34 mL  Total IN: 649 mL    OUT:    Indwelling Catheter - Urethral (mL): 1110 mL    LPGD (Low Profile Gastrostomy Device) (mL): 235 mL  Total OUT: 1345 mL    Total NET: -696 mL        --------------------------------------------------------------------------------------    EXAM    NEUROLOGY  Exam: Normal, NAD, sedated     HEENT  Exam: Normocephalic, atraumatic, EOMI. Intubated.     RESPIRATORY  Exam: Lungs clear to auscultation, Normal expansion/effort.  Mode: AC/ CMV (Assist Control/ Continuous Mandatory Ventilation)  RR (machine): 18  TV (machine): 400  FiO2: 30  PEEP: 5  ITime: 0.94  MAP: 10  PIP: 23    CARDIOVASCULAR  Exam: S1, S2.  Regular rate and rhythm.      GI/NUTRITION  Exam: Abdomen soft, Non-tender, Non-distended.   Current Diet: Diet, NPO with Tube Feed      METABOLIC/FLUIDS/ELECTROLYTES  dextrose 5% + sodium chloride 0.45%. 1000 milliLiter(s) IV Continuous <Continuous>      HEMATOLOGIC  [x] VTE Prophylaxis: heparin   Injectable 5000 Unit(s) SubCutaneous every 8 hours      LABS  --------------------------------------------------------------------------------------  CBC ( @ 03:10)                          7.1<L>                   17.28<H>  )--------------(  245        --    % Neuts, --    % Lymphs, ANC: --                              23.1<L>    BMP ( @ 03:10)       148<H>  |  113<H>  |  92<H> 			Ca++ 1.29    Ca 9.7          ---------------------------------( 113<H>		Mg 3.10<H>       4.6     |  17<L>   |  3.51<H>			Ph 4.8<H>  BMP ( @ 16:18)       150<H>  |  113<H>  |  91<H> 			Ca++ --      Ca 9.9          ---------------------------------( 162<H>		Mg 2.90<H>       4.7     |  18<L>   |  3.47<H>			Ph 4.7<H>      Coags ( @ 03:10)  aPTT 32.2 / INR 1.32<H> / PT 14.8<H>          Urinalysis ( @ 08:29):     Color: Light Yellow / Appearance: Clear / S.018 / pH: 6.0 / Gluc: Negative / Ketones: Negative / Bili: Negative / Urobili: <2 mg/dL / Protein :100 mg/dL<!> / Nitrites: Negative / Leuk.Est: Negative / RBC: 2-5 / WBC: 2-5 / Sq Epi:  / Non Sq Epi: Occasional / Bacteria        -> .Blood Blood-Venous Culture ( @ 10:19)     NG    NG    No growth to date.    -> .Sputum Sputum trap Culture ( @ 09:00)       Few polymorphonuclear leukocytes per low power field  No Squamous epithelial cells per low power field  Rare Gram Variable Rods per oil power field    Pseudomonas aeruginosa  Pseudomonas aeruginosa    Moderate Pseudomonas aeruginosa  Moderate Pseudomonas aeruginosa #2  Normal Respiratory Taylor absent      --------------------------------------------------------------------------------------

## 2021-07-21 LAB
ANION GAP SERPL CALC-SCNC: 20 MMOL/L — HIGH (ref 7–14)
BUN SERPL-MCNC: 97 MG/DL — HIGH (ref 7–23)
CALCIUM SERPL-MCNC: 9.6 MG/DL — SIGNIFICANT CHANGE UP (ref 8.4–10.5)
CHLORIDE SERPL-SCNC: 113 MMOL/L — HIGH (ref 98–107)
CO2 SERPL-SCNC: 15 MMOL/L — LOW (ref 22–31)
CREAT SERPL-MCNC: 3.43 MG/DL — HIGH (ref 0.5–1.3)
CULTURE RESULTS: SIGNIFICANT CHANGE UP
CULTURE RESULTS: SIGNIFICANT CHANGE UP
GLUCOSE SERPL-MCNC: 131 MG/DL — HIGH (ref 70–99)
HCT VFR BLD CALC: 28.7 % — LOW (ref 39–50)
HGB BLD-MCNC: 8.4 G/DL — LOW (ref 13–17)
MAGNESIUM SERPL-MCNC: 3.1 MG/DL — HIGH (ref 1.6–2.6)
MCHC RBC-ENTMCNC: 28.7 PG — SIGNIFICANT CHANGE UP (ref 27–34)
MCHC RBC-ENTMCNC: 29.3 GM/DL — LOW (ref 32–36)
MCV RBC AUTO: 98 FL — SIGNIFICANT CHANGE UP (ref 80–100)
NRBC # BLD: 0 /100 WBCS — SIGNIFICANT CHANGE UP
NRBC # FLD: 0.02 K/UL — HIGH
PHENOBARB SERPL-MCNC: 31.8 UG/ML — SIGNIFICANT CHANGE UP (ref 10–40)
PHOSPHATE SERPL-MCNC: 5.8 MG/DL — HIGH (ref 2.5–4.5)
PLATELET # BLD AUTO: 191 K/UL — SIGNIFICANT CHANGE UP (ref 150–400)
POTASSIUM SERPL-MCNC: 4.8 MMOL/L — SIGNIFICANT CHANGE UP (ref 3.5–5.3)
POTASSIUM SERPL-SCNC: 4.8 MMOL/L — SIGNIFICANT CHANGE UP (ref 3.5–5.3)
RBC # BLD: 2.93 M/UL — LOW (ref 4.2–5.8)
RBC # FLD: 16.3 % — HIGH (ref 10.3–14.5)
SODIUM SERPL-SCNC: 148 MMOL/L — HIGH (ref 135–145)
SPECIMEN SOURCE: SIGNIFICANT CHANGE UP
SPECIMEN SOURCE: SIGNIFICANT CHANGE UP
WBC # BLD: 16.13 K/UL — HIGH (ref 3.8–10.5)
WBC # FLD AUTO: 16.13 K/UL — HIGH (ref 3.8–10.5)

## 2021-07-21 PROCEDURE — 99291 CRITICAL CARE FIRST HOUR: CPT

## 2021-07-21 RX ORDER — SODIUM CHLORIDE 9 MG/ML
500 INJECTION INTRAMUSCULAR; INTRAVENOUS; SUBCUTANEOUS ONCE
Refills: 0 | Status: COMPLETED | OUTPATIENT
Start: 2021-07-21 | End: 2021-07-21

## 2021-07-21 RX ORDER — PHENOBARBITAL 60 MG
64.8 TABLET ORAL
Refills: 0 | Status: DISCONTINUED | OUTPATIENT
Start: 2021-07-21 | End: 2021-07-23

## 2021-07-21 RX ADMIN — Medication 1 APPLICATION(S): at 05:10

## 2021-07-21 RX ADMIN — LEVETIRACETAM 1000 MILLIGRAM(S): 250 TABLET, FILM COATED ORAL at 05:08

## 2021-07-21 RX ADMIN — LEVETIRACETAM 1000 MILLIGRAM(S): 250 TABLET, FILM COATED ORAL at 17:19

## 2021-07-21 RX ADMIN — Medication 1 MILLIGRAM(S): at 05:09

## 2021-07-21 RX ADMIN — HEPARIN SODIUM 5000 UNIT(S): 5000 INJECTION INTRAVENOUS; SUBCUTANEOUS at 05:00

## 2021-07-21 RX ADMIN — DIVALPROEX SODIUM 750 MILLIGRAM(S): 500 TABLET, DELAYED RELEASE ORAL at 05:09

## 2021-07-21 RX ADMIN — Medication 129.6 MILLIGRAM(S): at 05:11

## 2021-07-21 RX ADMIN — CEFEPIME 100 MILLIGRAM(S): 1 INJECTION, POWDER, FOR SOLUTION INTRAMUSCULAR; INTRAVENOUS at 17:18

## 2021-07-21 RX ADMIN — PANTOPRAZOLE SODIUM 40 MILLIGRAM(S): 20 TABLET, DELAYED RELEASE ORAL at 13:00

## 2021-07-21 RX ADMIN — SODIUM CHLORIDE 500 MILLILITER(S): 9 INJECTION INTRAMUSCULAR; INTRAVENOUS; SUBCUTANEOUS at 17:18

## 2021-07-21 RX ADMIN — Medication 650 MILLIGRAM(S): at 14:00

## 2021-07-21 RX ADMIN — CHLORHEXIDINE GLUCONATE 1 APPLICATION(S): 213 SOLUTION TOPICAL at 05:09

## 2021-07-21 RX ADMIN — Medication 1 MILLIGRAM(S): at 13:01

## 2021-07-21 RX ADMIN — Medication 1 APPLICATION(S): at 17:19

## 2021-07-21 RX ADMIN — CHLORHEXIDINE GLUCONATE 15 MILLILITER(S): 213 SOLUTION TOPICAL at 17:20

## 2021-07-21 RX ADMIN — SENNA PLUS 5 MILLILITER(S): 8.6 TABLET ORAL at 21:05

## 2021-07-21 RX ADMIN — HEPARIN SODIUM 5000 UNIT(S): 5000 INJECTION INTRAVENOUS; SUBCUTANEOUS at 21:05

## 2021-07-21 RX ADMIN — CHLORHEXIDINE GLUCONATE 15 MILLILITER(S): 213 SOLUTION TOPICAL at 05:09

## 2021-07-21 RX ADMIN — HEPARIN SODIUM 5000 UNIT(S): 5000 INJECTION INTRAVENOUS; SUBCUTANEOUS at 13:01

## 2021-07-21 RX ADMIN — Medication 650 MILLIGRAM(S): at 13:00

## 2021-07-21 RX ADMIN — DIVALPROEX SODIUM 750 MILLIGRAM(S): 500 TABLET, DELAYED RELEASE ORAL at 17:19

## 2021-07-21 RX ADMIN — Medication 64.8 MILLIGRAM(S): at 17:20

## 2021-07-21 RX ADMIN — Medication 1 MILLIGRAM(S): at 21:05

## 2021-07-21 NOTE — PROGRESS NOTE ADULT - ASSESSMENT
60 y/o male PMHx COVID+ (3/2021 - not hospitalized), PE (8/2020 - on Eliquis at home), CKD, w/ recent hospitalization at [facility] after seizure w/ lingual hematoma requiring emergency tracheostomy on 3/25/21 s/p Tracheal Resection and Reconstruction on 4/23 presented to ED in respiratory distress requiring emergent tracheostomy on 7/1 and subsequent revision tracheostomy on 7/2, s/p PEG 7/19. Post op course complicated by seizures and concern for anoxic brain injury.    PLAN:  Neurologic:  - Phenobarbital load w/ 700mg / 130mg q 12 --> changed to PO  - Depakote 750mg BID, Keppra 1000mg BID  - Klonopin 1mg TID for myoclonic jerks  - EEG significant for hyperexcitability and GPDs: cortical storming and stimulus myoclonus  - MRI Brain WNL, no anoxic injury    Respiratory:   - s/p bronch and trach revision 7/2  - Mechanical ventilation AC/CMV    Cardiovascular:   - Hemodynamically stable off vasopressors   - Labetalol 100 BID for hypertension     Gastrointestinal/Nutrition:   - PEG w/ TF@ Goal  - Protonix daily for stress ulcer ppx  - CTS (Zeltsman) PEG 7/19    Renal/Genitourinary:  - Monitor intake & output, UOP  - Lester, remove lester 7/21?  - 250 q 6 free water    Hematologic:  - Trend H/H  - c/w SQH for VTE prophylaxis    Infectious Disease:   - Afebrile  - Sputum Cx with pseudomonas -> Cefepime (7/18- ) (5 days)  - Urine Cx with Citrobacter  - s/p course of Zosyn     Tubes/Lines/Drains:   - NGT  - R PIV  - L PIV     Disposition: SICU, Social work called, aware on KEIKO/getting pt to next facility 62 y/o male PMHx COVID+ (3/2021 - not hospitalized), PE (8/2020 - on Eliquis at home), CKD, w/ recent hospitalization at [facility] after seizure w/ lingual hematoma requiring emergency tracheostomy on 3/25/21 s/p Tracheal Resection and Reconstruction on 4/23 presented to ED in respiratory distress requiring emergent tracheostomy on 7/1 and subsequent revision tracheostomy on 7/2, s/p PEG 7/19. Post op course complicated by seizures and concern for anoxic brain injury.    PLAN:  Neurologic:  - Phenobarbital 65mg PO BID  - Depakote 750mg BID, Keppra 1000mg BID  - Klonopin 1mg TID for myoclonic jerks  - EEG significant for hyperexcitability and GPDs: cortical storming and stimulus myoclonus  - MRI Brain WNL, no anoxic injury    Respiratory:   - s/p bronch and trach revision 7/2  - Mechanical ventilation AC/CMV    Cardiovascular:   - Hemodynamically stable off vasopressors   - Labetalol 100 BID for hypertension     Gastrointestinal/Nutrition:   - PEG w/ TF@ Goal  - Protonix daily for stress ulcer ppx  - CTS (Zeltsman) PEG 7/19    Renal/Genitourinary:  - Monitor intake & output, UOP  - 250 q 6 free water    Hematologic:  - Trend H/H  - c/w SQH for VTE prophylaxis    Infectious Disease:   - Afebrile  - Sputum Cx with pseudomonas -> Cefepime (7/18- ) (7 days)  - Urine Cx with Citrobacter  - s/p course of Zosyn     Tubes/Lines/Drains:   - NGT  - R PIV  - L PIV     Disposition: SICU, Social work called, aware on KEIKO/getting pt to next facility

## 2021-07-21 NOTE — PROGRESS NOTE ADULT - SUBJECTIVE AND OBJECTIVE BOX
SICU Daily Progress Note  =====================================================  24 Hour Interval/Overnight Events:      - NGT removed   - IV Phenobarbital changed to PO  - 250 q 6 free water  - Social work for placement     HPI:  60 y/o male s/p tracheal resection 21 presents to [facility] ER w c/o stridor and SOB. Difficulty breating is worse at night and prevents him from being able to sleep.  He denies productive cough or fever.  This is his 3rd readmission after his tracheal resection  PT has PMHx COVID+ (3/2021 - not hospitalized), PE (2020 - on eliquis at home), CKD, w/ recent hospitalization at [facility] after seizure w/ lingual hematoma requiring emergency tracheostomy on 3/25/21.  Patient underwent Tracheal Resection and Reconstruction on . ENT injected vocal cords few months ago. He was last discharged 21.     PROCEDURE  ENT consulted in ED for surgical airway management. per anesthesia unable to intubate. emergent tracheostomy performed, size 5.5 tracheostomy tube w cuff. secured w silk sutures and neck tie    P/E  tracheostomy tube in place size 5.5, cuff up secored w silk sutures and neck tie  oozing from stoma, minimal  saturating 100%    A/P: 60 y/o male s/p tracheal resection 21 presents to [facility] ER w c/o stridor and SOB. Difficulty breating is worse at night and prevents him from being able to sleep.  He denies productive cough or fever.  This is his 3rd readmission after his tracheal resection  PT has PMHx COVID+ (3/2021 - not hospitalized), PE (2020 - on eliquis at home), CKD, w/ recent hospitalization at [facility] after seizure w/ lingual hematoma requiring emergency tracheostomy on 3/25/21.  Patient underwent Tracheal Resection and Reconstruction on . ENT injected vocal cords few months ago. He was last discharged 21. s/p emergent tracheostomy  size 5.5 trach tube  - Patient had a tracheostomy, size 5.5 (cuffed). Please perform standard tracheostomy care procedures.  - Regular suctioning with soft suction catheter q1  - Humidified air to tracheostomy tube  - CTICU and CTS consult w [facility] team  - per discussion w CTS, to take to OR tomorow for bronch and trach revision.  - Call or page us if any issues        (2021 22:19)      --------------------------------------------------------------------------------------  Allergies: No Known Allergies      MEDICATIONS:   --------------------------------------------------------------------------------------  Neurologic Medications  acetaminophen    Suspension .. 650 milliGRAM(s) Oral every 6 hours PRN Temp greater or equal to 38C (100.4F)  clonazePAM  Tablet 1 milliGRAM(s) Oral three times a day  diVALproex Sprinkle 750 milliGRAM(s) Oral two times a day  levETIRAcetam  Solution 1000 milliGRAM(s) Oral two times a day  PHENobarbital 129.6 milliGRAM(s) Oral two times a day    Respiratory Medications  ALBUTerol    90 MICROgram(s) HFA Inhaler 2 Puff(s) Inhalation every 4 hours    Cardiovascular Medications  labetalol 100 milliGRAM(s) Oral every 12 hours    Gastrointestinal Medications  pantoprazole   Suspension 40 milliGRAM(s) Oral daily  senna Syrup 5 milliLiter(s) Oral at bedtime  sodium chloride 0.9% lock flush 10 milliLiter(s) IV Push every 1 hour PRN Pre/post blood products, medications, blood draw, and to maintain line patency    Genitourinary Medications    Hematologic/Oncologic Medications  heparin   Injectable 5000 Unit(s) SubCutaneous every 8 hours    Antimicrobial/Immunologic Medications  cefepime   IVPB 1000 milliGRAM(s) IV Intermittent every 24 hours    Endocrine/Metabolic Medications    Topical/Other Medications  chlorhexidine 0.12% Liquid 15 milliLiter(s) Oral Mucosa every 12 hours  chlorhexidine 4% Liquid 1 Application(s) Topical <User Schedule>  petrolatum Ophthalmic Ointment 1 Application(s) Both EYES two times a day    --------------------------------------------------------------------------------------    VITAL SIGNS, INS/OUTS (last 24 hours):  --------------------------------------------------------------------------------------  T(C): 37.2 (21 @ 00:00), Max: 38 (21 @ 04:00)  HR: 93 (21 @ 00:00) (86 - 97)  BP: 115/72 (21 @ 00:00) (90/59 - 126/73)  BP(mean): 81 (21 @ 00:00) (65 - 94)  ABP: --  ABP(mean): --  RR: 18 (21 @ 00:00) (16 - 21)  SpO2: 98% (21 @ 00:00) (95% - 99%)  Wt(kg): --  CVP(mm Hg): --  CI: --  CAPILLARY BLOOD GLUCOSE       N/A       @ 07:01  -   @ 07:00  --------------------------------------------------------  IN:    dextrose 5% + sodium chloride 0.45%: 525 mL    Enteral Tube Flush: 90 mL    Nepro with Carb Steady: 272 mL  Total IN: 887 mL    OUT:    Indwelling Catheter - Urethral (mL): 1640 mL    LPGD (Low Profile Gastrostomy Device) (mL): 235 mL    Rectal Tube (mL): 10 mL  Total OUT: 1885 mL    Total NET: -998 mL       @ 07:01  -   @ 00:17  --------------------------------------------------------  IN:    Enteral Tube Flush: 730 mL    Free Water: 250 mL    IV PiggyBack: 50 mL    Nepro with Carb Steady: 578 mL  Total IN: 1608 mL    OUT:    Indwelling Catheter - Urethral (mL): 920 mL    Rectal Tube (mL): 50 mL  Total OUT: 970 mL    Total NET: 638 mL        --------------------------------------------------------------------------------------    EXAM    NEUROLOGY  Exam: Sedated    HEENT  Exam: Normocephalic, atraumatic, EOMI. ETT in place.     RESPIRATORY  Exam: Lungs clear to auscultation, Normal expansion/effort.  Mode: AC/ CMV (Assist Control/ Continuous Mandatory Ventilation), RR (machine): 18, TV (machine): 400, FiO2: 30, PEEP: 5, ITime: 0.94, MAP: 10, PIP: 23    CARDIOVASCULAR  Exam: S1, S2.  Regular rate and rhythm.      GI/NUTRITION  Exam: Abdomen soft, Non-tender, Non-distended. G-tube secured.  Current Diet: Diet, NPO with Tube Feed        METABOLIC/FLUIDS/ELECTROLYTES      HEMATOLOGIC  [x] VTE Prophylaxis: heparin   Injectable 5000 Unit(s) SubCutaneous every 8 hours      LABS  --------------------------------------------------------------------------------------  CBC ( @ 03:50)                          8.1<L>                   15.30<H>  )--------------(  214        --    % Neuts, --    % Lymphs, ANC: --                              26.7<L>    BMP ( @ 03:50)       152<H>  |  115<H>  |  92<H> 			Ca++ --      Ca 9.7          ---------------------------------( 141<H>		Mg 3.10<H>       4.4     |  17<L>   |  3.45<H>			Ph 4.8<H>              Urinalysis ( @ 08:29):     Color: Light Yellow / Appearance: Clear / S.018 / pH: 6.0 / Gluc: Negative / Ketones: Negative / Bili: Negative / Urobili: <2 mg/dL / Protein :100 mg/dL<!> / Nitrites: Negative / Leuk.Est: Negative / RBC: 2-5 / WBC: 2-5 / Sq Epi:  / Non Sq Epi: Occasional / Bacteria        -> .Blood Blood-Venous Culture ( @ 10:19)     NG    NG    No growth to date.    -> .Sputum Sputum trap Culture ( @ 09:00)       Few polymorphonuclear leukocytes per low power field  No Squamous epithelial cells per low power field  Rare Gram Variable Rods per oil power field    Pseudomonas aeruginosa  Pseudomonas aeruginosa    Moderate Pseudomonas aeruginosa  Moderate Pseudomonas aeruginosa #2  Normal Respiratory Taylor absent      --------------------------------------------------------------------------------------

## 2021-07-21 NOTE — PROGRESS NOTE ADULT - SUBJECTIVE AND OBJECTIVE BOX
7/6: EEg notable for possible status epilepticus. Planned for MRI today. Pt noted to be febrile yesterday with tmax of 102.9.   7/7: naeon. cxr appears to be slightly worse in effusions. pending mri  7/8: negative blood cultures, plan for MRI today   7/9: NAEON - MRI with no significant intracranial pathology.  7/10: NAEON  7/11: naeon   7/12: naeon. GOC today  7/13: naeon. waiting [facility] duscission  7/14:  naeon. waiting [facility] duscission  7/15: NAEON. [facility]  7/16: per [facility] full measures until family arrives. to get peg  7/17: NAEON, febrile overnight  7/18: NAEON - plan for PEG sergio  7/19: No acute events overnight. On abx for fever, plan for PEG today.   7/20 No acute events overnight, PEG tube in place, had another episode of fever  7/21: NAEON, follow up social work for placement    Vital Signs Last 24 Hrs  T(C): 37.8 (21 Jul 2021 04:00), Max: 37.8 (21 Jul 2021 04:00)  T(F): 100 (21 Jul 2021 04:00), Max: 100 (21 Jul 2021 04:00)  HR: 98 (21 Jul 2021 07:00) (86 - 99)  BP: 119/81 (21 Jul 2021 07:00) (90/59 - 130/85)  BP(mean): 88 (21 Jul 2021 07:00) (61 - 92)  RR: 19 (21 Jul 2021 07:00) (18 - 21)  SpO2: 98% (21 Jul 2021 07:00) (95% - 100%)        P/E  Sedated, 6LPC, trach sutured to skin, trach ties. no bleeding  Neck soft, flat          A/P: 60 y/o male s/p tracheal resection 4/23/21 presents to [facility] ER w c/o stridor and SOB. Difficulty breating is worse at night and prevents him from being able to sleep.  He denies productive cough or fever.  This is his 3rd readmission after his tracheal resection  PT has PMHx COVID+ (3/2021 - not hospitalized), PE (8/2020 - on eliquis at home), CKD, w/ recent hospitalization at [facility] after seizure w/ lingual hematoma requiring emergency tracheostomy on 3/25/21.  Patient underwent Tracheal Resection and Reconstruction on 4/23. ENT injected vocal cords few months ago. He was last discharged 5/18/21. s/p emergent cric 7/1 now s/p revision trach, flex bronch and removal of stent 7/2 with post-op course complicated by seizures  - follow up social work   - Patient has a critical airway, only ENT to manipulate airway  - Routine trach care by nursing  - Suction PRN  - Extra 6LPC and 4LPC at bedside  - Vent per SICU  - Tube feeds per SICU   - Rest of care per SICU

## 2021-07-21 NOTE — PROGRESS NOTE ADULT - SUBJECTIVE AND OBJECTIVE BOX
7/6: EEg notable for possible status epilepticus. Planned for MRI today. Pt noted to be febrile yesterday with tmax of 102.9.   7/7: naeon. cxr appears to be slightly worse in effusions. pending mri  7/8: negative blood cultures, plan for MRI today   7/9: NAEON - MRI with no significant intracranial pathology.  7/10: NAEON  7/11: naeon   7/12: naeon. GOC today  7/13: naeon. waiting [facility] duscission  7/14:  naeon. waiting [facility] duscission  7/15: NAEON. c  7/16: per [facility] full measures until family arrives. to get peg  7/17: NAEON, febrile overnight  7/18: NAEON - plan for PEG sergio  7/19: No acute events overnight. On abx for fever, plan for PEG today.   7/20 No acute events overnight, PEG tube in place, had another episode of fever  7/21: NAEON.    ICU Vital Signs Last 24 Hrs  T(C): 37.8 (21 Jul 2021 04:00), Max: 37.8 (21 Jul 2021 04:00)  T(F): 100 (21 Jul 2021 04:00), Max: 100 (21 Jul 2021 04:00)  HR: 98 (21 Jul 2021 07:00) (86 - 99)  BP: 119/81 (21 Jul 2021 07:00) (90/59 - 130/85)  BP(mean): 88 (21 Jul 2021 07:00) (61 - 92)  ABP: --  ABP(mean): --  RR: 19 (21 Jul 2021 07:00) (18 - 21)  SpO2: 98% (21 Jul 2021 07:00) (95% - 100%)      P/E  Sedated, 6LPC, trach sutured to skin, trach ties. no bleeding  Neck soft, flat      A/P: 60 y/o male s/p tracheal resection 4/23/21 presents to [facility] ER w c/o stridor and SOB. Difficulty breating is worse at night and prevents him from being able to sleep.  He denies productive cough or fever.  This is his 3rd readmission after his tracheal resection  PT has PMHx COVID+ (3/2021 - not hospitalized), PE (8/2020 - on eliquis at home), CKD, w/ recent hospitalization at [facility] after seizure w/ lingual hematoma requiring emergency tracheostomy on 3/25/21.  Patient underwent Tracheal Resection and Reconstruction on 4/23. ENT injected vocal cords few months ago. He was last discharged 5/18/21. s/p emergent cric 7/1 now s/p revision trach, flex bronch and removal of stent 7/2 with post-op course complicated by seizures  - GOC: full code for now. s/p PEG   - Patient has a critical airway, only ENT to manipulate airway  - Routine trach care by nursing  - Suction PRN  - Extra 6LPC and 4LPC at bedside  - Vent per SICU  - Tube feeds per SICU   - Rest of care per SICU

## 2021-07-22 LAB
ANION GAP SERPL CALC-SCNC: 17 MMOL/L — HIGH (ref 7–14)
BUN SERPL-MCNC: 99 MG/DL — HIGH (ref 7–23)
CALCIUM SERPL-MCNC: 9.4 MG/DL — SIGNIFICANT CHANGE UP (ref 8.4–10.5)
CHLORIDE SERPL-SCNC: 112 MMOL/L — HIGH (ref 98–107)
CO2 SERPL-SCNC: 16 MMOL/L — LOW (ref 22–31)
CREAT SERPL-MCNC: 3.46 MG/DL — HIGH (ref 0.5–1.3)
GLUCOSE SERPL-MCNC: 139 MG/DL — HIGH (ref 70–99)
HCT VFR BLD CALC: 25.7 % — LOW (ref 39–50)
HGB BLD-MCNC: 7.7 G/DL — LOW (ref 13–17)
MAGNESIUM SERPL-MCNC: 3 MG/DL — HIGH (ref 1.6–2.6)
MCHC RBC-ENTMCNC: 28.7 PG — SIGNIFICANT CHANGE UP (ref 27–34)
MCHC RBC-ENTMCNC: 30 GM/DL — LOW (ref 32–36)
MCV RBC AUTO: 95.9 FL — SIGNIFICANT CHANGE UP (ref 80–100)
NRBC # BLD: 0 /100 WBCS — SIGNIFICANT CHANGE UP
NRBC # FLD: 0 K/UL — SIGNIFICANT CHANGE UP
PHOSPHATE SERPL-MCNC: 5.6 MG/DL — HIGH (ref 2.5–4.5)
PLATELET # BLD AUTO: 193 K/UL — SIGNIFICANT CHANGE UP (ref 150–400)
POTASSIUM SERPL-MCNC: 4.5 MMOL/L — SIGNIFICANT CHANGE UP (ref 3.5–5.3)
POTASSIUM SERPL-SCNC: 4.5 MMOL/L — SIGNIFICANT CHANGE UP (ref 3.5–5.3)
RBC # BLD: 2.68 M/UL — LOW (ref 4.2–5.8)
RBC # FLD: 16.7 % — HIGH (ref 10.3–14.5)
SODIUM SERPL-SCNC: 145 MMOL/L — SIGNIFICANT CHANGE UP (ref 135–145)
WBC # BLD: 13.52 K/UL — HIGH (ref 3.8–10.5)
WBC # FLD AUTO: 13.52 K/UL — HIGH (ref 3.8–10.5)

## 2021-07-22 PROCEDURE — 71045 X-RAY EXAM CHEST 1 VIEW: CPT | Mod: 26

## 2021-07-22 PROCEDURE — 99291 CRITICAL CARE FIRST HOUR: CPT

## 2021-07-22 RX ORDER — SODIUM CHLORIDE 9 MG/ML
500 INJECTION, SOLUTION INTRAVENOUS ONCE
Refills: 0 | Status: COMPLETED | OUTPATIENT
Start: 2021-07-22 | End: 2021-07-22

## 2021-07-22 RX ADMIN — Medication 1 MILLIGRAM(S): at 14:22

## 2021-07-22 RX ADMIN — HEPARIN SODIUM 5000 UNIT(S): 5000 INJECTION INTRAVENOUS; SUBCUTANEOUS at 14:22

## 2021-07-22 RX ADMIN — Medication 64.8 MILLIGRAM(S): at 06:08

## 2021-07-22 RX ADMIN — CHLORHEXIDINE GLUCONATE 1 APPLICATION(S): 213 SOLUTION TOPICAL at 06:10

## 2021-07-22 RX ADMIN — HEPARIN SODIUM 5000 UNIT(S): 5000 INJECTION INTRAVENOUS; SUBCUTANEOUS at 06:08

## 2021-07-22 RX ADMIN — Medication 650 MILLIGRAM(S): at 06:09

## 2021-07-22 RX ADMIN — LEVETIRACETAM 1000 MILLIGRAM(S): 250 TABLET, FILM COATED ORAL at 06:09

## 2021-07-22 RX ADMIN — Medication 650 MILLIGRAM(S): at 06:39

## 2021-07-22 RX ADMIN — Medication 1 MILLIGRAM(S): at 06:09

## 2021-07-22 RX ADMIN — DIVALPROEX SODIUM 750 MILLIGRAM(S): 500 TABLET, DELAYED RELEASE ORAL at 17:17

## 2021-07-22 RX ADMIN — SENNA PLUS 5 MILLILITER(S): 8.6 TABLET ORAL at 22:58

## 2021-07-22 RX ADMIN — LEVETIRACETAM 1000 MILLIGRAM(S): 250 TABLET, FILM COATED ORAL at 17:16

## 2021-07-22 RX ADMIN — Medication 64.8 MILLIGRAM(S): at 17:25

## 2021-07-22 RX ADMIN — CHLORHEXIDINE GLUCONATE 15 MILLILITER(S): 213 SOLUTION TOPICAL at 06:08

## 2021-07-22 RX ADMIN — Medication 1 APPLICATION(S): at 18:10

## 2021-07-22 RX ADMIN — SODIUM CHLORIDE 500 MILLILITER(S): 9 INJECTION, SOLUTION INTRAVENOUS at 07:45

## 2021-07-22 RX ADMIN — PANTOPRAZOLE SODIUM 40 MILLIGRAM(S): 20 TABLET, DELAYED RELEASE ORAL at 13:00

## 2021-07-22 RX ADMIN — Medication 1 MILLIGRAM(S): at 22:58

## 2021-07-22 RX ADMIN — HEPARIN SODIUM 5000 UNIT(S): 5000 INJECTION INTRAVENOUS; SUBCUTANEOUS at 22:59

## 2021-07-22 RX ADMIN — DIVALPROEX SODIUM 750 MILLIGRAM(S): 500 TABLET, DELAYED RELEASE ORAL at 06:11

## 2021-07-22 RX ADMIN — Medication 100 MILLIGRAM(S): at 06:09

## 2021-07-22 RX ADMIN — CEFEPIME 100 MILLIGRAM(S): 1 INJECTION, POWDER, FOR SOLUTION INTRAMUSCULAR; INTRAVENOUS at 17:25

## 2021-07-22 RX ADMIN — Medication 1 APPLICATION(S): at 06:10

## 2021-07-22 RX ADMIN — CHLORHEXIDINE GLUCONATE 15 MILLILITER(S): 213 SOLUTION TOPICAL at 17:16

## 2021-07-22 NOTE — PROGRESS NOTE ADULT - SUBJECTIVE AND OBJECTIVE BOX
SICU Daily Progress Note  =====================================================  24 Hour Interval/Overnight Events:      - PO Phenobarbital 65mg PO BID  - f/u social work; called w/ no response, will reattempt 7/22     HPI:  62 y/o male s/p tracheal resection 4/23/21 presents to [facility] ER w c/o stridor and SOB. Difficulty breating is worse at night and prevents him from being able to sleep.  He denies productive cough or fever.  This is his 3rd readmission after his tracheal resection  PT has PMHx COVID+ (3/2021 - not hospitalized), PE (8/2020 - on eliquis at home), CKD, w/ recent hospitalization at [facility] after seizure w/ lingual hematoma requiring emergency tracheostomy on 3/25/21.  Patient underwent Tracheal Resection and Reconstruction on 4/23. ENT injected vocal cords few months ago. He was last discharged 5/18/21.     PROCEDURE  ENT consulted in ED for surgical airway management. per anesthesia unable to intubate. emergent tracheostomy performed, size 5.5 tracheostomy tube w cuff. secured w silk sutures and neck tie    P/E  tracheostomy tube in place size 5.5, cuff up secored w silk sutures and neck tie  oozing from stoma, minimal  saturating 100%    A/P: 62 y/o male s/p tracheal resection 4/23/21 presents to [facility] ER w c/o stridor and SOB. Difficulty breating is worse at night and prevents him from being able to sleep.  He denies productive cough or fever.  This is his 3rd readmission after his tracheal resection  PT has PMHx COVID+ (3/2021 - not hospitalized), PE (8/2020 - on eliquis at home), CKD, w/ recent hospitalization at [facility] after seizure w/ lingual hematoma requiring emergency tracheostomy on 3/25/21.  Patient underwent Tracheal Resection and Reconstruction on 4/23. ENT injected vocal cords few months ago. He was last discharged 5/18/21. s/p emergent tracheostomy 7/1 size 5.5 trach tube  - Patient had a tracheostomy, size 5.5 (cuffed). Please perform standard tracheostomy care procedures.  - Regular suctioning with soft suction catheter q1  - Humidified air to tracheostomy tube  - CTICU and CTS consult w [facility] team  - per discussion w CTS, to take to OR tomorow for bronch and trach revision.  - Call or page us if any issues        (01 Jul 2021 22:19)      --------------------------------------------------------------------------------------  Allergies: No Known Allergies      MEDICATIONS:   --------------------------------------------------------------------------------------  Neurologic Medications  acetaminophen    Suspension .. 650 milliGRAM(s) Oral every 6 hours PRN Temp greater or equal to 38C (100.4F)  clonazePAM  Tablet 1 milliGRAM(s) Oral three times a day  diVALproex Sprinkle 750 milliGRAM(s) Oral two times a day  levETIRAcetam  Solution 1000 milliGRAM(s) Oral two times a day  PHENobarbital 64.8 milliGRAM(s) Oral two times a day    Respiratory Medications  ALBUTerol    90 MICROgram(s) HFA Inhaler 2 Puff(s) Inhalation every 4 hours    Cardiovascular Medications  labetalol 100 milliGRAM(s) Oral every 12 hours    Gastrointestinal Medications  pantoprazole   Suspension 40 milliGRAM(s) Oral daily  senna Syrup 5 milliLiter(s) Oral at bedtime  sodium chloride 0.9% lock flush 10 milliLiter(s) IV Push every 1 hour PRN Pre/post blood products, medications, blood draw, and to maintain line patency    Genitourinary Medications    Hematologic/Oncologic Medications  heparin   Injectable 5000 Unit(s) SubCutaneous every 8 hours    Antimicrobial/Immunologic Medications  cefepime   IVPB 1000 milliGRAM(s) IV Intermittent every 24 hours    Endocrine/Metabolic Medications    Topical/Other Medications  chlorhexidine 0.12% Liquid 15 milliLiter(s) Oral Mucosa every 12 hours  chlorhexidine 4% Liquid 1 Application(s) Topical <User Schedule>  petrolatum Ophthalmic Ointment 1 Application(s) Both EYES two times a day    --------------------------------------------------------------------------------------    VITAL SIGNS, INS/OUTS (last 24 hours):  --------------------------------------------------------------------------------------  T(C): 37.3 (07-21-21 @ 20:00), Max: 38.1 (07-21-21 @ 12:00)  HR: 95 (07-22-21 @ 00:00) (86 - 102)  BP: 98/71 (07-22-21 @ 00:00) (87/61 - 130/85)  BP(mean): 76 (07-22-21 @ 00:00) (61 - 92)  ABP: --  ABP(mean): --  RR: 12 (07-22-21 @ 00:00) (12 - 28)  SpO2: 99% (07-22-21 @ 00:00) (96% - 100%)  Wt(kg): --  CVP(mm Hg): --  CI: --  CAPILLARY BLOOD GLUCOSE       N/A      07-20 @ 07:01  -  07-21 @ 07:00  --------------------------------------------------------  IN:    Enteral Tube Flush: 730 mL    Free Water: 500 mL    IV PiggyBack: 50 mL    Nepro with Carb Steady: 816 mL  Total IN: 2096 mL    OUT:    Indwelling Catheter - Urethral (mL): 1275 mL    Rectal Tube (mL): 200 mL  Total OUT: 1475 mL    Total NET: 621 mL      07-21 @ 07:01  -  07-22 @ 00:38  --------------------------------------------------------  IN:    Enteral Tube Flush: 150 mL    Free Water: 750 mL    IV PiggyBack: 50 mL    Nepro with Carb Steady: 578 mL    Sodium Chloride 0.9% Bolus: 500 mL  Total IN: 2028 mL    OUT:    Indwelling Catheter - Urethral (mL): 760 mL  Total OUT: 760 mL    Total NET: 1268 mL        --------------------------------------------------------------------------------------    EXAM    NEUROLOGY  Exam: Sedated    HEENT  Exam: Normocephalic, atraumatic, EOMI. ETT in place    RESPIRATORY  Exam: Lungs clear to auscultation, Normal expansion/effort.  Mode: AC/ CMV (Assist Control/ Continuous Mandatory Ventilation), RR (machine): 18, TV (machine): 400, FiO2: 30, PEEP: 5, ITime: 0.84, MAP: 12, PIP: 24    CARDIOVASCULAR  Exam: S1, S2.  Regular rate and rhythm.      GI/NUTRITION  Exam: Abdomen soft, Non-tender, Non-distended.   Current Diet: Diet, NPO with Tube Feed        METABOLIC/FLUIDS/ELECTROLYTES      HEMATOLOGIC  [x] VTE Prophylaxis: heparin   Injectable 5000 Unit(s) SubCutaneous every 8 hours      LABS  --------------------------------------------------------------------------------------  CBC (07-21 @ 02:05)                          8.4<L>                   16.13<H>  )--------------(  191        --    % Neuts, --    % Lymphs, ANC: --                              28.7<L>    BMP (07-21 @ 02:05)       148<H>  |  113<H>  |  97<H> 			Ca++ --      Ca 9.6          ---------------------------------( 131<H>		Mg 3.10<H>       4.8     |  15<L>   |  3.43<H>			Ph 5.8<H>                -> .Sputum Sputum trap Culture (07-16 @ 09:00)       Few polymorphonuclear leukocytes per low power field  No Squamous epithelial cells per low power field  Rare Gram Variable Rods per oil power field    Pseudomonas aeruginosa  Pseudomonas aeruginosa    Moderate Pseudomonas aeruginosa  Moderate Pseudomonas aeruginosa #2  Normal Respiratory Taylor absent    -> .Blood Blood-Peripheral Culture (07-16 @ 08:00)     NG    NG    No Growth Final    -> .Blood Blood-Venous Culture (07-16 @ 02:50)     NG    NG    No Growth Final      -------------------------------------------------------------------------------------- SICU Daily Progress Note  =====================================================  24 Hour Interval/Overnight Events:      - PO Phenobarbital 65mg PO BID  - f/u social work; called w/ no response, will reattempt 7/22  - 500cc bolus in AM for soft BP     HPI:  60 y/o male s/p tracheal resection 4/23/21 presents to [facility] ER w c/o stridor and SOB. Difficulty breating is worse at night and prevents him from being able to sleep.  He denies productive cough or fever.  This is his 3rd readmission after his tracheal resection  PT has PMHx COVID+ (3/2021 - not hospitalized), PE (8/2020 - on eliquis at home), CKD, w/ recent hospitalization at [facility] after seizure w/ lingual hematoma requiring emergency tracheostomy on 3/25/21.  Patient underwent Tracheal Resection and Reconstruction on 4/23. ENT injected vocal cords few months ago. He was last discharged 5/18/21.     PROCEDURE  ENT consulted in ED for surgical airway management. per anesthesia unable to intubate. emergent tracheostomy performed, size 5.5 tracheostomy tube w cuff. secured w silk sutures and neck tie    P/E  tracheostomy tube in place size 5.5, cuff up secored w silk sutures and neck tie  oozing from stoma, minimal  saturating 100%    A/P: 60 y/o male s/p tracheal resection 4/23/21 presents to [facility] ER w c/o stridor and SOB. Difficulty breating is worse at night and prevents him from being able to sleep.  He denies productive cough or fever.  This is his 3rd readmission after his tracheal resection  PT has PMHx COVID+ (3/2021 - not hospitalized), PE (8/2020 - on eliquis at home), CKD, w/ recent hospitalization at [facility] after seizure w/ lingual hematoma requiring emergency tracheostomy on 3/25/21.  Patient underwent Tracheal Resection and Reconstruction on 4/23. ENT injected vocal cords few months ago. He was last discharged 5/18/21. s/p emergent tracheostomy 7/1 size 5.5 trach tube  - Patient had a tracheostomy, size 5.5 (cuffed). Please perform standard tracheostomy care procedures.  - Regular suctioning with soft suction catheter q1  - Humidified air to tracheostomy tube  - CTICU and CTS consult w [facility] team  - per discussion w CTS, to take to OR tomorow for bronch and trach revision.  - Call or page us if any issues        (01 Jul 2021 22:19)      --------------------------------------------------------------------------------------  Allergies: No Known Allergies      MEDICATIONS:   --------------------------------------------------------------------------------------  Neurologic Medications  acetaminophen    Suspension .. 650 milliGRAM(s) Oral every 6 hours PRN Temp greater or equal to 38C (100.4F)  clonazePAM  Tablet 1 milliGRAM(s) Oral three times a day  diVALproex Sprinkle 750 milliGRAM(s) Oral two times a day  levETIRAcetam  Solution 1000 milliGRAM(s) Oral two times a day  PHENobarbital 64.8 milliGRAM(s) Oral two times a day    Respiratory Medications  ALBUTerol    90 MICROgram(s) HFA Inhaler 2 Puff(s) Inhalation every 4 hours    Cardiovascular Medications  labetalol 100 milliGRAM(s) Oral every 12 hours    Gastrointestinal Medications  pantoprazole   Suspension 40 milliGRAM(s) Oral daily  senna Syrup 5 milliLiter(s) Oral at bedtime  sodium chloride 0.9% lock flush 10 milliLiter(s) IV Push every 1 hour PRN Pre/post blood products, medications, blood draw, and to maintain line patency    Genitourinary Medications    Hematologic/Oncologic Medications  heparin   Injectable 5000 Unit(s) SubCutaneous every 8 hours    Antimicrobial/Immunologic Medications  cefepime   IVPB 1000 milliGRAM(s) IV Intermittent every 24 hours    Endocrine/Metabolic Medications    Topical/Other Medications  chlorhexidine 0.12% Liquid 15 milliLiter(s) Oral Mucosa every 12 hours  chlorhexidine 4% Liquid 1 Application(s) Topical <User Schedule>  petrolatum Ophthalmic Ointment 1 Application(s) Both EYES two times a day    --------------------------------------------------------------------------------------    VITAL SIGNS, INS/OUTS (last 24 hours):  --------------------------------------------------------------------------------------  T(C): 37.3 (07-21-21 @ 20:00), Max: 38.1 (07-21-21 @ 12:00)  HR: 95 (07-22-21 @ 00:00) (86 - 102)  BP: 98/71 (07-22-21 @ 00:00) (87/61 - 130/85)  BP(mean): 76 (07-22-21 @ 00:00) (61 - 92)  ABP: --  ABP(mean): --  RR: 12 (07-22-21 @ 00:00) (12 - 28)  SpO2: 99% (07-22-21 @ 00:00) (96% - 100%)  Wt(kg): --  CVP(mm Hg): --  CI: --  CAPILLARY BLOOD GLUCOSE       N/A      07-20 @ 07:01  -  07-21 @ 07:00  --------------------------------------------------------  IN:    Enteral Tube Flush: 730 mL    Free Water: 500 mL    IV PiggyBack: 50 mL    Nepro with Carb Steady: 816 mL  Total IN: 2096 mL    OUT:    Indwelling Catheter - Urethral (mL): 1275 mL    Rectal Tube (mL): 200 mL  Total OUT: 1475 mL    Total NET: 621 mL      07-21 @ 07:01  -  07-22 @ 00:38  --------------------------------------------------------  IN:    Enteral Tube Flush: 150 mL    Free Water: 750 mL    IV PiggyBack: 50 mL    Nepro with Carb Steady: 578 mL    Sodium Chloride 0.9% Bolus: 500 mL  Total IN: 2028 mL    OUT:    Indwelling Catheter - Urethral (mL): 760 mL  Total OUT: 760 mL    Total NET: 1268 mL        --------------------------------------------------------------------------------------    EXAM    NEUROLOGY  Exam: Sedated    HEENT  Exam: Normocephalic, atraumatic, EOMI. ETT in place    RESPIRATORY  Exam: Lungs clear to auscultation, Normal expansion/effort.  Mode: AC/ CMV (Assist Control/ Continuous Mandatory Ventilation), RR (machine): 18, TV (machine): 400, FiO2: 30, PEEP: 5, ITime: 0.84, MAP: 12, PIP: 24    CARDIOVASCULAR  Exam: S1, S2.  Regular rate and rhythm.      GI/NUTRITION  Exam: Abdomen soft, Non-tender, Non-distended.   Current Diet: Diet, NPO with Tube Feed        METABOLIC/FLUIDS/ELECTROLYTES      HEMATOLOGIC  [x] VTE Prophylaxis: heparin   Injectable 5000 Unit(s) SubCutaneous every 8 hours      LABS  --------------------------------------------------------------------------------------  CBC (07-21 @ 02:05)                          8.4<L>                   16.13<H>  )--------------(  191        --    % Neuts, --    % Lymphs, ANC: --                              28.7<L>    BMP (07-21 @ 02:05)       148<H>  |  113<H>  |  97<H> 			Ca++ --      Ca 9.6          ---------------------------------( 131<H>		Mg 3.10<H>       4.8     |  15<L>   |  3.43<H>			Ph 5.8<H>                -> .Sputum Sputum trap Culture (07-16 @ 09:00)       Few polymorphonuclear leukocytes per low power field  No Squamous epithelial cells per low power field  Rare Gram Variable Rods per oil power field    Pseudomonas aeruginosa  Pseudomonas aeruginosa    Moderate Pseudomonas aeruginosa  Moderate Pseudomonas aeruginosa #2  Normal Respiratory Taylor absent    -> .Blood Blood-Peripheral Culture (07-16 @ 08:00)     NG    NG    No Growth Final    -> .Blood Blood-Venous Culture (07-16 @ 02:50)     NG    NG    No Growth Final      --------------------------------------------------------------------------------------

## 2021-07-22 NOTE — PROGRESS NOTE ADULT - ASSESSMENT
60 y/o male PMHx COVID+ (3/2021 - not hospitalized), PE (8/2020 - on Eliquis at home), CKD, w/ recent hospitalization at [facility] after seizure w/ lingual hematoma requiring emergency tracheostomy on 3/25/21 s/p Tracheal Resection and Reconstruction on 4/23 presented to ED in respiratory distress requiring emergent tracheostomy on 7/1 and subsequent revision tracheostomy on 7/2, s/p PEG 7/19. Post op course complicated by seizures and concern for anoxic brain injury.    PLAN:  Neurologic:  - Phenobarbital 65mg PO BID  - Depakote 750mg BID, Keppra 1000mg BID  - Klonopin 1mg TID for myoclonic jerks  - EEG significant for hyperexcitability and GPDs: cortical storming and stimulus myoclonus  - MRI Brain WNL, no anoxic injury    Respiratory:   - s/p bronch and trach revision 7/2  - Mechanical ventilation AC/CMV    Cardiovascular:   - Hemodynamically stable off vasopressors   - Labetalol 100 BID for hypertension     Gastrointestinal/Nutrition:   - PEG w/ TF@ Goal  - Protonix daily for stress ulcer ppx  - S/p PEG 7/19    Renal/Genitourinary:  - Monitor intake & output, UOP  - 250 q 6 free water    Hematologic:  - Trend H/H  - c/w SQH for VTE prophylaxis    Infectious Disease:   - Afebrile  - Sputum Cx with pseudomonas -> Cefepime (7/18- ) (7 days)  - Urine Cx with Citrobacter  - s/p course of Zosyn     Tubes/Lines/Drains:   - PEG  - R PIV  - L PIV     Disposition: SICU, Social work called, aware on KEIKO/getting pt to next facility

## 2021-07-22 NOTE — PROGRESS NOTE ADULT - SUBJECTIVE AND OBJECTIVE BOX
7/6: EEg notable for possible status epilepticus. Planned for MRI today. Pt noted to be febrile yesterday with tmax of 102.9.   7/7: naeon. cxr appears to be slightly worse in effusions. pending mri  7/8: negative blood cultures, plan for MRI today   7/9: NAEON - MRI with no significant intracranial pathology.  7/10: NAEON  7/11: naeon   7/12: naeon. GOC today  7/13: naeon. waiting [facility] duscission  7/14:  naeon. waiting [facility] duscission  7/15: NAEON. [facility]  7/16: per [facility] full measures until family arrives. to get peg  7/17: NAEON, febrile overnight  7/18: NAEON - plan for PEG sergio  7/19: No acute events overnight. On abx for fever, plan for PEG today.   7/20 No acute events overnight, PEG tube in place, had another episode of fever  7/21: NAEON.  7/22: NAEON    IVital Signs Last 24 Hrs  T(C): 38.2 (22 Jul 2021 04:00), Max: 38.2 (22 Jul 2021 04:00)  T(F): 100.8 (22 Jul 2021 04:00), Max: 100.8 (22 Jul 2021 04:00)  HR: 88 (22 Jul 2021 07:22) (86 - 104)  BP: 77/53 (22 Jul 2021 07:15) (77/53 - 132/81)  BP(mean): 59 (22 Jul 2021 07:15) (59 - 92)  RR: 17 (22 Jul 2021 07:15) (12 - 28)  SpO2: 97% (22 Jul 2021 07:22) (96% - 100%)    P/E  Sedated, 6LPC, trach sutured to skin, trach ties. no bleeding  Neck soft, flat      A/P: 62 y/o male s/p tracheal resection 4/23/21 presents to [facility] ER w c/o stridor and SOB. Difficulty breating is worse at night and prevents him from being able to sleep.  He denies productive cough or fever.  This is his 3rd readmission after his tracheal resection  PT has PMHx COVID+ (3/2021 - not hospitalized), PE (8/2020 - on eliquis at home), CKD, w/ recent hospitalization at [facility] after seizure w/ lingual hematoma requiring emergency tracheostomy on 3/25/21.  Patient underwent Tracheal Resection and Reconstruction on 4/23. ENT injected vocal cords few months ago. He was last discharged 5/18/21. s/p emergent cric 7/1 now s/p revision trach, flex bronch and removal of stent 7/2 with post-op course complicated by seizures  - GOC: full code for now. s/p PEG   - Patient has a critical airway, only ENT to manipulate airway  - Routine trach care by nursing  - Suction PRN  - Extra 6LPC and 4LPC at bedside  - Vent per SICU  - Tube feeds per SICU   - Rest of care per SICU

## 2021-07-23 LAB
ANION GAP SERPL CALC-SCNC: 18 MMOL/L — HIGH (ref 7–14)
BLOOD GAS ARTERIAL COMPREHENSIVE RESULT: SIGNIFICANT CHANGE UP
BUN SERPL-MCNC: 98 MG/DL — HIGH (ref 7–23)
CALCIUM SERPL-MCNC: 9.2 MG/DL — SIGNIFICANT CHANGE UP (ref 8.4–10.5)
CHLORIDE SERPL-SCNC: 109 MMOL/L — HIGH (ref 98–107)
CO2 SERPL-SCNC: 17 MMOL/L — LOW (ref 22–31)
CREAT SERPL-MCNC: 3.57 MG/DL — HIGH (ref 0.5–1.3)
GLUCOSE SERPL-MCNC: 128 MG/DL — HIGH (ref 70–99)
HCT VFR BLD CALC: 26.1 % — LOW (ref 39–50)
HGB BLD-MCNC: 7.8 G/DL — LOW (ref 13–17)
MAGNESIUM SERPL-MCNC: 2.9 MG/DL — HIGH (ref 1.6–2.6)
MCHC RBC-ENTMCNC: 28.9 PG — SIGNIFICANT CHANGE UP (ref 27–34)
MCHC RBC-ENTMCNC: 29.9 GM/DL — LOW (ref 32–36)
MCV RBC AUTO: 96.7 FL — SIGNIFICANT CHANGE UP (ref 80–100)
NRBC # BLD: 0 /100 WBCS — SIGNIFICANT CHANGE UP
NRBC # FLD: 0 K/UL — SIGNIFICANT CHANGE UP
PHOSPHATE SERPL-MCNC: 5.3 MG/DL — HIGH (ref 2.5–4.5)
PLATELET # BLD AUTO: 204 K/UL — SIGNIFICANT CHANGE UP (ref 150–400)
POTASSIUM SERPL-MCNC: 4.7 MMOL/L — SIGNIFICANT CHANGE UP (ref 3.5–5.3)
POTASSIUM SERPL-SCNC: 4.7 MMOL/L — SIGNIFICANT CHANGE UP (ref 3.5–5.3)
RBC # BLD: 2.7 M/UL — LOW (ref 4.2–5.8)
RBC # FLD: 16.9 % — HIGH (ref 10.3–14.5)
SODIUM SERPL-SCNC: 144 MMOL/L — SIGNIFICANT CHANGE UP (ref 135–145)
WBC # BLD: 11.63 K/UL — HIGH (ref 3.8–10.5)
WBC # FLD AUTO: 11.63 K/UL — HIGH (ref 3.8–10.5)

## 2021-07-23 PROCEDURE — 99291 CRITICAL CARE FIRST HOUR: CPT

## 2021-07-23 RX ORDER — PHENOBARBITAL 60 MG
64.8 TABLET ORAL
Refills: 0 | Status: DISCONTINUED | OUTPATIENT
Start: 2021-07-24 | End: 2021-07-29

## 2021-07-23 RX ADMIN — LEVETIRACETAM 1000 MILLIGRAM(S): 250 TABLET, FILM COATED ORAL at 17:40

## 2021-07-23 RX ADMIN — Medication 650 MILLIGRAM(S): at 19:51

## 2021-07-23 RX ADMIN — HEPARIN SODIUM 5000 UNIT(S): 5000 INJECTION INTRAVENOUS; SUBCUTANEOUS at 14:11

## 2021-07-23 RX ADMIN — Medication 64.8 MILLIGRAM(S): at 17:40

## 2021-07-23 RX ADMIN — CHLORHEXIDINE GLUCONATE 15 MILLILITER(S): 213 SOLUTION TOPICAL at 17:39

## 2021-07-23 RX ADMIN — Medication 100 MILLIGRAM(S): at 17:40

## 2021-07-23 RX ADMIN — SENNA PLUS 5 MILLILITER(S): 8.6 TABLET ORAL at 22:22

## 2021-07-23 RX ADMIN — Medication 64.8 MILLIGRAM(S): at 06:34

## 2021-07-23 RX ADMIN — HEPARIN SODIUM 5000 UNIT(S): 5000 INJECTION INTRAVENOUS; SUBCUTANEOUS at 22:22

## 2021-07-23 RX ADMIN — DIVALPROEX SODIUM 750 MILLIGRAM(S): 500 TABLET, DELAYED RELEASE ORAL at 06:34

## 2021-07-23 RX ADMIN — CHLORHEXIDINE GLUCONATE 15 MILLILITER(S): 213 SOLUTION TOPICAL at 06:33

## 2021-07-23 RX ADMIN — Medication 1 MILLIGRAM(S): at 14:12

## 2021-07-23 RX ADMIN — Medication 1 APPLICATION(S): at 17:40

## 2021-07-23 RX ADMIN — Medication 1 MILLIGRAM(S): at 22:22

## 2021-07-23 RX ADMIN — HEPARIN SODIUM 5000 UNIT(S): 5000 INJECTION INTRAVENOUS; SUBCUTANEOUS at 06:34

## 2021-07-23 RX ADMIN — CHLORHEXIDINE GLUCONATE 1 APPLICATION(S): 213 SOLUTION TOPICAL at 06:35

## 2021-07-23 RX ADMIN — Medication 1 MILLIGRAM(S): at 06:33

## 2021-07-23 RX ADMIN — Medication 100 MILLIGRAM(S): at 06:33

## 2021-07-23 RX ADMIN — LEVETIRACETAM 1000 MILLIGRAM(S): 250 TABLET, FILM COATED ORAL at 06:34

## 2021-07-23 RX ADMIN — PANTOPRAZOLE SODIUM 40 MILLIGRAM(S): 20 TABLET, DELAYED RELEASE ORAL at 13:11

## 2021-07-23 RX ADMIN — DIVALPROEX SODIUM 750 MILLIGRAM(S): 500 TABLET, DELAYED RELEASE ORAL at 17:40

## 2021-07-23 RX ADMIN — Medication 1 APPLICATION(S): at 06:35

## 2021-07-23 RX ADMIN — Medication 650 MILLIGRAM(S): at 20:51

## 2021-07-23 NOTE — ADVANCED PRACTICE NURSE CONSULT - REASON FOR CONSULT
Patient seen on skin care rounds after wound care referral received for assessment of skin impairment and recommendations of topical management. Chart reviewed: Marino Hastings, BMI 28.1kg/m2. Patient H/O s/p tracheal resection 4/23/21 presents to [facility] ER w c/o stridor and SOB. Difficulty breathing is worse at night and prevents him from being able to sleep.  He denies productive cough or fever.  This is his 3rd readmission after his tracheal resection PT has PMHx COVID+ (3/2021 - not hospitalized), PE (8/2020 - on eliquis at home), CKD, w/ recent hospitalization at [facility] after seizure w/ lingual hematoma requiring emergency tracheostomy on 3/25/21.  Patient underwent Tracheal Resection and Reconstruction on 4/23. ENT injected vocal cords few months ago. He was last discharged 5/18/21. Presented to ED in respiratory distress requiring emergent tracheostomy on 7/1 and subsequent revision tracheostomy on 7/2, s/p PEG 7/19. Post op course complicated by seizures and concern for anoxic brain injury. EEG significant for hyperexcitability and GPDs: cortical storming and stimulus myoclonus. Hemodynamically stable off vasopressors. PEG w/ TF@ Goal. PEG placed 7/19.
Patient seen on skin care rounds after wound care referral received for assessment of skin impairment and recommendations of topical management. Chart reviewed: Pt h/o tracheal resection 4/23/21 presented to [facility] ER w c/o stridor and SOB, progressed to respiratory arrest requiring urgent Cricoid after seizure activity with possible aspiration, see below. This is his 3rd readmission after his tracheal resection. PMHx COVID+ (3/2021 - not hospitalized), PE (8/2020 - on eliquis at home), CKD, w/ recent hospitalization at [facility] after seizure w/ lingual hematoma requiring emergency tracheostomy on 3/25/21.  Patient underwent Tracheal Resection and Reconstruction on 4/23. ENT injected vocal cords few months ago. He was last discharged 5/18/21. Now s/p emergent cricoid in ED for loss of airway 7/1, subsequent revision tracheostomy 7/2. Patient pending MRI Brain with/without contrast. Current labs: WBC 10.30 k/uL, H/H 7.3/23.8, Plt 187. BMI 28.1, Marino 10. LACE 16.

## 2021-07-23 NOTE — PROGRESS NOTE ADULT - SUBJECTIVE AND OBJECTIVE BOX
SICU Daily Progress Note  =====================================================  24 Hour Interval/Overnight Events:      - awaiting placement  - TAMIE overnight    HPI: 62 y/o male s/p tracheal resection 4/23/21 presents to [facility] ER w c/o stridor and SOB. Difficulty breating is worse at night and prevents him from being able to sleep.  He denies productive cough or fever.  This is his 3rd readmission after his tracheal resection  PT has PMHx COVID+ (3/2021 - not hospitalized), PE (8/2020 - on eliquis at home), CKD, w/ recent hospitalization at [facility] after seizure w/ lingual hematoma requiring emergency tracheostomy on 3/25/21.  Patient underwent Tracheal Resection and Reconstruction on 4/23. ENT injected vocal cords few months ago. He was last discharged 5/18/21.     PROCEDURE  ENT consulted in ED for surgical airway management. per anesthesia unable to intubate. emergent tracheostomy performed, size 5.5 tracheostomy tube w cuff. secured w silk sutures and neck tie    P/E  tracheostomy tube in place size 5.5, cuff up secored w silk sutures and neck tie  oozing from stoma, minimal  saturating 100%       (01 Jul 2021 22:19)      --------------------------------------------------------------------------------------  Allergies: No Known Allergies    MEDICATIONS  (STANDING):  ALBUTerol    90 MICROgram(s) HFA Inhaler 2 Puff(s) Inhalation every 4 hours  chlorhexidine 0.12% Liquid 15 milliLiter(s) Oral Mucosa every 12 hours  chlorhexidine 4% Liquid 1 Application(s) Topical <User Schedule>  clonazePAM  Tablet 1 milliGRAM(s) Oral three times a day  diVALproex Sprinkle 750 milliGRAM(s) Oral two times a day  heparin   Injectable 5000 Unit(s) SubCutaneous every 8 hours  labetalol 100 milliGRAM(s) Oral every 12 hours  levETIRAcetam  Solution 1000 milliGRAM(s) Oral two times a day  pantoprazole   Suspension 40 milliGRAM(s) Oral daily  petrolatum Ophthalmic Ointment 1 Application(s) Both EYES two times a day  PHENobarbital 64.8 milliGRAM(s) Oral two times a day  senna Syrup 5 milliLiter(s) Oral at bedtime    MEDICATIONS  (PRN):  acetaminophen    Suspension .. 650 milliGRAM(s) Oral every 6 hours PRN Temp greater or equal to 38C (100.4F)  sodium chloride 0.9% lock flush 10 milliLiter(s) IV Push every 1 hour PRN Pre/post blood products, medications, blood draw, and to maintain line patency    --------------------------------------------------------------------------------------  ICU Vital Signs Last 24 Hrs  T(C): 37.6 (22 Jul 2021 20:00), Max: 38.2 (22 Jul 2021 04:00)  T(F): 99.6 (22 Jul 2021 20:00), Max: 100.8 (22 Jul 2021 04:00)  HR: 93 (22 Jul 2021 23:00) (85 - 104)  BP: 91/55 (22 Jul 2021 23:00) (77/53 - 132/81)  BP(mean): 63 (22 Jul 2021 23:00) (59 - 92)  ABP: --  ABP(mean): --  RR: 10 (22 Jul 2021 23:00) (0 - 25)  SpO2: 99% (22 Jul 2021 23:00) (97% - 100%)    I&O's Detail    21 Jul 2021 07:01  -  22 Jul 2021 07:00  --------------------------------------------------------  IN:    Enteral Tube Flush: 250 mL    Free Water: 1000 mL    IV PiggyBack: 50 mL    Nepro with Carb Steady: 816 mL    Sodium Chloride 0.9% Bolus: 500 mL  Total IN: 2616 mL    OUT:    Indwelling Catheter - Urethral (mL): 1360 mL    Rectal Tube (mL): 200 mL  Total OUT: 1560 mL    Total NET: 1056 mL      22 Jul 2021 07:01  -  23 Jul 2021 01:26  --------------------------------------------------------  IN:    Enteral Tube Flush: 100 mL    Free Water: 750 mL    IV PiggyBack: 50 mL    Lactated Ringers Bolus: 500 mL    Nepro with Carb Steady: 578 mL  Total IN: 1978 mL    OUT:    Indwelling Catheter - Urethral (mL): 1020 mL    Rectal Tube (mL): 10 mL  Total OUT: 1030 mL    Total NET: 948 mL      EXAM    NEUROLOGY  Exam: Sedated    HEENT  Exam: Normocephalic, atraumatic, EOMI. ETT in place    RESPIRATORY  Exam: Lungs clear to auscultation, Normal expansion/effort.  Mode: AC/ CMV (Assist Control/ Continuous Mandatory Ventilation), RR (machine): 18, TV (machine): 400, FiO2: 30, PEEP: 5, ITime: 0.84, MAP: 12, PIP: 24    CARDIOVASCULAR  Exam: S1, S2.  Regular rate and rhythm.      GI/NUTRITION  Exam: Abdomen soft, Non-tender, Non-distended.   Current Diet: Diet, NPO with Tube Feed      METABOLIC/FLUIDS/ELECTROLYTES      HEMATOLOGIC  [x] VTE Prophylaxis: heparin   Injectable 5000 Unit(s) SubCutaneous every 8 hours      LABS  --------------------------------------------------------------------------------------  CBC (07-22 @ 01:40)                              7.7<L>                         13.52<H>  )----------------(  193        --    % Neutrophils, --    % Lymphocytes, ANC: --                                  25.7<L>    [facility] (07-22 @ 01:40)             145     |  112<H>  |  99<H> 		Ca++ --      Ca 9.4                ---------------------------------( 139<H>		Mg 3.00<H>             4.5     |  16<L>   |  3.46<H>			Ph 5.6<H>      -------------------------------------------------------------------------------------- SICU Daily Progress Note  =====================================================  24 Hour Interval/Overnight Events:      - awaiting placement  - d/c free water  - transitioned to CPAP  - f/u ABG  - f/u social work    HPI: 60 y/o male s/p tracheal resection 4/23/21 presents to [facility] ER w c/o stridor and SOB. Difficulty breating is worse at night and prevents him from being able to sleep.  He denies productive cough or fever.  This is his 3rd readmission after his tracheal resection  PT has PMHx COVID+ (3/2021 - not hospitalized), PE (8/2020 - on eliquis at home), CKD, w/ recent hospitalization at [facility] after seizure w/ lingual hematoma requiring emergency tracheostomy on 3/25/21.  Patient underwent Tracheal Resection and Reconstruction on 4/23. ENT injected vocal cords few months ago. He was last discharged 5/18/21.     PROCEDURE  ENT consulted in ED for surgical airway management. per anesthesia unable to intubate. emergent tracheostomy performed, size 5.5 tracheostomy tube w cuff. secured w silk sutures and neck tie    P/E  tracheostomy tube in place size 5.5, cuff up secored w silk sutures and neck tie  oozing from stoma, minimal  saturating 100%       (01 Jul 2021 22:19)      --------------------------------------------------------------------------------------  Allergies: No Known Allergies    MEDICATIONS  (STANDING):  ALBUTerol    90 MICROgram(s) HFA Inhaler 2 Puff(s) Inhalation every 4 hours  chlorhexidine 0.12% Liquid 15 milliLiter(s) Oral Mucosa every 12 hours  chlorhexidine 4% Liquid 1 Application(s) Topical <User Schedule>  clonazePAM  Tablet 1 milliGRAM(s) Oral three times a day  diVALproex Sprinkle 750 milliGRAM(s) Oral two times a day  heparin   Injectable 5000 Unit(s) SubCutaneous every 8 hours  labetalol 100 milliGRAM(s) Oral every 12 hours  levETIRAcetam  Solution 1000 milliGRAM(s) Oral two times a day  pantoprazole   Suspension 40 milliGRAM(s) Oral daily  petrolatum Ophthalmic Ointment 1 Application(s) Both EYES two times a day  PHENobarbital 64.8 milliGRAM(s) Oral two times a day  senna Syrup 5 milliLiter(s) Oral at bedtime    MEDICATIONS  (PRN):  acetaminophen    Suspension .. 650 milliGRAM(s) Oral every 6 hours PRN Temp greater or equal to 38C (100.4F)  sodium chloride 0.9% lock flush 10 milliLiter(s) IV Push every 1 hour PRN Pre/post blood products, medications, blood draw, and to maintain line patency    --------------------------------------------------------------------------------------  ICU Vital Signs Last 24 Hrs  T(C): 37.6 (22 Jul 2021 20:00), Max: 38.2 (22 Jul 2021 04:00)  T(F): 99.6 (22 Jul 2021 20:00), Max: 100.8 (22 Jul 2021 04:00)  HR: 93 (22 Jul 2021 23:00) (85 - 104)  BP: 91/55 (22 Jul 2021 23:00) (77/53 - 132/81)  BP(mean): 63 (22 Jul 2021 23:00) (59 - 92)  ABP: --  ABP(mean): --  RR: 10 (22 Jul 2021 23:00) (0 - 25)  SpO2: 99% (22 Jul 2021 23:00) (97% - 100%)    I&O's Detail    21 Jul 2021 07:01  -  22 Jul 2021 07:00  --------------------------------------------------------  IN:    Enteral Tube Flush: 250 mL    Free Water: 1000 mL    IV PiggyBack: 50 mL    Nepro with Carb Steady: 816 mL    Sodium Chloride 0.9% Bolus: 500 mL  Total IN: 2616 mL    OUT:    Indwelling Catheter - Urethral (mL): 1360 mL    Rectal Tube (mL): 200 mL  Total OUT: 1560 mL    Total NET: 1056 mL      22 Jul 2021 07:01  -  23 Jul 2021 01:26  --------------------------------------------------------  IN:    Enteral Tube Flush: 100 mL    Free Water: 750 mL    IV PiggyBack: 50 mL    Lactated Ringers Bolus: 500 mL    Nepro with Carb Steady: 578 mL  Total IN: 1978 mL    OUT:    Indwelling Catheter - Urethral (mL): 1020 mL    Rectal Tube (mL): 10 mL  Total OUT: 1030 mL    Total NET: 948 mL      EXAM    NEUROLOGY  Exam: Sedated    HEENT  Exam: Normocephalic, atraumatic, EOMI. ETT in place    RESPIRATORY  Exam: Lungs clear to auscultation, Normal expansion/effort.  Mode: AC/ CMV (Assist Control/ Continuous Mandatory Ventilation), RR (machine): 18, TV (machine): 400, FiO2: 30, PEEP: 5, ITime: 0.84, MAP: 12, PIP: 24    CARDIOVASCULAR  Exam: S1, S2.  Regular rate and rhythm.      GI/NUTRITION  Exam: Abdomen soft, Non-tender, Non-distended.   Current Diet: Diet, NPO with Tube Feed      METABOLIC/FLUIDS/ELECTROLYTES      HEMATOLOGIC  [x] VTE Prophylaxis: heparin   Injectable 5000 Unit(s) SubCutaneous every 8 hours      LABS  --------------------------------------------------------------------------------------  CBC (07-22 @ 01:40)                              7.7<L>                         13.52<H>  )----------------(  193        --    % Neutrophils, --    % Lymphocytes, ANC: --                                  25.7<L>    BMP (07-22 @ 01:40)             145     |  112<H>  |  99<H> 		Ca++ --      Ca 9.4                ---------------------------------( 139<H>		Mg 3.00<H>             4.5     |  16<L>   |  3.46<H>			Ph 5.6<H>      --------------------------------------------------------------------------------------

## 2021-07-23 NOTE — ADVANCED PRACTICE NURSE CONSULT - ASSESSMENT
Tracheostomy in place, as per nursing noted patient is obtunded, bedbound, indwelling urethral catheter and incontinent of stool. Skin warm, dry with increased moisture in intertriginous folds, adequate skin turgor. Tracheostomy in place, peritubular skin intact. PEG tube in place, peritubular skin intact.    Right thumb reabsorbed blister measures 1cmx0.4slo6em. No active drainage. No signs of soft tissue infection.    Risk for incontinence/moisture dermatitis of sacral fold. Tracheostomy in place, as per nursing noted patient is obtunded, bedbound, indwelling urethral catheter and incontinent of stool. Skin warm, dry with increased moisture in intertriginous folds, adequate skin turgor. Tracheostomy in place, peritubular skin intact. PEG tube in place, peritubular skin intact.    Right thumb reabsorbed blister measures 1cmx0.2mql4sy. No active drainage. No signs of soft tissue infection. Patient had history of edema of hands/generalized edema up to +4 during admission.    Risk for incontinence/moisture dermatitis of sacral fold.

## 2021-07-23 NOTE — PROGRESS NOTE ADULT - SUBJECTIVE AND OBJECTIVE BOX
7/6: EEg notable for possible status epilepticus. Planned for MRI today. Pt noted to be febrile yesterday with tmax of 102.9.   7/7: naeon. cxr appears to be slightly worse in effusions. pending mri  7/8: negative blood cultures, plan for MRI today   7/9: NAEON - MRI with no significant intracranial pathology.  7/10: NAEON  7/11: naeon   7/12: naeon. GOC today  7/13: naeon. waiting [facility] duscission  7/14:  naeon. waiting [facility] duscission  7/15: NAEON. [facility]  7/16: per [facility] full measures until family arrives. to get peg  7/17: NAEON, febrile overnight  7/18: NAEON - plan for PEG sergio  7/19: No acute events overnight. On abx for fever, plan for PEG today.   7/20 No acute events overnight, PEG tube in place, had another episode of fever  7/21: NAEON.  7/22: NAEON  7/23: NAEON    ICU Vital Signs Last 24 Hrs  T(C): 37.4 (23 Jul 2021 04:00), Max: 37.6 (22 Jul 2021 20:00)  T(F): 99.3 (23 Jul 2021 04:00), Max: 99.6 (22 Jul 2021 20:00)  HR: 93 (23 Jul 2021 07:13) (85 - 100)  BP: 122/84 (23 Jul 2021 07:00) (82/55 - 128/81)  BP(mean): 92 (23 Jul 2021 07:00) (60 - 92)  ABP: --  ABP(mean): --  RR: 18 (23 Jul 2021 07:00) (0 - 25)  SpO2: 98% (23 Jul 2021 07:13) (97% - 100%)      P/E  Sedated, 6LPC, trach sutured to skin, trach ties. no bleeding  Neck soft, flat      A/P: 60 y/o male s/p tracheal resection 4/23/21 presents to [facility] ER w c/o stridor and SOB. Difficulty breating is worse at night and prevents him from being able to sleep.  He denies productive cough or fever.  This is his 3rd readmission after his tracheal resection  PT has PMHx COVID+ (3/2021 - not hospitalized), PE (8/2020 - on eliquis at home), CKD, w/ recent hospitalization at [facility] after seizure w/ lingual hematoma requiring emergency tracheostomy on 3/25/21.  Patient underwent Tracheal Resection and Reconstruction on 4/23. ENT injected vocal cords few months ago. He was last discharged 5/18/21. s/p emergent cric 7/1 now s/p revision trach, flex bronch and removal of stent 7/2 with post-op course complicated by seizures  - GOC: full code for now. s/p PEG   - Patient has a critical airway, only ENT to manipulate airway  - Routine trach care by nursing  - Suction PRN  - Extra 6LPC and 4LPC at bedside  - Vent per SICU  - Tube feeds per SICU   - Rest of care per SICU

## 2021-07-23 NOTE — PROGRESS NOTE ADULT - ASSESSMENT
60 y/o male PMHx COVID+ (3/2021 - not hospitalized), PE (8/2020 - on Eliquis at home), CKD, w/ recent hospitalization at [facility] after seizure w/ lingual hematoma requiring emergency tracheostomy on 3/25/21 s/p Tracheal Resection and Reconstruction on 4/23 presented to ED in respiratory distress requiring emergent tracheostomy on 7/1 and subsequent revision tracheostomy on 7/2, s/p PEG 7/19. Post op course complicated by seizures and concern for anoxic brain injury.    PLAN:  Neurologic:  - Phenobarbital 65mg PO BID  - Depakote 750mg BID, Keppra 1000mg BID  - Klonopin 1mg TID for myoclonic jerks  - EEG significant for hyperexcitability and GPDs: cortical storming and stimulus myoclonus  - MRI Brain WNL, no anoxic injury    Respiratory:   - s/p bronch and trach revision 7/2  - Mechanical ventilation AC/CMV 18/400/5/30    Cardiovascular:   - Hemodynamically stable off vasopressors   - Labetalol 100 BID for hypertension     Gastrointestinal/Nutrition:   - PEG w/ TF@ Goal  - Protonix daily for stress ulcer ppx  - S/p PEG 7/19    Renal/Genitourinary:  - Monitor intake & output, UOP  - 250 q 6 free water    Hematologic:  - Trend H/H  - c/w SQH for VTE prophylaxis    Infectious Disease:   - Afebrile  - Sputum Cx with pseudomonas -> Cefepime (7/18- ) (7 days)  - Urine Cx with Citrobacter  - s/p course of Zosyn     Tubes/Lines/Drains:   - PEG  - R PIV  - L PIV     Disposition: SICU, Social work called, aware on KEIKO/getting pt to next facility   62 y/o male PMHx COVID+ (3/2021 - not hospitalized), PE (8/2020 - on Eliquis at home), CKD, w/ recent hospitalization at [facility] after seizure w/ lingual hematoma requiring emergency tracheostomy on 3/25/21 s/p Tracheal Resection and Reconstruction on 4/23 presented to ED in respiratory distress requiring emergent tracheostomy on 7/1 and subsequent revision tracheostomy on 7/2, s/p PEG 7/19. Post op course complicated by seizures and concern for anoxic brain injury.    PLAN:  Neurologic:  - Phenobarbital 65mg PO BID  - Depakote 750mg BID, Keppra 1000mg BID  - Klonopin 1mg TID for myoclonic jerks  - EEG significant for hyperexcitability and GPDs: cortical storming and stimulus myoclonus  - MRI Brain WNL, no anoxic injury    Respiratory:   - s/p bronch and trach revision 7/2  - Mechanical ventilation AC/CMV 18/400/5/30  - 7/23 AM CPAP  - f/u ABG    Cardiovascular:   - Hemodynamically stable off vasopressors   - Labetalol 100 BID for hypertension     Gastrointestinal/Nutrition:   - PEG w/ TF@ Goal  - Protonix daily for stress ulcer ppx  - S/p PEG 7/19    Renal/Genitourinary:  - Monitor intake & output, UOP  - d/c free water    Hematologic:  - Trend H/H  - c/w SQH for VTE prophylaxis    Infectious Disease:   - Afebrile  - Sputum Cx with pseudomonas -> Cefepime (7/18- ) (7 days)  - Urine Cx with Citrobacter  - s/p course of Zosyn     Tubes/Lines/Drains:   - PEG  - R PIV  - L PIV     Disposition: SICU, Social work called, aware on KEIKO/getting pt to next facility

## 2021-07-24 LAB
ANION GAP SERPL CALC-SCNC: 16 MMOL/L — HIGH (ref 7–14)
BUN SERPL-MCNC: 100 MG/DL — HIGH (ref 7–23)
CALCIUM SERPL-MCNC: 9.7 MG/DL — SIGNIFICANT CHANGE UP (ref 8.4–10.5)
CHLORIDE SERPL-SCNC: 110 MMOL/L — HIGH (ref 98–107)
CO2 SERPL-SCNC: 16 MMOL/L — LOW (ref 22–31)
CREAT SERPL-MCNC: 3.47 MG/DL — HIGH (ref 0.5–1.3)
GLUCOSE SERPL-MCNC: 147 MG/DL — HIGH (ref 70–99)
HCT VFR BLD CALC: 28.9 % — LOW (ref 39–50)
HGB BLD-MCNC: 8.8 G/DL — LOW (ref 13–17)
MAGNESIUM SERPL-MCNC: 2.8 MG/DL — HIGH (ref 1.6–2.6)
MCHC RBC-ENTMCNC: 28.9 PG — SIGNIFICANT CHANGE UP (ref 27–34)
MCHC RBC-ENTMCNC: 30.4 GM/DL — LOW (ref 32–36)
MCV RBC AUTO: 95.1 FL — SIGNIFICANT CHANGE UP (ref 80–100)
NRBC # BLD: 0 /100 WBCS — SIGNIFICANT CHANGE UP
NRBC # FLD: 0 K/UL — SIGNIFICANT CHANGE UP
PHOSPHATE SERPL-MCNC: 4.9 MG/DL — HIGH (ref 2.5–4.5)
PLATELET # BLD AUTO: 205 K/UL — SIGNIFICANT CHANGE UP (ref 150–400)
POTASSIUM SERPL-MCNC: 4.8 MMOL/L — SIGNIFICANT CHANGE UP (ref 3.5–5.3)
POTASSIUM SERPL-SCNC: 4.8 MMOL/L — SIGNIFICANT CHANGE UP (ref 3.5–5.3)
RBC # BLD: 3.04 M/UL — LOW (ref 4.2–5.8)
RBC # FLD: 16.9 % — HIGH (ref 10.3–14.5)
SODIUM SERPL-SCNC: 142 MMOL/L — SIGNIFICANT CHANGE UP (ref 135–145)
WBC # BLD: 12.76 K/UL — HIGH (ref 3.8–10.5)
WBC # FLD AUTO: 12.76 K/UL — HIGH (ref 3.8–10.5)

## 2021-07-24 PROCEDURE — 99291 CRITICAL CARE FIRST HOUR: CPT

## 2021-07-24 RX ADMIN — Medication 650 MILLIGRAM(S): at 08:30

## 2021-07-24 RX ADMIN — HEPARIN SODIUM 5000 UNIT(S): 5000 INJECTION INTRAVENOUS; SUBCUTANEOUS at 13:17

## 2021-07-24 RX ADMIN — CHLORHEXIDINE GLUCONATE 1 APPLICATION(S): 213 SOLUTION TOPICAL at 04:00

## 2021-07-24 RX ADMIN — DIVALPROEX SODIUM 750 MILLIGRAM(S): 500 TABLET, DELAYED RELEASE ORAL at 06:30

## 2021-07-24 RX ADMIN — SENNA PLUS 5 MILLILITER(S): 8.6 TABLET ORAL at 22:00

## 2021-07-24 RX ADMIN — Medication 1 APPLICATION(S): at 17:36

## 2021-07-24 RX ADMIN — LEVETIRACETAM 1000 MILLIGRAM(S): 250 TABLET, FILM COATED ORAL at 06:30

## 2021-07-24 RX ADMIN — PANTOPRAZOLE SODIUM 40 MILLIGRAM(S): 20 TABLET, DELAYED RELEASE ORAL at 13:17

## 2021-07-24 RX ADMIN — Medication 1 MILLIGRAM(S): at 06:30

## 2021-07-24 RX ADMIN — Medication 1 MILLIGRAM(S): at 22:00

## 2021-07-24 RX ADMIN — HEPARIN SODIUM 5000 UNIT(S): 5000 INJECTION INTRAVENOUS; SUBCUTANEOUS at 06:31

## 2021-07-24 RX ADMIN — Medication 1 APPLICATION(S): at 06:31

## 2021-07-24 RX ADMIN — Medication 650 MILLIGRAM(S): at 08:35

## 2021-07-24 RX ADMIN — CHLORHEXIDINE GLUCONATE 15 MILLILITER(S): 213 SOLUTION TOPICAL at 06:30

## 2021-07-24 RX ADMIN — DIVALPROEX SODIUM 750 MILLIGRAM(S): 500 TABLET, DELAYED RELEASE ORAL at 17:35

## 2021-07-24 RX ADMIN — LEVETIRACETAM 1000 MILLIGRAM(S): 250 TABLET, FILM COATED ORAL at 17:36

## 2021-07-24 RX ADMIN — Medication 100 MILLIGRAM(S): at 17:35

## 2021-07-24 RX ADMIN — HEPARIN SODIUM 5000 UNIT(S): 5000 INJECTION INTRAVENOUS; SUBCUTANEOUS at 22:01

## 2021-07-24 RX ADMIN — Medication 100 MILLIGRAM(S): at 06:30

## 2021-07-24 RX ADMIN — CHLORHEXIDINE GLUCONATE 15 MILLILITER(S): 213 SOLUTION TOPICAL at 17:34

## 2021-07-24 RX ADMIN — Medication 64.8 MILLIGRAM(S): at 17:35

## 2021-07-24 RX ADMIN — Medication 64.8 MILLIGRAM(S): at 06:30

## 2021-07-24 RX ADMIN — Medication 1 MILLIGRAM(S): at 13:17

## 2021-07-24 NOTE — PROGRESS NOTE ADULT - ASSESSMENT
62 y/o male PMHx COVID+ (3/2021 - not hospitalized), PE (8/2020 - on Eliquis at home), CKD, w/ recent hospitalization at [facility] after seizure w/ lingual hematoma requiring emergency tracheostomy on 3/25/21 s/p Tracheal Resection and Reconstruction on 4/23 presented to ED in respiratory distress requiring emergent tracheostomy on 7/1 and subsequent revision tracheostomy on 7/2, s/p PEG 7/19. Post op course complicated by seizures and concern for anoxic brain injury.    PLAN:  Neurologic:  - Phenobarbital 65mg PO BID  - Depakote 750mg BID, Keppra 1000mg BID  - Klonopin 1mg TID for myoclonic jerks  - EEG significant for hyperexcitability and GPDs: cortical storming and stimulus myoclonus  - MRI Brain WNL, no anoxic injury    Respiratory:   - s/p bronch and trach revision 7/2  - Mechanical ventilation AC/CMV 18/400/5/30  - 7/23 AM CPAP, started getting low tidal volume and now back on A/C  - f/u ABG    Cardiovascular:   - Hemodynamically stable off vasopressors   - Labetalol 100 BID for hypertension     Gastrointestinal/Nutrition:   - PEG w/ TF@ Goal  - Protonix daily for stress ulcer ppx  - S/p PEG 7/19    Renal/Genitourinary:  - Monitor intake & output, UOP  - d/c free water    Hematologic:  - Trend H/H  - c/w SQH for VTE prophylaxis    Infectious Disease:   - Afebrile  - Sputum Cx with pseudomonas -> Cefepime (7/18- ) (7 days)  - Urine Cx with Citrobacter  - s/p course of Zosyn     Tubes/Lines/Drains:   - PEG  - R PIV  - L PIV     Disposition: SICU, Social work called, aware on KEIKO/getting pt to next facility

## 2021-07-24 NOTE — PROGRESS NOTE ADULT - SUBJECTIVE AND OBJECTIVE BOX
SICU Daily Progress Note  =====================================================  24 Hour Interval/Overnight Events:      - awaiting placement  - transitioned to CPAP 13/5/21 then 10/5/21 and wasn't getting enough tidal volume and began belly breathing  - back on A/C  - f/u social work    HPI: 60 y/o male s/p tracheal resection 4/23/21 presents to [facility] ER w c/o stridor and SOB. Difficulty breating is worse at night and prevents him from being able to sleep.  He denies productive cough or fever.  This is his 3rd readmission after his tracheal resection  PT has PMHx COVID+ (3/2021 - not hospitalized), PE (8/2020 - on eliquis at home), CKD, w/ recent hospitalization at [facility] after seizure w/ lingual hematoma requiring emergency tracheostomy on 3/25/21.  Patient underwent Tracheal Resection and Reconstruction on 4/23. ENT injected vocal cords few months ago. He was last discharged 5/18/21.     PROCEDURE  ENT consulted in ED for surgical airway management. per anesthesia unable to intubate. emergent tracheostomy performed, size 5.5 tracheostomy tube w cuff. secured w silk sutures and neck tie    P/E  tracheostomy tube in place size 5.5, cuff up secored w silk sutures and neck tie  oozing from stoma, minimal  saturating 100%    MEDICATIONS  (STANDING):  ALBUTerol    90 MICROgram(s) HFA Inhaler 2 Puff(s) Inhalation every 4 hours  chlorhexidine 0.12% Liquid 15 milliLiter(s) Oral Mucosa every 12 hours  chlorhexidine 4% Liquid 1 Application(s) Topical <User Schedule>  clonazePAM  Tablet 1 milliGRAM(s) Oral three times a day  diVALproex Sprinkle 750 milliGRAM(s) Oral two times a day  heparin   Injectable 5000 Unit(s) SubCutaneous every 8 hours  labetalol 100 milliGRAM(s) Oral every 12 hours  levETIRAcetam  Solution 1000 milliGRAM(s) Oral two times a day  pantoprazole   Suspension 40 milliGRAM(s) Oral daily  petrolatum Ophthalmic Ointment 1 Application(s) Both EYES two times a day  PHENobarbital 64.8 milliGRAM(s) Oral two times a day  senna Syrup 5 milliLiter(s) Oral at bedtime    MEDICATIONS  (PRN):  acetaminophen    Suspension .. 650 milliGRAM(s) Oral every 6 hours PRN Temp greater or equal to 38C (100.4F)  sodium chloride 0.9% lock flush 10 milliLiter(s) IV Push every 1 hour PRN Pre/post blood products, medications, blood draw, and to maintain line patency    ICU Vital Signs Last 24 Hrs  T(C): 37.3 (24 Jul 2021 04:00), Max: 38.2 (23 Jul 2021 20:00)  T(F): 99.1 (24 Jul 2021 04:00), Max: 100.8 (23 Jul 2021 20:00)  HR: 94 (24 Jul 2021 04:00) (87 - 100)  BP: 129/74 (24 Jul 2021 04:00) (92/65 - 129/74)  BP(mean): 84 (24 Jul 2021 04:00) (71 - 92)  ABP: --  ABP(mean): --  RR: 21 (24 Jul 2021 04:00) (17 - 27)  SpO2: 96% (24 Jul 2021 04:00) (94% - 100%)    I&O's Detail    22 Jul 2021 07:01  -  23 Jul 2021 07:00  --------------------------------------------------------  IN:    Enteral Tube Flush: 200 mL    Free Water: 1000 mL    IV PiggyBack: 50 mL    Lactated Ringers Bolus: 500 mL    Nepro with Carb Steady: 816 mL  Total IN: 2566 mL    OUT:    Indwelling Catheter - Urethral (mL): 1570 mL    Rectal Tube (mL): 10 mL  Total OUT: 1580 mL    Total NET: 986 mL      23 Jul 2021 07:01  -  24 Jul 2021 04:51  --------------------------------------------------------  IN:    Enteral Tube Flush: 60 mL    Free Water: 750 mL    Nepro with Carb Steady: 714 mL  Total IN: 1524 mL    OUT:    Indwelling Catheter - Urethral (mL): 1300 mL    Rectal Tube (mL): 15 mL    Voided (mL): 275 mL  Total OUT: 1590 mL    Total NET: -66 mL        EXAM    NEUROLOGY  Exam: Sedated    HEENT  Exam: Normocephalic, atraumatic, EOMI. ETT in place    RESPIRATORY  Exam: Lungs clear to auscultation, Normal expansion/effort.  Mode: AC/ CMV (Assist Control/ Continuous Mandatory Ventilation)  RR (machine): 18 TV (machine): 400 FiO2: 21 PEEP: 5    CARDIOVASCULAR  Exam: S1, S2.  Regular rate and rhythm.      GI/NUTRITION  Exam: Abdomen soft, Non-tender, Non-distended.   Current Diet: Diet, NPO with Tube Feed      METABOLIC/FLUIDS/ELECTROLYTES      HEMATOLOGIC  [x] VTE Prophylaxis: heparin   Injectable 5000 Unit(s) SubCutaneous every 8 hours      LABS  --------------------------------------------------------------------------------------  CBC (07-24 @ 01:08)                              8.8<L>                         12.76<H>  )----------------(  205        --    % Neutrophils, --    % Lymphocytes, ANC: --                                  28.9<L>  CBC (07-23 @ 02:39)                              7.8<L>                         11.63<H>  )----------------(  204        --    % Neutrophils, --    % Lymphocytes, ANC: --                                  26.1<L>    BMP (07-24 @ 01:08)             142     |  110<H>  |  100<H>		Ca++ --      Ca 9.7                ---------------------------------( 147<H>		Mg 2.80<H>             4.8     |  16<L>   |  3.47<H>			Ph 4.9<H>  BMP (07-23 @ 02:39)             144     |  109<H>  |  98<H> 		Ca++ --      Ca 9.2                ---------------------------------( 128<H>		Mg 2.90<H>             4.7     |  17<L>   |  3.57<H>			Ph 5.3<H>          ABG (07-23 @ 13:56)     7.40 / 31<L> / 75<L> / 20<L> / -5.6<L> / 96.1%     Lactate:        --------------------------------------------------------------------------------------

## 2021-07-24 NOTE — PROGRESS NOTE ADULT - SUBJECTIVE AND OBJECTIVE BOX
7/6: EEg notable for possible status epilepticus. Planned for MRI today. Pt noted to be febrile yesterday with tmax of 102.9.   7/7: naeon. cxr appears to be slightly worse in effusions. pending mri  7/8: negative blood cultures, plan for MRI today   7/9: NAEON - MRI with no significant intracranial pathology.  7/10: NAEON  7/11: naeon   7/12: naeon. GOC today  7/13: naeon. waiting [facility] duscission  7/14:  naeon. waiting [facility] duscission  7/15: NAEON. [facility]  7/16: per [facility] full measures until family arrives. to get peg  7/17: NAEON, febrile overnight  7/18: NAEON - plan for PEG sergio  7/19: No acute events overnight. On abx for fever, plan for PEG today.   7/20 No acute events overnight, PEG tube in place, had another episode of fever  7/21: NAEON.  7/22: NAEON  7/23: NAEON  7/24: No acute events overnight. Pt being planned for likely transfer to facility.     ICU Vital Signs Last 24 Hrs  ICU Vital Signs Last 24 Hrs  T(C): 37.2 (24 Jul 2021 00:00), Max: 38.2 (23 Jul 2021 20:00)  T(F): 99 (24 Jul 2021 00:00), Max: 100.8 (23 Jul 2021 20:00)  HR: 93 (24 Jul 2021 03:13) (87 - 100)  BP: 101/69 (24 Jul 2021 00:00) (92/65 - 125/76)  BP(mean): 76 (24 Jul 2021 00:00) (71 - 92)  ABP: --  ABP(mean): --  RR: 20 (24 Jul 2021 00:00) (17 - 27)  SpO2: 96% (24 Jul 2021 03:13) (94% - 100%)        P/E  Sedated, 6LPC, trach sutured to skin, trach ties. no bleeding  Neck soft, flat      A/P: 60 y/o male s/p tracheal resection 4/23/21 presents to [facility] ER w c/o stridor and SOB. Difficulty breating is worse at night and prevents him from being able to sleep.  He denies productive cough or fever.  This is his 3rd readmission after his tracheal resection  PT has PMHx COVID+ (3/2021 - not hospitalized), PE (8/2020 - on eliquis at home), CKD, w/ recent hospitalization at [facility] after seizure w/ lingual hematoma requiring emergency tracheostomy on 3/25/21.  Patient underwent Tracheal Resection and Reconstruction on 4/23. ENT injected vocal cords few months ago. He was last discharged 5/18/21. s/p emergent cric 7/1 now s/p revision trach, flex bronch and removal of stent 7/2 with post-op course complicated by seizures  - GOC: full code for now. s/p PEG   - Patient has a critical airway, only ENT to manipulate airway  - Routine trach care by nursing  - Suction PRN  - Extra 6LPC and 4LPC at bedside  - Vent per SICU  - Tube feeds per SICU   - Rest of care per SICU

## 2021-07-25 LAB
ANION GAP SERPL CALC-SCNC: 18 MMOL/L — HIGH (ref 7–14)
BUN SERPL-MCNC: 111 MG/DL — HIGH (ref 7–23)
CALCIUM SERPL-MCNC: 9.6 MG/DL — SIGNIFICANT CHANGE UP (ref 8.4–10.5)
CHLORIDE SERPL-SCNC: 108 MMOL/L — HIGH (ref 98–107)
CO2 SERPL-SCNC: 15 MMOL/L — LOW (ref 22–31)
CREAT SERPL-MCNC: 3.71 MG/DL — HIGH (ref 0.5–1.3)
GLUCOSE SERPL-MCNC: 146 MG/DL — HIGH (ref 70–99)
HCT VFR BLD CALC: 24.7 % — LOW (ref 39–50)
HGB BLD-MCNC: 7.5 G/DL — LOW (ref 13–17)
MAGNESIUM SERPL-MCNC: 2.8 MG/DL — HIGH (ref 1.6–2.6)
MCHC RBC-ENTMCNC: 28.2 PG — SIGNIFICANT CHANGE UP (ref 27–34)
MCHC RBC-ENTMCNC: 30.4 GM/DL — LOW (ref 32–36)
MCV RBC AUTO: 92.9 FL — SIGNIFICANT CHANGE UP (ref 80–100)
NRBC # BLD: 0 /100 WBCS — SIGNIFICANT CHANGE UP
NRBC # FLD: 0 K/UL — SIGNIFICANT CHANGE UP
PHOSPHATE SERPL-MCNC: 5 MG/DL — HIGH (ref 2.5–4.5)
PLATELET # BLD AUTO: 217 K/UL — SIGNIFICANT CHANGE UP (ref 150–400)
POTASSIUM SERPL-MCNC: 4.9 MMOL/L — SIGNIFICANT CHANGE UP (ref 3.5–5.3)
POTASSIUM SERPL-SCNC: 4.9 MMOL/L — SIGNIFICANT CHANGE UP (ref 3.5–5.3)
RBC # BLD: 2.66 M/UL — LOW (ref 4.2–5.8)
RBC # FLD: 16.9 % — HIGH (ref 10.3–14.5)
SODIUM SERPL-SCNC: 141 MMOL/L — SIGNIFICANT CHANGE UP (ref 135–145)
WBC # BLD: 13.26 K/UL — HIGH (ref 3.8–10.5)
WBC # FLD AUTO: 13.26 K/UL — HIGH (ref 3.8–10.5)

## 2021-07-25 PROCEDURE — 99291 CRITICAL CARE FIRST HOUR: CPT

## 2021-07-25 RX ADMIN — DIVALPROEX SODIUM 750 MILLIGRAM(S): 500 TABLET, DELAYED RELEASE ORAL at 05:01

## 2021-07-25 RX ADMIN — CHLORHEXIDINE GLUCONATE 1 APPLICATION(S): 213 SOLUTION TOPICAL at 05:02

## 2021-07-25 RX ADMIN — CHLORHEXIDINE GLUCONATE 15 MILLILITER(S): 213 SOLUTION TOPICAL at 05:01

## 2021-07-25 RX ADMIN — Medication 1 APPLICATION(S): at 17:45

## 2021-07-25 RX ADMIN — HEPARIN SODIUM 5000 UNIT(S): 5000 INJECTION INTRAVENOUS; SUBCUTANEOUS at 16:08

## 2021-07-25 RX ADMIN — Medication 1 MILLIGRAM(S): at 06:00

## 2021-07-25 RX ADMIN — Medication 1 MILLIGRAM(S): at 22:42

## 2021-07-25 RX ADMIN — PANTOPRAZOLE SODIUM 40 MILLIGRAM(S): 20 TABLET, DELAYED RELEASE ORAL at 12:25

## 2021-07-25 RX ADMIN — HEPARIN SODIUM 5000 UNIT(S): 5000 INJECTION INTRAVENOUS; SUBCUTANEOUS at 22:42

## 2021-07-25 RX ADMIN — HEPARIN SODIUM 5000 UNIT(S): 5000 INJECTION INTRAVENOUS; SUBCUTANEOUS at 05:02

## 2021-07-25 RX ADMIN — Medication 64.8 MILLIGRAM(S): at 17:33

## 2021-07-25 RX ADMIN — Medication 100 MILLIGRAM(S): at 06:04

## 2021-07-25 RX ADMIN — Medication 1 MILLIGRAM(S): at 16:11

## 2021-07-25 RX ADMIN — SENNA PLUS 5 MILLILITER(S): 8.6 TABLET ORAL at 22:42

## 2021-07-25 RX ADMIN — Medication 64.8 MILLIGRAM(S): at 06:00

## 2021-07-25 RX ADMIN — Medication 1 APPLICATION(S): at 05:02

## 2021-07-25 RX ADMIN — LEVETIRACETAM 1000 MILLIGRAM(S): 250 TABLET, FILM COATED ORAL at 05:02

## 2021-07-25 RX ADMIN — CHLORHEXIDINE GLUCONATE 15 MILLILITER(S): 213 SOLUTION TOPICAL at 17:27

## 2021-07-25 RX ADMIN — LEVETIRACETAM 1000 MILLIGRAM(S): 250 TABLET, FILM COATED ORAL at 17:28

## 2021-07-25 RX ADMIN — DIVALPROEX SODIUM 750 MILLIGRAM(S): 500 TABLET, DELAYED RELEASE ORAL at 17:29

## 2021-07-25 NOTE — PROGRESS NOTE ADULT - SUBJECTIVE AND OBJECTIVE BOX
SICU Daily Progress Note  =====================================================  24 Hour Interval/Overnight Events:      - awaiting placement  - No interval events       HPI:  62 y/o male s/p tracheal resection 4/23/21 presents to [facility] ER w c/o stridor and SOB. Difficulty breating is worse at night and prevents him from being able to sleep.  He denies productive cough or fever.  This is his 3rd readmission after his tracheal resection  PT has PMHx COVID+ (3/2021 - not hospitalized), PE (8/2020 - on eliquis at home), CKD, w/ recent hospitalization at [facility] after seizure w/ lingual hematoma requiring emergency tracheostomy on 3/25/21.  Patient underwent Tracheal Resection and Reconstruction on 4/23. ENT injected vocal cords few months ago. He was last discharged 5/18/21.       --------------------------------------------------------------------------------------  Allergies: No Known Allergies      MEDICATIONS:   --------------------------------------------------------------------------------------  Neurologic Medications  acetaminophen    Suspension .. 650 milliGRAM(s) Oral every 6 hours PRN Temp greater or equal to 38C (100.4F)  clonazePAM  Tablet 1 milliGRAM(s) Oral three times a day  diVALproex Sprinkle 750 milliGRAM(s) Oral two times a day  levETIRAcetam  Solution 1000 milliGRAM(s) Oral two times a day  PHENobarbital 64.8 milliGRAM(s) Oral two times a day    Respiratory Medications  ALBUTerol    90 MICROgram(s) HFA Inhaler 2 Puff(s) Inhalation every 4 hours    Cardiovascular Medications  labetalol 100 milliGRAM(s) Oral every 12 hours    Gastrointestinal Medications  pantoprazole   Suspension 40 milliGRAM(s) Oral daily  senna Syrup 5 milliLiter(s) Oral at bedtime  sodium chloride 0.9% lock flush 10 milliLiter(s) IV Push every 1 hour PRN Pre/post blood products, medications, blood draw, and to maintain line patency    Genitourinary Medications    Hematologic/Oncologic Medications  heparin   Injectable 5000 Unit(s) SubCutaneous every 8 hours    Antimicrobial/Immunologic Medications    Endocrine/Metabolic Medications    Topical/Other Medications  chlorhexidine 0.12% Liquid 15 milliLiter(s) Oral Mucosa every 12 hours  chlorhexidine 4% Liquid 1 Application(s) Topical <User Schedule>  petrolatum Ophthalmic Ointment 1 Application(s) Both EYES two times a day    --------------------------------------------------------------------------------------    VITAL SIGNS, INS/OUTS (last 24 hours):  --------------------------------------------------------------------------------------  T(C): 37.9 (07-25-21 @ 00:00), Max: 38.3 (07-24-21 @ 08:00)  HR: 91 (07-25-21 @ 00:00) (90 - 98)  BP: 104/66 (07-25-21 @ 00:00) (88/58 - 129/74)  BP(mean): 74 (07-25-21 @ 00:00) (65 - 84)  ABP: --  ABP(mean): --  RR: 19 (07-25-21 @ 00:00) (18 - 21)  SpO2: 97% (07-25-21 @ 00:00) (95% - 97%)  Wt(kg): --  CVP(mm Hg): --  CI: --  CAPILLARY BLOOD GLUCOSE       N/A      07-23 @ 07:01  -  07-24 @ 07:00  --------------------------------------------------------  IN:    Enteral Tube Flush: 60 mL    Free Water: 1000 mL    Nepro with Carb Steady: 782 mL  Total IN: 1842 mL    OUT:    Indwelling Catheter - Urethral (mL): 1300 mL    Rectal Tube (mL): 165 mL    Voided (mL): 275 mL  Total OUT: 1740 mL    Total NET: 102 mL      07-24 @ 07:01  -  07-25 @ 03:08  --------------------------------------------------------  IN:    Free Water: 750 mL    Nepro with Carb Steady: 340 mL  Total IN: 1090 mL    OUT:    Voided (mL): 575 mL  Total OUT: 575 mL    Total NET: 515 mL        --------------------------------------------------------------------------------------  EXAM    NEUROLOGY  Exam: Not responsive to commands    HEENT  Exam: Normocephalic, atraumatic, EOMI. ETT in place    RESPIRATORY  Exam: Lungs clear to auscultation, Normal expansion/effort.  Mode: AC/ CMV (Assist Control/ Continuous Mandatory Ventilation), RR (machine): 18, TV (machine): 400, FiO2: 21, PEEP: 5, ITime: 0.84, MAP: 10, PIP: 24    CARDIOVASCULAR  Exam: S1, S2.  Regular rate and rhythm.      GI/NUTRITION  Exam: Abdomen soft, Non-tender, Non-distended. PEG site CDI  Current Diet: Diet, NPO with Tube Feed      METABOLIC/FLUIDS/ELECTROLYTES      HEMATOLOGIC  [x] VTE Prophylaxis: heparin   Injectable 5000 Unit(s) SubCutaneous every 8 hours      LABS  --------------------------------------------------------------------------------------  CBC (07-25 @ 02:05)                          7.5<L>                   13.26<H>  )--------------(  217        --    % Neuts, --    % Lymphs, ANC: --                              24.7<L>  CBC (07-24 @ 01:08)                          8.8<L>                   12.76<H>  )--------------(  205        --    % Neuts, --    % Lymphs, ANC: --                              28.9<L>    BMP (07-25 @ 02:05)       141     |  108<H>  |  111<H>			Ca++ --      Ca 9.6          ---------------------------------( 146<H>		Mg 2.80<H>       4.9     |  15<L>   |  3.71<H>			Ph 5.0<H>  BMP (07-24 @ 01:08)       142     |  110<H>  |  100<H>			Ca++ --      Ca 9.7          ---------------------------------( 147<H>		Mg 2.80<H>       4.8     |  16<L>   |  3.47<H>			Ph 4.9<H>                -> .Sputum Sputum trap Culture (07-16 @ 09:00)       Few polymorphonuclear leukocytes per low power field  No Squamous epithelial cells per low power field  Rare Gram Variable Rods per oil power field    Pseudomonas aeruginosa  Pseudomonas aeruginosa    Moderate Pseudomonas aeruginosa  Moderate Pseudomonas aeruginosa #2  Normal Respiratory Taylor absent    -> .Blood Blood-Peripheral Culture (07-16 @ 08:00)     NG    NG    No Growth Final    -> .Blood Blood-Venous Culture (07-16 @ 02:50)     NG    NG    No Growth Final      -------------------------------------------------------------------------------------- SICU Daily Progress Note  =====================================================  24 Hour Interval/Overnight Events:      - awaiting placement  - No interval events     HPI:  62 y/o male s/p tracheal resection 4/23/21 presents to [facility] ER w c/o stridor and SOB. Difficulty breating is worse at night and prevents him from being able to sleep.  He denies productive cough or fever.  This is his 3rd readmission after his tracheal resection  PT has PMHx COVID+ (3/2021 - not hospitalized), PE (8/2020 - on eliquis at home), CKD, w/ recent hospitalization at [facility] after seizure w/ lingual hematoma requiring emergency tracheostomy on 3/25/21.  Patient underwent Tracheal Resection and Reconstruction on 4/23. ENT injected vocal cords few months ago. He was last discharged 5/18/21.       --------------------------------------------------------------------------------------  Allergies: No Known Allergies      MEDICATIONS:   --------------------------------------------------------------------------------------  Neurologic Medications  acetaminophen    Suspension .. 650 milliGRAM(s) Oral every 6 hours PRN Temp greater or equal to 38C (100.4F)  clonazePAM  Tablet 1 milliGRAM(s) Oral three times a day  diVALproex Sprinkle 750 milliGRAM(s) Oral two times a day  levETIRAcetam  Solution 1000 milliGRAM(s) Oral two times a day  PHENobarbital 64.8 milliGRAM(s) Oral two times a day    Respiratory Medications  ALBUTerol    90 MICROgram(s) HFA Inhaler 2 Puff(s) Inhalation every 4 hours    Cardiovascular Medications  labetalol 100 milliGRAM(s) Oral every 12 hours    Gastrointestinal Medications  pantoprazole   Suspension 40 milliGRAM(s) Oral daily  senna Syrup 5 milliLiter(s) Oral at bedtime  sodium chloride 0.9% lock flush 10 milliLiter(s) IV Push every 1 hour PRN Pre/post blood products, medications, blood draw, and to maintain line patency    Genitourinary Medications    Hematologic/Oncologic Medications  heparin   Injectable 5000 Unit(s) SubCutaneous every 8 hours    Antimicrobial/Immunologic Medications    Endocrine/Metabolic Medications    Topical/Other Medications  chlorhexidine 0.12% Liquid 15 milliLiter(s) Oral Mucosa every 12 hours  chlorhexidine 4% Liquid 1 Application(s) Topical <User Schedule>  petrolatum Ophthalmic Ointment 1 Application(s) Both EYES two times a day    --------------------------------------------------------------------------------------    VITAL SIGNS, INS/OUTS (last 24 hours):  --------------------------------------------------------------------------------------  T(C): 37.9 (07-25-21 @ 00:00), Max: 38.3 (07-24-21 @ 08:00)  HR: 91 (07-25-21 @ 00:00) (90 - 98)  BP: 104/66 (07-25-21 @ 00:00) (88/58 - 129/74)  BP(mean): 74 (07-25-21 @ 00:00) (65 - 84)  ABP: --  ABP(mean): --  RR: 19 (07-25-21 @ 00:00) (18 - 21)  SpO2: 97% (07-25-21 @ 00:00) (95% - 97%)  Wt(kg): --  CVP(mm Hg): --  CI: --  CAPILLARY BLOOD GLUCOSE       N/A      07-23 @ 07:01  -  07-24 @ 07:00  --------------------------------------------------------  IN:    Enteral Tube Flush: 60 mL    Free Water: 1000 mL    Nepro with Carb Steady: 782 mL  Total IN: 1842 mL    OUT:    Indwelling Catheter - Urethral (mL): 1300 mL    Rectal Tube (mL): 165 mL    Voided (mL): 275 mL  Total OUT: 1740 mL    Total NET: 102 mL      07-24 @ 07:01  -  07-25 @ 03:08  --------------------------------------------------------  IN:    Free Water: 750 mL    Nepro with Carb Steady: 340 mL  Total IN: 1090 mL    OUT:    Voided (mL): 575 mL  Total OUT: 575 mL    Total NET: 515 mL        --------------------------------------------------------------------------------------  EXAM    NEUROLOGY  Exam: Not responsive to commands    HEENT  Exam: Normocephalic, atraumatic, EOMI. ETT in place    RESPIRATORY  Exam: Lungs clear to auscultation, Normal expansion/effort.  Mode: AC/ CMV (Assist Control/ Continuous Mandatory Ventilation), RR (machine): 18, TV (machine): 400, FiO2: 21, PEEP: 5, ITime: 0.84, MAP: 10, PIP: 24    CARDIOVASCULAR  Exam: S1, S2.  Regular rate and rhythm.      GI/NUTRITION  Exam: Abdomen soft, Non-tender, Non-distended. PEG site CDI  Current Diet: Diet, NPO with Tube Feed      METABOLIC/FLUIDS/ELECTROLYTES      HEMATOLOGIC  [x] VTE Prophylaxis: heparin   Injectable 5000 Unit(s) SubCutaneous every 8 hours      LABS  --------------------------------------------------------------------------------------  CBC (07-25 @ 02:05)                          7.5<L>                   13.26<H>  )--------------(  217        --    % Neuts, --    % Lymphs, ANC: --                              24.7<L>  CBC (07-24 @ 01:08)                          8.8<L>                   12.76<H>  )--------------(  205        --    % Neuts, --    % Lymphs, ANC: --                              28.9<L>    BMP (07-25 @ 02:05)       141     |  108<H>  |  111<H>			Ca++ --      Ca 9.6          ---------------------------------( 146<H>		Mg 2.80<H>       4.9     |  15<L>   |  3.71<H>			Ph 5.0<H>  BMP (07-24 @ 01:08)       142     |  110<H>  |  100<H>			Ca++ --      Ca 9.7          ---------------------------------( 147<H>		Mg 2.80<H>       4.8     |  16<L>   |  3.47<H>			Ph 4.9<H>                -> .Sputum Sputum trap Culture (07-16 @ 09:00)       Few polymorphonuclear leukocytes per low power field  No Squamous epithelial cells per low power field  Rare Gram Variable Rods per oil power field    Pseudomonas aeruginosa  Pseudomonas aeruginosa    Moderate Pseudomonas aeruginosa  Moderate Pseudomonas aeruginosa #2  Normal Respiratory Taylor absent    -> .Blood Blood-Peripheral Culture (07-16 @ 08:00)     NG    NG    No Growth Final    -> .Blood Blood-Venous Culture (07-16 @ 02:50)     NG    NG    No Growth Final      --------------------------------------------------------------------------------------

## 2021-07-25 NOTE — PROGRESS NOTE ADULT - SUBJECTIVE AND OBJECTIVE BOX
7/6: EEg notable for possible status epilepticus. Planned for MRI today. Pt noted to be febrile yesterday with tmax of 102.9.   7/7: naeon. cxr appears to be slightly worse in effusions. pending mri  7/8: negative blood cultures, plan for MRI today   7/9: NAEON - MRI with no significant intracranial pathology.  7/10: NAEON  7/11: naeon   7/12: naeon. GOC today  7/13: naeon. waiting [facility] duscission  7/14:  naeon. waiting [facility] duscission  7/15: NAEON. [facility]  7/16: per [facility] full measures until family arrives. to get peg  7/17: NAEON, febrile overnight  7/18: NAEON - plan for PEG sergio  7/19: No acute events overnight. On abx for fever, plan for PEG today.   7/20 No acute events overnight, PEG tube in place, had another episode of fever  7/21: NAEON.  7/22: NAEON  7/23: NAEON  7/24: No acute events overnight. Pt being planned for likely transfer to facility.   7/25: naeon    Vital Signs Last 24 Hrs  T(C): 37.4 (25 Jul 2021 04:00), Max: 38.3 (24 Jul 2021 08:00)  T(F): 99.4 (25 Jul 2021 04:00), Max: 101 (24 Jul 2021 08:00)  HR: 84 (25 Jul 2021 07:28) (84 - 98)  BP: 125/83 (25 Jul 2021 04:00) (88/58 - 125/83)  BP(mean): 92 (25 Jul 2021 04:00) (65 - 92)  RR: 19 (25 Jul 2021 04:00) (18 - 21)  SpO2: 96% (25 Jul 2021 07:28) (95% - 97%)    P/E  Sedated, 6LPC, trach sutured to skin, trach ties. no bleeding  Neck soft, flat      A/P: 62 y/o male s/p tracheal resection 4/23/21 presents to [facility] ER w c/o stridor and SOB. Difficulty breating is worse at night and prevents him from being able to sleep.  He denies productive cough or fever.  This is his 3rd readmission after his tracheal resection  PT has PMHx COVID+ (3/2021 - not hospitalized), PE (8/2020 - on eliquis at home), CKD, w/ recent hospitalization at [facility] after seizure w/ lingual hematoma requiring emergency tracheostomy on 3/25/21.  Patient underwent Tracheal Resection and Reconstruction on 4/23. ENT injected vocal cords few months ago. He was last discharged 5/18/21. s/p emergent cric 7/1 now s/p revision trach, flex bronch and removal of stent 7/2 with post-op course complicated by seizures  - GOC: full code for now. s/p PEG   - Patient has a critical airway, only ENT to manipulate airway  - Routine trach care by nursing  - Suction PRN  - Extra 6LPC and 4LPC at bedside  - Vent per SICU  - Tube feeds per SICU   - Rest of care per SICU

## 2021-07-25 NOTE — PROGRESS NOTE ADULT - ASSESSMENT
61M with hypoxic brain injury; s/p tracheostomy w/ revision    PLAN:  Neurologic:  - Phenobarbital 65mg PO BID  - Depakote 750mg BID, Keppra 1000mg BID  - Klonopin 1mg TID for myoclonic jerks  - EEG significant for hyperexcitability and GPDs: cortical storming and stimulus myoclonus  - MRI Brain WNL, no anoxic injury    Respiratory:   - s/p bronch and trach revision 7/2  - Mechanical ventilation AC/CMV 18/400/5/30  - 7/23 AM CPAP, started getting low tidal volume and now back on A/C  - f/u ABG    Cardiovascular:   - Hemodynamically stable off vasopressors   - Labetalol 100 BID for hypertension     Gastrointestinal/Nutrition:   - PEG w/ TF@ Goal  - Protonix daily for stress ulcer ppx  - S/p PEG 7/19    Renal/Genitourinary:  - Monitor intake & output, UOP  - d/c free water    Hematologic:  - Trend H/H  - c/w SQH for VTE prophylaxis    Infectious Disease:   - Afebrile  - Sputum Cx with pseudomonas -> Cefepime (7/18- ) (7 days)  - Urine Cx with Citrobacter  - s/p course of Zosyn     Tubes/Lines/Drains:   - PEG  - R PIV  - L PIV     Disposition: SICU, Social work called, aware on KEIKO/getting pt to next facility   61M with hypoxic brain injury; s/p tracheostomy w/ revision    PLAN:  Neurologic:  - Phenobarbital 65mg PO BID  - Depakote 750mg BID, Keppra 1000mg BID  - Klonopin 1mg TID for myoclonic jerks  - EEG significant for hyperexcitability and GPDs: cortical storming and stimulus myoclonus  - MRI Brain WNL, no anoxic injury    Respiratory:   - s/p bronch and trach revision 7/2  - Mechanical ventilation AC/CMV 18/400/5/30  - 7/23 AM CPAP, started getting low tidal volume and now back on A/C    Cardiovascular:   - Hemodynamically stable off vasopressors   - Labetalol 100 BID for hypertension     Gastrointestinal/Nutrition:   - PEG w/ TF@ Goal  - Protonix daily for stress ulcer ppx  - S/p PEG 7/19    Renal/Genitourinary:  - Monitor intake & output, UOP    Hematologic:  - Trend H/H  - c/w SQH for VTE prophylaxis    Infectious Disease:   - Afebrile  - Sputum Cx with pseudomonas -> Cefepime (7/18- ) (7 days)  - Urine Cx with Citrobacter  - s/p course of Zosyn     Tubes/Lines/Drains:   - PEG  - R PIV  - L PIV     Disposition: SICU, Social work called, aware on KEIKO/getting pt to next facility

## 2021-07-26 LAB
ANION GAP SERPL CALC-SCNC: 19 MMOL/L — HIGH (ref 7–14)
BUN SERPL-MCNC: 111 MG/DL — HIGH (ref 7–23)
CALCIUM SERPL-MCNC: 10.2 MG/DL — SIGNIFICANT CHANGE UP (ref 8.4–10.5)
CHLORIDE SERPL-SCNC: 107 MMOL/L — SIGNIFICANT CHANGE UP (ref 98–107)
CO2 SERPL-SCNC: 18 MMOL/L — LOW (ref 22–31)
CREAT SERPL-MCNC: 3.61 MG/DL — HIGH (ref 0.5–1.3)
GLUCOSE SERPL-MCNC: 139 MG/DL — HIGH (ref 70–99)
HCT VFR BLD CALC: 25.1 % — LOW (ref 39–50)
HGB BLD-MCNC: 7.6 G/DL — LOW (ref 13–17)
MAGNESIUM SERPL-MCNC: 3 MG/DL — HIGH (ref 1.6–2.6)
MCHC RBC-ENTMCNC: 28.7 PG — SIGNIFICANT CHANGE UP (ref 27–34)
MCHC RBC-ENTMCNC: 30.3 GM/DL — LOW (ref 32–36)
MCV RBC AUTO: 94.7 FL — SIGNIFICANT CHANGE UP (ref 80–100)
NRBC # BLD: 0 /100 WBCS — SIGNIFICANT CHANGE UP
NRBC # FLD: 0 K/UL — SIGNIFICANT CHANGE UP
PHOSPHATE SERPL-MCNC: 6 MG/DL — HIGH (ref 2.5–4.5)
PLATELET # BLD AUTO: 251 K/UL — SIGNIFICANT CHANGE UP (ref 150–400)
POTASSIUM SERPL-MCNC: 4.8 MMOL/L — SIGNIFICANT CHANGE UP (ref 3.5–5.3)
POTASSIUM SERPL-SCNC: 4.8 MMOL/L — SIGNIFICANT CHANGE UP (ref 3.5–5.3)
RBC # BLD: 2.65 M/UL — LOW (ref 4.2–5.8)
RBC # FLD: 16.9 % — HIGH (ref 10.3–14.5)
SODIUM SERPL-SCNC: 144 MMOL/L — SIGNIFICANT CHANGE UP (ref 135–145)
WBC # BLD: 14 K/UL — HIGH (ref 3.8–10.5)
WBC # FLD AUTO: 14 K/UL — HIGH (ref 3.8–10.5)

## 2021-07-26 PROCEDURE — 99232 SBSQ HOSP IP/OBS MODERATE 35: CPT | Mod: 24,GC

## 2021-07-26 RX ORDER — CALCIUM ACETATE 667 MG
667 TABLET ORAL
Refills: 0 | Status: DISCONTINUED | OUTPATIENT
Start: 2021-07-26 | End: 2021-07-26

## 2021-07-26 RX ORDER — SEVELAMER CARBONATE 2400 MG/1
800 POWDER, FOR SUSPENSION ORAL
Refills: 0 | Status: DISCONTINUED | OUTPATIENT
Start: 2021-07-26 | End: 2021-07-29

## 2021-07-26 RX ORDER — SEVELAMER CARBONATE 2400 MG/1
800 POWDER, FOR SUSPENSION ORAL THREE TIMES A DAY
Refills: 0 | Status: DISCONTINUED | OUTPATIENT
Start: 2021-07-26 | End: 2021-07-26

## 2021-07-26 RX ADMIN — Medication 1 MILLIGRAM(S): at 05:07

## 2021-07-26 RX ADMIN — Medication 64.8 MILLIGRAM(S): at 17:03

## 2021-07-26 RX ADMIN — CHLORHEXIDINE GLUCONATE 1 APPLICATION(S): 213 SOLUTION TOPICAL at 05:00

## 2021-07-26 RX ADMIN — LEVETIRACETAM 1000 MILLIGRAM(S): 250 TABLET, FILM COATED ORAL at 17:04

## 2021-07-26 RX ADMIN — LEVETIRACETAM 1000 MILLIGRAM(S): 250 TABLET, FILM COATED ORAL at 05:07

## 2021-07-26 RX ADMIN — CHLORHEXIDINE GLUCONATE 15 MILLILITER(S): 213 SOLUTION TOPICAL at 05:07

## 2021-07-26 RX ADMIN — HEPARIN SODIUM 5000 UNIT(S): 5000 INJECTION INTRAVENOUS; SUBCUTANEOUS at 05:08

## 2021-07-26 RX ADMIN — DIVALPROEX SODIUM 750 MILLIGRAM(S): 500 TABLET, DELAYED RELEASE ORAL at 05:07

## 2021-07-26 RX ADMIN — SENNA PLUS 5 MILLILITER(S): 8.6 TABLET ORAL at 21:37

## 2021-07-26 RX ADMIN — Medication 1 APPLICATION(S): at 18:09

## 2021-07-26 RX ADMIN — PANTOPRAZOLE SODIUM 40 MILLIGRAM(S): 20 TABLET, DELAYED RELEASE ORAL at 13:01

## 2021-07-26 RX ADMIN — Medication 650 MILLIGRAM(S): at 17:04

## 2021-07-26 RX ADMIN — Medication 1 APPLICATION(S): at 05:08

## 2021-07-26 RX ADMIN — SEVELAMER CARBONATE 800 MILLIGRAM(S): 2400 POWDER, FOR SUSPENSION ORAL at 17:02

## 2021-07-26 RX ADMIN — DIVALPROEX SODIUM 750 MILLIGRAM(S): 500 TABLET, DELAYED RELEASE ORAL at 17:04

## 2021-07-26 RX ADMIN — Medication 1 MILLIGRAM(S): at 21:37

## 2021-07-26 RX ADMIN — Medication 1 MILLIGRAM(S): at 13:01

## 2021-07-26 RX ADMIN — Medication 64.8 MILLIGRAM(S): at 05:07

## 2021-07-26 RX ADMIN — SEVELAMER CARBONATE 800 MILLIGRAM(S): 2400 POWDER, FOR SUSPENSION ORAL at 12:59

## 2021-07-26 RX ADMIN — HEPARIN SODIUM 5000 UNIT(S): 5000 INJECTION INTRAVENOUS; SUBCUTANEOUS at 13:01

## 2021-07-26 RX ADMIN — HEPARIN SODIUM 5000 UNIT(S): 5000 INJECTION INTRAVENOUS; SUBCUTANEOUS at 21:37

## 2021-07-26 RX ADMIN — CHLORHEXIDINE GLUCONATE 15 MILLILITER(S): 213 SOLUTION TOPICAL at 17:03

## 2021-07-26 RX ADMIN — Medication 650 MILLIGRAM(S): at 18:00

## 2021-07-26 NOTE — PROGRESS NOTE ADULT - ASSESSMENT
PLAN:  Neurologic:  - Phenobarbital 65mg PO BID  - Depakote 750mg BID, Keppra 1000mg BID  - Klonopin 1mg TID for myoclonic jerks  - EEG significant for hyperexcitability and GPDs: cortical storming and stimulus myoclonus  - MRI Brain WNL, no anoxic injury    Respiratory:   - s/p bronch and trach revision 7/2  - Mechanical ventilation AC/CMV 18/400/5/30  - 7/23 AM CPAP, started getting low tidal volume and now back on A/C    Cardiovascular:   - Hemodynamically stable off vasopressors   - Labetalol 100 BID for hypertension     Gastrointestinal/Nutrition:   - PEG w/ TF@ Goal  - Protonix daily for stress ulcer ppx  - S/p PEG 7/19    Renal/Genitourinary:  - Monitor intake & output, UOP    Hematologic:  - Trend H/H  - c/w SQH for VTE prophylaxis    Infectious Disease:   - Afebrile  - Sputum Cx with pseudomonas -> Cefepime (7/18- ) (7 days)  - Urine Cx with Citrobacter  - s/p course of Zosyn     Tubes/Lines/Drains:   - PEG  - R PIV  - L PIV     Disposition: SICU, Social work called, aware on KEIKO/getting pt to next facility       PLAN:  Neurologic:  - Phenobarbital 65mg PO BID  - Depakote 750mg BID, Keppra 1000mg BID  - Klonopin 1mg TID for myoclonic jerks  - EEG significant for hyperexcitability and GPDs: cortical storming and stimulus myoclonus  - MRI Brain WNL, no anoxic injury    Respiratory:   - s/p bronch and trach revision 7/2  - Mechanical ventilation CPAP/PSV 10/5/21%,   - 7/26 - try weaning pressure support to 5, attempt trach collar if tidal volumes ok    Cardiovascular:   - Hemodynamically stable off vasopressors   - Stopped labetalol 100 BID for hypertension     Gastrointestinal/Nutrition:   - PEG w/ TF@ Goal  - Protonix daily for stress ulcer ppx  - S/p PEG 7/19  - Try bolus feeds    Renal/Genitourinary:  - Monitor intake & output, UOP  - Phos 6. Will consider phos binder    Hematologic:  - Trend H/H  - c/w SQH for VTE prophylaxis    Infectious Disease:   - Afebrile  - Sputum Cx with pseudomonas -> s/p Cefepime (7/18- 7/25) (7 days)  - Urine Cx with Citrobacter  - s/p course of Zosyn     Tubes/Lines/Drains:   - PEG  - R PIV  - L PIV     Disposition: SICU to RCU?, Social work called, aware on KEIKO/getting pt to next facility       PLAN:  Neurologic:  - Phenobarbital 65mg PO BID  - Depakote 750mg BID, Keppra 1000mg BID  - Klonopin 1mg TID for myoclonic jerks  - EEG significant for hyperexcitability and GPDs: cortical storming and stimulus myoclonus  - MRI Brain WNL, no anoxic injury    Respiratory:   - s/p bronch and trach revision 7/2  - Mechanical ventilation CPAP/PSV 10/5/21%,   - 7/26 - weaning pressure support to 5, failed now on 10/5 CPAP    Cardiovascular:   - Hemodynamically stable off vasopressors   - Stopped labetalol 100 BID for hypertension     Gastrointestinal/Nutrition:   - PEG w/ TF@ Goal, bolus feeds  - Protonix daily for stress ulcer ppx  - S/p PEG 7/19    Renal/Genitourinary:  - Monitor intake & output, UOP  - Phos 6. Started Sevelamer 800 TID    Hematologic:  - Trend H/H  - c/w SQH for VTE prophylaxis    Infectious Disease:   - Afebrile  - Sputum Cx with pseudomonas -> s/p Cefepime (7/18- 7/25) (7 days)  - Urine Cx with Citrobacter  - s/p course of Zosyn     Tubes/Lines/Drains:   - PEG  - R PIV  - L PIV     Disposition: SICU to RCU?, Social work called, aware on KEIKO/getting pt to next facility

## 2021-07-26 NOTE — PROGRESS NOTE ADULT - SUBJECTIVE AND OBJECTIVE BOX
7/6: EEg notable for possible status epilepticus. Planned for MRI today. Pt noted to be febrile yesterday with tmax of 102.9.   7/7: naeon. cxr appears to be slightly worse in effusions. pending mri  7/8: negative blood cultures, plan for MRI today   7/9: NAEON - MRI with no significant intracranial pathology.  7/10: NAEON  7/11: naeon   7/12: naeon. GOC today  7/13: naeon. waiting [facility] duscission  7/14:  naeon. waiting [facility] duscission  7/15: NAEON. [facility]  7/16: per [facility] full measures until family arrives. to get peg  7/17: NAEON, febrile overnight  7/18: NAEON - plan for PEG sergio  7/19: No acute events overnight. On abx for fever, plan for PEG today.   7/20 No acute events overnight, PEG tube in place, had another episode of fever  7/21: NAEON.  7/22: NAEON  7/23: NAEON  7/24: No acute events overnight. Pt being planned for likely transfer to facility.   7/25: naeon  7/26: NAEON      Vital Signs Last 24 Hrs  T(C): 36.9 (26 Jul 2021 04:00), Max: 37.3 (25 Jul 2021 16:00)  T(F): 98.5 (26 Jul 2021 04:00), Max: 99.1 (25 Jul 2021 16:00)  HR: 90 (26 Jul 2021 07:11) (83 - 95)  BP: 98/67 (26 Jul 2021 05:00) (90/56 - 123/82)  BP(mean): 74 (26 Jul 2021 05:00) (63 - 90)  RR: 16 (26 Jul 2021 05:00) (14 - 22)  SpO2: 98% (26 Jul 2021 07:11) (94% - 99%)    P/E  Sedated, 6LPC, trach sutured to skin, trach ties. no bleeding  Neck soft, flat      A/P: 62 y/o male s/p tracheal resection 4/23/21 presents to [facility] ER w c/o stridor and SOB. Difficulty breating is worse at night and prevents him from being able to sleep.  He denies productive cough or fever.  This is his 3rd readmission after his tracheal resection  PT has PMHx COVID+ (3/2021 - not hospitalized), PE (8/2020 - on eliquis at home), CKD, w/ recent hospitalization at [facility] after seizure w/ lingual hematoma requiring emergency tracheostomy on 3/25/21.  Patient underwent Tracheal Resection and Reconstruction on 4/23. ENT injected vocal cords few months ago. He was last discharged 5/18/21. s/p emergent cric 7/1 now s/p revision trach, flex bronch and removal of stent 7/2 with post-op course complicated by seizures  - GOC: full code for now. s/p PEG   - Patient has a critical airway, only ENT to manipulate airway  - Routine trach care by nursing  - Suction PRN  - Extra 6LPC and 4LPC at bedside  - Vent per SICU  - Tube feeds per SICU   - Rest of care per SICU

## 2021-07-26 NOTE — PROGRESS NOTE ADULT - SUBJECTIVE AND OBJECTIVE BOX
SICU Daily Progress Note  =====================================================  24 Hour Interval/Overnight Events:      - awaiting placement  - No interval events     HPI:  60 y/o male s/p tracheal resection 4/23/21 presents to [facility] ER w c/o stridor and SOB. Difficulty breating is worse at night and prevents him from being able to sleep.  He denies productive cough or fever.  This is his 3rd readmission after his tracheal resection  PT has PMHx COVID+ (3/2021 - not hospitalized), PE (8/2020 - on eliquis at home), CKD, w/ recent hospitalization at [facility] after seizure w/ lingual hematoma requiring emergency tracheostomy on 3/25/21.  Patient underwent Tracheal Resection and Reconstruction on 4/23. ENT injected vocal cords few months ago. He was last discharged 5/18/21.       --------------------------------------------------------------------------------------  Allergies: No Known Allergies      MEDICATIONS:   --------------------------------------------------------------------------------------  Neurologic Medications  acetaminophen    Suspension .. 650 milliGRAM(s) Oral every 6 hours PRN Temp greater or equal to 38C (100.4F)  clonazePAM  Tablet 1 milliGRAM(s) Oral three times a day  diVALproex Sprinkle 750 milliGRAM(s) Oral two times a day  levETIRAcetam  Solution 1000 milliGRAM(s) Oral two times a day  PHENobarbital 64.8 milliGRAM(s) Oral two times a day    Respiratory Medications  ALBUTerol    90 MICROgram(s) HFA Inhaler 2 Puff(s) Inhalation every 4 hours    Cardiovascular Medications  labetalol 100 milliGRAM(s) Oral every 12 hours    Gastrointestinal Medications  pantoprazole   Suspension 40 milliGRAM(s) Oral daily  senna Syrup 5 milliLiter(s) Oral at bedtime  sodium chloride 0.9% lock flush 10 milliLiter(s) IV Push every 1 hour PRN Pre/post blood products, medications, blood draw, and to maintain line patency    Genitourinary Medications    Hematologic/Oncologic Medications  heparin   Injectable 5000 Unit(s) SubCutaneous every 8 hours    Antimicrobial/Immunologic Medications    Endocrine/Metabolic Medications    Topical/Other Medications  chlorhexidine 0.12% Liquid 15 milliLiter(s) Oral Mucosa every 12 hours  chlorhexidine 4% Liquid 1 Application(s) Topical <User Schedule>  petrolatum Ophthalmic Ointment 1 Application(s) Both EYES two times a day    --------------------------------------------------------------------------------------    VITAL SIGNS, INS/OUTS (last 24 hours):  --------------------------------------------------------------------------------------  ICU Vital Signs Last 24 Hrs  T(C): 37 (26 Jul 2021 00:00), Max: 37.4 (25 Jul 2021 04:00)  T(F): 98.6 (26 Jul 2021 00:00), Max: 99.4 (25 Jul 2021 04:00)  HR: 84 (26 Jul 2021 00:00) (83 - 95)  BP: 122/80 (26 Jul 2021 00:00) (90/56 - 125/83)  BP(mean): 88 (26 Jul 2021 00:00) (63 - 92)  ABP: --  ABP(mean): --  RR: 18 (26 Jul 2021 00:00) (16 - 23)  SpO2: 97% (26 Jul 2021 00:00) (94% - 100%)         N/A  I&O's Detail    24 Jul 2021 07:01  -  25 Jul 2021 07:00  --------------------------------------------------------  IN:    Enteral Tube Flush: 30 mL    Free Water: 1000 mL    Nepro with Carb Steady: 782 mL  Total IN: 1812 mL    OUT:    Rectal Tube (mL): 50 mL    Voided (mL): 1175 mL  Total OUT: 1225 mL    Total NET: 587 mL      25 Jul 2021 07:01  -  26 Jul 2021 00:13  --------------------------------------------------------  IN:    Enteral Tube Flush: 70 mL    Nepro with Carb Steady: 578 mL  Total IN: 648 mL    OUT:    Intermittent Catheterization - Urethral (mL): 450 mL    Rectal Tube (mL): 150 mL    Voided (mL): 600 mL  Total OUT: 1200 mL    Total NET: -552 mL        ---------------------------------------------------------------------------------------  EXAM    NEUROLOGY  Exam: Not responsive to commands    HEENT  Exam: Normocephalic, atraumatic, EOMI. ETT in place    RESPIRATORY  Exam: Lungs clear to auscultation, Normal expansion/effort.  Mode: AC/ CMV (Assist Control/ Continuous Mandatory Ventilation), RR (machine): 18, TV (machine): 400, FiO2: 21, PEEP: 5, ITime: 0.84, MAP: 10, PIP: 24    CARDIOVASCULAR  Exam: S1, S2.  Regular rate and rhythm.      GI/NUTRITION  Exam: Abdomen soft, Non-tender, Non-distended. PEG site CDI  Current Diet: Diet, NPO with Tube Feed      METABOLIC/FLUIDS/ELECTROLYTES      HEMATOLOGIC  [x] VTE Prophylaxis: heparin   Injectable 5000 Unit(s) SubCutaneous every 8 hours      LABS  --------------------------------------------------------------------------------------  CBC (07-25 @ 02:05)                          7.5<L>                   13.26<H>  )--------------(  217        --    % Neuts, --    % Lymphs, ANC: --                              24.7<L>  CBC (07-24 @ 01:08)                          8.8<L>                   12.76<H>  )--------------(  205        --    % Neuts, --    % Lymphs, ANC: --                              28.9<L>    BMP (07-25 @ 02:05)       141     |  108<H>  |  111<H>			Ca++ --      Ca 9.6          ---------------------------------( 146<H>		Mg 2.80<H>       4.9     |  15<L>   |  3.71<H>			Ph 5.0<H>  BMP (07-24 @ 01:08)       142     |  110<H>  |  100<H>			Ca++ --      Ca 9.7          ---------------------------------( 147<H>		Mg 2.80<H>       4.8     |  16<L>   |  3.47<H>			Ph 4.9<H>                -> .Sputum Sputum trap Culture (07-16 @ 09:00)       Few polymorphonuclear leukocytes per low power field  No Squamous epithelial cells per low power field  Rare Gram Variable Rods per oil power field    Pseudomonas aeruginosa  Pseudomonas aeruginosa    Moderate Pseudomonas aeruginosa  Moderate Pseudomonas aeruginosa #2  Normal Respiratory Taylor absent    -> .Blood Blood-Peripheral Culture (07-16 @ 08:00)     NG    NG    No Growth Final    -> .Blood Blood-Venous Culture (07-16 @ 02:50)     NG    NG    No Growth Final      -------------------------------------------------------------------------------------- SICU Daily Progress Note  =====================================================  24 Hour Interval/Overnight Events:      - Awaiting placement  - No interval events  - Myoclonic contractions noted    HPI:  60 y/o male s/p tracheal resection 4/23/21 presents to [facility] ER w c/o stridor and SOB. Difficulty breating is worse at night and prevents him from being able to sleep.  He denies productive cough or fever.  This is his 3rd readmission after his tracheal resection  PT has PMHx COVID+ (3/2021 - not hospitalized), PE (8/2020 - on eliquis at home), CKD, w/ recent hospitalization at [facility] after seizure w/ lingual hematoma requiring emergency tracheostomy on 3/25/21.  Patient underwent Tracheal Resection and Reconstruction on 4/23. ENT injected vocal cords few months ago. He was last discharged 5/18/21.       --------------------------------------------------------------------------------------  Allergies: No Known Allergies      MEDICATIONS:   --------------------------------------------------------------------------------------  Neurologic Medications  acetaminophen    Suspension .. 650 milliGRAM(s) Oral every 6 hours PRN Temp greater or equal to 38C (100.4F)  clonazePAM  Tablet 1 milliGRAM(s) Oral three times a day  diVALproex Sprinkle 750 milliGRAM(s) Oral two times a day  levETIRAcetam  Solution 1000 milliGRAM(s) Oral two times a day  PHENobarbital 64.8 milliGRAM(s) Oral two times a day    Respiratory Medications  ALBUTerol    90 MICROgram(s) HFA Inhaler 2 Puff(s) Inhalation every 4 hours    Cardiovascular Medications  labetalol 100 milliGRAM(s) Oral every 12 hours    Gastrointestinal Medications  pantoprazole   Suspension 40 milliGRAM(s) Oral daily  senna Syrup 5 milliLiter(s) Oral at bedtime  sodium chloride 0.9% lock flush 10 milliLiter(s) IV Push every 1 hour PRN Pre/post blood products, medications, blood draw, and to maintain line patency    Genitourinary Medications    Hematologic/Oncologic Medications  heparin   Injectable 5000 Unit(s) SubCutaneous every 8 hours    Antimicrobial/Immunologic Medications    Endocrine/Metabolic Medications    Topical/Other Medications  chlorhexidine 0.12% Liquid 15 milliLiter(s) Oral Mucosa every 12 hours  chlorhexidine 4% Liquid 1 Application(s) Topical <User Schedule>  petrolatum Ophthalmic Ointment 1 Application(s) Both EYES two times a day    --------------------------------------------------------------------------------------    VITAL SIGNS, INS/OUTS (last 24 hours):  --------------------------------------------------------------------------------------  ICU Vital Signs Last 24 Hrs  T(C): 37 (26 Jul 2021 00:00), Max: 37.4 (25 Jul 2021 04:00)  T(F): 98.6 (26 Jul 2021 00:00), Max: 99.4 (25 Jul 2021 04:00)  HR: 84 (26 Jul 2021 00:00) (83 - 95)  BP: 122/80 (26 Jul 2021 00:00) (90/56 - 125/83)  BP(mean): 88 (26 Jul 2021 00:00) (63 - 92)  ABP: --  ABP(mean): --  RR: 18 (26 Jul 2021 00:00) (16 - 23)  SpO2: 97% (26 Jul 2021 00:00) (94% - 100%)         N/A  I&O's Detail    24 Jul 2021 07:01  -  25 Jul 2021 07:00  --------------------------------------------------------  IN:    Enteral Tube Flush: 30 mL    Free Water: 1000 mL    Nepro with Carb Steady: 782 mL  Total IN: 1812 mL    OUT:    Rectal Tube (mL): 50 mL    Voided (mL): 1175 mL  Total OUT: 1225 mL    Total NET: 587 mL      25 Jul 2021 07:01  -  26 Jul 2021 00:13  --------------------------------------------------------  IN:    Enteral Tube Flush: 70 mL    Nepro with Carb Steady: 578 mL  Total IN: 648 mL    OUT:    Intermittent Catheterization - Urethral (mL): 450 mL    Rectal Tube (mL): 150 mL    Voided (mL): 600 mL  Total OUT: 1200 mL    Total NET: -552 mL        ---------------------------------------------------------------------------------------  EXAM    NEUROLOGY  Exam: Not responsive to commands    HEENT  Exam: Normocephalic, atraumatic, EOMI. ETT in place    RESPIRATORY  Exam: Lungs clear to auscultation, Normal expansion/effort.  Mode: CPAP 10 / PEEP 5 / 21% FiO2    CARDIOVASCULAR  Exam: S1, S2.  Regular rate and rhythm.      GI/NUTRITION  Exam: Abdomen soft, Non-tender, Non-distended. PEG site CDI  Current Diet: Diet, NPO with Tube Feed      METABOLIC/FLUIDS/ELECTROLYTES  Nepro via PEG     HEMATOLOGIC  [x] VTE Prophylaxis: heparin   Injectable 5000 Unit(s) SubCutaneous every 8 hours      LABS  --------------------------------------------------------------------------------------  CBC (07-25 @ 02:05)                          7.5<L>                   13.26<H>  )--------------(  217        --    % Neuts, --    % Lymphs, ANC: --                              24.7<L>  CBC (07-24 @ 01:08)                          8.8<L>                   12.76<H>  )--------------(  205        --    % Neuts, --    % Lymphs, ANC: --                              28.9<L>    BMP (07-25 @ 02:05)       141     |  108<H>  |  111<H>			Ca++ --      Ca 9.6          ---------------------------------( 146<H>		Mg 2.80<H>       4.9     |  15<L>   |  3.71<H>			Ph 5.0<H>  BMP (07-24 @ 01:08)       142     |  110<H>  |  100<H>			Ca++ --      Ca 9.7          ---------------------------------( 147<H>		Mg 2.80<H>       4.8     |  16<L>   |  3.47<H>			Ph 4.9<H>    -> .Sputum Sputum trap Culture (07-16 @ 09:00)       Few polymorphonuclear leukocytes per low power field  No Squamous epithelial cells per low power field  Rare Gram Variable Rods per oil power field    Pseudomonas aeruginosa  Pseudomonas aeruginosa    Moderate Pseudomonas aeruginosa  Moderate Pseudomonas aeruginosa #2  Normal Respiratory Taylor absent    -> .Blood Blood-Peripheral Culture (07-16 @ 08:00)     NG    NG    No Growth Final    -> .Blood Blood-Venous Culture (07-16 @ 02:50)     NG    NG    No Growth Final      --------------------------------------------------------------------------------------

## 2021-07-27 LAB
ANION GAP SERPL CALC-SCNC: 20 MMOL/L — HIGH (ref 7–14)
BUN SERPL-MCNC: 113 MG/DL — HIGH (ref 7–23)
CALCIUM SERPL-MCNC: 10.2 MG/DL — SIGNIFICANT CHANGE UP (ref 8.4–10.5)
CHLORIDE SERPL-SCNC: 108 MMOL/L — HIGH (ref 98–107)
CO2 SERPL-SCNC: 16 MMOL/L — LOW (ref 22–31)
CREAT SERPL-MCNC: 3.73 MG/DL — HIGH (ref 0.5–1.3)
GLUCOSE SERPL-MCNC: 120 MG/DL — HIGH (ref 70–99)
HCT VFR BLD CALC: 28.7 % — LOW (ref 39–50)
HGB BLD-MCNC: 8.7 G/DL — LOW (ref 13–17)
MAGNESIUM SERPL-MCNC: 3 MG/DL — HIGH (ref 1.6–2.6)
MCHC RBC-ENTMCNC: 28.9 PG — SIGNIFICANT CHANGE UP (ref 27–34)
MCHC RBC-ENTMCNC: 30.3 GM/DL — LOW (ref 32–36)
MCV RBC AUTO: 95.3 FL — SIGNIFICANT CHANGE UP (ref 80–100)
NRBC # BLD: 0 /100 WBCS — SIGNIFICANT CHANGE UP
NRBC # FLD: 0 K/UL — SIGNIFICANT CHANGE UP
PHOSPHATE SERPL-MCNC: 5.6 MG/DL — HIGH (ref 2.5–4.5)
PLATELET # BLD AUTO: 256 K/UL — SIGNIFICANT CHANGE UP (ref 150–400)
POTASSIUM SERPL-MCNC: 4.9 MMOL/L — SIGNIFICANT CHANGE UP (ref 3.5–5.3)
POTASSIUM SERPL-SCNC: 4.9 MMOL/L — SIGNIFICANT CHANGE UP (ref 3.5–5.3)
RBC # BLD: 3.01 M/UL — LOW (ref 4.2–5.8)
RBC # FLD: 17.1 % — HIGH (ref 10.3–14.5)
SARS-COV-2 RNA SPEC QL NAA+PROBE: SIGNIFICANT CHANGE UP
SODIUM SERPL-SCNC: 144 MMOL/L — SIGNIFICANT CHANGE UP (ref 135–145)
WBC # BLD: 14.66 K/UL — HIGH (ref 3.8–10.5)
WBC # FLD AUTO: 14.66 K/UL — HIGH (ref 3.8–10.5)

## 2021-07-27 PROCEDURE — 71045 X-RAY EXAM CHEST 1 VIEW: CPT | Mod: 26

## 2021-07-27 PROCEDURE — 99231 SBSQ HOSP IP/OBS SF/LOW 25: CPT | Mod: 24,GC

## 2021-07-27 RX ORDER — VALPROIC ACID (AS SODIUM SALT) 250 MG/5ML
750 SOLUTION, ORAL ORAL
Refills: 0 | Status: DISCONTINUED | OUTPATIENT
Start: 2021-07-27 | End: 2021-07-29

## 2021-07-27 RX ORDER — VALPROIC ACID (AS SODIUM SALT) 250 MG/5ML
750 SOLUTION, ORAL ORAL
Refills: 0 | Status: DISCONTINUED | OUTPATIENT
Start: 2021-07-27 | End: 2021-07-27

## 2021-07-27 RX ADMIN — CHLORHEXIDINE GLUCONATE 15 MILLILITER(S): 213 SOLUTION TOPICAL at 18:41

## 2021-07-27 RX ADMIN — HEPARIN SODIUM 5000 UNIT(S): 5000 INJECTION INTRAVENOUS; SUBCUTANEOUS at 14:09

## 2021-07-27 RX ADMIN — SEVELAMER CARBONATE 800 MILLIGRAM(S): 2400 POWDER, FOR SUSPENSION ORAL at 13:14

## 2021-07-27 RX ADMIN — HEPARIN SODIUM 5000 UNIT(S): 5000 INJECTION INTRAVENOUS; SUBCUTANEOUS at 21:46

## 2021-07-27 RX ADMIN — Medication 1 MILLIGRAM(S): at 21:47

## 2021-07-27 RX ADMIN — Medication 1 MILLIGRAM(S): at 05:03

## 2021-07-27 RX ADMIN — Medication 1 MILLIGRAM(S): at 14:09

## 2021-07-27 RX ADMIN — Medication 64.8 MILLIGRAM(S): at 18:27

## 2021-07-27 RX ADMIN — LEVETIRACETAM 1000 MILLIGRAM(S): 250 TABLET, FILM COATED ORAL at 05:03

## 2021-07-27 RX ADMIN — HEPARIN SODIUM 5000 UNIT(S): 5000 INJECTION INTRAVENOUS; SUBCUTANEOUS at 05:03

## 2021-07-27 RX ADMIN — CHLORHEXIDINE GLUCONATE 15 MILLILITER(S): 213 SOLUTION TOPICAL at 05:03

## 2021-07-27 RX ADMIN — SENNA PLUS 5 MILLILITER(S): 8.6 TABLET ORAL at 21:46

## 2021-07-27 RX ADMIN — Medication 1 APPLICATION(S): at 18:59

## 2021-07-27 RX ADMIN — SEVELAMER CARBONATE 800 MILLIGRAM(S): 2400 POWDER, FOR SUSPENSION ORAL at 17:55

## 2021-07-27 RX ADMIN — LEVETIRACETAM 1000 MILLIGRAM(S): 250 TABLET, FILM COATED ORAL at 18:26

## 2021-07-27 RX ADMIN — PANTOPRAZOLE SODIUM 40 MILLIGRAM(S): 20 TABLET, DELAYED RELEASE ORAL at 11:55

## 2021-07-27 RX ADMIN — Medication 750 MILLIGRAM(S): at 18:28

## 2021-07-27 RX ADMIN — SEVELAMER CARBONATE 800 MILLIGRAM(S): 2400 POWDER, FOR SUSPENSION ORAL at 05:52

## 2021-07-27 RX ADMIN — CHLORHEXIDINE GLUCONATE 1 APPLICATION(S): 213 SOLUTION TOPICAL at 04:16

## 2021-07-27 RX ADMIN — DIVALPROEX SODIUM 750 MILLIGRAM(S): 500 TABLET, DELAYED RELEASE ORAL at 05:04

## 2021-07-27 RX ADMIN — Medication 1 APPLICATION(S): at 07:30

## 2021-07-27 RX ADMIN — Medication 64.8 MILLIGRAM(S): at 05:03

## 2021-07-27 NOTE — PROGRESS NOTE ADULT - SUBJECTIVE AND OBJECTIVE BOX
7/6: EEg notable for possible status epilepticus. Planned for MRI today. Pt noted to be febrile yesterday with tmax of 102.9.   7/7: naeon. cxr appears to be slightly worse in effusions. pending mri  7/8: negative blood cultures, plan for MRI today   7/9: NAEON - MRI with no significant intracranial pathology.  7/10: NAEON  7/11: naeon   7/12: naeon. GOC today  7/13: naeon. waiting [facility] duscission  7/14:  naeon. waiting [facility] duscission  7/15: NAEON. [facility]  7/16: per [facility] full measures until family arrives. to get peg  7/17: NAEON, febrile overnight  7/18: NAEON - plan for PEG sergio  7/19: No acute events overnight. On abx for fever, plan for PEG today.   7/20 No acute events overnight, PEG tube in place, had another episode of fever  7/21: NAEON.  7/22: NAEON  7/23: NAEON  7/24: No acute events overnight. Pt being planned for likely transfer to facility.   7/25: naeon  7/26: NAEON  7/27. No acute events overnight. Planned for family meeting with family and SICU      ICU Vital Signs Last 24 Hrs  T(C): 37.8 (27 Jul 2021 04:00), Max: 38 (26 Jul 2021 16:00)  T(F): 100.1 (27 Jul 2021 04:00), Max: 100.4 (26 Jul 2021 16:00)  HR: 111 (27 Jul 2021 04:33) (92 - 111)  BP: 109/81 (27 Jul 2021 04:00) (91/56 - 120/84)  BP(mean): 88 (27 Jul 2021 04:00) (60 - 91)  ABP: --  ABP(mean): --  RR: 22 (27 Jul 2021 04:00) (18 - 26)  SpO2: 96% (27 Jul 2021 04:33) (95% - 98%)      P/E  Sedated, 6LPC, trach sutured to skin, trach ties. no bleeding  Neck soft, flat      A/P: 60 y/o male s/p tracheal resection 4/23/21 presents to [facility] ER w c/o stridor and SOB. Difficulty breating is worse at night and prevents him from being able to sleep.  He denies productive cough or fever.  This is his 3rd readmission after his tracheal resection  PT has PMHx COVID+ (3/2021 - not hospitalized), PE (8/2020 - on eliquis at home), CKD, w/ recent hospitalization at [facility] after seizure w/ lingual hematoma requiring emergency tracheostomy on 3/25/21.  Patient underwent Tracheal Resection and Reconstruction on 4/23. ENT injected vocal cords few months ago. He was last discharged 5/18/21. s/p emergent cric 7/1 now s/p revision trach, flex bronch and removal of stent 7/2 with post-op course complicated by seizures  - GOC: full code for now. s/p PEG   - Patient has a critical airway, only ENT to manipulate airway  - Routine trach care by nursing  - Suction PRN  - Extra 6LPC and 4LPC at bedside  - Vent per SICU  - Tube feeds per SICU   - Rest of care per SICU

## 2021-07-27 NOTE — CHART NOTE - NSCHARTNOTEFT_GEN_A_CORE
Ethics consult initiated. Family meeting held yesterday with the patient’s spouse (Jeannie), patient’s sister in law, patient’s sister, patient’s brother, and the patient’s niece. SICU team (Dr. LILILAM Santacruz, Resident, and other resident and medical students), Neuro Attending (Dr. PANKAJ Thibodeaux), ENT (Dr. NATALIA Burciaga, Resident), and Ethics (OLIVIA Monk, Ethicist, and A. Schwab, Fellow) present as well. Presented the role of Ethics and everyone in the room. The family had numerous questions about the event that led to his admission. They also requested to speak to the patient’s primary attending in SICU or surgeons. Neuro explained the patient’s current neurological status and prognosis. Neuro also clarified the options available (feeding tube and a facility v. palliative transition). The family expressed they could not make any decisions at this time as they needed to process this information. They would like to discuss it with the patient’s family in [facility]. They requested medical records, inquired on how much time they had to decide on plan of care, and more information regarding palliative care. Ethics provided emotional support.     Full consult to follow.     Ethics will remain available to aid in interdisciplinary communication between the medical team and the family.
NUTRITION FOLLOW-UP:    Pt seen for critical care nutrition follow/up.  Pt remains on mechanical ventilation at this time.  S/P PEG placement on 7/19-TF resumed.  Pt appears to be tolerating at this time at goal rate.  Free H2O added.      62 y/o male s/p tracheal resection 4/23/21 presents to [facility] ER w c/o stridor and SOB. Difficulty breathing is worse at night and prevents him from being able to sleep.  He denies productive cough or fever.  This is his 3rd readmission after his tracheal resection  PT has PMHx COVID+ (3/2021 - not hospitalized), PE (8/2020 - on eliquis at home), CKD, w/ recent hospitalization at [facility] after seizure w/ lingual hematoma requiring emergency tracheostomy on 3/25/21.  Patient underwent Tracheal Resection and Reconstruction on 4/23. ENT injected vocal cords few months ago. He was last discharged 5/18/21. s/p emergent tracheostomy 7/1 size 5.5 trach tube.    Current wt is back to admission wt, but with 1+ generalized edema noted, actual body wt may be less.  Pt noted w/loose BM.  Senna ordered.      Weight:  7/22 - 72.4kg     7/16 - 82.6kg     7/2 - 72kg     6/1 - 68kg     IBW - 56.3kg  Labs:  H/H 7.7/25.7  BUN/cr 99/3.46  Glu 139  Mg 3.0  Phos 5.6  Trig 314    Current Diet:  Nepro @34mL/h via Gastrostomy tube + Free H2O 250mL q6h  Enteral Recommendations:  TF providing 1469kcal w/65gm protein.  Estimated kcal needs = 25-30kca/kg IBW = 1407-1689Kcal/d.  Estimated protein needs = ~1.2gm/kg IBW = 67.5gm/d.  Current TF rate meeting kcal/protein needs.  H2O content of TF = 593mL +1000mL via Free N8S=6902mW/d.  Adjust Free H2O as needed to meet hydration needs.    RD to Remain Available:  yes    Additional Recommendations:   1) Monitor weights, labs, BM's, skin integrity, tolerance to TF  2) Continue current TF regimen as tolerated.  3) Continue Free H2O
NUTRITION FOLLOW-UP:    Pt seen for critical care nutrition follow/up.  Pt trach to mechanical ventilation.  Awaiting placement.  Family meeting planned.  Tube feeding changed to bolus feeds on 7/26, delivered by pump.  TF tolerating well, as per RN.      62 y/o male s/p tracheal resection 4/23/21 presents to [facility] ER w c/o stridor and SOB. Difficulty breating is worse at night and prevents him from being able to sleep.  He denies productive cough or fever.  This is his 3rd readmission after his tracheal resection  PT has PMHx COVID+ (3/2021 - not hospitalized), PE (8/2020 - on eliquis at home), CKD, w/ recent hospitalization at [facility] after seizure w/ lingual hematoma requiring emergency tracheostomy on 3/25/21.  Patient underwent Tracheal Resection and Reconstruction on 4/23. ENT injected vocal cords few months ago. He was last discharged 5/18/21.   Presented to ED in respiratory distress requiring emergent tracheostomy on 7/1 and subsequent revision tracheostomy on 7/2, s/p PEG 7/19. Post op course complicated by seizures and concern for anoxic brain injury.    Current wt is .5kg below admission wt.  Pt w/1+ generalized edema, so ABW may be less.  Pt w/liquid BM.  Pt on senna.      Weight:  7/27 - 71.5kg     7/24 - 72.2kg     7/22 - 72.4kg     Adm - 72kg     IBW - 56.3kg  Labs:  H/H 8.7/28.7  BUN/Cr 113/3.73  Glu 120  Mg 3.0  Phos 5.6    Current Diet:  Nepro bolus schedule of 200mL q6h via gastrostomy tube  Enteral Recommendations:  TF providing 1440 kcal w/64gm protein.  Estimated kcal needs = 25-30kcal/kg = 1407-1689kcal/d.  Estimated protein needs = 1.2gm/kg = 67.5gm protein/d.  Current TF regimen meeting kcal/protein needs.  Continue current TF regimen as tolerated.      RD to Remain Available:  yes    Additional Recommendations:   1) Monitor weights, labs, BM's, skin integrity, tolerance to TF
PEG loosened 2cm and now 3.5 cm at skin. Tolerating tube feeds without issue and PEG site c/d/i. We will sign off at this time and please call back with any questions.    [id number]
Patient s/p flex bronch, trach revision, tracheal stent revision.  Patient remains sedated.  Trach site clean and dry, no signs of bleeding.  Vital Signs Last 24 Hrs  T(C): 36.9 (02 Jul 2021 20:00), Max: 36.9 (02 Jul 2021 20:00)  T(F): 98.5 (02 Jul 2021 20:00), Max: 98.5 (02 Jul 2021 20:00)  HR: 79 (02 Jul 2021 22:00) (58 - 88)  BP: 76/50 (02 Jul 2021 17:00) (76/50 - 120/73)  BP(mean): 56 (02 Jul 2021 17:00) (56 - 85)  RR: 18 (02 Jul 2021 22:00) (14 - 24)  SpO2: 100% (02 Jul 2021 22:00) (94% - 100%)  Mode: AC/ CMV (Assist Control/ Continuous Mandatory Ventilation)  RR (machine): 16  TV (machine): 400  FiO2: 40  PEEP: 7  ITime: 0.75  MAP: 10  PIP: 23  MEDICATIONS  (STANDING):  chlorhexidine 0.12% Liquid 15 milliLiter(s) Oral Mucosa every 12 hours  chlorhexidine 4% Liquid 1 Application(s) Topical <User Schedule>  dextrose 5% + lactated ringers. 1000 milliLiter(s) (75 mL/Hr) IV Continuous <Continuous>  fentaNYL   Infusion. 0.5 MICROgram(s)/kG/Hr (3.6 mL/Hr) IV Continuous <Continuous>  heparin   Injectable 5000 Unit(s) SubCutaneous every 8 hours  levETIRAcetam  IVPB 750 milliGRAM(s) IV Intermittent every 12 hours  midazolam Infusion 0.03 mG/kG/Hr (2.16 mL/Hr) IV Continuous <Continuous>  pantoprazole  Injectable 40 milliGRAM(s) IV Push daily  propofol Infusion 10 MICROgram(s)/kG/Min (4.32 mL/Hr) IV Continuous <Continuous>    A/P: S/P Flex bronch, trach revision, tracheal stent revision  Trach care as needed  Continue care as per SICU
Please call [phone] when family meeting is scheduled to discuss overall medical condition and AD.  Verito Sood DO
Spoke to pt's family about options for pt going forward. Pt's wife expecting call from palliative care. Family requests palliative speak to wife as well as other family together as wife's english is not perfect.  Pt's wife needs time to discuss decision to withdraw care with pt's children in [facility].
EEG SUMMARY/CLASSIFICATION    Abnormal EEG in a sedated patient.  - Generalized periodic discharges  - Moderate to severe generalized slowing with burst suppression pattern.    _____________________________________________________________  EEG IMPRESSION/CLINICAL CORRELATE    Abnormal EEG study.  generalized cortical hyperexcitability with GPDs indicating increased risk of generalized seizure onset  Moderate to severe nonspecific diffuse or multifocal cerebral dysfunction     Neurology called to re-evaluate truncal/diaphragmatic myoclonus when patient was being weaned off Propofol. Team evaluated patient at bedside. Hypertensive to SBP >190, a few minutes later noted to desaturate to 83%. Pt also spiked a fever to 102F earlier today. Pt was not responsive to external stimuli, eyes closed, not following commands, no verbal output. Continuous involuntary movements of trunk and diaphragm were observed. Propofol was uptitrated back up to 30mcg with some improvement. EEG reviewed as above and discussed with Epilepsy fellow on-call.    Impression: Possible status epilepticus of unknown etiology    RECOMMENDATIONS  [x] Uptitrate Propofol again  [x] Continue Keppra 1,000mg twice daily - watch for worsening renal function  [] Load with Depakote 1,500mg IV over 15 min, then start IV Depakote 500mg TID  [] Please draw serum valproic acid level w/ albumin level 1 hr after load  [] Continue video EEG  [] MRI brain w/w/o contrast - scheduled for tomorrow    Plan discussed with Dr. Linn, neurology attending.    Megan Ballesteros DO  PGY-2  Neurology Resident
Ongoing discussions with family about GOC. Family would like  to pursue PEG placement.   Pt. for EGD/PEG placement with Dr. Allen on   Monday 7/19/21  Please make NPO after 12mn on Sunday night  Valid type and screen, coags prior to OR.   Please send COVID swab on Sunday.   Above d/w Dr. Conway by Dr. Allen.   Will follow

## 2021-07-27 NOTE — CHART NOTE - NSCHARTNOTESELECT_GEN_ALL_CORE
NEUROLOGY/Event Note
Thoracic surgery
ETHICS/Event Note
Event Note
Event Note
Nutrition Follow/Up Note/Nutrition Services
Nutrition Follow/Up Note/Nutrition Services
THORACIC/Event Note
Thoracic Surgery Post Op Note/Event Note

## 2021-07-27 NOTE — PROGRESS NOTE ADULT - SUBJECTIVE AND OBJECTIVE BOX
HISTORY   60 y/o male s/p tracheal resection 4/23/21 presents to [facility] ER w c/o stridor and SOB. Difficulty breating is worse at night and prevents him from being able to sleep.  He denies productive cough or fever.  This is his 3rd readmission after his tracheal resection  PT has PMHx COVID+ (3/2021 - not hospitalized), PE (8/2020 - on eliquis at home), CKD, w/ recent hospitalization at [facility] after seizure w/ lingual hematoma requiring emergency tracheostomy on 3/25/21.  Patient underwent Tracheal Resection and Reconstruction on 4/23. ENT injected vocal cords few months ago. He was last discharged 5/18/21.     24 HOUR EVENTS:  - Awaiting placement, RCU denied transfer  - Failed CPAP 5/5, increased to 10/5, and now 12/5  - Stopped labetolol  - Phos 6. Started Sevelamer 800 TID  - Wife will come in Tuesday for family meeting, likely dispo Wednesday  - Tube feeds changed to bolus      SUBJECTIVE/ROS:  Due to altered mental status, subjective information were not able to be obtained from the patient.     NEURO  Exam: does not follow commands  Meds: acetaminophen    Suspension .. 650 milliGRAM(s) Oral every 6 hours PRN Temp greater or equal to 38C (100.4F)  clonazePAM  Tablet 1 milliGRAM(s) Oral three times a day  diVALproex Sprinkle 750 milliGRAM(s) Oral two times a day  levETIRAcetam  Solution 1000 milliGRAM(s) Oral two times a day  PHENobarbital 64.8 milliGRAM(s) Oral two times a day      RESPIRATORY  RR: 19 (07-27-21 @ 00:00) (14 - 26)  SpO2: 98% (07-27-21 @ 00:00) (95% - 99%)  Wt(kg): --  Exam: appropriate work of breathing  Mechanical Ventilation: Mode: CPAP with PS, RR (patient): 21, FiO2: 21, PEEP: 5, PS: 12, ITime: , MAP: 10, PIP: 18    Meds: ALBUTerol    90 MICROgram(s) HFA Inhaler 2 Puff(s) Inhalation every 4 hours      CARDIOVASCULAR  HR: 95 (07-27-21 @ 00:00) (87 - 103)  BP: 116/76 (07-27-21 @ 00:00) (91/56 - 123/82)  BP(mean): 85 (07-27-21 @ 00:00) (60 - 91)  ABP: --  ABP(mean): --  Wt(kg): --  CVP(cm H2O): --      Exam: intermittently tachycardic  Cardiac Rhythm: sinus  Perfusion     [x]Adequate   [ ]Inadequate  Mentation   [ ]Normal       [x]Reduced  Volume Status [ ]Hypervolemic [x]Euvolemic [ ]Hypovolemic  Meds:     GI/NUTRITION  Exam: Nondistended, gastrostomy tube in place  Diet:  Meds: pantoprazole   Suspension 40 milliGRAM(s) Oral daily  senna Syrup 5 milliLiter(s) Oral at bedtime      GENITOURINARY  I&O's Detail    07-25 @ 07:01  -  07-26 @ 07:00  --------------------------------------------------------  IN:    Enteral Tube Flush: 70 mL    Nepro with Carb Steady: 816 mL  Total IN: 886 mL    OUT:    Intermittent Catheterization - Urethral (mL): 450 mL    Rectal Tube (mL): 150 mL    Voided (mL): 600 mL  Total OUT: 1200 mL    Total NET: -314 mL      07-26 @ 07:01 - 07-27 @ 00:10  --------------------------------------------------------  IN:    Enteral Tube Flush: 350 mL    Nepro with Carb Steady: 702 mL  Total IN: 1052 mL    OUT:    Voided (mL): 905 mL  Total OUT: 905 mL    Total NET: 147 mL          07-26    144  |  107  |  111<H>  ----------------------------<  139<H>  4.8   |  18<L>  |  3.61<H>    Ca    10.2      26 Jul 2021 01:04  Phos  6.0     07-26  Mg     3.00     07-26      [] Higginbotham catheter, indication: condom catheter  Meds: sodium chloride 0.9% lock flush 10 milliLiter(s) IV Push every 1 hour PRN Pre/post blood products, medications, blood draw, and to maintain line patency      HEMATOLOGIC  Meds: heparin   Injectable 5000 Unit(s) SubCutaneous every 8 hours    [x] VTE Prophylaxis - SQH                        7.6    14.00 )-----------( 251      ( 26 Jul 2021 01:04 )             25.1         INFECTIOUS DISEASES  T(C): 37.4 (07-27-21 @ 00:00), Max: 38 (07-26-21 @ 16:00)  Wt(kg): --  WBC Count: 14.00 K/uL (07-26 @ 01:04)    Recent Cultures:    Meds:     ENDOCRINE  Capillary Blood Glucose    Meds:     ACCESS DEVICES:  [x] Peripheral IV  [ ] Central Venous Line	[ ] R	[ ] L	[ ] IJ	[ ] Fem	[ ] SC	Placed:   [ ] Arterial Line		[ ] R	[ ] L	[ ] Fem	[ ] Rad	[ ] Ax	Placed:   [ ] PICC:					[ ] Mediport  [ ] Urinary Catheter, Date Placed:   [x] Necessity of urinary, arterial, and venous catheters discussed    OTHER MEDICATIONS:  chlorhexidine 0.12% Liquid 15 milliLiter(s) Oral Mucosa every 12 hours  chlorhexidine 4% Liquid 1 Application(s) Topical <User Schedule>  petrolatum Ophthalmic Ointment 1 Application(s) Both EYES two times a day  sevelamer carbonate Powder 800 milliGRAM(s) Oral three times a day with meals      IMAGING: HISTORY   62 y/o male s/p tracheal resection 4/23/21 presents to [facility] ER w c/o stridor and SOB. Difficulty breating is worse at night and prevents him from being able to sleep.  He denies productive cough or fever.  This is his 3rd readmission after his tracheal resection  PT has PMHx COVID+ (3/2021 - not hospitalized), PE (8/2020 - on eliquis at home), CKD, w/ recent hospitalization at [facility] after seizure w/ lingual hematoma requiring emergency tracheostomy on 3/25/21.  Patient underwent Tracheal Resection and Reconstruction on 4/23. ENT injected vocal cords few months ago. He was last discharged 5/18/21.     24 HOUR EVENTS:  - Awaiting placement, RCU denied transfer  - Failed CPAP 5/5, increased to 10/5, and now 12/5  - Stopped labetolol  - Phos 6. Started Sevelamer 800 TID  - Wife will speak with  Tuesday, likely dispo to care facility on Wednesday  - Tube feeds changed to bolus  - Sputum/secretions noted to be more thick/green. Repeat CXR stable.      SUBJECTIVE/ROS:  Due to altered mental status, subjective information were not able to be obtained from the patient.     NEURO  Exam: does not follow commands  Meds: acetaminophen    Suspension .. 650 milliGRAM(s) Oral every 6 hours PRN Temp greater or equal to 38C (100.4F)  clonazePAM  Tablet 1 milliGRAM(s) Oral three times a day  diVALproex Sprinkle 750 milliGRAM(s) Oral two times a day  levETIRAcetam  Solution 1000 milliGRAM(s) Oral two times a day  PHENobarbital 64.8 milliGRAM(s) Oral two times a day      RESPIRATORY  RR: 19 (07-27-21 @ 00:00) (14 - 26)  SpO2: 98% (07-27-21 @ 00:00) (95% - 99%)  Wt(kg): --  Exam: appropriate work of breathing  Mechanical Ventilation: Mode: CPAP with PS, RR (patient): 21, FiO2: 21, PEEP: 5, PS: 12, ITime: , MAP: 10, PIP: 18    Meds: ALBUTerol    90 MICROgram(s) HFA Inhaler 2 Puff(s) Inhalation every 4 hours      CARDIOVASCULAR  HR: 95 (07-27-21 @ 00:00) (87 - 103)  BP: 116/76 (07-27-21 @ 00:00) (91/56 - 123/82)  BP(mean): 85 (07-27-21 @ 00:00) (60 - 91)  ABP: --  ABP(mean): --  Wt(kg): --  CVP(cm H2O): --      Exam: intermittently tachycardic  Cardiac Rhythm: sinus  Perfusion     [x]Adequate   [ ]Inadequate  Mentation   [ ]Normal       [x]Reduced  Volume Status [ ]Hypervolemic [x]Euvolemic [ ]Hypovolemic  Meds:     GI/NUTRITION  Exam: Nondistended, gastrostomy tube in place  Diet:  Meds: pantoprazole   Suspension 40 milliGRAM(s) Oral daily  senna Syrup 5 milliLiter(s) Oral at bedtime      GENITOURINARY  I&O's Detail    07-25 @ 07:01  -  07-26 @ 07:00  --------------------------------------------------------  IN:    Enteral Tube Flush: 70 mL    Nepro with Carb Steady: 816 mL  Total IN: 886 mL    OUT:    Intermittent Catheterization - Urethral (mL): 450 mL    Rectal Tube (mL): 150 mL    Voided (mL): 600 mL  Total OUT: 1200 mL    Total NET: -314 mL      07-26 @ 07:01  -  07-27 @ 00:10  --------------------------------------------------------  IN:    Enteral Tube Flush: 350 mL    Nepro with Carb Steady: 702 mL  Total IN: 1052 mL    OUT:    Voided (mL): 905 mL  Total OUT: 905 mL    Total NET: 147 mL          07-26    144  |  107  |  111<H>  ----------------------------<  139<H>  4.8   |  18<L>  |  3.61<H>    Ca    10.2      26 Jul 2021 01:04  Phos  6.0     07-26  Mg     3.00     07-26      [] Higginbotham catheter, indication: condom catheter  Meds: sodium chloride 0.9% lock flush 10 milliLiter(s) IV Push every 1 hour PRN Pre/post blood products, medications, blood draw, and to maintain line patency      HEMATOLOGIC  Meds: heparin   Injectable 5000 Unit(s) SubCutaneous every 8 hours    [x] VTE Prophylaxis - SQH                        7.6    14.00 )-----------( 251      ( 26 Jul 2021 01:04 )             25.1         INFECTIOUS DISEASES  T(C): 37.4 (07-27-21 @ 00:00), Max: 38 (07-26-21 @ 16:00)  Wt(kg): --  WBC Count: 14.00 K/uL (07-26 @ 01:04)    Recent Cultures:    Meds:     ENDOCRINE  Capillary Blood Glucose    Meds:     ACCESS DEVICES:  [x] Peripheral IV  [ ] Central Venous Line	[ ] R	[ ] L	[ ] IJ	[ ] Fem	[ ] SC	Placed:   [ ] Arterial Line		[ ] R	[ ] L	[ ] Fem	[ ] Rad	[ ] Ax	Placed:   [ ] PICC:					[ ] Mediport  [ ] Urinary Catheter, Date Placed:   [x] Necessity of urinary, arterial, and venous catheters discussed    OTHER MEDICATIONS:  chlorhexidine 0.12% Liquid 15 milliLiter(s) Oral Mucosa every 12 hours  chlorhexidine 4% Liquid 1 Application(s) Topical <User Schedule>  petrolatum Ophthalmic Ointment 1 Application(s) Both EYES two times a day  sevelamer carbonate Powder 800 milliGRAM(s) Oral three times a day with meals      IMAGING:

## 2021-07-27 NOTE — PROGRESS NOTE ADULT - ASSESSMENT
61M with hypoxic brain injury; s/p tracheostomy w/ revision    PLAN:  Neurologic:  - Phenobarbital 65mg PO BID  - Depakote 750mg BID, Keppra 1000mg BID  - Klonopin 1mg TID for myoclonic jerks  - EEG significant for hyperexcitability and GPDs: cortical storming and stimulus myoclonus  - MRI Brain WNL, no anoxic injury    Respiratory:   - s/p bronch and trach revision 7/2  - Mechanical ventilation CPAP/PSV 12/5, did not tolerate lower pressure support    Cardiovascular:   - Hemodynamically stable off vasopressors   - Stopped labetalol 100 BID for hypertension     Gastrointestinal/Nutrition:   - PEG w/ TF@ Goal, bolus feeds  - Protonix daily for stress ulcer ppx  - S/p PEG 7/19    Renal/Genitourinary:  - Monitor intake & output, UOP  - Phos 6. Started Sevelamer 800 TID    Hematologic:  - Trend H/H  - c/w SQH for VTE prophylaxis    Infectious Disease:   - Afebrile  - Sputum Cx with pseudomonas -> s/p Cefepime (7/18- 7/25) (7 days)  - Urine Cx with Citrobacter; s/p course of Zosyn     Tubes/Lines/Drains:   - PEG  - R PIV  - L PIV     Disposition: SICU. Social work called, aware on KEIKO/getting pt to next facility    Karthikeyan Anne  PGY-2  SICU  Spectra [phone] 61M with hypoxic brain injury; s/p tracheostomy w/ revision    PLAN:  Neurologic:  - Phenobarbital 65mg PO BID  - Changed Depakote 750mg BID to Depakene 750mg BID  - Keppra 1000mg BID  - Klonopin 1mg TID for myoclonic jerks  - EEG significant for hyperexcitability and GPDs: cortical storming and stimulus myoclonus  - MRI Brain WNL, no anoxic injury    Respiratory:   - s/p bronch and trach revision 7/2  - Mechanical ventilation CPAP/PSV 12/5, did not tolerate lower pressure support    Cardiovascular:   - Hemodynamically stable off vasopressors   - Stopped labetalol 100 BID for hypertension     Gastrointestinal/Nutrition:   - PEG w/ TF@ Goal, bolus feeds  - Protonix daily for stress ulcer ppx  - S/p PEG 7/19    Renal/Genitourinary:  - Monitor intake & output, UOP  - Phos 6. Started Sevelamer 800 TID    Hematologic:  - Trend H/H  - c/w SQH for VTE prophylaxis    Infectious Disease:   - Afebrile  - Sputum Cx with pseudomonas -> s/p Cefepime (7/18- 7/25) (7 days)  - Urine Cx with Citrobacter; s/p course of Zosyn     Tubes/Lines/Drains:   - PEG  - R PIV  - L PIV     Disposition: SICU. Social work called, aware on KEIKO/getting pt to next facility    Karthikeyan Anne  PGY-2  SICU  Spectra [phone]

## 2021-07-28 PROCEDURE — 99231 SBSQ HOSP IP/OBS SF/LOW 25: CPT | Mod: 24,GC

## 2021-07-28 RX ORDER — POLYETHYLENE GLYCOL 3350 17 G/17G
17 POWDER, FOR SOLUTION ORAL DAILY
Refills: 0 | Status: DISCONTINUED | OUTPATIENT
Start: 2021-07-28 | End: 2021-07-29

## 2021-07-28 RX ADMIN — PANTOPRAZOLE SODIUM 40 MILLIGRAM(S): 20 TABLET, DELAYED RELEASE ORAL at 11:25

## 2021-07-28 RX ADMIN — Medication 1 APPLICATION(S): at 18:30

## 2021-07-28 RX ADMIN — HEPARIN SODIUM 5000 UNIT(S): 5000 INJECTION INTRAVENOUS; SUBCUTANEOUS at 13:03

## 2021-07-28 RX ADMIN — HEPARIN SODIUM 5000 UNIT(S): 5000 INJECTION INTRAVENOUS; SUBCUTANEOUS at 21:49

## 2021-07-28 RX ADMIN — CHLORHEXIDINE GLUCONATE 15 MILLILITER(S): 213 SOLUTION TOPICAL at 18:24

## 2021-07-28 RX ADMIN — CHLORHEXIDINE GLUCONATE 1 APPLICATION(S): 213 SOLUTION TOPICAL at 04:00

## 2021-07-28 RX ADMIN — LEVETIRACETAM 1000 MILLIGRAM(S): 250 TABLET, FILM COATED ORAL at 18:24

## 2021-07-28 RX ADMIN — SEVELAMER CARBONATE 800 MILLIGRAM(S): 2400 POWDER, FOR SUSPENSION ORAL at 12:17

## 2021-07-28 RX ADMIN — LEVETIRACETAM 1000 MILLIGRAM(S): 250 TABLET, FILM COATED ORAL at 05:54

## 2021-07-28 RX ADMIN — SENNA PLUS 5 MILLILITER(S): 8.6 TABLET ORAL at 21:48

## 2021-07-28 RX ADMIN — CHLORHEXIDINE GLUCONATE 15 MILLILITER(S): 213 SOLUTION TOPICAL at 05:53

## 2021-07-28 RX ADMIN — HEPARIN SODIUM 5000 UNIT(S): 5000 INJECTION INTRAVENOUS; SUBCUTANEOUS at 05:53

## 2021-07-28 RX ADMIN — POLYETHYLENE GLYCOL 3350 17 GRAM(S): 17 POWDER, FOR SOLUTION ORAL at 11:25

## 2021-07-28 RX ADMIN — Medication 64.8 MILLIGRAM(S): at 18:24

## 2021-07-28 RX ADMIN — Medication 1 APPLICATION(S): at 05:54

## 2021-07-28 RX ADMIN — SEVELAMER CARBONATE 800 MILLIGRAM(S): 2400 POWDER, FOR SUSPENSION ORAL at 07:47

## 2021-07-28 RX ADMIN — SEVELAMER CARBONATE 800 MILLIGRAM(S): 2400 POWDER, FOR SUSPENSION ORAL at 17:01

## 2021-07-28 RX ADMIN — Medication 64.8 MILLIGRAM(S): at 05:53

## 2021-07-28 RX ADMIN — Medication 750 MILLIGRAM(S): at 05:53

## 2021-07-28 RX ADMIN — Medication 750 MILLIGRAM(S): at 18:25

## 2021-07-28 NOTE — PROGRESS NOTE ADULT - ASSESSMENT
61M with hypoxic brain injury; s/p tracheostomy w/ revision    PLAN:  Neurologic:  - Phenobarbital 65mg PO BID  - valproic acid 750mg BID  - Keppra 1000mg BID  - EEG significant for hyperexcitability and GPDs: cortical storming and stimulus myoclonus    Respiratory:   - s/p bronch and trach revision 7/2  - Mechanical ventilation CPAP/PSV 10/5, did not tolerate lower pressure support    Cardiovascular:   - Hemodynamically stable off vasopressors   - Stopped labetalol 100 BID for hypertension     Gastrointestinal/Nutrition:   - PEG w/ TF@ Goal, bolus feeds  - Protonix daily for stress ulcer ppx  - S/p PEG 7/19    Renal/Genitourinary:  - Monitor intake & output, UOP  - Phos 6. Started Sevelamer 800 TID    Hematologic:  - Trend H/H  - c/w SQH for VTE prophylaxis    Infectious Disease:   - Afebrile  - Sputum Cx with pseudomonas -> s/p Cefepime (7/18- 7/25) (7 days)  - Urine Cx with Citrobacter; s/p course of Zosyn     Tubes/Lines/Drains:   - PEG  - R PIV  - L PIV     Disposition: SICU. Social work called, aware on KEIKO/getting pt to next facility    Karthikeyan Anne  PGY-2  SICU  Spectra [phone]

## 2021-07-28 NOTE — PROGRESS NOTE ADULT - SUBJECTIVE AND OBJECTIVE BOX
7/6: EEg notable for possible status epilepticus. Planned for MRI today. Pt noted to be febrile yesterday with tmax of 102.9.   7/7: naeon. cxr appears to be slightly worse in effusions. pending mri  7/8: negative blood cultures, plan for MRI today   7/9: NAEON - MRI with no significant intracranial pathology.  7/10: NAEON  7/11: naeon   7/12: naeon. GOC today  7/13: naeon. waiting [facility] duscission  7/14:  naeon. waiting [facility] duscission  7/15: NAEON. [facility]  7/16: per [facility] full measures until family arrives. to get peg  7/17: NAEON, febrile overnight  7/18: NAEON - plan for PEG sergio  7/19: No acute events overnight. On abx for fever, plan for PEG today.   7/20 No acute events overnight, PEG tube in place, had another episode of fever  7/21: NAEON.  7/22: NAEON  7/23: NAEON  7/24: No acute events overnight. Pt being planned for likely transfer to facility.   7/25: naeon  7/26: NAEON  7/27. No acute events overnight. Planned for family meeting with family and SICU  7/28 No acute events overnight. Pt planned for dispo today vs. tomorrow       ICU Vital Signs Last 24 Hrs  T(C): 36.9 (28 Jul 2021 04:00), Max: 37.7 (27 Jul 2021 08:00)  T(F): 98.4 (28 Jul 2021 04:00), Max: 99.9 (27 Jul 2021 08:00)  HR: 99 (28 Jul 2021 06:00) (90 - 105)  BP: 116/81 (28 Jul 2021 06:00) (90/62 - 127/78)  BP(mean): 89 (28 Jul 2021 06:00) (68 - 91)  ABP: --  ABP(mean): --  RR: 18 (28 Jul 2021 06:00) (14 - 23)  SpO2: 95% (28 Jul 2021 06:00) (92% - 100%)        P/E  Sedated, 6LPC, trach sutured to skin, trach ties. no bleeding  Neck soft, flat      A/P: 60 y/o male s/p tracheal resection 4/23/21 presents to [facility] ER w c/o stridor and SOB. Difficulty breating is worse at night and prevents him from being able to sleep.  He denies productive cough or fever.  This is his 3rd readmission after his tracheal resection  PT has PMHx COVID+ (3/2021 - not hospitalized), PE (8/2020 - on eliquis at home), CKD, w/ recent hospitalization at [facility] after seizure w/ lingual hematoma requiring emergency tracheostomy on 3/25/21.  Patient underwent Tracheal Resection and Reconstruction on 4/23. ENT injected vocal cords few months ago. He was last discharged 5/18/21. s/p emergent cric 7/1 now s/p revision trach, flex bronch and removal of stent 7/2 with post-op course complicated by seizures  - GOC: full code for now. s/p PEG   - Patient has a critical airway, only ENT to manipulate airway  - Routine trach care by nursing  - Suction PRN  - Extra 6LPC and 4LPC at bedside  - Vent per SICU  - Tube feeds per SICU   - Rest of care per SICU

## 2021-07-28 NOTE — PROGRESS NOTE ADULT - SUBJECTIVE AND OBJECTIVE BOX
HISTORY  60 y/o male s/p tracheal resection 4/23/21 presents to [facility] ER w c/o stridor and SOB. Difficulty breating is worse at night and prevents him from being able to sleep.  He denies productive cough or fever.  This is his 3rd readmission after his tracheal resection  PT has PMHx COVID+ (3/2021 - not hospitalized), PE (8/2020 - on eliquis at home), CKD, w/ recent hospitalization at [facility] after seizure w/ lingual hematoma requiring emergency tracheostomy on 3/25/21.  Patient underwent Tracheal Resection and Reconstruction on 4/23. ENT injected vocal cords few months ago. He was last discharged 5/18/21.    24 HOUR EVENTS:  - f/u social work  - Nurse noted acute change in resp secretions from clear/yellowq to thick green; cxr ordered  -CXR showed no signs of CHF or consolidations reflecting a PNA   - Depakote sprinkles changed to Valproic Acid liquid. Spoke w/ neuro and pharmacy both gave the approval  -NO labs for tomorrow   - Covid negative    SUBJECTIVE/ROS:  Due to altered mental status/intubation, subjective information were not able to be obtained from the patient. History was obtained, to the extent possible, from review of the chart and collateral sources of information.    NEURO  RASS:  -4   GCS:  6    Exam: does not follow commands  Meds: acetaminophen    Suspension .. 650 milliGRAM(s) Oral every 6 hours PRN Temp greater or equal to 38C (100.4F)  levETIRAcetam  Solution 1000 milliGRAM(s) Oral two times a day  PHENobarbital 64.8 milliGRAM(s) Oral two times a day  valproic  acid Syrup 750 milliGRAM(s) Oral two times a day      RESPIRATORY  RR: 17 (07-28-21 @ 00:00) (14 - 23)  SpO2: 97% (07-28-21 @ 00:00) (92% - 100%)  Wt(kg): --  Mechanical Ventilation: Mode: CPAP with PS, RR (patient): 18, FiO2: 21, PEEP: 5, PS: 10, MAP: 8, PIP: 16    Meds: ALBUTerol    90 MICROgram(s) HFA Inhaler 2 Puff(s) Inhalation every 4 hours      CARDIOVASCULAR  HR: 90 (07-28-21 @ 00:00) (90 - 111)  BP: 94/61 (07-28-21 @ 00:00) (90/62 - 127/78)  BP(mean): 69 (07-28-21 @ 00:00) (68 - 91)  ABP: --  ABP(mean): --  Wt(kg): --  CVP(cm H2O): --      Exam: regular rate and rhythm  Perfusion     [x]Adequate   [ ]Inadequate  Mentation   [ ]Normal       [x]Reduced  Extremities  [x]Warm         [ ]Cool  Volume Status [ ]Hypervolemic [x]Euvolemic [ ]Hypovolemic  Meds:     GI/NUTRITION  Exam: soft, nontender, nondistended, softly distended, sylvie-incisional tenderness, incision dressing C/D/I, NGT output, drain output  Diet: Nondistended, gastrostomy tube in place  Meds: pantoprazole   Suspension 40 milliGRAM(s) Oral daily  senna Syrup 5 milliLiter(s) Oral at bedtime      GENITOURINARY  I&O's Detail    07-26 @ 07:01  -  07-27 @ 07:00  --------------------------------------------------------  IN:    Enteral Tube Flush: 350 mL    Nepro with Carb Steady: 702 mL  Total IN: 1052 mL    OUT:    Voided (mL): 1205 mL  Total OUT: 1205 mL    Total NET: -153 mL      07-27 @ 07:01 - 07-28 @ 02:20  --------------------------------------------------------  IN:    Enteral Tube Flush: 430 mL    Nepro with Carb Steady: 600 mL  Total IN: 1030 mL    OUT:    Rectal Tube (mL): 20 mL    Voided (mL): 950 mL  Total OUT: 970 mL    Total NET: 60 mL          07-27    144  |  108<H>  |  113<H>  ----------------------------<  120<H>  4.9   |  16<L>  |  3.73<H>    Ca    10.2      27 Jul 2021 01:13  Phos  5.6     07-27  Mg     3.00     07-27      [] Higginbotham catheter, indication: N/A  Meds: sodium chloride 0.9% lock flush 10 milliLiter(s) IV Push every 1 hour PRN Pre/post blood products, medications, blood draw, and to maintain line patency      HEMATOLOGIC  Meds: heparin   Injectable 5000 Unit(s) SubCutaneous every 8 hours    [x] VTE Prophylaxis - SQH                        8.7    14.66 )-----------( 256      ( 27 Jul 2021 01:13 )             28.7         INFECTIOUS DISEASES  T(C): 36.8 (07-28-21 @ 00:00), Max: 37.8 (07-27-21 @ 04:00)  Wt(kg): --    Recent Cultures:    Meds:     ENDOCRINE  Capillary Blood Glucose    Meds:     ACCESS DEVICES:  [x] Peripheral IV  [ ] Central Venous Line	[ ] R	[ ] L	[ ] IJ	[ ] Fem	[ ] SC	Placed:   [ ] Arterial Line		[ ] R	[ ] L	[ ] Fem	[ ] Rad	[ ] Ax	Placed:   [ ] PICC:					[ ] Mediport  [ ] Urinary Catheter, Date Placed:   [x] Necessity of urinary, arterial, and venous catheters discussed    OTHER MEDICATIONS:  chlorhexidine 0.12% Liquid 15 milliLiter(s) Oral Mucosa every 12 hours  chlorhexidine 4% Liquid 1 Application(s) Topical <User Schedule>  petrolatum Ophthalmic Ointment 1 Application(s) Both EYES two times a day  sevelamer carbonate Powder 800 milliGRAM(s) Oral three times a day with meals      IMAGING: HISTORY  62 y/o male s/p tracheal resection 4/23/21 presents to [facility] ER w c/o stridor and SOB. Difficulty breating is worse at night and prevents him from being able to sleep.  He denies productive cough or fever.  This is his 3rd readmission after his tracheal resection  PT has PMHx COVID+ (3/2021 - not hospitalized), PE (8/2020 - on eliquis at home), CKD, w/ recent hospitalization at [facility] after seizure w/ lingual hematoma requiring emergency tracheostomy on 3/25/21.  Patient underwent Tracheal Resection and Reconstruction on 4/23. ENT injected vocal cords few months ago. He was last discharged 5/18/21.    24 HOUR EVENTS:  - Depakote sprinkles changed to Valproic Acid liquid. Spoke w/ neuro and pharmacy both gave the approval  -NO labs  - Covid negative    SUBJECTIVE/ROS:  Due to altered mental status/intubation, subjective information were not able to be obtained from the patient. History was obtained, to the extent possible, from review of the chart and collateral sources of information.    NEURO  RASS:  -4   GCS:  6    Exam: does not follow commands  Meds: acetaminophen    Suspension .. 650 milliGRAM(s) Oral every 6 hours PRN Temp greater or equal to 38C (100.4F)  levETIRAcetam  Solution 1000 milliGRAM(s) Oral two times a day  PHENobarbital 64.8 milliGRAM(s) Oral two times a day  valproic  acid Syrup 750 milliGRAM(s) Oral two times a day      RESPIRATORY  RR: 17 (07-28-21 @ 00:00) (14 - 23)  SpO2: 97% (07-28-21 @ 00:00) (92% - 100%)  Wt(kg): --  Mechanical Ventilation: Mode: CPAP with PS, RR (patient): 18, FiO2: 21, PEEP: 5, PS: 10, MAP: 8, PIP: 16    Meds: ALBUTerol    90 MICROgram(s) HFA Inhaler 2 Puff(s) Inhalation every 4 hours      CARDIOVASCULAR  HR: 90 (07-28-21 @ 00:00) (90 - 111)  BP: 94/61 (07-28-21 @ 00:00) (90/62 - 127/78)  BP(mean): 69 (07-28-21 @ 00:00) (68 - 91)  ABP: --  ABP(mean): --  Wt(kg): --  CVP(cm H2O): --      Exam: regular rate and rhythm  Perfusion     [x]Adequate   [ ]Inadequate  Mentation   [ ]Normal       [x]Reduced  Extremities  [x]Warm         [ ]Cool  Volume Status [ ]Hypervolemic [x]Euvolemic [ ]Hypovolemic  Meds:     GI/NUTRITION  Exam: soft, nontender, nondistended, softly distended, sylvie-incisional tenderness, incision dressing C/D/I, NGT output, drain output  Diet: Nondistended, gastrostomy tube in place  Meds: pantoprazole   Suspension 40 milliGRAM(s) Oral daily  senna Syrup 5 milliLiter(s) Oral at bedtime      GENITOURINARY  I&O's Detail    07-26 @ 07:01  -  07-27 @ 07:00  --------------------------------------------------------  IN:    Enteral Tube Flush: 350 mL    Nepro with Carb Steady: 702 mL  Total IN: 1052 mL    OUT:    Voided (mL): 1205 mL  Total OUT: 1205 mL    Total NET: -153 mL      07-27 @ 07:01  -  07-28 @ 02:20  --------------------------------------------------------  IN:    Enteral Tube Flush: 430 mL    Nepro with Carb Steady: 600 mL  Total IN: 1030 mL    OUT:    Rectal Tube (mL): 20 mL    Voided (mL): 950 mL  Total OUT: 970 mL    Total NET: 60 mL          07-27    144  |  108<H>  |  113<H>  ----------------------------<  120<H>  4.9   |  16<L>  |  3.73<H>    Ca    10.2      27 Jul 2021 01:13  Phos  5.6     07-27  Mg     3.00 07-27      [] Higginbotham catheter, indication: N/A  Meds: sodium chloride 0.9% lock flush 10 milliLiter(s) IV Push every 1 hour PRN Pre/post blood products, medications, blood draw, and to maintain line patency      HEMATOLOGIC  Meds: heparin   Injectable 5000 Unit(s) SubCutaneous every 8 hours    [x] VTE Prophylaxis - SQH                        8.7    14.66 )-----------( 256      ( 27 Jul 2021 01:13 )             28.7         INFECTIOUS DISEASES  T(C): 36.8 (07-28-21 @ 00:00), Max: 37.8 (07-27-21 @ 04:00)  Wt(kg): --    Recent Cultures:    Meds:     ENDOCRINE  Capillary Blood Glucose    Meds:     ACCESS DEVICES:  [x] Peripheral IV  [ ] Central Venous Line	[ ] R	[ ] L	[ ] IJ	[ ] Fem	[ ] SC	Placed:   [ ] Arterial Line		[ ] R	[ ] L	[ ] Fem	[ ] Rad	[ ] Ax	Placed:   [ ] PICC:					[ ] Mediport  [ ] Urinary Catheter, Date Placed:   [x] Necessity of urinary, arterial, and venous catheters discussed    OTHER MEDICATIONS:  chlorhexidine 0.12% Liquid 15 milliLiter(s) Oral Mucosa every 12 hours  chlorhexidine 4% Liquid 1 Application(s) Topical <User Schedule>  petrolatum Ophthalmic Ointment 1 Application(s) Both EYES two times a day  sevelamer carbonate Powder 800 milliGRAM(s) Oral three times a day with meals      IMAGING:

## 2021-07-29 ENCOUNTER — TRANSCRIPTION ENCOUNTER (OUTPATIENT)
Age: 61
End: 2021-07-29

## 2021-07-29 VITALS
RESPIRATION RATE: 14 BRPM | OXYGEN SATURATION: 98 % | HEART RATE: 103 BPM | SYSTOLIC BLOOD PRESSURE: 117 MMHG | TEMPERATURE: 99 F | DIASTOLIC BLOOD PRESSURE: 73 MMHG

## 2021-07-29 PROCEDURE — 99232 SBSQ HOSP IP/OBS MODERATE 35: CPT

## 2021-07-29 RX ORDER — LABETALOL HCL 100 MG
1 TABLET ORAL
Qty: 1 | Refills: 0
Start: 2021-07-29

## 2021-07-29 RX ORDER — PHENOBARBITAL 60 MG
1 TABLET ORAL
Qty: 0 | Refills: 0 | DISCHARGE
Start: 2021-07-29

## 2021-07-29 RX ORDER — IPRATROPIUM/ALBUTEROL SULFATE 18-103MCG
1 AEROSOL WITH ADAPTER (GRAM) INHALATION
Qty: 1 | Refills: 0
Start: 2021-07-29 | End: 2021-08-27

## 2021-07-29 RX ORDER — CYCLOBENZAPRINE HYDROCHLORIDE 10 MG/1
1 TABLET, FILM COATED ORAL
Qty: 15 | Refills: 0
Start: 2021-07-29 | End: 2021-08-02

## 2021-07-29 RX ORDER — SEVELAMER CARBONATE 2400 MG/1
1 POWDER, FOR SUSPENSION ORAL
Qty: 0 | Refills: 0 | DISCHARGE
Start: 2021-07-29

## 2021-07-29 RX ORDER — AMLODIPINE BESYLATE 2.5 MG/1
1 TABLET ORAL
Qty: 1 | Refills: 0
Start: 2021-07-29

## 2021-07-29 RX ORDER — AMLODIPINE BESYLATE 2.5 MG/1
1 TABLET ORAL
Qty: 0 | Refills: 0 | DISCHARGE

## 2021-07-29 RX ORDER — PANTOPRAZOLE SODIUM 20 MG/1
0 TABLET, DELAYED RELEASE ORAL
Qty: 0 | Refills: 0 | DISCHARGE
Start: 2021-07-29

## 2021-07-29 RX ORDER — ALBUTEROL 90 UG/1
2 AEROSOL, METERED ORAL
Qty: 0 | Refills: 0 | DISCHARGE
Start: 2021-07-29

## 2021-07-29 RX ORDER — LABETALOL HCL 100 MG
1 TABLET ORAL
Qty: 0 | Refills: 0 | DISCHARGE

## 2021-07-29 RX ORDER — HEPARIN SODIUM 5000 [USP'U]/ML
5000 INJECTION INTRAVENOUS; SUBCUTANEOUS
Qty: 0 | Refills: 0 | DISCHARGE
Start: 2021-07-29

## 2021-07-29 RX ORDER — CHLORHEXIDINE GLUCONATE 213 G/1000ML
15 SOLUTION TOPICAL
Qty: 0 | Refills: 0 | DISCHARGE
Start: 2021-07-29

## 2021-07-29 RX ORDER — APIXABAN 2.5 MG/1
1 TABLET, FILM COATED ORAL
Qty: 60 | Refills: 0
Start: 2021-07-29 | End: 2021-08-27

## 2021-07-29 RX ORDER — VALPROIC ACID (AS SODIUM SALT) 250 MG/5ML
0 SOLUTION, ORAL ORAL
Qty: 0 | Refills: 0 | DISCHARGE
Start: 2021-07-29

## 2021-07-29 RX ADMIN — HEPARIN SODIUM 5000 UNIT(S): 5000 INJECTION INTRAVENOUS; SUBCUTANEOUS at 14:28

## 2021-07-29 RX ADMIN — LEVETIRACETAM 1000 MILLIGRAM(S): 250 TABLET, FILM COATED ORAL at 05:16

## 2021-07-29 RX ADMIN — Medication 64.8 MILLIGRAM(S): at 05:17

## 2021-07-29 RX ADMIN — PANTOPRAZOLE SODIUM 40 MILLIGRAM(S): 20 TABLET, DELAYED RELEASE ORAL at 11:23

## 2021-07-29 RX ADMIN — Medication 1 APPLICATION(S): at 05:18

## 2021-07-29 RX ADMIN — POLYETHYLENE GLYCOL 3350 17 GRAM(S): 17 POWDER, FOR SOLUTION ORAL at 11:23

## 2021-07-29 RX ADMIN — SEVELAMER CARBONATE 800 MILLIGRAM(S): 2400 POWDER, FOR SUSPENSION ORAL at 12:07

## 2021-07-29 RX ADMIN — Medication 750 MILLIGRAM(S): at 05:16

## 2021-07-29 RX ADMIN — HEPARIN SODIUM 5000 UNIT(S): 5000 INJECTION INTRAVENOUS; SUBCUTANEOUS at 05:17

## 2021-07-29 RX ADMIN — CHLORHEXIDINE GLUCONATE 1 APPLICATION(S): 213 SOLUTION TOPICAL at 05:16

## 2021-07-29 RX ADMIN — SEVELAMER CARBONATE 800 MILLIGRAM(S): 2400 POWDER, FOR SUSPENSION ORAL at 08:03

## 2021-07-29 RX ADMIN — CHLORHEXIDINE GLUCONATE 15 MILLILITER(S): 213 SOLUTION TOPICAL at 05:16

## 2021-07-29 NOTE — DISCHARGE NOTE NURSING/CASE MANAGEMENT/SOCIAL WORK - NSDCVIVACCINE_GEN_ALL_CORE_FT
Tdap; 25-Mar-2021 08:24; Katelin Singh (ALLAN); [employer]; [id number] (Exp. Date: 28-Jan-2023); IntraMuscular; Deltoid Left.; 0.5 milliLiter(s); VIS (VIS Published: 09-May-2013, VIS Presented: 25-Mar-2021);

## 2021-07-29 NOTE — DISCHARGE NOTE PROVIDER - CARE PROVIDER_API CALL
Aram Roland)  Otolaryngology  [address] [place] [address]  Phone: [phone]  Fax: [phone]  Follow Up Time:

## 2021-07-29 NOTE — DISCHARGE NOTE PROVIDER - HOSPITAL COURSE
7/6: EEg notable for possible status epilepticus. Planned for MRI today. Pt noted to be febrile yesterday with tmax of 102.9.   7/7: naeon. cxr appears to be slightly worse in effusions. pending mri  7/8: negative blood cultures, plan for MRI today   7/9: NAEON - MRI with no significant intracranial pathology.  7/10: NAEON  7/11: naeon   7/12: naeon. GOC today  7/13: naeon. waiting [facility] duscission  7/14:  naeon. waiting [facility] duscission  7/15: NAEON. [facility]  7/16: per [facility] full measures until family arrives. to get peg  7/17: NAEON, febrile overnight  7/18: NAEON - plan for PEG sergio  7/19: No acute events overnight. On abx for fever, plan for PEG today.   7/20 No acute events overnight, PEG tube in place, had another episode of fever  7/21: NAEON.  7/22: NAEON  7/23: NAEON  7/24: No acute events overnight. Pt being planned for likely transfer to facility.   7/25: naeon  7/26: NAEON  7/27. No acute events overnight. Planned for family meeting with family and SICU  7/28 No acute events overnight. Pt planned for dispo today vs. tomorrow

## 2021-07-29 NOTE — PROGRESS NOTE ADULT - SUBJECTIVE AND OBJECTIVE BOX
SICU DAILY PROGRESS NOTE    24 HOUR EVENTS:  - Authorization received for Vent facility  - Discharge pending communication with wife      HISTORY  60 y/o male s/p tracheal resection 4/23/21 presents to [facility] ER w c/o stridor and SOB. Difficulty breating is worse at night and prevents him from being able to sleep.  He denies productive cough or fever.  This is his 3rd readmission after his tracheal resection  PT has PMHx COVID+ (3/2021 - not hospitalized), PE (8/2020 - on eliquis at home), CKD, w/ recent hospitalization at [facility] after seizure w/ lingual hematoma requiring emergency tracheostomy on 3/25/21.  Patient underwent Tracheal Resection and Reconstruction on 4/23. ENT injected vocal cords few months ago. He was last discharged 5/18/21.      VITALS/I&Os  T(C): 36.9 (07-28-21 @ 20:00), Max: 37.5 (07-28-21 @ 16:00)  HR: 97 (07-29-21 @ 00:00) (92 - 104)  BP: 107/74 (07-29-21 @ 00:00) (93/61 - 120/87)  RR: 15 (07-29-21 @ 00:00) (15 - 24)  SpO2: 96% (07-29-21 @ 00:00) (95% - 99%)      07-27 @ 07:01  -  07-28 @ 07:00  --------------------------------------------------------  IN:    Enteral Tube Flush: 480 mL    Nepro with Carb Steady: 800 mL  Total IN: 1280 mL    OUT:    Rectal Tube (mL): 20 mL    Voided (mL): 1350 mL  Total OUT: 1370 mL    Total NET: -90 mL      07-28 @ 07:01  -  07-29 @ 02:57  --------------------------------------------------------  IN:    Enteral Tube Flush: 410 mL    Nepro with Carb Steady: 600 mL  Total IN: 1010 mL    OUT:    Voided (mL): 1185 mL  Total OUT: 1185 mL    Total NET: -175 mL        NEURO  RASS:  -4   GCS:  6    Exam: does not follow commands      RESPIRATORY  Mechanical Ventilation: Mode: CPAP with PS, RR (patient): 18, FiO2: 21, PEEP: 5, PS: 10, MAP: 8, PIP: 16      CARDIOVASCULAR  Exam: regular rate and rhythm  Perfusion     [x]Adequate   [ ]Inadequate  Mentation   [ ]Normal       [x]Reduced  Extremities  [x]Warm         [ ]Cool  Volume Status [ ]Hypervolemic [x]Euvolemic [ ]Hypovolemic      GI/NUTRITION  Exam: soft, nontender, nondistended, softly distended  Diet: Nondistended, gastrostomy tube in place      ACCESS DEVICES:  [x] Peripheral IV  [ ] Central Venous Line	[ ] R	[ ] L	[ ] IJ	[ ] Fem	[ ] SC	Placed:   [ ] Arterial Line		[ ] R	[ ] L	[ ] Fem	[ ] Rad	[ ] Ax	Placed:   [ ] PICC:					[ ] Mediport  [ ] Urinary Catheter, Date Placed:   [x] Necessity of urinary, arterial, and venous catheters discussed      LABS        MEDICATIONS  Neurologic Medications:  acetaminophen    Suspension .. 650 milliGRAM(s) Oral every 6 hours PRN  levETIRAcetam  Solution 1000 milliGRAM(s) Oral two times a day  PHENobarbital 64.8 milliGRAM(s) Oral two times a day  valproic  acid Syrup 750 milliGRAM(s) Oral two times a day    Respiratory Medications:  ALBUTerol    90 MICROgram(s) HFA Inhaler 2 Puff(s) Inhalation every 4 hours    Cardiovascular Medications:    Gastrointestinal Medications:  pantoprazole   Suspension 40 milliGRAM(s) Oral daily  polyethylene glycol 3350 17 Gram(s) Oral daily  senna Syrup 5 milliLiter(s) Oral at bedtime  sodium chloride 0.9% lock flush 10 milliLiter(s) IV Push every 1 hour PRN    Genitourinary Medications:    Hematologic/Oncologic Medications:  heparin   Injectable 5000 Unit(s) SubCutaneous every 8 hours    Antimicrobials/Immunologic Medications:    Endocrine/Metabolic Medications:    Topical/Other Medications:  chlorhexidine 0.12% Liquid 15 milliLiter(s) Oral Mucosa every 12 hours  chlorhexidine 4% Liquid 1 Application(s) Topical <User Schedule>  petrolatum Ophthalmic Ointment 1 Application(s) Both EYES two times a day  sevelamer carbonate Powder 800 milliGRAM(s) Oral three times a day with meals

## 2021-07-29 NOTE — DISCHARGE NOTE NURSING/CASE MANAGEMENT/SOCIAL WORK - PATIENT PORTAL LINK FT
You can access the FollowMyHealth Patient Portal offered by [facility] by registering at the following website: [email or web address]. By joining [facility] FollowMyHealth portal, you will also be able to view your health information using other applications (apps) compatible with our system.

## 2021-07-29 NOTE — PROGRESS NOTE ADULT - PROVIDER SPECIALTY LIST ADULT
CT Surgery
ENT
SICU
CT Surgery
ENT
Neurology
SICU
Anesthesia
Anesthesia
ENT
Neurology
Palliative Care
SICU
Thoracic Surgery
SICU
Thoracic Surgery
Palliative Care
Thoracic Surgery

## 2021-07-29 NOTE — PROGRESS NOTE ADULT - ATTENDING SUPERVISION STATEMENT
ACP/Resident
Resident
ACP
ACP
ACP/Resident
Resident
ACP
ACP
ACP/Resident
Resident
ACP
Resident
ACP
ACP
ACP/Resident
ACP/Resident
Resident
Resident
ACP/Resident

## 2021-07-29 NOTE — ED ADULT NURSE NOTE - NSFALLRSKHARMRISK_ED_ALL_ED
Tried to return call to  ~ Left voice message for her to call back / I will try to reach again later today    yes

## 2021-07-29 NOTE — DISCHARGE NOTE PROVIDER - NSDCMRMEDTOKEN_GEN_ALL_CORE_FT
acetaminophen 325 mg oral tablet: 3 tab(s) orally every 6 hours, As needed, Mild Pain (1 - 3)  apixaban 5 mg oral tablet: 1 tab(s) orally 2 times a day**apply free coupon if available**  cyclobenzaprine 5 mg oral tablet: 1 tab(s) orally 3 times a day, As needed, Muscle Spasm MDD:3  ipratropium-albuterol 20 mcg-100 mcg/inh inhalation aerosol: 1 puff(s) inhaled 4 times a day   labetalol 100 mg oral tablet: 1 tab(s) orally 3 times a day  levETIRAcetam 750 mg oral tablet: 1 tab(s) orally every 12 hours  Norvasc 10 mg oral tablet: 1 tab(s) orally once a day  polyethylene glycol 3350 oral powder for reconstitution: 17 gram(s) orally once a day, As Needed  Protonix 40 mg oral delayed release tablet: 1 tab(s) orally 2 times a day MDD:2 tabs  Robitussin 100 mg/5 mL oral liquid: 10 milliliter(s) orally every 4 hours, As Needed for cough

## 2021-07-29 NOTE — DISCHARGE NOTE PROVIDER - NSDCCPCAREPLAN_GEN_ALL_CORE_FT
PRINCIPAL DISCHARGE DIAGNOSIS  Diagnosis: Respiratory arrest  Assessment and Plan of Treatment:

## 2021-07-29 NOTE — PROGRESS NOTE ADULT - ATTENDING COMMENTS
61 man p/w difficult airway and hypoxia , seizures of unknown etiology, now with evidence of anoxic brain injury and myoclonic status on EEG.   Pt first has seizure in March for unknown reason which caused a severe tongue laceration. He was brought to the hospital and had stitches placed in his tongue but developed a hematoma which compromised his airway and he has to have an emergent cricothyrotomy. Pt afterwards had tracheal reconstruction. Pt for unknown reason developed complications of the trach and presented with difficulty breathing on this admission, again requiring emergent airway placement during which pt noted to be having seizure.   At this point pt has brainstem reflexes intact but otherwise doing neurologically poor, with continuous stimulus induced seizures.   EEG alone portends a bad prognosis.   Family meeting held, with pt's wife, sister-in-law, sister, brother and niece, along with residents from the SICU, myself and ethics.  Family was upset about the sequence of events leading to this admission and requested to speak with the CT surgeon. We discussed what happened on this admission and his current state. I explained to them that it was unlikely that he would return to meaningful neurologic function and could possibly stay in current state for unknown amount of time. Informed them that if they decided to continue with care at some point the trach would be made permanent and he would need a feeding tube placed. Also explained other option would be for palliation. Wife expressed that she was unable to make any decisions at this time and needed to speak with her children. They have the number for ethics and the unit and will be in touch. Would also have the surgeon and palliative team speak to her.
Encephalopathy  a.  Stable with no improvement.  poor neurologic recovery  B.  Continue phenobarb, Klonopin, Keppra    Respiratory failure  a.  With adequate gas exchange    Malnutrition, SEVERE  a.  On TF  b.  Await PEG
I agree with the history, physical, and plan, which I have reviewed and edited where appropriate.  I agree with notes/assessment and detailed interval history of the health care providers on my service.  I have personally examined the patient.  I was physically present for the key portions of the evaluation and management (E/M) service provided.  I reviewed data and laboratory tests/x-rays and all pertinent electronic images.  The patient is in SICU with diagnosis mentioned in the note.    The patient is a critical care patient with life threatening hemodynamic and metabolic instability in SICU.  The SICU team has a constant risk benefit analyzes discussion and coordinating care with the primary team and all consultants.   The plan is specified below.  61M PMH epilepsy on Keppra, COVID+ with seizure w/ lingual hematoma requiring emergent trach on 3/25 and now sp emergent tracheostomy.  Unable to decrease sedation. Family GOC discussion pending, prognosis guarded.  RASS: -4   Exam: NAD, Does not withdraw to painful stimuli. Intermittent clonic movement of chest/diaphragm  RESPIRATORY  Exam: Lungs clear   Mechanical Ventilation: Mode: AC/ CMV   CARDIOVASCULAR  Exam: RR  GI/NUTRITION  Exam: Abdomen soft, Non-tender, Non-distended.    Neurologic:  -  fentanyl & propofol  - Keppra   - Depakote   Respiratory:   - Mechanical ventilation AC/CMV  Cardiovascular:   - off vasopressors    Gastrointestinal/Nutrition:   - NPO w/ Jevity  - On Protonix   Renal/Genitourinary:  - Higginbotham in place  -Hematologic:  - - c/w SQH   Infectious Disease:   - Continue Zosyn    Disposition: SICU
I agree with the history, physical, and plan, which I have reviewed and edited where appropriate.  I agree with notes/assessment and detailed interval history of the health care providers on my service.  I have personally examined the patient.  I was physically present for the key portions of the evaluation and management (E/M) service provided.  I reviewed data and laboratory tests/x-rays and all pertinent electronic images.  The patient is in SICU with diagnosis mentioned in the note.    The patient is a critical care patient with life threatening hemodynamic and metabolic instability in SICU.  The SICU team has a constant risk benefit analyzes discussion and coordinating care with the primary team and all consultants.   The plan is specified below.  62 y/o male s/p Tracheal Resection and Reconstruction with subsequent revision tracheostomy, s/p PEG 7/19. Post op course complicated by seizures and concern for anoxic brain injury.  NEURO: unchanged  ETT in place  RESPIRATORY  Exam: Lungs clear   Mode: AC/ CMV   CARDIOVASCULAR  Exam: RR  GI/NUTRITION  Exam: Abdomen soft, Non-tender, Non-distended    PLAN:  Neurologic:  - Phenobarbital 65mg PO BID  - Depakote 750mg BID, Keppra 1000mg BID  - Klonopin 1mg TID for myoclonic jerks  Respiratory:   -f/u ABG  Cardiovascular:   - Labetalol 100 BID for hypertension   Gastrointestinal/Nutrition:   - PEG w/ TF@ Goal  - Protonix   Renal/Genitourinary:  - d/c free water  Hematologic:  - c/w SQH for VTE prophylaxis  Infectious Disease:   - Afebrile  - Disposition: SICU, Social work
Encephalopathy  a.  Stable with no improvement.  poor neurologic recovery  B.  Continue phenobarb, Klonopin, Keppra    Respiratory failure  a.  With adequate gas exchange  b s/p tracheostomy    Leukocytosis  a.  Risng WBC  b.  Pseudomonas noted on sputum cx, start cefepime     Malnutrition, SEVERE  a.  On TF  b.  Await PEG
I agree with the history, physical, and plan, which I have reviewed and edited where appropriate.  I agree with notes/assessment and detailed interval history of the health care providers on my service.  I have personally examined the patient.  I was physically present for the key portions of the evaluation and management (E/M) service provided.  I reviewed data and laboratory tests/x-rays and all pertinent electronic images.  The patient is in SICU with diagnosis mentioned in the note.    The patient is a critical care patient with life threatening hemodynamic and metabolic instability in SICU.  The SICU team has a constant risk benefit analyzes discussion and coordinating care with the primary team and all consultants.   The plan is specified below.  62 y/o male s/p Tracheal Resection and Reconstruction and subsequent revision tracheostomy on 7/2, s/p PEG 7/19. Post op course complicated by seizures and concern for anoxic brain injury.  NEUROLOGY  Exam: Sedated  HEENT  Exam: sedated  RESPIRATORY  Exam: Lungs clear   Mode: AC/ CMV   CARDIOVASCULAR  Exam: RR  GI/NUTRITION  Exam: Abdomen soft, Non-tender, Non-distended.     PLAN:  Neurologic:  - Phenobarbital 65mg PO BID  - Depakote 750mg BID, Keppra 1000mg BID  - Klonopin 1mg TID for myoclonic jerks  Respiratory:   - Mechanical ventilation AC/CMV  Cardiovascular:   -Labetalol 100 BID for hypertension   Gastrointestinal/Nutrition:   - Protonix   Renal/Genitourinary:  - 250 q 6 free water  Hematologic:  - c/w SQH for VTE prophylaxis  Infectious Disease:   Cefepime   Disposition: SICU, Social work
I agree with the history, physical, and plan, which I have reviewed and edited where appropriate.  I agree with notes/assessment and detailed interval history of the health care providers on my service.  I have personally examined the patient.  I was physically present for the key portions of the evaluation and management (E/M) service provided.  I reviewed data and laboratory tests/x-rays and all pertinent electronic images.  The patient is in SICU with diagnosis mentioned in the note.    The patient is a critical care patient with life threatening hemodynamic and metabolic instability in SICU.  The SICU team has a constant risk benefit analyzes discussion and coordinating care with the primary team and all consultants.   The plan is specified below.  62 y/o male s/p Tracheal Resection and Reconstruction sp subsequent revision tracheostomy, s/p PEG. Post op course complicated by seizures and concern for anoxic brain injury.  Sedated  HEENT  Exam: ETT in place.   RESPIRATORY  Exam: Lungs clear   Mode: AC/ CMV   CARDIOVASCULAR  Exam: RR  GI/NUTRITION  Exam: Abdomen soft, Non-tender, Non-distended. G-tube secured.    PLAN:  Neurologic:  - Phenobarbital 65mg PO BID  - Depakote 750mg BID, Keppra 1000mg BID  - Klonopin 1mg TID for myoclonic jerks  -Respiratory:   -Mechanical ventilation AC/CMV  Cardiovascular:   - Labetalol 100 BID for hypertension   Gastrointestinal/Nutrition:   - PEG w/ TF@ Goal  - Protonix daily for stress ulcer ppx  Renal/Genitourinary:  - 250 q 6 free water  Hematologic:  - c/w SQH for VTE prophylaxis  Infectious Disease:   - Afebrile    Disposition: SICU, Social work
I saw and examined the patient. I was physically present for the key portions of the evaluation and management (E/M) service provided.  I agree with the above history, physical, and plan which I have reviewed and edited where appropriate.    Ethics and palliative care meeting Tuesday afternoon  Con't current vent setting and care management     Jhony Gonzalez MD  Acute and Critical Care Surgery    The Acute Care Surgery (B Team) Attending Group Practice:  Dr. Lee Tabares, Dr. Flaco Ennis, Dr. Jhony Gonzalez, and Dr. Ulysses Forbes    Urgent issues - spectra [phone] or [phone]  Nonurgent issues - [phone]  Patient appointments or after hours - [phone]
No issues overnight. Awaiting transfer to long term facility.    I have personally interviewed when possible and examined the patient, reviewed data and laboratory tests/x-rays and all pertinent electronic images.  I was physically present for the key portions of the evaluation and management (E/M) service provided.   The SICU team has a constant risk benefit analyzes discussion with the primary team, all consultants, House Staff and PA's on all decisions.  These diagnoses are unrelated to the surgical procedure noted above.  I meet with family if needed to get further history, discuss the case and make care decisions for this patient who might not be able to participate.  Time involved in performance of separately billable procedures was not counted toward my critical care time. There is no overlap.  I spent 30 minutes ( 0800Hrs-0915Hrs in AM/ 1600Hrs-1715Hrs in PM, or other time indicated) of critical care time for the diagnoses, assessment, plan and interventions.  This time excludes time spent on separate procedures and teaching.
Patient s/p [facility] for critical airway brought to or for revision tracheostomy and stent removal  N on prop, fent, nimbex plan for neuro exam, d/c paralytic and wean down sedation, maintain on seizure ppx  resp on minimal vent settings, adequate gas exchange  cv perfusing adequate, hemodynamically stable  gi npo, start tf tomorrow  gu/renal monitor uop  heme vte ppx  id aspiration even, no pneumonia  endo no changes    The patient is a critical care patient with life threatening hemodynamic and metabolic instability in SICU.  I have personally interviewed when possible and examined the patient, reviewed data and laboratory tests/x-rays and all pertinent electronic images.  I was physically present for the key portions of the evaluation and management (E/M) service provided.   The SICU team has a constant risk benefit analyzes discussion with the primary team, all consultants, House Staff and PA's on all decisions.  These diagnoses are unrelated to the surgical procedure noted above.  I meet with family if needed to get further history, discuss the case and make care decisions for this patient who might not be able to participate.  Time involved in performance of separately billable procedures was not counted toward my critical care time. There is no overlap.  I spent 55-75 minutes ( 0800Hrs-0915Hrs in AM/ 1600Hrs-1715Hrs in PM, or other time indicated) of critical care time for the diagnoses, assessment, plan and interventions.  This time excludes time spent on separate procedures and teaching.
did not tolerate PS wean on vent, will not try trach collar trial    d/w neurology safety of switching depakote to valproate given difficulty of administration via PEG    switched to bolus tube feeds    continue care coordination for vent facility for discharge
Critical Care Dx    G40.409 Generalized tonic-clonic seizure   -video EEG planned for today, continue ativan. 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  I have personally interviewed and examined the patient, reviewed data and laboratory tests/x-rays and all pertinent electronic images.  The SICU team has a constant risk benefit analyzes discussion with the primary team, all consultants, House Staff and PA's on all decisions.   Time involved in performance of separately billable procedures was not counted toward my critical care time. There is no overlap.    I have personally provided 60 minutes of critical care time concurrently with the resident/fellow. This time excludes time spent on separate procedures and time spent teaching. I have reviewed the resident's/fellow's documentation and agree with the assessment and plan of care.  I was physically present for the key portions of the evaluation and management (E/M) service provided.      Jhony Gonzalez MD  Acute and Critical Care Surgery
Encephalopathy/ seizure disorder  a.  On ketamine gtt, wean as tolerated  b.  Continue Keppra at this time  c.  Appreciate neuro input      Respiratory failure secondary to inability to wean  a.  s/p cricothyroidotomy, with eventual tracheal revision  b.  With adequate oxygenation and ventilation  c.  No further surgical interventions per ENT/ CTVS service  Sepsis secondary to UTI  a.  Started on ceftriaxone per pending culture with +UA    At risk for malnutrition  a.  TF via feeding tube  b.  May need more permanent feeding access
Hypoxic-anoxic encephalopathy  a.  MRI pending  b.  On propofol, wean as tolerated  c.  Continue Depakote, Keppra  d.  For GOC discussion this pm    Respiratory failure  a.  With improving oxygenation and ventilation   b. s/p tracheostomy  c.  PPI for SUP    Sepsis secondary to UTI  a.  On zosyn    Malnutrition  a.  TF to goal
I agree with the history, physical, and plan, which I have reviewed and edited where appropriate.  I agree with notes/assessment and detailed interval history of the health care providers on my service.  I have personally examined the patient.  I was physically present for the key portions of the evaluation and management (E/M) service provided.  I reviewed data and laboratory tests/x-rays and all pertinent electronic images.  The patient is in SICU with diagnosis mentioned in the note.    The patient is a critical care patient with life threatening hemodynamic and metabolic instability in SICU.  The SICU team has a constant risk benefit analyzes discussion and coordinating care with the primary team and all consultants.   The plan is specified below.  60 y/o male s/p Tracheal Resection and Reconstruction sp emergent tracheostomy, s/p PEG 7/19.   Post op course complicated by seizures and concern for anoxic brain injury.  NEUROLOGY  Exam: Sedated  HEENT  Exam: Normocephalic,   RESPIRATORY  Exam: Lungs clear  Mode: AC/ CMV   CARDIOVASCULAR  Exam: RR  GI/NUTRITION  Exam: Abdomen soft, Non-tender, Non-distended.     PLAN:  Neurologic:  - Phenobarbital 65mg PO BID  - Depakote 750mg BID, Keppra 1000mg BID  - Klonopin 1mg TID for myoclonic jerks  - Respiratory:   - Mechanical ventilation AC/CMV 18/400/5/30  Cardiovascular:   - Labetalol 100 BID for hypertension  Gastrointestinal/Nutrition:   - PEG w/ TF@ Goal  - Protonix daily for stress ulcer ppx  -Renal/Genitourinary:  - d/c free water  Hematologic:  - c/w SQH   Infectious Disease:   - Cefepime (7/18- ) (7 days)    Disposition: SICU/Social work
I agree with the history, physical, and plan, which I have reviewed and edited where appropriate.  I agree with notes/assessment and detailed interval history of the health care providers on my service.  I have personally examined the patient.  I was physically present for the key portions of the evaluation and management (E/M) service provided.  I reviewed data and laboratory tests/x-rays and all pertinent electronic images.  The patient is in SICU with diagnosis mentioned in the note.    The patient is a critical care patient with life threatening hemodynamic and metabolic instability in SICU.  The SICU team has a constant risk benefit analyzes discussion and coordinating care with the primary team and all consultants.   The plan is specified below.  60 y/o male sp emergent tracheostomy and subsequent revision tracheostomy, s/p PEG and anoxic brain injury in respiratory failure  NAD, sedated   HEENT  Exam: Normocephalic, atraumatic,   RESPIRATORY  Exam: Lungs clear   Mode: AC/ CMV   CARDIOVASCULAR  Exam: RR  GI/NUTRITION  Exam: Abdomen soft, Non-tender, Non-distended.   EXT edema trace    PLAN:  Neurologic:  - Phenobarbital load w/ 700mg / 130mg q 12 --> change to PO  - Depakote 750mg BID, Keppra 1000mg BID  - Klonopin 1mg TID for myoclonic jerks  -  Respiratory:   - Mechanical ventilation AC/CMV    Cardiovascular:   - Labetalol 100 BID for hypertension     Gastrointestinal/Nutrition:   - PEG @ Goal  - Protonix daily for stress ulcer ppx  -   Renal/Genitourinary:  - 250 q 6 free water    Hematologic:  - c/w SQH for VTE prophylaxis    Infectious Disease:   Cefepime (7/18- ) (5 days)  - s/p course of Zosyn     Disposition: SICU
I agree with the history, physical, and plan, which I have reviewed and edited where appropriate.  I agree with notes/assessment and detailed interval history of the health care providers on my service.  I have personally examined the patient.  I was physically present for the key portions of the evaluation and management (E/M) service provided.  I reviewed data and laboratory tests/x-rays and all pertinent electronic images.  The patient is in SICU with diagnosis mentioned in the note.    The patient is a critical care patient with life threatening hemodynamic and metabolic instability in SICU.  The SICU team has a constant risk benefit analyzes discussion and coordinating care with the primary team and all consultants.   The plan is specified below.  61M with hypoxic brain injury; s/p tracheostomy sp PEG in respiratory failure   Not responsive to commands    HEENT  Exam: Normocephalic,     RESPIRATORY  Exam: Lungs clear   Mode: AC/ CMV     CARDIOVASCULAR  Exam:  Regular rate and rhythm.      GI/NUTRITION  Exam: Abdomen soft, Non-tender, Non-distended. PEG site CDI    PLAN:  Neurologic:  - Phenobarbital 65mg PO BID  - Depakote 750mg BID, Keppra 1000mg BID  - Klonopin 1mg TID for myoclonic jerks  -     Respiratory:   -- Mechanical ventilation AC/CMV 18/400/5/30    Cardiovascular:   -- Labetalol 100 BID for hypertension     Gastrointestinal/Nutrition:   - PEG w/ TF@ Goal  - Protonix daily for stress ulcer ppx  -Renal/Genitourinary:  - Monitor intake & output, UOP    Hematologic:    - c/w SQH     Infectious Disease:   - Afebrile      Disposition: SICU, Social work
I agree with the history, physical, and plan, which I have reviewed and edited where appropriate.  I agree with notes/assessment and detailed interval history of the health care providers on my service.  I have personally examined the patient.  I was physically present for the key portions of the evaluation and management (E/M) service provided.  I reviewed data and laboratory tests/x-rays and all pertinent electronic images.  The patient is in SICU with diagnosis mentioned in the note.    The patient is a critical care patient with life threatening hemodynamic and metabolic instability in SICU.  The SICU team has a constant risk benefit analyzes discussion and coordinating care with the primary team and all consultants.   The plan is specified below.  62 y/o male PMHx COVID+, s/p Tracheal Resection and Reconstruction and sp  tracheostomy with post op course complicated by seizures and concern for anoxic brain injury   NEUROLOGY  Exam: sedated    RESPIRATORY  Lungs clear    CARDIOVASCULAR  Exam: RR    GI/NUTRITION  Exam: Abdomen soft, Non-tender, Non-distended.   Nasogastric tube in place.      VASCULAR  Exam: Extremities warm,  PLAN:  Neurologic:  - Sedated with fentanyl & propofol  - Keppra 1000 BID s/p loading dose 1500mg (7/3)  - Depakote 500mg TID  -    Respiratory:   - Mechanical ventilation AC/CMV    Cardiovascular:   -stable off vasopressors      Gastrointestinal/Nutrition:   - w/ Jevity tube feeds via NGT at goal   - On Protonix     Renal/Genitourinary:  - Higginbotham in place    Hematologic:  - c/w SQH   Infectious Disease:   - Continue Zosyn    - plan for meeting with family, palliative, neuro early next week
I discussed the plan of care with Bib today and outlined that his underlying mental state is not changed. I brought up a gastrostomy tube as well as ongoing care for ventilator management and neurology care. She understood the current mgmt plan and will get back to us wether or not she would like a gastrostomy tube or have the ventilatory removed and allow his body to undergo natural processes. She reiterated to not call between 9am to 1pm as she is working so any further communication needs to happen outside this window.     G40.409 Generalized tonic-clonic seizure   -current mgmt scheme continued as to suppress seizure activity as much as possible   -monitor for neurologic improvement   -MRI noted, neurology relayed findings to patient's HC agent.   R06.89 Acute respiratory insufficiency   -AC/VC and CPAP trials daily  -lasix MARYAM Gonzalez MD  Acute and Critical Care Surgery    The Acute Care Surgery (B Team) Attending Group Practice:  Dr. Lee Tabares, Dr. Flaco Ennis, Dr. Jhony Gonzalez, and Dr. Ulysses Forbes    Urgent issues - spectra [phone] or [phone]  Nonurgent issues - [phone]  Patient appointments or after hours - [phone]
I saw and examined the patient. I was physically present for the key portions of the evaluation and management (E/M) service provided.  I agree with the above history, physical, and plan which I have reviewed and edited where appropriate.    G40.409 Generalized tonic-clonic seizure   -con't current regimen, EEG and MRI noted.  -He is unlikely to recover from this insult however we willl exhaust medical therapy to optimize his neurologic status  R06.89 Acute respiratory insufficiency   -con't vent, AC/VC, sedation weaned to ascertain underlying function   -will need trach/peg if ongoing care is desired by family    Family meeting this PM with multidisciplinary team     Jhony Gonzalez MD  Acute and Critical Care Surgery    The Acute Care Surgery (B Team) Attending Group Practice:  Dr. Lee Tabares, Dr. Flaco Ennis, Dr. Jhony Gonzalez, and Dr. Ulysses Forbes    Urgent issues - spectra [phone] or [phone]  Nonurgent issues - [phone]  Patient appointments or after hours - [phone]
Patient seen and examined at bedside. Remains intubated and on sedation. Continues to have chest/abdominal myoclonic movements (? diaphragmatic myoclonus). VEEG with no epileptiform activity. OK to discontinue VEEG.     isolated diaphragmatic myoclonus- possible 2/2 Toxic etiology (uremia) vs infectious etiology/aspiration with diaphragm irritation vs central etiology (hypoxic insult).    Continue keppra 1000 mg BID  MRI brain w/w/o- neurology will follow up with results  Wean off sedation as tolerated.
Patient seen and examined with neurology consult team this AM. Myoclonic movements of trunk and abdomen have improved, however are stimulus sensitive and provoked by checking cough reflex. VEEG showed generalized periodic discharges indicative of cortical hyperexcitability and increased risk for seizures; frequent intermittent myoclonic seizures also noted. He was loaded and started on Depakote on 7/5 pm for worsening myoclonic movements while attempting to wean sedation. Patient remains on sedation and is unresponsive. Pupil and cough reflex present, decreased tone in extremities, no response to noxious stimuli.    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 Will need to re assess neurological exam off of sedation and also follow up with MRI brain for anoxic injury.     Continue depakote and keppra   Can Dc VEEG for MRI   Infectious/metabolic management as per primary team
Patient with seizures despite on propofol, versed, keppra. 24 hr continuous eeg monitoring, with breakthrough seizures, increased keppra and propofol, ct head negative yesterday.  N f/u neurology, continue eeg monitoring, plan for brain mri, continue seizure regimen  resp mucus plug this am, start on pulmazyme and duonebs  cv perfusing adequate, no pressor requirements  gi npo, ngt, tf, gi ppx  gu/renal monitor uop  heme vte ppx  id no abx, send procal, if high will consider treatment for pneumonia with new consolidation on cxr  endo no changes    The patient is a critical care patient with life threatening hemodynamic and metabolic instability in SICU.  I have personally interviewed when possible and examined the patient, reviewed data and laboratory tests/x-rays and all pertinent electronic images.  I was physically present for the key portions of the evaluation and management (E/M) service provided.   The SICU team has a constant risk benefit analyzes discussion with the primary team, all consultants, House Staff and PA's on all decisions.  These diagnoses are unrelated to the surgical procedure noted above.  I meet with family if needed to get further history, discuss the case and make care decisions for this patient who might not be able to participate.  Time involved in performance of separately billable procedures was not counted toward my critical care time. There is no overlap.  I spent 55-75 minutes ( 0800Hrs-0915Hrs in AM/ 1600Hrs-1715Hrs in PM, or other time indicated) of critical care time for the diagnoses, assessment, plan and interventions.  This time excludes time spent on separate procedures and teaching.
Encephalopathy/seizure  a.  Note of persistent sz like activity overnight  b.  On propofol, Keppra, and Depakote  c., Pending MRI  d.  Likely poor neurologic outcome given background of prolonged hypoxic insult    Respiratory failure secondary to inability to wean  a.  With adequate gas exchange    At risk for malnutrition  a.  NPO, TF to goal    Sepsis secondary to UTI/sinusitis  a.  Continue zosyn, vancomycin
I agree with the history, physical, and plan, which I have reviewed and edited where appropriate.  I agree with notes/assessment and detailed interval history of the health care providers on my service.  I have personally examined the patient.  I was physically present for the key portions of the evaluation and management (E/M) service provided.  I reviewed data and laboratory tests/x-rays and all pertinent electronic images.  The patient is in SICU with diagnosis mentioned in the note.    The patient is a critical care patient with life threatening hemodynamic and metabolic instability in SICU.  The SICU team has a constant risk benefit analyzes discussion and coordinating care with the primary team and all consultants.   The plan is specified below.  60 y/o male s/p Tracheal Resection and Reconstruction and sp emergent tracheostomy with Post op course complicated by seizures and concern for anoxic brain injury.  EXAM  NEUROLOGY  Exam: Sedated, not following commands, intermittent myoclonic jerks  HEENT  Exam: Normocephalic,  RESPIRATORY  Exam: Lungs clear   Mechanical Ventilation: Mode: AC/ CMV  CARDIOVASCULAR  Exam: S1, S2.  Regular rate and rhythm.   GI/NUTRITION  Exam: Abdomen soft, Non-tender, Non-distended.   VASCULAR  Exam: Extremities warm    PLAN:  Neurologic:  - c/d propofol --> start phenobarbital 700 / 130 q 12  - Depakote 750mg BID, Keppra 1000mg BID  - Klonopin 1mg TID for myoclonic jerks  -   Respiratory:   - Mechanical ventilation AC/CMV    Cardiovascular:   - Labetalol 100 BID for hypertension     Gastrointestinal/Nutrition:   - Nepro   - Protonix     Renal/Genitourinary:  f/u Bladder scan    Hematologic:  - c/w SQH for VTE prophylaxis    Infectious Disease:   - Increasing leukocytosis, low procalcitonin, continue to monitor      Disposition: SICU
I agree with the history, physical, and plan, which I have reviewed and edited where appropriate.  I agree with notes/assessment and detailed interval history of the health care providers on my service.  I have personally examined the patient.  I was physically present for the key portions of the evaluation and management (E/M) service provided.  I reviewed data and laboratory tests/x-rays and all pertinent electronic images.  The patient is in SICU with diagnosis mentioned in the note.    The patient is a critical care patient with life threatening hemodynamic and metabolic instability in SICU.  The SICU team has a constant risk benefit analyzes discussion and coordinating care with the primary team and all consultants.   The plan is specified below.  62 y/o male s/p tracheal resection sp bronch and trach revision with an EEG demonstrated generalized cortical hyperexcitability with GPDs indicating increased risk of generalized seizure onset, Moderate to severe nonspecific diffuse or multifocal cerebral dysfunction.    EXAM  NEUROLOGY  Exam: sedated,    RESPIRATORY  Exam: Lungs clear  CARDIOVASCULAR  Exam: RR  GI/NUTRITION  Exam: Abdomen soft, Non-tender, Non-distended.   Nasogastric tube in place.    VASCULAR  Exam: Extremities warm    PLAN  NEUROLOGY  neurochecks Q1hr  continue sedation  RESPIRATORY  weaning trail  CMV  CARDIOVASCULAR  MAp>65  GI/NUTRITION  Nasogastric tube in place; continue feeds
I agree with the history, physical, and plan, which I have reviewed and edited where appropriate.  I agree with notes/assessment and detailed interval history of the health care providers on my service.  I have personally examined the patient.  I was physically present for the key portions of the evaluation and management (E/M) service provided.  I reviewed data and laboratory tests/x-rays and all pertinent electronic images.  The patient is in SICU with diagnosis mentioned in the note.    The patient is a critical care patient with life threatening hemodynamic and metabolic instability in SICU.  The SICU team has a constant risk benefit analyzes discussion and coordinating care with the primary team and all consultants.   The plan is specified below.  62 y/o male sp emergent tracheostomy and subsequent revision tracheostomy. Post op course complicated by seizures and concern for anoxic brain injury in respiratory failure  Sedated  HEENT  Exam: Normocephalic,   RESPIRATORY  Exam: Lungs clear   Mode: AC/ CMV   CARDIOVASCULAR  Exam: RR  GI/NUTRITION  Exam: Abdomen soft, Non-tender, Non-distended.   Current Diet: Diet, NPO after Midnight    PLAN:  Neurologic:  - Phenobarbital load w/ 700mg / 130mg q 12  - Depakote 750mg BID, Keppra 1000mg BID  - Klonopin 1mg TID for myoclonic jerks  - Change phenobarb to PEG, 6h after PEG placed  Respiratory:    Mechanical ventilation AC/CMV  Cardiovascular:   - Labetalol 100 BID for hypertension   Gastrointestinal/Nutrition:   - Nepro   - Protonix   Renal/Genitourinary:  - Higgin[place]am  - Monitor intake & output  Hematologic:  - c/w SQH   Infectious Disease:   - s/p course of Zosyn     Disposition: SICU
Seizure/encephalopathy  a.  On keppra, depakote  b.  Await MRI  c.  Wean propofol as tolerated  d.  Palliative consult    Respiratory failure secondary to inability to wean  a.  With adequate gas exchange  b.  s/p emergent cricothyroidotomy with tracheostomy    UTI  a.  Afebrile   b.  Continue zosyn
d/c rectal tube and administer miralax for constipation  tolerating PSV 5 with occasional need for recovery using ACV  d/c to LTC when bed available
seizure d/o, on regimen  CKD, on nepro and to start phos binder today for hyperphosphatemia  switch to bolus tube feeds  resp insufficiency, wean PSV from 10 to 5 as TV and RR permit  f/u care coordination regarding LTC
I agree with the history, physical, and plan, which I have reviewed and edited where appropriate.  I agree with notes/assessment and detailed interval history of the health care providers on my service.  I have personally examined the patient.  I was physically present for the key portions of the evaluation and management (E/M) service provided.  I reviewed data and laboratory tests/x-rays and all pertinent electronic images.  The patient is in SICU with diagnosis mentioned in the note.    The patient is a critical care patient with life threatening hemodynamic and metabolic instability in SICU.  The SICU team has a constant risk benefit analyzes discussion and coordinating care with the primary team and all consultants.   The plan is specified below.  60 y/o male sp tracheostomywith seizures and concern for anoxic brain injury.  NEUROLOGY  Exam: Sedated, not following commands, intermittent myoclonic jerks    HEENT  Exam: Normocephalic,     RESPIRATORY  Exam: Lungs clear to auscultation, Normal expansion/effort.   Mechanical Ventilation: Mode: AC/ CMV (Assist Control/ Continuous Mandatory Ventilation), RR (machine): 18, TV (machine): 400, FiO2: 30, PEEP: 5, ITime: 0.94, MAP: 11, PIP: 24    CARDIOVASCULAR  Exam: RR    GI/NUTRITION  Exam: Abdomen soft, Non-tender, Non-distended.     VASCULAR  Exam: Extremities warm,   PLAN:  Neurologic:  - Phenobarbital load w/ 700mg / 130mg q 12  - Depakote 750mg BID, Keppra 1000mg BID  - Klonopin 1mg TID for myoclonic jerks  -     Respiratory:   -Mechanical ventilation AC/CMV  Cardiovascular:   - Labetalol 100 BID for hypertension   Gastrointestinal/Nutrition:   - Nepro tube feeds via NGT at goal  - Protonix   Renal/Genitourinary:  - Higgin[place]am   Hematologic:  - c/w SQH   Infectious Disease:   - f/u fever work up (b/c, urinalysis, sputum, CXR, pro-thea)  Disposition: SICU

## 2021-07-29 NOTE — PROGRESS NOTE ADULT - NSICDXPILOT_GEN_ALL_CORE
[place]

## 2021-07-29 NOTE — PROGRESS NOTE ADULT - ASSESSMENT
61M with hypoxic brain injury; s/p tracheostomy w/ revision    PLAN:  Neurologic:  - Phenobarbital 65mg PO BID  - valproic acid 750mg BID  - Keppra 1000mg BID  - EEG significant for hyperexcitability and GPDs: cortical storming and stimulus myoclonus    Respiratory:   - s/p bronch and trach revision 7/2  - Mechanical ventilation CPAP/PSV 10/5, did not tolerate lower pressure support    Cardiovascular:   - Hemodynamically stable off vasopressors      Gastrointestinal/Nutrition:   - PEG w/ TF@ Goal, bolus feeds  - Protonix daily for stress ulcer ppx  - S/p PEG 7/19    Renal/Genitourinary:  - Monitor intake & output, UOP  - Phos 6. Started Sevelamer 800 TID    Hematologic:  - Trend H/H  - c/w SQH for VTE prophylaxis    Infectious Disease:   - Afebrile  - Sputum Cx with pseudomonas -> s/p Cefepime (7/18- 7/25) (7 days)  - Urine Cx with Citrobacter; s/p course of Zosyn     Tubes/Lines/Drains:   - PEG  - R PIV  - L PIV     Disposition: SICU pending discharge to long term vent facility

## 2021-08-20 NOTE — DISCHARGE NOTE PROVIDER - CARE PROVIDERS DIRECT ADDRESSES
[email or web address] Use Enhanced Medication Counseling?: No

## 2021-08-26 ENCOUNTER — APPOINTMENT (OUTPATIENT)
Dept: OTOLARYNGOLOGY | Facility: CLINIC | Age: 61
End: 2021-08-26

## 2022-01-01 NOTE — DISCHARGE NOTE PROVIDER - NSDCFUADDAPPT_GEN_ALL_CORE_FT
See Dr Allen in about 2 weeks.  Call for an appointment and bring a new chest X-ray when you come.   See Dr Allen in about 1 weeks.  Call for an appointment  hard copy, drawn during this pregnancy

## 2023-01-01 NOTE — PATIENT PROFILE ADULT - SW.
Cradle cap shampoo once or twice a week used as a body wash, suds up and leave on for 3-5 minutes then wash off. Moisturize well after     social work

## 2023-03-29 NOTE — PATIENT PROFILE ADULT - TOBACCO USE
Former smoker W Plasty Text: The lesion was extirpated to the level of the fat with a #15 scalpel blade.  Given the location of the defect, shape of the defect and the proximity to free margins a W-plasty was deemed most appropriate for repair.  Using a sterile surgical marker, the appropriate transposition arms of the W-plasty were drawn incorporating the defect and placing the expected incisions within the relaxed skin tension lines where possible.    The area thus outlined was incised deep to adipose tissue with a #15 scalpel blade.  The skin margins were undermined to an appropriate distance in all directions utilizing iris scissors.  The opposing transposition arms were then transposed into place in opposite direction and anchored with interrupted buried subcutaneous sutures.

## 2023-04-29 NOTE — PROGRESS NOTE ADULT - SUBJECTIVE AND OBJECTIVE BOX
ORAL AND MAXILLOFACIAL SURGERY PROGRESS NOTE    SUBJECTIVE / 24H EVENTS:  Patient seen and examined on morning rounds. Transferred to floor. No acute events overnight. Tolerating soft-nectar liquid diet po intake, voiding, and ambulating.      OBJECTIVE:  VITAL SIGNS:  Vital Signs Last 24 Hrs  T(C): 37.1 (02 Apr 2021 06:00), Max: 37.7 (01 Apr 2021 08:00)  T(F): 98.8 (02 Apr 2021 06:00), Max: 99.9 (01 Apr 2021 08:00)  HR: 83 (02 Apr 2021 06:00) (81 - 107)  BP: 116/81 (02 Apr 2021 06:00) (116/81 - 161/92)  BP(mean): 99 (01 Apr 2021 18:58) (89 - 109)  RR: 17 (02 Apr 2021 06:00) (14 - 21)  SpO2: 98% (02 Apr 2021 06:00) (93% - 99%)    Gen: AAOx3  Resp: non-labored  HEENT: trach decannulation site hemostatic w/ 4x4 atop    Oral: no edema, hemostatic, no signs of infection     MEDICATIONS  (STANDING):  amLODIPine   Tablet 10 milliGRAM(s) Oral daily  chlorhexidine 4% Liquid 1 Application(s) Topical <User Schedule>  heparin   Injectable 5000 Unit(s) SubCutaneous every 8 hours  labetalol 75 milliGRAM(s) Oral every 8 hours  levETIRAcetam  Solution 750 milliGRAM(s) Oral two times a day  polyethylene glycol 3350 17 Gram(s) Oral daily  senna 2 Tablet(s) Oral at bedtime  sodium zirconium cyclosilicate 10 Gram(s) Oral every 8 hours    MEDICATIONS  (PRN):  acetaminophen   Tablet .. 975 milliGRAM(s) Oral every 6 hours PRN Temp greater or equal to 38C (100.4F), Mild Pain (1 - 3)  bisacodyl Suppository 10 milliGRAM(s) Rectal daily PRN Constipation  oxyCODONE    IR 5 milliGRAM(s) Oral every 4 hours PRN Moderate Pain (4 - 6)  oxyCODONE    IR 10 milliGRAM(s) Oral every 4 hours PRN Severe Pain (7 - 10)                   yes

## 2023-05-31 NOTE — BRIEF OPERATIVE NOTE - NSICDXBRIEFPROCEDURE_GEN_ALL_CORE_FT
PROCEDURES:  Bronchoscopy, flexible, adult 26-May-2021 13:34:53  Susan Link  
111
PROCEDURES:  Bronchoscopy, flexible, by CT surgery 03-Jun-2021 10:43:40  Brian Stern  Bronchoscopy, rigid, adult 03-Jun-2021 10:44:37  Brian Stern  Insertion, stent, tracheal 03-Jun-2021 10:45:31  Brian Stern  Bronchoscopy, diagnostic, with C-arm fluoroscopic guidance 03-Jun-2021 10:48:41  Brian Stern

## 2023-10-26 NOTE — PATIENT PROFILE ADULT - IS THERE A SUSPICION OF ABUSE/NEGLIGENCE?
no Complex Repair And M Plasty Text: The defect edges were debeveled with a #15 scalpel blade.  The primary defect was closed partially with a complex linear closure.  Given the location of the remaining defect, shape of the defect and the proximity to free margins an M plasty was deemed most appropriate for complete closure of the defect.  Using a sterile surgical marker, an appropriate advancement flap was drawn incorporating the defect and placing the expected incisions within the relaxed skin tension lines where possible. The area thus outlined was incised deep to adipose tissue with a #15 scalpel blade. The skin margins were undermined to an appropriate distance in all directions utilizing iris scissors and carried over to close the primary defect.

## 2025-02-28 NOTE — H&P ADULT - NSRESEARCHGRANT_MLMHIDDEN_GEN_A_CORE
Patient called in to report having pain in left upper arm. Chiropractor referred patient to Cardiology with OV scheduled for Mon 3/3. Cardio thinks it may be musculoskeletal  and may require x-ray and to fu with PCP. RN tried to schedule OV for Mon 3/3 but patient requesting order for x-ray to get done today. Please follow up with patient for provider response and possible new order.    yes

## 2025-03-11 NOTE — DIETITIAN INITIAL EVALUATION ADULT. - FEEDING SKILL
Prieto reports the pain improved when he took the last course of antibiotics, and there was less blood -- it has come and gone til this past several days when it's peristent. The pain is kind of intermittent, but definitly worse for the last 4 days. He hasn't been able to monitor his weight. Since he moved out of his grandmother's house in Oct, 2024 he has not seen his mesalamine.   
independent

## 2025-05-28 NOTE — PATIENT PROFILE ADULT - NSPROPTRIGHTREPNAME_GEN_A__NUR
Detail Level: Zone Photo Preface (Leave Blank If You Do Not Want): Photographs were obtained today Jeannie Zambrano

## 2025-07-16 NOTE — DIETITIAN INITIAL EVALUATION ADULT. - NUTRITION DIAGNOSIS
[facility] CLINICAL PHARMACY: ADHERENCE REVIEW  Identified care gap per [employer]: fills at [facility]: ACE/ARB adherence    Patient also appears to be prescribed: Statin    ASSESSMENT    ACE/ARB ADHERENCE    Insurance Records claims through 2025 (Prior Year PDC = not reported; YTD PDC = FIRST FILL; Potential Fail Date: 2025):   LOSARTAN POT TAB 25MG  last filled on 2025 for 90 day supply. Next refill due: 2025    Prescribed si tablet/capsule daily    Per Insurer Portal: last filled on 2025 for 90 day supply.     Per [facility]: last picked up on 2025 for 90 day supply. Will get  90 day supply ready to  since past due.    BP Readings from Last 3 Encounters:   25 118/86   25 (!) 140/78   01/15/25 132/86     CrCl cannot be calculated (Patient's most recent lab result is older than the maximum 180 days allowed.).  Lab Results   Component Value Date    CREATININE 2.3 (H) 2024     Lab Results   Component Value Date    K 3.7 2024     STATIN ADHERENCE    Insurance Records claims through 2025 (Prior Year PDC = not reported; YTD PDC = 100%; Potential Fail Date: 2025):   ROSUVASTATIN TAB 40MG  last filled on 2025 for 90 day supply. Next refill due: 2025    Prescribed si tablet/capsule daily    Per Insurer Portal: last filled on 2025 for 90 day supply.     Per [facility]: last picked up on 2025 for 90 day supply. Will get  90 day supply ready to  since past due.  Will be available for  on 2025.     Lab Results   Component Value Date    CHOL 144 2021    TRIG 233 (H) 2024    HDL 49 2023    LDLDIRECT 57 2021     Lab Results   Component Value Date    LDL 48 2023    LDLDIRECT 57 2021      ALT   Date Value Ref Range Status   2024 7 (L) 10 - 40 U/L Final     AST   Date Value Ref Range Status   2024 19 15 - 37 U/L Final     The ASCVD 
no...